# Patient Record
Sex: FEMALE | Race: WHITE | NOT HISPANIC OR LATINO | Employment: FULL TIME
[De-identification: names, ages, dates, MRNs, and addresses within clinical notes are randomized per-mention and may not be internally consistent; named-entity substitution may affect disease eponyms.]

---

## 2017-11-19 ENCOUNTER — HEALTH MAINTENANCE LETTER (OUTPATIENT)
Age: 32
End: 2017-11-19

## 2020-03-02 ENCOUNTER — HEALTH MAINTENANCE LETTER (OUTPATIENT)
Age: 35
End: 2020-03-02

## 2020-10-27 ENCOUNTER — COMMUNICATION - HEALTHEAST (OUTPATIENT)
Dept: SURGERY | Facility: CLINIC | Age: 35
End: 2020-10-27

## 2020-10-27 ENCOUNTER — OFFICE VISIT - HEALTHEAST (OUTPATIENT)
Dept: SURGERY | Facility: CLINIC | Age: 35
End: 2020-10-27

## 2020-10-27 ENCOUNTER — AMBULATORY - HEALTHEAST (OUTPATIENT)
Dept: SURGERY | Facility: CLINIC | Age: 35
End: 2020-10-27

## 2020-10-27 DIAGNOSIS — E66.01 MORBID OBESITY (H): ICD-10-CM

## 2020-10-27 DIAGNOSIS — K91.2 POSTOPERATIVE INTESTINAL MALABSORPTION: ICD-10-CM

## 2020-10-27 DIAGNOSIS — G43.709 CHRONIC MIGRAINE WITHOUT AURA WITHOUT STATUS MIGRAINOSUS, NOT INTRACTABLE: ICD-10-CM

## 2020-10-27 DIAGNOSIS — N39.3 STRESS INCONTINENCE OF URINE: ICD-10-CM

## 2020-10-27 DIAGNOSIS — K21.9 GASTROESOPHAGEAL REFLUX DISEASE WITHOUT ESOPHAGITIS: ICD-10-CM

## 2020-10-27 DIAGNOSIS — M54.50 LOW BACK PAIN, UNSPECIFIED BACK PAIN LATERALITY, UNSPECIFIED CHRONICITY, UNSPECIFIED WHETHER SCIATICA PRESENT: ICD-10-CM

## 2020-10-27 DIAGNOSIS — M72.2 PLANTAR FASCIITIS: ICD-10-CM

## 2020-10-27 ASSESSMENT — MIFFLIN-ST. JEOR: SCORE: 1930.13

## 2020-11-02 ENCOUNTER — COMMUNICATION - HEALTHEAST (OUTPATIENT)
Dept: SURGERY | Facility: CLINIC | Age: 35
End: 2020-11-02

## 2020-11-06 ENCOUNTER — COMMUNICATION - HEALTHEAST (OUTPATIENT)
Dept: SURGERY | Facility: CLINIC | Age: 35
End: 2020-11-06

## 2020-12-01 ENCOUNTER — OFFICE VISIT - HEALTHEAST (OUTPATIENT)
Dept: SURGERY | Facility: CLINIC | Age: 35
End: 2020-12-01

## 2020-12-01 DIAGNOSIS — K91.2 POSTOPERATIVE MALABSORPTION: ICD-10-CM

## 2020-12-01 DIAGNOSIS — R79.89 INCREASED PTH LEVEL: ICD-10-CM

## 2020-12-01 ASSESSMENT — MIFFLIN-ST. JEOR: SCORE: 1876.6

## 2020-12-04 ENCOUNTER — COMMUNICATION - HEALTHEAST (OUTPATIENT)
Dept: SURGERY | Facility: CLINIC | Age: 35
End: 2020-12-04

## 2020-12-14 ENCOUNTER — HEALTH MAINTENANCE LETTER (OUTPATIENT)
Age: 35
End: 2020-12-14

## 2020-12-23 ENCOUNTER — COMMUNICATION - HEALTHEAST (OUTPATIENT)
Dept: SURGERY | Facility: CLINIC | Age: 35
End: 2020-12-23

## 2020-12-23 ENCOUNTER — OFFICE VISIT - HEALTHEAST (OUTPATIENT)
Dept: SURGERY | Facility: CLINIC | Age: 35
End: 2020-12-23

## 2020-12-23 DIAGNOSIS — E66.01 OBESITY, CLASS III, BMI 40-49.9 (MORBID OBESITY) (H): ICD-10-CM

## 2020-12-23 DIAGNOSIS — Z98.84 BARIATRIC SURGERY STATUS: ICD-10-CM

## 2020-12-23 ASSESSMENT — MIFFLIN-ST. JEOR: SCORE: 1853.02

## 2021-01-21 ENCOUNTER — COMMUNICATION - HEALTHEAST (OUTPATIENT)
Dept: SURGERY | Facility: CLINIC | Age: 36
End: 2021-01-21

## 2021-01-21 DIAGNOSIS — E66.01 MORBID OBESITY (H): ICD-10-CM

## 2021-01-21 DIAGNOSIS — K91.2 POSTOPERATIVE INTESTINAL MALABSORPTION: ICD-10-CM

## 2021-02-02 ENCOUNTER — OFFICE VISIT - HEALTHEAST (OUTPATIENT)
Dept: SURGERY | Facility: CLINIC | Age: 36
End: 2021-02-02

## 2021-02-02 ENCOUNTER — COMMUNICATION - HEALTHEAST (OUTPATIENT)
Dept: SURGERY | Facility: CLINIC | Age: 36
End: 2021-02-02

## 2021-02-02 DIAGNOSIS — K91.2 POSTOPERATIVE INTESTINAL MALABSORPTION: ICD-10-CM

## 2021-02-02 DIAGNOSIS — E66.01 MORBID OBESITY (H): ICD-10-CM

## 2021-02-02 ASSESSMENT — MIFFLIN-ST. JEOR: SCORE: 1830.34

## 2021-03-04 ENCOUNTER — COMMUNICATION - HEALTHEAST (OUTPATIENT)
Dept: SURGERY | Facility: CLINIC | Age: 36
End: 2021-03-04

## 2021-03-04 DIAGNOSIS — Z98.84 BARIATRIC SURGERY STATUS: ICD-10-CM

## 2021-03-04 DIAGNOSIS — K91.2 POSTOPERATIVE INTESTINAL MALABSORPTION: ICD-10-CM

## 2021-03-04 DIAGNOSIS — R79.89 INCREASED PTH LEVEL: ICD-10-CM

## 2021-04-18 ENCOUNTER — HEALTH MAINTENANCE LETTER (OUTPATIENT)
Age: 36
End: 2021-04-18

## 2021-06-05 VITALS — BODY MASS INDEX: 41.48 KG/M2 | WEIGHT: 249 LBS | HEIGHT: 65 IN

## 2021-06-05 VITALS — BODY MASS INDEX: 43.18 KG/M2 | HEIGHT: 65 IN | WEIGHT: 259.2 LBS

## 2021-06-05 VITALS — HEIGHT: 65 IN | BODY MASS INDEX: 45.15 KG/M2 | WEIGHT: 271 LBS

## 2021-06-05 VITALS — BODY MASS INDEX: 42.32 KG/M2 | WEIGHT: 254 LBS | HEIGHT: 65 IN

## 2021-06-12 NOTE — PROGRESS NOTES
"Marizol Granados is a 35 y.o. female who is being evaluated via a billable video visit.      The patient has been notified of following:     \"This video visit will be conducted via a call between you and your physician/provider. We have found that certain health care needs can be provided without the need for an in-person physical exam.  This service lets us provide the care you need with a video conversation.  If a prescription is necessary we can send it directly to your pharmacy.  If lab work is needed we can place an order for that and you can then stop by our lab to have the test done at a later time.    Video visits are billed at different rates depending on your insurance coverage. Please reach out to your insurance provider with any questions.    If during the course of the call the physician/provider feels a video visit is not appropriate, you will not be charged for this service.\"    Patient has given verbal consent to a Video visit? Yes  How would you like to obtain your AVS? AVS Preference: MyChart.  If dropped by the video visit, the video invitation should be sent to: Send to e-mail at: [email or web address]  Will anyone else be joining your video visit? No        Video Start Time: 8:15 am    Additional provider notes:     Bariatric Follow Up Visit with a History of Previous Bariatric Surgery     Date of visit: 10/27/2020  Physician: Joellen Vidal MD  Primary Care Provider:  Luz Maria Niño MD  Marizol Granados   35 y.o.  female    Date of Surgery: 3/6/2008  Initial Weight: 268#  Initial BMI: 268#  Today's Weight:   Wt Readings from Last 1 Encounters:   10/27/20 (!) 271 lb (122.9 kg)     Body mass index is 45.1 kg/m .      Assessment and Plan     Assessment: Marizol is a 35 y.o. year old female who is 12 yrs s/p  Brea en Y Gastric Bypass with Dr. Munguia  aMrizol LOBATO Eitanaleksandr feels as if she has not achieved the goals she hoped to accomplish through bariatric surgery and weight loss.    Encounter Diagnoses "   Name Primary?     Postoperative intestinal malabsorption      Gastroesophageal reflux disease without esophagitis      Stress incontinence of urine      Chronic migraine without aura without status migrainosus, not intractable      Low back pain, unspecified back pain laterality, unspecified chronicity, unspecified whether sciatica present      Plantar fasciitis      Morbid obesity (H)          Current Outpatient Medications:      BELSOMRA 20 mg Tab, Take 1 tablet by mouth daily., Disp: , Rfl:      buPROPion (WELLBUTRIN SR) 200 MG 12 hr tablet, Take 1 tablet by mouth 2 (two) times a day., Disp: , Rfl:      calcium polycarbophiL (FIBERCON) 625 mg tablet, Take 1,250 mg by mouth daily., Disp: , Rfl:      cholecalciferol, vitamin D3, (VITAMIN D3) 50 mcg (2,000 unit) capsule, Take 1,000 Units by mouth daily., Disp: , Rfl:      colestipoL (COLESTID) 1 gram tablet, Take 1 tablet by mouth 2 (two) times a day., Disp: , Rfl:      cyanocobalamin, vitamin B-12, (CYANOCOBALAMIN) 1,000 mcg tablet, Take 1,000 mcg by mouth daily., Disp: , Rfl:      desvenlafaxine succinate (PRISTIQ) 100 MG 24 hr tablet, Take 1 tablet by mouth 2 (two) times a day., Disp: , Rfl:      diphenoxylate-atropine (LOMOTIL) 2.5-0.025 mg per tablet, Take 1-2 tablets by mouth 2 (two) times a day as needed., Disp: , Rfl:      fluconazole (DIFLUCAN) 150 MG tablet, Take 1 tablet by mouth daily., Disp: , Rfl:      fluticasone propionate (FLOVENT HFA) 220 mcg/actuation inhaler, Inhale 2 puffs., Disp: , Rfl:      galcanezumab-gnlm 120 mg/mL PnIj, Inject 120 mg under the skin every 30 (thirty) days., Disp: , Rfl:      levonorgestreL (MIRENA) 20 mcg/24 hours (5 yrs) 52 mg IUD, 1 Intra Uterine Device by Intrauterine route., Disp: , Rfl:      levothyroxine (SYNTHROID, LEVOTHROID) 125 MCG tablet, Take 125 mcg by mouth daily., Disp: , Rfl:      loperamide (IMODIUM A-D) 2 mg tablet, Take 2 mg by mouth daily as needed., Disp: , Rfl:      LORazepam (ATIVAN) 1 MG tablet,  "Take 1 tablet by mouth daily as needed., Disp: , Rfl:      ondansetron (ZOFRAN) 4 MG tablet, Take 4 mg by mouth daily as needed., Disp: , Rfl:      QUEtiapine (SEROQUEL) 200 MG tablet, Take 1 tablet by mouth at bedtime., Disp: , Rfl:      ramelteon (ROZEREM) 8 mg tablet, Take 1 tablet by mouth at bedtime., Disp: , Rfl:      traZODone (DESYREL) 150 MG tablet, Take 2 tablets by mouth at bedtime., Disp: , Rfl:      zolpidem (AMBIEN) 5 MG tablet, Take 1 tablet by mouth at bedtime., Disp: , Rfl:      famotidine (PEPCID) 20 MG tablet, Take 1 tablet by mouth daily as needed., Disp: , Rfl:      phentermine (ADIPEX-P) 37.5 mg tablet, Take 1/2 to 1 tablet in the morning., Disp: 90 tablet, Rfl: 0     topiramate (TOPAMAX) 25 MG tablet, Take 1-2 tablets (25-50 mg total) by mouth 2 (two) times a day., Disp: 180 tablet, Rfl: prn    Plan: Dietitian. Annual labs. Topamax for migraine prevention. Phentermine for appetite suppression. Has mirena for birth control. Change B-12 to Sublingual or \"rapid dissolve\" Will wait for labs to see if need to increase MVI to 2/d. Wait for labs to see if vitamin D increase is indicated. Continue going to the gym.     Return in about 1 day (around 10/28/2020) for dietitian then me 1 month after that. .    Bariatric Surgery Review     Interim History/LifeChanges: Initial weight 268# RYGB 2008 at [facility] [place]. Munguia. Lost to 200#. She did not f/u at [facility] [place]. She takes her B-12 by capsule. Her psychiatrist followed her vitamin levels to some extent. She takes one MVI daily. Her vitamin D is 2,000 and she takes 2 daily, She takes align pro-biotic and fiber. She has had multiple colonoscopies, endoscopies and has been diagnosed with IBS. She has had stress ulcers. She has had problems with her teeth. She has mirena for birth control. She used bupropion. She has used topamax in the past.     Patient Concerns: weight regain  Appetite (1-10): up and down  GERD: treated with PPI    Medication changes: off of " lithium recently, off of effexor, down on seroquel    Vitamin Intake:   B-12   capsule   MVI  one a day   Vitamin D  4,000U   Calcium   none     Other  none              LABS: ordered    Nausea no  Vomiting no  Constipation no  Diarrhea yes  Rashes yes  Hair Loss yes  Calf tenderness no  Breathing difficulty no  Reactive Hypoglycemia yes  Light Headedness sometimes   Moods stable    12 point ROS as above and otherwise negative      Habits:  Alcohol: 0-1/month  Tobacco: no  Caffeine diet soda  NSAIDS occasional ibuprofen  Exercise Routine: ADLs and goes to the gym 3X/wk  3 meals/day yes  Protein first yes  60grams/day  Water Separate from meals yes  Calorie Containing Beverages no  Restaurant eating/wk 0-1  Sleeping 6 hours/night-needs more  Stress low/moderate  CPAP: NA  Contraception: mirena  DEXA:not indicated for age <45    Social History     Social History     Socioeconomic History     Marital status: Single     Spouse name: Not on file     Number of children: Not on file     Years of education: Not on file     Highest education level: Not on file   Occupational History     Not on file   Social Needs     Financial resource strain: Not on file     Food insecurity     Worry: Not on file     Inability: Not on file     Transportation needs     Medical: Not on file     Non-medical: Not on file   Tobacco Use     Smoking status: Never Smoker     Smokeless tobacco: Never Used   Substance and Sexual Activity     Alcohol use: Yes     Alcohol/week: 0.0 - 1.0 standard drinks     Comment: maybe one a month     Drug use: Never     Sexual activity: Yes     Partners: Male   Lifestyle     Physical activity     Days per week: Not on file     Minutes per session: Not on file     Stress: Not on file   Relationships     Social connections     Talks on phone: Not on file     Gets together: Not on file     Attends Orthodoxy service: Not on file     Active member of club or organization: Not on file     Attends meetings of clubs or  organizations: Not on file     Relationship status: Not on file     Intimate partner violence     Fear of current or ex partner: Not on file     Emotionally abused: Not on file     Physically abused: Not on file     Forced sexual activity: Not on file   Other Topics Concern     Not on file   Social History Narrative    Single. 2 children 9 months apart.    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 Working from home       Past Medical History     Past Medical History:   Diagnosis Date     Deep vein thrombosis (H)      Depression      Disease of thyroid gland      GERD (gastroesophageal reflux disease)      Hypothyroidism      Low back pain      Migraine      Plantar fasciitis      Postoperative intestinal malabsorption      Pulmonary embolism (H)      Urinary incontinence      Problem List     Patient Active Problem List   Diagnosis     Therapeutic drug monitoring     Social phobia     Severe obesity (H)     S/P gastric bypass     Restless legs     Recurrent major depressive disorder (H)     Psychological factor affecting physical condition     Persistent depressive disorder     Other B-complex deficiencies     Morbid obesity with BMI of 40.0-44.9, adult (H)     Moderate persistent asthma     Irritable bowel syndrome with both constipation and diarrhea     Intractable migraine without aura and with status migrainosus     Insomnia     Hypothyroidism     Hypercholesterolemia     History of thromboembolism     History of MRSA infection     Generalized anxiety disorder     Frequent UTI     Chronic fatigue     Allergic rhinitis     Acne     Abdominal pain, chronic, epigastric     Abdominal pain     Genital herpes simplex     Postoperative intestinal malabsorption     Depression     GERD (gastroesophageal reflux disease)     Urinary incontinence     Migraine     Low back pain     Plantar fasciitis     Morbid obesity (H)     Medications     Current Outpatient Medications    Medication Sig     BELSOMRA 20 mg Tab Take 1 tablet by mouth daily.     buPROPion (WELLBUTRIN SR) 200 MG 12 hr tablet Take 1 tablet by mouth 2 (two) times a day.     calcium polycarbophiL (FIBERCON) 625 mg tablet Take 1,250 mg by mouth daily.     cholecalciferol, vitamin D3, (VITAMIN D3) 50 mcg (2,000 unit) capsule Take 1,000 Units by mouth daily.     colestipoL (COLESTID) 1 gram tablet Take 1 tablet by mouth 2 (two) times a day.     cyanocobalamin, vitamin B-12, (CYANOCOBALAMIN) 1,000 mcg tablet Take 1,000 mcg by mouth daily.     desvenlafaxine succinate (PRISTIQ) 100 MG 24 hr tablet Take 1 tablet by mouth 2 (two) times a day.     diphenoxylate-atropine (LOMOTIL) 2.5-0.025 mg per tablet Take 1-2 tablets by mouth 2 (two) times a day as needed.     fluconazole (DIFLUCAN) 150 MG tablet Take 1 tablet by mouth daily.     fluticasone propionate (FLOVENT HFA) 220 mcg/actuation inhaler Inhale 2 puffs.     galcanezumab-gnlm 120 mg/mL PnIj Inject 120 mg under the skin every 30 (thirty) days.     levonorgestreL (MIRENA) 20 mcg/24 hours (5 yrs) 52 mg IUD 1 Intra Uterine Device by Intrauterine route.     levothyroxine (SYNTHROID, LEVOTHROID) 125 MCG tablet Take 125 mcg by mouth daily.     loperamide (IMODIUM A-D) 2 mg tablet Take 2 mg by mouth daily as needed.     LORazepam (ATIVAN) 1 MG tablet Take 1 tablet by mouth daily as needed.     ondansetron (ZOFRAN) 4 MG tablet Take 4 mg by mouth daily as needed.     QUEtiapine (SEROQUEL) 200 MG tablet Take 1 tablet by mouth at bedtime.     ramelteon (ROZEREM) 8 mg tablet Take 1 tablet by mouth at bedtime.     traZODone (DESYREL) 150 MG tablet Take 2 tablets by mouth at bedtime.     zolpidem (AMBIEN) 5 MG tablet Take 1 tablet by mouth at bedtime.     famotidine (PEPCID) 20 MG tablet Take 1 tablet by mouth daily as needed.     phentermine (ADIPEX-P) 37.5 mg tablet Take 1/2 to 1 tablet in the morning.     topiramate (TOPAMAX) 25 MG tablet Take 1-2 tablets (25-50 mg total) by mouth 2  "(two) times a day.     Surgical History     Past Surgical History  She has a past surgical history that includes Brea-en-y procedure and Cholecystectomy.    Objective-Exam     Constitutional:  Ht 5' 5\" (1.651 m)   Wt (!) 271 lb (122.9 kg)   BMI 45.10 kg/m    Height: 5' 5\" (1.651 m) (10/27/2020  8:00 AM)  Initial Weight: 268 lbs (10/27/2020  8:00 AM)  Weight: (!) 271 lb (122.9 kg) (10/27/2020  8:00 AM)  Weight loss from initial: -3 (10/27/2020  8:00 AM)  % Weight loss: -1.12 % (10/27/2020  8:00 AM)  BMI (Calculated): 45.1 (10/27/2020  8:00 AM)    General:  Pleasant and in no acute distress   Psychiatric: alert and oriented X3, mood and affect normal    Counseling     We reviewed the important post op bariatric recommendations:  -eating 3 meals daily  -eating protein first, getting >60gm protein daily  -eating slowly, chewing food well  -avoiding/limiting calorie containing beverages  -drinking water 15-30 minutes before or after meals  -choosing wheat, not white with breads, crackers, pastas, ashley, bagels, tortillas, rice  -limiting restaurant or cafeteria eating to twice a week or less    We discussed the importance of restorative sleep and stress management in maintaining a healthy weight.  We discussed the National Weight Control Registry healthy weight maintenance strategies and ways to optimize metabolism.  We discussed the importance of physical activity including cardiovascular and strength training in maintaining a healthier weight.    We discussed the importance of life-long vitamin supplementation and life-long  follow-up.    Marizol was reminded that, to avoid marginal ulcers she should avoid tobacco at all, alcohol in excess, caffeine in excess, and NSAIDS (unless indicated for cardioprotection or othewise and opposed by a PPI).    Joellen Vidal MD, FAAFP  [facility].  10/27/2020  8:35 AM            Video-Visit Details    Type of service:  Video Visit    Video End Time (time " video stopped): 9:15am  Originating Location (pt. Location): Home    Distant Location (provider location):  [facility]     Platform used for Video Visit: Brii Vidal MD

## 2021-06-12 NOTE — PATIENT INSTRUCTIONS - HE
Thank you for your interest in Support Group!  We currently have two options for support group but they are in the virtual format only at this time.  Both groups are using [employer] Teams for their platform and you can access it through the web or an jarad that can be downloaded to a smart phone if you have one.      The Pre- and Post-op Connections Group is on the second Tuesday of the month from 6:30-8pm and is hosted by Bruce Pfeiffer, PhD.  If you are interested in this group, you will need to email him at [email or web address] each month and then he will in turn send you the invitation to join.      We also have a Post-op focused Connections Group the 4th Wednesday of the month from 11am-12pm that is mostly geared toward post-operative patients who are past three months post-op.  This group is hosted by JAIME Cortez, CBN, CIC and you can email her to join the group at [email or web address] each month and she will send you an invite similar to Bruce's group.  If you decide you would like to be a regular attender at this group, we can add you to an automatic invitation list of people.    Please let us know if you have any questions.     Thank you,   [facility] [facility]

## 2021-06-12 NOTE — TELEPHONE ENCOUNTER
Called patient when she was not connected for 9 am nutrition appointment. Left message to connect to video call and sent link via SMS text. Also, provided clinic number to call to reschedule.     Otilia Soto RD

## 2021-06-13 NOTE — PROGRESS NOTES
"Marizol Granados is a 35 y.o. female who is being evaluated via a billable video visit.      The patient has been notified of following:     \"This video visit will be conducted via a call between you and your physician/provider. We have found that certain health care needs can be provided without the need for an in-person physical exam.  This service lets us provide the care you need with a video conversation.  If a prescription is necessary we can send it directly to your pharmacy.  If lab work is needed we can place an order for that and you can then stop by our lab to have the test done at a later time.    Video visits are billed at different rates depending on your insurance coverage. Please reach out to your insurance provider with any questions.    If during the course of the call the physician/provider feels a video visit is not appropriate, you will not be charged for this service.\"    Patient has given verbal consent to a Video visit? Yes  How would you like to obtain your AVS? AVS Preference: MyChart.  If dropped by the video visit, the video invitation should be sent to: Send to e-mail at: [email or web address]  Will anyone else be joining your video visit? No        Video Start Time: 8:15 am    Additional provider notes:    Bariatric Follow Up Visit with a History of Previous Bariatric Surgery     Date of visit: 12/1/2020  Physician: Joellen Vidal MD  Primary Care Provider:  Luz Maria Niño MD  Marizol Granados   35 y.o.  female    Date of Surgery: 3/6/2008  Initial Weight: 268#  Initial BMI:   Today's Weight:   Wt Readings from Last 1 Encounters:   12/01/20 (!) 259 lb 3.2 oz (117.6 kg)     Body mass index is 43.13 kg/m .      Assessment and Plan     Assessment: Marizol is a 35 y.o. year old female who is 12 1/2 yrs s/p  Brea en Y Gastric Bypass with Dr. Ezra Fontana LUIS ALFREDO Roberta feels as if she was close achieving the goals she hoped to accomplish through bariatric surgery and weight loss.  She had " reached 200# but has had weight regain    Encounter Diagnoses   Name Primary?     Postoperative malabsorption Yes     Increased PTH level          Current Outpatient Medications:      BELSOMRA 20 mg Tab, Take 1 tablet by mouth daily., Disp: , Rfl:      buPROPion (WELLBUTRIN SR) 200 MG 12 hr tablet, Take 1 tablet by mouth 2 (two) times a day., Disp: , Rfl:      calcium polycarbophiL (FIBERCON) 625 mg tablet, Take 1,250 mg by mouth daily., Disp: , Rfl:      cholecalciferol, vitamin D3, (VITAMIN D3) 50 mcg (2,000 unit) capsule, Take 1,000 Units by mouth daily., Disp: , Rfl:      colestipoL (COLESTID) 1 gram tablet, Take 1 tablet by mouth 2 (two) times a day., Disp: , Rfl:      cyanocobalamin, vitamin B-12, (CYANOCOBALAMIN) 1,000 mcg tablet, Take 1,000 mcg by mouth daily., Disp: , Rfl:      desvenlafaxine succinate (PRISTIQ) 100 MG 24 hr tablet, Take 1 tablet by mouth 2 (two) times a day., Disp: , Rfl:      diphenoxylate-atropine (LOMOTIL) 2.5-0.025 mg per tablet, Take 1-2 tablets by mouth 2 (two) times a day as needed., Disp: , Rfl:      famotidine (PEPCID) 20 MG tablet, Take 1 tablet by mouth daily as needed., Disp: , Rfl:      fluconazole (DIFLUCAN) 150 MG tablet, Take 1 tablet by mouth daily., Disp: , Rfl:      fluticasone propionate (FLOVENT HFA) 220 mcg/actuation inhaler, Inhale 2 puffs., Disp: , Rfl:      galcanezumab-gnlm 120 mg/mL PnIj, Inject 120 mg under the skin every 30 (thirty) days., Disp: , Rfl:      levonorgestreL (MIRENA) 20 mcg/24 hours (5 yrs) 52 mg IUD, 1 Intra Uterine Device by Intrauterine route., Disp: , Rfl:      levothyroxine (SYNTHROID, LEVOTHROID) 125 MCG tablet, Take 125 mcg by mouth daily., Disp: , Rfl:      loperamide (IMODIUM A-D) 2 mg tablet, Take 2 mg by mouth daily as needed., Disp: , Rfl:      LORazepam (ATIVAN) 1 MG tablet, Take 1 tablet by mouth daily as needed., Disp: , Rfl:      ondansetron (ZOFRAN) 4 MG tablet, Take 4 mg by mouth daily as needed., Disp: , Rfl:      phentermine  "(ADIPEX-P) 37.5 mg tablet, Take 1/2 to 1 tablet in the morning., Disp: 90 tablet, Rfl: 0     QUEtiapine (SEROQUEL) 200 MG tablet, Take 1 tablet by mouth at bedtime., Disp: , Rfl:      ramelteon (ROZEREM) 8 mg tablet, Take 1 tablet by mouth at bedtime., Disp: , Rfl:      topiramate (TOPAMAX) 25 MG tablet, Take 1-2 tablets (25-50 mg total) by mouth 2 (two) times a day., Disp: 180 tablet, Rfl: prn     traZODone (DESYREL) 150 MG tablet, Take 2 tablets by mouth at bedtime., Disp: , Rfl:      zolpidem (AMBIEN) 5 MG tablet, Take 1 tablet by mouth at bedtime., Disp: , Rfl:     Plan: Discussed management of constipation. Encouraged to stay the course. Dietitian visit. PTH and ferritin in 6 months. See GYN provider re: menstrual irregularities.  Discussed DEXA  And decided to defer. She increased her D through winter and will see if PTH comes down. She is optimizing her calcium intake    Return for Next scheduled follow up.    Bariatric Surgery Review     Interim History/LifeChanges: Stress is high. Menstrual changes despite Mirena in place for 1 year. Feels exhausted daily-doesn't know why. 2 kids with ADHD distance learning. Acne break out.  PTH up, ferritin marginal. Other labs look good. She has lost 12#. Sleep study \"inconclusive\" Decreased seroquel. Constipated    Patient Concerns: follow up and weight  Appetite (1-10): OK  GERD: no    Medication changes: decreased seroquel and sleeping OK    Vitamin Intake:   B-12   SL   MVI  2/d   Vitamin D  doubled for the winter now taking 10KU/d   Calcium   6418-9461 between food and supplement     Other                LABS: \"Reviewed  PTH increased iron marginal  Nausea yes-has zofran and uses rarely  Vomiting no  Constipation yes  Diarrhea no  Rashes no  Hair Loss no  Calf tenderness no  Breathing difficulty no  Reactive Hypoglycemia no  Light Headedness no   Moods OK    12 point ROS as above and otherwise negative      Habits:  Alcohol: no  Tobacco: no  Caffeine no  NSAIDS " no  Exercise Routine: walks to the mailbox daily across the apartment   3 meals/day yes  Protein first yes  60 grams/day  Water Separate from meals yes  Calorie Containing Beverages no. [employer] and emmett  Restaurant eating/wk no  Sleeping OK  Stress high  CPAP: Not needed  Contraception: mirena  DEXA:not indicated d/t age <45    Social History     Social History     Socioeconomic History     Marital status: Single     Spouse name: Not on file     Number of children: Not on file     Years of education: Not on file     Highest education level: Not on file   Occupational History     Not on file   Social Needs     Financial resource strain: Not on file     Food insecurity     Worry: Not on file     Inability: Not on file     Transportation needs     Medical: Not on file     Non-medical: Not on file   Tobacco Use     Smoking status: Never Smoker     Smokeless tobacco: Never Used   Substance and Sexual Activity     Alcohol use: Yes     Alcohol/week: 0.0 - 1.0 standard drinks     Comment: maybe one a month     Drug use: Never     Sexual activity: Yes     Partners: Male   Lifestyle     Physical activity     Days per week: Not on file     Minutes per session: Not on file     Stress: Not on file   Relationships     Social connections     Talks on phone: Not on file     Gets together: Not on file     Attends Buddhist service: Not on file     Active member of club or organization: Not on file     Attends meetings of clubs or organizations: Not on file     Relationship status: Not on file     Intimate partner violence     Fear of current or ex partner: Not on file     Emotionally abused: Not on file     Physically abused: Not on file     Forced sexual activity: Not on file   Other Topics Concern     Not on file   Social History Narrative    Single. 2 children 9 months apart.    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 Working from home       Past Medical History     Past Medical History:    Diagnosis Date     Deep vein thrombosis (H)      Depression      Disease of thyroid gland      GERD (gastroesophageal reflux disease)      Hypothyroidism      Low back pain      Migraine      Plantar fasciitis      Postoperative intestinal malabsorption      Pulmonary embolism (H)      Urinary incontinence      Problem List     Patient Active Problem List   Diagnosis     Therapeutic drug monitoring     Social phobia     Severe obesity (H)     S/P gastric bypass     Restless legs     Recurrent major depressive disorder (H)     Psychological factor affecting physical condition     Persistent depressive disorder     Other B-complex deficiencies     Morbid obesity with BMI of 40.0-44.9, adult (H)     Moderate persistent asthma     Irritable bowel syndrome with both constipation and diarrhea     Intractable migraine without aura and with status migrainosus     Insomnia     Hypothyroidism     Hypercholesterolemia     History of thromboembolism     History of MRSA infection     Generalized anxiety disorder     Frequent UTI     Chronic fatigue     Allergic rhinitis     Acne     Abdominal pain, chronic, epigastric     Abdominal pain     Genital herpes simplex     Postoperative intestinal malabsorption     Depression     GERD (gastroesophageal reflux disease)     Urinary incontinence     Migraine     Low back pain     Plantar fasciitis     Morbid obesity (H)     Medications     Current Outpatient Medications   Medication Sig     BELSOMRA 20 mg Tab Take 1 tablet by mouth daily.     buPROPion (WELLBUTRIN SR) 200 MG 12 hr tablet Take 1 tablet by mouth 2 (two) times a day.     calcium polycarbophiL (FIBERCON) 625 mg tablet Take 1,250 mg by mouth daily.     cholecalciferol, vitamin D3, (VITAMIN D3) 50 mcg (2,000 unit) capsule Take 1,000 Units by mouth daily.     colestipoL (COLESTID) 1 gram tablet Take 1 tablet by mouth 2 (two) times a day.     cyanocobalamin, vitamin B-12, (CYANOCOBALAMIN) 1,000 mcg tablet Take 1,000 mcg by  "mouth daily.     desvenlafaxine succinate (PRISTIQ) 100 MG 24 hr tablet Take 1 tablet by mouth 2 (two) times a day.     diphenoxylate-atropine (LOMOTIL) 2.5-0.025 mg per tablet Take 1-2 tablets by mouth 2 (two) times a day as needed.     famotidine (PEPCID) 20 MG tablet Take 1 tablet by mouth daily as needed.     fluconazole (DIFLUCAN) 150 MG tablet Take 1 tablet by mouth daily.     fluticasone propionate (FLOVENT HFA) 220 mcg/actuation inhaler Inhale 2 puffs.     galcanezumab-gnlm 120 mg/mL PnIj Inject 120 mg under the skin every 30 (thirty) days.     levonorgestreL (MIRENA) 20 mcg/24 hours (5 yrs) 52 mg IUD 1 Intra Uterine Device by Intrauterine route.     levothyroxine (SYNTHROID, LEVOTHROID) 125 MCG tablet Take 125 mcg by mouth daily.     loperamide (IMODIUM A-D) 2 mg tablet Take 2 mg by mouth daily as needed.     LORazepam (ATIVAN) 1 MG tablet Take 1 tablet by mouth daily as needed.     ondansetron (ZOFRAN) 4 MG tablet Take 4 mg by mouth daily as needed.     phentermine (ADIPEX-P) 37.5 mg tablet Take 1/2 to 1 tablet in the morning.     QUEtiapine (SEROQUEL) 200 MG tablet Take 1 tablet by mouth at bedtime.     ramelteon (ROZEREM) 8 mg tablet Take 1 tablet by mouth at bedtime.     topiramate (TOPAMAX) 25 MG tablet Take 1-2 tablets (25-50 mg total) by mouth 2 (two) times a day.     traZODone (DESYREL) 150 MG tablet Take 2 tablets by mouth at bedtime.     zolpidem (AMBIEN) 5 MG tablet Take 1 tablet by mouth at bedtime.     Surgical History     Past Surgical History  She has a past surgical history that includes Brea-en-y procedure and Cholecystectomy.    Objective-Exam     Constitutional:  Ht 5' 5\" (1.651 m)   Wt (!) 259 lb 3.2 oz (117.6 kg)   BMI 43.13 kg/m    Height: 5' 5\" (1.651 m) (12/1/2020  7:00 AM)  Initial Weight: 268 lbs (12/1/2020  7:00 AM)  Weight: (!) 259 lb 3.2 oz (117.6 kg) (12/1/2020  7:00 AM)  Weight loss from initial: 8.8 (12/1/2020  7:00 AM)  % Weight loss: 3.28 % (12/1/2020  7:00 AM)  BMI " (Calculated): 43.1 (12/1/2020  7:00 AM)    General:  Pleasant and in no acute distress   Psychiatric: alert and oriented X3, mood and affect normal    Counseling     We reviewed the important post op bariatric recommendations:  -eating 3 meals daily  -eating protein first, getting >60gm protein daily  -eating slowly, chewing food well  -avoiding/limiting calorie containing beverages  -drinking water 15-30 minutes before or after meals  -choosing wheat, not white with breads, crackers, pastas, ashley, bagels, tortillas, rice  -limiting restaurant or cafeteria eating to twice a week or less    We discussed the importance of restorative sleep and stress management in maintaining a healthy weight.  We discussed the National Weight Control Registry healthy weight maintenance strategies and ways to optimize metabolism.  We discussed the importance of physical activity including cardiovascular and strength training in maintaining a healthier weight.    We discussed the importance of life-long vitamin supplementation and life-long  follow-up.    Marizol was reminded that, to avoid marginal ulcers she should avoid tobacco at all, alcohol in excess, caffeine in excess, and NSAIDS (unless indicated for cardioprotection or othewise and opposed by a PPI).    Joellen Vidal MD, FAAFP  [facility].  12/1/2020  8:21 AM            Video-Visit Details    Type of service:  Video Visit    Video End Time (time video stopped): 8:42  Originating Location (pt. Location): Home    Distant Location (provider location):  [facility] [place]     Platform used for Video Visit: Brii Vidal MD

## 2021-06-13 NOTE — TELEPHONE ENCOUNTER
Called patient when she was not connected for video nutrition visit. Left message for patient to connect to video or to call the clinic (phone number provided) to reschedule.     Otilia Soto RD

## 2021-06-14 NOTE — PROGRESS NOTES
"Marizol Granados is 35 y.o.  female who presents for a billable video visit today.    How would you like to obtain your AVS? MyChart.  If dropped from the video visit, the video invitation should be resent by: Send to e-mail at: [email or web address]  Will anyone else be joining your video visit? No      Video Start Time: 11:30 am    Bariatric Follow-up    35 y.o.  female BMI:Body mass index is 41.44 kg/m .    Weight:   Wt Readings from Last 1 Encounters:   02/02/21 (!) 249 lb (112.9 kg)    pounds  Height: 5' 5\" (1.651 m) (2/2/2021 11:00 AM)  Initial Weight: 268 lbs (2/2/2021 11:00 AM)  Weight: (!) 249 lb (112.9 kg) (2/2/2021 11:00 AM)  Weight loss from initial: 19 (2/2/2021 11:00 AM)  % Weight loss: 7.09 % (2/2/2021 11:00 AM)  BMI (Calculated): 41.4 (2/2/2021 11:00 AM)      Comorbidities:  Patient Active Problem List   Diagnosis     Therapeutic drug monitoring     Social phobia     Severe obesity (H)     S/P gastric bypass     Restless legs     Recurrent major depressive disorder (H)     Psychological factor affecting physical condition     Persistent depressive disorder     Other B-complex deficiencies     Morbid obesity with BMI of 40.0-44.9, adult (H)     Moderate persistent asthma     Irritable bowel syndrome with both constipation and diarrhea     Intractable migraine without aura and with status migrainosus     Insomnia     Hypothyroidism     Hypercholesterolemia     History of thromboembolism     History of MRSA infection     Generalized anxiety disorder     Frequent UTI     Chronic fatigue     Allergic rhinitis     Acne     Abdominal pain, chronic, epigastric     Abdominal pain     Genital herpes simplex     Postoperative intestinal malabsorption     Depression     GERD (gastroesophageal reflux disease)     Urinary incontinence     Migraine     Low back pain     Plantar fasciitis     Morbid obesity (H)         Interim: RYGB 3/6/2008 with Dr. Munguia [facility]. Initial weight 268#. Lost to 200#. Regained to 271#. " "Has lost 22# and has met with the dietitian once. At a plateau, frustrated. Clothes fitting better and getting compliments. Still on seroquel 200mg. Working with her therapist. Going on vacation for a couple of weeks.     Plan:  DIET  Continue 3 meals, protein first   EXERCISE continue walking the dog and start a YouTube exercise   PHARMACOTHERAPY Phentermine-continue-refilled at [facility]    dietitain. Long term goal is to come off of the seroquel. Stay the course. Continue to \"move,\" counting steps walking the dog and adding strength training with the kids would be great. Indoor plan for [place]-mall walking, home equipment...     -We reviewed her medications and whether associated with weight gain.  Current Outpatient Medications on File Prior to Visit   Medication Sig Dispense Refill     BELSOMRA 20 mg Tab Take 1 tablet by mouth daily.       buPROPion (WELLBUTRIN SR) 200 MG 12 hr tablet Take 1 tablet by mouth 2 (two) times a day.       calcium polycarbophiL (FIBERCON) 625 mg tablet Take 1,250 mg by mouth daily.       cholecalciferol, vitamin D3, (VITAMIN D3) 50 mcg (2,000 unit) capsule Take 1,000 Units by mouth daily.       colestipoL (COLESTID) 1 gram tablet Take 1 tablet by mouth 2 (two) times a day.       cyanocobalamin, vitamin B-12, (CYANOCOBALAMIN) 1,000 mcg tablet Take 1,000 mcg by mouth daily.       desvenlafaxine succinate (PRISTIQ) 100 MG 24 hr tablet Take 1 tablet by mouth 2 (two) times a day.       diphenoxylate-atropine (LOMOTIL) 2.5-0.025 mg per tablet Take 1-2 tablets by mouth 2 (two) times a day as needed.       famotidine (PEPCID) 20 MG tablet Take 1 tablet by mouth daily as needed.       fluconazole (DIFLUCAN) 150 MG tablet Take 1 tablet by mouth daily.       fluticasone propionate (FLOVENT HFA) 220 mcg/actuation inhaler Inhale 2 puffs.       galcanezumab-gnlm 120 mg/mL PnIj Inject 120 mg under the skin every 30 (thirty) days.       levonorgestreL (MIRENA) 20 mcg/24 hours (5 yrs) 52 mg IUD 1 " Intra Uterine Device by Intrauterine route.       levothyroxine (SYNTHROID, LEVOTHROID) 125 MCG tablet Take 125 mcg by mouth daily.       loperamide (IMODIUM A-D) 2 mg tablet Take 2 mg by mouth daily as needed.       LORazepam (ATIVAN) 1 MG tablet Take 1 tablet by mouth daily as needed.       omeprazole (PRILOSEC) 20 MG capsule Take 20 mg by mouth once.       ondansetron (ZOFRAN) 4 MG tablet Take 4 mg by mouth daily as needed.       QUEtiapine (SEROQUEL) 200 MG tablet Take 1 tablet by mouth at bedtime.       ramelteon (ROZEREM) 8 mg tablet Take 1 tablet by mouth at bedtime.       traZODone (DESYREL) 150 MG tablet Take 2 tablets by mouth at bedtime.       zolpidem (AMBIEN) 5 MG tablet Take 1 tablet by mouth at bedtime.       [DISCONTINUED] phentermine (ADIPEX-P) 37.5 mg tablet TAKE 1/2 TO 1 TABLET BY MOUTH IN THE MORNING 90 tablet 1     No current facility-administered medications on file prior to visit.         We discussed [facility] Bariatric Basics including:  -eating 3 meals daily  -eating protein first  -eating slowly, chewing food well  -avoiding/limiting calorie containing beverages  -choosing wheat, not white with breads, crackers, pastas, ashley, bagels, tortillas, rice  -limiting restaurant or cafeteria eating to twice a week or less  -We discussed the importance of restorative sleep and stress management in maintaining a healthy weight.  -We discussed insulin resistance and glycemic index as it relates to appetite and weight control  -We discussed the National Weight Control Registry healthy weight maintenance strategies and ways to optimize metabolism.  -We discussed the importance of physical activity including cardiovascular and strength training in maintaining a healthier weight and explored viable options.    Most recent labs:      DIETARY HISTORY  Meals Per Day: 3  Eating Protein First?: yes  Food Diary: B:protein shake with banana or 1 waffle with PB L:turkey, chicken, protein bar, salad,  D:Ribs  "last night   Snacks Per Day: not typically now  Typical Snack: shake or a bar on occasion  Fluid Intake: intentional  Portion Control: improved  Calorie Containing Beverages: none  Alcohol per week: no  Typical Protein Food Choices: see above  Choosing Whole Grains: with bread and crackers  Grocery Shopping is done by: herself  Food Preparation is done by: herself  Meals at Restaurant/Cafeteria/Take Out Per Week: 0-1  Eating at the Table: yes  TV is Off During Meals: yes    Positive Changes Since Last Visit: 22# weight loss, meal planning,   Struggling With: plateau    Knowledgeable in Reading Food Labels: yes  Getting Adequate Protein: yes  Sleeping 7-8 hours/day yes  Stress management sleeps    PHYSICAL ACTIVITY PATTERNS:  Cardiovascular: tries to move around the house. Walking the dog  Strength Training: thinking about doing exercises on TV    REVIEW OF SYSTEMS  GENERAL/CONSTITUTIONAL:  Fatigue: improved  CARDIOVASCULAR:  Chest Pain with Exertion: no  PULMONARY:  Dyspnea on exertion: no  CPAP Use: NA  Tobacco Use: no  GASTROINTESTINAL:  GERD/Heartburn: yes on PPI  UROLOGIC:  Urinary Symptoms: no  NEUROLOGIC:  Headaches: occ  Paresthesias: no  PSYCHIATRIC:  Moods: OK  MUSCULOSKELETAL/RHEUMATOLOGIC  Arthralgias: no  Myalgias: no  ENDOCRINE:  Monitoring Blood Sugars: no  Sugars Well Controlled: yes  DERMATOLOGIC:  Rashes: no    PHYSICAL EXAM: (most recent vitals and today's stated weight)  Vitals: Ht 5' 5\" (1.651 m)   Wt (!) 249 lb (112.9 kg)   BMI 41.44 kg/m    Height: 5' 5\" (1.651 m) (2/2/2021 11:00 AM)  Initial Weight: 268 lbs (2/2/2021 11:00 AM)  Weight: (!) 249 lb (112.9 kg) (2/2/2021 11:00 AM)  Weight loss from initial: 19 (2/2/2021 11:00 AM)  % Weight loss: 7.09 % (2/2/2021 11:00 AM)  BMI (Calculated): 41.4 (2/2/2021 11:00 AM)      GEN: Pleasant and in no acute distress  PSYCH: A&OX3,     I have reviewed the note as documented above.  This accurately captures the substance of my conversation with the " patient.  Thank you for the opportunity to participate in the care of your patient.    Joellen Vidal MD, FAAFP  [facility]  Diplomate, American Board of Obesity Medicine    Total time spent on the date of this encounter doing: chart review, review of test results, patient visit, physical exam, education, counseling, developing plan of care, and documenting = 30 minutes.          Video-Visit Details    Type of service:  Video Visit    Originating Location (pt. Location): Home    Distant Location (provider location):  [facility]     Platform used for Video Visit: [employer]

## 2021-06-14 NOTE — PROGRESS NOTES
"Marizol Granados is a 35 y.o. female who is being evaluated via a billable video visit.       The patient has been notified of following:     \"This video visit will be conducted via a call between you and your physician/provider. We have found that certain health care needs can be provided without the need for an in-person physical exam.  This service lets us provide the care you need with a video conversation.  If a prescription is necessary we can send it directly to your pharmacy.  If lab work is needed we can place an order for that and you can then stop by our lab to have the test done at a later time.    Video visits are billed at different rates depending on your insurance coverage. Please reach out to your insurance provider with any questions.    If during the course of the call the physician/provider feels a video visit is not appropriate, you will not be charged for this service.\"    Patient has given verbal consent to a Video visit? Yes  How would you like to obtain your AVS? AVS Preference: MyChart.  If dropped by the video visit, the video invitation should be sent to: Send to e-mail at: [email or web address]  Will anyone else be joining your video visit? No    Video Start Time: 2:00p      Post-op Surgical Weight Loss Diet Evaluation     Assessment:  Pt presents for 12.5 years post-op RD visit, s/p RYGB on 3-6-08 with Dr. Munguia. Today we reviewed current eating habits and level of physical activity, and instructed on the changes that are required for successful bariatric outcomes.    Patient Progress: goal is 160lb- lowest weight after surgery was 200lb, started on topamax and phentermine  Feeling really good to have lost about 19lb in the past couple months    Pt's Initial Weight: 268 lbs  Weight: (!) 254 lb (115.2 kg)  Weight loss from initial: 14  % Weight loss: 5.22 %      Body mass index is 42.27 kg/m .    Patient Active Problem List   Diagnosis     Therapeutic drug monitoring     Social phobia     " "Severe obesity (H)     S/P gastric bypass     Restless legs     Recurrent major depressive disorder (H)     Psychological factor affecting physical condition     Persistent depressive disorder     Other B-complex deficiencies     Morbid obesity with BMI of 40.0-44.9, adult (H)     Moderate persistent asthma     Irritable bowel syndrome with both constipation and diarrhea     Intractable migraine without aura and with status migrainosus     Insomnia     Hypothyroidism     Hypercholesterolemia     History of thromboembolism     History of MRSA infection     Generalized anxiety disorder     Frequent UTI     Chronic fatigue     Allergic rhinitis     Acne     Abdominal pain, chronic, epigastric     Abdominal pain     Genital herpes simplex     Postoperative intestinal malabsorption     Depression     GERD (gastroesophageal reflux disease)     Urinary incontinence     Migraine     Low back pain     Plantar fasciitis     Morbid obesity (H)     Vitamins   Multi Vit with Iron: yes  Calcium Citrate: yes  B12: yes  D3: yes    Do you have \"dumping\" syndrome?yes- a lot   Nausea: yes  Vomiting: no  Diarrhea: yes  Constipation: yes  +hospitalized for GI issues- unknown diagnosis    Diet Recall/Time:   Breakfast: 1/2 bagel or piece of toast with peanut butter, sometimes a banana  Lunch: turkey or chicken and carrots- kind of eats the same things each day- chicken salad or chopped chicken/turkey and a veggies (peas, carrots or corn)  Dinner: chicken caesar salad     Typical Snacks: none    Proteins/Veg/Fruits/CHO (NOT well tolerated): none    Estimated protein intake: 40-50 grams    Estimated portion size per meals:1 cup/meal    Meals per week away from home: none    Meal Duration:20 minutes    Fluid-meal separation:  Fluids are  30min before and 30 minutes after meals.    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       PES statement:      (NC-1.4) Altered GI Function related to Alteration in gastrointestinal tract structure and/or function/ Decreased functional length of the GI tract as evidenced by Weight loss of 5% initial body weight; Gastric bypass surgery  (NB-1.7) Undesirable food choices related to Failure to adjust for lifestyle changes as evidenced by low protein intake at meals; large portions; skipping meals; frequent grazing;  mindless eating ; drinking with meals, not chewing each bite to applesauce consistency; consuming caffeine/carbonation daily, frequent restaurant intake; and no structured activity regimen  (NI-5.7.1) Inadequate protein intake related to Gastric bypass causing increased nutrient needs due to malabsorption/ Decreased ability to consume sufficient protein as evidenced by Edema, poor musculature, dull skin, thin and fragile hair; and Estimated intake of protein insufficient to meet requirements    Intervention    Discussion  1. Recommended to consume 15-20gm protein at 3 meals daily, along with protein supplement/\"planned protein containing snack\" of 15-30gm protein, to reach goal of 60-80 gm protein daily.  2. Reviewed lean protein sources  3. Bariatric Plate Method-  including lean/low fat protein at each meal, including a vegetable/fruit, and limiting carbohydrate intake to less than 25% of plate volume.    Instructions  1. Include 15-20gm protein at each meal, along with protein supplement/\"planned protein containing snack\" of 15-30gm protein, to reach goal of 60-80 gm protein daily.  2. Read food labels more consistently: keeping total fat grams <10, total sugar grams <10, fiber >3gm per serving.  3. Increase vegetable/fruit intake, by having a vegetable or fruit with each meal daily.  4. Practice plate method: 1/2 plate lean/low fat protein source, vegetable/fruit, <25% of plate complex carbohydrates.  5. Separate fluids 30 " minutes before/after meal times.  6. Practice eating off of smaller plates/bowls, chewing to applesauce consistency, taking 20-30 minutes to eat in a calm/relaxed environment without distractions of tv/email/cell phone.    Handouts provided:  Protein supplements    Assessment/Plan:    Pt to follow up in 2 months with bariatrician and 3-4 months with [facility]    Video-Visit Details    Type of service:  Video Visit    Video End Time (time video stopped): 2:20 PM  Originating Location (pt. Location): Home    Distant Location (provider location):  [facility] GENER[facility] SURGERY [facility]ARE     Platform used for Video Visit: Brii Terrell RD

## 2021-06-15 NOTE — TELEPHONE ENCOUNTER
Orders placed and faxed to pt's PCP clinic, -fax number [phone]. Pt is due to have these labs checked at the end of April. Sent pt a message letting her know that.    Evi Valero RN, CBN  [facility]  P [phone]  F [phone]

## 2021-06-16 PROBLEM — F34.1 PERSISTENT DEPRESSIVE DISORDER: Status: ACTIVE | Noted: 2019-09-17

## 2021-06-16 PROBLEM — G25.81 RESTLESS LEGS: Status: ACTIVE | Noted: 2019-05-28

## 2021-06-16 PROBLEM — Z86.718 HISTORY OF THROMBOEMBOLISM: Status: ACTIVE | Noted: 2019-08-06

## 2021-06-16 PROBLEM — E03.9 HYPOTHYROIDISM: Status: ACTIVE | Noted: 2020-10-21

## 2021-06-16 PROBLEM — K58.2 IRRITABLE BOWEL SYNDROME WITH BOTH CONSTIPATION AND DIARRHEA: Status: ACTIVE | Noted: 2020-04-14

## 2021-06-16 PROBLEM — E53.8 OTHER B-COMPLEX DEFICIENCIES: Status: ACTIVE | Noted: 2019-03-04

## 2021-06-16 PROBLEM — G47.00 INSOMNIA: Status: ACTIVE | Noted: 2017-02-02

## 2021-06-16 PROBLEM — A60.00 GENITAL HERPES SIMPLEX: Status: ACTIVE | Noted: 2017-11-24

## 2021-06-16 PROBLEM — J30.9 ALLERGIC RHINITIS: Status: ACTIVE | Noted: 2019-03-04

## 2021-07-07 ENCOUNTER — COMMUNICATION - HEALTHEAST (OUTPATIENT)
Dept: SURGERY | Facility: CLINIC | Age: 36
End: 2021-07-07

## 2021-07-07 ENCOUNTER — AMBULATORY - HEALTHEAST (OUTPATIENT)
Dept: SURGERY | Facility: CLINIC | Age: 36
End: 2021-07-07

## 2021-07-07 DIAGNOSIS — K91.2 POSTOPERATIVE INTESTINAL MALABSORPTION: ICD-10-CM

## 2021-07-07 DIAGNOSIS — E66.01 MORBID OBESITY (H): ICD-10-CM

## 2021-10-02 ENCOUNTER — HEALTH MAINTENANCE LETTER (OUTPATIENT)
Age: 36
End: 2021-10-02

## 2021-11-19 ENCOUNTER — VIRTUAL VISIT (OUTPATIENT)
Dept: SURGERY | Facility: CLINIC | Age: 36
End: 2021-11-19
Payer: COMMERCIAL

## 2021-11-19 VITALS — WEIGHT: 206 LBS | BODY MASS INDEX: 34.32 KG/M2 | HEIGHT: 65 IN

## 2021-11-19 DIAGNOSIS — R63.2 HYPERPHAGIA: ICD-10-CM

## 2021-11-19 DIAGNOSIS — K91.2 POSTOPERATIVE MALABSORPTION: Primary | ICD-10-CM

## 2021-11-19 PROCEDURE — 99213 OFFICE O/P EST LOW 20 MIN: CPT | Mod: 95 | Performed by: FAMILY MEDICINE

## 2021-11-19 RX ORDER — DESVENLAFAXINE 50 MG/1
TABLET, FILM COATED, EXTENDED RELEASE ORAL
COMMUNITY
Start: 2021-02-09

## 2021-11-19 RX ORDER — PHENTERMINE HYDROCHLORIDE 37.5 MG/1
1 TABLET ORAL EVERY MORNING
COMMUNITY
Start: 2020-10-27 | End: 2021-11-19

## 2021-11-19 RX ORDER — TRAZODONE HYDROCHLORIDE 150 MG/1
300 TABLET ORAL AT BEDTIME
Status: ON HOLD | COMMUNITY
Start: 2020-10-20 | End: 2024-09-18

## 2021-11-19 RX ORDER — BUPROPION HYDROCHLORIDE 150 MG/1
150 TABLET, EXTENDED RELEASE ORAL
COMMUNITY
Start: 2021-05-05 | End: 2022-09-02

## 2021-11-19 RX ORDER — FLUTICASONE PROPIONATE 220 UG/1
2 AEROSOL, METERED RESPIRATORY (INHALATION) DAILY PRN
Status: ON HOLD | COMMUNITY
Start: 2020-08-06 | End: 2024-06-21

## 2021-11-19 RX ORDER — LEVOTHYROXINE SODIUM 125 UG/1
125 TABLET ORAL
COMMUNITY
Start: 2020-08-06 | End: 2022-09-02

## 2021-11-19 RX ORDER — TOPIRAMATE 25 MG/1
50 TABLET, FILM COATED ORAL
COMMUNITY
Start: 2020-10-27 | End: 2022-03-01

## 2021-11-19 RX ORDER — ALBUTEROL SULFATE 90 UG/1
1-2 AEROSOL, METERED RESPIRATORY (INHALATION)
Status: ON HOLD | COMMUNITY
Start: 2021-05-21 | End: 2023-01-23

## 2021-11-19 RX ORDER — DICYCLOMINE HYDROCHLORIDE 10 MG/1
CAPSULE ORAL
Status: ON HOLD | COMMUNITY
Start: 2020-07-14 | End: 2023-01-23

## 2021-11-19 RX ORDER — CALCIUM POLYCARBOPHIL 625 MG
1 TABLET ORAL DAILY
COMMUNITY
End: 2024-06-26

## 2021-11-19 RX ORDER — ZOLPIDEM TARTRATE 5 MG/1
5 TABLET ORAL AT BEDTIME
COMMUNITY
Start: 2020-10-20

## 2021-11-19 RX ORDER — RAMELTEON 8 MG/1
8 TABLET ORAL AT BEDTIME
COMMUNITY
Start: 2020-10-10

## 2021-11-19 RX ORDER — DESVENLAFAXINE 100 MG/1
100 TABLET, EXTENDED RELEASE ORAL DAILY
COMMUNITY
Start: 2020-09-30

## 2021-11-19 RX ORDER — UBROGEPANT 50 MG/1
50 TABLET ORAL
COMMUNITY
Start: 2021-10-12 | End: 2022-04-10

## 2021-11-19 RX ORDER — PHENTERMINE HYDROCHLORIDE 37.5 MG/1
18.75-37.5 TABLET ORAL EVERY MORNING
Qty: 90 TABLET | Refills: 1 | Status: SHIPPED | OUTPATIENT
Start: 2021-11-19 | End: 2022-05-12

## 2021-11-19 RX ORDER — QUETIAPINE FUMARATE 50 MG/1
150 TABLET, FILM COATED ORAL
COMMUNITY
Start: 2021-03-10 | End: 2022-03-10

## 2021-11-19 RX ORDER — GALCANEZUMAB 120 MG/ML
240 INJECTION, SOLUTION SUBCUTANEOUS
COMMUNITY
Start: 2020-08-10 | End: 2024-05-02

## 2021-11-19 ASSESSMENT — MIFFLIN-ST. JEOR: SCORE: 1635.29

## 2021-11-19 NOTE — LETTER
11/19/2021         RE: Marizol Granados  [id number] [address] [address]        Dear Colleague,    Thank you for referring your patient, Marizol Granados, to the [facility] [place]. Please see a copy of my visit note below.    Marizol Granados is 36 year old  female who presents for a billable video visit today.    How would you like to obtain your AVS? MyChart  If dropped from the video visit, the video invitation should be resent by: Send to e-mail at: [email or web address]  Will anyone else be joining your video visit? No  {If patient encounters technical issues they should call [phone]}    Video Start Time: 9:05      Bariatric Follow Up Visit with a History of Previous Bariatric Surgery     Date of visit: 11/19/2021  Physician: Joellen Vidal MD, MD  Primary Care Provider:  Arik Munoz  Marizol Granados   36 year old  female    Date of Surgery: 3/6/2008  Initial Weight: 268#  Initial BMI:   Today's Weight:   Wt Readings from Last 1 Encounters:   11/19/21 93.4 kg (206 lb)     Body mass index is 34.28 kg/m .      Assessment and Plan     Assessment: Marizol is a 36 year old year old female who is 13 yrs s/p  Brea en Y Gastric Bypass with Dr. Munguia  Marizol Granados feels as if she had achieved the goals she hoped to accomplish through bariatric surgery and weight loss.    Encounter Diagnoses   Name Primary?     Postoperative malabsorption Yes     Hyperphagia          Current Outpatient Medications:      albuterol (PROAIR HFA/PROVENTIL HFA/VENTOLIN HFA) 108 (90 Base) MCG/ACT inhaler, Inhale 1-2 puffs into the lungs, Disp: , Rfl:      buPROPion (WELLBUTRIN SR) 150 MG 12 hr tablet, Take 150 mg by mouth, Disp: , Rfl:      Calcium Carb-Cholecalciferol (CALCIUM 500 +D PO), Take  by mouth. 2xqd at hs, Disp: , Rfl:      Calcium Polycarbophil (FIBER) 625 MG tablet, Take 1,250 mg by mouth, Disp: , Rfl:      cholecalciferol 50 MCG (2000 UT) CAPS, Take  1,000 Units by mouth, Disp: , Rfl:      Cyanocobalamin (B-12 PO), Take  by mouth. 1,000 mg hs, Disp: , Rfl:      desvenlafaxine (PRISTIQ) 100 MG 24 hr tablet, TAKE 1 TABLET BY MOUTH DAILY AT BEDTIME, Disp: , Rfl:      desvenlafaxine (PRISTIQ) 50 MG 24 hr tablet, TAKE 1 TABLET BY MOUTH ONCE DAILY. TAKE WITH 100MG TABLET FOR TOTAL OF 150MG DAILY, Disp: , Rfl:      dicyclomine (BENTYL) 10 MG capsule, TAKE 1 TO 2 CAPSULES(10 TO 20 MG) BY MOUTH FOUR TIMES DAILY AS NEEDED FOR DIARRHEA OR ABDOMINAL CRAMPS, Disp: , Rfl:      fluticasone (FLOVENT HFA) 220 MCG/ACT inhaler, Inhale 2 puffs into the lungs, Disp: , Rfl:      fluticasone-salmeterol (ADVAIR) 500-50 MCG/DOSE inhaler, , Disp: , Rfl:      galcanezumab-gnlm (EMGALITY) 120 MG/ML injection, Inject 240 mg Subcutaneous, Disp: , Rfl:      levonorgestrel (MIRENA) 20 MCG/24HR IUD, 1 Intra Uterine Device by Intrauterine route, Disp: , Rfl:      levothyroxine (SYNTHROID/LEVOTHROID) 125 MCG tablet, Take 125 mcg by mouth, Disp: , Rfl:      LORazepam (ATIVAN PO), Take  by mouth. 0.5 mg 1 tab 2xqd and 1 tab prn, Disp: , Rfl:      Multiple Vitamins-Minerals (MULTIVITAL PO), Take  by mouth. 1xqd hs , Disp: , Rfl:      omeprazole (PRILOSEC) 20 MG DR capsule, Take 20 mg by mouth, Disp: , Rfl:      Ondansetron HCl (ZOFRAN PO), Take  by mouth. 4 mg 1-2 tab prn, Disp: , Rfl:      QUEtiapine (SEROQUEL) 50 MG tablet, Take 150 mg by mouth, Disp: , Rfl:      ramelteon (ROZEREM) 8 MG tablet, TAKE 1 TABLET(8 MG) BY MOUTH AT BEDTIME AS NEEDED FOR SLEEP, Disp: , Rfl:      Suvorexant (BELSOMRA) 20 MG tablet, Take 20 mg by mouth, Disp: , Rfl:      topiramate (TOPAMAX) 25 MG tablet, Take 50 mg by mouth, Disp: , Rfl:      traZODone (DESYREL) 150 MG tablet, TAKE 2 TABLETS(300 MG) BY MOUTH DAILY AT BEDTIME, Disp: , Rfl:      ubrogepant (UBRELVY) 50 MG tablet, Take 50 mg by mouth, Disp: , Rfl:      zolpidem (AMBIEN) 5 MG tablet, TAKE 1 TABLET(5 MG) BY MOUTH AT BEDTIME AS NEEDED FOR SLEEP, Disp: , Rfl:       phentermine (ADIPEX-P) 37.5 MG tablet, Take 0.5-1 tablets (18.75-37.5 mg) by mouth every morning, Disp: 90 tablet, Rfl: 1    Plan: Annual labs [facility]. Continue vitamins with consistency. Step down on Seroquel in the future if able and settled. Refill phentermine. Get a fitbit/smart watch or monitor steps. Adding 2,000 steps a day will get you to your goal of under 200:-)    Return in about 1 week (around 11/26/2021).    Bariatric Surgery Review     Interim History/LifeChanges: Had COVID in October. Having Thanksgiving at her house. Maintaining a 62#. Started [employer]. Moved to [place]. Just decided to start over there. Bought a house. She just hates it. The kids are healthy and like it there.    Patient Concerns: would like to get below 200#  Appetite (1-10): OK  GERD: no    Medication changes: seroquiel lowered from 200mg to 150mg    Vitamin Intake:   B-12   SL   MVI  1   Vitamin D  5,000   Calcium   supplement     Other                LABS: ordered    Nausea stomach hurts when eats  Vomiting no  Constipation with COVID  Diarrhea no  Rashes no  Hair Loss no  Calf tenderness no  Breathing difficulty no  Reactive Hypoglycemia avoids  Light Headedness no   Moods stable    12 point ROS as above and otherwise negative      Habits:  Alcohol: no  Tobacco: no  Caffeine no  NSAIDS no  Exercise Routine: ADLs.   3 meals/day yes  Protein first yes  60grams/day  Water Separate from meals yes  Calorie Containing Beverages no  Restaurant eating/wk no  Sleeping Ok  Stress moderate  CPAP: no  Contraception: mirena  DEXA:not indicated    Social History     Social History     Socioeconomic History     Marital status: Single     Spouse name: Not on file     Number of children: Not on file     Years of education: Not on file     Highest education level: Not on file   Occupational History     Not on file   Tobacco Use     Smoking status: Never Smoker     Smokeless tobacco: Never Used   Substance and Sexual  Activity     Alcohol use: Yes     Alcohol/week: 0.0 - 1.0 standard drinks     Comment: Alcoholic Drinks/day: maybe one a month     Drug use: Never     Sexual activity: Yes     Partners: Male     Birth control/protection: Condom   Other Topics Concern     Parent/sibling w/ CABG, MI or angioplasty before 65F 55M? Not Asked      Service Not Asked     Blood Transfusions Not Asked     Caffeine Concern Not Asked     Occupational Exposure Not Asked     Hobby Hazards Not Asked     Sleep Concern Not Asked     Stress Concern Not Asked     Weight Concern Not Asked     Special Diet Not Asked     Back Care Not Asked     Exercise Yes     Comment: cardio and weights     Bike Helmet Not Asked     Seat Belt Not Asked     Self-Exams Not Asked   Social History Narrative    Single. 2 children 9 months apart.  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 Working from home.     Social Determinants of Health     Financial Resource Strain: Not on file   Food Insecurity: Not on file   Transportation Needs: Not on file   Physical Activity: Not on file   Stress: Not on file   Social Connections: Not on file   Intimate Partner Violence: Not on file   Housing Stability: Not on file       Past Medical History     Past Medical History:   Diagnosis Date     Abdominal pain      Anxiety      Deep vein thrombosis (H)      Depression      Depression      Diarrhea      Disease of thyroid gland      GERD (gastroesophageal reflux disease)      Hypothyroidism      Low back pain      Migraine      Plantar fasciitis      Postoperative intestinal malabsorption      PTSD (post-traumatic stress disorder)      Pulmonary embolism (H)      Urinary incontinence      Problem List     Patient Active Problem List   Diagnosis     Therapeutic drug monitoring     Social phobia     Severe obesity (H)     S/P gastric bypass     Restless legs     Recurrent major depressive disorder (H)     Psychological factor affecting physical  condition     Persistent depressive disorder     Other B-complex deficiencies     Morbid obesity with BMI of 40.0-44.9, adult (H)     Moderate persistent asthma     Irritable bowel syndrome with both constipation and diarrhea     Intractable migraine without aura and with status migrainosus     Insomnia     Hypothyroidism     Hypercholesterolemia     History of thromboembolism     History of MRSA infection     Generalized anxiety disorder     Frequent UTI     Chronic fatigue     Allergic rhinitis     Acne     Abdominal pain, chronic, epigastric     Abdominal pain     Genital herpes simplex     Postoperative intestinal malabsorption     Depression     GERD (gastroesophageal reflux disease)     Urinary incontinence     Migraine     Low back pain     Plantar fasciitis     Morbid obesity (H)     Medications       Current Outpatient Medications:      albuterol (PROAIR HFA/PROVENTIL HFA/VENTOLIN HFA) 108 (90 Base) MCG/ACT inhaler, Inhale 1-2 puffs into the lungs, Disp: , Rfl:      buPROPion (WELLBUTRIN SR) 150 MG 12 hr tablet, Take 150 mg by mouth, Disp: , Rfl:      Calcium Carb-Cholecalciferol (CALCIUM 500 +D PO), Take  by mouth. 2xqd at hs, Disp: , Rfl:      Calcium Polycarbophil (FIBER) 625 MG tablet, Take 1,250 mg by mouth, Disp: , Rfl:      cholecalciferol 50 MCG (2000 UT) CAPS, Take 1,000 Units by mouth, Disp: , Rfl:      Cyanocobalamin (B-12 PO), Take  by mouth. 1,000 mg hs, Disp: , Rfl:      desvenlafaxine (PRISTIQ) 100 MG 24 hr tablet, TAKE 1 TABLET BY MOUTH DAILY AT BEDTIME, Disp: , Rfl:      desvenlafaxine (PRISTIQ) 50 MG 24 hr tablet, TAKE 1 TABLET BY MOUTH ONCE DAILY. TAKE WITH 100MG TABLET FOR TOTAL OF 150MG DAILY, Disp: , Rfl:      dicyclomine (BENTYL) 10 MG capsule, TAKE 1 TO 2 CAPSULES(10 TO 20 MG) BY MOUTH FOUR TIMES DAILY AS NEEDED FOR DIARRHEA OR ABDOMINAL CRAMPS, Disp: , Rfl:      fluticasone (FLOVENT HFA) 220 MCG/ACT inhaler, Inhale 2 puffs into the lungs, Disp: , Rfl:      fluticasone-salmeterol  "(ADVAIR) 500-50 MCG/DOSE inhaler, , Disp: , Rfl:      galcanezumab-gnlm (EMGALITY) 120 MG/ML injection, Inject 240 mg Subcutaneous, Disp: , Rfl:      levonorgestrel (MIRENA) 20 MCG/24HR IUD, 1 Intra Uterine Device by Intrauterine route, Disp: , Rfl:      levothyroxine (SYNTHROID/LEVOTHROID) 125 MCG tablet, Take 125 mcg by mouth, Disp: , Rfl:      LORazepam (ATIVAN PO), Take  by mouth. 0.5 mg 1 tab 2xqd and 1 tab prn, Disp: , Rfl:      Multiple Vitamins-Minerals (MULTIVITAL PO), Take  by mouth. 1xqd hs , Disp: , Rfl:      omeprazole (PRILOSEC) 20 MG DR capsule, Take 20 mg by mouth, Disp: , Rfl:      Ondansetron HCl (ZOFRAN PO), Take  by mouth. 4 mg 1-2 tab prn, Disp: , Rfl:      QUEtiapine (SEROQUEL) 50 MG tablet, Take 150 mg by mouth, Disp: , Rfl:      ramelteon (ROZEREM) 8 MG tablet, TAKE 1 TABLET(8 MG) BY MOUTH AT BEDTIME AS NEEDED FOR SLEEP, Disp: , Rfl:      Suvorexant (BELSOMRA) 20 MG tablet, Take 20 mg by mouth, Disp: , Rfl:      topiramate (TOPAMAX) 25 MG tablet, Take 50 mg by mouth, Disp: , Rfl:      traZODone (DESYREL) 150 MG tablet, TAKE 2 TABLETS(300 MG) BY MOUTH DAILY AT BEDTIME, Disp: , Rfl:      ubrogepant (UBRELVY) 50 MG tablet, Take 50 mg by mouth, Disp: , Rfl:      zolpidem (AMBIEN) 5 MG tablet, TAKE 1 TABLET(5 MG) BY MOUTH AT BEDTIME AS NEEDED FOR SLEEP, Disp: , Rfl:      phentermine (ADIPEX-P) 37.5 MG tablet, Take 0.5-1 tablets (18.75-37.5 mg) by mouth every morning, Disp: 90 tablet, Rfl: 1   Surgical History     Past Surgical History  She has a past surgical history that includes gastric bypass (2008); Cholecystectomy; liver biopsy; CAPSULE ENDOSCOPY (11/20/2012); CAPSULE ENDOSCOPY (12/10/2012); Revision Brea-En-Y; and Cholecystectomy.    Objective-Exam     Constitutional:  Ht 1.651 m (5' 5\")   Wt 93.4 kg (206 lb)   BMI 34.28 kg/m    General:  Pleasant and in no acute distress     Psychiatric: alert and oriented X3, mood and affect normal    Counseling     We reviewed the important post op " bariatric recommendations:  -eating 3 meals daily  -eating protein first, getting >60gm protein daily  -eating slowly, chewing food well  -avoiding/limiting calorie containing beverages  -drinking water 15-30 minutes before or after meals  -choosing wheat, not white with breads, crackers, pastas, ashley, bagels, tortillas, rice  -limiting restaurant or cafeteria eating to twice a week or less    We discussed the importance of restorative sleep and stress management in maintaining a healthy weight.  We discussed the National Weight Control Registry healthy weight maintenance strategies and ways to optimize metabolism.  We discussed the importance of physical activity including cardiovascular and strength training in maintaining a healthier weight.    We discussed the importance of life-long vitamin supplementation and life-long  follow-up.    Marizol was reminded that, to avoid marginal ulcers she should avoid tobacco at all, alcohol in excess, caffeine in excess, and NSAIDS (unless indicated for cardioprotection or othewise and opposed by a PPI).    Joellen Vidal MD, MD, [facility].  11/19/2021  9:06 AM  Total time spent on the date of this encounter doing: chart review, review of test results, patient visit, physical exam, education, counseling, developing plan of care, and documenting = 25 minutes.      Video-Visit Details    Type of service:  Video Visit    Video End Time (time video stopped): 9:30  Originating Location (pt. Location): Home    Distant Location (provider location):  [facility] [place]     Platform used for Video Visit: Brii      Again, thank you for allowing me to participate in the care of your patient.        Sincerely,        Joellen Vidal MD

## 2021-11-19 NOTE — PROGRESS NOTES
Marizol Granados is 36 year old  female who presents for a billable video visit today.    How would you like to obtain your AVS? MyChart  If dropped from the video visit, the video invitation should be resent by: Send to e-mail at: [email or web address]  Will anyone else be joining your video visit? No      Video Start Time: 9:05      Bariatric Follow Up Visit with a History of Previous Bariatric Surgery     Date of visit: 11/19/2021  Physician: Joellen Vidal MD, MD  Primary Care Provider:  Arik Munoz  Marizol Granados   36 year old  female    Date of Surgery: 3/6/2008  Initial Weight: 268#  Initial BMI:   Today's Weight:   Wt Readings from Last 1 Encounters:   11/19/21 93.4 kg (206 lb)     Body mass index is 34.28 kg/m .      Assessment and Plan     Assessment: Marizol is a 36 year old year old female who is 13 yrs s/p  Brea en Y Gastric Bypass with Dr. Munguia  Marizol Granados feels as if she had achieved the goals she hoped to accomplish through bariatric surgery and weight loss.    Encounter Diagnoses   Name Primary?     Postoperative malabsorption Yes     Hyperphagia          Current Outpatient Medications:      albuterol (PROAIR HFA/PROVENTIL HFA/VENTOLIN HFA) 108 (90 Base) MCG/ACT inhaler, Inhale 1-2 puffs into the lungs, Disp: , Rfl:      buPROPion (WELLBUTRIN SR) 150 MG 12 hr tablet, Take 150 mg by mouth, Disp: , Rfl:      Calcium Carb-Cholecalciferol (CALCIUM 500 +D PO), Take  by mouth. 2xqd at hs, Disp: , Rfl:      Calcium Polycarbophil (FIBER) 625 MG tablet, Take 1,250 mg by mouth, Disp: , Rfl:      cholecalciferol 50 MCG (2000 UT) CAPS, Take 1,000 Units by mouth, Disp: , Rfl:      Cyanocobalamin (B-12 PO), Take  by mouth. 1,000 mg hs, Disp: , Rfl:      desvenlafaxine (PRISTIQ) 100 MG 24 hr tablet, TAKE 1 TABLET BY MOUTH DAILY AT BEDTIME, Disp: , Rfl:      desvenlafaxine (PRISTIQ) 50 MG 24 hr tablet, TAKE 1 TABLET BY MOUTH ONCE DAILY. TAKE WITH 100MG TABLET FOR TOTAL OF 150MG DAILY, Disp: , Rfl:       dicyclomine (BENTYL) 10 MG capsule, TAKE 1 TO 2 CAPSULES(10 TO 20 MG) BY MOUTH FOUR TIMES DAILY AS NEEDED FOR DIARRHEA OR ABDOMINAL CRAMPS, Disp: , Rfl:      fluticasone (FLOVENT HFA) 220 MCG/ACT inhaler, Inhale 2 puffs into the lungs, Disp: , Rfl:      fluticasone-salmeterol (ADVAIR) 500-50 MCG/DOSE inhaler, , Disp: , Rfl:      galcanezumab-gnlm (EMGALITY) 120 MG/ML injection, Inject 240 mg Subcutaneous, Disp: , Rfl:      levonorgestrel (MIRENA) 20 MCG/24HR IUD, 1 Intra Uterine Device by Intrauterine route, Disp: , Rfl:      levothyroxine (SYNTHROID/LEVOTHROID) 125 MCG tablet, Take 125 mcg by mouth, Disp: , Rfl:      LORazepam (ATIVAN PO), Take  by mouth. 0.5 mg 1 tab 2xqd and 1 tab prn, Disp: , Rfl:      Multiple Vitamins-Minerals (MULTIVITAL PO), Take  by mouth. 1xqd hs , Disp: , Rfl:      omeprazole (PRILOSEC) 20 MG DR capsule, Take 20 mg by mouth, Disp: , Rfl:      Ondansetron HCl (ZOFRAN PO), Take  by mouth. 4 mg 1-2 tab prn, Disp: , Rfl:      QUEtiapine (SEROQUEL) 50 MG tablet, Take 150 mg by mouth, Disp: , Rfl:      ramelteon (ROZEREM) 8 MG tablet, TAKE 1 TABLET(8 MG) BY MOUTH AT BEDTIME AS NEEDED FOR SLEEP, Disp: , Rfl:      Suvorexant (BELSOMRA) 20 MG tablet, Take 20 mg by mouth, Disp: , Rfl:      topiramate (TOPAMAX) 25 MG tablet, Take 50 mg by mouth, Disp: , Rfl:      traZODone (DESYREL) 150 MG tablet, TAKE 2 TABLETS(300 MG) BY MOUTH DAILY AT BEDTIME, Disp: , Rfl:      ubrogepant (UBRELVY) 50 MG tablet, Take 50 mg by mouth, Disp: , Rfl:      zolpidem (AMBIEN) 5 MG tablet, TAKE 1 TABLET(5 MG) BY MOUTH AT BEDTIME AS NEEDED FOR SLEEP, Disp: , Rfl:      phentermine (ADIPEX-P) 37.5 MG tablet, Take 0.5-1 tablets (18.75-37.5 mg) by mouth every morning, Disp: 90 tablet, Rfl: 1    Plan: Annual labs [facility]. Continue vitamins with consistency. Step down on Seroquel in the future if able and settled. Refill phentermine. Get a fitbit/smart watch or monitor steps. Adding 2,000 steps a day  will get you to your goal of under 200:-)    Return in about 1 week (around 11/26/2021).    Bariatric Surgery Review     Interim History/LifeChanges: Had COVID in October. Having Thanksgiving at her house. Maintaining a 62#. Started [employer]. Moved to [place]. Just decided to start over there. Bought a house. She just hates it. The kids are healthy and like it there.    Patient Concerns: would like to get below 200#  Appetite (1-10): OK  GERD: no    Medication changes: seroquiel lowered from 200mg to 150mg    Vitamin Intake:   B-12   SL   MVI  1   Vitamin D  5,000   Calcium   supplement     Other                LABS: ordered    Nausea stomach hurts when eats  Vomiting no  Constipation with COVID  Diarrhea no  Rashes no  Hair Loss no  Calf tenderness no  Breathing difficulty no  Reactive Hypoglycemia avoids  Light Headedness no   Moods stable    12 point ROS as above and otherwise negative      Habits:  Alcohol: no  Tobacco: no  Caffeine no  NSAIDS no  Exercise Routine: ADLs.   3 meals/day yes  Protein first yes  60grams/day  Water Separate from meals yes  Calorie Containing Beverages no  Restaurant eating/wk no  Sleeping Ok  Stress moderate  CPAP: no  Contraception: mirena  DEXA:not indicated    Social History     Social History     Socioeconomic History     Marital status: Single     Spouse name: Not on file     Number of children: Not on file     Years of education: Not on file     Highest education level: Not on file   Occupational History     Not on file   Tobacco Use     Smoking status: Never Smoker     Smokeless tobacco: Never Used   Substance and Sexual Activity     Alcohol use: Yes     Alcohol/week: 0.0 - 1.0 standard drinks     Comment: Alcoholic Drinks/day: maybe one a month     Drug use: Never     Sexual activity: Yes     Partners: Male     Birth control/protection: Condom   Other Topics Concern     Parent/sibling w/ CABG, MI or angioplasty before 65F 55M? Not Asked      Service Not Asked      Blood Transfusions Not Asked     Caffeine Concern Not Asked     Occupational Exposure Not Asked     Hobby Hazards Not Asked     Sleep Concern Not Asked     Stress Concern Not Asked     Weight Concern Not Asked     Special Diet Not Asked     Back Care Not Asked     Exercise Yes     Comment: cardio and weights     Bike Helmet Not Asked     Seat Belt Not Asked     Self-Exams Not Asked   Social History Narrative    Single. 2 children 9 months apart.  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 Working from home.     Social Determinants of Health     Financial Resource Strain: Not on file   Food Insecurity: Not on file   Transportation Needs: Not on file   Physical Activity: Not on file   Stress: Not on file   Social Connections: Not on file   Intimate Partner Violence: Not on file   Housing Stability: Not on file       Past Medical History     Past Medical History:   Diagnosis Date     Abdominal pain      Anxiety      Deep vein thrombosis (H)      Depression      Depression      Diarrhea      Disease of thyroid gland      GERD (gastroesophageal reflux disease)      Hypothyroidism      Low back pain      Migraine      Plantar fasciitis      Postoperative intestinal malabsorption      PTSD (post-traumatic stress disorder)      Pulmonary embolism (H)      Urinary incontinence      Problem List     Patient Active Problem List   Diagnosis     Therapeutic drug monitoring     Social phobia     Severe obesity (H)     S/P gastric bypass     Restless legs     Recurrent major depressive disorder (H)     Psychological factor affecting physical condition     Persistent depressive disorder     Other B-complex deficiencies     Morbid obesity with BMI of 40.0-44.9, adult (H)     Moderate persistent asthma     Irritable bowel syndrome with both constipation and diarrhea     Intractable migraine without aura and with status migrainosus     Insomnia     Hypothyroidism     Hypercholesterolemia      History of thromboembolism     History of MRSA infection     Generalized anxiety disorder     Frequent UTI     Chronic fatigue     Allergic rhinitis     Acne     Abdominal pain, chronic, epigastric     Abdominal pain     Genital herpes simplex     Postoperative intestinal malabsorption     Depression     GERD (gastroesophageal reflux disease)     Urinary incontinence     Migraine     Low back pain     Plantar fasciitis     Morbid obesity (H)     Medications       Current Outpatient Medications:      albuterol (PROAIR HFA/PROVENTIL HFA/VENTOLIN HFA) 108 (90 Base) MCG/ACT inhaler, Inhale 1-2 puffs into the lungs, Disp: , Rfl:      buPROPion (WELLBUTRIN SR) 150 MG 12 hr tablet, Take 150 mg by mouth, Disp: , Rfl:      Calcium Carb-Cholecalciferol (CALCIUM 500 +D PO), Take  by mouth. 2xqd at hs, Disp: , Rfl:      Calcium Polycarbophil (FIBER) 625 MG tablet, Take 1,250 mg by mouth, Disp: , Rfl:      cholecalciferol 50 MCG (2000 UT) CAPS, Take 1,000 Units by mouth, Disp: , Rfl:      Cyanocobalamin (B-12 PO), Take  by mouth. 1,000 mg hs, Disp: , Rfl:      desvenlafaxine (PRISTIQ) 100 MG 24 hr tablet, TAKE 1 TABLET BY MOUTH DAILY AT BEDTIME, Disp: , Rfl:      desvenlafaxine (PRISTIQ) 50 MG 24 hr tablet, TAKE 1 TABLET BY MOUTH ONCE DAILY. TAKE WITH 100MG TABLET FOR TOTAL OF 150MG DAILY, Disp: , Rfl:      dicyclomine (BENTYL) 10 MG capsule, TAKE 1 TO 2 CAPSULES(10 TO 20 MG) BY MOUTH FOUR TIMES DAILY AS NEEDED FOR DIARRHEA OR ABDOMINAL CRAMPS, Disp: , Rfl:      fluticasone (FLOVENT HFA) 220 MCG/ACT inhaler, Inhale 2 puffs into the lungs, Disp: , Rfl:      fluticasone-salmeterol (ADVAIR) 500-50 MCG/DOSE inhaler, , Disp: , Rfl:      galcanezumab-gnlm (EMGALITY) 120 MG/ML injection, Inject 240 mg Subcutaneous, Disp: , Rfl:      levonorgestrel (MIRENA) 20 MCG/24HR IUD, 1 Intra Uterine Device by Intrauterine route, Disp: , Rfl:      levothyroxine (SYNTHROID/LEVOTHROID) 125 MCG tablet, Take 125 mcg by mouth, Disp: , Rfl:       "LORazepam (ATIVAN PO), Take  by mouth. 0.5 mg 1 tab 2xqd and 1 tab prn, Disp: , Rfl:      Multiple Vitamins-Minerals (MULTIVITAL PO), Take  by mouth. 1xqd hs , Disp: , Rfl:      omeprazole (PRILOSEC) 20 MG DR capsule, Take 20 mg by mouth, Disp: , Rfl:      Ondansetron HCl (ZOFRAN PO), Take  by mouth. 4 mg 1-2 tab prn, Disp: , Rfl:      QUEtiapine (SEROQUEL) 50 MG tablet, Take 150 mg by mouth, Disp: , Rfl:      ramelteon (ROZEREM) 8 MG tablet, TAKE 1 TABLET(8 MG) BY MOUTH AT BEDTIME AS NEEDED FOR SLEEP, Disp: , Rfl:      Suvorexant (BELSOMRA) 20 MG tablet, Take 20 mg by mouth, Disp: , Rfl:      topiramate (TOPAMAX) 25 MG tablet, Take 50 mg by mouth, Disp: , Rfl:      traZODone (DESYREL) 150 MG tablet, TAKE 2 TABLETS(300 MG) BY MOUTH DAILY AT BEDTIME, Disp: , Rfl:      ubrogepant (UBRELVY) 50 MG tablet, Take 50 mg by mouth, Disp: , Rfl:      zolpidem (AMBIEN) 5 MG tablet, TAKE 1 TABLET(5 MG) BY MOUTH AT BEDTIME AS NEEDED FOR SLEEP, Disp: , Rfl:      phentermine (ADIPEX-P) 37.5 MG tablet, Take 0.5-1 tablets (18.75-37.5 mg) by mouth every morning, Disp: 90 tablet, Rfl: 1   Surgical History     Past Surgical History  She has a past surgical history that includes gastric bypass (2008); Cholecystectomy; liver biopsy; CAPSULE ENDOSCOPY (11/20/2012); CAPSULE ENDOSCOPY (12/10/2012); Revision Brea-En-Y; and Cholecystectomy.    Objective-Exam     Constitutional:  Ht 1.651 m (5' 5\")   Wt 93.4 kg (206 lb)   BMI 34.28 kg/m    General:  Pleasant and in no acute distress     Psychiatric: alert and oriented X3, mood and affect normal    Counseling     We reviewed the important post op bariatric recommendations:  -eating 3 meals daily  -eating protein first, getting >60gm protein daily  -eating slowly, chewing food well  -avoiding/limiting calorie containing beverages  -drinking water 15-30 minutes before or after meals  -choosing wheat, not white with breads, crackers, pastas, ashley, bagels, tortillas, rice  -limiting restaurant or " cafeteria eating to twice a week or less    We discussed the importance of restorative sleep and stress management in maintaining a healthy weight.  We discussed the National Weight Control Registry healthy weight maintenance strategies and ways to optimize metabolism.  We discussed the importance of physical activity including cardiovascular and strength training in maintaining a healthier weight.    We discussed the importance of life-long vitamin supplementation and life-long  follow-up.    Marizol was reminded that, to avoid marginal ulcers she should avoid tobacco at all, alcohol in excess, caffeine in excess, and NSAIDS (unless indicated for cardioprotection or othewise and opposed by a PPI).    Joellen Vidal MD, MD, [facility].  11/19/2021  9:06 AM  Total time spent on the date of this encounter doing: chart review, review of test results, patient visit, physical exam, education, counseling, developing plan of care, and documenting = 25 minutes.      Video-Visit Details    Type of service:  Video Visit    Video End Time (time video stopped): 9:30  Originating Location (pt. Location): Home    Distant Location (provider location):  [facility] [place]     Platform used for Video Visit: Rambus

## 2021-12-10 ENCOUNTER — TELEPHONE (OUTPATIENT)
Dept: SURGERY | Facility: CLINIC | Age: 36
End: 2021-12-10

## 2021-12-10 NOTE — TELEPHONE ENCOUNTER
Called patient when she was not connected for the video nutrition visit. Sent patient a text message video link toconnect. Left voicemail instructing patient to connect or to call the clinic (phone number provided) to reschedule appointment.    Otilia Soto RD, LD

## 2022-05-14 ENCOUNTER — HEALTH MAINTENANCE LETTER (OUTPATIENT)
Age: 37
End: 2022-05-14

## 2022-07-27 ENCOUNTER — VIRTUAL VISIT (OUTPATIENT)
Dept: SURGERY | Facility: CLINIC | Age: 37
End: 2022-07-27
Payer: COMMERCIAL

## 2022-07-27 VITALS — HEIGHT: 65 IN | WEIGHT: 174 LBS | BODY MASS INDEX: 28.99 KG/M2

## 2022-07-27 DIAGNOSIS — Z98.84 S/P GASTRIC BYPASS: ICD-10-CM

## 2022-07-27 DIAGNOSIS — R63.2 HYPERPHAGIA: ICD-10-CM

## 2022-07-27 DIAGNOSIS — K91.2 POSTOPERATIVE MALABSORPTION: Primary | ICD-10-CM

## 2022-07-27 DIAGNOSIS — R21 RASH: ICD-10-CM

## 2022-07-27 DIAGNOSIS — R10.13 EPIGASTRIC DISCOMFORT: ICD-10-CM

## 2022-07-27 PROCEDURE — 99214 OFFICE O/P EST MOD 30 MIN: CPT | Mod: 95 | Performed by: FAMILY MEDICINE

## 2022-07-27 RX ORDER — BUPROPION HYDROCHLORIDE 200 MG/1
TABLET, EXTENDED RELEASE ORAL
COMMUNITY
Start: 2022-05-31 | End: 2023-02-27

## 2022-07-27 RX ORDER — OMEPRAZOLE 40 MG/1
40 CAPSULE, DELAYED RELEASE ORAL DAILY
Qty: 90 CAPSULE | Refills: 3 | Status: SHIPPED | OUTPATIENT
Start: 2022-07-27 | End: 2023-08-09

## 2022-07-27 RX ORDER — ONDANSETRON 4 MG/1
4 TABLET, ORALLY DISINTEGRATING ORAL EVERY 8 HOURS PRN
COMMUNITY
Start: 2021-10-05 | End: 2023-02-27

## 2022-07-27 RX ORDER — RIZATRIPTAN BENZOATE 10 MG/1
TABLET ORAL
Status: ON HOLD | COMMUNITY
Start: 2021-09-28 | End: 2023-01-23

## 2022-07-27 RX ORDER — BUSPIRONE HYDROCHLORIDE 30 MG/1
30 TABLET ORAL 2 TIMES DAILY
COMMUNITY
Start: 2022-07-15

## 2022-07-27 RX ORDER — TOPIRAMATE 25 MG/1
50 TABLET, FILM COATED ORAL
COMMUNITY
Start: 2020-10-27 | End: 2022-07-27

## 2022-07-27 RX ORDER — QUETIAPINE FUMARATE 200 MG/1
200 TABLET, FILM COATED ORAL AT BEDTIME
COMMUNITY
Start: 2022-05-04

## 2022-07-27 RX ORDER — UBROGEPANT 50 MG/1
50 TABLET ORAL
COMMUNITY
Start: 2022-05-31 | End: 2024-05-02

## 2022-07-27 RX ORDER — TRAZODONE HYDROCHLORIDE 50 MG/1
TABLET, FILM COATED ORAL
COMMUNITY
Start: 2022-05-13 | End: 2022-09-02

## 2022-07-27 RX ORDER — LORAZEPAM 1 MG/1
1 TABLET ORAL 2 TIMES DAILY
Status: ON HOLD | COMMUNITY
Start: 2022-07-15 | End: 2024-09-18

## 2022-07-27 RX ORDER — PHENTERMINE HYDROCHLORIDE 37.5 MG/1
TABLET ORAL
Qty: 90 TABLET | Refills: 1 | Status: SHIPPED | OUTPATIENT
Start: 2022-07-27 | End: 2023-01-11

## 2022-07-27 NOTE — PROGRESS NOTES
Marizol Granados is 37 year old  female who presents for a billable video visit today.    How would you like to obtain your AVS? MyChart  If dropped from the video visit, the video invitation should be resent by: Text to cell phone: [phone]  Will anyone else be joining your video visit? No      Video Start Time: 3:10pm    Are there any specific questions or needs that you would like addressed at your visit today? Return MWM - no ?s or concerns      Bariatric Follow Up Visit with a History of Previous Bariatric Surgery     Date of visit: 7/27/2022  Physician: Joellen Vidal MD, MD  Primary Care Provider:  Arik Munoz  Marizol Granados   37 year old  female    Date of Surgery: 3/6/2008  Initial Weight: 268#  Initial BMI:   Today's Weight:   Wt Readings from Last 1 Encounters:   07/27/22 78.9 kg (174 lb)     Body mass index is 28.96 kg/m .      Assessment and Plan     Assessment: Marizol is a 37 year old year old female who is 14 yrs s/p  Brea en Y Gastric Bypass with Dr. Munguia  Marizol Granados feels as if she has achieved the goals she hoped to accomplish through bariatric surgery and weight loss.    Encounter Diagnoses   Name Primary?     Epigastric discomfort Yes     S/P gastric bypass      Rash      Hyperphagia          Current Outpatient Medications:      buPROPion (WELLBUTRIN SR) 200 MG 12 hr tablet, TAKE 1 TABLET(200 MG) BY MOUTH TWICE DAILY, Disp: , Rfl:      butt paste ointment, Apply topically 2 times daily as needed for skin protection Aquaphor 60gm and Stomadhesive Powder 30gm and Nystatin Ointment 15gm, Disp: 105 g, Rfl: 4     omeprazole (PRILOSEC) 40 MG DR capsule, Take 1 capsule (40 mg) by mouth daily, Disp: 90 capsule, Rfl: 3     phentermine (ADIPEX-P) 37.5 MG tablet, TAKE 1/2 TO 1 TABLET(18.75 TO 37.5 MG) BY MOUTH EVERY MORNING, Disp: 90 tablet, Rfl: 1     albuterol (PROAIR HFA/PROVENTIL HFA/VENTOLIN HFA) 108 (90 Base) MCG/ACT inhaler, Inhale 1-2 puffs into the lungs, Disp: , Rfl:       buPROPion (WELLBUTRIN SR) 150 MG 12 hr tablet, Take 150 mg by mouth, Disp: , Rfl:      busPIRone (BUSPAR) 15 MG tablet, , Disp: , Rfl:      calcium carbonate-vitamin D (OYSTER SHELL CALCIUM/D) 500-200 MG-UNIT tablet, Take 2 tablets by mouth, Disp: , Rfl:      Calcium Polycarbophil (FIBER) 625 MG tablet, Take 1,250 mg by mouth, Disp: , Rfl:      cholecalciferol 50 MCG (2000 UT) CAPS, Take 1,000 Units by mouth, Disp: , Rfl:      Cyanocobalamin (B-12 PO), Take  by mouth. 1,000 mg hs, Disp: , Rfl:      desvenlafaxine (PRISTIQ) 100 MG 24 hr tablet, TAKE 1 TABLET BY MOUTH DAILY AT BEDTIME, Disp: , Rfl:      desvenlafaxine (PRISTIQ) 50 MG 24 hr tablet, TAKE 1 TABLET BY MOUTH ONCE DAILY. TAKE WITH 100MG TABLET FOR TOTAL OF 150MG DAILY, Disp: , Rfl:      dicyclomine (BENTYL) 10 MG capsule, TAKE 1 TO 2 CAPSULES(10 TO 20 MG) BY MOUTH FOUR TIMES DAILY AS NEEDED FOR DIARRHEA OR ABDOMINAL CRAMPS, Disp: , Rfl:      fluticasone (FLOVENT HFA) 220 MCG/ACT inhaler, Inhale 2 puffs into the lungs, Disp: , Rfl:      fluticasone-salmeterol (ADVAIR) 500-50 MCG/DOSE inhaler, , Disp: , Rfl:      galcanezumab-gnlm (EMGALITY) 120 MG/ML injection, Inject 240 mg Subcutaneous, Disp: , Rfl:      levonorgestrel (MIRENA) 20 MCG/24HR IUD, 1 Intra Uterine Device by Intrauterine route, Disp: , Rfl:      levothyroxine (SYNTHROID/LEVOTHROID) 125 MCG tablet, Take 125 mcg by mouth, Disp: , Rfl:      LORazepam (ATIVAN) 1 MG tablet, , Disp: , Rfl:      Multiple Vitamins-Minerals (MULTIVITAL PO), Take  by mouth. 1xqd hs , Disp: , Rfl:      ondansetron (ZOFRAN ODT) 4 MG ODT tab, , Disp: , Rfl:      PROBIOTIC PRODUCT PO, Take 1 tablet by mouth At Bedtime, Disp: , Rfl:      QUEtiapine (SEROQUEL) 200 MG tablet, , Disp: , Rfl:      ramelteon (ROZEREM) 8 MG tablet, TAKE 1 TABLET(8 MG) BY MOUTH AT BEDTIME AS NEEDED FOR SLEEP, Disp: , Rfl:      rizatriptan (MAXALT) 10 MG tablet, TAKE 1 TABLET BY MOUTH AT ONSET OF MIGRAINE. REPEAT IN 2 HOURS IF MIGRAINE PERSISTS. NO  MORE THAN 2 IN 24 HOURS OR 5 IN 7 DAYS, Disp: , Rfl:      Suvorexant (BELSOMRA) 20 MG tablet, Take 20 mg by mouth, Disp: , Rfl:      topiramate (TOPAMAX) 50 MG tablet, Take 1 tablet (50 mg) by mouth 2 times daily, Disp: 180 tablet, Rfl: 3     traZODone (DESYREL) 150 MG tablet, TAKE 2 TABLETS(300 MG) BY MOUTH DAILY AT BEDTIME, Disp: , Rfl:      traZODone (DESYREL) 50 MG tablet, , Disp: , Rfl:      UBRELVY 50 MG tablet, , Disp: , Rfl:      vitamin B-12 (CYANOCOBALAMIN) 1000 MCG tablet, Take 1,000 mcg by mouth, Disp: , Rfl:      zolpidem (AMBIEN) 5 MG tablet, TAKE 1 TABLET(5 MG) BY MOUTH AT BEDTIME AS NEEDED FOR SLEEP, Disp: , Rfl:     Plan: Referral to Dr. Bell for definitive treatment for intertriginous dermatitis. PPI during high stress period. RX for Omeprazole 40mg daily. Butt paste for intertriginous dermatitis (has tried hygiene, OTC anti-fungals, and everything she can think of).     Return in about 3 months (around 10/27/2022).    Bariatric Surgery Review     Interim History/LifeChanges: Rough 6 months Moved back to [facility] in to her parent's house then moving now to her own house August 1st.  Her ex-boyfriend was a threatening narcissist. Has been able to take care of herself, sleeping OK. Has parents and psychiatrist support.  Has started omeprazole. Skin and rashes are terribly bothersome. Battling them for a long time, feels like a losing [place]. Has tried powders, antifungals, different fabrics, hygiene but they persist.     Patient Concerns: Rashes are terrible  Appetite (1-10): down  GERD: uneasiness    Medication changes: none    Vitamin Intake:   B-12   SL   MVI  2/d   Vitamin D  5,000   Calcium        Other                LABS: ordered  Had a physical with normal TSH, lipids and glucose. They did not run annual labs which had been ordered.  Nausea no  Vomiting no  Constipation no  Diarrhea no  Rashes yes!  Hair Loss no  Calf tenderness no  Breathing difficulty no  Reactive Hypoglycemia now  and again  Light Headedness    Moods improving    12 point ROS as above and otherwise negative      Habits:  Alcohol: no  Tobacco: no  Caffeine no  NSAIDS no  Exercise Routine: daily-with her dad to walk the dog  3 meals/day yes  Protein first yes  60grams/day  Water Separate from meals yes  Calorie Containing Beverages herbalife shakes  Restaurant eating/wk <2  Sleeping better  Stress high  CPAP: NA  Contraception: mirena  DEXA:not indicated    Social History     Social History     Socioeconomic History     Marital status: Single     Spouse name: Not on file     Number of children: Not on file     Years of education: Not on file     Highest education level: Not on file   Occupational History     Not on file   Tobacco Use     Smoking status: Never Smoker     Smokeless tobacco: Never Used   Substance and Sexual Activity     Alcohol use: Yes     Alcohol/week: 0.0 - 1.0 standard drinks     Comment: Alcoholic Drinks/day: maybe one a month     Drug use: Never     Sexual activity: Yes     Partners: Male     Birth control/protection: Condom   Other Topics Concern     Parent/sibling w/ CABG, MI or angioplasty before 65F 55M? Not Asked      Service Not Asked     Blood Transfusions Not Asked     Caffeine Concern Not Asked     Occupational Exposure Not Asked     Hobby Hazards Not Asked     Sleep Concern Not Asked     Stress Concern Not Asked     Weight Concern Not Asked     Special Diet Not Asked     Back Care Not Asked     Exercise Yes     Comment: cardio and weights     Bike Helmet Not Asked     Seat Belt Not Asked     Self-Exams Not Asked   Social History Narrative    Single. 2 children 9 months apart.  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 Working from home.    Left a narcissistic relationship and now with her 2 kids     Social Determinants of Health     Financial Resource Strain: Not on file   Food Insecurity: Not on file   Transportation Needs: Not on file   Physical  Activity: Not on file   Stress: Not on file   Social Connections: Not on file   Intimate Partner Violence: Not on file   Housing Stability: Not on file       Past Medical History     Past Medical History:   Diagnosis Date     Abdominal pain      Anxiety      Deep vein thrombosis (H)      Depression      Depression      Diarrhea      Disease of thyroid gland      GERD (gastroesophageal reflux disease)      Hypothyroidism      Low back pain      Migraine      Plantar fasciitis      Postoperative intestinal malabsorption      PTSD (post-traumatic stress disorder)      Pulmonary embolism (H)      Urinary incontinence      Problem List     Patient Active Problem List   Diagnosis     Therapeutic drug monitoring     Social phobia     Severe obesity (H)     S/P gastric bypass     Restless legs     Recurrent major depressive disorder (H)     Psychological factor affecting physical condition     Persistent depressive disorder     Other B-complex deficiencies     Morbid obesity with BMI of 40.0-44.9, adult (H)     Moderate persistent asthma     Irritable bowel syndrome with both constipation and diarrhea     Intractable migraine without aura and with status migrainosus     Insomnia     Hypothyroidism     Hypercholesterolemia     History of thromboembolism     History of MRSA infection     Generalized anxiety disorder     Frequent UTI     Chronic fatigue     Allergic rhinitis     Acne     Abdominal pain, chronic, epigastric     Abdominal pain     Genital herpes simplex     Postoperative intestinal malabsorption     Depression     GERD (gastroesophageal reflux disease)     Urinary incontinence     Migraine     Low back pain     Plantar fasciitis     Morbid obesity (H)     Medications       Current Outpatient Medications:      buPROPion (WELLBUTRIN SR) 200 MG 12 hr tablet, TAKE 1 TABLET(200 MG) BY MOUTH TWICE DAILY, Disp: , Rfl:      butt paste ointment, Apply topically 2 times daily as needed for skin protection Aquaphor 60gm  and Stomadhesive Powder 30gm and Nystatin Ointment 15gm, Disp: 105 g, Rfl: 4     omeprazole (PRILOSEC) 40 MG DR capsule, Take 1 capsule (40 mg) by mouth daily, Disp: 90 capsule, Rfl: 3     phentermine (ADIPEX-P) 37.5 MG tablet, TAKE 1/2 TO 1 TABLET(18.75 TO 37.5 MG) BY MOUTH EVERY MORNING, Disp: 90 tablet, Rfl: 1     albuterol (PROAIR HFA/PROVENTIL HFA/VENTOLIN HFA) 108 (90 Base) MCG/ACT inhaler, Inhale 1-2 puffs into the lungs, Disp: , Rfl:      buPROPion (WELLBUTRIN SR) 150 MG 12 hr tablet, Take 150 mg by mouth, Disp: , Rfl:      busPIRone (BUSPAR) 15 MG tablet, , Disp: , Rfl:      calcium carbonate-vitamin D (OYSTER SHELL CALCIUM/D) 500-200 MG-UNIT tablet, Take 2 tablets by mouth, Disp: , Rfl:      Calcium Polycarbophil (FIBER) 625 MG tablet, Take 1,250 mg by mouth, Disp: , Rfl:      cholecalciferol 50 MCG (2000 UT) CAPS, Take 1,000 Units by mouth, Disp: , Rfl:      Cyanocobalamin (B-12 PO), Take  by mouth. 1,000 mg hs, Disp: , Rfl:      desvenlafaxine (PRISTIQ) 100 MG 24 hr tablet, TAKE 1 TABLET BY MOUTH DAILY AT BEDTIME, Disp: , Rfl:      desvenlafaxine (PRISTIQ) 50 MG 24 hr tablet, TAKE 1 TABLET BY MOUTH ONCE DAILY. TAKE WITH 100MG TABLET FOR TOTAL OF 150MG DAILY, Disp: , Rfl:      dicyclomine (BENTYL) 10 MG capsule, TAKE 1 TO 2 CAPSULES(10 TO 20 MG) BY MOUTH FOUR TIMES DAILY AS NEEDED FOR DIARRHEA OR ABDOMINAL CRAMPS, Disp: , Rfl:      fluticasone (FLOVENT HFA) 220 MCG/ACT inhaler, Inhale 2 puffs into the lungs, Disp: , Rfl:      fluticasone-salmeterol (ADVAIR) 500-50 MCG/DOSE inhaler, , Disp: , Rfl:      galcanezumab-gnlm (EMGALITY) 120 MG/ML injection, Inject 240 mg Subcutaneous, Disp: , Rfl:      levonorgestrel (MIRENA) 20 MCG/24HR IUD, 1 Intra Uterine Device by Intrauterine route, Disp: , Rfl:      levothyroxine (SYNTHROID/LEVOTHROID) 125 MCG tablet, Take 125 mcg by mouth, Disp: , Rfl:      LORazepam (ATIVAN) 1 MG tablet, , Disp: , Rfl:      Multiple Vitamins-Minerals (MULTIVITAL PO), Take  by mouth. 1xqd  "hs , Disp: , Rfl:      ondansetron (ZOFRAN ODT) 4 MG ODT tab, , Disp: , Rfl:      PROBIOTIC PRODUCT PO, Take 1 tablet by mouth At Bedtime, Disp: , Rfl:      QUEtiapine (SEROQUEL) 200 MG tablet, , Disp: , Rfl:      ramelteon (ROZEREM) 8 MG tablet, TAKE 1 TABLET(8 MG) BY MOUTH AT BEDTIME AS NEEDED FOR SLEEP, Disp: , Rfl:      rizatriptan (MAXALT) 10 MG tablet, TAKE 1 TABLET BY MOUTH AT ONSET OF MIGRAINE. REPEAT IN 2 HOURS IF MIGRAINE PERSISTS. NO MORE THAN 2 IN 24 HOURS OR 5 IN 7 DAYS, Disp: , Rfl:      Suvorexant (BELSOMRA) 20 MG tablet, Take 20 mg by mouth, Disp: , Rfl:      topiramate (TOPAMAX) 50 MG tablet, Take 1 tablet (50 mg) by mouth 2 times daily, Disp: 180 tablet, Rfl: 3     traZODone (DESYREL) 150 MG tablet, TAKE 2 TABLETS(300 MG) BY MOUTH DAILY AT BEDTIME, Disp: , Rfl:      traZODone (DESYREL) 50 MG tablet, , Disp: , Rfl:      UBRELVY 50 MG tablet, , Disp: , Rfl:      vitamin B-12 (CYANOCOBALAMIN) 1000 MCG tablet, Take 1,000 mcg by mouth, Disp: , Rfl:      zolpidem (AMBIEN) 5 MG tablet, TAKE 1 TABLET(5 MG) BY MOUTH AT BEDTIME AS NEEDED FOR SLEEP, Disp: , Rfl:    Surgical History     Past Surgical History  She has a past surgical history that includes gastric bypass (2008); Cholecystectomy; liver biopsy; CAPSULE ENDOSCOPY (11/20/2012); CAPSULE ENDOSCOPY (12/10/2012); Revision Brea-En-Y; and Cholecystectomy.    Objective-Exam     Constitutional:  Ht 1.651 m (5' 5\")   Wt 78.9 kg (174 lb)   BMI 28.96 kg/m    General:  Pleasant and in no acute distress     Psychiatric: alert and oriented X3, mood and affect normal    Counseling     We reviewed the important post op bariatric recommendations:  -eating 3 meals daily  -eating protein first, getting >60gm protein daily  -eating slowly, chewing food well  -avoiding/limiting calorie containing beverages  -drinking water 15-30 minutes before or after meals  -choosing wheat, not white with breads, crackers, pastas, ashley, bagels, tortillas, rice  -limiting restaurant " or cafeteria eating to twice a week or less    We discussed the importance of restorative sleep and stress management in maintaining a healthy weight.  We discussed the National Weight Control Registry healthy weight maintenance strategies and ways to optimize metabolism.  We discussed the importance of physical activity including cardiovascular and strength training in maintaining a healthier weight.    We discussed the importance of life-long vitamin supplementation and life-long  follow-up.    Marizol was reminded that, to avoid marginal ulcers she should avoid tobacco at all, alcohol in excess, caffeine in excess, and NSAIDS (unless indicated for cardioprotection or othewise and opposed by a PPI).    Joellen Vidal MD, MD, FAAUniversity of [facility] Health [facility].  7/27/2022  3:17 PM  Total time spent on the date of this encounter doing: chart review, review of test results, patient visit, physical exam, education, counseling, developing plan of care, and documenting = 30 minutes.      Video-Visit Details    Type of service:  Video Visit    Video End Time (time video stopped): 3:40  Originating Location (pt. Location): Home    Distant Location (provider location):  [facility] [place]     Platform used for Video Visit: [employer]ala

## 2022-07-27 NOTE — LETTER
7/27/2022         RE: Marizol Granados  [id number] [address] [address]        Dear Colleague,    Thank you for referring your patient, Marizol Granados, to the [facility] [place]. Please see a copy of my visit note below.    Marizol Granados is 37 year old  female who presents for a billable video visit today.    How would you like to obtain your AVS? MyChart  If dropped from the video visit, the video invitation should be resent by: Text to cell phone: [phone]  Will anyone else be joining your video visit? No  {If patient encounters technical issues they should call [phone]}    Video Start Time: 3:10pm    Are there any specific questions or needs that you would like addressed at your visit today? Return MWM - no ?s or concerns      Bariatric Follow Up Visit with a History of Previous Bariatric Surgery     Date of visit: 7/27/2022  Physician: Joellen Vidal MD, MD  Primary Care Provider:  Arik Munoz  Marizol Granados   37 year old  female    Date of Surgery: 3/6/2008  Initial Weight: 268#  Initial BMI:   Today's Weight:   Wt Readings from Last 1 Encounters:   07/27/22 78.9 kg (174 lb)     Body mass index is 28.96 kg/m .      Assessment and Plan     Assessment: Marizol is a 37 year old year old female who is 14 yrs s/p  Brea en Y Gastric Bypass with Dr. Munguia  Marizol Granados feels as if she has achieved the goals she hoped to accomplish through bariatric surgery and weight loss.    Encounter Diagnoses   Name Primary?     Epigastric discomfort Yes     S/P gastric bypass      Rash      Hyperphagia          Current Outpatient Medications:      buPROPion (WELLBUTRIN SR) 200 MG 12 hr tablet, TAKE 1 TABLET(200 MG) BY MOUTH TWICE DAILY, Disp: , Rfl:      butt paste ointment, Apply topically 2 times daily as needed for skin protection Aquaphor 60gm and Stomadhesive Powder 30gm and Nystatin Ointment 15gm, Disp: 105 g, Rfl: 4     omeprazole  (PRILOSEC) 40 MG DR capsule, Take 1 capsule (40 mg) by mouth daily, Disp: 90 capsule, Rfl: 3     phentermine (ADIPEX-P) 37.5 MG tablet, TAKE 1/2 TO 1 TABLET(18.75 TO 37.5 MG) BY MOUTH EVERY MORNING, Disp: 90 tablet, Rfl: 1     albuterol (PROAIR HFA/PROVENTIL HFA/VENTOLIN HFA) 108 (90 Base) MCG/ACT inhaler, Inhale 1-2 puffs into the lungs, Disp: , Rfl:      buPROPion (WELLBUTRIN SR) 150 MG 12 hr tablet, Take 150 mg by mouth, Disp: , Rfl:      busPIRone (BUSPAR) 15 MG tablet, , Disp: , Rfl:      calcium carbonate-vitamin D (OYSTER SHELL CALCIUM/D) 500-200 MG-UNIT tablet, Take 2 tablets by mouth, Disp: , Rfl:      Calcium Polycarbophil (FIBER) 625 MG tablet, Take 1,250 mg by mouth, Disp: , Rfl:      cholecalciferol 50 MCG (2000 UT) CAPS, Take 1,000 Units by mouth, Disp: , Rfl:      Cyanocobalamin (B-12 PO), Take  by mouth. 1,000 mg hs, Disp: , Rfl:      desvenlafaxine (PRISTIQ) 100 MG 24 hr tablet, TAKE 1 TABLET BY MOUTH DAILY AT BEDTIME, Disp: , Rfl:      desvenlafaxine (PRISTIQ) 50 MG 24 hr tablet, TAKE 1 TABLET BY MOUTH ONCE DAILY. TAKE WITH 100MG TABLET FOR TOTAL OF 150MG DAILY, Disp: , Rfl:      dicyclomine (BENTYL) 10 MG capsule, TAKE 1 TO 2 CAPSULES(10 TO 20 MG) BY MOUTH FOUR TIMES DAILY AS NEEDED FOR DIARRHEA OR ABDOMINAL CRAMPS, Disp: , Rfl:      fluticasone (FLOVENT HFA) 220 MCG/ACT inhaler, Inhale 2 puffs into the lungs, Disp: , Rfl:      fluticasone-salmeterol (ADVAIR) 500-50 MCG/DOSE inhaler, , Disp: , Rfl:      galcanezumab-gnlm (EMGALITY) 120 MG/ML injection, Inject 240 mg Subcutaneous, Disp: , Rfl:      levonorgestrel (MIRENA) 20 MCG/24HR IUD, 1 Intra Uterine Device by Intrauterine route, Disp: , Rfl:      levothyroxine (SYNTHROID/LEVOTHROID) 125 MCG tablet, Take 125 mcg by mouth, Disp: , Rfl:      LORazepam (ATIVAN) 1 MG tablet, , Disp: , Rfl:      Multiple Vitamins-Minerals (MULTIVITAL PO), Take  by mouth. 1xqd hs , Disp: , Rfl:      ondansetron (ZOFRAN ODT) 4 MG ODT tab, , Disp: , Rfl:      PROBIOTIC  PRODUCT PO, Take 1 tablet by mouth At Bedtime, Disp: , Rfl:      QUEtiapine (SEROQUEL) 200 MG tablet, , Disp: , Rfl:      ramelteon (ROZEREM) 8 MG tablet, TAKE 1 TABLET(8 MG) BY MOUTH AT BEDTIME AS NEEDED FOR SLEEP, Disp: , Rfl:      rizatriptan (MAXALT) 10 MG tablet, TAKE 1 TABLET BY MOUTH AT ONSET OF MIGRAINE. REPEAT IN 2 HOURS IF MIGRAINE PERSISTS. NO MORE THAN 2 IN 24 HOURS OR 5 IN 7 DAYS, Disp: , Rfl:      Suvorexant (BELSOMRA) 20 MG tablet, Take 20 mg by mouth, Disp: , Rfl:      topiramate (TOPAMAX) 50 MG tablet, Take 1 tablet (50 mg) by mouth 2 times daily, Disp: 180 tablet, Rfl: 3     traZODone (DESYREL) 150 MG tablet, TAKE 2 TABLETS(300 MG) BY MOUTH DAILY AT BEDTIME, Disp: , Rfl:      traZODone (DESYREL) 50 MG tablet, , Disp: , Rfl:      UBRELVY 50 MG tablet, , Disp: , Rfl:      vitamin B-12 (CYANOCOBALAMIN) 1000 MCG tablet, Take 1,000 mcg by mouth, Disp: , Rfl:      zolpidem (AMBIEN) 5 MG tablet, TAKE 1 TABLET(5 MG) BY MOUTH AT BEDTIME AS NEEDED FOR SLEEP, Disp: , Rfl:     Plan: Referral to Dr. Bell for definitive treatment for intertriginous dermatitis. PPI during high stress period. RX for Omeprazole 40mg daily. Butt paste for intertriginous dermatitis (has tried hygiene, OTC anti-fungals, and everything she can think of).     Return in about 3 months (around 10/27/2022).    Bariatric Surgery Review     Interim History/LifeChanges: Rough 6 months Moved back to [facility] in to her parent's house then moving now to her own house August 1st.  Her ex-boyfriend was a threatening narcissist. Has been able to take care of herself, sleeping OK. Has parents and psychiatrist support.  Has started omeprazole. Skin and rashes are terribly bothersome. Battling them for a long time, feels like a losing [place]. Has tried powders, antifungals, different fabrics, hygiene but they persist.     Patient Concerns: Rashes are terrible  Appetite (1-10): down  GERD: uneasiness    Medication changes: none    Vitamin  Intake:   B-12   SL   MVI  2/d   Vitamin D  5,000   Calcium        Other                LABS: ordered  Had a physical with normal TSH, lipids and glucose. They did not run annual labs which had been ordered.  Nausea no  Vomiting no  Constipation no  Diarrhea no  Rashes yes!  Hair Loss no  Calf tenderness no  Breathing difficulty no  Reactive Hypoglycemia now and again  Light Headedness    Moods improving    12 point ROS as above and otherwise negative      Habits:  Alcohol: no  Tobacco: no  Caffeine no  NSAIDS no  Exercise Routine: daily-with her dad to walk the dog  3 meals/day yes  Protein first yes  60grams/day  Water Separate from meals yes  Calorie Containing Beverages herbalife shakes  Restaurant eating/wk <2  Sleeping better  Stress high  CPAP: NA  Contraception: mirena  DEXA:not indicated    Social History     Social History     Socioeconomic History     Marital status: Single     Spouse name: Not on file     Number of children: Not on file     Years of education: Not on file     Highest education level: Not on file   Occupational History     Not on file   Tobacco Use     Smoking status: Never Smoker     Smokeless tobacco: Never Used   Substance and Sexual Activity     Alcohol use: Yes     Alcohol/week: 0.0 - 1.0 standard drinks     Comment: Alcoholic Drinks/day: maybe one a month     Drug use: Never     Sexual activity: Yes     Partners: Male     Birth control/protection: Condom   Other Topics Concern     Parent/sibling w/ CABG, MI or angioplasty before 65F 55M? Not Asked      Service Not Asked     Blood Transfusions Not Asked     Caffeine Concern Not Asked     Occupational Exposure Not Asked     Hobby Hazards Not Asked     Sleep Concern Not Asked     Stress Concern Not Asked     Weight Concern Not Asked     Special Diet Not Asked     Back Care Not Asked     Exercise Yes     Comment: cardio and weights     Bike Helmet Not Asked     Seat Belt Not Asked     Self-Exams Not Asked   Social History  Narrative    Single. 2 children 9 months apart.  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 Working from home.    Left a narcissistic relationship and now with her 2 kids     Social Determinants of Health     Financial Resource Strain: Not on file   Food Insecurity: Not on file   Transportation Needs: Not on file   Physical Activity: Not on file   Stress: Not on file   Social Connections: Not on file   Intimate Partner Violence: Not on file   Housing Stability: Not on file       Past Medical History     Past Medical History:   Diagnosis Date     Abdominal pain      Anxiety      Deep vein thrombosis (H)      Depression      Depression      Diarrhea      Disease of thyroid gland      GERD (gastroesophageal reflux disease)      Hypothyroidism      Low back pain      Migraine      Plantar fasciitis      Postoperative intestinal malabsorption      PTSD (post-traumatic stress disorder)      Pulmonary embolism (H)      Urinary incontinence      Problem List     Patient Active Problem List   Diagnosis     Therapeutic drug monitoring     Social phobia     Severe obesity (H)     S/P gastric bypass     Restless legs     Recurrent major depressive disorder (H)     Psychological factor affecting physical condition     Persistent depressive disorder     Other B-complex deficiencies     Morbid obesity with BMI of 40.0-44.9, adult (H)     Moderate persistent asthma     Irritable bowel syndrome with both constipation and diarrhea     Intractable migraine without aura and with status migrainosus     Insomnia     Hypothyroidism     Hypercholesterolemia     History of thromboembolism     History of MRSA infection     Generalized anxiety disorder     Frequent UTI     Chronic fatigue     Allergic rhinitis     Acne     Abdominal pain, chronic, epigastric     Abdominal pain     Genital herpes simplex     Postoperative intestinal malabsorption     Depression     GERD (gastroesophageal reflux disease)      Urinary incontinence     Migraine     Low back pain     Plantar fasciitis     Morbid obesity (H)     Medications       Current Outpatient Medications:      buPROPion (WELLBUTRIN SR) 200 MG 12 hr tablet, TAKE 1 TABLET(200 MG) BY MOUTH TWICE DAILY, Disp: , Rfl:      butt paste ointment, Apply topically 2 times daily as needed for skin protection Aquaphor 60gm and Stomadhesive Powder 30gm and Nystatin Ointment 15gm, Disp: 105 g, Rfl: 4     omeprazole (PRILOSEC) 40 MG DR capsule, Take 1 capsule (40 mg) by mouth daily, Disp: 90 capsule, Rfl: 3     phentermine (ADIPEX-P) 37.5 MG tablet, TAKE 1/2 TO 1 TABLET(18.75 TO 37.5 MG) BY MOUTH EVERY MORNING, Disp: 90 tablet, Rfl: 1     albuterol (PROAIR HFA/PROVENTIL HFA/VENTOLIN HFA) 108 (90 Base) MCG/ACT inhaler, Inhale 1-2 puffs into the lungs, Disp: , Rfl:      buPROPion (WELLBUTRIN SR) 150 MG 12 hr tablet, Take 150 mg by mouth, Disp: , Rfl:      busPIRone (BUSPAR) 15 MG tablet, , Disp: , Rfl:      calcium carbonate-vitamin D (OYSTER SHELL CALCIUM/D) 500-200 MG-UNIT tablet, Take 2 tablets by mouth, Disp: , Rfl:      Calcium Polycarbophil (FIBER) 625 MG tablet, Take 1,250 mg by mouth, Disp: , Rfl:      cholecalciferol 50 MCG (2000 UT) CAPS, Take 1,000 Units by mouth, Disp: , Rfl:      Cyanocobalamin (B-12 PO), Take  by mouth. 1,000 mg hs, Disp: , Rfl:      desvenlafaxine (PRISTIQ) 100 MG 24 hr tablet, TAKE 1 TABLET BY MOUTH DAILY AT BEDTIME, Disp: , Rfl:      desvenlafaxine (PRISTIQ) 50 MG 24 hr tablet, TAKE 1 TABLET BY MOUTH ONCE DAILY. TAKE WITH 100MG TABLET FOR TOTAL OF 150MG DAILY, Disp: , Rfl:      dicyclomine (BENTYL) 10 MG capsule, TAKE 1 TO 2 CAPSULES(10 TO 20 MG) BY MOUTH FOUR TIMES DAILY AS NEEDED FOR DIARRHEA OR ABDOMINAL CRAMPS, Disp: , Rfl:      fluticasone (FLOVENT HFA) 220 MCG/ACT inhaler, Inhale 2 puffs into the lungs, Disp: , Rfl:      fluticasone-salmeterol (ADVAIR) 500-50 MCG/DOSE inhaler, , Disp: , Rfl:      galcanezumab-gnlm (EMGALITY) 120 MG/ML injection,  "Inject 240 mg Subcutaneous, Disp: , Rfl:      levonorgestrel (MIRENA) 20 MCG/24HR IUD, 1 Intra Uterine Device by Intrauterine route, Disp: , Rfl:      levothyroxine (SYNTHROID/LEVOTHROID) 125 MCG tablet, Take 125 mcg by mouth, Disp: , Rfl:      LORazepam (ATIVAN) 1 MG tablet, , Disp: , Rfl:      Multiple Vitamins-Minerals (MULTIVITAL PO), Take  by mouth. 1xqd hs , Disp: , Rfl:      ondansetron (ZOFRAN ODT) 4 MG ODT tab, , Disp: , Rfl:      PROBIOTIC PRODUCT PO, Take 1 tablet by mouth At Bedtime, Disp: , Rfl:      QUEtiapine (SEROQUEL) 200 MG tablet, , Disp: , Rfl:      ramelteon (ROZEREM) 8 MG tablet, TAKE 1 TABLET(8 MG) BY MOUTH AT BEDTIME AS NEEDED FOR SLEEP, Disp: , Rfl:      rizatriptan (MAXALT) 10 MG tablet, TAKE 1 TABLET BY MOUTH AT ONSET OF MIGRAINE. REPEAT IN 2 HOURS IF MIGRAINE PERSISTS. NO MORE THAN 2 IN 24 HOURS OR 5 IN 7 DAYS, Disp: , Rfl:      Suvorexant (BELSOMRA) 20 MG tablet, Take 20 mg by mouth, Disp: , Rfl:      topiramate (TOPAMAX) 50 MG tablet, Take 1 tablet (50 mg) by mouth 2 times daily, Disp: 180 tablet, Rfl: 3     traZODone (DESYREL) 150 MG tablet, TAKE 2 TABLETS(300 MG) BY MOUTH DAILY AT BEDTIME, Disp: , Rfl:      traZODone (DESYREL) 50 MG tablet, , Disp: , Rfl:      UBRELVY 50 MG tablet, , Disp: , Rfl:      vitamin B-12 (CYANOCOBALAMIN) 1000 MCG tablet, Take 1,000 mcg by mouth, Disp: , Rfl:      zolpidem (AMBIEN) 5 MG tablet, TAKE 1 TABLET(5 MG) BY MOUTH AT BEDTIME AS NEEDED FOR SLEEP, Disp: , Rfl:    Surgical History     Past Surgical History  She has a past surgical history that includes gastric bypass (2008); Cholecystectomy; liver biopsy; CAPSULE ENDOSCOPY (11/20/2012); CAPSULE ENDOSCOPY (12/10/2012); Revision Brea-En-Y; and Cholecystectomy.    Objective-Exam     Constitutional:  Ht 1.651 m (5' 5\")   Wt 78.9 kg (174 lb)   BMI 28.96 kg/m    General:  Pleasant and in no acute distress     Psychiatric: alert and oriented X3, mood and affect normal    Counseling     We reviewed the " important post op bariatric recommendations:  -eating 3 meals daily  -eating protein first, getting >60gm protein daily  -eating slowly, chewing food well  -avoiding/limiting calorie containing beverages  -drinking water 15-30 minutes before or after meals  -choosing wheat, not white with breads, crackers, pastas, ashley, bagels, tortillas, rice  -limiting restaurant or cafeteria eating to twice a week or less    We discussed the importance of restorative sleep and stress management in maintaining a healthy weight.  We discussed the National Weight Control Registry healthy weight maintenance strategies and ways to optimize metabolism.  We discussed the importance of physical activity including cardiovascular and strength training in maintaining a healthier weight.    We discussed the importance of life-long vitamin supplementation and life-long  follow-up.    Marizol was reminded that, to avoid marginal ulcers she should avoid tobacco at all, alcohol in excess, caffeine in excess, and NSAIDS (unless indicated for cardioprotection or othewise and opposed by a PPI).    Joellen Vidal MD, MD, [facility].  7/27/2022  3:17 PM  Total time spent on the date of this encounter doing: chart review, review of test results, patient visit, physical exam, education, counseling, developing plan of care, and documenting = 30 minutes.      Video-Visit Details    Type of service:  Video Visit    Video End Time (time video stopped): 3:40  Originating Location (pt. Location): Home    Distant Location (provider location):  [facility] [place]     Platform used for Video Visit: Brii      Again, thank you for allowing me to participate in the care of your patient.        Sincerely,        Joellen Vidal MD

## 2022-09-02 ENCOUNTER — OFFICE VISIT (OUTPATIENT)
Dept: PLASTIC SURGERY | Facility: AMBULATORY SURGERY CENTER | Age: 37
End: 2022-09-02
Payer: COMMERCIAL

## 2022-09-02 DIAGNOSIS — E65 ABDOMINAL PANNUS: Primary | ICD-10-CM

## 2022-09-02 DIAGNOSIS — N64.81 PTOSIS OF BOTH BREASTS: ICD-10-CM

## 2022-09-02 PROCEDURE — 99205 OFFICE O/P NEW HI 60 MIN: CPT | Performed by: PLASTIC SURGERY

## 2022-09-02 NOTE — Clinical Note
9/2/2022         RE: Marizol Granados  [address] [place] [address]        Dear Colleague,    Thank you for referring your patient, Marizol Granados, to the [facility]. Please see a copy of my visit note below.    Chief complaint:  Abdominal wall skin laxity, hanging abdominal wall pannus.     History of present illness:  This is 37 year old lady who presents with severe abdominal wall skin laxity and hanging abdominal wall pannus over the suprapubic region.  This patient does have history of laparoscopic Brea-en-Y gastric bypass on 2008 and has lost significant amount of weight, more than 117 pounds. Her weight has been stable now for the last several years.  Now she is presenting with abdominal wall skin laxity and hanging abdominal wall pannus.    She does have history of multiple and recurrent episode of intertrigo that she treats with multiple creams, talc powder and deodorant.  This irritation produces pain and its gets worse during the summertime.    She has been referred to me for possible panniculectomy.     Past medical history:  Past Medical History:   Diagnosis Date     Abdominal pain      Anxiety      Deep vein thrombosis (H)      Depression      Depression      Diarrhea      Disease of thyroid gland      GERD (gastroesophageal reflux disease)      Hypothyroidism      Low back pain      Migraine      Plantar fasciitis      Postoperative intestinal malabsorption      PTSD (post-traumatic stress disorder)      Pulmonary embolism (H)      Urinary incontinence      Left lower extremity DVT     Past surgical history:  Laparoscopic Brea-en-Y with gastric bypass, laparoscopic cholecystectomy.     Allergies:  Sulfa and penicillin     Medications:       Current Outpatient Medications:      buPROPion (WELLBUTRIN SR) 200 MG 12 hr tablet, TAKE 1 TABLET(200 MG) BY MOUTH TWICE DAILY, Disp: , Rfl:      busPIRone (BUSPAR) 15 MG tablet, , Disp: , Rfl:      butt paste  ointment, Apply topically 2 times daily as needed for skin protection Aquaphor 60gm and Stomadhesive Powder 30gm and Nystatin Ointment 15gm, Disp: 105 g, Rfl: 4     calcium carbonate-vitamin D (OS-LORY WITH D) 500-200 MG-UNIT tablet, Take 2 tablets by mouth, Disp: , Rfl:      Calcium Polycarbophil (FIBER) 625 MG tablet, Take 1,250 mg by mouth, Disp: , Rfl:      cholecalciferol 50 MCG (2000 UT) CAPS, Take 1,000 Units by mouth, Disp: , Rfl:      Cyanocobalamin (B-12 PO), Take  by mouth. 1,000 mg hs, Disp: , Rfl:      desvenlafaxine (PRISTIQ) 100 MG 24 hr tablet, TAKE 1 TABLET BY MOUTH DAILY AT BEDTIME, Disp: , Rfl:      desvenlafaxine (PRISTIQ) 50 MG 24 hr tablet, TAKE 1 TABLET BY MOUTH ONCE DAILY. TAKE WITH 100MG TABLET FOR TOTAL OF 150MG DAILY, Disp: , Rfl:      dicyclomine (BENTYL) 10 MG capsule, TAKE 1 TO 2 CAPSULES(10 TO 20 MG) BY MOUTH FOUR TIMES DAILY AS NEEDED FOR DIARRHEA OR ABDOMINAL CRAMPS, Disp: , Rfl:      fluticasone (FLOVENT HFA) 220 MCG/ACT inhaler, Inhale 2 puffs into the lungs, Disp: , Rfl:      galcanezumab-gnlm (EMGALITY) 120 MG/ML injection, Inject 240 mg Subcutaneous, Disp: , Rfl:      levonorgestrel (MIRENA) 20 MCG/DAY IUD, 1 Intra Uterine Device by Intrauterine route, Disp: , Rfl:      LORazepam (ATIVAN) 1 MG tablet, , Disp: , Rfl:      Multiple Vitamins-Minerals (MULTIVITAL PO), Take  by mouth. 1xqd hs , Disp: , Rfl:      omeprazole (PRILOSEC) 40 MG DR capsule, Take 1 capsule (40 mg) by mouth daily, Disp: 90 capsule, Rfl: 3     ondansetron (ZOFRAN ODT) 4 MG ODT tab, , Disp: , Rfl:      phentermine (ADIPEX-P) 37.5 MG tablet, TAKE 1/2 TO 1 TABLET(18.75 TO 37.5 MG) BY MOUTH EVERY MORNING, Disp: 90 tablet, Rfl: 1     PROBIOTIC PRODUCT PO, Take 1 tablet by mouth At Bedtime, Disp: , Rfl:      QUEtiapine (SEROQUEL) 200 MG tablet, , Disp: , Rfl:      ramelteon (ROZEREM) 8 MG tablet, TAKE 1 TABLET(8 MG) BY MOUTH AT BEDTIME AS NEEDED FOR SLEEP, Disp: , Rfl:      rizatriptan (MAXALT) 10 MG tablet, TAKE 1  TABLET BY MOUTH AT ONSET OF MIGRAINE. REPEAT IN 2 HOURS IF MIGRAINE PERSISTS. NO MORE THAN 2 IN 24 HOURS OR 5 IN 7 DAYS, Disp: , Rfl:      Suvorexant (BELSOMRA) 20 MG tablet, Take 20 mg by mouth, Disp: , Rfl:      topiramate (TOPAMAX) 50 MG tablet, Take 1 tablet (50 mg) by mouth 2 times daily, Disp: 180 tablet, Rfl: 3     traZODone (DESYREL) 150 MG tablet, TAKE 2 TABLETS(300 MG) BY MOUTH DAILY AT BEDTIME, Disp: , Rfl:      UBRELVY 50 MG tablet, , Disp: , Rfl:      vitamin B-12 (CYANOCOBALAMIN) 1000 MCG tablet, Take 1,000 mcg by mouth, Disp: , Rfl:      zolpidem (AMBIEN) 5 MG tablet, TAKE 1 TABLET(5 MG) BY MOUTH AT BEDTIME AS NEEDED FOR SLEEP, Disp: , Rfl:      albuterol (PROAIR HFA/PROVENTIL HFA/VENTOLIN HFA) 108 (90 Base) MCG/ACT inhaler, Inhale 1-2 puffs into the lungs, Disp: , Rfl:      fluticasone-salmeterol (ADVAIR) 500-50 MCG/DOSE inhaler, , Disp: , Rfl:     BIRTH CONTROL: Mirena     Family history:  Denies venous Thromboembolism (VTE)       GYN history:  G  2, P 2     Social History:  Denies tobacco, denies alcohol     Review of systems:  General ROS: No complaints or constitutional symptoms  Skin: Intertrigo and dermatitis in the suprapubic region.  Severe skin laxity throughout the anterior abdominal wall.  Hematologic/Lymphatic: No symptoms or complaints  Psychiatric: No symptoms or complaints  Endocrine: No excessive fatigue, no hypermetabolic symptoms reported  Respiratory ROS: No cough, shortness of breath, or wheezing  Cardiovascular ROS: No chest pain or dyspnea on exertion  Breast ROS: Denies nipple discharge, denies nipple inversion, denies palpable breast masses, denies changes in the color of the skin of both breasts  Gastrointestinal ROS: No abdominal pain, nausea, diarrhea, or constipation  Musculoskeletal ROS: Occasional back pain.  Neurological ROS: No focal neurologic defects reported.       Physical exam:     There were no vitals taken for this visit.  General: Alert, cooperative, appears  stated age   Skin: Skin color, texture, turgor normal, no rashes or lesions   Lymphatic: No obvious adenopathy, no swelling   Eyes: No scleral icterus, pupils equal  HENT: No traumatic injury to the head or face, no gross abnormalities  Lungs: Normal respiratory effort, breath sounds equal bilaterally  Heart: Regular rate and rhythm  Breasts: Not examined  Abdomen: Presents with diffuse skin laxity, striae and cellulite throughout the whole abdominal wall. There is severe skin laxity throughout the whole anterior abdominal wall.  This is more pronounced in the supraumbilical region and infraumbilical region as well as bilateral flanks. This skin laxity is more evident during flexion of her torso over the pelvis.  There is hanging abdominal wall pannus over the suprapubic region.  The umbilicus is deformed secondary to the weight of the supraumbilical lipodystrophy.  The umbilicus is also inferiorly displaced secondary to the weight of the infra umbilical hanging pannus that is pulling the umbilicus down.  Patient does present with diastases recti.  I did not palpate any evidence of umbilical hernias or incisional hernias.  Abdomen is otherwise soft, non-distended and non-tender to palpation.  Extremities: Patient does not present with varicose veins.  Neurologic: Grossly intact                                              Assessment:     37 year old female with excessive abdominal skin laxity and hanging abdominal wall pannus with inferiorly displaced umbilicus.  She also presents with diastases recti and multiple recurrent episode of intertrigo.     Her Caprini score is as follows: one- point for BMI greater than 25, three- points one-point for her history of DVT, two-points because any body contouring procedure will take more than 45 minutes, one-point because of her birth control Mirena. Total risk factor score is 7 points.  Patient will  require postoperative chemoprophylaxis, utilizing 40 mg of Lovenox  subcutaneously daily for 7 days.        PLAN:      Patient will benefit of [id number]'s panniculectomy secondary to the severity of her skin laxity in both supra and infraumbilical region as well as on her bilateral flanks.  She will also need rectus abdominals muscle fascia plication.     I discussed with her that this is an outpatient procedure that usually last between 4 to 5 hours.       We also discussed the potential risks for surgery.     In her particular case, she is at risk of venous thromboembolism (VTE) according to her Caprini score of 7 points.  She has personal history of left lower extremity DVT.  Therefore, I would prescribe her Lovenox postoperatively for 7 days to prevent any VTE.  That being said, she will be at risk of hematoma and postoperative bleeding.  However, I explained to her that chemoprophylaxis to prevent VTE outweighs the risks of potential bleeding.  She told me that she understood.     The other risks of surgery include but are not limited to scarring, wound dehiscence, wound healing problems, bleeding and hematoma, infection, seroma, contour irregularities, vertical scar in the abdomen, intraperitoneal injury requiring laparotomy, risk for further surgeries.      Patient has acknowledged all these risks and is still is interested to proceed with surgery.    Time spent with patient 60 minutes.    Adan Bell MD , FACS   Diplomate American Board of Plastic Surgery  Diplomate American Board of Surgery  Adj. Assistant Professor of Surgery  Division of Plastic & Reconstructive Surgery   [facility]  Office: [phone]   9/2/2022 at 3:41 PM       Chief complaint:  Bilateral breast ptosis     History of present illness:  This is a 37 year old year old  lady who presents with bilateral breast grade 3 ptosis.  Interestingly, she had more than 10 years ago, laparoscopic Brea-en-Y gastric bypass and has lost more than 117 pounds.  Her weight has now been  stable for the last several years.  She is concerned about the degree of her bilateral breast ptosis and the skin laxity.  She is interested on a breast lift.     Past medical history:  Left lower extremity DVT     Past surgical history:  Laparoscopic Brea-en-Y gastric bypass, laparoscopically cholecystectomy     Allergies:  Penicillin and sulfa     Medications:    Current Outpatient Medications:      buPROPion (WELLBUTRIN SR) 200 MG 12 hr tablet, TAKE 1 TABLET(200 MG) BY MOUTH TWICE DAILY, Disp: , Rfl:      busPIRone (BUSPAR) 15 MG tablet, , Disp: , Rfl:      butt paste ointment, Apply topically 2 times daily as needed for skin protection Aquaphor 60gm and Stomadhesive Powder 30gm and Nystatin Ointment 15gm, Disp: 105 g, Rfl: 4     calcium carbonate-vitamin D (OS-LORY WITH D) 500-200 MG-UNIT tablet, Take 2 tablets by mouth, Disp: , Rfl:      Calcium Polycarbophil (FIBER) 625 MG tablet, Take 1,250 mg by mouth, Disp: , Rfl:      cholecalciferol 50 MCG (2000 UT) CAPS, Take 1,000 Units by mouth, Disp: , Rfl:      Cyanocobalamin (B-12 PO), Take  by mouth. 1,000 mg hs, Disp: , Rfl:      desvenlafaxine (PRISTIQ) 100 MG 24 hr tablet, TAKE 1 TABLET BY MOUTH DAILY AT BEDTIME, Disp: , Rfl:      desvenlafaxine (PRISTIQ) 50 MG 24 hr tablet, TAKE 1 TABLET BY MOUTH ONCE DAILY. TAKE WITH 100MG TABLET FOR TOTAL OF 150MG DAILY, Disp: , Rfl:      dicyclomine (BENTYL) 10 MG capsule, TAKE 1 TO 2 CAPSULES(10 TO 20 MG) BY MOUTH FOUR TIMES DAILY AS NEEDED FOR DIARRHEA OR ABDOMINAL CRAMPS, Disp: , Rfl:      fluticasone (FLOVENT HFA) 220 MCG/ACT inhaler, Inhale 2 puffs into the lungs, Disp: , Rfl:      galcanezumab-gnlm (EMGALITY) 120 MG/ML injection, Inject 240 mg Subcutaneous, Disp: , Rfl:      levonorgestrel (MIRENA) 20 MCG/DAY IUD, 1 Intra Uterine Device by Intrauterine route, Disp: , Rfl:      LORazepam (ATIVAN) 1 MG tablet, , Disp: , Rfl:      Multiple Vitamins-Minerals (MULTIVITAL PO), Take  by mouth. 1xqd hs , Disp: , Rfl:       omeprazole (PRILOSEC) 40 MG DR capsule, Take 1 capsule (40 mg) by mouth daily, Disp: 90 capsule, Rfl: 3     ondansetron (ZOFRAN ODT) 4 MG ODT tab, , Disp: , Rfl:      phentermine (ADIPEX-P) 37.5 MG tablet, TAKE 1/2 TO 1 TABLET(18.75 TO 37.5 MG) BY MOUTH EVERY MORNING, Disp: 90 tablet, Rfl: 1     PROBIOTIC PRODUCT PO, Take 1 tablet by mouth At Bedtime, Disp: , Rfl:      QUEtiapine (SEROQUEL) 200 MG tablet, , Disp: , Rfl:      ramelteon (ROZEREM) 8 MG tablet, TAKE 1 TABLET(8 MG) BY MOUTH AT BEDTIME AS NEEDED FOR SLEEP, Disp: , Rfl:      rizatriptan (MAXALT) 10 MG tablet, TAKE 1 TABLET BY MOUTH AT ONSET OF MIGRAINE. REPEAT IN 2 HOURS IF MIGRAINE PERSISTS. NO MORE THAN 2 IN 24 HOURS OR 5 IN 7 DAYS, Disp: , Rfl:      Suvorexant (BELSOMRA) 20 MG tablet, Take 20 mg by mouth, Disp: , Rfl:      topiramate (TOPAMAX) 50 MG tablet, Take 1 tablet (50 mg) by mouth 2 times daily, Disp: 180 tablet, Rfl: 3     traZODone (DESYREL) 150 MG tablet, TAKE 2 TABLETS(300 MG) BY MOUTH DAILY AT BEDTIME, Disp: , Rfl:      UBRELVY 50 MG tablet, , Disp: , Rfl:      vitamin B-12 (CYANOCOBALAMIN) 1000 MCG tablet, Take 1,000 mcg by mouth, Disp: , Rfl:      zolpidem (AMBIEN) 5 MG tablet, TAKE 1 TABLET(5 MG) BY MOUTH AT BEDTIME AS NEEDED FOR SLEEP, Disp: , Rfl:      albuterol (PROAIR HFA/PROVENTIL HFA/VENTOLIN HFA) 108 (90 Base) MCG/ACT inhaler, Inhale 1-2 puffs into the lungs, Disp: , Rfl:      fluticasone-salmeterol (ADVAIR) 500-50 MCG/DOSE inhaler, , Disp: , Rfl:      Family history:  Denies family history of breast cancer     GYN history:  G2, P2     Social History:  Denies tobacco, denies alcohol     Review of systems:  General ROS: No complaints or constitutional symptoms  Skin: No complaints or symptoms   Hematologic/Lymphatic: No symptoms or complaints  Psychiatric: No symptoms or complaints  Endocrine: No excessive fatigue, no hypermetabolic symptoms reported  Respiratory ROS: No cough, shortness of breath, or wheezing  Cardiovascular ROS:  No chest pain or dyspnea on exertion  Breast ROS: Denies nipple discharge, denies nipple inversion, denies palpable breast masses, denies changes in the color of the skin of both breasts  Gastrointestinal ROS: No abdominal pain, nausea, diarrhea, or constipation  Musculoskeletal ROS: Complains of neck and upper back pain.  Neurological ROS: No focal neurologic defects reported.       Physical exam:    There were no vitals taken for this visit.  General: Alert, cooperative, appears stated age   Skin: Skin color, texture, turgor normal, no rashes or lesions   Lymphatic: No obvious adenopathy, no swelling   Eyes: No scleral icterus, pupils equal  HENT: No traumatic injury to the head or face, no gross abnormalities  Lungs: Normal respiratory effort, breath sounds equal bilaterally  Heart: Regular rate and rhythm  Breasts: Bilateral grade 3 ptosis. There is no nipple inversion or nipple discharge.  There is no peau d'orange.  There are no palpable breast masses.  There are no palpable axillary lymphadenopathies. The right breast presents with sternal notch to nipple distance of 32 cm, nipple to inframammary fold 7 cm, and breast diameter 15 cm.  On the left breast sternal notch to nipple distance is 30 cm, nipple to inframammary fold is 7 cm and breast diameter is 15 cm.  There is significant loss of bilateral upper pole fullness with significant skin laxity.  Abdomen: Soft, non-distended and non-tender to palpation  Neurologic: Grossly intact                                Assessment:     37 year old years old female with bilateral breast grade 3 ptosis.     PLAN:   This patient is a good surgical candidate for bilateral augmentation mastopexy, utilizing a Navarrete pattern, with subpectoral placement of breast implants.    For her breast implants I will utilize a moderate profile, soft touch, with the following potential volumes: 295 cc, 275 cc, 255 cc and 210 cc.  I will utilize corresponding sizers as well.     Risks  were explained to the patient and they include but are not limited to scarring, keloid, infection, potential explantation of the implants, capsular contracture, bleeding, temporary versus permanent altered sensation of the nipple areolar complexes as well of the skin of both breasts, necrosis of the nipple areolar complex requiring potential tattooing, inability to breast-feed, need for further surgeries. Patient has acknowledged all these risks and has expressed interest in proceeding with surgery.    Patient understands that this is a cosmetic procedure.  This procedure is outpatient I will take approximately 4 to 5 hours.  I will have our  reach out to her to discuss pricing.       Adan Bell MD , FACS   Diplomate American Board of Plastic Surgery  Diplomate American Board of Surgery  Adj. Assistant Professor of Surgery  Division of Plastic & Reconstructive Surgery   [facility]  Office: [phone]   9/2/2022 at 7:00 PM         Again, thank you for allowing me to participate in the care of your patient.        Sincerely,        Adan Bell MD

## 2022-09-02 NOTE — PROGRESS NOTES
Chief complaint:  Abdominal wall skin laxity, hanging abdominal wall pannus.     History of present illness:  This is 37 year old lady who presents with severe abdominal wall skin laxity and hanging abdominal wall pannus over the suprapubic region.  This patient does have history of laparoscopic Brea-en-Y gastric bypass on 2008 and has lost significant amount of weight, more than 117 pounds. Her weight has been stable now for the last several years.  Now she is presenting with abdominal wall skin laxity and hanging abdominal wall pannus.    She does have history of multiple and recurrent episode of intertrigo that she treats with multiple creams, talc powder and deodorant.  This irritation produces pain and its gets worse during the summertime.    She has been referred to me for possible panniculectomy.     Past medical history:  Past Medical History:   Diagnosis Date     Abdominal pain      Anxiety      Deep vein thrombosis (H)      Depression      Depression      Diarrhea      Disease of thyroid gland      GERD (gastroesophageal reflux disease)      Hypothyroidism      Low back pain      Migraine      Plantar fasciitis      Postoperative intestinal malabsorption      PTSD (post-traumatic stress disorder)      Pulmonary embolism (H)      Urinary incontinence      Left lower extremity DVT     Past surgical history:  Laparoscopic Brea-en-Y with gastric bypass, laparoscopic cholecystectomy.     Allergies:  Sulfa and penicillin     Medications:       Current Outpatient Medications:      buPROPion (WELLBUTRIN SR) 200 MG 12 hr tablet, TAKE 1 TABLET(200 MG) BY MOUTH TWICE DAILY, Disp: , Rfl:      busPIRone (BUSPAR) 15 MG tablet, , Disp: , Rfl:      butt paste ointment, Apply topically 2 times daily as needed for skin protection Aquaphor 60gm and Stomadhesive Powder 30gm and Nystatin Ointment 15gm, Disp: 105 g, Rfl: 4     calcium carbonate-vitamin D (OS-LORY WITH D) 500-200 MG-UNIT tablet, Take 2 tablets by mouth, Disp: ,  Rfl:      Calcium Polycarbophil (FIBER) 625 MG tablet, Take 1,250 mg by mouth, Disp: , Rfl:      cholecalciferol 50 MCG (2000 UT) CAPS, Take 1,000 Units by mouth, Disp: , Rfl:      Cyanocobalamin (B-12 PO), Take  by mouth. 1,000 mg hs, Disp: , Rfl:      desvenlafaxine (PRISTIQ) 100 MG 24 hr tablet, TAKE 1 TABLET BY MOUTH DAILY AT BEDTIME, Disp: , Rfl:      desvenlafaxine (PRISTIQ) 50 MG 24 hr tablet, TAKE 1 TABLET BY MOUTH ONCE DAILY. TAKE WITH 100MG TABLET FOR TOTAL OF 150MG DAILY, Disp: , Rfl:      dicyclomine (BENTYL) 10 MG capsule, TAKE 1 TO 2 CAPSULES(10 TO 20 MG) BY MOUTH FOUR TIMES DAILY AS NEEDED FOR DIARRHEA OR ABDOMINAL CRAMPS, Disp: , Rfl:      fluticasone (FLOVENT HFA) 220 MCG/ACT inhaler, Inhale 2 puffs into the lungs, Disp: , Rfl:      galcanezumab-gnlm (EMGALITY) 120 MG/ML injection, Inject 240 mg Subcutaneous, Disp: , Rfl:      levonorgestrel (MIRENA) 20 MCG/DAY IUD, 1 Intra Uterine Device by Intrauterine route, Disp: , Rfl:      LORazepam (ATIVAN) 1 MG tablet, , Disp: , Rfl:      Multiple Vitamins-Minerals (MULTIVITAL PO), Take  by mouth. 1xqd hs , Disp: , Rfl:      omeprazole (PRILOSEC) 40 MG DR capsule, Take 1 capsule (40 mg) by mouth daily, Disp: 90 capsule, Rfl: 3     ondansetron (ZOFRAN ODT) 4 MG ODT tab, , Disp: , Rfl:      phentermine (ADIPEX-P) 37.5 MG tablet, TAKE 1/2 TO 1 TABLET(18.75 TO 37.5 MG) BY MOUTH EVERY MORNING, Disp: 90 tablet, Rfl: 1     PROBIOTIC PRODUCT PO, Take 1 tablet by mouth At Bedtime, Disp: , Rfl:      QUEtiapine (SEROQUEL) 200 MG tablet, , Disp: , Rfl:      ramelteon (ROZEREM) 8 MG tablet, TAKE 1 TABLET(8 MG) BY MOUTH AT BEDTIME AS NEEDED FOR SLEEP, Disp: , Rfl:      rizatriptan (MAXALT) 10 MG tablet, TAKE 1 TABLET BY MOUTH AT ONSET OF MIGRAINE. REPEAT IN 2 HOURS IF MIGRAINE PERSISTS. NO MORE THAN 2 IN 24 HOURS OR 5 IN 7 DAYS, Disp: , Rfl:      Suvorexant (BELSOMRA) 20 MG tablet, Take 20 mg by mouth, Disp: , Rfl:      topiramate (TOPAMAX) 50 MG tablet, Take 1 tablet (50  mg) by mouth 2 times daily, Disp: 180 tablet, Rfl: 3     traZODone (DESYREL) 150 MG tablet, TAKE 2 TABLETS(300 MG) BY MOUTH DAILY AT BEDTIME, Disp: , Rfl:      UBRELVY 50 MG tablet, , Disp: , Rfl:      vitamin B-12 (CYANOCOBALAMIN) 1000 MCG tablet, Take 1,000 mcg by mouth, Disp: , Rfl:      zolpidem (AMBIEN) 5 MG tablet, TAKE 1 TABLET(5 MG) BY MOUTH AT BEDTIME AS NEEDED FOR SLEEP, Disp: , Rfl:      albuterol (PROAIR HFA/PROVENTIL HFA/VENTOLIN HFA) 108 (90 Base) MCG/ACT inhaler, Inhale 1-2 puffs into the lungs, Disp: , Rfl:      fluticasone-salmeterol (ADVAIR) 500-50 MCG/DOSE inhaler, , Disp: , Rfl:     BIRTH CONTROL: Mirena     Family history:  Denies venous Thromboembolism (VTE)       GYN history:  G  2, P 2     Social History:  Denies tobacco, denies alcohol     Review of systems:  General ROS: No complaints or constitutional symptoms  Skin: Intertrigo and dermatitis in the suprapubic region.  Severe skin laxity throughout the anterior abdominal wall.  Hematologic/Lymphatic: No symptoms or complaints  Psychiatric: No symptoms or complaints  Endocrine: No excessive fatigue, no hypermetabolic symptoms reported  Respiratory ROS: No cough, shortness of breath, or wheezing  Cardiovascular ROS: No chest pain or dyspnea on exertion  Breast ROS: Denies nipple discharge, denies nipple inversion, denies palpable breast masses, denies changes in the color of the skin of both breasts  Gastrointestinal ROS: No abdominal pain, nausea, diarrhea, or constipation  Musculoskeletal ROS: Occasional back pain.  Neurological ROS: No focal neurologic defects reported.       Physical exam:     There were no vitals taken for this visit.  General: Alert, cooperative, appears stated age   Skin: Skin color, texture, turgor normal, no rashes or lesions   Lymphatic: No obvious adenopathy, no swelling   Eyes: No scleral icterus, pupils equal  HENT: No traumatic injury to the head or face, no gross abnormalities  Lungs: Normal respiratory  effort, breath sounds equal bilaterally  Heart: Regular rate and rhythm  Breasts: Not examined  Abdomen: Presents with diffuse skin laxity, striae and cellulite throughout the whole abdominal wall. There is severe skin laxity throughout the whole anterior abdominal wall.  This is more pronounced in the supraumbilical region and infraumbilical region as well as bilateral flanks. This skin laxity is more evident during flexion of her torso over the pelvis.  There is hanging abdominal wall pannus over the suprapubic region.  The umbilicus is deformed secondary to the weight of the supraumbilical lipodystrophy.  The umbilicus is also inferiorly displaced secondary to the weight of the infra umbilical hanging pannus that is pulling the umbilicus down.  Patient does present with diastases recti.  I did not palpate any evidence of umbilical hernias or incisional hernias.  Abdomen is otherwise soft, non-distended and non-tender to palpation.  Extremities: Patient does not present with varicose veins.  Neurologic: Grossly intact                                              Assessment:     37 year old female with excessive abdominal skin laxity and hanging abdominal wall pannus with inferiorly displaced umbilicus.  She also presents with diastases recti and multiple recurrent episode of intertrigo.     Her Caprini score is as follows: one- point for BMI greater than 25, three- points one-point for her history of DVT, two-points because any body contouring procedure will take more than 45 minutes, one-point because of her birth control Mirena. Total risk factor score is 7 points.  Patient will  require postoperative chemoprophylaxis, utilizing 40 mg of Lovenox subcutaneously daily for 7 days.        PLAN:      Patient will benefit of keena's panniculectomy secondary to the severity of her skin laxity in both supra and infraumbilical region as well as on her bilateral flanks.  She will also need rectus abdominals muscle  fascia plication.     I discussed with her that this is an outpatient procedure that usually last between 4 to 5 hours.       We also discussed the potential risks for surgery.     In her particular case, she is at risk of venous thromboembolism (VTE) according to her Caprini score of 7 points.  She has personal history of left lower extremity DVT.  Therefore, I would prescribe her Lovenox postoperatively for 7 days to prevent any VTE.  That being said, she will be at risk of hematoma and postoperative bleeding.  However, I explained to her that chemoprophylaxis to prevent VTE outweighs the risks of potential bleeding.  She told me that she understood.     The other risks of surgery include but are not limited to scarring, wound dehiscence, wound healing problems, bleeding and hematoma, infection, seroma, contour irregularities, vertical scar in the abdomen, intraperitoneal injury requiring laparotomy, risk for further surgeries.      Patient has acknowledged all these risks and is still is interested to proceed with surgery.    Time spent with patient 60 minutes.    Adan Bell MD , FACS   Diplomate American Board of Plastic Surgery  Diplomate American Board of Surgery  Adj. Assistant Professor of Surgery  Division of Plastic & Reconstructive Surgery   [facility]  Office: [phone]   9/2/2022 at 3:41 PM

## 2022-09-03 ENCOUNTER — HEALTH MAINTENANCE LETTER (OUTPATIENT)
Age: 37
End: 2022-09-03

## 2022-09-03 NOTE — PROGRESS NOTES
Chief complaint:  Bilateral breast ptosis     History of present illness:  This is a 37 year old year old  lady who presents with bilateral breast grade 3 ptosis.  Interestingly, she had more than 10 years ago, laparoscopic Brea-en-Y gastric bypass and has lost more than 117 pounds.  Her weight has now been stable for the last several years.  She is concerned about the degree of her bilateral breast ptosis and the skin laxity.  She is interested on a breast lift.     Past medical history:  Left lower extremity DVT     Past surgical history:  Laparoscopic Brea-en-Y gastric bypass, laparoscopically cholecystectomy     Allergies:  Penicillin and sulfa     Medications:    Current Outpatient Medications:      buPROPion (WELLBUTRIN SR) 200 MG 12 hr tablet, TAKE 1 TABLET(200 MG) BY MOUTH TWICE DAILY, Disp: , Rfl:      busPIRone (BUSPAR) 15 MG tablet, , Disp: , Rfl:      butt paste ointment, Apply topically 2 times daily as needed for skin protection Aquaphor 60gm and Stomadhesive Powder 30gm and Nystatin Ointment 15gm, Disp: 105 g, Rfl: 4     calcium carbonate-vitamin D (OS-LORY WITH D) 500-200 MG-UNIT tablet, Take 2 tablets by mouth, Disp: , Rfl:      Calcium Polycarbophil (FIBER) 625 MG tablet, Take 1,250 mg by mouth, Disp: , Rfl:      cholecalciferol 50 MCG (2000 UT) CAPS, Take 1,000 Units by mouth, Disp: , Rfl:      Cyanocobalamin (B-12 PO), Take  by mouth. 1,000 mg hs, Disp: , Rfl:      desvenlafaxine (PRISTIQ) 100 MG 24 hr tablet, TAKE 1 TABLET BY MOUTH DAILY AT BEDTIME, Disp: , Rfl:      desvenlafaxine (PRISTIQ) 50 MG 24 hr tablet, TAKE 1 TABLET BY MOUTH ONCE DAILY. TAKE WITH 100MG TABLET FOR TOTAL OF 150MG DAILY, Disp: , Rfl:      dicyclomine (BENTYL) 10 MG capsule, TAKE 1 TO 2 CAPSULES(10 TO 20 MG) BY MOUTH FOUR TIMES DAILY AS NEEDED FOR DIARRHEA OR ABDOMINAL CRAMPS, Disp: , Rfl:      fluticasone (FLOVENT HFA) 220 MCG/ACT inhaler, Inhale 2 puffs into the lungs, Disp: , Rfl:      galcanezumab-gnlm (EMGALITY) 120  MG/ML injection, Inject 240 mg Subcutaneous, Disp: , Rfl:      levonorgestrel (MIRENA) 20 MCG/DAY IUD, 1 Intra Uterine Device by Intrauterine route, Disp: , Rfl:      LORazepam (ATIVAN) 1 MG tablet, , Disp: , Rfl:      Multiple Vitamins-Minerals (MULTIVITAL PO), Take  by mouth. 1xqd hs , Disp: , Rfl:      omeprazole (PRILOSEC) 40 MG DR capsule, Take 1 capsule (40 mg) by mouth daily, Disp: 90 capsule, Rfl: 3     ondansetron (ZOFRAN ODT) 4 MG ODT tab, , Disp: , Rfl:      phentermine (ADIPEX-P) 37.5 MG tablet, TAKE 1/2 TO 1 TABLET(18.75 TO 37.5 MG) BY MOUTH EVERY MORNING, Disp: 90 tablet, Rfl: 1     PROBIOTIC PRODUCT PO, Take 1 tablet by mouth At Bedtime, Disp: , Rfl:      QUEtiapine (SEROQUEL) 200 MG tablet, , Disp: , Rfl:      ramelteon (ROZEREM) 8 MG tablet, TAKE 1 TABLET(8 MG) BY MOUTH AT BEDTIME AS NEEDED FOR SLEEP, Disp: , Rfl:      rizatriptan (MAXALT) 10 MG tablet, TAKE 1 TABLET BY MOUTH AT ONSET OF MIGRAINE. REPEAT IN 2 HOURS IF MIGRAINE PERSISTS. NO MORE THAN 2 IN 24 HOURS OR 5 IN 7 DAYS, Disp: , Rfl:      Suvorexant (BELSOMRA) 20 MG tablet, Take 20 mg by mouth, Disp: , Rfl:      topiramate (TOPAMAX) 50 MG tablet, Take 1 tablet (50 mg) by mouth 2 times daily, Disp: 180 tablet, Rfl: 3     traZODone (DESYREL) 150 MG tablet, TAKE 2 TABLETS(300 MG) BY MOUTH DAILY AT BEDTIME, Disp: , Rfl:      UBRELVY 50 MG tablet, , Disp: , Rfl:      vitamin B-12 (CYANOCOBALAMIN) 1000 MCG tablet, Take 1,000 mcg by mouth, Disp: , Rfl:      zolpidem (AMBIEN) 5 MG tablet, TAKE 1 TABLET(5 MG) BY MOUTH AT BEDTIME AS NEEDED FOR SLEEP, Disp: , Rfl:      albuterol (PROAIR HFA/PROVENTIL HFA/VENTOLIN HFA) 108 (90 Base) MCG/ACT inhaler, Inhale 1-2 puffs into the lungs, Disp: , Rfl:      fluticasone-salmeterol (ADVAIR) 500-50 MCG/DOSE inhaler, , Disp: , Rfl:      Family history:  Denies family history of breast cancer     GYN history:  G2, P2     Social History:  Denies tobacco, denies alcohol     Review of systems:  General ROS: No  complaints or constitutional symptoms  Skin: No complaints or symptoms   Hematologic/Lymphatic: No symptoms or complaints  Psychiatric: No symptoms or complaints  Endocrine: No excessive fatigue, no hypermetabolic symptoms reported  Respiratory ROS: No cough, shortness of breath, or wheezing  Cardiovascular ROS: No chest pain or dyspnea on exertion  Breast ROS: Denies nipple discharge, denies nipple inversion, denies palpable breast masses, denies changes in the color of the skin of both breasts  Gastrointestinal ROS: No abdominal pain, nausea, diarrhea, or constipation  Musculoskeletal ROS: Complains of neck and upper back pain.  Neurological ROS: No focal neurologic defects reported.       Physical exam:    There were no vitals taken for this visit.  General: Alert, cooperative, appears stated age   Skin: Skin color, texture, turgor normal, no rashes or lesions   Lymphatic: No obvious adenopathy, no swelling   Eyes: No scleral icterus, pupils equal  HENT: No traumatic injury to the head or face, no gross abnormalities  Lungs: Normal respiratory effort, breath sounds equal bilaterally  Heart: Regular rate and rhythm  Breasts: Bilateral grade 3 ptosis. There is no nipple inversion or nipple discharge.  There is no peau d'orange.  There are no palpable breast masses.  There are no palpable axillary lymphadenopathies. The right breast presents with sternal notch to nipple distance of 32 cm, nipple to inframammary fold 7 cm, and breast diameter 15 cm.  On the left breast sternal notch to nipple distance is 30 cm, nipple to inframammary fold is 7 cm and breast diameter is 15 cm.  There is significant loss of bilateral upper pole fullness with significant skin laxity.  Abdomen: Soft, non-distended and non-tender to palpation  Neurologic: Grossly intact                                Assessment:     37 year old years old female with bilateral breast grade 3 ptosis.     PLAN:   This patient is a good surgical candidate for  bilateral augmentation mastopexy, utilizing a Navarrete pattern, with subpectoral placement of breast implants.    For her breast implants I will utilize a moderate profile, soft touch, with the following potential volumes: 295 cc, 275 cc, 255 cc and 210 cc.  I will utilize corresponding sizers as well.     Risks were explained to the patient and they include but are not limited to scarring, keloid, infection, potential explantation of the implants, capsular contracture, bleeding, temporary versus permanent altered sensation of the nipple areolar complexes as well of the skin of both breasts, necrosis of the nipple areolar complex requiring potential tattooing, inability to breast-feed, need for further surgeries. Patient has acknowledged all these risks and has expressed interest in proceeding with surgery.    Patient understands that this is a cosmetic procedure.  This procedure is outpatient I will take approximately 4 to 5 hours.  I will have our  reach out to her to discuss pricing.       Adan Bell MD , FACS   Diplomate American Board of Plastic Surgery  Diplomate American Board of Surgery  Adj. Assistant Professor of Surgery  Division of Plastic & Reconstructive Surgery   [facility]  Office: [phone]   9/2/2022 at 7:00 PM

## 2022-10-26 ENCOUNTER — VIRTUAL VISIT (OUTPATIENT)
Dept: SURGERY | Facility: CLINIC | Age: 37
End: 2022-10-26
Payer: COMMERCIAL

## 2022-10-26 VITALS — WEIGHT: 172 LBS | HEIGHT: 65 IN | BODY MASS INDEX: 28.66 KG/M2

## 2022-10-26 DIAGNOSIS — K91.2 POSTOPERATIVE MALABSORPTION: Primary | ICD-10-CM

## 2022-10-26 PROCEDURE — 99213 OFFICE O/P EST LOW 20 MIN: CPT | Mod: 95 | Performed by: FAMILY MEDICINE

## 2022-10-26 RX ORDER — TRAZODONE HYDROCHLORIDE 50 MG/1
TABLET, FILM COATED ORAL
Status: ON HOLD | COMMUNITY
Start: 2022-10-24 | End: 2023-01-23

## 2022-10-26 NOTE — LETTER
10/26/2022         RE: Marizol Granados  [address] [place] [address]        Dear Colleague,    Thank you for referring your patient, Marizol Granados, to the [facility] [place]. Please see a copy of my visit note below.    Marizol Granados is 37 year old  female who presents for a billable video visit today.    How would you like to obtain your AVS? MyChart  If dropped from the video visit, the video invitation should be resent by: Text to cell phone: [phone]  Will anyone else be joining your video visit? No  {If patient encounters technical issues they should call [phone]}    Video Start Time: 10:46    Are there any specific questions or needs that you would like addressed at your visit today? Return simeon - 14yr PO - no concerns or ?s      Bariatric Follow Up Visit with a History of Previous Bariatric Surgery     Date of visit: 10/26/2022  Physician: Joellen Vidal MD, MD  Primary Care Provider:  Arik Munoz  Marizol Granados   37 year old  female    Date of Surgery: 3/6/2008  Initial Weight: 268#-initial  here 271#  Initial BMI:   Today's Weight:   Wt Readings from Last 1 Encounters:   10/26/22 78 kg (172 lb)     Body mass index is 28.62 kg/m .      Assessment and Plan     Assessment: Marizol is a 37 year old year old female who is 14 yrs s/p  Brea en Y Gastric Bypass with Dr. Munguia  Marizol Granados feels as if she has achieved the goals she hoped to accomplish through bariatric surgery and weight loss.    Encounter Diagnosis   Name Primary?     Postoperative malabsorption Yes         Current Outpatient Medications:      albuterol (PROAIR HFA/PROVENTIL HFA/VENTOLIN HFA) 108 (90 Base) MCG/ACT inhaler, Inhale 1-2 puffs into the lungs, Disp: , Rfl:      buPROPion (WELLBUTRIN SR) 200 MG 12 hr tablet, TAKE 1 TABLET(200 MG) BY MOUTH TWICE DAILY, Disp: , Rfl:      busPIRone (BUSPAR) 15 MG tablet, , Disp: , Rfl:      butt paste ointment,  Apply topically 2 times daily as needed for skin protection Aquaphor 60gm and Stomadhesive Powder 30gm and Nystatin Ointment 15gm, Disp: 105 g, Rfl: 4     calcium carbonate-vitamin D (OS-LORY WITH D) 500-200 MG-UNIT tablet, Take 2 tablets by mouth, Disp: , Rfl:      Calcium Polycarbophil (FIBER) 625 MG tablet, Take 1,250 mg by mouth, Disp: , Rfl:      cholecalciferol 50 MCG (2000 UT) CAPS, Take 1,000 Units by mouth, Disp: , Rfl:      Cyanocobalamin (B-12 PO), Take  by mouth. 1,000 mg hs, Disp: , Rfl:      desvenlafaxine (PRISTIQ) 100 MG 24 hr tablet, TAKE 1 TABLET BY MOUTH DAILY AT BEDTIME, Disp: , Rfl:      desvenlafaxine (PRISTIQ) 50 MG 24 hr tablet, TAKE 1 TABLET BY MOUTH ONCE DAILY. TAKE WITH 100MG TABLET FOR TOTAL OF 150MG DAILY, Disp: , Rfl:      dicyclomine (BENTYL) 10 MG capsule, TAKE 1 TO 2 CAPSULES(10 TO 20 MG) BY MOUTH FOUR TIMES DAILY AS NEEDED FOR DIARRHEA OR ABDOMINAL CRAMPS, Disp: , Rfl:      fluticasone (FLOVENT HFA) 220 MCG/ACT inhaler, Inhale 2 puffs into the lungs, Disp: , Rfl:      fluticasone-salmeterol (ADVAIR) 500-50 MCG/DOSE inhaler, , Disp: , Rfl:      galcanezumab-gnlm (EMGALITY) 120 MG/ML injection, Inject 240 mg Subcutaneous, Disp: , Rfl:      levonorgestrel (MIRENA) 20 MCG/DAY IUD, 1 Intra Uterine Device by Intrauterine route, Disp: , Rfl:      LORazepam (ATIVAN) 1 MG tablet, , Disp: , Rfl:      Multiple Vitamins-Minerals (MULTIVITAL PO), Take  by mouth. 1xqd hs , Disp: , Rfl:      omeprazole (PRILOSEC) 40 MG DR capsule, Take 1 capsule (40 mg) by mouth daily, Disp: 90 capsule, Rfl: 3     ondansetron (ZOFRAN ODT) 4 MG ODT tab, , Disp: , Rfl:      phentermine (ADIPEX-P) 37.5 MG tablet, TAKE 1/2 TO 1 TABLET(18.75 TO 37.5 MG) BY MOUTH EVERY MORNING, Disp: 90 tablet, Rfl: 1     PROBIOTIC PRODUCT PO, Take 1 tablet by mouth At Bedtime, Disp: , Rfl:      QUEtiapine (SEROQUEL) 200 MG tablet, , Disp: , Rfl:      ramelteon (ROZEREM) 8 MG tablet, TAKE 1 TABLET(8 MG) BY MOUTH AT BEDTIME AS NEEDED FOR  SLEEP, Disp: , Rfl:      rizatriptan (MAXALT) 10 MG tablet, TAKE 1 TABLET BY MOUTH AT ONSET OF MIGRAINE. REPEAT IN 2 HOURS IF MIGRAINE PERSISTS. NO MORE THAN 2 IN 24 HOURS OR 5 IN 7 DAYS, Disp: , Rfl:      Suvorexant (BELSOMRA) 20 MG tablet, Take 20 mg by mouth, Disp: , Rfl:      topiramate (TOPAMAX) 50 MG tablet, Take 1 tablet (50 mg) by mouth 2 times daily, Disp: 180 tablet, Rfl: 3     traZODone (DESYREL) 150 MG tablet, TAKE 2 TABLETS(300 MG) BY MOUTH DAILY AT BEDTIME, Disp: , Rfl:      traZODone (DESYREL) 50 MG tablet, , Disp: , Rfl:      UBRELVY 50 MG tablet, , Disp: , Rfl:      vitamin B-12 (CYANOCOBALAMIN) 1000 MCG tablet, Take 1,000 mcg by mouth, Disp: , Rfl:      zolpidem (AMBIEN) 5 MG tablet, TAKE 1 TABLET(5 MG) BY MOUTH AT BEDTIME AS NEEDED FOR SLEEP, Disp: , Rfl:     Plan: When things are good and stable, consider trying to back down on seroquel dose if able. Continue vitamins with consistency. Consider zinc supplement given recent COVID and upcoming plastic surgery with Dr. Bell. Annual labs prior to plastic surgery to ensure excellent nutritional status for healing. Optimize and continue protein intake for healing as well.     Return in about 1 year (around 10/26/2023).    Bariatric Surgery Review     Interim History/LifeChanges: Recent COVID (again) down for weeks, just regaining her strength. Taking her vitamins with consistency. Waling the dog daily. 100# down from when she presented here. Labs ordered not drawn.  Work is busy season. Winter is hard time so staying on Seroquel. Has tried going down but back up to 200mg.     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   Appetite (1-10): Ok  GERD: on PPI    Medication changes: on PPI    Vitamin Intake:   B-12   Sl   MVI  1/d   Vitamin D  5,000   Calcium        Other                LABS: ordered    Nausea no  Vomiting no  Constipation no  Diarrhea no  Rashes no  Hair Loss no  Calf tenderness no  Breathing difficulty no  Reactive  Hypoglycemia avoids  Light Headedness no   Moods stable    12 point ROS as above and otherwise negative      Habits:  Alcohol: no  Tobacco: no  Caffeine no  NSAIDS no  Exercise Routine: walking the dog, counting on phone  3 meals/day yes  Protein first yes  grams/day  Water Separate from meals yes  Calorie Containing Beverages no  Restaurant eating/wk no  Sleeping well  Stress busy season at work  CPAP:   Contraception: mirena  DEXA:not indicated    Social History     Social History     Socioeconomic History     Marital status: Single     Spouse name: Not on file     Number of children: Not on file     Years of education: Not on file     Highest education level: Not on file   Occupational History     Not on file   Tobacco Use     Smoking status: Never     Smokeless tobacco: Never   Substance and Sexual Activity     Alcohol use: Yes     Alcohol/week: 0.0 - 1.0 standard drinks     Comment: Alcoholic Drinks/day: maybe one a month     Drug use: Never     Sexual activity: Yes     Partners: Male     Birth control/protection: Condom   Other Topics Concern     Parent/sibling w/ CABG, MI or angioplasty before 65F 55M? Not Asked      Service Not Asked     Blood Transfusions Not Asked     Caffeine Concern Not Asked     Occupational Exposure Not Asked     Hobby Hazards Not Asked     Sleep Concern Not Asked     Stress Concern Not Asked     Weight Concern Not Asked     Special Diet Not Asked     Back Care Not Asked     Exercise Yes     Comment: cardio and weights     Bike Helmet Not Asked     Seat Belt Not Asked     Self-Exams Not Asked   Social History Narrative    Single. 2 children 9 months apart.  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 Working from home.    Left a narcissistic relationship and now with her 2 kids     Social Determinants of Health     Financial Resource Strain: Not on file   Food Insecurity: Not on file   Transportation Needs: Not on file   Physical Activity:  Not on file   Stress: Not on file   Social Connections: Not on file   Intimate Partner Violence: Not on file   Housing Stability: Not on file       Past Medical History     Past Medical History:   Diagnosis Date     Abdominal pain      Anxiety      Deep vein thrombosis (H)      Depression      Depression      Diarrhea      Disease of thyroid gland      GERD (gastroesophageal reflux disease)      Hypothyroidism      Low back pain      Migraine      Plantar fasciitis      Postoperative intestinal malabsorption      PTSD (post-traumatic stress disorder)      Pulmonary embolism (H)      Urinary incontinence      Problem List     Patient Active Problem List   Diagnosis     Therapeutic drug monitoring     Social phobia     Severe obesity (H)     S/P gastric bypass     Restless legs     Recurrent major depressive disorder (H)     Psychological factor affecting physical condition     Persistent depressive disorder     Other B-complex deficiencies     Morbid obesity with BMI of 40.0-44.9, adult (H)     Moderate persistent asthma     Irritable bowel syndrome with both constipation and diarrhea     Intractable migraine without aura and with status migrainosus     Insomnia     Hypothyroidism     Hypercholesterolemia     History of thromboembolism     History of MRSA infection     Generalized anxiety disorder     Frequent UTI     Chronic fatigue     Allergic rhinitis     Acne     Abdominal pain, chronic, epigastric     Abdominal pain     Genital herpes simplex     Postoperative intestinal malabsorption     Depression     GERD (gastroesophageal reflux disease)     Urinary incontinence     Migraine     Low back pain     Plantar fasciitis     Morbid obesity (H)     Medications       Current Outpatient Medications:      albuterol (PROAIR HFA/PROVENTIL HFA/VENTOLIN HFA) 108 (90 Base) MCG/ACT inhaler, Inhale 1-2 puffs into the lungs, Disp: , Rfl:      buPROPion (WELLBUTRIN SR) 200 MG 12 hr tablet, TAKE 1 TABLET(200 MG) BY MOUTH TWICE  DAILY, Disp: , Rfl:      busPIRone (BUSPAR) 15 MG tablet, , Disp: , Rfl:      butt paste ointment, Apply topically 2 times daily as needed for skin protection Aquaphor 60gm and Stomadhesive Powder 30gm and Nystatin Ointment 15gm, Disp: 105 g, Rfl: 4     calcium carbonate-vitamin D (OS-LORY WITH D) 500-200 MG-UNIT tablet, Take 2 tablets by mouth, Disp: , Rfl:      Calcium Polycarbophil (FIBER) 625 MG tablet, Take 1,250 mg by mouth, Disp: , Rfl:      cholecalciferol 50 MCG (2000 UT) CAPS, Take 1,000 Units by mouth, Disp: , Rfl:      Cyanocobalamin (B-12 PO), Take  by mouth. 1,000 mg hs, Disp: , Rfl:      desvenlafaxine (PRISTIQ) 100 MG 24 hr tablet, TAKE 1 TABLET BY MOUTH DAILY AT BEDTIME, Disp: , Rfl:      desvenlafaxine (PRISTIQ) 50 MG 24 hr tablet, TAKE 1 TABLET BY MOUTH ONCE DAILY. TAKE WITH 100MG TABLET FOR TOTAL OF 150MG DAILY, Disp: , Rfl:      dicyclomine (BENTYL) 10 MG capsule, TAKE 1 TO 2 CAPSULES(10 TO 20 MG) BY MOUTH FOUR TIMES DAILY AS NEEDED FOR DIARRHEA OR ABDOMINAL CRAMPS, Disp: , Rfl:      fluticasone (FLOVENT HFA) 220 MCG/ACT inhaler, Inhale 2 puffs into the lungs, Disp: , Rfl:      fluticasone-salmeterol (ADVAIR) 500-50 MCG/DOSE inhaler, , Disp: , Rfl:      galcanezumab-gnlm (EMGALITY) 120 MG/ML injection, Inject 240 mg Subcutaneous, Disp: , Rfl:      levonorgestrel (MIRENA) 20 MCG/DAY IUD, 1 Intra Uterine Device by Intrauterine route, Disp: , Rfl:      LORazepam (ATIVAN) 1 MG tablet, , Disp: , Rfl:      Multiple Vitamins-Minerals (MULTIVITAL PO), Take  by mouth. 1xqd hs , Disp: , Rfl:      omeprazole (PRILOSEC) 40 MG DR capsule, Take 1 capsule (40 mg) by mouth daily, Disp: 90 capsule, Rfl: 3     ondansetron (ZOFRAN ODT) 4 MG ODT tab, , Disp: , Rfl:      phentermine (ADIPEX-P) 37.5 MG tablet, TAKE 1/2 TO 1 TABLET(18.75 TO 37.5 MG) BY MOUTH EVERY MORNING, Disp: 90 tablet, Rfl: 1     PROBIOTIC PRODUCT PO, Take 1 tablet by mouth At Bedtime, Disp: , Rfl:      QUEtiapine (SEROQUEL) 200 MG tablet, , Disp:  ", Rfl:      ramelteon (ROZEREM) 8 MG tablet, TAKE 1 TABLET(8 MG) BY MOUTH AT BEDTIME AS NEEDED FOR SLEEP, Disp: , Rfl:      rizatriptan (MAXALT) 10 MG tablet, TAKE 1 TABLET BY MOUTH AT ONSET OF MIGRAINE. REPEAT IN 2 HOURS IF MIGRAINE PERSISTS. NO MORE THAN 2 IN 24 HOURS OR 5 IN 7 DAYS, Disp: , Rfl:      Suvorexant (BELSOMRA) 20 MG tablet, Take 20 mg by mouth, Disp: , Rfl:      topiramate (TOPAMAX) 50 MG tablet, Take 1 tablet (50 mg) by mouth 2 times daily, Disp: 180 tablet, Rfl: 3     traZODone (DESYREL) 150 MG tablet, TAKE 2 TABLETS(300 MG) BY MOUTH DAILY AT BEDTIME, Disp: , Rfl:      traZODone (DESYREL) 50 MG tablet, , Disp: , Rfl:      UBRELVY 50 MG tablet, , Disp: , Rfl:      vitamin B-12 (CYANOCOBALAMIN) 1000 MCG tablet, Take 1,000 mcg by mouth, Disp: , Rfl:      zolpidem (AMBIEN) 5 MG tablet, TAKE 1 TABLET(5 MG) BY MOUTH AT BEDTIME AS NEEDED FOR SLEEP, Disp: , Rfl:    Surgical History     Past Surgical History  She has a past surgical history that includes gastric bypass (2008); Cholecystectomy; liver biopsy; CAPSULE ENDOSCOPY (11/20/2012); CAPSULE ENDOSCOPY (12/10/2012); Revision Brea-En-Y; and Cholecystectomy.    Objective-Exam     Constitutional:  Ht 1.651 m (5' 5\")   Wt 78 kg (172 lb)   BMI 28.62 kg/m    General:  Pleasant and in no acute distress     Psychiatric: alert and oriented X3, mood and affect normal    Counseling     We reviewed the important post op bariatric recommendations:  -eating 3 meals daily  -eating protein first, getting >60gm protein daily  -eating slowly, chewing food well  -avoiding/limiting calorie containing beverages  -drinking water 15-30 minutes before or after meals  -choosing wheat, not white with breads, crackers, pastas, ashley, bagels, tortillas, rice  -limiting restaurant or cafeteria eating to twice a week or less    We discussed the importance of restorative sleep and stress management in maintaining a healthy weight.  We discussed the National Weight Control Registry " healthy weight maintenance strategies and ways to optimize metabolism.  We discussed the importance of physical activity including cardiovascular and strength training in maintaining a healthier weight.    We discussed the importance of life-long vitamin supplementation and life-long  follow-up.    Marizol was reminded that, to avoid marginal ulcers she should avoid tobacco at all, alcohol in excess, caffeine in excess, and NSAIDS (unless indicated for cardioprotection or othewise and opposed by a PPI).    Joellen Vidal MD, MD, [facility].  10/26/2022  10:42 AM  Total time spent on the date of this encounter doing: chart review, review of test results, patient visit, physical exam, education, counseling, developing plan of care, and documenting = 20 minutes.      Video-Visit Details    Type of service:  Video Visit    Platform used for Video Visit: "[employer]    Video End Time (time video stopped): 11:06 AM    Originating Location (pt. Location): Home    {PROVIDER LOCATION On-site should be selected for visits conducted from your clinic location or adjoining [facility] hospital, academic office, or other nearby [facility] building. Off-site should be selected for all other provider locations, including home:[id number]}    Distant Location (provider location):  On-site    Distant Location (provider location):  [facility] [place]       Again, thank you for allowing me to participate in the care of your patient.        Sincerely,        Joellen Vidal MD

## 2022-10-26 NOTE — PROGRESS NOTES
Marizol Granados is 37 year old  female who presents for a billable video visit today.    How would you like to obtain your AVS? MyChart  If dropped from the video visit, the video invitation should be resent by: Text to cell phone: [phone]  Will anyone else be joining your video visit? No      Video Start Time: 10:46    Are there any specific questions or needs that you would like addressed at your visit today? Return simeon - 14yr PO - no concerns or ?s      Bariatric Follow Up Visit with a History of Previous Bariatric Surgery     Date of visit: 10/26/2022  Physician: Joellen Vidal MD, MD  Primary Care Provider:  Arik Munoz  Marizol Granados   37 year old  female    Date of Surgery: 3/6/2008  Initial Weight: 268#-initial  here 271#  Initial BMI:   Today's Weight:   Wt Readings from Last 1 Encounters:   10/26/22 78 kg (172 lb)     Body mass index is 28.62 kg/m .      Assessment and Plan     Assessment: Marizol is a 37 year old year old female who is 14 yrs s/p  Brea en Y Gastric Bypass with Dr. Munguia  Marizol Granados feels as if she has achieved the goals she hoped to accomplish through bariatric surgery and weight loss.    Encounter Diagnosis   Name Primary?     Postoperative malabsorption Yes         Current Outpatient Medications:      albuterol (PROAIR HFA/PROVENTIL HFA/VENTOLIN HFA) 108 (90 Base) MCG/ACT inhaler, Inhale 1-2 puffs into the lungs, Disp: , Rfl:      buPROPion (WELLBUTRIN SR) 200 MG 12 hr tablet, TAKE 1 TABLET(200 MG) BY MOUTH TWICE DAILY, Disp: , Rfl:      busPIRone (BUSPAR) 15 MG tablet, , Disp: , Rfl:      butt paste ointment, Apply topically 2 times daily as needed for skin protection Aquaphor 60gm and Stomadhesive Powder 30gm and Nystatin Ointment 15gm, Disp: 105 g, Rfl: 4     calcium carbonate-vitamin D (OS-LORY WITH D) 500-200 MG-UNIT tablet, Take 2 tablets by mouth, Disp: , Rfl:      Calcium Polycarbophil (FIBER) 625 MG tablet, Take 1,250 mg by mouth, Disp: , Rfl:       cholecalciferol 50 MCG (2000 UT) CAPS, Take 1,000 Units by mouth, Disp: , Rfl:      Cyanocobalamin (B-12 PO), Take  by mouth. 1,000 mg hs, Disp: , Rfl:      desvenlafaxine (PRISTIQ) 100 MG 24 hr tablet, TAKE 1 TABLET BY MOUTH DAILY AT BEDTIME, Disp: , Rfl:      desvenlafaxine (PRISTIQ) 50 MG 24 hr tablet, TAKE 1 TABLET BY MOUTH ONCE DAILY. TAKE WITH 100MG TABLET FOR TOTAL OF 150MG DAILY, Disp: , Rfl:      dicyclomine (BENTYL) 10 MG capsule, TAKE 1 TO 2 CAPSULES(10 TO 20 MG) BY MOUTH FOUR TIMES DAILY AS NEEDED FOR DIARRHEA OR ABDOMINAL CRAMPS, Disp: , Rfl:      fluticasone (FLOVENT HFA) 220 MCG/ACT inhaler, Inhale 2 puffs into the lungs, Disp: , Rfl:      fluticasone-salmeterol (ADVAIR) 500-50 MCG/DOSE inhaler, , Disp: , Rfl:      galcanezumab-gnlm (EMGALITY) 120 MG/ML injection, Inject 240 mg Subcutaneous, Disp: , Rfl:      levonorgestrel (MIRENA) 20 MCG/DAY IUD, 1 Intra Uterine Device by Intrauterine route, Disp: , Rfl:      LORazepam (ATIVAN) 1 MG tablet, , Disp: , Rfl:      Multiple Vitamins-Minerals (MULTIVITAL PO), Take  by mouth. 1xqd hs , Disp: , Rfl:      omeprazole (PRILOSEC) 40 MG DR capsule, Take 1 capsule (40 mg) by mouth daily, Disp: 90 capsule, Rfl: 3     ondansetron (ZOFRAN ODT) 4 MG ODT tab, , Disp: , Rfl:      phentermine (ADIPEX-P) 37.5 MG tablet, TAKE 1/2 TO 1 TABLET(18.75 TO 37.5 MG) BY MOUTH EVERY MORNING, Disp: 90 tablet, Rfl: 1     PROBIOTIC PRODUCT PO, Take 1 tablet by mouth At Bedtime, Disp: , Rfl:      QUEtiapine (SEROQUEL) 200 MG tablet, , Disp: , Rfl:      ramelteon (ROZEREM) 8 MG tablet, TAKE 1 TABLET(8 MG) BY MOUTH AT BEDTIME AS NEEDED FOR SLEEP, Disp: , Rfl:      rizatriptan (MAXALT) 10 MG tablet, TAKE 1 TABLET BY MOUTH AT ONSET OF MIGRAINE. REPEAT IN 2 HOURS IF MIGRAINE PERSISTS. NO MORE THAN 2 IN 24 HOURS OR 5 IN 7 DAYS, Disp: , Rfl:      Suvorexant (BELSOMRA) 20 MG tablet, Take 20 mg by mouth, Disp: , Rfl:      topiramate (TOPAMAX) 50 MG tablet, Take 1 tablet (50 mg) by mouth 2  times daily, Disp: 180 tablet, Rfl: 3     traZODone (DESYREL) 150 MG tablet, TAKE 2 TABLETS(300 MG) BY MOUTH DAILY AT BEDTIME, Disp: , Rfl:      traZODone (DESYREL) 50 MG tablet, , Disp: , Rfl:      UBRELVY 50 MG tablet, , Disp: , Rfl:      vitamin B-12 (CYANOCOBALAMIN) 1000 MCG tablet, Take 1,000 mcg by mouth, Disp: , Rfl:      zolpidem (AMBIEN) 5 MG tablet, TAKE 1 TABLET(5 MG) BY MOUTH AT BEDTIME AS NEEDED FOR SLEEP, Disp: , Rfl:     Plan: When things are good and stable, consider trying to back down on seroquel dose if able. Continue vitamins with consistency. Consider zinc supplement given recent COVID and upcoming plastic surgery with Dr. Bell. Annual labs prior to plastic surgery to ensure excellent nutritional status for healing. Optimize and continue protein intake for healing as well.     Return in about 1 year (around 10/26/2023).    Bariatric Surgery Review     Interim History/LifeChanges: Recent COVID (again) down for weeks, just regaining her strength. Taking her vitamins with consistency. Waling the dog daily. 100# down from when she presented here. Labs ordered not drawn.  Work is busy season. Winter is hard time so staying on Seroquel. Has tried going down but back up to 200mg.     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   Appetite (1-10): Ok  GERD: on PPI    Medication changes: on PPI    Vitamin Intake:   B-12   Sl   MVI  1/d   Vitamin D  5,000   Calcium        Other                LABS: ordered    Nausea no  Vomiting no  Constipation no  Diarrhea no  Rashes no  Hair Loss no  Calf tenderness no  Breathing difficulty no  Reactive Hypoglycemia avoids  Light Headedness no   Moods stable    12 point ROS as above and otherwise negative      Habits:  Alcohol: no  Tobacco: no  Caffeine no  NSAIDS no  Exercise Routine: walking the dog, counting on phone  3 meals/day yes  Protein first yes  grams/day  Water Separate from meals yes  Calorie Containing Beverages no  Restaurant eating/wk  no  Sleeping well  Stress busy season at work  CPAP:   Contraception: mirena  DEXA:not indicated    Social History     Social History     Socioeconomic History     Marital status: Single     Spouse name: Not on file     Number of children: Not on file     Years of education: Not on file     Highest education level: Not on file   Occupational History     Not on file   Tobacco Use     Smoking status: Never     Smokeless tobacco: Never   Substance and Sexual Activity     Alcohol use: Yes     Alcohol/week: 0.0 - 1.0 standard drinks     Comment: Alcoholic Drinks/day: maybe one a month     Drug use: Never     Sexual activity: Yes     Partners: Male     Birth control/protection: Condom   Other Topics Concern     Parent/sibling w/ CABG, MI or angioplasty before 65F 55M? Not Asked      Service Not Asked     Blood Transfusions Not Asked     Caffeine Concern Not Asked     Occupational Exposure Not Asked     Hobby Hazards Not Asked     Sleep Concern Not Asked     Stress Concern Not Asked     Weight Concern Not Asked     Special Diet Not Asked     Back Care Not Asked     Exercise Yes     Comment: cardio and weights     Bike Helmet Not Asked     Seat Belt Not Asked     Self-Exams Not Asked   Social History Narrative    Single. 2 children 9 months apart.  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 Working from home.    Left a narcissistic relationship and now with her 2 kids     Social Determinants of Health     Financial Resource Strain: Not on file   Food Insecurity: Not on file   Transportation Needs: Not on file   Physical Activity: Not on file   Stress: Not on file   Social Connections: Not on file   Intimate Partner Violence: Not on file   Housing Stability: Not on file       Past Medical History     Past Medical History:   Diagnosis Date     Abdominal pain      Anxiety      Deep vein thrombosis (H)      Depression      Depression      Diarrhea      Disease of thyroid gland       GERD (gastroesophageal reflux disease)      Hypothyroidism      Low back pain      Migraine      Plantar fasciitis      Postoperative intestinal malabsorption      PTSD (post-traumatic stress disorder)      Pulmonary embolism (H)      Urinary incontinence      Problem List     Patient Active Problem List   Diagnosis     Therapeutic drug monitoring     Social phobia     Severe obesity (H)     S/P gastric bypass     Restless legs     Recurrent major depressive disorder (H)     Psychological factor affecting physical condition     Persistent depressive disorder     Other B-complex deficiencies     Morbid obesity with BMI of 40.0-44.9, adult (H)     Moderate persistent asthma     Irritable bowel syndrome with both constipation and diarrhea     Intractable migraine without aura and with status migrainosus     Insomnia     Hypothyroidism     Hypercholesterolemia     History of thromboembolism     History of MRSA infection     Generalized anxiety disorder     Frequent UTI     Chronic fatigue     Allergic rhinitis     Acne     Abdominal pain, chronic, epigastric     Abdominal pain     Genital herpes simplex     Postoperative intestinal malabsorption     Depression     GERD (gastroesophageal reflux disease)     Urinary incontinence     Migraine     Low back pain     Plantar fasciitis     Morbid obesity (H)     Medications       Current Outpatient Medications:      albuterol (PROAIR HFA/PROVENTIL HFA/VENTOLIN HFA) 108 (90 Base) MCG/ACT inhaler, Inhale 1-2 puffs into the lungs, Disp: , Rfl:      buPROPion (WELLBUTRIN SR) 200 MG 12 hr tablet, TAKE 1 TABLET(200 MG) BY MOUTH TWICE DAILY, Disp: , Rfl:      busPIRone (BUSPAR) 15 MG tablet, , Disp: , Rfl:      butt paste ointment, Apply topically 2 times daily as needed for skin protection Aquaphor 60gm and Stomadhesive Powder 30gm and Nystatin Ointment 15gm, Disp: 105 g, Rfl: 4     calcium carbonate-vitamin D (OS-LORY WITH D) 500-200 MG-UNIT tablet, Take 2 tablets by mouth, Disp: ,  Rfl:      Calcium Polycarbophil (FIBER) 625 MG tablet, Take 1,250 mg by mouth, Disp: , Rfl:      cholecalciferol 50 MCG (2000 UT) CAPS, Take 1,000 Units by mouth, Disp: , Rfl:      Cyanocobalamin (B-12 PO), Take  by mouth. 1,000 mg hs, Disp: , Rfl:      desvenlafaxine (PRISTIQ) 100 MG 24 hr tablet, TAKE 1 TABLET BY MOUTH DAILY AT BEDTIME, Disp: , Rfl:      desvenlafaxine (PRISTIQ) 50 MG 24 hr tablet, TAKE 1 TABLET BY MOUTH ONCE DAILY. TAKE WITH 100MG TABLET FOR TOTAL OF 150MG DAILY, Disp: , Rfl:      dicyclomine (BENTYL) 10 MG capsule, TAKE 1 TO 2 CAPSULES(10 TO 20 MG) BY MOUTH FOUR TIMES DAILY AS NEEDED FOR DIARRHEA OR ABDOMINAL CRAMPS, Disp: , Rfl:      fluticasone (FLOVENT HFA) 220 MCG/ACT inhaler, Inhale 2 puffs into the lungs, Disp: , Rfl:      fluticasone-salmeterol (ADVAIR) 500-50 MCG/DOSE inhaler, , Disp: , Rfl:      galcanezumab-gnlm (EMGALITY) 120 MG/ML injection, Inject 240 mg Subcutaneous, Disp: , Rfl:      levonorgestrel (MIRENA) 20 MCG/DAY IUD, 1 Intra Uterine Device by Intrauterine route, Disp: , Rfl:      LORazepam (ATIVAN) 1 MG tablet, , Disp: , Rfl:      Multiple Vitamins-Minerals (MULTIVITAL PO), Take  by mouth. 1xqd hs , Disp: , Rfl:      omeprazole (PRILOSEC) 40 MG DR capsule, Take 1 capsule (40 mg) by mouth daily, Disp: 90 capsule, Rfl: 3     ondansetron (ZOFRAN ODT) 4 MG ODT tab, , Disp: , Rfl:      phentermine (ADIPEX-P) 37.5 MG tablet, TAKE 1/2 TO 1 TABLET(18.75 TO 37.5 MG) BY MOUTH EVERY MORNING, Disp: 90 tablet, Rfl: 1     PROBIOTIC PRODUCT PO, Take 1 tablet by mouth At Bedtime, Disp: , Rfl:      QUEtiapine (SEROQUEL) 200 MG tablet, , Disp: , Rfl:      ramelteon (ROZEREM) 8 MG tablet, TAKE 1 TABLET(8 MG) BY MOUTH AT BEDTIME AS NEEDED FOR SLEEP, Disp: , Rfl:      rizatriptan (MAXALT) 10 MG tablet, TAKE 1 TABLET BY MOUTH AT ONSET OF MIGRAINE. REPEAT IN 2 HOURS IF MIGRAINE PERSISTS. NO MORE THAN 2 IN 24 HOURS OR 5 IN 7 DAYS, Disp: , Rfl:      Suvorexant (BELSOMRA) 20 MG tablet, Take 20 mg by  "mouth, Disp: , Rfl:      topiramate (TOPAMAX) 50 MG tablet, Take 1 tablet (50 mg) by mouth 2 times daily, Disp: 180 tablet, Rfl: 3     traZODone (DESYREL) 150 MG tablet, TAKE 2 TABLETS(300 MG) BY MOUTH DAILY AT BEDTIME, Disp: , Rfl:      traZODone (DESYREL) 50 MG tablet, , Disp: , Rfl:      UBRELVY 50 MG tablet, , Disp: , Rfl:      vitamin B-12 (CYANOCOBALAMIN) 1000 MCG tablet, Take 1,000 mcg by mouth, Disp: , Rfl:      zolpidem (AMBIEN) 5 MG tablet, TAKE 1 TABLET(5 MG) BY MOUTH AT BEDTIME AS NEEDED FOR SLEEP, Disp: , Rfl:    Surgical History     Past Surgical History  She has a past surgical history that includes gastric bypass (2008); Cholecystectomy; liver biopsy; CAPSULE ENDOSCOPY (11/20/2012); CAPSULE ENDOSCOPY (12/10/2012); Revision Brea-En-Y; and Cholecystectomy.    Objective-Exam     Constitutional:  Ht 1.651 m (5' 5\")   Wt 78 kg (172 lb)   BMI 28.62 kg/m    General:  Pleasant and in no acute distress     Psychiatric: alert and oriented X3, mood and affect normal    Counseling     We reviewed the important post op bariatric recommendations:  -eating 3 meals daily  -eating protein first, getting >60gm protein daily  -eating slowly, chewing food well  -avoiding/limiting calorie containing beverages  -drinking water 15-30 minutes before or after meals  -choosing wheat, not white with breads, crackers, pastas, ashley, bagels, tortillas, rice  -limiting restaurant or cafeteria eating to twice a week or less    We discussed the importance of restorative sleep and stress management in maintaining a healthy weight.  We discussed the National Weight Control Registry healthy weight maintenance strategies and ways to optimize metabolism.  We discussed the importance of physical activity including cardiovascular and strength training in maintaining a healthier weight.    We discussed the importance of life-long vitamin supplementation and life-long  follow-up.    Marizol was reminded that, to avoid marginal ulcers she " should avoid tobacco at all, alcohol in excess, caffeine in excess, and NSAIDS (unless indicated for cardioprotection or othewise and opposed by a PPI).    Joellen Vidal MD, MD, [facility].  10/26/2022  10:42 AM  Total time spent on the date of this encounter doing: chart review, review of test results, patient visit, physical exam, education, counseling, developing plan of care, and documenting = 20 minutes.      Video-Visit Details    Type of service:  Video Visit    Platform used for Video Visit: [employer]    Video End Time (time video stopped): 11:06 AM    Originating Location (pt. Location): Home        Distant Location (provider location):  On-site    Distant Location (provider location):  [facility]

## 2022-11-29 ENCOUNTER — TELEPHONE (OUTPATIENT)
Dept: SURGERY | Facility: CLINIC | Age: 37
End: 2022-11-29

## 2022-11-29 NOTE — TELEPHONE ENCOUNTER
Prior Authorization Retail Medication Request    Medication/Dose: Omeprazole 40mg DR capsules    Key: [id number]    Pharmacy Information (if different than what is on RX)  Name:  [facility]  Phone:  [phone]

## 2022-11-30 NOTE — TELEPHONE ENCOUNTER
Central Prior Authorization Team   Phone: [phone]    PA Initiation    Medication: Omeprazole 40mg DR capsules  Insurance Company: [employer] [place] - Phone [phone] Fax [phone]  Pharmacy Filling the Rx: [facility] #[id number] [place], [place] - [address] [place] [address] AT [facility] & [address] [place] [address]  Filling Pharmacy Phone: [phone]  Filling Pharmacy Fax:    Start Date: 11/30/2022

## 2022-12-02 NOTE — TELEPHONE ENCOUNTER
Prior Authorization Approval    Authorization Effective Date: 9/2/2022  Authorization Expiration Date: 12/1/2023  Medication: Omeprazole 40mg DR capsules  Approved Dose/Quantity:    Reference #:     Insurance Company: [employer] [place] - Phone [phone] Fax [phone]  Expected CoPay:       CoPay Card Available:      Foundation Assistance Needed:    Which Pharmacy is filling the prescription (Not needed for infusion/clinic administered): [id number] [place], [place] - [facility] [address] AT [address]  Pharmacy Notified: Yes  Patient Notified: Yes

## 2023-01-20 RX ORDER — ACETAMINOPHEN 325 MG/1
650 TABLET ORAL EVERY 6 HOURS PRN
Status: ON HOLD | COMMUNITY
End: 2023-01-24

## 2023-01-20 RX ORDER — MINERAL OIL/HYDROPHIL PETROLAT
OINTMENT (GRAM) TOPICAL PRN
COMMUNITY
End: 2023-02-27

## 2023-01-20 RX ORDER — DIPHENOXYLATE HCL/ATROPINE 2.5-.025MG
1 TABLET ORAL 4 TIMES DAILY PRN
Status: ON HOLD | COMMUNITY
End: 2023-01-23

## 2023-01-20 RX ORDER — FLUCONAZOLE 150 MG/1
150 TABLET ORAL ONCE
Status: ON HOLD | COMMUNITY
End: 2023-01-23

## 2023-01-20 RX ORDER — LOPERAMIDE HCL 2 MG
2 CAPSULE ORAL 4 TIMES DAILY PRN
COMMUNITY
End: 2023-02-27

## 2023-01-23 ENCOUNTER — ANESTHESIA (OUTPATIENT)
Dept: SURGERY | Facility: HOSPITAL | Age: 38
End: 2023-01-23
Payer: COMMERCIAL

## 2023-01-23 ENCOUNTER — ANESTHESIA EVENT (OUTPATIENT)
Dept: SURGERY | Facility: HOSPITAL | Age: 38
End: 2023-01-23
Payer: COMMERCIAL

## 2023-01-23 ENCOUNTER — HOSPITAL ENCOUNTER (OUTPATIENT)
Facility: HOSPITAL | Age: 38
Discharge: HOME | End: 2023-01-24
Attending: PLASTIC SURGERY | Admitting: PLASTIC SURGERY
Payer: COMMERCIAL

## 2023-01-23 DIAGNOSIS — Z98.890 S/P PANNICULECTOMY: Primary | ICD-10-CM

## 2023-01-23 PROCEDURE — 250N000009 HC RX 250: Performed by: PLASTIC SURGERY

## 2023-01-23 PROCEDURE — 250N000011 HC RX IP 250 OP 636: Performed by: PLASTIC SURGERY

## 2023-01-23 PROCEDURE — 250N000011 HC RX IP 250 OP 636: Performed by: ANESTHESIOLOGY

## 2023-01-23 PROCEDURE — 250N000009 HC RX 250: Performed by: NURSE ANESTHETIST, CERTIFIED REGISTERED

## 2023-01-23 PROCEDURE — C1769 GUIDE WIRE: HCPCS | Performed by: PLASTIC SURGERY

## 2023-01-23 PROCEDURE — 258N000003 HC RX IP 258 OP 636: Performed by: ANESTHESIOLOGY

## 2023-01-23 PROCEDURE — 360N000076 HC SURGERY LEVEL 3, PER MIN: Performed by: PLASTIC SURGERY

## 2023-01-23 PROCEDURE — 999N000141 HC STATISTIC PRE-PROCEDURE NURSING ASSESSMENT: Performed by: PLASTIC SURGERY

## 2023-01-23 PROCEDURE — 258N000003 HC RX IP 258 OP 636: Performed by: PLASTIC SURGERY

## 2023-01-23 PROCEDURE — 250N000013 HC RX MED GY IP 250 OP 250 PS 637: Performed by: PLASTIC SURGERY

## 2023-01-23 PROCEDURE — 370N000017 HC ANESTHESIA TECHNICAL FEE, PER MIN: Performed by: PLASTIC SURGERY

## 2023-01-23 PROCEDURE — 258N000001 HC RX 258: Performed by: PLASTIC SURGERY

## 2023-01-23 PROCEDURE — 999N000127 HC STATISTIC PERIPHERAL IV START W US GUIDANCE

## 2023-01-23 PROCEDURE — 88304 TISSUE EXAM BY PATHOLOGIST: CPT | Mod: TC | Performed by: PLASTIC SURGERY

## 2023-01-23 PROCEDURE — 250N000025 HC SEVOFLURANE, PER MIN: Performed by: PLASTIC SURGERY

## 2023-01-23 PROCEDURE — 88304 TISSUE EXAM BY PATHOLOGIST: CPT | Mod: 26 | Performed by: PATHOLOGY

## 2023-01-23 PROCEDURE — 272N000001 HC OR GENERAL SUPPLY STERILE: Performed by: PLASTIC SURGERY

## 2023-01-23 PROCEDURE — 710N000009 HC RECOVERY PHASE 1, LEVEL 1, PER MIN: Performed by: PLASTIC SURGERY

## 2023-01-23 PROCEDURE — 250N000011 HC RX IP 250 OP 636: Performed by: NURSE ANESTHETIST, CERTIFIED REGISTERED

## 2023-01-23 PROCEDURE — 15830 EXC EXCESSIVE SKIN ABDOMEN: CPT | Performed by: PLASTIC SURGERY

## 2023-01-23 RX ORDER — SODIUM CHLORIDE, SODIUM LACTATE, POTASSIUM CHLORIDE, CALCIUM CHLORIDE 600; 310; 30; 20 MG/100ML; MG/100ML; MG/100ML; MG/100ML
INJECTION, SOLUTION INTRAVENOUS CONTINUOUS
Status: DISCONTINUED | OUTPATIENT
Start: 2023-01-23 | End: 2023-01-23

## 2023-01-23 RX ORDER — MORPHINE SULFATE 2 MG/ML
2 INJECTION, SOLUTION INTRAMUSCULAR; INTRAVENOUS
Status: DISCONTINUED | OUTPATIENT
Start: 2023-01-23 | End: 2023-01-24 | Stop reason: HOSPADM

## 2023-01-23 RX ORDER — OXYCODONE AND ACETAMINOPHEN 5; 325 MG/1; MG/1
1 TABLET ORAL EVERY 4 HOURS PRN
Status: DISCONTINUED | OUTPATIENT
Start: 2023-01-23 | End: 2023-01-24 | Stop reason: HOSPADM

## 2023-01-23 RX ORDER — DOCUSATE SODIUM 100 MG/1
100 CAPSULE, LIQUID FILLED ORAL 2 TIMES DAILY
Status: DISCONTINUED | OUTPATIENT
Start: 2023-01-23 | End: 2023-01-24 | Stop reason: HOSPADM

## 2023-01-23 RX ORDER — CLINDAMYCIN PHOSPHATE 900 MG/50ML
900 INJECTION, SOLUTION INTRAVENOUS ONCE
Status: COMPLETED | OUTPATIENT
Start: 2023-01-23 | End: 2023-01-23

## 2023-01-23 RX ORDER — FENTANYL CITRATE 50 UG/ML
50 INJECTION, SOLUTION INTRAMUSCULAR; INTRAVENOUS EVERY 5 MIN PRN
Status: DISCONTINUED | OUTPATIENT
Start: 2023-01-23 | End: 2023-01-23

## 2023-01-23 RX ORDER — SODIUM CHLORIDE, SODIUM LACTATE, POTASSIUM CHLORIDE, CALCIUM CHLORIDE 600; 310; 30; 20 MG/100ML; MG/100ML; MG/100ML; MG/100ML
INJECTION, SOLUTION INTRAVENOUS CONTINUOUS
Status: DISCONTINUED | OUTPATIENT
Start: 2023-01-23 | End: 2023-01-23 | Stop reason: HOSPADM

## 2023-01-23 RX ORDER — ONDANSETRON 2 MG/ML
INJECTION INTRAMUSCULAR; INTRAVENOUS PRN
Status: DISCONTINUED | OUTPATIENT
Start: 2023-01-23 | End: 2023-01-23

## 2023-01-23 RX ORDER — LIDOCAINE 40 MG/G
CREAM TOPICAL
Status: DISCONTINUED | OUTPATIENT
Start: 2023-01-23 | End: 2023-01-23 | Stop reason: HOSPADM

## 2023-01-23 RX ORDER — CLINDAMYCIN PHOSPHATE 900 MG/50ML
900 INJECTION, SOLUTION INTRAVENOUS EVERY 8 HOURS
Status: COMPLETED | OUTPATIENT
Start: 2023-01-23 | End: 2023-01-24

## 2023-01-23 RX ORDER — ONDANSETRON 2 MG/ML
4 INJECTION INTRAMUSCULAR; INTRAVENOUS EVERY 6 HOURS PRN
Status: DISCONTINUED | OUTPATIENT
Start: 2023-01-23 | End: 2023-01-24 | Stop reason: HOSPADM

## 2023-01-23 RX ORDER — ONDANSETRON 2 MG/ML
4 INJECTION INTRAMUSCULAR; INTRAVENOUS EVERY 30 MIN PRN
Status: DISCONTINUED | OUTPATIENT
Start: 2023-01-23 | End: 2023-01-23

## 2023-01-23 RX ORDER — NALOXONE HYDROCHLORIDE 0.4 MG/ML
0.2 INJECTION, SOLUTION INTRAMUSCULAR; INTRAVENOUS; SUBCUTANEOUS
Status: DISCONTINUED | OUTPATIENT
Start: 2023-01-23 | End: 2023-01-24 | Stop reason: HOSPADM

## 2023-01-23 RX ORDER — HYDROMORPHONE HYDROCHLORIDE 1 MG/ML
0.4 INJECTION, SOLUTION INTRAMUSCULAR; INTRAVENOUS; SUBCUTANEOUS EVERY 5 MIN PRN
Status: DISCONTINUED | OUTPATIENT
Start: 2023-01-23 | End: 2023-01-23

## 2023-01-23 RX ORDER — DEXTROSE MONOHYDRATE, SODIUM CHLORIDE, AND POTASSIUM CHLORIDE 50; 1.49; 4.5 G/1000ML; G/1000ML; G/1000ML
INJECTION, SOLUTION INTRAVENOUS CONTINUOUS
Status: DISCONTINUED | OUTPATIENT
Start: 2023-01-23 | End: 2023-01-24 | Stop reason: HOSPADM

## 2023-01-23 RX ORDER — ENOXAPARIN SODIUM 100 MG/ML
40 INJECTION SUBCUTANEOUS EVERY 24 HOURS
Status: DISCONTINUED | OUTPATIENT
Start: 2023-01-24 | End: 2023-01-24 | Stop reason: HOSPADM

## 2023-01-23 RX ORDER — NALOXONE HYDROCHLORIDE 0.4 MG/ML
0.4 INJECTION, SOLUTION INTRAMUSCULAR; INTRAVENOUS; SUBCUTANEOUS
Status: DISCONTINUED | OUTPATIENT
Start: 2023-01-23 | End: 2023-01-24 | Stop reason: HOSPADM

## 2023-01-23 RX ORDER — HYDROMORPHONE HYDROCHLORIDE 1 MG/ML
0.2 INJECTION, SOLUTION INTRAMUSCULAR; INTRAVENOUS; SUBCUTANEOUS EVERY 5 MIN PRN
Status: DISCONTINUED | OUTPATIENT
Start: 2023-01-23 | End: 2023-01-23

## 2023-01-23 RX ORDER — FENTANYL CITRATE 50 UG/ML
25 INJECTION, SOLUTION INTRAMUSCULAR; INTRAVENOUS EVERY 5 MIN PRN
Status: DISCONTINUED | OUTPATIENT
Start: 2023-01-23 | End: 2023-01-23

## 2023-01-23 RX ORDER — DEXAMETHASONE SODIUM PHOSPHATE 10 MG/ML
INJECTION, SOLUTION INTRAMUSCULAR; INTRAVENOUS PRN
Status: DISCONTINUED | OUTPATIENT
Start: 2023-01-23 | End: 2023-01-23

## 2023-01-23 RX ORDER — IBUPROFEN 200 MG
800 TABLET ORAL EVERY 6 HOURS PRN
COMMUNITY
End: 2024-05-13

## 2023-01-23 RX ORDER — PROPOFOL 10 MG/ML
INJECTION, EMULSION INTRAVENOUS PRN
Status: DISCONTINUED | OUTPATIENT
Start: 2023-01-23 | End: 2023-01-23

## 2023-01-23 RX ORDER — RIZATRIPTAN BENZOATE 10 MG/1
10 TABLET ORAL ONCE
Status: COMPLETED | OUTPATIENT
Start: 2023-01-23 | End: 2023-01-23

## 2023-01-23 RX ORDER — GLYCOPYRROLATE 0.2 MG/ML
INJECTION, SOLUTION INTRAMUSCULAR; INTRAVENOUS PRN
Status: DISCONTINUED | OUTPATIENT
Start: 2023-01-23 | End: 2023-01-23

## 2023-01-23 RX ORDER — LIDOCAINE HYDROCHLORIDE 10 MG/ML
INJECTION, SOLUTION INFILTRATION; PERINEURAL PRN
Status: DISCONTINUED | OUTPATIENT
Start: 2023-01-23 | End: 2023-01-23

## 2023-01-23 RX ORDER — ONDANSETRON 4 MG/1
4 TABLET, ORALLY DISINTEGRATING ORAL EVERY 30 MIN PRN
Status: DISCONTINUED | OUTPATIENT
Start: 2023-01-23 | End: 2023-01-23

## 2023-01-23 RX ORDER — PROPOFOL 10 MG/ML
INJECTION, EMULSION INTRAVENOUS CONTINUOUS PRN
Status: DISCONTINUED | OUTPATIENT
Start: 2023-01-23 | End: 2023-01-23

## 2023-01-23 RX ORDER — GINSENG 100 MG
CAPSULE ORAL PRN
Status: DISCONTINUED | OUTPATIENT
Start: 2023-01-23 | End: 2023-01-23 | Stop reason: HOSPADM

## 2023-01-23 RX ORDER — MAGNESIUM SULFATE 4 G/50ML
4 INJECTION INTRAVENOUS ONCE
Status: COMPLETED | OUTPATIENT
Start: 2023-01-23 | End: 2023-01-23

## 2023-01-23 RX ORDER — RIZATRIPTAN BENZOATE 10 MG/1
10 TABLET ORAL
Status: COMPLETED | OUTPATIENT
Start: 2023-01-23 | End: 2023-01-24

## 2023-01-23 RX ORDER — MAGNESIUM HYDROXIDE 1200 MG/15ML
LIQUID ORAL PRN
Status: DISCONTINUED | OUTPATIENT
Start: 2023-01-23 | End: 2023-01-23 | Stop reason: HOSPADM

## 2023-01-23 RX ORDER — FENTANYL CITRATE 50 UG/ML
INJECTION, SOLUTION INTRAMUSCULAR; INTRAVENOUS PRN
Status: DISCONTINUED | OUTPATIENT
Start: 2023-01-23 | End: 2023-01-23

## 2023-01-23 RX ORDER — KETAMINE HYDROCHLORIDE 10 MG/ML
INJECTION INTRAMUSCULAR; INTRAVENOUS PRN
Status: DISCONTINUED | OUTPATIENT
Start: 2023-01-23 | End: 2023-01-23

## 2023-01-23 RX ORDER — LIDOCAINE 40 MG/G
CREAM TOPICAL
Status: DISCONTINUED | OUTPATIENT
Start: 2023-01-23 | End: 2023-01-24 | Stop reason: HOSPADM

## 2023-01-23 RX ADMIN — MIDAZOLAM 2 MG: 1 INJECTION INTRAMUSCULAR; INTRAVENOUS at 14:17

## 2023-01-23 RX ADMIN — GLYCOPYRROLATE 0.2 MG: 0.2 INJECTION, SOLUTION INTRAMUSCULAR; INTRAVENOUS at 14:29

## 2023-01-23 RX ADMIN — LIDOCAINE HYDROCHLORIDE 30 MG: 10 INJECTION, SOLUTION INFILTRATION; PERINEURAL at 14:29

## 2023-01-23 RX ADMIN — PROPOFOL 180 MG: 10 INJECTION, EMULSION INTRAVENOUS at 14:29

## 2023-01-23 RX ADMIN — DOCUSATE SODIUM 100 MG: 100 CAPSULE, LIQUID FILLED ORAL at 23:04

## 2023-01-23 RX ADMIN — HYDROMORPHONE HYDROCHLORIDE 0.5 MG: 1 INJECTION, SOLUTION INTRAMUSCULAR; INTRAVENOUS; SUBCUTANEOUS at 17:46

## 2023-01-23 RX ADMIN — CLINDAMYCIN PHOSPHATE 900 MG: 900 INJECTION, SOLUTION INTRAVENOUS at 22:23

## 2023-01-23 RX ADMIN — CLINDAMYCIN PHOSPHATE 900 MG: 900 INJECTION, SOLUTION INTRAVENOUS at 14:16

## 2023-01-23 RX ADMIN — MAGNESIUM SULFATE HEPTAHYDRATE 4 G: 80 INJECTION, SOLUTION INTRAVENOUS at 12:09

## 2023-01-23 RX ADMIN — ONDANSETRON 4 MG: 2 INJECTION INTRAMUSCULAR; INTRAVENOUS at 21:40

## 2023-01-23 RX ADMIN — ONDANSETRON 4 MG: 2 INJECTION INTRAMUSCULAR; INTRAVENOUS at 14:23

## 2023-01-23 RX ADMIN — KETAMINE HYDROCHLORIDE 50 MG: 10 INJECTION, SOLUTION INTRAMUSCULAR; INTRAVENOUS at 14:34

## 2023-01-23 RX ADMIN — HYDROMORPHONE HYDROCHLORIDE 0.5 MG: 1 INJECTION, SOLUTION INTRAMUSCULAR; INTRAVENOUS; SUBCUTANEOUS at 16:22

## 2023-01-23 RX ADMIN — DEXAMETHASONE SODIUM PHOSPHATE 10 MG: 10 INJECTION, SOLUTION INTRAMUSCULAR; INTRAVENOUS at 14:29

## 2023-01-23 RX ADMIN — FENTANYL CITRATE 50 MCG: 50 INJECTION, SOLUTION INTRAMUSCULAR; INTRAVENOUS at 15:25

## 2023-01-23 RX ADMIN — POTASSIUM CHLORIDE, DEXTROSE MONOHYDRATE AND SODIUM CHLORIDE: 150; 5; 450 INJECTION, SOLUTION INTRAVENOUS at 21:49

## 2023-01-23 RX ADMIN — MORPHINE SULFATE 2 MG: 2 INJECTION, SOLUTION INTRAMUSCULAR; INTRAVENOUS at 21:40

## 2023-01-23 RX ADMIN — FENTANYL CITRATE 50 MCG: 50 INJECTION, SOLUTION INTRAMUSCULAR; INTRAVENOUS at 20:06

## 2023-01-23 RX ADMIN — ROCURONIUM BROMIDE 30 MG: 50 INJECTION, SOLUTION INTRAVENOUS at 16:54

## 2023-01-23 RX ADMIN — FENTANYL CITRATE 50 MCG: 50 INJECTION, SOLUTION INTRAMUSCULAR; INTRAVENOUS at 15:32

## 2023-01-23 RX ADMIN — FENTANYL CITRATE 50 MCG: 50 INJECTION, SOLUTION INTRAMUSCULAR; INTRAVENOUS at 20:39

## 2023-01-23 RX ADMIN — FENTANYL CITRATE 50 MCG: 50 INJECTION, SOLUTION INTRAMUSCULAR; INTRAVENOUS at 20:25

## 2023-01-23 RX ADMIN — FENTANYL CITRATE 50 MCG: 50 INJECTION, SOLUTION INTRAMUSCULAR; INTRAVENOUS at 19:50

## 2023-01-23 RX ADMIN — ROCURONIUM BROMIDE 20 MG: 50 INJECTION, SOLUTION INTRAVENOUS at 17:42

## 2023-01-23 RX ADMIN — RIZATRIPTAN BENZOATE 10 MG: 10 TABLET ORAL at 23:01

## 2023-01-23 RX ADMIN — ROCURONIUM BROMIDE 50 MG: 50 INJECTION, SOLUTION INTRAVENOUS at 14:29

## 2023-01-23 RX ADMIN — FENTANYL CITRATE 100 MCG: 50 INJECTION, SOLUTION INTRAMUSCULAR; INTRAVENOUS at 14:29

## 2023-01-23 RX ADMIN — SODIUM CHLORIDE, POTASSIUM CHLORIDE, SODIUM LACTATE AND CALCIUM CHLORIDE: 600; 310; 30; 20 INJECTION, SOLUTION INTRAVENOUS at 12:09

## 2023-01-23 RX ADMIN — PROPOFOL 40 MCG/KG/MIN: 10 INJECTION, EMULSION INTRAVENOUS at 15:40

## 2023-01-23 RX ADMIN — SODIUM CHLORIDE, POTASSIUM CHLORIDE, SODIUM LACTATE AND CALCIUM CHLORIDE: 600; 310; 30; 20 INJECTION, SOLUTION INTRAVENOUS at 16:06

## 2023-01-23 ASSESSMENT — ACTIVITIES OF DAILY LIVING (ADL)
ADLS_ACUITY_SCORE: 20
ADLS_ACUITY_SCORE: 20
ADLS_ACUITY_SCORE: 22
ADLS_ACUITY_SCORE: 20
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
## 2023-01-23 NOTE — ANESTHESIA PROCEDURE NOTES
Airway       Patient location during procedure: OR       Procedure Start/Stop Times: 1/23/2023 2:31 PM  Staff -        CRNA: Evi Pereira APRN CRNA       Performed By: CRNA  Consent for Airway        Urgency: elective  Indications and Patient Condition       Indications for airway management: freedom-procedural       Induction type:intravenous       Mask difficulty assessment: 1 - vent by mask    Final Airway Details       Final airway type: endotracheal airway       Successful airway: ETT - single  Endotracheal Airway Details        ETT size (mm): 7.0       Cuffed: yes       Successful intubation technique: direct laryngoscopy       DL Blade Type: MAC 3       Grade View of Cords: 1       Adjucts: stylet       Position: Right       Measured from: gums/teeth       Secured at (cm): 22       Bite block used: None    Post intubation assessment        Placement verified by: capnometry, equal breath sounds and chest rise        Number of attempts at approach: 1       Number of other approaches attempted: 0       Secured with: silk tape       Ease of procedure: easy       Dentition: Intact and Unchanged       Dental guard used and removed. Dental Guard Type: Proguard Red.    Medication(s) Administered   Medication Administration Time: 1/23/2023 2:31 PM

## 2023-01-23 NOTE — INTERVAL H&P NOTE
I have reviewed the surgical (or preoperative) H&P that is linked to this encounter, and examined the patient. There are no significant changes    Clinical Conditions Present on Arrival:  Clinically Significant Risk Factors Present on Admission                       Adan Bell MD , FACS   Diplomate American Board of Plastic Surgery  Diplomate American Board of Surgery  Adj. Assistant Professor of Surgery  Division of Plastic & Reconstructive Surgery   [facility]  Office: [phone]   1/23/2023 at 2:19 PM

## 2023-01-24 VITALS
OXYGEN SATURATION: 96 % | HEART RATE: 91 BPM | BODY MASS INDEX: 28.76 KG/M2 | SYSTOLIC BLOOD PRESSURE: 111 MMHG | TEMPERATURE: 97.6 F | DIASTOLIC BLOOD PRESSURE: 66 MMHG | RESPIRATION RATE: 16 BRPM | WEIGHT: 172.84 LBS

## 2023-01-24 LAB — GLUCOSE BLDC GLUCOMTR-MCNC: 147 MG/DL (ref 70–99)

## 2023-01-24 PROCEDURE — 96372 THER/PROPH/DIAG INJ SC/IM: CPT | Performed by: PLASTIC SURGERY

## 2023-01-24 PROCEDURE — 82962 GLUCOSE BLOOD TEST: CPT

## 2023-01-24 PROCEDURE — 250N000011 HC RX IP 250 OP 636: Performed by: PLASTIC SURGERY

## 2023-01-24 PROCEDURE — 250N000013 HC RX MED GY IP 250 OP 250 PS 637: Performed by: PLASTIC SURGERY

## 2023-01-24 RX ORDER — ENOXAPARIN SODIUM 100 MG/ML
40 INJECTION SUBCUTANEOUS DAILY
Qty: 2.4 ML | Refills: 0 | Status: SHIPPED | OUTPATIENT
Start: 2023-01-24 | End: 2023-01-30

## 2023-01-24 RX ORDER — OXYCODONE HYDROCHLORIDE 5 MG/1
5-10 TABLET ORAL EVERY 4 HOURS PRN
Qty: 25 TABLET | Refills: 0 | Status: SHIPPED | OUTPATIENT
Start: 2023-01-24 | End: 2023-02-27

## 2023-01-24 RX ORDER — ONDANSETRON 4 MG/1
4 TABLET, ORALLY DISINTEGRATING ORAL EVERY 8 HOURS PRN
Qty: 12 TABLET | Refills: 0 | Status: SHIPPED | OUTPATIENT
Start: 2023-01-24 | End: 2023-02-27

## 2023-01-24 RX ORDER — AMOXICILLIN 250 MG
1-2 CAPSULE ORAL 2 TIMES DAILY
Qty: 30 TABLET | Refills: 0 | Status: SHIPPED | OUTPATIENT
Start: 2023-01-24 | End: 2023-02-27

## 2023-01-24 RX ORDER — CLINDAMYCIN HCL 300 MG
300 CAPSULE ORAL 3 TIMES DAILY
Qty: 21 CAPSULE | Refills: 0 | Status: SHIPPED | OUTPATIENT
Start: 2023-01-24 | End: 2023-02-27

## 2023-01-24 RX ORDER — HYDROMORPHONE HYDROCHLORIDE 4 MG/1
4 TABLET ORAL EVERY 4 HOURS PRN
Status: DISCONTINUED | OUTPATIENT
Start: 2023-01-24 | End: 2023-01-24 | Stop reason: HOSPADM

## 2023-01-24 RX ORDER — AMOXICILLIN 250 MG
1-2 CAPSULE ORAL 2 TIMES DAILY
Qty: 30 TABLET | Refills: 0 | Status: ON HOLD | OUTPATIENT
Start: 2023-01-24 | End: 2023-03-01

## 2023-01-24 RX ORDER — HYDROMORPHONE HYDROCHLORIDE 2 MG/1
4 TABLET ORAL EVERY 4 HOURS PRN
Qty: 21 TABLET | Refills: 0 | Status: ON HOLD | OUTPATIENT
Start: 2023-01-24 | End: 2023-03-01

## 2023-01-24 RX ORDER — ONDANSETRON 4 MG/1
4 TABLET, ORALLY DISINTEGRATING ORAL EVERY 8 HOURS PRN
Qty: 12 TABLET | Refills: 0 | Status: SHIPPED | OUTPATIENT
Start: 2023-01-24 | End: 2024-05-02

## 2023-01-24 RX ADMIN — MORPHINE SULFATE 2 MG: 2 INJECTION, SOLUTION INTRAMUSCULAR; INTRAVENOUS at 02:13

## 2023-01-24 RX ADMIN — HYDROMORPHONE HYDROCHLORIDE 4 MG: 4 TABLET ORAL at 09:41

## 2023-01-24 RX ADMIN — MORPHINE SULFATE 2 MG: 2 INJECTION, SOLUTION INTRAMUSCULAR; INTRAVENOUS at 00:04

## 2023-01-24 RX ADMIN — DOCUSATE SODIUM 100 MG: 100 CAPSULE, LIQUID FILLED ORAL at 08:35

## 2023-01-24 RX ADMIN — RIZATRIPTAN BENZOATE 10 MG: 10 TABLET ORAL at 01:40

## 2023-01-24 RX ADMIN — ENOXAPARIN SODIUM 40 MG: 40 INJECTION SUBCUTANEOUS at 08:35

## 2023-01-24 RX ADMIN — MORPHINE SULFATE 2 MG: 2 INJECTION, SOLUTION INTRAMUSCULAR; INTRAVENOUS at 06:22

## 2023-01-24 RX ADMIN — ONDANSETRON 4 MG: 2 INJECTION INTRAMUSCULAR; INTRAVENOUS at 06:45

## 2023-01-24 RX ADMIN — CLINDAMYCIN PHOSPHATE 900 MG: 900 INJECTION, SOLUTION INTRAVENOUS at 06:28

## 2023-01-24 RX ADMIN — MORPHINE SULFATE 2 MG: 2 INJECTION, SOLUTION INTRAMUSCULAR; INTRAVENOUS at 04:15

## 2023-01-24 RX ADMIN — OXYCODONE HYDROCHLORIDE AND ACETAMINOPHEN 1 TABLET: 5; 325 TABLET ORAL at 08:35

## 2023-01-24 ASSESSMENT — ACTIVITIES OF DAILY LIVING (ADL)
ADLS_ACUITY_SCORE: 22

## 2023-01-24 NOTE — DISCHARGE INSTRUCTIONS
Enoxaparin Sodium Solution for injection  What is this medicine?  ENOXAPARIN (ee nox a PA rin) is used after knee, hip, or abdominal surgeries to prevent blood clotting. It is also used to treat existing blood clots in the lungs or in the veins.  This medicine may be used for other purposes; ask your health care provider or pharmacist if you have questions.  What should I tell my health care provider before I take this medicine?  They need to know if you have any of these conditions:  bleeding disorders, hemorrhage, or hemophilia  infection of the heart or heart valves  kidney or liver disease  previous stroke  prosthetic heart valve  recent surgery or delivery of a baby  ulcer in the stomach or intestine, diverticulitis, or other bowel disease  an unusual or allergic reaction to enoxaparin, heparin, pork or pork products, other medicines, foods, dyes, or preservatives  pregnant or trying to get pregnant  breast-feeding  How should I use this medicine?  This medicine is for injection under the skin. It is usually given by a health-care professional. You or a family member may be trained on how to give the injections. If you are to give yourself injections, make sure you understand how to use the syringe, measure the dose if necessary, and give the injection. To avoid bruising, do not rub the site where this medicine has been injected. Do not take your medicine more often than directed. Do not stop taking except on the advice of your doctor or health care professional.  Make sure you receive a puncture-resistant container to dispose of the needles and syringes once you have finished with them. Do not reuse these items. Return the container to your doctor or health care professional for proper disposal.  Talk to your pediatrician regarding the use of this medicine in children. Special care may be needed.  Overdosage: If you think you have taken too much of this medicine contact a poison control center or emergency  room at once.  NOTE: This medicine is only for you. Do not share this medicine with others.  What if I miss a dose?  If you miss a dose, take it as soon as you can. If it is almost time for your next dose, take only that dose. Do not take double or extra doses.  What may interact with this medicine?  aspirin and aspirin-like medicines  certain medicines that treat or prevent blood clots  dipyridamole  NSAIDs, medicines for pain and inflammation, like ibuprofen or naproxen  This list may not describe all possible interactions. Give your health care provider a list of all the medicines, herbs, non-prescription drugs, or dietary supplements you use. Also tell them if you smoke, drink alcohol, or use illegal drugs. Some items may interact with your medicine.  What should I watch for while using this medicine?  Visit your doctor or health care professional for regular checks on your progress. Your condition will be monitored carefully while you are receiving this medicine.  Notify your doctor or health care professional and seek emergency treatment if you develop breathing problems; changes in vision; chest pain; severe, sudden headache; pain, swelling, warmth in the leg; trouble speaking; sudden numbness or weakness of the face, arm, or leg. These can be signs that your condition has gotten worse.  If you are going to have surgery, tell your doctor or health care professional that you are taking this medicine.  Do not stop taking this medicine without first talking to your doctor. Be sure to refill your prescription before you run out of medicine.  Avoid sports and activities that might cause injury while you are using this medicine. Severe falls or injuries can cause unseen bleeding. Be careful when using sharp tools or knives. Consider using an electric razor. Take special care brushing or flossing your teeth. Report any injuries, bruising, or red spots on the skin to your doctor or health care professional.  What  side effects may I notice from receiving this medicine?  Side effects that you should report to your doctor or health care professional as soon as possible:  allergic reactions like skin rash, itching or hives, swelling of the face, lips, or tongue  feeling faint or lightheaded, falls  signs and symptoms of bleeding such as bloody or black, tarry stools; red or dark-brown urine; spitting up blood or brown material that looks like coffee grounds; red spots on the skin; unusual bruising or bleeding from the eye, gums, or nose  Side effects that usually do not require medical attention (report to your doctor or health care professional if they continue or are bothersome):  pain, redness, or irritation at site where injected  This list may not describe all possible side effects. Call your doctor for medical advice about side effects. You may report side effects to FDA at 8-903-FDA-7866.  Where should I keep my medicine?  Keep out of the reach of children.  Store at room temperature between 15 and 30 degrees C (59 and 86 degrees F). Do not freeze. If your injections have been specially prepared, you may need to store them in the refrigerator. Ask your pharmacist. Throw away any unused medicine after the expiration date.  NOTE:This sheet is a summary. It may not cover all possible information. If you have questions about this medicine, talk to your doctor, pharmacist, or health care provider. Copyright  2016 Gold Standard        Discharge Instructions: Caring for Your Charly-Fleming Drainage Tube  Your healthcare provider discharged you with a Charly-Fleming drainage tube. They commonly leave this drain within the abdomen and other cavities after surgery. It helps drain and collect blood and body fluid after surgery. This can prevent swelling and reduces the risk for infection. The tube is held in place by a few stitches. It's covered with a bandage. Your healthcare provider will remove the drain when he or she determines  you no longer need it.  Home care  Don t sleep on the same side as the tube.  Secure the tube and bag inside your clothing with a safety pin. This helps keep the tube from being pulled out.  Empty your drain at least twice a day. Empty it more often if the drain is full. Wash  and dry your hands before emptying the drain.  Lift the opening on the drain.  Drain the fluid into a measuring cup.  Record the amount of fluid each time you empty the drain. Include the date and time it was emptied. Share this information with your healthcare provider on your next visit.  Squeeze the bulb with your hands until you hear air coming out of the bulb if your healthcare provider has instructed you to do so (sometimes the bulb is used as a reservoir without suction). Check with your healthcare provider about specific drain instructions.  Close the opening.  If you are to change the dressing around the tube, follow the directions your provider has given you. Some dressings may not need to be changed, but your provider will let you know. Here are some general steps to follow:  Wash your hands.  Remove the old bandage.  Wash your hands again.  Clean the skin around the incision and tube site as instructed.  Put a new bandage on the incision and tube site. Make the bandage large enough to cover the whole incision area.  Tape the bandage in place.  Talk with your healthcare provider about showering with the drain. You may need to keep the bandage and tube site dry when you shower. Ask your healthcare team about the best way to do this.   Stripping  the tube helps keep blood clots from blocking the tube. Ask your healthcare team how often you should strip the tube. Stripping may not be needed, depending on where and why your healthcare provider placed the tube. It may even be dangerous in some cases.   Hold the tubing where it leaves the skin, with one hand. This keeps it from pulling on the skin.  Pinch the tubing with the thumb and  first finger of your other hand.  Slowly and firmly pull your thumb and first finger down the tubing. You may find it helpful to hold an alcohol swab between your fingers and the tube to lubricate the tubing.  If the pulling hurts or feels like the tube is coming out of the skin, stop. Begin again more gently.    Follow-up care  Make a follow-up appointment as directed by our staff.  When to call your healthcare provider  Call your healthcare provider right away if you have any of the following:  New or increased pain around the tube  Redness, swelling, or warmth around the incision or tube  Drainage that is foul-smelling  Vomiting  Fever of 100.4 F ( 38 C) or higher, or as directed by your provider  Fluid leaking around the tube  Incision seems not to be healing  Stitches become loose  Tube falls out or breaks  Drainage that changes from light pink to dark red  Blood clots in the drainage bulb  A sudden increase or decrease in the amount of drainage (over 30 mL)  [employer] last reviewed this educational content on 10/1/2019    0177-0843 [employer]. All rights reserved. This information is not intended as a substitute for professional medical care. Always follow your healthcare professional's instructions.

## 2023-01-24 NOTE — PLAN OF CARE
Goal Outcome Evaluation:  Call placed to Dr Albert this morning for inadequate pain control with percocet  New orders received for PO Dilaudid.  Patient stated good pain relief with dilaudid.  Dr Albert updated and will send prescription to [facility].         Patient vital signs are stable.  Incisions are clean dry and intact.  AQUILINO 's are patent and draining.  Education provided on AQUILINO care and Lovenox injections.  Patient performed teach back on these skills and all questions were answered.  Discharge instructions reviewed and questions answered.

## 2023-01-24 NOTE — ANESTHESIA POSTPROCEDURE EVALUATION
Patient: Marizol Granados    Procedure: Procedure(s):  Upfyz-jp-[id number]'s panniculectomy with vertical component.       Anesthesia Type:  General    Note:  Disposition: Outpatient   Postop Pain Control: Uneventful            Sign Out: Well controlled pain   PONV: No   Neuro/Psych: Uneventful            Sign Out: Acceptable/Baseline neuro status   Airway/Respiratory: Uneventful            Sign Out: Acceptable/Baseline resp. status   CV/Hemodynamics: Uneventful            Sign Out: Acceptable CV status; No obvious hypovolemia; No obvious fluid overload   Other NRE: NONE   DID A NON-ROUTINE EVENT OCCUR?            Last vitals:  Vitals Value Taken Time   /81 01/23/23 2045   Temp 37.1  C (98.7  F) 01/23/23 2045   Pulse 92 01/23/23 2100   Resp 9 01/23/23 2051   SpO2 98 % 01/23/23 2100   Vitals shown include unvalidated device data.    Electronically Signed By: Lee Guillen MD  January 23, 2023  9:13 PM

## 2023-01-24 NOTE — ANESTHESIA CARE TRANSFER NOTE
Patient: Marizol Granados    Procedure: Procedure(s):  Zugsj-tq-zbg's panniculectomy with vertical component.       Diagnosis: Abdominal pannus [E65]  Diagnosis Additional Information: No value filed.    Anesthesia Type:   General     Note:    Oropharynx: spontaneously breathing  Level of Consciousness: drowsy  Oxygen Supplementation: face mask  Level of Supplemental Oxygen (L/min / FiO2): 6  Independent Airway: airway patency satisfactory and stable  Dentition: dentition unchanged  Vital Signs Stable: post-procedure vital signs reviewed and stable  Report to RN Given: handoff report given  Patient transferred to: PACU    Handoff Report: Identifed the Patient, Identified the Reponsible Provider, Reviewed the pertinent medical history, Discussed the surgical course, Reviewed Intra-OP anesthesia mangement and issues during anesthesia, Set expectations for post-procedure period and Allowed opportunity for questions and acknowledgement of understanding      Vitals:  Vitals Value Taken Time   /81 01/23/23 1937   Temp     Pulse 99 01/23/23 1941   Resp 14 01/23/23 1941   SpO2 100 % 01/23/23 1941   Vitals shown include unvalidated device data.    Electronically Signed By: GERALDO Austin CRNA  January 23, 2023  7:42 PM

## 2023-01-24 NOTE — PHARMACY-ADMISSION MEDICATION HISTORY
Pharmacy Note - Admission Medication History    Pertinent Provider Information:      ______________________________________________________________________    Prior To Admission (PTA) med list completed and updated in EMR.       PTA Med List   Medication Sig Note Last Dose     acetaminophen (TYLENOL) 325 MG tablet Take 650 mg by mouth every 6 hours as needed for mild pain  Unknown at prn     buPROPion (WELLBUTRIN SR) 200 MG 12 hr tablet TAKE 1 TABLET(200 MG) BY MOUTH TWICE DAILY  1/23/2023     busPIRone (BUSPAR) 15 MG tablet Take 15 mg by mouth 2 times daily  1/23/2023     butt paste ointment Apply topically 2 times daily as needed for skin protection Aquaphor 60gm and Stomadhesive Powder 30gm and Nystatin Ointment 15gm  Unknown at prn     calcium carbonate-vitamin D (OS-LORY WITH D) 500-200 MG-UNIT tablet Take 2 tablets by mouth daily  1/21/2023     Calcium Polycarbophil (FIBER) 625 MG tablet Take 1,250 mg by mouth daily  1/21/2023     cholecalciferol 25 MCG (1000 UT) TABS Take 1,000 Units by mouth daily  1/21/2023     Cyanocobalamin (B-12 PO) Take 1,000 mg by mouth At Bedtime  1/21/2023     desvenlafaxine (PRISTIQ) 100 MG 24 hr tablet Take 100 mg by mouth daily 1/23/2023: Total of 150mg daily 1/22/2023     desvenlafaxine (PRISTIQ) 50 MG 24 hr tablet TAKE 1 TABLET BY MOUTH ONCE DAILY. TAKE WITH 100MG TABLET FOR TOTAL OF 150MG DAILY 1/23/2023: Total of 150mg daily 1/22/2023     fluticasone (FLOVENT HFA) 220 MCG/ACT inhaler Inhale 2 puffs into the lungs 1/23/2023: No Fills since April 2022 More than a month     galcanezumab-gnlm (EMGALITY) 120 MG/ML injection Inject 240 mg Subcutaneous every 30 days  Past Month     ibuprofen (ADVIL/MOTRIN) 200 MG tablet Take 800 mg by mouth every 6 hours as needed for pain  Unknown     levonorgestrel (MIRENA) 20 MCG/DAY IUD 1 Intra Uterine Device by Intrauterine route  Unknown     loperamide (IMODIUM) 2 MG capsule Take 2 mg by mouth 4 times daily as needed for diarrhea  More than a  month     LORazepam (ATIVAN) 1 MG tablet Take 1 mg by mouth 2 times daily  1/23/2023     mineral oil-hydrophilic petrolatum (AQUAPHOR) external ointment Apply topically as needed  Unknown at prn     Multiple Vitamins-Minerals (MULTIVITAL PO) Take 1 tablet by mouth At Bedtime  1/21/2023     omeprazole (PRILOSEC) 40 MG DR capsule Take 1 capsule (40 mg) by mouth daily  1/22/2023     ondansetron (ZOFRAN ODT) 4 MG ODT tab Take 4 mg by mouth every 8 hours as needed  More than a month     phentermine (ADIPEX-P) 37.5 MG tablet TAKE 1/2 TO 1 TABLET(18.75 TO 37.5 MG) BY MOUTH EVERY MORNING (Patient taking differently: Take 37.5 mg by mouth every morning (before breakfast))  1/23/2023     PROBIOTIC PRODUCT PO Take 1 tablet by mouth At Bedtime  Past Week     QUEtiapine (SEROQUEL) 200 MG tablet Take 200 mg by mouth At Bedtime  1/22/2023     ramelteon (ROZEREM) 8 MG tablet Take 8 mg by mouth At Bedtime  1/22/2023     Suvorexant (BELSOMRA) 20 MG tablet Take 20 mg by mouth At Bedtime  1/22/2023     topiramate (TOPAMAX) 50 MG tablet Take 1 tablet (50 mg) by mouth 2 times daily  1/23/2023     traZODone (DESYREL) 150 MG tablet Take 225 mg by mouth At Bedtime 1/23/2023: Takes 1.5 tabs at bedtime 1/22/2023     UBRELVY 50 MG tablet Take 50 mg by mouth at onset of headache  More than a month at prn     vitamin B-12 (CYANOCOBALAMIN) 1000 MCG tablet Take 1,000 mcg by mouth daily  1/21/2023     zolpidem (AMBIEN) 5 MG tablet Take 5 mg by mouth nightly as needed for sleep  1/22/2023       Information source(s): Patient and CareEverywhere/SureScripts  Method of interview communication: in-person    Summary of Changes to PTA Med List  New: Advil  Discontinued: Lomotil per pt request, dicyclomine, trazodone 50mg (duplicate), rizatriptan as pt not using  Changed: Trazodone to 1.5 tabs hs, Buspar to BID    Patient was asked about OTC/herbal products specifically.  PTA med list reflects this.    In the past week, patient estimated taking medication  this percent of the time:  greater than 90%.      Allergies were reviewed, assessed, and updated with the patient.      The information provided in this note is only as accurate as the sources available at the time of the update(s).    Thank you for the opportunity to participate in the care of this patient.    Marjan Dumont, PharmD  1/23/2023 10:24 PM

## 2023-01-24 NOTE — BRIEF OP NOTE
[facility]    Brief Operative Note    Pre-operative diagnosis: Abdominal pannus [E65]  Post-operative diagnosis Same as pre-operative diagnosis    Procedure: Procedure(s):  Opxej-tu-qye's panniculectomy with vertical component.  Surgeon: Surgeon(s) and Role:     * Adan Bell MD - Primary  Anesthesia: General   Estimated Blood Loss: Less than 50 ml    Drains: Charly-Fleming  Specimens:   ID Type Source Tests Collected by Time Destination   1 : ABDOMINAL WALL PANNUS    4752 grams Tissue Abdomen SURGICAL PATHOLOGY EXAM Adan Bell MD 1/23/2023  5:05 PM      Findings:   Large abdominal wall pannus.  Complications: None.  Implants: * No implants in log *       Adan Bell MD , FACS   Diplomate American Board of Plastic Surgery  Diplomate American Board of Surgery  Adj. Assistant Professor of Surgery  Division of Plastic & Reconstructive Surgery   [facility]  Office: [phone]   1/23/2023 at 7:53 PM

## 2023-01-24 NOTE — PLAN OF CARE
Problem: Plan of Care    Goal: Optimal Comfort and Wellbeing  Outcome: Progressing  Intervention: Monitor Pain and Promote Comfort  Taken 1/24/2023 0140 by Huan Sanchez RN  Pain Management Interventions: medication (see MAR)    Problem: Surgery Nonspecified  Goal: Anesthesia/Sedation Recovery  Intervention: Optimize Anesthesia Recovery  Recent Flowsheet Documentation  Taken 1/24/2023 0140 by Huan Sanchez, RN  Safety Promotion/Fall Prevention:    activity supervised    assistive device/personal items within reach    fall prevention program maintained    clutter free environment maintained    nonskid shoes/slippers when out of bed    patient and family education    room organization consistent    safety round/check completed    supervised activity     Problem: Surgery Nonspecified  Goal: Effective Urinary Elimination  Outcome: Progressing      Goal Outcome Evaluation:         Pt C/O 6-7/10 abdominal pain and migraine. Administered PRN Morphine and Rizatriptan with relief.     Ambulated to bathroom with Ax1. Voided 1050 mL. Tolerated room air overnight. SpO2 >95%. Bilateral AQUILINO drains intact and patent. Abdominal binder in place.

## 2023-01-24 NOTE — PLAN OF CARE
Goal Outcome Evaluation:       Patient came from surgery a little after 2100. Patient having 7/10 pain to abdomen and a migraine headache. Paged surgeon Bell to get pain medication orders and nausea medication since there was none. Gave IV morphine for pain and IV zofran for nausea. Abdomen pain improved to 3/10 but patient still having a horrible migraine with continued nausea so paged MD Bell again to get an order for rizatriptan for migraine. Gave first dose and waiting to see effectiveness. SCD's in place. IV fluids and abx given as ordered. Incision intact, KANDY, no drainage noted, ab binders x2 in place. Pt did walk from the cart to the bed after surgery. Waiting for patient to void once migraine improves. Will continue to monitor.     Lila Jones RN 1/23/2023 11:36 PM

## 2023-01-25 ENCOUNTER — OFFICE VISIT (OUTPATIENT)
Dept: PLASTIC SURGERY | Facility: AMBULATORY SURGERY CENTER | Age: 38
End: 2023-01-25
Payer: COMMERCIAL

## 2023-01-25 DIAGNOSIS — E65 ABDOMINAL PANNUS: Primary | ICD-10-CM

## 2023-01-25 LAB
PATH REPORT.COMMENTS IMP SPEC: NORMAL
PATH REPORT.COMMENTS IMP SPEC: NORMAL
PATH REPORT.FINAL DX SPEC: NORMAL
PATH REPORT.GROSS SPEC: NORMAL
PATH REPORT.MICROSCOPIC SPEC OTHER STN: NORMAL
PATH REPORT.RELEVANT HX SPEC: NORMAL
PHOTO IMAGE: NORMAL

## 2023-01-25 PROCEDURE — 99207 PR NO CHARGE NURSE ONLY: CPT | Performed by: PLASTIC SURGERY

## 2023-01-25 NOTE — LETTER
1/25/2023         RE: Marizol Granados  [address] [address]        Dear Colleague,    Thank you for referring your patient, Marizol Granados, to the [facility] [place]. Please see a copy of my visit note below.    Marizol is a 37 year old here today status post [id number]-pg-[id number]'s panniculectomy.  She is 2 days out from surgery.  The patient is feeling well. The patient does have hx of DVT and was prescribed Lovenox for 6 days. She has been administering these herself daily.      At the physical exam, the vertical and horizontal incision from the zohvv-lj-rlb's panniculectomy are healing well, no sign of dehiscence or infection or wound dehiscence.  Bilateral Andrés drain with serosanguineous fluid.  Patient with excellent shape on abdomen     Plan: Patient may have regular showers, continue with drain care, follow-up with Dr. Bell scheduled for 02/02/2023 for possible drain removal right lower quadrant.  Also I have encouraged the patient to ambulate to prevent blood clots in addition to lovenox. Patient is aware she must wear abdominal binder 24/7 unless showering. Patient is happy with results and so am I.       Rupinder Brantley RN on 1/25/2023 at 10:11 AM        Again, thank you for allowing me to participate in the care of your patient.        Sincerely,        Adan Bell MD

## 2023-01-25 NOTE — PROGRESS NOTES
Marizol is a 37 year old here today status post uuqpk-mg-jao's panniculectomy.  She is 2 days out from surgery.  The patient is feeling well. The patient does have hx of DVT and was prescribed Lovenox for 6 days. She has been administering these herself daily.      At the physical exam, the vertical and horizontal incision from the lcnco-bq-xzl's panniculectomy are healing well, no sign of dehiscence or infection or wound dehiscence.  Bilateral Andrés drain with serosanguineous fluid.  Patient with excellent shape on abdomen     Plan: Patient may have regular showers, continue with drain care, follow-up with Dr. Bell scheduled for 02/02/2023 for possible drain removal right lower quadrant.  Also I have encouraged the patient to ambulate to prevent blood clots in addition to lovenox. Patient is aware she must wear abdominal binder 24/7 unless showering. Patient is happy with results and so am I.       Rupinder Brantley RN on 1/25/2023 at 10:11 AM

## 2023-01-26 NOTE — OP NOTE
January 25, 2023    Marizol Granados      Preoperative diagnosis: Large abdominal wall pannus, recurrent episodes of intertrigo, excessive skin laxity supraumbilical region     Postoperative diagnosis: same     Procedure: Uokrn-cx-bzt's panniculectomy      Surgeon: Adan Bell MD.     Assistant: Virginie Klein CSA (there was no plastic surgery resident available to assist).     Anesthesia: General     IV fluids:  1500 mL    Tumescent solution: 2000 mL of lactated Ringer's mixed with 1 mg of epinephrine (1 mg of epinephrine per 1000 mL of lactated Ringer's) in the abdominal wall pannus, midline and epigastric region    Estimated blood loss (EBL): 20 mL    Urine output: 450 mL     Findings: Large abdominal wall pannus that was hanging over the pubic region.  Significant epigastric skin laxity with lipodystrophy.  Diastases recti.     Specimen: Abdominal wall pannus and vertical epigastric loose skin     Drains: two # 15 [place] Andrés drains. One on the right lower quadrant and the second one on the left lower quadrant.      Disposition: Patient tolerated procedure well, she was extubated and transferred to recovery room awake and in stable condition.     Indications:    Marizol Granados is a 37 year old patient with history of bariatric surgery with subsequent significant weight loss.  The weight loss has now been stable for more than 18 months.  After this significant weight loss, the patient has now developed a great amount of abdominal wall skin laxity, especially a hanging abdominal wall pannus over the pubic region as well as significant skin laxity on the supra umbilical and epigastric region.  The friction of the hanging abdominal wall pannus over the suprapubic abdominal crease, is causing multiple, chronic and recurrent episode of intertrigo that have failed medical therapy.    Patient has then been offered a Jrjuj-eb-fbo's panniculectomy in order to treat the infraumbilical hanging abdominal wall pannus, that  "is causing the recurrent episode of intertrigo in the pubic region, as well as the significant supraumbilical and epigastric skin laxity.    Risks of the surgery has been explained to the patient and they include but are not limited to scarring, both in the suprapubic region as well as along the midline (forming an inverted \"T\"), wound dehiscence, wound healing problems (especially at the level of the intersection of the vertical component of the incision with the horizontal component of the incision), infection, wound ischemia and necrosis, bleeding, hematoma, seroma, loss of the umbilicus, contour irregularities, venous thromboembolism (VTE) and pulmonary embolism (PE), need for further surgeries.  Patient has acknowledged all these risks and has signed informed consent agreeing to proceed.     Procedure:     Patient was identified in the preoperative holding area and preoperative IV antibiotics were given.    I also proceeded to perform preoperative markings on the anterior abdominal wall.  First, I draw the midline from the xiphoid process down to the vulvar cleft.  I stopped 8 cm superior from the vulvar cleft.  Then, I proceeded to perform, via Pinch test technique, the vertical markings of the area to be excised in the epigastric region. 12 cm were found to be adequate to safely excise on the epigastric region, 6 cm from each size of the midline.  These marking were drawn down up to the level of the umbilicus.    Then, I proceeded to perform the inferior markings along the inguinal crease bilaterally.  These markings intersected at the level of the midline and in the pubic region with the aleah that was 7 cm superior from the vulvar cleft.  The markings were extended laterally and superiorly in the direction of the anterior iliac superior spine (AISP).  From the AISP I draw a second and transverse curve line towards the umbilicus that intersected with the vertical markings that were coming down from the " epigastric region and that were parallel from the midline.  At this time, I also performed a Pinch test in order to make sure that this infra abdominal flap would be able to be closed without undue tension in the lower abdomen.    This is how the markings looked like:                      Patient was then brought to the operating room and was placed supine on the operating room table.  Patient already had sequential compression devices on both lower extremities and they were immediately activated.  After general anesthesia was obtained, a Cat catheter was introduced and the chest, abdomen, flanks, pubic region and upper thighs were then prepped and draped in sterile surgical fashion.     Utilizing a 2 mm Klein's infiltrating cannula, we proceeded to infiltrate the subcutaneous tissues of the marked pannus areas to be resected with tumescent solution. This solution was beneficial for hemostasis and to facilitate dissection.     Then, we proceeded to make incision along the inferior border of the marked pannus area along the inguinal crease and dissection was taken down with electrocautery to the Rectus Abdominis fascia. Dissection of the abdominal wall flap continued superiorly and towards the umbilicus and midline.      Once we reach the umbilicus we divided the inferior abdominal wall flap along its midline and detach the umbilicus with a scalpel #15 from this surrounding abdominal wall flap.  The umbilicus along its stalk remained attached to its opening on the rectus abdominis fascia.  At this time, we stop the dissection towards the epigastric area and proceed to reassess the markings of the upper border of this inferior abdominal wall flap.      We sat down the patient at approximately 45 degree angles and proceeded to pull down inferiorly the dissected inferior abdominal wall flap and marked the inferior edges of this flap over the inferior incision of the panniculectomy along the inguinal creases.  These  new markings coincided with the preoperative upper transverse markings.  After confirming that the flap could be closed without excessive tension, the abdominal wall flap was then divided over these markings.      This flap represented the inferior hanging abdominal wall pannus.  Said flap was sent to pathology as a specimen.     Then, we continue with the supraumbilical and epigastric dissection until we reach the xiphoid process.  At this time we confirm again the preoperative markings with a new Pinch test on this supraumbilical flap.  We were satisfied that the area will be closed without undue tension and this flap was then divided along its lateral markings and it was completely excised and sent to pathology as a specimen.    The total weight of the excised epigastric and infraumbilical pannus was: 4752 grams.    We inspected the integrity of the fascia of the rectus abdominis muscles along the midline and there was evidence of diastases recti. With methylene blue we marked the diastases recti approximately 3 cm laterally from each side of the midline.  Essentially, the width of the diastases recti was approximately 6 cm.  Utilizing 1-0 Prolene we proceeded to close this diastases with running intraaponeurotic stitches from the xiphoid down to umbilicus and from the umbilicus down to the symphysis pubis.    Irrigation was provided and we found that hemostasis was excellent.  A #19 [place] Andrés drain was placed on the right lower quadrant and a second one on the left lower quadrant, for a total of 2 drains. Each drain was secured to the skin with 2-0 silk stitch.    At this time, the supraumbilical/epigastric opening was temporarily approximated towards the midline with staples. This closure showed that there was no significant tension along the midline and no undermining was performed.  We then proceeded to approximate the inferior opening of the panniculectomy along the inguinal region to this upper abdominal  flap.  This temporary closure was also performed with staples, beginning laterally from each anterior iliac superior spine (AISP) towards the midline in order to decrease any tension on the midline.  Finally, when we reach the midline, we were satisfied that we were able to approximate the vertical incision of the upper flap along its midline to the corresponding midline of the inferior opening of the panniculectomy without any tension.  Lack of tension in this intersection of the vertical incision with horizontal incision is desirable in order to prevent ischemia, necrosis and wound healing problems.    The new umbilical opening along the midline/vertical incision on the upper abdominal wall flap was determined by utilizing the intersection of this midline and the superior edge of bilateral iliac crests. The umbilicus was secured to the surrounding subcutaneous tissues with 3-0 Monocryl buried interrupted stitches followed by 5-0 Monocryl running subcuticular stitches.    Both the midline and inferior panniculectomy incisions were closed in layers with 2-0 Vicryl buried interrupted stitches at the level of the deep fascia, followed by In sorb absorbable staples at the level of the subcutaneous tissue and then the skin with 4-0 Stratafix running subcuticular stitches.    Instrument count was reported by nursing personnel as correct, patient tolerated procedure well, she was then extubated and transferred to recovery room awake and in stable condition.      Adan Bell MD , FACS   Diplomate American Board of Plastic Surgery  Diplomate American Board of Surgery  Adj. Assistant Professor of Surgery  Division of Plastic & Reconstructive Surgery   [facility]  Office: [phone]   1/25/2023 at 6:04 PM

## 2023-01-31 ENCOUNTER — TELEPHONE (OUTPATIENT)
Dept: PLASTIC SURGERY | Facility: CLINIC | Age: 38
End: 2023-01-31
Payer: COMMERCIAL

## 2023-01-31 ENCOUNTER — MYC MEDICAL ADVICE (OUTPATIENT)
Dept: PLASTIC SURGERY | Facility: AMBULATORY SURGERY CENTER | Age: 38
End: 2023-01-31
Payer: COMMERCIAL

## 2023-01-31 NOTE — TELEPHONE ENCOUNTER
Patient called with questions regarding redness around the belly button. The patient denies fever, chills or any signs of infection. I have advised the patient to send in a photograph for Dr. Bell and team to address.     Rupinder Brantley RN on 1/31/2023 at 10:32 AM

## 2023-02-02 ENCOUNTER — OFFICE VISIT (OUTPATIENT)
Dept: PLASTIC SURGERY | Facility: AMBULATORY SURGERY CENTER | Age: 38
End: 2023-02-02
Payer: COMMERCIAL

## 2023-02-02 DIAGNOSIS — Z98.890 STATUS POST PANNICULECTOMY: Primary | ICD-10-CM

## 2023-02-02 PROCEDURE — 99024 POSTOP FOLLOW-UP VISIT: CPT | Performed by: PLASTIC SURGERY

## 2023-02-02 NOTE — PROGRESS NOTES
Kristel is a 37 years old lady status post frhuf-du-fhu panniculectomy.  She is 10 days out from surgery.  Patient is doing well.  She did receive Lovenox 40 mg daily for the last 7 days.  She reports no hematoma or shortness of breath.    At the physical exam, all incisions are healing well.  There is no evidence of dehiscence or infection or necrosis.  Bilateral Andrés drains still with high output more than 30 cc a day on both right lower quadrant and left lower quadrant drain.  Therefore, they will have to stay for another week.    Plan: Continue compression garments, continue drain care, follow-up with me next week for possible drain removal right lower quadrant.  Patient is happy with results.    Adan Bell MD , FACS   Diplomate American Board of Plastic Surgery  Diplomate American Board of Surgery  Adj. Assistant Professor of Surgery  Division of Plastic & Reconstructive Surgery   [facility]  Office: [phone]   2/2/2023 at 10:59 AM

## 2023-02-02 NOTE — NURSING NOTE
Patient here today for second post-op after gabriel mcnamara paniculectomy on 01/23/2023. The patient is doing well, reports decreased redness around umbilicus. The patient reports pain 4/10. Patient has both drains in place currently. The right drain is leaking and has far less drainage. The patient reports the left drain is being emptied 4-5x per day with 50ml at a time.     Rupinder Brantley RN on 2/2/2023 at 9:32 AM

## 2023-02-02 NOTE — Clinical Note
2/2/2023         RE: Marizol Granados  [address] [address]        Dear Colleague,    Thank you for referring your patient, Marizol Granados, to the [facility] [place]. Please see a copy of my visit note below.    Kristel is a 37 years old lady status post mjbyn-ze-fsf panniculectomy.  She is 10 days out from surgery.  Patient is doing well.  She did receive Lovenox 40 mg daily for the last 7 days.  She reports no hematoma or shortness of breath.    At the physical exam, all incisions are healing well.  There is no evidence of dehiscence or infection or necrosis.  Bilateral Andrés drains still with high output more than 30 cc a day on both right lower quadrant and left lower quadrant drain.  Therefore, they will have to stay for another week.    Plan: Continue compression garments, continue drain care, follow-up with me next week for possible drain removal right lower quadrant.  Patient is happy with results.    Adan Bell MD , FACS   Diplomate American Board of Plastic Surgery  Diplomate American Board of Surgery  Adj. Assistant Professor of Surgery  Division of Plastic & Reconstructive Surgery   [facility]  Office: [phone]   2/2/2023 at 10:59 AM         Again, thank you for allowing me to participate in the care of your patient.        Sincerely,        Adan Bell MD

## 2023-02-06 ENCOUNTER — TELEPHONE (OUTPATIENT)
Dept: PLASTIC SURGERY | Facility: CLINIC | Age: 38
End: 2023-02-06
Payer: COMMERCIAL

## 2023-02-06 NOTE — TELEPHONE ENCOUNTER
[facility] Call Center    Phone Message    May a detailed message be left on voicemail: yes     Reason for Call: Other:     Pt is requesting a call back to discuss if they need to be seen sooner than their appt 02/09.    Action Taken: Message routed to:  [facility]    Travel Screening: Not Applicable

## 2023-02-06 NOTE — TELEPHONE ENCOUNTER
Call received from Dr. Woodruff at [facility]. Patient went into the ER today due to new onset right extremity pain. Patient has hx of DVT. A ultrasound was preformed and patient does have a new DVT on RLE extending proximally into great saphenous vein. Plan to treat with Warfarin but would like to make sure that is okay given patient still has 2 drains in place.     Dr. Woodruff can be reached by cell at [phone].     Patient is currently set up to be discharged but the ER doc would like to consult with hospitalist to ensure that is a good idea. The patient is set up to see Dr. Bell this Thursday. I will forward high priority to Dr. Bell and notify him via cell as he in the OR today.     Rupinder Brantley RN on 2/6/2023 at 11:12 AM

## 2023-02-06 NOTE — TELEPHONE ENCOUNTER
Return call placed to patient after consulting with Dr. Bell. Patient is okay to be seen as scheduled this Thursday. Message left for patient with this information.     Rupinder Brantley RN on 2/6/2023 at 1:59 PM

## 2023-02-09 ENCOUNTER — OFFICE VISIT (OUTPATIENT)
Dept: PLASTIC SURGERY | Facility: AMBULATORY SURGERY CENTER | Age: 38
End: 2023-02-09
Payer: COMMERCIAL

## 2023-02-09 DIAGNOSIS — Z98.890 STATUS POST PANNICULECTOMY: Primary | ICD-10-CM

## 2023-02-09 PROCEDURE — 99207 PR NO CHARGE NURSE ONLY: CPT | Performed by: PLASTIC SURGERY

## 2023-02-09 NOTE — PROGRESS NOTES
Patient here today for post-op gabrielsuzy mcnamara panniculectomy. The patient is doing okay but is still struggling with DVT diagnosis. The patient reports increased pain in right calf after DVT diagnosis. Dr. Bell and myself advised the patient to see PCP or follow-up again in ER as PCP has been unable to see her. I have let the patient know I will contact her PCP to discuss alternate options and further treatment care regarding DVT. Provider to re-assess patient on 02/14/2023. Left drain was pulled per providers consent. Patient has been advised to wash the area thoroughly and apply antibiotic ointment and a dressing to the drain site for the next 3 days.      The patient plans to go to the ER tonight as the medication she was prescribed (Eloquis) does not seem to be working. Will call to check in tomorrow and talk with PCP.     Rupinder Brantley RN on 2/9/2023 at 4:18 PM

## 2023-02-09 NOTE — Clinical Note
2/9/2023         RE: Marizol Granados  [address] [address]        Dear Colleague,    Thank you for referring your patient, Marizol Granados, to the [facility]LAST[facility] SUR[facility]ERY [facility] [place]. Please see a copy of my visit note below.    Patient here today for post-op gabrielsuzy mcnamara panniculectomy. The patient is doing okay but is still struggling with DVT diagnosis. The patient reports increased pain in right calf after DVT diagnosis. Dr. Bell and myself advised the patient to see PCP or follow-up again in ER as PCP has been unable to see her. I have let the patient know I will contact her PCP to discuss alternate options and further treatment care regarding DVT. Provider to re-assess patient on 02/14/2023. Left drain was pulled per providers consent. Patient has been advised to wash the area thoroughly and apply antibiotic ointment and a dressing to the drain site for the next 3 days.      The patient plans to go to the ER tonight as the medication she was prescribed (Eloquis) does not seem to be working. Will call to check in tomorrow and talk with PCP.     Rupinder Brantley RN on 2/9/2023 at 4:18 PM          Again, thank you for allowing me to participate in the care of your patient.        Sincerely,        Adan Bell MD

## 2023-02-10 ENCOUNTER — TELEPHONE (OUTPATIENT)
Dept: PLASTIC SURGERY | Facility: CLINIC | Age: 38
End: 2023-02-10
Payer: COMMERCIAL

## 2023-02-10 NOTE — TELEPHONE ENCOUNTER
Patient returned call and reports no change after visit to the emergency room.     The patient is going to be following up with PCP.     Patients PCP team called back and will set up an appointment for the patient to come in for additional care.     The patient was advised should any worsening symptoms occur to seek emergent medical attention.     Rupinder Brantley RN on 2/10/2023 at 11:00 AM

## 2023-02-10 NOTE — TELEPHONE ENCOUNTER
Call placed to patient to check in after post-op visit yesterday. Patient was advised to see  PCP or seek emergency medical care with symptoms of DVT worsening despite treatment.     Patient did not answer. Detailed message left for patient to call back to follow-up.     A call was also placed to the patients PCP as additional follow-up will be needed. Care team was unavailable so a message was left with instructions to call back.     Rupinder Brantley RN on 2/10/2023 at 10:31 AM

## 2023-02-14 ENCOUNTER — OFFICE VISIT (OUTPATIENT)
Dept: PLASTIC SURGERY | Facility: AMBULATORY SURGERY CENTER | Age: 38
End: 2023-02-14
Payer: COMMERCIAL

## 2023-02-14 DIAGNOSIS — Z98.890 STATUS POST PANNICULECTOMY: Primary | ICD-10-CM

## 2023-02-14 PROCEDURE — 99024 POSTOP FOLLOW-UP VISIT: CPT | Performed by: PLASTIC SURGERY

## 2023-02-14 NOTE — Clinical Note
2/14/2023         RE: Marizol Granados  [address] [address]        Dear Colleague,    Thank you for referring your patient, Marizol Granados, to the [facility] PLASTIC SURGERY [facility] [place]. Please see a copy of my visit note below.    Marizol is a 37 years old lady status post dsnyj-cz-mbi panniculectomy.  She is 3 weeks out from surgery.  Patient did develop a right lower extremity DVT despite the fact that she received 7 days postoperatively of prophylactic Lovenox due to a Caprini score greater than 7.  Patient does have history of previous lower extremity DVT.    At the physical exam, remaining right lower quadrant Andrés drain has been removed.  Patient was still having greater than 30 cc a day output.  However, after 3 weeks, I was concerned that this drain will become a gate for bacterial access and potential surgical site infection.  Therefore I remove the drain and I instructed her to continue with abdominal binders and also I instructed her to purchase compression garment and needs to be wear 24/7 except while showering.    All incisions of the ofefa-ry-vvj's panniculectomy's are well-healed.  No sign of infection or wound dehiscence.    Plan: Apply cocoa butter lotion along the incisions, continue with compression garments and follow-up with me in 1 week.  Patient is currently receiving Eliquis for her DVT.    I told her that in the future if she wants to have breast reduction and lift she will need to have hematology evaluation to make sure that she does not have a hypercoagulable syndrome.  Furthermore, I told her that if she is taking chronic anticoagulation, in my hands, that is a formal contraindication to pursue any breast surgery.    Patient told me that she understands this.    I will see her again in 1 week.    Adan Bell MD , FACS   Diplomate American Board of Plastic Surgery  Diplomate American Board of Surgery  Adj. Assistant Professor of Surgery  Division of Plastic  & Reconstructive Surgery   [facility]  Office: [phone]   2/14/2023 at 7:03 PM         Again, thank you for allowing me to participate in the care of your patient.        Sincerely,        Adan Bell MD

## 2023-02-14 NOTE — NURSING NOTE
Patient here today after Ndizy-sw-avv's panniculectomy with vertical component on 01/23/2023. The patient is currently being treated for a DVT of the RLE. The patient is following up with PCP regarding this as pain has become worse. Currently her PCP has prescribed Dilaudid for pain but patient has been unable to fill the prescription due to needing a prior auth.     The patient reports pain of 5/10 in abdomen. The patients right drain is still having large output of 200 mL daily. The patient has concerns as she feels the drain is pulling.     Provider to evaluate today during visit.     Rupinder Brantley RN on 2/14/2023 at 9:44 AM

## 2023-02-15 NOTE — PROGRESS NOTES
Marizol is a 37 years old lady status post mcewo-ab-ceu panniculectomy.  She is 3 weeks out from surgery.  Patient did develop a right lower extremity DVT despite the fact that she received 7 days postoperatively of prophylactic Lovenox due to a Caprini score greater than 7.  Patient does have history of previous lower extremity DVT.    At the physical exam, remaining right lower quadrant Andrés drain has been removed.  Patient was still having greater than 30 cc a day output.  However, after 3 weeks, I was concerned that this drain will become a gate for bacterial access and potential surgical site infection.  Therefore I remove the drain and I instructed her to continue with abdominal binders and also I instructed her to purchase compression garment and needs to be wear 24/7 except while showering.    All incisions of the tfimo-iy-wuv's panniculectomy's are well-healed.  No sign of infection or wound dehiscence.    Plan: Apply cocoa butter lotion along the incisions, continue with compression garments and follow-up with me in 1 week.  Patient is currently receiving Eliquis for her DVT.    I told her that in the future if she wants to have breast reduction and lift she will need to have hematology evaluation to make sure that she does not have a hypercoagulable syndrome.  Furthermore, I told her that if she is taking chronic anticoagulation, in my hands, that is a formal contraindication to pursue any breast surgery.    Patient told me that she understands this.    I will see her again in 1 week.    Adan Blel MD , FACS   Diplomate American Board of Plastic Surgery  Diplomate American Board of Surgery  Adj. Assistant Professor of Surgery  Division of Plastic & Reconstructive Surgery   [facility]  Office: [phone]   2/14/2023 at 7:03 PM

## 2023-02-18 DIAGNOSIS — G43.011 INTRACTABLE MIGRAINE WITHOUT AURA AND WITH STATUS MIGRAINOSUS: ICD-10-CM

## 2023-02-20 ENCOUNTER — TELEPHONE (OUTPATIENT)
Dept: PLASTIC SURGERY | Facility: CLINIC | Age: 38
End: 2023-02-20
Payer: COMMERCIAL

## 2023-02-20 RX ORDER — TOPIRAMATE 50 MG/1
TABLET, FILM COATED ORAL
Qty: 180 TABLET | Refills: 3 | Status: SHIPPED | OUTPATIENT
Start: 2023-02-20 | End: 2024-01-29

## 2023-02-20 NOTE — TELEPHONE ENCOUNTER
Patient called and reports that sutures line opened up on Saturday. The patient reports that she is having some pus-like drainage. The patient has been advised to send a picture for Dr. Bell to evaluate.     Patient denies fever or other infection symptoms other than yellow pus.     Rupinder Brantley RN on 2/20/2023 at 1:56 PM

## 2023-02-21 ENCOUNTER — TELEPHONE (OUTPATIENT)
Dept: PLASTIC SURGERY | Facility: CLINIC | Age: 38
End: 2023-02-21
Payer: COMMERCIAL

## 2023-02-21 ENCOUNTER — VIRTUAL VISIT (OUTPATIENT)
Dept: PLASTIC SURGERY | Facility: AMBULATORY SURGERY CENTER | Age: 38
End: 2023-02-21
Payer: COMMERCIAL

## 2023-02-21 DIAGNOSIS — Z98.890 STATUS POST PANNICULECTOMY: Primary | ICD-10-CM

## 2023-02-21 PROCEDURE — 99024 POSTOP FOLLOW-UP VISIT: CPT | Mod: VID | Performed by: PLASTIC SURGERY

## 2023-02-21 NOTE — LETTER
2/21/2023         RE: Marizol Granados  [address] [facility] [address]        Dear Colleague,    Thank you for referring your patient, Marizol Granados, to the [facility] PLASTIC SURGERY [facility] [place]. Please see a copy of my visit note below.    Ms. Fontana is a 36 years old patient status post lfctz-hm-epp's panniculectomy's.  She is 4 weeks out from surgery.  Patient did develop right lower extremity DVT, despite the fact that she was treated prophylactically with Lovenox 7 days after surgery.    This is a virtual consultation due to inclement weather.    Patient is concerned that she could be developing a seroma in her hypogastric region.    At the physical exam, there is some swelling in the hypogastric region along the middle third of the panniculectomy scar.  This could be representative of a small seroma or perhaps a small hematoma secondary to the fact that the patient is taking Eliquis for her DVT.    I will see the patient in person in the next 48 hours to see if she in fact is developing a seroma or hematoma.  If this is the case, I will refer her to interventional radiology for placement of a drain.  In the meantime, continue with compression garments.    Time spent with the patient 10 minutes.    Adan Bell MD , FACS   Diplomate American Board of Plastic Surgery  Diplomate American Board of Surgery  Adj. Assistant Professor of Surgery  Division of Plastic & Reconstructive Surgery   [facility]  Office: [phone]   2/23/2023 at 10:04 PM         Again, thank you for allowing me to participate in the care of your patient.        Sincerely,        Adan eBll MD

## 2023-02-21 NOTE — NURSING NOTE
Patient being evaluated after [id number] panniculectomy on 01/23/2023. The patient reports some hardness, bruising and pain along suture line.     Rupinder Brantley RN on 2/21/2023 at 3:50 PM

## 2023-02-21 NOTE — TELEPHONE ENCOUNTER
Health Call Center    Phone Message    May a detailed message be left on voicemail: yes     Reason for Call: Other: Marizol is calling in asking for a call back from DONNELL Bucio. She states that she would like to discuss her incision prior to her appt with Dr. Bell on 2/23. Please call back as soon as possible to discuss.     Action Taken: Message routed to:  [facility] ([facility] Plastic Surg    Travel Screening: Not Applicable                                                                         How Severe Are Your Spot(S)?: mild What Is The Reason For Today's Visit?: Skin Lesions

## 2023-02-21 NOTE — TELEPHONE ENCOUNTER
Return call place to patient. The patient reports that site where stitches began to open has turned black and blue and has started swelling despite wearing compression. The patient is concerned due to the hardness, swelling and pain she is currently undergoing.     I have requested the patient sent photos in for Dr. Bell to evaluate.     Rupinder Brantley RN on 2/21/2023 at 3:00 PM

## 2023-02-24 ENCOUNTER — OFFICE VISIT (OUTPATIENT)
Dept: PLASTIC SURGERY | Facility: AMBULATORY SURGERY CENTER | Age: 38
End: 2023-02-24
Payer: COMMERCIAL

## 2023-02-24 DIAGNOSIS — S30.1XXA ABDOMINAL WALL SEROMA, INITIAL ENCOUNTER: ICD-10-CM

## 2023-02-24 DIAGNOSIS — Z98.890 STATUS POST PANNICULECTOMY: Primary | ICD-10-CM

## 2023-02-24 PROCEDURE — 99024 POSTOP FOLLOW-UP VISIT: CPT | Performed by: PLASTIC SURGERY

## 2023-02-24 NOTE — PROGRESS NOTES
Ms. Fontana is a 36 years old patient status post hrmmg-rq-vmg's panniculectomy's.  She is 4 weeks out from surgery.  Patient did develop right lower extremity DVT, despite the fact that she was treated prophylactically with Lovenox 7 days after surgery.    This is a virtual consultation due to inclement weather.    Patient is concerned that she could be developing a seroma in her hypogastric region.    At the physical exam, there is some swelling in the hypogastric region along the middle third of the panniculectomy scar.  This could be representative of a small seroma or perhaps a small hematoma secondary to the fact that the patient is taking Eliquis for her DVT.    I will see the patient in person in the next 48 hours to see if she in fact is developing a seroma or hematoma.  If this is the case, I will refer her to interventional radiology for placement of a drain.  In the meantime, continue with compression garments.    Time spent with the patient 10 minutes.    Adan Bell MD , FACS   Diplomate American Board of Plastic Surgery  Diplomate American Board of Surgery  Adj. Assistant Professor of Surgery  Division of Plastic & Reconstructive Surgery   [facility]  Office: [phone]   2/23/2023 at 10:04 PM

## 2023-02-24 NOTE — PROGRESS NOTES
Order for IR sent to [facility] fax per patients request as she lives in [place] and wants to do this procedure closer to home if possible.     Order was faxed to Radiology at [facility] via [phone]. Scheduling states this should be able to be done quickly with an emergent / urgent referral.     Patient has been notified.     Rupinder Brantley RN on 2/24/2023 at 11:08 AM

## 2023-02-24 NOTE — Clinical Note
2/24/2023         RE: Marizol Granados  [address] [facility] [address]        Dear Colleague,    Thank you for referring your patient, Marizol Granados, to the [facility] PLASTIC SURGERY CLINIC [place]. Please see a copy of my visit note below.    Order for IR sent to [facility] fax per patients request as she lives in [place] and wants to do this procedure closer to home if possible.     Order was faxed to Radiology at [facility] via [phone]. Scheduling states this should be able to be done quickly with an emergent / urgent referral.     Patient has been notified.     Rupinder Brantley RN on 2/24/2023 at 11:08 AM      Lalo is a 35 years old lady status post zlmea-hr-gez's panniculectomy.  She is 1 month out from surgery.  She returns today for reevaluation.    At the physical exam, panniculectomy incisions are healing well.  Patient presents however with evidence of seroma on her lower abdomen.  There is no surgical site infection.    Plan: I will refer this patient for interventional radiologist for drain placement.  I will follow-up with her in 10 days.  Continue with compression garment.    Adan Bell MD , FACS   Diplomate American Board of Plastic Surgery  Diplomate American Board of Surgery  Adj. Assistant Professor of Surgery  Division of Plastic & Reconstructive Surgery   [facility]  Office: [phone]   2/24/2023 at 1:21 PM         Again, thank you for allowing me to participate in the care of your patient.        Sincerely,        Adan Bell MD

## 2023-02-24 NOTE — PROGRESS NOTES
Lalo is a 35 years old lady status post keena's panniculectomy.  She is 1 month out from surgery.  She returns today for reevaluation.    At the physical exam, panniculectomy incisions are healing well.  Patient presents however with evidence of seroma on her lower abdomen.  There is no surgical site infection.    Plan: I will refer this patient for interventional radiologist for drain placement.  I will follow-up with her in 10 days.  Continue with compression garment.    Adan Bell MD , FACS   Diplomate American Board of Plastic Surgery  Diplomate American Board of Surgery  Adj. Assistant Professor of Surgery  Division of Plastic & Reconstructive Surgery   [facility]  Office: [phone]   2/24/2023 at 1:21 PM

## 2023-02-24 NOTE — NURSING NOTE
Patient here today for post-op pannic follow-up appointment. The patient rates pain as 8/10 with minimal relief from OTC medication.     Patient reports extreme pressure and bloating that makes her feel like she is going to explode. The patient reports her wound is healing up and closing.     Reports minimal pain from DVT at this time.     Rupinder Brantley RN on 2/24/2023 at 9:57 AM

## 2023-02-27 ENCOUNTER — OFFICE VISIT (OUTPATIENT)
Dept: PLASTIC SURGERY | Facility: AMBULATORY SURGERY CENTER | Age: 38
End: 2023-02-27
Payer: COMMERCIAL

## 2023-02-27 ENCOUNTER — APPOINTMENT (OUTPATIENT)
Dept: CT IMAGING | Facility: HOSPITAL | Age: 38
End: 2023-02-27
Attending: EMERGENCY MEDICINE
Payer: COMMERCIAL

## 2023-02-27 ENCOUNTER — HOSPITAL ENCOUNTER (INPATIENT)
Facility: HOSPITAL | Age: 38
LOS: 2 days | Discharge: HOME | End: 2023-03-01
Attending: EMERGENCY MEDICINE | Admitting: PLASTIC SURGERY
Payer: COMMERCIAL

## 2023-02-27 DIAGNOSIS — L76.34 POSTOPERATIVE SEROMA OF SUBCUTANEOUS TISSUE AFTER NON-DERMATOLOGIC PROCEDURE: ICD-10-CM

## 2023-02-27 DIAGNOSIS — Z86.718 HISTORY OF THROMBOEMBOLISM: Primary | ICD-10-CM

## 2023-02-27 DIAGNOSIS — Z98.890 S/P PANNICULECTOMY: ICD-10-CM

## 2023-02-27 DIAGNOSIS — T81.40XA POSTOPERATIVE INFECTION, UNSPECIFIED TYPE, INITIAL ENCOUNTER: ICD-10-CM

## 2023-02-27 DIAGNOSIS — Z98.890 STATUS POST ABDOMINOPLASTY: Primary | ICD-10-CM

## 2023-02-27 LAB
ALBUMIN SERPL BCG-MCNC: 3.8 G/DL (ref 3.5–5.2)
ALBUMIN UR-MCNC: 10 MG/DL
ALBUMIN UR-MCNC: NEGATIVE MG/DL
ALP SERPL-CCNC: 63 U/L (ref 35–104)
ALT SERPL W P-5'-P-CCNC: 17 U/L (ref 10–35)
ANION GAP SERPL CALCULATED.3IONS-SCNC: 14 MMOL/L (ref 7–15)
APPEARANCE FLD: ABNORMAL
APPEARANCE UR: ABNORMAL
APPEARANCE UR: CLEAR
APTT PPP: 27 SECONDS (ref 22–38)
AST SERPL W P-5'-P-CCNC: 20 U/L (ref 10–35)
BACTERIA #/AREA URNS HPF: ABNORMAL /HPF
BASOPHILS # BLD AUTO: 0 10E3/UL (ref 0–0.2)
BASOPHILS NFR BLD AUTO: 1 %
BILIRUB SERPL-MCNC: <0.2 MG/DL
BILIRUB UR QL STRIP: NEGATIVE
BILIRUB UR QL STRIP: NEGATIVE
BUN SERPL-MCNC: 15.7 MG/DL (ref 6–20)
CALCIUM SERPL-MCNC: 9.1 MG/DL (ref 8.6–10)
CELL COUNT BODY FLUID SOURCE: ABNORMAL
CHLORIDE SERPL-SCNC: 104 MMOL/L (ref 98–107)
COLOR FLD: ABNORMAL
COLOR UR AUTO: ABNORMAL
COLOR UR AUTO: YELLOW
CREAT SERPL-MCNC: 0.78 MG/DL (ref 0.51–0.95)
DEPRECATED HCO3 PLAS-SCNC: 20 MMOL/L (ref 22–29)
EOSINOPHIL # BLD AUTO: 0.3 10E3/UL (ref 0–0.7)
EOSINOPHIL NFR BLD AUTO: 5 %
ERYTHROCYTE [DISTWIDTH] IN BLOOD BY AUTOMATED COUNT: 13.6 % (ref 10–15)
GFR SERPL CREATININE-BSD FRML MDRD: >90 ML/MIN/1.73M2
GLUCOSE SERPL-MCNC: 98 MG/DL (ref 70–99)
GLUCOSE UR STRIP-MCNC: NEGATIVE MG/DL
GLUCOSE UR STRIP-MCNC: NEGATIVE MG/DL
HCT VFR BLD AUTO: 37.3 % (ref 35–47)
HGB BLD-MCNC: 11.8 G/DL (ref 11.7–15.7)
HGB UR QL STRIP: NEGATIVE
HGB UR QL STRIP: NEGATIVE
HOLD SPECIMEN: NORMAL
HYALINE CASTS: 4 /LPF
IMM GRANULOCYTES # BLD: 0 10E3/UL
IMM GRANULOCYTES NFR BLD: 1 %
INR PPP: 1.22 (ref 0.85–1.15)
KETONES UR STRIP-MCNC: NEGATIVE MG/DL
KETONES UR STRIP-MCNC: NEGATIVE MG/DL
LACTATE SERPL-SCNC: 0.9 MMOL/L (ref 0.7–2)
LEUKOCYTE ESTERASE UR QL STRIP: ABNORMAL
LEUKOCYTE ESTERASE UR QL STRIP: NEGATIVE
LIPASE SERPL-CCNC: 50 U/L (ref 13–60)
LYMPHOCYTES # BLD AUTO: 1.9 10E3/UL (ref 0.8–5.3)
LYMPHOCYTES NFR BLD AUTO: 29 %
LYMPHOCYTES NFR FLD MANUAL: 10 %
MCH RBC QN AUTO: 29.3 PG (ref 26.5–33)
MCHC RBC AUTO-ENTMCNC: 31.6 G/DL (ref 31.5–36.5)
MCV RBC AUTO: 93 FL (ref 78–100)
MONOCYTES # BLD AUTO: 0.4 10E3/UL (ref 0–1.3)
MONOCYTES NFR BLD AUTO: 7 %
MONOS+MACROS NFR FLD MANUAL: 3 %
MUCOUS THREADS #/AREA URNS LPF: PRESENT /LPF
NEUTROPHILS # BLD AUTO: 3.9 10E3/UL (ref 1.6–8.3)
NEUTROPHILS NFR BLD AUTO: 57 %
NEUTS BAND NFR FLD MANUAL: 87 %
NITRATE UR QL: NEGATIVE
NITRATE UR QL: NEGATIVE
NRBC # BLD AUTO: 0 10E3/UL
NRBC BLD AUTO-RTO: 0 /100
PH UR STRIP: 5.5 [PH] (ref 5–7)
PH UR STRIP: 6 [PH] (ref 5–7)
PLATELET # BLD AUTO: 257 10E3/UL (ref 150–450)
POTASSIUM SERPL-SCNC: 4.1 MMOL/L (ref 3.4–5.3)
PROT SERPL-MCNC: 6.9 G/DL (ref 6.4–8.3)
RBC # BLD AUTO: 4.03 10E6/UL (ref 3.8–5.2)
RBC # FLD: ABNORMAL /UL
RBC URINE: 1 /HPF
RBC URINE: <1 /HPF
SODIUM SERPL-SCNC: 138 MMOL/L (ref 136–145)
SP GR UR STRIP: 1.02 (ref 1–1.03)
SP GR UR STRIP: >1.05 (ref 1–1.03)
SQUAMOUS EPITHELIAL: 1 /HPF
SQUAMOUS EPITHELIAL: 13 /HPF
UROBILINOGEN UR STRIP-MCNC: <2 MG/DL
UROBILINOGEN UR STRIP-MCNC: <2 MG/DL
WBC # BLD AUTO: 6.6 10E3/UL (ref 4–11)
WBC # FLD AUTO: ABNORMAL /UL
WBC URINE: 18 /HPF
WBC URINE: <1 /HPF

## 2023-02-27 PROCEDURE — G0378 HOSPITAL OBSERVATION PER HR: HCPCS

## 2023-02-27 PROCEDURE — 89050 BODY FLUID CELL COUNT: CPT | Performed by: EMERGENCY MEDICINE

## 2023-02-27 PROCEDURE — 85610 PROTHROMBIN TIME: CPT | Performed by: EMERGENCY MEDICINE

## 2023-02-27 PROCEDURE — 250N000011 HC RX IP 250 OP 636: Performed by: PLASTIC SURGERY

## 2023-02-27 PROCEDURE — 258N000003 HC RX IP 258 OP 636: Performed by: EMERGENCY MEDICINE

## 2023-02-27 PROCEDURE — 250N000013 HC RX MED GY IP 250 OP 250 PS 637: Performed by: PLASTIC SURGERY

## 2023-02-27 PROCEDURE — 80053 COMPREHEN METABOLIC PANEL: CPT | Performed by: EMERGENCY MEDICINE

## 2023-02-27 PROCEDURE — 96365 THER/PROPH/DIAG IV INF INIT: CPT

## 2023-02-27 PROCEDURE — 81003 URINALYSIS AUTO W/O SCOPE: CPT | Performed by: EMERGENCY MEDICINE

## 2023-02-27 PROCEDURE — 87086 URINE CULTURE/COLONY COUNT: CPT | Performed by: EMERGENCY MEDICINE

## 2023-02-27 PROCEDURE — 250N000011 HC RX IP 250 OP 636: Performed by: EMERGENCY MEDICINE

## 2023-02-27 PROCEDURE — 87077 CULTURE AEROBIC IDENTIFY: CPT | Performed by: EMERGENCY MEDICINE

## 2023-02-27 PROCEDURE — 85025 COMPLETE CBC W/AUTO DIFF WBC: CPT | Performed by: EMERGENCY MEDICINE

## 2023-02-27 PROCEDURE — 83690 ASSAY OF LIPASE: CPT | Performed by: EMERGENCY MEDICINE

## 2023-02-27 PROCEDURE — 99285 EMERGENCY DEPT VISIT HI MDM: CPT | Mod: 25

## 2023-02-27 PROCEDURE — 120N000001 HC R&B MED SURG/OB

## 2023-02-27 PROCEDURE — 74177 CT ABD & PELVIS W/CONTRAST: CPT

## 2023-02-27 PROCEDURE — 99024 POSTOP FOLLOW-UP VISIT: CPT | Performed by: PLASTIC SURGERY

## 2023-02-27 PROCEDURE — 36415 COLL VENOUS BLD VENIPUNCTURE: CPT | Performed by: EMERGENCY MEDICINE

## 2023-02-27 PROCEDURE — 96375 TX/PRO/DX INJ NEW DRUG ADDON: CPT

## 2023-02-27 PROCEDURE — 83605 ASSAY OF LACTIC ACID: CPT | Performed by: EMERGENCY MEDICINE

## 2023-02-27 PROCEDURE — 85730 THROMBOPLASTIN TIME PARTIAL: CPT | Performed by: EMERGENCY MEDICINE

## 2023-02-27 PROCEDURE — 87040 BLOOD CULTURE FOR BACTERIA: CPT | Performed by: EMERGENCY MEDICINE

## 2023-02-27 PROCEDURE — 36415 COLL VENOUS BLD VENIPUNCTURE: CPT | Performed by: STUDENT IN AN ORGANIZED HEALTH CARE EDUCATION/TRAINING PROGRAM

## 2023-02-27 PROCEDURE — 87205 SMEAR GRAM STAIN: CPT | Performed by: EMERGENCY MEDICINE

## 2023-02-27 RX ORDER — PHENTERMINE HYDROCHLORIDE 37.5 MG/1
37.5 TABLET ORAL
Status: DISCONTINUED | OUTPATIENT
Start: 2023-02-28 | End: 2023-03-01 | Stop reason: HOSPADM

## 2023-02-27 RX ORDER — CEFAZOLIN SODIUM 1 G/50ML
1750 SOLUTION INTRAVENOUS ONCE
Status: COMPLETED | OUTPATIENT
Start: 2023-02-27 | End: 2023-02-27

## 2023-02-27 RX ORDER — CIPROFLOXACIN 2 MG/ML
400 INJECTION, SOLUTION INTRAVENOUS EVERY 12 HOURS
Status: DISCONTINUED | OUTPATIENT
Start: 2023-02-28 | End: 2023-02-28 | Stop reason: ALTCHOICE

## 2023-02-27 RX ORDER — VITAMIN B COMPLEX
50 TABLET ORAL AT BEDTIME
Status: DISCONTINUED | OUTPATIENT
Start: 2023-02-28 | End: 2023-03-01 | Stop reason: HOSPADM

## 2023-02-27 RX ORDER — IBUPROFEN 800 MG/1
800 TABLET, FILM COATED ORAL EVERY 6 HOURS PRN
Status: DISCONTINUED | OUTPATIENT
Start: 2023-02-27 | End: 2023-03-01 | Stop reason: HOSPADM

## 2023-02-27 RX ORDER — HYDROMORPHONE HYDROCHLORIDE 2 MG/1
2 TABLET ORAL EVERY 4 HOURS PRN
Status: DISCONTINUED | OUTPATIENT
Start: 2023-02-27 | End: 2023-03-01 | Stop reason: HOSPADM

## 2023-02-27 RX ORDER — PROCHLORPERAZINE MALEATE 10 MG
10 TABLET ORAL EVERY 6 HOURS PRN
Status: DISCONTINUED | OUTPATIENT
Start: 2023-02-27 | End: 2023-03-01 | Stop reason: HOSPADM

## 2023-02-27 RX ORDER — DESVENLAFAXINE 50 MG/1
100 TABLET, FILM COATED, EXTENDED RELEASE ORAL AT BEDTIME
Status: DISCONTINUED | OUTPATIENT
Start: 2023-02-28 | End: 2023-03-01 | Stop reason: HOSPADM

## 2023-02-27 RX ORDER — CEFTRIAXONE 1 G/1
1 INJECTION, POWDER, FOR SOLUTION INTRAMUSCULAR; INTRAVENOUS ONCE
Status: COMPLETED | OUTPATIENT
Start: 2023-02-27 | End: 2023-02-27

## 2023-02-27 RX ORDER — TOPIRAMATE 25 MG/1
50 TABLET, FILM COATED ORAL 2 TIMES DAILY
Status: DISCONTINUED | OUTPATIENT
Start: 2023-02-28 | End: 2023-03-01 | Stop reason: HOSPADM

## 2023-02-27 RX ORDER — HYDROMORPHONE HYDROCHLORIDE 1 MG/ML
0.4 INJECTION, SOLUTION INTRAMUSCULAR; INTRAVENOUS; SUBCUTANEOUS
Status: DISCONTINUED | OUTPATIENT
Start: 2023-02-27 | End: 2023-03-01 | Stop reason: HOSPADM

## 2023-02-27 RX ORDER — RAMELTEON 8 MG/1
8 TABLET ORAL AT BEDTIME
Status: DISCONTINUED | OUTPATIENT
Start: 2023-02-28 | End: 2023-03-01 | Stop reason: HOSPADM

## 2023-02-27 RX ORDER — ONDANSETRON 2 MG/ML
4 INJECTION INTRAMUSCULAR; INTRAVENOUS EVERY 30 MIN PRN
Status: DISCONTINUED | OUTPATIENT
Start: 2023-02-27 | End: 2023-03-01 | Stop reason: HOSPADM

## 2023-02-27 RX ORDER — DESVENLAFAXINE 50 MG/1
50 TABLET, FILM COATED, EXTENDED RELEASE ORAL AT BEDTIME
Status: DISCONTINUED | OUTPATIENT
Start: 2023-02-28 | End: 2023-03-01 | Stop reason: HOSPADM

## 2023-02-27 RX ORDER — ONDANSETRON 2 MG/ML
4 INJECTION INTRAMUSCULAR; INTRAVENOUS EVERY 6 HOURS PRN
Status: DISCONTINUED | OUTPATIENT
Start: 2023-02-27 | End: 2023-03-01 | Stop reason: HOSPADM

## 2023-02-27 RX ORDER — ACETAMINOPHEN 325 MG/1
650 TABLET ORAL EVERY 4 HOURS PRN
Status: DISCONTINUED | OUTPATIENT
Start: 2023-03-02 | End: 2023-03-01 | Stop reason: HOSPADM

## 2023-02-27 RX ORDER — ACETAMINOPHEN 325 MG/1
975 TABLET ORAL EVERY 8 HOURS
Status: DISCONTINUED | OUTPATIENT
Start: 2023-02-27 | End: 2023-03-01 | Stop reason: HOSPADM

## 2023-02-27 RX ORDER — BISACODYL 10 MG
10 SUPPOSITORY, RECTAL RECTAL DAILY PRN
Status: DISCONTINUED | OUTPATIENT
Start: 2023-02-27 | End: 2023-03-01 | Stop reason: HOSPADM

## 2023-02-27 RX ORDER — ZOLPIDEM TARTRATE 5 MG/1
5 TABLET ORAL
Status: DISCONTINUED | OUTPATIENT
Start: 2023-02-27 | End: 2023-03-01 | Stop reason: HOSPADM

## 2023-02-27 RX ORDER — QUETIAPINE FUMARATE 100 MG/1
200 TABLET, FILM COATED ORAL AT BEDTIME
Status: DISCONTINUED | OUTPATIENT
Start: 2023-02-28 | End: 2023-03-01 | Stop reason: HOSPADM

## 2023-02-27 RX ORDER — CLINDAMYCIN HCL 150 MG
150 CAPSULE ORAL 3 TIMES DAILY
Status: ON HOLD | COMMUNITY
End: 2023-03-01

## 2023-02-27 RX ORDER — PHENTERMINE HYDROCHLORIDE 37.5 MG/1
37.5 TABLET ORAL
COMMUNITY
End: 2023-08-02

## 2023-02-27 RX ORDER — LORAZEPAM 0.5 MG/1
1 TABLET ORAL 2 TIMES DAILY
Status: DISCONTINUED | OUTPATIENT
Start: 2023-02-28 | End: 2023-03-01 | Stop reason: HOSPADM

## 2023-02-27 RX ORDER — HYDROMORPHONE HYDROCHLORIDE 2 MG/1
2 TABLET ORAL ONCE
Status: DISCONTINUED | OUTPATIENT
Start: 2023-02-27 | End: 2023-02-27

## 2023-02-27 RX ORDER — CALCIUM POLYCARBOPHIL 625 MG 625 MG/1
625 TABLET ORAL AT BEDTIME
Status: DISCONTINUED | OUTPATIENT
Start: 2023-02-28 | End: 2023-03-01 | Stop reason: HOSPADM

## 2023-02-27 RX ORDER — LIDOCAINE 40 MG/G
CREAM TOPICAL
Status: DISCONTINUED | OUTPATIENT
Start: 2023-02-27 | End: 2023-03-01 | Stop reason: HOSPADM

## 2023-02-27 RX ORDER — HYDROMORPHONE HYDROCHLORIDE 1 MG/ML
0.2 INJECTION, SOLUTION INTRAMUSCULAR; INTRAVENOUS; SUBCUTANEOUS
Status: DISCONTINUED | OUTPATIENT
Start: 2023-02-27 | End: 2023-03-01 | Stop reason: HOSPADM

## 2023-02-27 RX ORDER — FLUCONAZOLE 150 MG/1
150 TABLET ORAL DAILY
COMMUNITY
End: 2024-05-02

## 2023-02-27 RX ORDER — POLYETHYLENE GLYCOL 3350 17 G/17G
17 POWDER, FOR SOLUTION ORAL DAILY
Status: DISCONTINUED | OUTPATIENT
Start: 2023-02-28 | End: 2023-03-01 | Stop reason: HOSPADM

## 2023-02-27 RX ORDER — IOPAMIDOL 755 MG/ML
100 INJECTION, SOLUTION INTRAVASCULAR ONCE
Status: COMPLETED | OUTPATIENT
Start: 2023-02-27 | End: 2023-02-27

## 2023-02-27 RX ORDER — AMOXICILLIN 250 MG
1 CAPSULE ORAL 2 TIMES DAILY
Status: DISCONTINUED | OUTPATIENT
Start: 2023-02-27 | End: 2023-03-01 | Stop reason: HOSPADM

## 2023-02-27 RX ORDER — ONDANSETRON 4 MG/1
4 TABLET, ORALLY DISINTEGRATING ORAL EVERY 6 HOURS PRN
Status: DISCONTINUED | OUTPATIENT
Start: 2023-02-27 | End: 2023-03-01 | Stop reason: HOSPADM

## 2023-02-27 RX ORDER — HYDROMORPHONE HYDROCHLORIDE 2 MG/1
4 TABLET ORAL EVERY 4 HOURS PRN
Status: DISCONTINUED | OUTPATIENT
Start: 2023-02-27 | End: 2023-03-01 | Stop reason: HOSPADM

## 2023-02-27 RX ORDER — SODIUM CHLORIDE 9 MG/ML
INJECTION, SOLUTION INTRAVENOUS ONCE
Status: COMPLETED | OUTPATIENT
Start: 2023-02-27 | End: 2023-02-27

## 2023-02-27 RX ADMIN — IOPAMIDOL 100 ML: 755 INJECTION, SOLUTION INTRAVENOUS at 14:43

## 2023-02-27 RX ADMIN — HYDROMORPHONE HYDROCHLORIDE 0.4 MG: 1 INJECTION, SOLUTION INTRAMUSCULAR; INTRAVENOUS; SUBCUTANEOUS at 22:47

## 2023-02-27 RX ADMIN — ACETAMINOPHEN 975 MG: 325 TABLET ORAL at 20:20

## 2023-02-27 RX ADMIN — HYDROMORPHONE HYDROCHLORIDE 1 MG: 1 INJECTION, SOLUTION INTRAMUSCULAR; INTRAVENOUS; SUBCUTANEOUS at 14:54

## 2023-02-27 RX ADMIN — HYDROMORPHONE HYDROCHLORIDE 4 MG: 2 TABLET ORAL at 20:51

## 2023-02-27 RX ADMIN — CEFTRIAXONE SODIUM 1 G: 1 INJECTION, POWDER, FOR SOLUTION INTRAMUSCULAR; INTRAVENOUS at 13:28

## 2023-02-27 RX ADMIN — VANCOMYCIN HYDROCHLORIDE 1750 MG: 5 INJECTION, POWDER, LYOPHILIZED, FOR SOLUTION INTRAVENOUS at 14:58

## 2023-02-27 RX ADMIN — ONDANSETRON 4 MG: 2 INJECTION INTRAMUSCULAR; INTRAVENOUS at 14:50

## 2023-02-27 RX ADMIN — SODIUM CHLORIDE: 9 INJECTION, SOLUTION INTRAVENOUS at 14:50

## 2023-02-27 RX ADMIN — SENNOSIDES AND DOCUSATE SODIUM 1 TABLET: 50; 8.6 TABLET ORAL at 20:20

## 2023-02-27 ASSESSMENT — ENCOUNTER SYMPTOMS
FEVER: 0
ABDOMINAL PAIN: 1

## 2023-02-27 ASSESSMENT — ACTIVITIES OF DAILY LIVING (ADL)
ADLS_ACUITY_SCORE: 35

## 2023-02-27 NOTE — ED NOTES
"Patient is saying she is itchy-reports itchy \"all over\". -no rash or hives noted-slioght redness noted on her abdomin from itching. -stopped vanco infusion--has received over half the bag.  Vanco placed on hold. Paged the plastic surgeon and updated on the above.  (Dr. Bell)  "

## 2023-02-27 NOTE — ED TRIAGE NOTES
"Patient presents here for evaluation of pain related to a recent surgery that was done. She was seen by her surgeon today and sent here for further work up. She is unclear about her symptoms, stating \"It is hard for me to explain\".       "

## 2023-02-27 NOTE — PHARMACY-ADMISSION MEDICATION HISTORY
Pharmacy Note - Admission Medication History    Pertinent Provider Information: Recent start of clindamycin 150mg tid x5 days started 2/25, pt thinks she's been taking it twice daily.  Eliquis has been stopped since 2/25 for IR appointment     ______________________________________________________________________    Prior To Admission (PTA) med list completed and updated in EMR.       PTA Med List   Medication Sig Note Last Dose     apixaban ANTICOAGULANT (ELIQUIS) 5 MG tablet Take 5 mg by mouth 2 times daily 2/27/2023: Stopped last Friday due to AQUILINO drain placement.  Has not re-started Past Week     busPIRone (BUSPAR) 15 MG tablet Take 22.5 mg by mouth 2 times daily  2/27/2023     calcium carbonate-vitamin D (OS-LORY WITH D) 500-200 MG-UNIT tablet Take 2 tablets by mouth daily  2/26/2023 at hs     Calcium Polycarbophil (FIBER) 625 MG tablet Take 1 tablet by mouth daily  2/26/2023 at hs     cholecalciferol 25 MCG (1000 UT) TABS Take 2,000 Units by mouth daily  2/26/2023 at hs     clindamycin (CLEOCIN) 150 MG capsule Take 150 mg by mouth 3 times daily X 5 days  2/27/2023 at am     desvenlafaxine (PRISTIQ) 100 MG 24 hr tablet Take 100 mg by mouth daily  2/26/2023 at hs     desvenlafaxine (PRISTIQ) 50 MG 24 hr tablet TAKE 1 TABLET BY MOUTH ONCE DAILY. TAKE WITH 100MG TABLET FOR TOTAL OF 150MG DAILY  2/26/2023 at hs     fluconazole (DIFLUCAN) 150 MG tablet Take 150 mg by mouth daily X3 days for Vaginal yeast infection prevention while on antibiotics  Past Week     fluticasone (FLOVENT HFA) 220 MCG/ACT inhaler Inhale 2 puffs into the lungs daily as needed  Past Month     galcanezumab-gnlm (EMGALITY) 120 MG/ML injection Inject 240 mg Subcutaneous every 30 days  2/23/2023     HYDROmorphone (DILAUDID) 2 MG tablet Take 2 tablets (4 mg) by mouth every 4 hours as needed for moderate to severe pain 2/27/2023: Pt ran out of medication Past Week     ibuprofen (ADVIL/MOTRIN) 200 MG tablet Take 800 mg by mouth every 6 hours as needed  for pain  2/26/2023 at hs     levonorgestrel (MIRENA) 20 MCG/DAY IUD 1 Intra Uterine Device by Intrauterine route       LORazepam (ATIVAN) 1 MG tablet Take 1 mg by mouth 2 times daily 2/27/2023: Pt takes at 8am and 2pm 2/27/2023 at am     Multiple Vitamins-Minerals (MULTIVITAL PO) Take 1 tablet by mouth daily  2/27/2023     omeprazole (PRILOSEC) 40 MG DR capsule Take 1 capsule (40 mg) by mouth daily  2/27/2023 at am     ondansetron (ZOFRAN ODT) 4 MG ODT tab Take 1 tablet (4 mg) by mouth every 8 hours as needed for nausea  2/26/2023     phentermine (ADIPEX-P) 37.5 MG tablet Take 37.5 mg by mouth every morning (before breakfast)  2/27/2023     PROBIOTIC PRODUCT PO Take 1 tablet by mouth At Bedtime  2/27/2023     QUEtiapine (SEROQUEL) 200 MG tablet Take 200 mg by mouth At Bedtime  2/26/2023 at hs     ramelteon (ROZEREM) 8 MG tablet Take 8 mg by mouth At Bedtime  2/26/2023 at hs     senna-docusate (SENOKOT-S/PERICOLACE) 8.6-50 MG tablet Take 1-2 tablets by mouth 2 times daily (Patient taking differently: Take 1-2 tablets by mouth 2 times daily as needed)  Past Week     Suvorexant (BELSOMRA) 20 MG tablet Take 20 mg by mouth At Bedtime 2/27/2023: Pt does not have her supply.  She's aware that this medication is non-formulary and is ok if she does not get it her 2/26/2023 at hs     topiramate (TOPAMAX) 50 MG tablet TAKE 1 TABLET(50 MG) BY MOUTH TWICE DAILY  2/27/2023 at am     traZODone (DESYREL) 150 MG tablet Take 225 mg by mouth At Bedtime  2/26/2023 at hs     UBRELVY 50 MG tablet Take 50 mg by mouth at onset of headache  More than a month     zolpidem (AMBIEN) 5 MG tablet Take 5 mg by mouth nightly as needed for sleep  2/26/2023 at hs       Information source(s): Patient  Method of interview communication: in-person    Summary of Changes to PTA Med List  New: clindamycin, fluconazole, EEliquis  Discontinued: n/a  Changed: n/a    Patient was asked about OTC/herbal products specifically.  PTA med list reflects this.    In  the past week, patient estimated taking medication this percent of the time:  greater than 90%.    Allergies were reviewed, assessed, and updated with the patient.      Patient did not bring any medications to the hospital and can't retrieve from home. No multi-dose medications are available for use during hospital stay.     The information provided in this note is only as accurate as the sources available at the time of the update(s).    Thank you for the opportunity to participate in the care of this patient.    Cathy Lovell RPH  2/27/2023 3:26 PM

## 2023-02-27 NOTE — ED NOTES
Expected Patient Referral to ED  10:38 AM    Referring Clinic/Provider:  Dr. Adan Bell plastic surgery     Reason for referral/Clinical facts:  He did a paniculectomy on her about 1 month ago, and she is now in clinic with significant abd pain.    S/p paniculectomy.  Has hx/o dvt so started on lovenox.  However, now has new dvt in her rlq and has been on eliquis for the last month.  Then this weekend developed seroma and was seen at [facility] ER and radiologist placed a drain in her seroma.  Per patient called today for positive cultures.    He would like to have a new ct scan of her abd to assure no other signs of fluid collection/abscess.  Please try and get cultures off the adela drain for repeat testing, and then also give broad spectrum antibiotics of our choice for likely postoperative infection.    When she is ready for admission please call HIM and he will admit her directly to his service.          Recommendations provided:  Send to ED for further evaluation    Caller was informed that this institution does possess the capabilities and/or resources to provide for patient and should be transferred to our facility.    Discussed that if direct admit is sought and any hurdles are encountered, this ED would be happy to see the patient and evaluate.    Informed caller that recommendations provided are recommendations based only on the facts provided and that they responsible to accept or reject the advice, or to seek a formal in person consultation as needed and that this ED will see/treat patient should they arrive.      Darlin Donovan MD  [facility] EMERGENCY DEPARTMENT  [address] [place] [id number]  [phone]       Dralin Donovan MD  02/27/23 [id number]
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
## 2023-02-27 NOTE — PHARMACY-VANCOMYCIN DOSING SERVICE
Pharmacy Vancomycin Initial Note  Date of Service 2023  Patient's  1985  38 year old, female    Indication: Sepsis and Skin and Soft Tissue Infection    Current estimated CrCl = CrCl cannot be calculated (Patient's most recent lab result is older than the maximum 30 days allowed.).    Creatinine for last 3 days  No results found for requested labs within last 72 hours.    Recent Vancomycin Level(s) for last 3 days  No results found for requested labs within last 72 hours.      Vancomycin IV Administrations (past 72 hours)      No vancomycin orders with administrations in past 72 hours.                Nephrotoxins and other renal medications (From now, onward)    Start     Dose/Rate Route Frequency Ordered Stop    23 1300  vancomycin (VANCOCIN) 1,750 mg in sodium chloride 0.9 % 500 mL intermittent infusion         1,750 mg  over 2 Hours Intravenous ONCE 23 1222            Contrast Orders - past 72 hours (72h ago, onward)    None                    Plan:  1. Give Vancomycin 1750 mg IV once.   2. Please re consult pharmacy if Vancomycin is to continue as inpatient.     RADHA MOYA, RPH

## 2023-02-27 NOTE — NURSING NOTE
Patient here today for panniculectomy follow-up. The patient did have to be seen in the ER in [place] on Saturday due to pain. The patient did have a drain placed but things have not improved. The patient reports not being able to eat or drink much due to level of pain / discomfort. The patient also reports she has to milk drainage tube in order for it to drain. She reports feeling very uncomfortable and has been unable to wear her compression due to the level of bloating and how the drains port sits.     The patient is currently out of pain meds and would like to try alternative to Zofran for nausea as Zofran does not seem to be working.       Rupinder Brantley RN on 2/27/2023 at 9:58 AM

## 2023-02-27 NOTE — Clinical Note
2/27/2023         RE: Marizol Granados  [address] [address]        Dear Colleague,    Thank you for referring your patient, Marizol Granados, to the [facility] [place]. Please see a copy of my visit note below.    Marizol is a 38 years old lady status post [id number]'s panniculectomy.  She had surgery on January 23, 2023.  She is essentially 27 days out from surgery.  Patient had a high Caprini score secondary to previous history of left lower extremity DVT.  Therefore, patient was initiated postoperatively on prophylactic Lovenox for 7 days.  Patient tolerated procedure well.    Approximately 2 weeks after surgery and despite the fact that she was treated prophylactically with Lovenox, patient was found to have a new onset of right lower extremity DVT.  Patient was initiated on oral anticoagulation.    She used to have Andrés drains.  1 of which on the left lower quadrant was removed secondary to early cellulitis around the insertion site.  The second drain on the right lower quadrant remain in place for 3 weeks.    Last week, patient complain of fullness in the lower abdomen and upon evaluation I discovered that the patient did have postoperative seroma.  Patient was then a schedule for insertion of a drain by interventional radiologist, which took place this weekend in the ED at [facility].    Patient returns today for reevaluation.  She complains of diffuse abdominal pain.  She is tolerating her diet and having bowel movement.  Denies any fevers.  CBC performed at [facility] did not disclose any elevated white blood cell count.  However, and according to the patient, there was some evidence of infection on the fluid collection drained.    She does not have any evidence of peritonitis.  However, her abdomen is diffusely tender.    I am concerned that the patient may be developing an infection, at least at the level of the fascia.    Plan: I have  spoken with the ER physician at [facility] and I will send her over there for a repeat CT scan, new CBC, CMP, culture from the drain and admission for pain control and broad-spectrum IV antibiotics.    Patient is in agreement with this plan.    Time spent with the patient 30 minutes.    Adan Bell MD , FACS   Diplomate American Board of Plastic Surgery  Diplomate American Board of Surgery  Adj. Assistant Professor of Surgery  Division of Plastic & Reconstructive Surgery   [facility]  Office: [phone]   2/27/2023 at 11:33 AM          Again, thank you for allowing me to participate in the care of your patient.        Sincerely,        Adan Bell MD

## 2023-02-27 NOTE — PROGRESS NOTES
Marizol is a 38 years old lady status post [id number]'s panniculectomy.  She had surgery on January 23, 2023.  She is essentially 27 days out from surgery.  Patient had a high Caprini score secondary to previous history of left lower extremity DVT.  Therefore, patient was initiated postoperatively on prophylactic Lovenox for 7 days.  Patient tolerated procedure well.    Approximately 2 weeks after surgery and despite the fact that she was treated prophylactically with Lovenox, patient was found to have a new onset of right lower extremity DVT.  Patient was initiated on oral anticoagulation.    She used to have Andrés drains.  1 of which on the left lower quadrant was removed secondary to early cellulitis around the insertion site.  The second drain on the right lower quadrant remain in place for 3 weeks.    Last week, patient complain of fullness in the lower abdomen and upon evaluation I discovered that the patient did have postoperative seroma.  Patient was then a schedule for insertion of a drain by interventional radiologist, which took place this weekend in the ED at [facility].    Patient returns today for reevaluation.  She complains of diffuse abdominal pain.  She is tolerating her diet and having bowel movement.  Denies any fevers.  CBC performed at [facility] did not disclose any elevated white blood cell count.  However, and according to the patient, there was some evidence of infection on the fluid collection drained.    She does not have any evidence of peritonitis.  However, her abdomen is diffusely tender.    I am concerned that the patient may be developing an infection, at least at the level of the fascia.    Plan: I have spoken with the ER physician at [facility] and I will send her over there for a repeat CT scan, new CBC, CMP, culture from the drain and admission for pain control and broad-spectrum IV antibiotics.    Patient is in agreement with this plan.    Time  spent with the patient 30 minutes.    Adan Bell MD , FACS   Diplomate American Board of Plastic Surgery  Diplomate American Board of Surgery  Adj. Assistant Professor of Surgery  Division of Plastic & Reconstructive Surgery   [facility]  Office: [phone]   2/27/2023 at 11:33 AM
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
## 2023-02-28 ENCOUNTER — APPOINTMENT (OUTPATIENT)
Dept: ULTRASOUND IMAGING | Facility: HOSPITAL | Age: 38
End: 2023-02-28
Attending: RADIOLOGY
Payer: COMMERCIAL

## 2023-02-28 PROCEDURE — 250N000013 HC RX MED GY IP 250 OP 250 PS 637: Performed by: PLASTIC SURGERY

## 2023-02-28 PROCEDURE — 250N000013 HC RX MED GY IP 250 OP 250 PS 637

## 2023-02-28 PROCEDURE — 250N000011 HC RX IP 250 OP 636: Performed by: PLASTIC SURGERY

## 2023-02-28 PROCEDURE — 76857 US EXAM PELVIC LIMITED: CPT

## 2023-02-28 PROCEDURE — 120N000001 HC R&B MED SURG/OB

## 2023-02-28 PROCEDURE — 99232 SBSQ HOSP IP/OBS MODERATE 35: CPT | Mod: GC | Performed by: STUDENT IN AN ORGANIZED HEALTH CARE EDUCATION/TRAINING PROGRAM

## 2023-02-28 RX ORDER — PIPERACILLIN SODIUM, TAZOBACTAM SODIUM 3; .375 G/15ML; G/15ML
3.38 INJECTION, POWDER, LYOPHILIZED, FOR SOLUTION INTRAVENOUS ONCE
Status: COMPLETED | OUTPATIENT
Start: 2023-02-28 | End: 2023-02-28

## 2023-02-28 RX ORDER — PIPERACILLIN SODIUM, TAZOBACTAM SODIUM 3; .375 G/15ML; G/15ML
3.38 INJECTION, POWDER, LYOPHILIZED, FOR SOLUTION INTRAVENOUS EVERY 8 HOURS
Status: DISCONTINUED | OUTPATIENT
Start: 2023-03-01 | End: 2023-03-01 | Stop reason: HOSPADM

## 2023-02-28 RX ADMIN — HYDROMORPHONE HYDROCHLORIDE 4 MG: 2 TABLET ORAL at 07:28

## 2023-02-28 RX ADMIN — SENNOSIDES AND DOCUSATE SODIUM 1 TABLET: 50; 8.6 TABLET ORAL at 20:14

## 2023-02-28 RX ADMIN — BUSPIRONE HYDROCHLORIDE 22.5 MG: 7.5 TABLET ORAL at 08:56

## 2023-02-28 RX ADMIN — APIXABAN 5 MG: 5 TABLET, FILM COATED ORAL at 08:58

## 2023-02-28 RX ADMIN — Medication 50 MCG: at 00:40

## 2023-02-28 RX ADMIN — PIPERACILLIN AND TAZOBACTAM 3.38 G: 3; .375 INJECTION, POWDER, LYOPHILIZED, FOR SOLUTION INTRAVENOUS at 18:39

## 2023-02-28 RX ADMIN — DESVENLAFAXINE SUCCINATE 50 MG: 50 TABLET, EXTENDED RELEASE ORAL at 21:16

## 2023-02-28 RX ADMIN — Medication 50 MCG: at 21:21

## 2023-02-28 RX ADMIN — RAMELTEON 8 MG: 8 TABLET, FILM COATED ORAL at 21:21

## 2023-02-28 RX ADMIN — HYDROMORPHONE HYDROCHLORIDE 4 MG: 2 TABLET ORAL at 12:15

## 2023-02-28 RX ADMIN — CALCIUM POLYCARBOPHIL 625 MG: 625 TABLET, FILM COATED ORAL at 21:22

## 2023-02-28 RX ADMIN — CALCIUM POLYCARBOPHIL 625 MG: 625 TABLET, FILM COATED ORAL at 00:41

## 2023-02-28 RX ADMIN — TOPIRAMATE 50 MG: 25 TABLET, FILM COATED ORAL at 00:31

## 2023-02-28 RX ADMIN — DESVENLAFAXINE SUCCINATE 100 MG: 50 TABLET, EXTENDED RELEASE ORAL at 00:42

## 2023-02-28 RX ADMIN — ACETAMINOPHEN 975 MG: 325 TABLET ORAL at 03:51

## 2023-02-28 RX ADMIN — CIPROFLOXACIN 400 MG: 2 INJECTION, SOLUTION INTRAVENOUS at 01:04

## 2023-02-28 RX ADMIN — QUETIAPINE FUMARATE 200 MG: 100 TABLET ORAL at 00:38

## 2023-02-28 RX ADMIN — RAMELTEON 8 MG: 8 TABLET, FILM COATED ORAL at 00:41

## 2023-02-28 RX ADMIN — APIXABAN 5 MG: 5 TABLET, FILM COATED ORAL at 21:09

## 2023-02-28 RX ADMIN — BUSPIRONE HYDROCHLORIDE 22.5 MG: 7.5 TABLET ORAL at 21:10

## 2023-02-28 RX ADMIN — OMEPRAZOLE 40 MG: 20 CAPSULE, DELAYED RELEASE ORAL at 08:58

## 2023-02-28 RX ADMIN — TOPIRAMATE 50 MG: 25 TABLET, FILM COATED ORAL at 08:59

## 2023-02-28 RX ADMIN — QUETIAPINE FUMARATE 200 MG: 100 TABLET ORAL at 21:13

## 2023-02-28 RX ADMIN — HYDROMORPHONE HYDROCHLORIDE 4 MG: 2 TABLET ORAL at 21:48

## 2023-02-28 RX ADMIN — TRAZODONE HYDROCHLORIDE 225 MG: 50 TABLET ORAL at 00:34

## 2023-02-28 RX ADMIN — TOPIRAMATE 50 MG: 25 TABLET, FILM COATED ORAL at 21:18

## 2023-02-28 RX ADMIN — CIPROFLOXACIN 400 MG: 2 INJECTION, SOLUTION INTRAVENOUS at 12:20

## 2023-02-28 RX ADMIN — DESVENLAFAXINE SUCCINATE 50 MG: 50 TABLET, EXTENDED RELEASE ORAL at 00:42

## 2023-02-28 RX ADMIN — ACETAMINOPHEN 975 MG: 325 TABLET ORAL at 20:14

## 2023-02-28 RX ADMIN — BUSPIRONE HYDROCHLORIDE 22.5 MG: 7.5 TABLET ORAL at 00:36

## 2023-02-28 RX ADMIN — HYDROMORPHONE HYDROCHLORIDE 4 MG: 2 TABLET ORAL at 16:31

## 2023-02-28 RX ADMIN — LORAZEPAM 1 MG: 0.5 TABLET ORAL at 14:14

## 2023-02-28 RX ADMIN — DESVENLAFAXINE SUCCINATE 100 MG: 50 TABLET, EXTENDED RELEASE ORAL at 21:16

## 2023-02-28 RX ADMIN — ACETAMINOPHEN 975 MG: 325 TABLET ORAL at 12:14

## 2023-02-28 RX ADMIN — SENNOSIDES AND DOCUSATE SODIUM 1 TABLET: 50; 8.6 TABLET ORAL at 07:29

## 2023-02-28 RX ADMIN — ONDANSETRON 4 MG: 2 INJECTION INTRAMUSCULAR; INTRAVENOUS at 12:04

## 2023-02-28 RX ADMIN — LORAZEPAM 1 MG: 0.5 TABLET ORAL at 07:29

## 2023-02-28 RX ADMIN — TRAZODONE HYDROCHLORIDE 225 MG: 50 TABLET ORAL at 21:19

## 2023-02-28 ASSESSMENT — ACTIVITIES OF DAILY LIVING (ADL)
ADLS_ACUITY_SCORE: 22
TOILETING_ISSUES: NO
WALKING_OR_CLIMBING_STAIRS_DIFFICULTY: NO
ADLS_ACUITY_SCORE: 22
CONCENTRATING,_REMEMBERING_OR_MAKING_DECISIONS_DIFFICULTY: NO
DOING_ERRANDS_INDEPENDENTLY_DIFFICULTY: NO
ADLS_ACUITY_SCORE: 22
DIFFICULTY_EATING/SWALLOWING: NO
ADLS_ACUITY_SCORE: 22
FALL_HISTORY_WITHIN_LAST_SIX_MONTHS: NO
ADLS_ACUITY_SCORE: 37
ADLS_ACUITY_SCORE: 22
DRESSING/BATHING_DIFFICULTY: NO
VISION_MANAGEMENT: GLASSES
ADLS_ACUITY_SCORE: 22
NUMBER_OF_TIMES_PATIENT_HAS_FALLEN_WITHIN_LAST_SIX_MONTHS: 0
CHANGE_IN_FUNCTIONAL_STATUS_SINCE_ONSET_OF_CURRENT_ILLNESS/INJURY: NO
ADLS_ACUITY_SCORE: 22
WEAR_GLASSES_OR_BLIND: YES

## 2023-02-28 NOTE — PROGRESS NOTES
[facility] Hospitalist service was called for consult regarding medical management of Ms. Granados. I contacted the [facility] and patient's RN regarding [place] hospitalist team's request to be consulted for general medicine assistance (please see progress note by Dr. Cathy Hagan on 2/27/23). RN is to call them for consult. We will sign off. Thank you.
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
## 2023-02-28 NOTE — ED NOTES
Spoke with the surgeon on the phone--aware of the vanco--placed on her allergy list. Admission orders released.

## 2023-02-28 NOTE — PLAN OF CARE
No fever. Pain managed with scheduled tylenol and PRN oral dilaudid. Had light breakfast- denied nausea. Drain to bulb suction.  Going to ultrasound at 1pm. Napped this am. Up in room with stand by assist, calls appropriately. Falls prevention plan in place. Era Sullivan RN    Problem: Infection  Goal: Absence of Infection Signs and Symptoms  Outcome: Progressing     Problem: Pain Acute  Goal: Optimal Pain Control and Function  Outcome: Progressing     Problem: Plan of Care - These are the overarching goals to be used throughout the patient stay.    Goal: Absence of Hospital-Acquired Illness or Injury  Intervention: Identify and Manage Fall Risk  Recent Flowsheet Documentation  Taken 2/28/2023 0800 by Era Sullivan, RN  Safety Promotion/Fall Prevention:   fall prevention program maintained   nonskid shoes/slippers when out of bed

## 2023-02-28 NOTE — PROGRESS NOTES
Reordered extensive list of home meds. If needing assistance from general medicine team, please place consult to [place] hospitalist team.    Cathy Hagan MD  [facility], PGY-2  Contact via [employer] or pager #: [phone].

## 2023-02-28 NOTE — PLAN OF CARE
Problem: Plan of Care - These are the overarching goals to be used throughout the patient stay.    Goal: Optimal Comfort and Wellbeing  Intervention: Monitor Pain and Promote Comfort  Recent Flowsheet Documentation  Taken 2/28/2023 0436 by Nanette Veras RN  Pain Management Interventions: medication (see MAR)  Taken 2/27/2023 2101 by Nanette Veras RN  Pain Management Interventions: medication (see MAR)     Problem: Pain Acute  Goal: Optimal Pain Control and Function  Intervention: Prevent or Manage Pain  Recent Flowsheet Documentation  Taken 2/28/2023 0318 by Nanette Veras RN  Medication Review/Management: medications reviewed  Taken 2/27/2023 2030 by Nanette Veras RN  Medication Review/Management: medications reviewed   Goal Outcome Evaluation:         Pt complained of 8/10 pain, managed with PO and IV dilaudid and scheduled tylenol. Vitally stable. IV ABX continued, pt tolerated well.

## 2023-03-01 VITALS
RESPIRATION RATE: 18 BRPM | OXYGEN SATURATION: 98 % | BODY MASS INDEX: 29.12 KG/M2 | WEIGHT: 175 LBS | TEMPERATURE: 97.7 F | DIASTOLIC BLOOD PRESSURE: 55 MMHG | HEART RATE: 70 BPM | SYSTOLIC BLOOD PRESSURE: 94 MMHG

## 2023-03-01 LAB — BACTERIA UR CULT: NORMAL

## 2023-03-01 PROCEDURE — 250N000013 HC RX MED GY IP 250 OP 250 PS 637: Performed by: PLASTIC SURGERY

## 2023-03-01 PROCEDURE — 250N000011 HC RX IP 250 OP 636: Performed by: PLASTIC SURGERY

## 2023-03-01 PROCEDURE — 250N000013 HC RX MED GY IP 250 OP 250 PS 637

## 2023-03-01 PROCEDURE — 99238 HOSP IP/OBS DSCHRG MGMT 30/<: CPT | Mod: GC | Performed by: STUDENT IN AN ORGANIZED HEALTH CARE EDUCATION/TRAINING PROGRAM

## 2023-03-01 RX ORDER — CLINDAMYCIN HCL 300 MG
300 CAPSULE ORAL 3 TIMES DAILY
Qty: 21 CAPSULE | Refills: 0 | Status: SHIPPED | OUTPATIENT
Start: 2023-03-01 | End: 2024-05-02

## 2023-03-01 RX ORDER — CLINDAMYCIN HCL 300 MG
300 CAPSULE ORAL 3 TIMES DAILY
Qty: 21 CAPSULE | Refills: 0 | Status: SHIPPED | OUTPATIENT
Start: 2023-03-01 | End: 2023-03-01

## 2023-03-01 RX ADMIN — PIPERACILLIN AND TAZOBACTAM 3.38 G: 3; .375 INJECTION, POWDER, LYOPHILIZED, FOR SOLUTION INTRAVENOUS at 00:55

## 2023-03-01 RX ADMIN — BUSPIRONE HYDROCHLORIDE 22.5 MG: 7.5 TABLET ORAL at 09:07

## 2023-03-01 RX ADMIN — ACETAMINOPHEN 975 MG: 325 TABLET ORAL at 04:17

## 2023-03-01 RX ADMIN — TOPIRAMATE 50 MG: 25 TABLET, FILM COATED ORAL at 09:06

## 2023-03-01 RX ADMIN — APIXABAN 5 MG: 5 TABLET, FILM COATED ORAL at 09:06

## 2023-03-01 RX ADMIN — PIPERACILLIN AND TAZOBACTAM 3.38 G: 3; .375 INJECTION, POWDER, LYOPHILIZED, FOR SOLUTION INTRAVENOUS at 08:13

## 2023-03-01 RX ADMIN — HYDROMORPHONE HYDROCHLORIDE 4 MG: 2 TABLET ORAL at 04:40

## 2023-03-01 RX ADMIN — LORAZEPAM 1 MG: 0.5 TABLET ORAL at 08:13

## 2023-03-01 RX ADMIN — IBUPROFEN 800 MG: 800 TABLET ORAL at 12:29

## 2023-03-01 RX ADMIN — HYDROMORPHONE HYDROCHLORIDE 4 MG: 2 TABLET ORAL at 09:05

## 2023-03-01 RX ADMIN — SENNOSIDES AND DOCUSATE SODIUM 1 TABLET: 50; 8.6 TABLET ORAL at 08:13

## 2023-03-01 RX ADMIN — POLYETHYLENE GLYCOL 3350 17 G: 17 POWDER, FOR SOLUTION ORAL at 08:13

## 2023-03-01 RX ADMIN — OMEPRAZOLE 40 MG: 20 CAPSULE, DELAYED RELEASE ORAL at 09:06

## 2023-03-01 ASSESSMENT — ACTIVITIES OF DAILY LIVING (ADL)
ADLS_ACUITY_SCORE: 22

## 2023-03-01 NOTE — PLAN OF CARE
Problem: Pain Acute  Goal: Optimal Pain Control and Function  Outcome: Progressing  Intervention: Develop Pain Management Plan  Recent Flowsheet Documentation  Taken 3/1/2023 0017 by Kaushik Bautista RN  Pain Management Interventions: emotional support  Intervention: Prevent or Manage Pain  Recent Flowsheet Documentation  Taken 3/1/2023 0420 by Kaushik Bautista RN  Medication Review/Management: medications reviewed  Taken 3/1/2023 0017 by Kaushik Bautista RN  Medication Review/Management: medications reviewed   Vital signs are stable. Received scheduled IV antibiotics, Zosyn. Independent in the room. AQUILINO in place, no output during the night. PRN dilaudid given around 0500 a.m.

## 2023-03-01 NOTE — PLAN OF CARE
Problem: Pain Acute  Goal: Optimal Pain Control and Function  Outcome: Progressing     Problem: Nausea and Vomiting  Goal: Nausea and Vomiting Relief  Outcome: Progressing     Problem: Infection  Goal: Absence of Infection Signs and Symptoms  Outcome: Progressing     Problem: Plan of Care - These are the overarching goals to be used throughout the patient stay.    Goal: Absence of Hospital-Acquired Illness or Injury  Intervention: Prevent Skin Injury  Recent Flowsheet Documentation  Taken 2/28/2023 1600 by Mary Batres RN  Body Position: position changed independently   Goal Outcome Evaluation:       Pt is alert and oriented, uses call light appropriately.  Pt is up independently in room. Pt's AQUILINO drainage this shift was 40 ml.  Dr. Bell saw pt this evening, and will discharge pt tomorrow.  Pt did not order food in time, so she ate turkey sandwich and lemon water ice for dinner.  Dilaudid is somewhat helpful for pt's pain but pain is still present.  Pt takes pills without incident.  Pt's lives near [place], and family will need to drive into town to pick her up.  Pt will speak with father to see when he could drive into town to pick her up for discharge.

## 2023-03-01 NOTE — PROGRESS NOTES
Care Management Chart Review    Length of Stay (days): 2    Expected Discharge Date: 03/01/2023    Concerns to be Addressed:   RN CM watching plan for antibiotics (Concern for infected seroma s/p panniculectomy); currently receiving IV Zosyn. Plastic surgery following.   Patient plan of care discussed at interdisciplinary rounds: Yes    Anticipated Discharge Disposition:  Home.     Anticipated Discharge Services:  To be determined.   Anticipated Discharge DME:  None anticipated.     Patient/family educated on Medicare website which has current facility and service quality ratings:   NA  Education Provided on the Discharge Plan:   Per team  Patient/Family in Agreement with the Plan:   Yes    Referrals Placed by CM/SW:   None  Private pay costs discussed: Not applicable     Additional Information:  Per chart review, patient lives in [facility] and is independent at baseline. Per MALDONADO CM comment, parents are involved/supportive.  CM will follow plan for IV antibiotics.     Hansa Pabon RN

## 2023-03-01 NOTE — PROGRESS NOTES
Care Management Discharge Note    Discharge Date: 03/01/2023     Discharge Disposition:  Home    Discharge Services:  None    Discharge DME:  [facility]    Discharge Transportation:  Family    Private pay costs discussed: Not applicable    PAS Confirmation Code:  NA  Patient/family educated on Medicare website which has current facility and service quality ratings:  NA    Education Provided on the Discharge Plan:  NA  Persons Notified of Discharge Plans: Nursing;   Patient/Family in Agreement with the Plan:  Yes    Handoff Referral Completed: No    Additional Information:  Discharge orders noted. No care management needs identified.      Hansa Pabon RN

## 2023-03-01 NOTE — PROGRESS NOTES
No events in the last 24 hours.  Patient actually is feeling better.  Abdominal pain has greatly improved.    At the physical exam, abdomen is soft, nontender with no peritonitis.  Drain is draining serous fluid.  No evidence of purulence.    Assessment/plan:    38 years old lady status post csmrj-wv-jea's panniculectomy, postoperative day #29.    So far, no clear evidence of surgical site infection.  White blood cell count is normal and preliminary cultures from drain has been negative for bacterial growth.    Patient is empirically treated with IV levofloxacin and Zosyn.    I appreciate consultation from hospitalist service as well as interventional radiologist.  No further interventions as far as new drain placement were necessary.    Patient to be discharged home in a.m.      Adan Bell MD , FACS   Diplomate American Board of Plastic Surgery  Diplomate American Board of Surgery  Adj. Assistant Professor of Surgery  Division of Plastic & Reconstructive Surgery   [facility]  Office: [phone]   2/28/2023 at 10:39 PM

## 2023-03-01 NOTE — PLAN OF CARE
Goal Outcome Evaluation:           Problem: Pain Acute  Goal: Optimal Pain Control and Function  Outcome: Met  Intervention: Prevent or Manage Pain  Recent Flowsheet Documentation  Taken 3/1/2023 8044 by Emily Jenkins RN  Medication Review/Management: medications reviewed       Patient reported having 7/10 abdominal pain. PRN dilaudid given and it was effective. Taught patient on care of AQUILINO drain and dressing. Patient was able to teach back information. She was discharged at 1500 and escorted home by her father.

## 2023-03-01 NOTE — PROGRESS NOTES
[facility]    Progress Note - Hospitalist Service       Date of Admission:  2/27/2023    Assessment & Plan   Marizol Granados is a 38 year old female admitted on 2/27/2023. She has a history of major depressive disorder, generalized anxiety disorder, hypothyroidism, status post gastric bypass and panniculectomy, recent right lower extremity DVT on Eliquis.  She was admitted to the hospital for concern of infected seroma status post panniculectomy 4 weeks ago.  Medicine team has been consulted for management of multiple medical issues.     OK to discharge from medicine perspective. Resume prior home medications on discharge - placed into discharge navigator this morning. Antibiotic plan per Surgery.    Concern for infected seroma s/p panniculectomy (1/23/23)  Presented to [facility] with diffuse abdominal pain found to have postoperative seroma.  Had a subcutaneous drain placed by IR in [place] on 2/25/2023.  Admitted for repeat CT scan, labs, drain cultures and antibiotics per plastic surgery  -Postoperative care as per plastic surgery  -Antibiotics per plastic surgery              Currently on Levo/Zosyn      Does have history of MRSA in chart (2013) but noted IPC reviewed 2 negative MRSA swabs on 10/3/12 and 3/4/13. No isolation required upon readmission..  -Scheduled for US/aspiration/replacement of drain this afternoon              Discussed with IR physician yesterday, drain appears well-positioned and adequately draining fluid.  No fluid was aspirated.  Drain is left in place.  Would benefit from a 3 to 5 cc saline flushes every 8 hours to keep tubing patent.  -Regular diet per surgery     RLE DVT s/p panniculectomy  Was treated prophylactivally with Lovenox for 7 days post op due to high caprini score 2/2 to previous history of LLE DVT. Now anticoagulated with Eliquis  - Continue Eliquis  - OOB/AAT     Chronic Conditions  MDD/SHIVAM: Continue Buspar, Desvenlafaxine,  Lorazepam  GERD: Continue PPI  Insomnia: Continue Ramelteon, Ambien, Seroquel, Trazodone  S/P gastric bypass: Continue Phentermine, topiramate. Hold suvorexant     Diet: Regular Diet Adult    DVT Prophylaxis: DOAC  Cat Catheter: Not present  Fluids: PO  Lines: None     Cardiac Monitoring: None  Code Status: Full Code      Clinically Significant Risk Factors                                 Disposition Plan     Expected Discharge Date: 03/01/2023                  Charity Johnson MD  Hospitalist Service  [facility]  Securely message with [employer] (more info)  Text page via [employer] Paging/Directory   ______________________________________________________________________    Interval History   - NAEON  - Pain improved  - AQUILINO serous  - No other concerns  - Hoping to go home today    Physical Exam   Vital Signs: Temp: 97.7  F (36.5  C) Temp src: Oral BP: 94/55 Pulse: 70   Resp: 18 SpO2: 98 % O2 Device: None (Room air)    Weight: 175 lbs 0 oz    Constitutional: awake, alert, cooperative, no apparent distress, and appears stated age  Eyes: lids and lashes normal and extra-ocular muscles intact  ENT: normocepalic, without obvious abnormality  Respiratory: No increased work of breathing, good air exchange, clear to auscultation bilaterally, no crackles or wheezing  Cardiovascular: regular rate and rhythm and normal S1 and S2  GI: soft, nondistended, nontender post op dressings in place. AQUILINO serous fluid  Musculoskeletal: no lower extremity pitting edema present  full range of motion noted  Neuropsychiatric: General: normal, calm and normal eye contact       Data   ------------------------- PAST 24 HR DATA REVIEWED -----------------------------------------------         Imaging results reviewed over the past 24 hrs:   Recent Results (from the past 24 hour(s))   US Pelvic Limited    Narrative    EXAM: US PELVIC LIMITED  LOCATION: [facility]  DATE/TIME: 2/28/2023 2:02 PM    INDICATION:  persistent fluid colletion  COMPARISON: None.  TECHNIQUE: Transabdominal scans were performed. Endovaginal ultrasound was performed to better visualize the adnexa.      Impression    FINDINGS/IMPRESSION:    A drain is identified within the subcutaneous tissues in the pelvis. Very minimal fluid is identified. Mild subcutaneous edema is identified.

## 2023-03-01 NOTE — DISCHARGE SUMMARY
[facility]  Discharge Summary - Medicine & Pediatrics       Date of Admission:  2/27/2023  Date of Discharge:  3/1/2023  Discharging Provider: Charity Johnson MD   Discharge Service: Hospitalist Service    Discharge Diagnoses   Principal Problem:    Postoperative infection, unspecified type, initial encounter (2/27/2023)      Follow-ups Needed After Discharge   Follow-up Appointments     Follow-up and recommended labs and tests       Follow up with primary care provider, Micaela Pierson, within 7 days for   hospital follow- up.  No follow up labs or test are needed.    Follow up with Dr. Bell on March 9 at 4pm               Unresulted Labs Ordered in the Past 30 Days of this Admission     Date and Time Order Name Status Description    2/27/2023 12:07 PM Blood Culture Peripheral Blood Preliminary     2/27/2023 12:07 PM Blood Culture Peripheral Blood Preliminary     2/27/2023 11:59 AM Body fluid, unsp Aerobic Bacterial Culture Routine with Gram Stain Preliminary       These results will be followed up by surgeon and/or PCP    Discharge Disposition   Discharged to home  Condition at discharge: Stable    Hospital Course   Marizol Granados was admitted on 2/27/2023 for concern of infected seroma status post panniculectomy 4 weeks ago. .  The following problems were addressed during her hospitalization:    Concern for infected seroma s/p panniculectomy (1/23/23)  Presented to [facility] with diffuse abdominal pain found to have postoperative seroma.  Had a subcutaneous drain placed by IR in [place] on 2/25/2023. Underwent US on 2/28 to eval drainable fluid collection. AQUILINO drain appeared well positioned and adequately drained collection.    Radiology recommended 3-5 ml saline flushes q8 hours to keep drain patent.              Received Cipro inpatient then transitioned to Levo/Zosyn --> transition to clindamycin 300 TID x 7 days per plastic surgeon.      Plan to follow up with surgeon on  March 9, at 4pm   Continue abdominal binder     Consultations This Hospital Stay   PHARMACY TO DOSE VANCO  PHARMACY TO DOSE VANCO  HOSPITALIST IP CONSULT  INTERVENTIONAL RADIOLOGY ADULT/PEDS IP CONSULT    Code Status   Full Code       The patient was discussed with Dr. Reinaldo Johnson MD  [address] [address]  Phone: [phone]  Fax: [phone]  ______________________________________________________________________    Physical Exam   Vital Signs: Temp: 97.7  F (36.5  C) Temp src: Oral BP: 94/55 Pulse: 70   Resp: 18 SpO2: 98 % O2 Device: None (Room air)    Weight: 175 lbs 0 oz  Constitutional: awake, alert, cooperative, no apparent distress, and appears stated age  Eyes: lids and lashes normal and extra-ocular muscles intact  ENT: normocepalic, without obvious abnormality  Respiratory: No increased work of breathing, good air exchange, clear to auscultation bilaterally, no crackles or wheezing  Cardiovascular: regular rate and rhythm and normal S1 and S2  GI: soft, nondistended, nontender post op dressings in place. AQUILINO serous fluid  Musculoskeletal: no lower extremity pitting edema present  full range of motion noted  Neuropsychiatric: General: normal, calm and normal eye contact         Primary Care Physician   Micaela Pierson    Discharge Orders      Reason for your hospital stay    You were hospitalized for concern of a post operative infection of fluid collection beneath the surgical incision. The drain is working well to drain the fluid. Continue antibiotics for the next 7 days.     Follow-up and recommended labs and tests     Follow up with primary care provider, Micaela Pierson, within 7 days for hospital follow- up.  No follow up labs or test are needed.    Follow up with Dr. Bell on March 9 at 4pm     Activity    Your activity upon discharge: activity as tolerated     Tubes and drains    You are going  home with the following tubes or drains: ADELA.  Tube cares per hospital or home care instructions     Diet    Follow this diet upon discharge: Orders Placed This Encounter      Regular Diet Adult       Significant Results and Procedures   Most Recent 3 CBC's:Recent Labs   Lab Test 02/27/23  1104   WBC 6.6   HGB 11.8   MCV 93        Most Recent 3 BMP's:Recent Labs   Lab Test 02/27/23  1104 01/24/23  0600     --    POTASSIUM 4.1  --    CHLORIDE 104  --    CO2 20*  --    BUN 15.7  --    CR 0.78  --    ANIONGAP 14  --    LORY 9.1  --    GLC 98 147*   ,   Results for orders placed or performed during the hospital encounter of 02/27/23   CT Abdomen Pelvis w Contrast    Narrative    EXAM: CT ABDOMEN PELVIS W CONTRAST  LOCATION: [facility]  DATE/TIME: 2/27/2023 2:42 PM    INDICATION: s p paniculectomy on 2 25 23 and then seen at [facility] for seroma that they placed adela drain.  ? poor drainage from adela drain and worsened pain.  incidentally has dvt r leg since surgery  COMPARISON: None.  TECHNIQUE: CT scan of the abdomen and pelvis was performed following injection of IV contrast. Multiplanar reformats were obtained. Dose reduction techniques were used.  CONTRAST: IsoVue 370 100mL    FINDINGS:   LOWER CHEST: Normal.    HEPATOBILIARY: Cholecystectomy. Liver and bile ducts are unremarkable.    PANCREAS: Normal.    SPLEEN: Normal.    ADRENAL GLANDS: Normal.    KIDNEYS/BLADDER: Normal.    BOWEL: Post surgical changes from gastric bypass. No acute inflammation or obstruction.    LYMPH NODES: Normal.    VASCULATURE: Unremarkable.    PELVIC ORGANS: IUD in situ. Ovaries unremarkable.    MUSCULOSKELETAL: Post surgical changes in the anterior abdominal wall from a colectomy. There is a thin superficial subcutaneous fluid collection measuring 11.5 x 1.5 x 7.8 cm (3/178). Percutaneous pigtail drainage catheter terminates along the left   superolateral aspect of the collection. Mild surrounding  ill-defined fat stranding. No other fluid collections.      Impression    IMPRESSION:   Postsurgical changes from panniculectomy with a simple subcutaneous fluid collection in the anterior abdominal wall. The drainage catheter terminates along the left superolateral aspect of the collection, which could account for the reported impaired   drainage.   US Pelvic Limited    Narrative    EXAM: US PELVIC LIMITED  LOCATION: [facility]  DATE/TIME: 2/28/2023 2:02 PM    INDICATION: persistent fluid colletion  COMPARISON: None.  TECHNIQUE: Transabdominal scans were performed. Endovaginal ultrasound was performed to better visualize the adnexa.      Impression    FINDINGS/IMPRESSION:    A drain is identified within the subcutaneous tissues in the pelvis. Very minimal fluid is identified. Mild subcutaneous edema is identified.                  Discharge Medications   Current Discharge Medication List      START taking these medications    Details   !! clindamycin (CLEOCIN) 300 MG capsule Take 1 capsule (300 mg) by mouth 3 times daily for 7 days  Qty: 21 capsule, Refills: 0    Associated Diagnoses: Postoperative seroma of subcutaneous tissue after non-dermatologic procedure       !! - Potential duplicate medications found. Please discuss with provider.      CONTINUE these medications which have CHANGED    Details   apixaban ANTICOAGULANT (ELIQUIS) 5 MG tablet Take 1 tablet (5 mg) by mouth 2 times daily  Qty: 30 tablet, Refills: 0    Associated Diagnoses: History of thromboembolism         CONTINUE these medications which have NOT CHANGED    Details   busPIRone (BUSPAR) 15 MG tablet Take 22.5 mg by mouth 2 times daily      calcium carbonate-vitamin D (OS-LORY WITH D) 500-200 MG-UNIT tablet Take 2 tablets by mouth daily      Calcium Polycarbophil (FIBER) 625 MG tablet Take 1 tablet by mouth daily      cholecalciferol 25 MCG (1000 UT) TABS Take 2,000 Units by mouth daily      !! clindamycin (CLEOCIN) 150 MG  capsule Take 150 mg by mouth 3 times daily X 5 days      !! desvenlafaxine (PRISTIQ) 100 MG 24 hr tablet Take 100 mg by mouth daily      !! desvenlafaxine (PRISTIQ) 50 MG 24 hr tablet TAKE 1 TABLET BY MOUTH ONCE DAILY. TAKE WITH 100MG TABLET FOR TOTAL OF 150MG DAILY      fluconazole (DIFLUCAN) 150 MG tablet Take 150 mg by mouth daily X3 days for Vaginal yeast infection prevention while on antibiotics      fluticasone (FLOVENT HFA) 220 MCG/ACT inhaler Inhale 2 puffs into the lungs daily as needed      galcanezumab-gnlm (EMGALITY) 120 MG/ML injection Inject 240 mg Subcutaneous every 30 days      ibuprofen (ADVIL/MOTRIN) 200 MG tablet Take 800 mg by mouth every 6 hours as needed for pain      levonorgestrel (MIRENA) 20 MCG/DAY IUD 1 Intra Uterine Device by Intrauterine route      LORazepam (ATIVAN) 1 MG tablet Take 1 mg by mouth 2 times daily      Multiple Vitamins-Minerals (MULTIVITAL PO) Take 1 tablet by mouth daily      omeprazole (PRILOSEC) 40 MG DR capsule Take 1 capsule (40 mg) by mouth daily  Qty: 90 capsule, Refills: 3    Associated Diagnoses: Epigastric discomfort; S/P gastric bypass      ondansetron (ZOFRAN ODT) 4 MG ODT tab Take 1 tablet (4 mg) by mouth every 8 hours as needed for nausea  Qty: 12 tablet, Refills: 0    Associated Diagnoses: S/P panniculectomy      phentermine (ADIPEX-P) 37.5 MG tablet Take 37.5 mg by mouth every morning (before breakfast)      PROBIOTIC PRODUCT PO Take 1 tablet by mouth At Bedtime      QUEtiapine (SEROQUEL) 200 MG tablet Take 200 mg by mouth At Bedtime      ramelteon (ROZEREM) 8 MG tablet Take 8 mg by mouth At Bedtime      Suvorexant (BELSOMRA) 20 MG tablet Take 20 mg by mouth At Bedtime      topiramate (TOPAMAX) 50 MG tablet TAKE 1 TABLET(50 MG) BY MOUTH TWICE DAILY  Qty: 180 tablet, Refills: 3    Associated Diagnoses: Intractable migraine without aura and with status migrainosus      traZODone (DESYREL) 150 MG tablet Take 225 mg by mouth At Bedtime      UBRELVY 50 MG tablet  Take 50 mg by mouth at onset of headache      zolpidem (AMBIEN) 5 MG tablet Take 5 mg by mouth nightly as needed for sleep       !! - Potential duplicate medications found. Please discuss with provider.      STOP taking these medications       HYDROmorphone (DILAUDID) 2 MG tablet Comments:   Reason for Stopping:         senna-docusate (SENOKOT-S/PERICOLACE) 8.6-50 MG tablet Comments:   Reason for Stopping:             Allergies   Allergies   Allergen Reactions     Bactrim [Sulfamethoxazole W-Trimethoprim] Anaphylaxis     Mesalamine Anaphylaxis and Hives     Other Environmental Allergy Hives, Other (See Comments) and Shortness Of Breath     [place]/Pollen  oak     Sulfa Drugs Anaphylaxis     Asacol [Mesalamine] Hives     Vancomycin Itching     Amoxicillin Other (See Comments)     Other reaction(s): Unknown/Not Verified  Gets UTI  Urinary sx's.       Molds & Smuts Other (See Comments)     Other reaction(s): Runny Nose, Unknown/Not Verified    Pollen

## 2023-03-03 ENCOUNTER — PATIENT OUTREACH (OUTPATIENT)
Dept: CARE COORDINATION | Facility: CLINIC | Age: 38
End: 2023-03-03
Payer: COMMERCIAL

## 2023-03-03 NOTE — PROGRESS NOTES
[facility] Resource Center Contact  [facility]/Voicemail     Clinical Data: Transitional Care Management Outreach     Outreach attempted x 2.  Left message on patient's voicemail, providing [facility]'s 24/7 scheduling and nurse triage phone number [phone] ([phone]) for questions/concerns and/or to schedule an appt with an [facility] provider, if they do not have a PCP.      Plan:  [facility] will do no further outreaches at this time.       Lay Ramirez  Community Health Worker  [facility], [facility]  [phone]      *Connected Care Resource Team does NOT follow patient ongoing. Referrals are identified based on internal discharge reports and the outreach is to ensure patient has an understanding of their discharge instructions.

## 2023-03-04 LAB
BACTERIA BLD CULT: NO GROWTH
BACTERIA BLD CULT: NO GROWTH

## 2023-03-05 LAB
BACTERIA FLD CULT: ABNORMAL
GRAM STAIN RESULT: ABNORMAL
GRAM STAIN RESULT: ABNORMAL

## 2023-03-10 ENCOUNTER — OFFICE VISIT (OUTPATIENT)
Dept: PLASTIC SURGERY | Facility: AMBULATORY SURGERY CENTER | Age: 38
End: 2023-03-10
Payer: COMMERCIAL

## 2023-03-10 DIAGNOSIS — Z98.890 STATUS POST PANNICULECTOMY: Primary | ICD-10-CM

## 2023-03-10 PROCEDURE — 99024 POSTOP FOLLOW-UP VISIT: CPT | Performed by: PLASTIC SURGERY

## 2023-03-10 NOTE — PROGRESS NOTES
Marizol is a 38 years old lady with history of [id number]-[id number]'s panniculectomy.  She did develop a postoperative seroma that was successfully drained by interventional radiology.  Currently she has a pigtail catheter placed by interventional radiology.  This catheter has been in place for the last 2 weeks.  She returns today for reevaluation.    At the physical exam, all incisions are well-healed.  Pigtail catheter in place.  Her output has been more than 100 cc a day.  Therefore the catheter will have to stay.    Plan: Continue with drain until the output is at least 40 to 30 cc a day for 2 consecutive days, continue with abdominal binders and follow-up with me in 1 week.    Patient is stable from plastic surgery standpoint.    Adan eBll MD , FACS   Diplomate American Board of Plastic Surgery  Diplomate American Board of Surgery  Adj. Assistant Professor of Surgery  Division of Plastic & Reconstructive Surgery   [facility]  Office: [phone]   3/10/2023 at 4:38 PM

## 2023-03-10 NOTE — LETTER
3/10/2023         RE: Marizol Granados  [address] [address]        Dear Colleague,    Thank you for referring your patient, Marizol Granados, to the [facility]LAST[facility]IN[facility] [place]. Please see a copy of my visit note below.    Marizol is a 38 years old lady with history of nzqok-en-qwe's panniculectomy.  She did develop a postoperative seroma that was successfully drained by interventional radiology.  Currently she has a pigtail catheter placed by interventional radiology.  This catheter has been in place for the last 2 weeks.  She returns today for reevaluation.    At the physical exam, all incisions are well-healed.  Pigtail catheter in place.  Her output has been more than 100 cc a day.  Therefore the catheter will have to stay.    Plan: Continue with drain until the output is at least 40 to 30 cc a day for 2 consecutive days, continue with abdominal binders and follow-up with me in 1 week.    Patient is stable from plastic surgery standpoint.    Adan Bell MD , FACS   Diplomate American Board of Plastic Surgery  Diplomate American Board of Surgery  Adj. Assistant Professor of Surgery  Division of Plastic & Reconstructive Surgery   [facility]  Office: [phone]   3/10/2023 at 4:38 PM         Again, thank you for allowing me to participate in the care of your patient.        Sincerely,        Adan Bell MD

## 2023-03-17 ENCOUNTER — OFFICE VISIT (OUTPATIENT)
Dept: PLASTIC SURGERY | Facility: AMBULATORY SURGERY CENTER | Age: 38
End: 2023-03-17
Payer: COMMERCIAL

## 2023-03-17 DIAGNOSIS — Z98.890 STATUS POST PANNICULECTOMY: Primary | ICD-10-CM

## 2023-03-17 PROCEDURE — 99024 POSTOP FOLLOW-UP VISIT: CPT | Performed by: PLASTIC SURGERY

## 2023-03-17 NOTE — LETTER
3/17/2023         RE: Marizol Granados  [address] [address]        Dear Colleague,    Thank you for referring your patient, Marizol Granados, to the [facility] PLASTIC SUR[facility]ERY [facility]IN[facility] [place]. Please see a copy of my visit note below.    Marizol is a 38 years old lady status post xuqmp-as-edx panniculectomy.  She is almost 8 weeks out from surgery.  She did develop a postoperative seroma that was drained by IR 3 weeks ago.  Today she returns for reevaluation.  She still have the drain that was placed 3 weeks ago.  She reports that the output has been negligible in the last few days.    At the physical exam, the pigtail catheter has been removed.  There is no evidence of recurrent seroma.  There is no evidence of infection.    Plan: Apply compression garment for the next 3 days without removing it, after the third day patient can take a shower and remove the garment, then reapply it for another 4 days.  With this strategy we will stimulate closing of the seroma cavity and prevent recurrence.  I will see the patient again in 2 weeks.    Adan Bell MD , FACS   Diplomate American Board of Plastic Surgery  Diplomate American Board of Surgery  Adj. Assistant Professor of Surgery  Division of Plastic & Reconstructive Surgery   [facility]  Office: [phone]   3/17/2023 at 1:00 PM         Again, thank you for allowing me to participate in the care of your patient.        Sincerely,        Adan Bell MD

## 2023-03-17 NOTE — NURSING NOTE
Patient here today for post-op follow-up after gabriel mcnamara panniculectomy. The patient is doing very well and is hopeful drain can be removed today. Drainage is minimal as far as daily output. Overall the patient is doing well.     Rupinder Brantley RN on 3/17/2023 at 12:45 PM

## 2023-03-17 NOTE — PROGRESS NOTES
Marizol is a 38 years old lady status post iewpc-ah-akl panniculectomy.  She is almost 8 weeks out from surgery.  She did develop a postoperative seroma that was drained by IR 3 weeks ago.  Today she returns for reevaluation.  She still have the drain that was placed 3 weeks ago.  She reports that the output has been negligible in the last few days.    At the physical exam, the pigtail catheter has been removed.  There is no evidence of recurrent seroma.  There is no evidence of infection.    Plan: Apply compression garment for the next 3 days without removing it, after the third day patient can take a shower and remove the garment, then reapply it for another 4 days.  With this strategy we will stimulate closing of the seroma cavity and prevent recurrence.  I will see the patient again in 2 weeks.    Adan Bell MD , FACS   Diplomate American Board of Plastic Surgery  Diplomate American Board of Surgery  Adj. Assistant Professor of Surgery  Division of Plastic & Reconstructive Surgery   [facility]  Office: [phone]   3/17/2023 at 1:00 PM

## 2023-03-31 ENCOUNTER — OFFICE VISIT (OUTPATIENT)
Dept: PLASTIC SURGERY | Facility: AMBULATORY SURGERY CENTER | Age: 38
End: 2023-03-31
Payer: COMMERCIAL

## 2023-03-31 DIAGNOSIS — Z98.890 STATUS POST PANNICULECTOMY: Primary | ICD-10-CM

## 2023-03-31 PROCEDURE — 99024 POSTOP FOLLOW-UP VISIT: CPT | Performed by: PLASTIC SURGERY

## 2023-03-31 NOTE — LETTER
3/31/2023         RE: Marizol Granados  [address] [address]        Dear Colleague,    Thank you for referring your patient, Marizol Granados, to the [facility] PLAST[facility] SUR[facility]ERY [facility]IN[facility] [place]. Please see a copy of my visit note below.    Marizol is a 34 years old lady status post rdkoh-xt-ktr's panniculectomy.  She is 2-month out from surgery.  She did have history of postoperative seroma.  Patient is otherwise doing excellent.  She returns today for reevaluation.    At the physical examination, all incisions are well-healed.  There is no evidence of recurrent seroma or hematoma.  There is still some minimal swelling at the level of the right lower quadrant skin.                                Plan: continue with compression garment, may resume normal activities and follow-up with me in 3 months.    Patient is interested in bilateral augmentation mastopexies.  She is currently receiving anticoagulation secondary to lower extremity DVT.  She told me that her anticoagulation will be stopped in about a month.  If that is the case then we should be able to proceed in the future with the augmentation mastopexy.    Once again we will see her in 3 months.  She is very happy with her results and so am I.    Adan Bell MD , FACS   Diplomate American Board of Plastic Surgery  Diplomate American Board of Surgery  Adj. Assistant Professor of Surgery  Division of Plastic & Reconstructive Surgery   [facility]  Office: [phone]   3/31/2023 at 12:55 PM         Again, thank you for allowing me to participate in the care of your patient.        Sincerely,        Adan Bell MD

## 2023-03-31 NOTE — PROGRESS NOTES
Marizol is a 34 years old lady status post rjzsx-so-hpl's panniculectomy.  She is 2-month out from surgery.  She did have history of postoperative seroma.  Patient is otherwise doing excellent.  She returns today for reevaluation.    At the physical examination, all incisions are well-healed.  There is no evidence of recurrent seroma or hematoma.  There is still some minimal swelling at the level of the right lower quadrant skin.                                Plan: continue with compression garment, may resume normal activities and follow-up with me in 3 months.    Patient is interested in bilateral augmentation mastopexies.  She is currently receiving anticoagulation secondary to lower extremity DVT.  She told me that her anticoagulation will be stopped in about a month.  If that is the case then we should be able to proceed in the future with the augmentation mastopexy.    Once again we will see her in 3 months.  She is very happy with her results and so am I.    Adan Bell MD , FACS   Diplomate American Board of Plastic Surgery  Diplomate American Board of Surgery  Adj. Assistant Professor of Surgery  Division of Plastic & Reconstructive Surgery   [facility]  Office: [phone]   3/31/2023 at 12:55 PM

## 2023-06-30 ENCOUNTER — OFFICE VISIT (OUTPATIENT)
Dept: PLASTIC SURGERY | Facility: AMBULATORY SURGERY CENTER | Age: 38
End: 2023-06-30
Payer: COMMERCIAL

## 2023-06-30 DIAGNOSIS — Z98.890 STATUS POST PANNICULECTOMY: Primary | ICD-10-CM

## 2023-06-30 PROCEDURE — 99212 OFFICE O/P EST SF 10 MIN: CPT | Performed by: PLASTIC SURGERY

## 2023-06-30 NOTE — PROGRESS NOTES
Marizol is a 35 years old lady status post keena's panniculectomy.  She is 6 months out from surgery.  She is feeling great.    At the physical exam, patient present with excellent definition and shape on her abdomen.  All incisions are well-healed.  No evidence of hypertrophic scarring or keloid.    Plan: Patient did excellent with her panniculectomy operation.  Patient presents with bilateral grade 2 ptosis.  She will come back for reevaluation for future bilateral augmentation mastopexy.  She did have history of DVT and now she is off of any anticoagulation.    Adan Bell MD , FACS   Diplomate American Board of Plastic Surgery  Diplomate American Board of Surgery  Adj. Assistant Professor of Surgery  Division of Plastic & Reconstructive Surgery   [facility]  Office: [phone]   6/30/2023 at 1:58 PM

## 2023-06-30 NOTE — LETTER
6/30/2023         RE: Marizol Granados  [address] [address]        Dear Colleague,    Thank you for referring your patient, Marizol Granados, to the [facility] PLASTIC SURGERY [facility]INIC [place]. Please see a copy of my visit note below.    Marizol is a 35 years old lady status post jddcb-at-bdx's panniculectomy.  She is 6 months out from surgery.  She is feeling great.    At the physical exam, patient present with excellent definition and shape on her abdomen.  All incisions are well-healed.  No evidence of hypertrophic scarring or keloid.    Plan: Patient did excellent with her panniculectomy operation.  Patient presents with bilateral grade 2 ptosis.  She will come back for reevaluation for future bilateral augmentation mastopexy.  She did have history of DVT and now she is off of any anticoagulation.    Adan Bell MD , FACS   Diplomate American Board of Plastic Surgery  Diplomate American Board of Surgery  Adj. Assistant Professor of Surgery  Division of Plastic & Reconstructive Surgery   [facility]  Office: [phone]   6/30/2023 at 1:58 PM         Again, thank you for allowing me to participate in the care of your patient.        Sincerely,        Adan Bell MD

## 2023-08-02 DIAGNOSIS — R63.2 HYPERPHAGIA: Primary | ICD-10-CM

## 2023-08-02 RX ORDER — PHENTERMINE HYDROCHLORIDE 37.5 MG/1
18.75-37.5 TABLET ORAL
Qty: 90 TABLET | Refills: 1 | Status: SHIPPED | OUTPATIENT
Start: 2023-08-02 | End: 2023-08-14

## 2023-08-03 ENCOUNTER — APPOINTMENT (OUTPATIENT)
Dept: GENERAL RADIOLOGY | Facility: CLINIC | Age: 38
End: 2023-08-03
Attending: EMERGENCY MEDICINE
Payer: COMMERCIAL

## 2023-08-03 ENCOUNTER — HOSPITAL ENCOUNTER (EMERGENCY)
Facility: CLINIC | Age: 38
Discharge: HOME | End: 2023-08-03
Attending: EMERGENCY MEDICINE | Admitting: EMERGENCY MEDICINE
Payer: COMMERCIAL

## 2023-08-03 VITALS
OXYGEN SATURATION: 100 % | DIASTOLIC BLOOD PRESSURE: 79 MMHG | SYSTOLIC BLOOD PRESSURE: 121 MMHG | TEMPERATURE: 98 F | RESPIRATION RATE: 16 BRPM | HEART RATE: 71 BPM

## 2023-08-03 DIAGNOSIS — N34.1 NGU DUE TO UREAPLASMA UREALYTICUM: ICD-10-CM

## 2023-08-03 DIAGNOSIS — A49.3 NGU DUE TO UREAPLASMA UREALYTICUM: ICD-10-CM

## 2023-08-03 DIAGNOSIS — R10.13 EPIGASTRIC PAIN: ICD-10-CM

## 2023-08-03 DIAGNOSIS — R07.9 CHEST PAIN, UNSPECIFIED TYPE: ICD-10-CM

## 2023-08-03 LAB
ALBUMIN SERPL BCG-MCNC: 4.1 G/DL (ref 3.5–5.2)
ALP SERPL-CCNC: 50 U/L (ref 35–104)
ALT SERPL W P-5'-P-CCNC: 23 U/L (ref 0–50)
ANION GAP SERPL CALCULATED.3IONS-SCNC: 9 MMOL/L (ref 7–15)
AST SERPL W P-5'-P-CCNC: 21 U/L (ref 0–45)
BASOPHILS # BLD AUTO: 0 10E3/UL (ref 0–0.2)
BASOPHILS NFR BLD AUTO: 1 %
BILIRUB DIRECT SERPL-MCNC: <0.2 MG/DL (ref 0–0.3)
BILIRUB SERPL-MCNC: 0.2 MG/DL
BUN SERPL-MCNC: 20.2 MG/DL (ref 6–20)
CALCIUM SERPL-MCNC: 9.3 MG/DL (ref 8.6–10)
CHLORIDE SERPL-SCNC: 106 MMOL/L (ref 98–107)
CREAT SERPL-MCNC: 0.86 MG/DL (ref 0.51–0.95)
D DIMER PPP FEU-MCNC: 0.35 UG/ML FEU (ref 0–0.5)
DEPRECATED HCO3 PLAS-SCNC: 23 MMOL/L (ref 22–29)
EOSINOPHIL # BLD AUTO: 0.2 10E3/UL (ref 0–0.7)
EOSINOPHIL NFR BLD AUTO: 3 %
ERYTHROCYTE [DISTWIDTH] IN BLOOD BY AUTOMATED COUNT: 15.2 % (ref 10–15)
GFR SERPL CREATININE-BSD FRML MDRD: 89 ML/MIN/1.73M2
GLUCOSE SERPL-MCNC: 88 MG/DL (ref 70–99)
HCT VFR BLD AUTO: 37.8 % (ref 35–47)
HGB BLD-MCNC: 11.7 G/DL (ref 11.7–15.7)
HOLD SPECIMEN: NORMAL
HOLD SPECIMEN: NORMAL
IMM GRANULOCYTES # BLD: 0 10E3/UL
IMM GRANULOCYTES NFR BLD: 0 %
LIPASE SERPL-CCNC: 66 U/L (ref 13–60)
LYMPHOCYTES # BLD AUTO: 2.3 10E3/UL (ref 0.8–5.3)
LYMPHOCYTES NFR BLD AUTO: 35 %
MCH RBC QN AUTO: 28.7 PG (ref 26.5–33)
MCHC RBC AUTO-ENTMCNC: 31 G/DL (ref 31.5–36.5)
MCV RBC AUTO: 93 FL (ref 78–100)
MONOCYTES # BLD AUTO: 0.7 10E3/UL (ref 0–1.3)
MONOCYTES NFR BLD AUTO: 10 %
NEUTROPHILS # BLD AUTO: 3.3 10E3/UL (ref 1.6–8.3)
NEUTROPHILS NFR BLD AUTO: 51 %
NRBC # BLD AUTO: 0 10E3/UL
NRBC BLD AUTO-RTO: 0 /100
PLATELET # BLD AUTO: 213 10E3/UL (ref 150–450)
POTASSIUM SERPL-SCNC: 4.2 MMOL/L (ref 3.4–5.3)
PROT SERPL-MCNC: 6.9 G/DL (ref 6.4–8.3)
RBC # BLD AUTO: 4.08 10E6/UL (ref 3.8–5.2)
SODIUM SERPL-SCNC: 138 MMOL/L (ref 136–145)
TROPONIN T SERPL HS-MCNC: <6 NG/L
WBC # BLD AUTO: 6.4 10E3/UL (ref 4–11)

## 2023-08-03 PROCEDURE — 99285 EMERGENCY DEPT VISIT HI MDM: CPT | Mod: 25

## 2023-08-03 PROCEDURE — 80053 COMPREHEN METABOLIC PANEL: CPT | Performed by: EMERGENCY MEDICINE

## 2023-08-03 PROCEDURE — 82248 BILIRUBIN DIRECT: CPT | Performed by: EMERGENCY MEDICINE

## 2023-08-03 PROCEDURE — 250N000013 HC RX MED GY IP 250 OP 250 PS 637: Performed by: EMERGENCY MEDICINE

## 2023-08-03 PROCEDURE — 85025 COMPLETE CBC W/AUTO DIFF WBC: CPT | Performed by: EMERGENCY MEDICINE

## 2023-08-03 PROCEDURE — 84484 ASSAY OF TROPONIN QUANT: CPT | Performed by: EMERGENCY MEDICINE

## 2023-08-03 PROCEDURE — 93005 ELECTROCARDIOGRAM TRACING: CPT

## 2023-08-03 PROCEDURE — 83690 ASSAY OF LIPASE: CPT | Performed by: EMERGENCY MEDICINE

## 2023-08-03 PROCEDURE — 36415 COLL VENOUS BLD VENIPUNCTURE: CPT | Performed by: EMERGENCY MEDICINE

## 2023-08-03 PROCEDURE — 71046 X-RAY EXAM CHEST 2 VIEWS: CPT

## 2023-08-03 PROCEDURE — 85379 FIBRIN DEGRADATION QUANT: CPT | Performed by: EMERGENCY MEDICINE

## 2023-08-03 PROCEDURE — 250N000011 HC RX IP 250 OP 636: Performed by: EMERGENCY MEDICINE

## 2023-08-03 RX ORDER — ONDANSETRON 4 MG/1
4 TABLET, ORALLY DISINTEGRATING ORAL ONCE
Status: COMPLETED | OUTPATIENT
Start: 2023-08-03 | End: 2023-08-03

## 2023-08-03 RX ORDER — AZITHROMYCIN 250 MG/1
TABLET, FILM COATED ORAL
Qty: 6 TABLET | Refills: 0 | Status: SHIPPED | OUTPATIENT
Start: 2023-08-03 | End: 2023-08-08

## 2023-08-03 RX ORDER — MAGNESIUM HYDROXIDE/ALUMINUM HYDROXICE/SIMETHICONE 120; 1200; 1200 MG/30ML; MG/30ML; MG/30ML
15 SUSPENSION ORAL ONCE
Status: COMPLETED | OUTPATIENT
Start: 2023-08-03 | End: 2023-08-03

## 2023-08-03 RX ORDER — PANTOPRAZOLE SODIUM 40 MG/1
40 TABLET, DELAYED RELEASE ORAL DAILY
Qty: 30 TABLET | Refills: 0 | Status: SHIPPED | OUTPATIENT
Start: 2023-08-03 | End: 2023-09-02

## 2023-08-03 RX ORDER — SUCRALFATE 1 G/1
1 TABLET ORAL 4 TIMES DAILY
Qty: 40 TABLET | Refills: 0 | Status: SHIPPED | OUTPATIENT
Start: 2023-08-03 | End: 2023-08-13

## 2023-08-03 RX ORDER — PROMETHAZINE HYDROCHLORIDE 25 MG/1
25 TABLET ORAL EVERY 6 HOURS PRN
Qty: 10 TABLET | Refills: 0 | Status: SHIPPED | OUTPATIENT
Start: 2023-08-03 | End: 2024-05-02

## 2023-08-03 RX ORDER — FAMOTIDINE 20 MG/1
20 TABLET, FILM COATED ORAL ONCE
Status: COMPLETED | OUTPATIENT
Start: 2023-08-03 | End: 2023-08-03

## 2023-08-03 RX ADMIN — ALUMINUM HYDROXIDE, MAGNESIUM HYDROXIDE, AND DIMETHICONE 15 ML: 200; 20; 200 SUSPENSION ORAL at 21:28

## 2023-08-03 RX ADMIN — FAMOTIDINE 20 MG: 20 TABLET ORAL at 21:28

## 2023-08-03 RX ADMIN — ONDANSETRON 4 MG: 4 TABLET, ORALLY DISINTEGRATING ORAL at 22:04

## 2023-08-03 ASSESSMENT — ACTIVITIES OF DAILY LIVING (ADL)
ADLS_ACUITY_SCORE: 35
ADLS_ACUITY_SCORE: 33

## 2023-08-04 LAB
ATRIAL RATE - MUSE: 66 BPM
DIASTOLIC BLOOD PRESSURE - MUSE: NORMAL MMHG
INTERPRETATION ECG - MUSE: NORMAL
P AXIS - MUSE: 66 DEGREES
PR INTERVAL - MUSE: 132 MS
QRS DURATION - MUSE: 80 MS
QT - MUSE: 368 MS
QTC - MUSE: 385 MS
R AXIS - MUSE: 33 DEGREES
SYSTOLIC BLOOD PRESSURE - MUSE: NORMAL MMHG
T AXIS - MUSE: 49 DEGREES
VENTRICULAR RATE- MUSE: 66 BPM

## 2023-08-04 NOTE — ED PROVIDER NOTES
"  History     Chief Complaint:  Pharyngitis and Chest Pain       The history is provided by the patient.      Marizol Granados is a 38 year old female who presents with pharyngitis and chest pain.  Patient reports she was seen a couple weeks ago for a UTI, which she gets frequently.  She has always been put on antibiotics for these infections, which provide acute relief, but never resolve her chronic issue.  During this recent visit within the last few weeks, she was prescribed doxycycline, which she states \"made her sick\".  She took 6 doses of the doxycycline and stopped July 29th prior to a family trip to [place].  He states that the same night she stopped the steroids, she began experiencing substernal chest pain, which inhibited her ability to swallow with ease.  She states that the pain has been constant since and that eating and drinking make it worse.  She endorses hematuria and nausea, but denies any shortness of breath or hemoptysis.  She does have a history of DVT and PE, but stopped taking the Eliquis 2 months ago due to an upcoming surgery.  She has had a cholecystectomy.    Independent Historian:   None - Patient Only    Review of External Notes:   Reviewed primary care visit from 7/20/2023 in which patient was placed on doxycycline for Ureaplasma related dysuria/urethritis    Medications:    Buspirone  Cholecalciferol  Clindamycin  Desvenlafaxine  Flovent  Levonorgestrel  Lorazepam  Phentermine  Seroquel  Ramelteon  Topamax  Trazodone  Ambien    Past Medical History:     Anxiety  Vitamin B deficiency  Colitis  DVT  Depression  Genital herpes  GERD  IBS  Thromboembolism  Hypercholesterolemia  Hypothyroidism  Migraine  Panic attack  Elevated BMI  PTSD  Restless legs  Suicidal ideation  Ulcerative colitis  Plantar fasciitis    Past Surgical History:    Cholecystectomy  Gastric bypass  Capsule endoscopy (x2)  Liver biopsy  Hepatic mass removal  Panniculectomy     Physical Exam   Patient Vitals for the past 24 " hrs:   BP Temp Temp src Pulse Resp SpO2   08/03/23 2130 121/79 -- -- 71 16 100 %   08/03/23 2020 118/87 98  F (36.7  C) Temporal 77 18 100 %        Physical Exam      HEENT:    Oropharynx is moist, without lesions or trismus.  Eyes:    Conjunctiva normal  Neck:     Supple, no meningismus.     CV:     Regular rate and rhythm.      No murmurs, rubs or gallops.       No unilateral leg swelling.       2+ radial pulses bilateral.       No lower extremity edema.  PULM:    Clear to auscultation bilateral.       No respiratory distress.      Good air exchange.     No rales or wheezing.     No stridor.  ABD:    Soft, non-distended.       Mild focal epigastric tenderness     No pulsatile masses.       No rebound, guarding or rigidity.  MSK:     No gross deformity to all four extremities.   LYMPH:   No cervical lymphadenopathy.  NEURO:   Alert. Good muscle tone, no atrophy.  Skin:    Warm, dry and intact.    Psych:    Mood is good and affect is appropriate.      Emergency Department Course   ECG  ECG results from 08/03/23   EKG 12-lead, tracing only     Value    Systolic Blood Pressure     Diastolic Blood Pressure     Ventricular Rate 66    Atrial Rate 66    CA Interval 132    QRS Duration 80        QTc 385    P Axis 66    R AXIS 33    T Axis 49    Interpretation ECG      Sinus rhythm  Possible Left atrial enlargement  Borderline ECG  No previous ECGs available  Read by: Dr. Hilario Hollins MD at [id number].       Imaging:  Chest XR,  PA & LAT   Final Result   IMPRESSION: Negative chest.         Report per radiology    Laboratory:  Labs Ordered and Resulted from Time of ED Arrival to Time of ED Departure   BASIC METABOLIC PANEL - Abnormal       Result Value    Sodium 138      Potassium 4.2      Chloride 106      Carbon Dioxide (CO2) 23      Anion Gap 9      Urea Nitrogen 20.2 (*)     Creatinine 0.86      Calcium 9.3      Glucose 88      GFR Estimate 89     CBC WITH PLATELETS AND DIFFERENTIAL - Abnormal    WBC Count 6.4       RBC Count 4.08      Hemoglobin 11.7      Hematocrit 37.8      MCV 93      MCH 28.7      MCHC 31.0 (*)     RDW 15.2 (*)     Platelet Count 213      % Neutrophils 51      % Lymphocytes 35      % Monocytes 10      % Eosinophils 3      % Basophils 1      % Immature Granulocytes 0      NRBCs per 100 WBC 0      Absolute Neutrophils 3.3      Absolute Lymphocytes 2.3      Absolute Monocytes 0.7      Absolute Eosinophils 0.2      Absolute Basophils 0.0      Absolute Immature Granulocytes 0.0      Absolute NRBCs 0.0     LIPASE - Abnormal    Lipase 66 (*)    TROPONIN T, HIGH SENSITIVITY - Normal    Troponin T, High Sensitivity <6     HEPATIC FUNCTION PANEL - Normal    Protein Total 6.9      Albumin 4.1      Bilirubin Total 0.2      Alkaline Phosphatase 50      AST 21      ALT 23      Bilirubin Direct <0.20     D DIMER QUANTITATIVE - Normal    D-Dimer Quantitative 0.35        Emergency Department Course & Assessments:    Interventions:  Medications   alum & mag hydroxide-simethicone (MAALOX) suspension 15 mL (15 mLs Oral $Given 8/3/23 2128)   famotidine (PEPCID) tablet 20 mg (20 mg Oral $Given 8/3/23 2128)   ondansetron (ZOFRAN ODT) ODT tab 4 mg (4 mg Oral $Given 8/3/23 2204)      Independent Interpretation (X-rays, CTs, rhythm strip):  I independently reviewed the patient's chest x-ray from today's visit.  I do not appreciate any pneumothorax, pneumonia, or other pulmonary infiltrate.    Assessments/Consultations/Discussion of Management or Tests:  ED Course as of 23   Thu Aug 03, 2023   2207 Dr. Hollins' evaluation     Social Determinants of Health affecting care:   None    Disposition:  The patient was discharged to home.     Impression & Plan      Medical Decision Makin-year-old female presents with chest pain and epigastric pain after recent initiation of doxycycline for Ureaplasma associated urethritis.  EKG without ischemic changes.  Troponin within normal limits.  No indication for serial enzymes  given duration of symptoms.  She has a history of PE and is no longer anticoagulated.  D-dimer sent and negative thus no indication for CT scan of the chest.  The remainder of her evaluation was unremarkable.  Evaluation is most consistent with doxycycline associated esophagitis.  She will be discharged home on Protonix, sucralfate and antiemetics.  Patient prescribed azithromycin as an alternative treatment option for her Ureaplasma urethritis.  Patient safe to discharge home and close follow-up with PCP.    Diagnosis:    ICD-10-CM    1. Chest pain, unspecified type  R07.9       2. Epigastric pain  R10.13       3. TOÑITO due to ureaplasma urealyticum  N34.1     A49.3            Discharge Medications:  New Prescriptions    AZITHROMYCIN (ZITHROMAX Z-SONIA) 250 MG TABLET    Two tablets on the first day, then one tablet daily for the next 4 days    PANTOPRAZOLE (PROTONIX) 40 MG EC TABLET    Take 1 tablet (40 mg) by mouth daily for 30 doses    PROMETHAZINE (PHENERGAN) 25 MG TABLET    Take 1 tablet (25 mg) by mouth every 6 hours as needed for nausea    SUCRALFATE (CARAFATE) 1 GM TABLET    Take 1 tablet (1 g) by mouth 4 times daily for 10 days      Scribe Disclosure:  I, YULIA MASON, am serving as a scribe at 9:59 PM on 8/3/2023 to document services personally performed by Hilario Hollins MD based on my observations and the provider's statements to me.     8/3/2023   Hilario Hollins MD Matthews, Jeremiah R, MD  08/03/23 3725

## 2023-08-04 NOTE — ED NOTES
PIT/Triage Evaluation    Patient presented with pharyngitis and chest pain. The patient reports that a couple weeks ago she visited back home and saw her primary care physician for BV and was prescribed doxycycline, but felt extremely sick after taking it. She took her last dose on Saturday night and still felt sick, had abdominal pain, and had pain with swallowing. Patient reports eating more than normal. States she takes Omeprazole, anti-depressants, and anti-anxiety medications.    Exam is notable for:    Patient Vitals for the past 24 hrs:   BP Temp Temp src Pulse Resp SpO2   08/03/23 2020 118/87 98  F (36.7  C) Temporal 77 18 100 %     Constitutional: Alert, attentive, GCS 15   Eyes: EOM are normal, anicteric, conjugate gaze  CV: distal extremities warm, well perfused  Chest: Non-labored breathing on RA  GI: Mild epigastric pain.  No distension. No guarding or rebound.    Neurological: Alert, attentive, moving all extremities equally.   Skin: Skin is warm and dry.     Appropriate interventions for symptom management were initiated if applicable.  Appropriate diagnostic tests were initiated if indicated.    Important information for subsequent clinician:  38-year-old woman presenting for evaluation of burning epigastric pain since Saturday after she finished course of antibiotics for BV and Ureaplasma. denies exertional component, reports she is unable to swallow but handling secretions well.  We will plan for screening labs, GI cocktail and IV Pepcid.  Abdominal exam is benign, imaging deferred.      I briefly evaluated the patient and developed an initial plan of care. I discussed this plan and explained that this brief interaction does not constitute a full evaluation. Patient/family understands that they should wait to be fully evaluated and discuss any test results with another clinician prior to leaving the hospital.    Elian Kenny MD  [employer]  9:04 PM 08/03/23        Scribe Disclosure:  I, SALLIE DAVEY, am serving as a scribe at 8:53 PM on 8/3/2023 to document services personally performed by Elian Kenny MD based on my observations and the provider's statements to me.      Elian Kenny MD  08/03/23 2104

## 2023-08-04 NOTE — ED TRIAGE NOTES
"Patient reports recently taking doxycycline for BV and was \"severely sick\".  She reports nausea, vomiting, chills, and sore throat.  She reports last dose taken was Saturday and still has a sore throat.  She also reports some mild chest pain that has been ongoing since Saturday.  History of asthma, depression, anxiety.  ABCs intact, A&Ox4.     Triage Assessment       Row Name 08/03/23 2021       Triage Assessment (Adult)    Airway WDL WDL       Respiratory WDL    Respiratory WDL WDL       Skin Circulation/Temperature WDL    Skin Circulation/Temperature WDL WDL       Cardiac WDL    Cardiac WDL WDL       Peripheral/Neurovascular WDL    Peripheral Neurovascular WDL WDL       Cognitive/Neuro/Behavioral WDL    Cognitive/Neuro/Behavioral WDL WDL       Leia Coma Scale    Best Eye Response 4-->(E4) spontaneous    Best Motor Response 6-->(M6) obeys commands    Best Verbal Response 5-->(V5) oriented    Leia Coma Scale Score 15                    "

## 2023-08-09 DIAGNOSIS — R10.13 EPIGASTRIC DISCOMFORT: ICD-10-CM

## 2023-08-09 DIAGNOSIS — Z98.84 S/P GASTRIC BYPASS: ICD-10-CM

## 2023-08-09 RX ORDER — OMEPRAZOLE 40 MG/1
40 CAPSULE, DELAYED RELEASE ORAL DAILY
Qty: 90 CAPSULE | Refills: 3 | Status: SHIPPED | OUTPATIENT
Start: 2023-08-09 | End: 2024-05-03

## 2023-08-14 DIAGNOSIS — R63.2 HYPERPHAGIA: ICD-10-CM

## 2023-08-14 RX ORDER — PHENTERMINE HYDROCHLORIDE 37.5 MG/1
18.75-37.5 TABLET ORAL
Qty: 90 TABLET | Refills: 1 | Status: SHIPPED | OUTPATIENT
Start: 2023-08-14 | End: 2024-08-30

## 2023-08-25 ENCOUNTER — VIRTUAL VISIT (OUTPATIENT)
Dept: SURGERY | Facility: CLINIC | Age: 38
End: 2023-08-25
Payer: COMMERCIAL

## 2023-08-25 VITALS — HEIGHT: 65 IN | WEIGHT: 164 LBS | BODY MASS INDEX: 27.32 KG/M2

## 2023-08-25 DIAGNOSIS — R10.13 EPIGASTRIC DISCOMFORT: ICD-10-CM

## 2023-08-25 DIAGNOSIS — K91.2 POSTOPERATIVE MALABSORPTION: Primary | ICD-10-CM

## 2023-08-25 DIAGNOSIS — K91.2 POSTOPERATIVE MALABSORPTION: ICD-10-CM

## 2023-08-25 PROCEDURE — 99214 OFFICE O/P EST MOD 30 MIN: CPT | Mod: VID | Performed by: FAMILY MEDICINE

## 2023-08-25 RX ORDER — SUCRALFATE 1 G/1
1 TABLET ORAL 4 TIMES DAILY
Qty: 120 TABLET | Refills: 1 | Status: SHIPPED | OUTPATIENT
Start: 2023-08-25 | End: 2023-08-28
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
## 2023-08-28 RX ORDER — SUCRALFATE 1 G/1
1 TABLET ORAL 4 TIMES DAILY
Qty: 360 TABLET | Refills: 1 | Status: SHIPPED | OUTPATIENT
Start: 2023-08-28 | End: 2024-03-20

## 2023-09-07 ENCOUNTER — OFFICE VISIT (OUTPATIENT)
Dept: PLASTIC SURGERY | Facility: AMBULATORY SURGERY CENTER | Age: 38
End: 2023-09-07
Payer: COMMERCIAL

## 2023-09-07 VITALS
DIASTOLIC BLOOD PRESSURE: 64 MMHG | WEIGHT: 170 LBS | HEIGHT: 65 IN | SYSTOLIC BLOOD PRESSURE: 116 MMHG | BODY MASS INDEX: 28.32 KG/M2

## 2023-09-07 DIAGNOSIS — N64.81 PTOSIS OF BOTH BREASTS: Primary | ICD-10-CM

## 2023-09-07 PROCEDURE — 99213 OFFICE O/P EST LOW 20 MIN: CPT | Performed by: PLASTIC SURGERY

## 2023-09-07 NOTE — LETTER
9/7/2023         RE: Marizol Granados  [id number] [facility] [address]        Dear Colleague,    Thank you for referring your patient, Marizol Granados, to the [facility]LAST[facility] [place]. Please see a copy of my visit note below.    Marizol is a 38 years old patient with history of bariatric surgery status post [id number]'s panniculectomy.  She is 9 months out.    Patient does present with bilateral grade 2 ptosis and significant bilateral upper pole loss of volume.  There are no nipple inversion, or nipple discharge.  No peau d'orange.  On the right breast the sternal notch to nipple distance is 32 cm, nipple to inframammary fold distance is 7 cm and breast diameter is 21 cm.  On the left breast, the sternal notch to nipple distance is 30 cm, nipple to inframammary fold distance is 8 cm and breast diameter is 20 cm.  There are no palpable axillary lymphadenopathies bilaterally.        Plan: Patient will benefit of bilateral augmentation mastopexy.  I will utilize inferior pedicle with a Navarrete pattern.  The implants will be subpectoral.  I will utilize Allergan, full profile, cohesive implants.  Potential volumes are 385 cc, 415 cc and 450 cc.  I will bring corresponding sizers as well.    Risks were explained to the patient and they include but are not limited to infection, potential explantation, bleeding, hematoma, seroma, capsular contracture, loss of nipple areolar complex, implant malposition, need for further surgeries.    Patient has acknowledged all these risks and would like to proceed.    She will be contacted by our  to discuss pricing.  Then we will schedule for surgery.     She understand that she will have to self finance the surgery.      Adan Bell MD , FACS   Diplomate American Board of Plastic Surgery  Diplomate American Board of Surgery  Adj. Assistant Professor of Surgery  Division of Plastic & Reconstructive Surgery   [facility]  Physicians  Office: [phone]   9/7/2023 at 3:56 PM       Again, thank you for allowing me to participate in the care of your patient.        Sincerely,        Adan Bell MD

## 2023-09-07 NOTE — PROGRESS NOTES
Marizol is a 38 years old patient with history of bariatric surgery status post [id number]'s panniculectomy.  She is 9 months out.    Patient does present with bilateral grade 2 ptosis and significant bilateral upper pole loss of volume.  There are no nipple inversion, or nipple discharge.  No peau d'orange.  On the right breast the sternal notch to nipple distance is 32 cm, nipple to inframammary fold distance is 7 cm and breast diameter is 21 cm.  On the left breast, the sternal notch to nipple distance is 30 cm, nipple to inframammary fold distance is 8 cm and breast diameter is 20 cm.  There are no palpable axillary lymphadenopathies bilaterally.        Plan: Patient will benefit of bilateral augmentation mastopexy.  I will utilize inferior pedicle with a Navarrete pattern.  The implants will be subpectoral.  I will utilize Allergan, full profile, cohesive implants.  Potential volumes are 385 cc, 415 cc and 450 cc.  I will bring corresponding sizers as well.    Risks were explained to the patient and they include but are not limited to infection, potential explantation, bleeding, hematoma, seroma, capsular contracture, loss of nipple areolar complex, implant malposition, need for further surgeries.    Patient has acknowledged all these risks and would like to proceed.    She will be contacted by our  to discuss pricing.  Then we will schedule for surgery.     She understand that she will have to self finance the surgery.      Adan Bell MD , FACS   Diplomate American Board of Plastic Surgery  Diplomate American Board of Surgery  Adj. Assistant Professor of Surgery  Division of Plastic & Reconstructive Surgery   [facility] Physicians  Office: [phone]   9/7/2023 at 3:56 PM

## 2023-09-12 ENCOUNTER — HOSPITAL ENCOUNTER (OUTPATIENT)
Facility: AMBULATORY SURGERY CENTER | Age: 38
Discharge: HOME | End: 2023-09-12
Attending: PLASTIC SURGERY | Admitting: PLASTIC SURGERY

## 2023-09-12 PROBLEM — N64.81 PTOSIS OF BOTH BREASTS: Status: ACTIVE | Noted: 2023-09-07

## 2023-09-30 ENCOUNTER — HEALTH MAINTENANCE LETTER (OUTPATIENT)
Age: 38
End: 2023-09-30

## 2023-10-16 ENCOUNTER — TELEPHONE (OUTPATIENT)
Dept: PLASTIC SURGERY | Facility: AMBULATORY SURGERY CENTER | Age: 38
End: 2023-10-16
Payer: COMMERCIAL

## 2023-10-16 NOTE — TELEPHONE ENCOUNTER
Marizol called in and requested to cancel surgery on 11/7 with Dr. Bell.    Reason: family illness    Reschedule?: pt declined at this time will call back when ready.    CSC and provider notified via email

## 2023-10-23 ENCOUNTER — TELEPHONE (OUTPATIENT)
Dept: SURGERY | Facility: CLINIC | Age: 38
End: 2023-10-23
Payer: COMMERCIAL

## 2023-10-23 NOTE — TELEPHONE ENCOUNTER
Prior Authorization Retail Medication Request    Medication/Dose: Renewal--Omeprazole 40mg Dr Capsules  Rationale:  Previous approval, expires soon.    Key: [id number]

## 2023-10-25 NOTE — TELEPHONE ENCOUNTER
Central Prior Authorization Team  Phone: [phone]    PA Initiation    Medication: OMEPRAZOLE 40 MG PO CPDR  Insurance Company: [facility] - Phone [phone] Fax [phone]  Pharmacy Filling the Rx: [id number] - [place], [place] - [address] AT [place] [address] & [facility]  Filling Pharmacy Phone: [phone]  Filling Pharmacy Fax:    Start Date: 10/25/2023

## 2023-10-26 NOTE — TELEPHONE ENCOUNTER
Central Prior Authorization Team  Phone: [phone]    Prior Authorization Approval    Medication: OMEPRAZOLE 40 MG PO CPDR  Authorization Effective Date: 12/2/2023  Authorization Expiration Date: 12/2/2024  Approved Dose/Quantity:   Reference #:     Insurance Company: [employer] [place] - Phone [phone] Fax [phone]  Expected CoPay: $    CoPay Card Available:      Financial Assistance Needed:   Which Pharmacy is filling the prescription: [id number] [place] [address] AT [place] [address] & [facility]  Pharmacy Notified: no- this is approved from 12/2/23 on  Patient Notified: n/a

## 2024-01-29 DIAGNOSIS — G43.011 INTRACTABLE MIGRAINE WITHOUT AURA AND WITH STATUS MIGRAINOSUS: ICD-10-CM

## 2024-01-29 RX ORDER — TOPIRAMATE 50 MG/1
TABLET, FILM COATED ORAL
Qty: 180 TABLET | Refills: 3 | Status: SHIPPED | OUTPATIENT
Start: 2024-01-29

## 2024-02-26 ASSESSMENT — ASTHMA QUESTIONNAIRES
ACT_TOTALSCORE: 23
QUESTION_4 LAST FOUR WEEKS HOW OFTEN HAVE YOU USED YOUR RESCUE INHALER OR NEBULIZER MEDICATION (SUCH AS ALBUTEROL): NOT AT ALL
ACT_TOTALSCORE: 23
QUESTION_1 LAST FOUR WEEKS HOW MUCH OF THE TIME DID YOUR ASTHMA KEEP YOU FROM GETTING AS MUCH DONE AT WORK, SCHOOL OR AT HOME: A LITTLE OF THE TIME
QUESTION_5 LAST FOUR WEEKS HOW WOULD YOU RATE YOUR ASTHMA CONTROL: COMPLETELY CONTROLLED
QUESTION_2 LAST FOUR WEEKS HOW OFTEN HAVE YOU HAD SHORTNESS OF BREATH: ONCE OR TWICE A WEEK
QUESTION_3 LAST FOUR WEEKS HOW OFTEN DID YOUR ASTHMA SYMPTOMS (WHEEZING, COUGHING, SHORTNESS OF BREATH, CHEST TIGHTNESS OR PAIN) WAKE YOU UP AT NIGHT OR EARLIER THAN USUAL IN THE MORNING: NOT AT ALL

## 2024-02-26 ASSESSMENT — PATIENT HEALTH QUESTIONNAIRE - PHQ9
SUM OF ALL RESPONSES TO PHQ QUESTIONS 1-9: 18
10. IF YOU CHECKED OFF ANY PROBLEMS, HOW DIFFICULT HAVE THESE PROBLEMS MADE IT FOR YOU TO DO YOUR WORK, TAKE CARE OF THINGS AT HOME, OR GET ALONG WITH OTHER PEOPLE: EXTREMELY DIFFICULT
SUM OF ALL RESPONSES TO PHQ QUESTIONS 1-9: 18

## 2024-02-27 ENCOUNTER — OFFICE VISIT (OUTPATIENT)
Dept: FAMILY MEDICINE | Facility: CLINIC | Age: 39
End: 2024-02-27
Payer: COMMERCIAL

## 2024-02-27 VITALS
HEIGHT: 65 IN | TEMPERATURE: 97.8 F | SYSTOLIC BLOOD PRESSURE: 102 MMHG | DIASTOLIC BLOOD PRESSURE: 70 MMHG | HEART RATE: 78 BPM | WEIGHT: 181.2 LBS | BODY MASS INDEX: 30.19 KG/M2 | RESPIRATION RATE: 20 BRPM | OXYGEN SATURATION: 98 %

## 2024-02-27 DIAGNOSIS — N89.8 VAGINAL DISCHARGE: ICD-10-CM

## 2024-02-27 DIAGNOSIS — R10.2 VAGINAL PAIN: Primary | ICD-10-CM

## 2024-02-27 LAB
ALBUMIN UR-MCNC: NEGATIVE MG/DL
APPEARANCE UR: CLEAR
BILIRUB UR QL STRIP: NEGATIVE
CLUE CELLS: ABNORMAL
COLOR UR AUTO: YELLOW
GLUCOSE UR STRIP-MCNC: NEGATIVE MG/DL
HGB UR QL STRIP: NEGATIVE
KETONES UR STRIP-MCNC: ABNORMAL MG/DL
LEUKOCYTE ESTERASE UR QL STRIP: NEGATIVE
NITRATE UR QL: NEGATIVE
PH UR STRIP: 6.5 [PH] (ref 5–7)
SP GR UR STRIP: 1.02 (ref 1–1.03)
TRICHOMONAS, WET PREP: ABNORMAL
UROBILINOGEN UR STRIP-ACNC: 0.2 E.U./DL
WBC'S/HIGH POWER FIELD, WET PREP: ABNORMAL
YEAST, WET PREP: ABNORMAL

## 2024-02-27 PROCEDURE — 81003 URINALYSIS AUTO W/O SCOPE: CPT | Performed by: PHYSICIAN ASSISTANT

## 2024-02-27 PROCEDURE — 87210 SMEAR WET MOUNT SALINE/INK: CPT | Performed by: PHYSICIAN ASSISTANT

## 2024-02-27 PROCEDURE — 99214 OFFICE O/P EST MOD 30 MIN: CPT | Performed by: PHYSICIAN ASSISTANT

## 2024-02-27 RX ORDER — TRIAMCINOLONE ACETONIDE 1 MG/G
CREAM TOPICAL 2 TIMES DAILY
Qty: 30 G | Refills: 0 | Status: SHIPPED | OUTPATIENT
Start: 2024-02-27 | End: 2024-05-02

## 2024-02-27 RX ORDER — TERCONAZOLE 0.4 %
1 CREAM WITH APPLICATOR VAGINAL AT BEDTIME
Qty: 45 G | Refills: 0 | Status: SHIPPED | OUTPATIENT
Start: 2024-02-27 | End: 2024-05-02

## 2024-02-27 ASSESSMENT — PAIN SCALES - GENERAL: PAINLEVEL: SEVERE PAIN (6)

## 2024-02-27 NOTE — PROGRESS NOTES
Assessment & Plan     Vaginal pain  Most symptoms seems to be coming from skin externally.  No yeast or clue cells seen today.  UA normal.  Will treat topically for a week.  Follow up if not improving.  - triamcinolone (KENALOG) 0.1 % external cream; Apply topically 2 times daily  - terconazole (TERAZOL 7) 0.4 % vaginal cream; Place 1 applicator vaginally at bedtime    Vaginal discharge    - Wet prep - lab collect; Future  - UA Macroscopic with reflex to Microscopic and Culture - Lab Collect; Future  - Wet prep - lab collect  - UA Macroscopic with reflex to Microscopic and Culture - Lab Collect      Depression Screening Follow Up        2/26/2024     6:58 PM   PHQ   PHQ-9 Total Score 18   Q9: Thoughts of better off dead/self-harm past 2 weeks Several days   F/U: Thoughts of suicide or self-harm No   F/U: Safety concerns No       Follow Up    Follow Up Actions Taken  Referred patient back to mental health provider    Discussed the following ways the patient can remain in a safe environment:   denied suicidal intent        Nichole Fontana is a 39 year old, presenting for the following health issues:  Vaginal Problem        2/27/2024     9:45 AM   Additional Questions   Roomed by Lisa Magill, CMA   Accompanied by self         2/27/2024     9:45 AM   Patient Reported Additional Medications   Patient reports taking the following new medications NONE     Vaginal Problem     History of Present Illness       Reason for visit:  Yeast infection or bacteria vaganosis    She eats 2-3 servings of fruits and vegetables daily.She consumes 0 sweetened beverage(s) daily.She exercises with enough effort to increase her heart rate 30 to 60 minutes per day.  She exercises with enough effort to increase her heart rate 5 days per week.   She is taking medications regularly.         2/26/2024     6:58 PM   PHQ   PHQ-9 Total Score 18   Q9: Thoughts of better off dead/self-harm past 2 weeks Several days   F/U: Thoughts of suicide or  "self-harm No   F/U: Safety concerns No      Moved from [facility] to [place] last year.  Psych in [facility]- denies active SI    Vaginal Symptoms  Onset/Duration: 2.5 weeks ago  Description:  Vaginal Discharge: creamy   Itching (Pruritis): YES  Burning sensation:  YES  Odor: YES  Accompanying Signs & Symptoms:  Urinary symptoms: YES- urinary frequency   Abdominal pain: No  Fever: No  History:   Sexually active: YES  New Partner: No  Possibility of Pregnancy:  No  Recent antibiotic use: YES  Previous vaginitis issues: YES  Precipitating or alleviating factors: NOTHING  Therapies tried and outcome: Monistat, Vagisil, and cranberry pills.  Patient was also on a antibiotic metronidazole and diflucan-Did NOT help symptoms.  Symptoms are a lot worse-more painful.     Was treated at an  2/13 for BV  Didn't go away completely but got better and now back with a lot of pain  Long time since any herpes outbreak but currently feels that painful, hurts just to sit  Has used OTC and Rx's without relief  Declines STD screening    Review of Systems  Constitutional, HEENT, cardiovascular, pulmonary, gi and gu systems are negative, except as otherwise noted.      Objective    /70 (BP Location: Right arm, Patient Position: Sitting, Cuff Size: Adult Regular)   Pulse 78   Temp 97.8  F (36.6  C) (Oral)   Resp 20   Ht 1.657 m (5' 5.25\")   Wt 82.2 kg (181 lb 3.2 oz)   SpO2 98%   BMI 29.92 kg/m    Body mass index is 29.92 kg/m .  Physical Exam   GENERAL: alert and no distress   (female): normal female external genitalia, normal urethral meatus , and vaginal skin findings: erythema present at introitus- no lesions or discharge seen today.  MS: no gross musculoskeletal defects noted, no edema  PSYCH: mentation appears normal, affect normal/bright    Results for orders placed or performed in visit on 02/27/24 (from the past 24 hour(s))   Wet prep - lab collect    Specimen: Vagina; Swab   Result Value Ref Range    Trichomonas " Absent Absent    Yeast Absent Absent    Clue Cells Absent Absent    WBCs/high power field 1+ (A) None   UA Macroscopic with reflex to Microscopic and Culture - Lab Collect    Specimen: Urine, Midstream   Result Value Ref Range    Color Urine Yellow Colorless, Straw, Light Yellow, Yellow    Appearance Urine Clear Clear    Glucose Urine Negative Negative mg/dL    Bilirubin Urine Negative Negative    Ketones Urine Trace (A) Negative mg/dL    Specific Gravity Urine 1.025 1.003 - 1.035    Blood Urine Negative Negative    pH Urine 6.5 5.0 - 7.0    Protein Albumin Urine Negative Negative mg/dL    Urobilinogen Urine 0.2 0.2, 1.0 E.U./dL    Nitrite Urine Negative Negative    Leukocyte Esterase Urine Negative Negative    Narrative    Microscopic not indicated           Signed Electronically by: Jonas Hart PA-C

## 2024-03-19 DIAGNOSIS — K91.2 POSTOPERATIVE MALABSORPTION: ICD-10-CM

## 2024-03-19 DIAGNOSIS — R10.13 EPIGASTRIC DISCOMFORT: ICD-10-CM

## 2024-03-20 RX ORDER — SUCRALFATE 1 G/1
1 TABLET ORAL 4 TIMES DAILY
Qty: 360 TABLET | Refills: 1 | Status: SHIPPED | OUTPATIENT
Start: 2024-03-20 | End: 2024-06-26

## 2024-05-01 ASSESSMENT — PATIENT HEALTH QUESTIONNAIRE - PHQ9
SUM OF ALL RESPONSES TO PHQ QUESTIONS 1-9: 24
SUM OF ALL RESPONSES TO PHQ QUESTIONS 1-9: 24
10. IF YOU CHECKED OFF ANY PROBLEMS, HOW DIFFICULT HAVE THESE PROBLEMS MADE IT FOR YOU TO DO YOUR WORK, TAKE CARE OF THINGS AT HOME, OR GET ALONG WITH OTHER PEOPLE: EXTREMELY DIFFICULT

## 2024-05-02 ENCOUNTER — OFFICE VISIT (OUTPATIENT)
Dept: FAMILY MEDICINE | Facility: CLINIC | Age: 39
End: 2024-05-02
Payer: COMMERCIAL

## 2024-05-02 VITALS
TEMPERATURE: 98.1 F | HEIGHT: 65 IN | OXYGEN SATURATION: 98 % | WEIGHT: 182.6 LBS | DIASTOLIC BLOOD PRESSURE: 62 MMHG | SYSTOLIC BLOOD PRESSURE: 100 MMHG | BODY MASS INDEX: 30.42 KG/M2 | RESPIRATION RATE: 15 BRPM | HEART RATE: 83 BPM

## 2024-05-02 DIAGNOSIS — M54.42 ACUTE LEFT-SIDED LOW BACK PAIN WITH LEFT-SIDED SCIATICA: Primary | ICD-10-CM

## 2024-05-02 DIAGNOSIS — F33.9 RECURRENT MAJOR DEPRESSIVE DISORDER, REMISSION STATUS UNSPECIFIED (H): ICD-10-CM

## 2024-05-02 PROBLEM — T81.40XA POSTOPERATIVE INFECTION, UNSPECIFIED TYPE, INITIAL ENCOUNTER: Status: RESOLVED | Noted: 2023-02-27 | Resolved: 2024-05-02

## 2024-05-02 PROBLEM — F34.1 PERSISTENT DEPRESSIVE DISORDER: Status: RESOLVED | Noted: 2019-09-17 | Resolved: 2024-05-02

## 2024-05-02 PROCEDURE — 99214 OFFICE O/P EST MOD 30 MIN: CPT | Performed by: NURSE PRACTITIONER

## 2024-05-02 RX ORDER — PREDNISONE 20 MG/1
40 TABLET ORAL DAILY
Qty: 10 TABLET | Refills: 0 | Status: SHIPPED | OUTPATIENT
Start: 2024-05-02 | End: 2024-06-17

## 2024-05-02 RX ORDER — CYCLOBENZAPRINE HCL 10 MG
10 TABLET ORAL 3 TIMES DAILY PRN
Qty: 15 TABLET | Refills: 0 | Status: ON HOLD | OUTPATIENT
Start: 2024-05-02 | End: 2024-06-21

## 2024-05-02 ASSESSMENT — ENCOUNTER SYMPTOMS
DIZZINESS: 0
UNEXPECTED WEIGHT CHANGE: 0
NECK PAIN: 0
CHILLS: 0
CONSTIPATION: 0
NUMBNESS: 0
PALPITATIONS: 0
SHORTNESS OF BREATH: 0
ABDOMINAL PAIN: 0
DYSURIA: 0
CHEST TIGHTNESS: 0
WEAKNESS: 0
FEVER: 0
DIARRHEA: 0
BACK PAIN: 1

## 2024-05-02 ASSESSMENT — PAIN SCALES - GENERAL: PAINLEVEL: SEVERE PAIN (7)

## 2024-05-02 NOTE — ASSESSMENT & PLAN NOTE
Has significant longstanding history of depression with hospitalizations in the past.  Currently feels her depression is reasonably controlled.  Sees a therapist routinely and has psychiatry managing medications.  Her mental health concerns certainly do cause further concern with her low back pain that she is managing right now.  We discussed this and patient is comfortable with the current management of depression and anxiety right now does not feel her low back pain is making her mental health concerns worse.  Understands emergency plan should she worsen.

## 2024-05-03 DIAGNOSIS — R10.13 EPIGASTRIC DISCOMFORT: ICD-10-CM

## 2024-05-03 DIAGNOSIS — Z98.84 S/P GASTRIC BYPASS: ICD-10-CM

## 2024-05-03 RX ORDER — OMEPRAZOLE 40 MG/1
40 CAPSULE, DELAYED RELEASE ORAL DAILY
Qty: 90 CAPSULE | Refills: 3 | Status: SHIPPED | OUTPATIENT
Start: 2024-05-03

## 2024-05-03 NOTE — PROGRESS NOTES
PHYSICAL THERAPY EVALUATION  Type of Visit: Evaluation    See electronic medical record for Abuse and Falls Screening details.    Subjective  Pt. complains of left LBP that has been present for 3 months.  Mechanism/History of injury/symptoms: she suspects it began after after doing a lot of driving [place] and from [place].   Patient s chief complaints: L LBP with left LE radiculopathy.  Symptoms are exacerbated by sitting or laying.  Symptoms are relieved by  nothing.   Current function restrictions:  sitting or laying down and driving.  Previous functional status as reported by patient: unlimited.  8 chiro visits without any affect    Notes from referring provider:    1 month of low back pain on left side.  Pain does now radiate down the left leg to the foot.  She denies any weakness in the leg.  She denies any bowel or bladder control.  She has no fever chills night sweats.  She has no history of injury associated with this low back pain.  Has been using chiropractic for a month and symptoms are worsening.  Does work at home and sits at a desk all day long.  Her pain is otherwise not limiting activities of daily living.         Presenting condition or subjective complaint: Very bad sciatica pain down my left side/lower back  Date of onset:      Relevant medical history:     Dates & types of surgery: Gastric bypass, gallbladder, major skin removal    Prior diagnostic imaging/testing results:       Prior therapy history for the same diagnosis, illness or injury: No      Prior Level of Function  Transfers: Independent  Ambulation: Independent  ADL: Independent  IADL:     Living Environment  Social support: With a significant other or spouse   Type of home: House   Stairs to enter the home: Yes 4 Is there a railing: No   Ramp: No   Stairs inside the home: Yes 12 Is there a railing: Yes   Help at home: None  Equipment owned:       Employment: Yes   Hobbies/Interests: Scrapbooking, camping, swimming, cooking    Patient  goals for therapy: Sit and sleep    Pain assessment: 6/10 today, 8/10 at worst     Objective   Lumbar alignment: Decreased lumbar lordosis in standing  Positive slump test  Negative straight leg raise test  Repeated extension in lying centralized symptoms  Repeated extension in standing decreased radicular symptoms and increased low back symptoms  Lumbar range of motion: Flexion 100% with no effect, extension 75%, right and left sidebending 100%  Core strength 3/5  Negative hip screen  Normal sensation and strength bilaterally    Assessment & Plan   CLINICAL IMPRESSIONS  Medical Diagnosis: L LBP    Treatment Diagnosis: L LBP   Impression/Assessment: Patient is a 39 year old female with LBP with L radiculopathy complaints.  The following significant findings have been identified: Decreased ROM/flexibility, Decreased joint mobility, Decreased strength, Impaired muscle performance, and Decreased activity tolerance. These impairments interfere with their ability to perform self care tasks, recreational activities, driving , household mobility, and community mobility as compared to previous level of function.     Clinical Decision Making (Complexity):  Clinical Presentation: Stable/Uncomplicated  Clinical Presentation Rationale: based on medical and personal factors listed in PT evaluation  Clinical Decision Making (Complexity): Low complexity    PLAN OF CARE  Treatment Interventions:  Interventions: Manual Therapy, Neuromuscular Re-education, Therapeutic Activity, Therapeutic Exercise    Long Term Goals     PT Goal 1  Goal Description: Pt will be able to tolerate sitting for 45 minutes  Rationale: to maximize safety and independence with performance of ADLs and functional tasks;to maximize safety and independence within the home;to maximize safety and independence within the community  Target Date: 07/01/24      Frequency of Treatment: 1 x week  Duration of Treatment: 8 week    Recommended Referrals to Other  Professionals:   Education Assessment:   Learner/Method: Patient;Listening;Demonstration;Pictures/Video  Education Comments: Pt educated on plan of care    Risks and benefits of evaluation/treatment have been explained.   Patient/Family/caregiver agrees with Plan of Care.     Evaluation Time:     PT Eval, Low Complexity Minutes (25885): 15       Signing Clinician: Elan Arndt, PT      [facility]                                                                                   OUTPATIENT PHYSICAL THERAPY      PLAN OF TREATMENT FOR OUTPATIENT REHABILITATION   Patient's Last Name, First Name, Marizol Figueroa YOB: 1985   Provider's Name   [facility]   Medical Record No.  [id number]     Onset Date:    Start of Care Date: 05/06/24     Medical Diagnosis:  L LBP      PT Treatment Diagnosis:  L LBP Plan of Treatment  Frequency/Duration: 1 x week/ 8 week    Certification date from 05/06/24 to 07/01/24         See note for plan of treatment details and functional goals     Elan Arndt, PT                         I CERTIFY THE NEED FOR THESE SERVICES FURNISHED UNDER        THIS PLAN OF TREATMENT AND WHILE UNDER MY CARE     (Physician attestation of this document indicates review and certification of the therapy plan).              Referring Provider:  Georgie Hernández    Initial Assessment  See Epic Evaluation- Start of Care Date: 05/06/24

## 2024-05-06 ENCOUNTER — THERAPY VISIT (OUTPATIENT)
Dept: PHYSICAL THERAPY | Facility: CLINIC | Age: 39
End: 2024-05-06
Attending: NURSE PRACTITIONER
Payer: COMMERCIAL

## 2024-05-06 DIAGNOSIS — M54.42 ACUTE LEFT-SIDED LOW BACK PAIN WITH LEFT-SIDED SCIATICA: ICD-10-CM

## 2024-05-06 PROCEDURE — 97161 PT EVAL LOW COMPLEX 20 MIN: CPT | Mod: GP | Performed by: PHYSICAL THERAPIST

## 2024-05-06 PROCEDURE — 97112 NEUROMUSCULAR REEDUCATION: CPT | Mod: GP | Performed by: PHYSICAL THERAPIST

## 2024-05-06 PROCEDURE — 97110 THERAPEUTIC EXERCISES: CPT | Mod: GP | Performed by: PHYSICAL THERAPIST

## 2024-05-13 ENCOUNTER — OFFICE VISIT (OUTPATIENT)
Dept: FAMILY MEDICINE | Facility: CLINIC | Age: 39
End: 2024-05-13
Payer: COMMERCIAL

## 2024-05-13 ENCOUNTER — OFFICE VISIT (OUTPATIENT)
Dept: PEDIATRICS | Facility: CLINIC | Age: 39
End: 2024-05-13
Payer: COMMERCIAL

## 2024-05-13 ENCOUNTER — HOSPITAL ENCOUNTER (OUTPATIENT)
Dept: ULTRASOUND IMAGING | Facility: CLINIC | Age: 39
Discharge: HOME | End: 2024-05-13
Attending: PHYSICIAN ASSISTANT | Admitting: PHYSICIAN ASSISTANT
Payer: COMMERCIAL

## 2024-05-13 ENCOUNTER — THERAPY VISIT (OUTPATIENT)
Dept: PHYSICAL THERAPY | Facility: CLINIC | Age: 39
End: 2024-05-13
Attending: NURSE PRACTITIONER
Payer: COMMERCIAL

## 2024-05-13 VITALS
WEIGHT: 181 LBS | OXYGEN SATURATION: 100 % | HEART RATE: 85 BPM | TEMPERATURE: 97.7 F | BODY MASS INDEX: 30.12 KG/M2 | RESPIRATION RATE: 18 BRPM | DIASTOLIC BLOOD PRESSURE: 70 MMHG | SYSTOLIC BLOOD PRESSURE: 103 MMHG

## 2024-05-13 VITALS
SYSTOLIC BLOOD PRESSURE: 116 MMHG | HEART RATE: 93 BPM | OXYGEN SATURATION: 98 % | RESPIRATION RATE: 16 BRPM | TEMPERATURE: 97.9 F | DIASTOLIC BLOOD PRESSURE: 78 MMHG

## 2024-05-13 DIAGNOSIS — M79.662 PAIN OF LEFT CALF: Primary | ICD-10-CM

## 2024-05-13 DIAGNOSIS — M79.662 PAIN OF LEFT CALF: ICD-10-CM

## 2024-05-13 DIAGNOSIS — Z86.718 PERSONAL HISTORY OF DVT (DEEP VEIN THROMBOSIS): ICD-10-CM

## 2024-05-13 DIAGNOSIS — I80.02 THROMBOPHLEBITIS OF SUPERFICIAL VEINS OF LEFT LOWER EXTREMITY: ICD-10-CM

## 2024-05-13 DIAGNOSIS — Z86.711 PERSONAL HISTORY OF PULMONARY EMBOLISM: ICD-10-CM

## 2024-05-13 DIAGNOSIS — M54.50 LOW BACK PAIN: Primary | ICD-10-CM

## 2024-05-13 DIAGNOSIS — I80.02 THROMBOPHLEBITIS OF SUPERFICIAL VEINS OF LEFT LOWER EXTREMITY: Primary | ICD-10-CM

## 2024-05-13 LAB
ERYTHROCYTE [DISTWIDTH] IN BLOOD BY AUTOMATED COUNT: 16.2 % (ref 10–15)
FACTOR 2 INTERPRETATION: NORMAL
FACTOR V INTERPRETATION: NORMAL
HCT VFR BLD AUTO: 36.3 % (ref 35–47)
HGB BLD-MCNC: 11.3 G/DL (ref 11.7–15.7)
LAB DIRECTOR COMMENTS: NORMAL
LAB DIRECTOR DISCLAIMER: NORMAL
LAB DIRECTOR INTERPRETATION: NORMAL
LAB DIRECTOR METHODOLOGY: NORMAL
LAB DIRECTOR RESULTS: NORMAL
LOCATION OF TASK: NORMAL
MCH RBC QN AUTO: 28.5 PG (ref 26.5–33)
MCHC RBC AUTO-ENTMCNC: 31.1 G/DL (ref 31.5–36.5)
MCV RBC AUTO: 92 FL (ref 78–100)
PLATELET # BLD AUTO: 227 10E3/UL (ref 150–450)
RBC # BLD AUTO: 3.96 10E6/UL (ref 3.8–5.2)
SPECIMEN DESCRIPTION: NORMAL
WBC # BLD AUTO: 6.1 10E3/UL (ref 4–11)

## 2024-05-13 PROCEDURE — 81241 F5 GENE: CPT | Performed by: PHYSICIAN ASSISTANT

## 2024-05-13 PROCEDURE — 81240 F2 GENE: CPT | Performed by: PHYSICIAN ASSISTANT

## 2024-05-13 PROCEDURE — 85306 CLOT INHIBIT PROT S FREE: CPT | Performed by: PHYSICIAN ASSISTANT

## 2024-05-13 PROCEDURE — G0452 MOLECULAR PATHOLOGY INTERPR: HCPCS | Performed by: PATHOLOGY

## 2024-05-13 PROCEDURE — 97110 THERAPEUTIC EXERCISES: CPT | Mod: GP | Performed by: PHYSICAL THERAPIST

## 2024-05-13 PROCEDURE — 99207 REFERRAL TO ACUTE AND DIAGNOSTIC SERVICES: CPT | Performed by: NURSE PRACTITIONER

## 2024-05-13 PROCEDURE — 85300 ANTITHROMBIN III ACTIVITY: CPT | Performed by: PHYSICIAN ASSISTANT

## 2024-05-13 PROCEDURE — 36415 COLL VENOUS BLD VENIPUNCTURE: CPT | Performed by: PHYSICIAN ASSISTANT

## 2024-05-13 PROCEDURE — 85027 COMPLETE CBC AUTOMATED: CPT | Performed by: PHYSICIAN ASSISTANT

## 2024-05-13 PROCEDURE — 93971 EXTREMITY STUDY: CPT | Mod: LT

## 2024-05-13 PROCEDURE — 85303 CLOT INHIBIT PROT C ACTIVITY: CPT | Performed by: PHYSICIAN ASSISTANT

## 2024-05-13 PROCEDURE — 99215 OFFICE O/P EST HI 40 MIN: CPT | Performed by: PHYSICIAN ASSISTANT

## 2024-05-13 RX ORDER — APIXABAN 5 MG (74)
KIT ORAL
Qty: 74 EACH | Refills: 0 | Status: SHIPPED | OUTPATIENT
Start: 2024-05-13 | End: 2024-06-14

## 2024-05-13 SDOH — HEALTH STABILITY: PHYSICAL HEALTH: ON AVERAGE, HOW MANY DAYS PER WEEK DO YOU ENGAGE IN MODERATE TO STRENUOUS EXERCISE (LIKE A BRISK WALK)?: 4 DAYS

## 2024-05-13 SDOH — HEALTH STABILITY: PHYSICAL HEALTH: ON AVERAGE, HOW MANY MINUTES DO YOU ENGAGE IN EXERCISE AT THIS LEVEL?: 60 MIN

## 2024-05-13 ASSESSMENT — LIFESTYLE VARIABLES
HOW MANY STANDARD DRINKS CONTAINING ALCOHOL DO YOU HAVE ON A TYPICAL DAY: 1 OR 2
AUDIT-C TOTAL SCORE: 1
SKIP TO QUESTIONS 9-10: 1
HOW OFTEN DO YOU HAVE SIX OR MORE DRINKS ON ONE OCCASION: NEVER
HOW OFTEN DO YOU HAVE A DRINK CONTAINING ALCOHOL: MONTHLY OR LESS

## 2024-05-13 ASSESSMENT — SOCIAL DETERMINANTS OF HEALTH (SDOH)
IN A TYPICAL WEEK, HOW MANY TIMES DO YOU TALK ON THE PHONE WITH FAMILY, FRIENDS, OR NEIGHBORS?: THREE TIMES A WEEK
HOW OFTEN DO YOU ATTEND CHURCH OR RELIGIOUS SERVICES?: NEVER
ARE YOU MARRIED, WIDOWED, DIVORCED, SEPARATED, NEVER MARRIED, OR LIVING WITH A PARTNER?: LIVING WITH PARTNER
HOW OFTEN DO YOU ATTENT MEETINGS OF THE CLUB OR ORGANIZATION YOU BELONG TO?: NEVER
DO YOU BELONG TO ANY CLUBS OR ORGANIZATIONS SUCH AS CHURCH GROUPS UNIONS, FRATERNAL OR ATHLETIC GROUPS, OR SCHOOL GROUPS?: NO
HOW OFTEN DO YOU GET TOGETHER WITH FRIENDS OR RELATIVES?: ONCE A WEEK

## 2024-05-13 NOTE — PROGRESS NOTES
Called from PT.  Patient with left calf pain.  NO redness or swelling, but constant pain noted on left calf.  No recent or remote history of injury to explain symptoms. Does have history of thromboembolism while on OCP.  Not on anticoagulation at this time.  No shortness of breath.  NO chest pain.  Was referred for sciatica and those symptoms are resolving nicely per PT.    Objective:  Pulse 93, and O2 sat 98%  Normal temp    Left calf with pain, not acutely tender.  Worsening pain with dorsiflexion.  No redness or increased warmth.    Appt made for pt at [facility] to rule out DVT.  Report called.  Appt for 2:30 today.      Patient stable for driving.

## 2024-05-13 NOTE — RESULT ENCOUNTER NOTE
Results discussed directly with patient while patient was present. Any further details documented in the note.   Blossom Garcia PA-C

## 2024-05-13 NOTE — PROGRESS NOTES
"Acute [facility] Diagnostic Services Clinic Visit    Assessment & Plan     Thrombophlebitis of superficial veins of left lower extremity  Pain of left calf  Personal history of DVT (deep vein thrombosis)  Personal history of pulmonary embolism  Extensive review of multiple charts revealing multiple thromboembolic episodes without any history of hypercoagulation workup.  Patient was found to have occlusive superficial thrombus that is causing significant symptomatology.  Hypercoagulation workup done today and patient will be started on Eliquis.  Close follow-up with [facility].  - US Lower Extremity Venous Duplex Left  - Apixaban Starter Pack (ELIQUIS DVT/PE STARTER PACK) 5 MG TBPK  Dispense: 74 each; Refill: 0  - Adult Oncology/Hematology  Referral  - CBC with platelets  - Protein S Antigen Free  - Antithrombin III  - Factor 2 and 5 mutation analysis  - Protein C chromogenic      43  minutes were spent doing chart review, history and exam, documentation and further activities per the note.      BMI  Estimated body mass index is 30.12 kg/m  as calculated from the following:    Height as of 5/2/24: 1.651 m (5' 5\").    Weight as of this encounter: 82.1 kg (181 lb).   Weight management plan: Patient was referred to their PCP to discuss a diet and exercise plan.      Return in about 4 weeks (around 6/10/2024) for Hematology.      Blossom Garcia MBA, MS, PA-C  [facility]        Nichole Fontana is a 39 year old, presenting for the following health issues:  Leg Pain (Left leg pain X 2 weeks)        5/2/2024    10:18 AM   Additional Questions   Roomed by Stephany LAL LPN     HPI     Evaluation for possible DVT  Onset/Duration: X 2 weeks  Description:       Location: left lower extremity       Redness: no        Pain: 8/10       Warmth: YES- calf slightly       Joint swelling no   Progression of symptoms worse  Accompanying signs and " "symptoms:       Fevers: no        Numbness/tingling/weakness: no        Chest pain/pleurisy: no        Shortness of breath: YES- \"a little bit\"  History        Trauma: no         Recent travel/when: no         Previous history of DVT: YES- bilaterally lower extremity         Family history of DVT: no         Recent surgery: no   Aggravating factors include: sitting, standing, walking, and climbing stairs  Therapies tried and outcome: physical therapy for sciatic pain in left leg, stretching  Prior surgery on arteries of veins in this area: No    Multiple thrombosis episodes:  2014 - Had bilateral DVT and PE on oral contraceptives. Anti-coagulation through June 2015 (first episode).  On anticoagulation x 1 year.  No hypercoagulation testing    4/2018 -found to have a superficial right upper extremity thrombosis after a hospital stay near her IV site through [facility].  Follow-up care based on my review of care everywhere is warm packs were planned if superficial DVT and anticoagulation if DVT.    2/2023: 3 weeks s/p [id number] panniculectomy.  Patient did develop a right lower extremity DVT despite the fact that she received 7 days postoperatively of prophylactic Lovenox.  Was treated with Eliquis but stopped this in preparation for a surgery June 2023 (which ultimately was canceled) but the patient never restarted.    To date still has not had any hypercoagulability workup.  No known family history of blood clots or clotting disorders.          Review of Systems  Constitutional, HEENT, cardiovascular, pulmonary, GI, , musculoskeletal, neuro, skin, endocrine and psych systems are negative, except as otherwise noted.      Objective    /70 (BP Location: Left arm, Patient Position: Chair, Cuff Size: Adult Large)   Pulse 85   Temp 97.7  F (36.5  C) (Oral)   Resp 18   Wt 82.1 kg (181 lb)   LMP  (LMP Unknown)   SpO2 100%   BMI 30.12 kg/m    Body mass index is 30.12 kg/m .  Physical Exam   GENERAL: alert " and no distress  EYES: Eyes grossly normal to inspection, PERRL and conjunctivae and sclerae normal  RESP: lungs clear to auscultation - no rales, rhonchi or wheezes  CV: regular rate and rhythm, normal S1 S2, no S3 or S4, no murmur, click or rub, no peripheral edema  MS: no gross musculoskeletal defects noted, tender left calf with mild compression.  SKIN: no suspicious lesions or rashes  NEURO: Normal strength and tone, mentation intact and speech normal  PSYCH: mentation appears normal, affect normal/bright    Results for orders placed or performed during the hospital encounter of 05/13/24   US Lower Extremity Venous Duplex Left     Status: None    Narrative    VENOUS ULTRASOUND LEFT LEG  5/13/2024 3:29 PM     HISTORY: hx dvt x 2, left calf pain - severe; Pain of left calf;  Personal history of DVT (deep vein thrombosis)    COMPARISON: None.    FINDINGS:  Examination of the deep veins with graded compression and  color flow Doppler with spectral wave form analysis was performed.   There is no evidence for DVT in the left lower extremity. There is are  partially occlusive superficial venous thrombus in the left greater  saphenous vein in the proximal thigh. This is less than 5 cm in  length.      Impression    IMPRESSION: No evidence of deep venous thrombosis.  Superficial venous  thrombus in the left greater saphenous vein.    KIMBERLY DYE MD         SYSTEM ID:  [id number]   Results for orders placed or performed in visit on 05/13/24   CBC with platelets     Status: Abnormal   Result Value Ref Range    WBC Count 6.1 4.0 - 11.0 10e3/uL    RBC Count 3.96 3.80 - 5.20 10e6/uL    Hemoglobin 11.3 (L) 11.7 - 15.7 g/dL    Hematocrit 36.3 35.0 - 47.0 %    MCV 92 78 - 100 fL    MCH 28.5 26.5 - 33.0 pg    MCHC 31.1 (L) 31.5 - 36.5 g/dL    RDW 16.2 (H) 10.0 - 15.0 %    Platelet Count 227 150 - 450 10e3/uL           Signed Electronically by: Blossom Garcia PA-C

## 2024-05-14 RX ORDER — APIXABAN 5 MG (74)
KIT ORAL
OUTPATIENT
Start: 2024-05-14

## 2024-05-14 NOTE — TELEPHONE ENCOUNTER
Please call patient and see if she is wanting this medication  sent to  a different pharmacy.  Thanks!

## 2024-05-14 NOTE — TELEPHONE ENCOUNTER
Called the pt.  She said she would like to keep eliquis at [facility] in [place], but they were out yesterday and so is [facility] in [place].  She is going to call to see if [facility] in [place] got some.  Advised if they didn't she should see if another pharmacy around here has it because it is important she have this medication asap.  She agrees.  Advised to let us know what is needed.      Called [facility] pharmacy.  They do have one in stock.      Called the pt to advise of this.  [facility]  If they don't have it advised, to call [facility] pharmacy to have it transferred asap.  Advised it is very important to start this asap.

## 2024-05-15 LAB
AT III ACT/NOR PPP CHRO: 113 % (ref 85–135)
PROT C ACT/NOR PPP CHRO: 89 % (ref 70–170)
PROT S FREE AG ACT/NOR PPP IA: 79 % (ref 55–125)

## 2024-05-20 ENCOUNTER — APPOINTMENT (OUTPATIENT)
Dept: CT IMAGING | Facility: CLINIC | Age: 39
End: 2024-05-20
Attending: EMERGENCY MEDICINE
Payer: COMMERCIAL

## 2024-05-20 ENCOUNTER — APPOINTMENT (OUTPATIENT)
Dept: ULTRASOUND IMAGING | Facility: CLINIC | Age: 39
End: 2024-05-20
Attending: EMERGENCY MEDICINE
Payer: COMMERCIAL

## 2024-05-20 ENCOUNTER — HOSPITAL ENCOUNTER (EMERGENCY)
Facility: CLINIC | Age: 39
Discharge: HOME | End: 2024-05-20
Attending: EMERGENCY MEDICINE | Admitting: EMERGENCY MEDICINE
Payer: COMMERCIAL

## 2024-05-20 VITALS
HEIGHT: 65 IN | OXYGEN SATURATION: 100 % | TEMPERATURE: 98.1 F | RESPIRATION RATE: 18 BRPM | SYSTOLIC BLOOD PRESSURE: 118 MMHG | BODY MASS INDEX: 30.93 KG/M2 | HEART RATE: 64 BPM | DIASTOLIC BLOOD PRESSURE: 80 MMHG | WEIGHT: 185.63 LBS

## 2024-05-20 DIAGNOSIS — R07.9 CHEST PAIN, UNSPECIFIED TYPE: ICD-10-CM

## 2024-05-20 DIAGNOSIS — M79.662 PAIN OF LEFT CALF: ICD-10-CM

## 2024-05-20 LAB
ANION GAP SERPL CALCULATED.3IONS-SCNC: 11 MMOL/L (ref 7–15)
ATRIAL RATE - MUSE: 68 BPM
BASOPHILS # BLD AUTO: 0 10E3/UL (ref 0–0.2)
BASOPHILS NFR BLD AUTO: 1 %
BUN SERPL-MCNC: 18.3 MG/DL (ref 6–20)
CALCIUM SERPL-MCNC: 8.9 MG/DL (ref 8.6–10)
CHLORIDE SERPL-SCNC: 109 MMOL/L (ref 98–107)
CREAT SERPL-MCNC: 0.76 MG/DL (ref 0.51–0.95)
DEPRECATED HCO3 PLAS-SCNC: 21 MMOL/L (ref 22–29)
DIASTOLIC BLOOD PRESSURE - MUSE: NORMAL MMHG
EGFRCR SERPLBLD CKD-EPI 2021: >90 ML/MIN/1.73M2
EOSINOPHIL # BLD AUTO: 0.3 10E3/UL (ref 0–0.7)
EOSINOPHIL NFR BLD AUTO: 5 %
ERYTHROCYTE [DISTWIDTH] IN BLOOD BY AUTOMATED COUNT: 16.4 % (ref 10–15)
GLUCOSE SERPL-MCNC: 108 MG/DL (ref 70–99)
HCT VFR BLD AUTO: 34.9 % (ref 35–47)
HGB BLD-MCNC: 10.9 G/DL (ref 11.7–15.7)
IMM GRANULOCYTES # BLD: 0 10E3/UL
IMM GRANULOCYTES NFR BLD: 0 %
INR PPP: 1.08 (ref 0.85–1.15)
INTERPRETATION ECG - MUSE: NORMAL
LYMPHOCYTES # BLD AUTO: 2.5 10E3/UL (ref 0.8–5.3)
LYMPHOCYTES NFR BLD AUTO: 36 %
MCH RBC QN AUTO: 28.6 PG (ref 26.5–33)
MCHC RBC AUTO-ENTMCNC: 31.2 G/DL (ref 31.5–36.5)
MCV RBC AUTO: 92 FL (ref 78–100)
MONOCYTES # BLD AUTO: 0.5 10E3/UL (ref 0–1.3)
MONOCYTES NFR BLD AUTO: 7 %
NEUTROPHILS # BLD AUTO: 3.6 10E3/UL (ref 1.6–8.3)
NEUTROPHILS NFR BLD AUTO: 51 %
NRBC # BLD AUTO: 0 10E3/UL
NRBC BLD AUTO-RTO: 0 /100
P AXIS - MUSE: 106 DEGREES
PLATELET # BLD AUTO: 208 10E3/UL (ref 150–450)
POTASSIUM SERPL-SCNC: 4.2 MMOL/L (ref 3.4–5.3)
PR INTERVAL - MUSE: 144 MS
QRS DURATION - MUSE: 68 MS
QT - MUSE: 370 MS
QTC - MUSE: 393 MS
R AXIS - MUSE: 32 DEGREES
RBC # BLD AUTO: 3.81 10E6/UL (ref 3.8–5.2)
SODIUM SERPL-SCNC: 141 MMOL/L (ref 135–145)
SYSTOLIC BLOOD PRESSURE - MUSE: NORMAL MMHG
T AXIS - MUSE: 49 DEGREES
VENTRICULAR RATE- MUSE: 68 BPM
WBC # BLD AUTO: 6.9 10E3/UL (ref 4–11)

## 2024-05-20 PROCEDURE — 93005 ELECTROCARDIOGRAM TRACING: CPT

## 2024-05-20 PROCEDURE — 250N000011 HC RX IP 250 OP 636: Performed by: EMERGENCY MEDICINE

## 2024-05-20 PROCEDURE — 80048 BASIC METABOLIC PNL TOTAL CA: CPT | Performed by: EMERGENCY MEDICINE

## 2024-05-20 PROCEDURE — 99285 EMERGENCY DEPT VISIT HI MDM: CPT | Mod: 25

## 2024-05-20 PROCEDURE — 93971 EXTREMITY STUDY: CPT | Mod: LT

## 2024-05-20 PROCEDURE — 71275 CT ANGIOGRAPHY CHEST: CPT

## 2024-05-20 PROCEDURE — 85049 AUTOMATED PLATELET COUNT: CPT | Performed by: EMERGENCY MEDICINE

## 2024-05-20 PROCEDURE — 36415 COLL VENOUS BLD VENIPUNCTURE: CPT | Performed by: EMERGENCY MEDICINE

## 2024-05-20 PROCEDURE — 250N000009 HC RX 250: Performed by: EMERGENCY MEDICINE

## 2024-05-20 PROCEDURE — 250N000013 HC RX MED GY IP 250 OP 250 PS 637: Performed by: EMERGENCY MEDICINE

## 2024-05-20 PROCEDURE — 85610 PROTHROMBIN TIME: CPT | Performed by: EMERGENCY MEDICINE

## 2024-05-20 RX ORDER — IOPAMIDOL 755 MG/ML
500 INJECTION, SOLUTION INTRAVASCULAR ONCE
Status: COMPLETED | OUTPATIENT
Start: 2024-05-20 | End: 2024-05-20

## 2024-05-20 RX ORDER — OXYCODONE HYDROCHLORIDE 5 MG/1
5 TABLET ORAL ONCE
Status: COMPLETED | OUTPATIENT
Start: 2024-05-20 | End: 2024-05-20

## 2024-05-20 RX ORDER — ACETAMINOPHEN 500 MG
1000 TABLET ORAL ONCE
Status: COMPLETED | OUTPATIENT
Start: 2024-05-20 | End: 2024-05-20

## 2024-05-20 RX ORDER — OXYCODONE HYDROCHLORIDE 5 MG/1
5 TABLET ORAL EVERY 6 HOURS PRN
Qty: 6 TABLET | Refills: 0 | Status: SHIPPED | OUTPATIENT
Start: 2024-05-20 | End: 2024-05-23

## 2024-05-20 RX ORDER — METHOCARBAMOL 500 MG/1
500 TABLET, FILM COATED ORAL 4 TIMES DAILY PRN
Qty: 40 TABLET | Refills: 0 | Status: SHIPPED | OUTPATIENT
Start: 2024-05-20 | End: 2024-06-17

## 2024-05-20 RX ADMIN — IOPAMIDOL 75 ML: 755 INJECTION, SOLUTION INTRAVENOUS at 18:15

## 2024-05-20 RX ADMIN — SODIUM CHLORIDE 93 ML: 9 INJECTION, SOLUTION INTRAVENOUS at 18:15

## 2024-05-20 RX ADMIN — ACETAMINOPHEN 1000 MG: 500 TABLET, FILM COATED ORAL at 18:02

## 2024-05-20 ASSESSMENT — COLUMBIA-SUICIDE SEVERITY RATING SCALE - C-SSRS
2. HAVE YOU ACTUALLY HAD ANY THOUGHTS OF KILLING YOURSELF IN THE PAST MONTH?: NO
6. HAVE YOU EVER DONE ANYTHING, STARTED TO DO ANYTHING, OR PREPARED TO DO ANYTHING TO END YOUR LIFE?: NO
1. IN THE PAST MONTH, HAVE YOU WISHED YOU WERE DEAD OR WISHED YOU COULD GO TO SLEEP AND NOT WAKE UP?: NO

## 2024-05-20 ASSESSMENT — ACTIVITIES OF DAILY LIVING (ADL)
ADLS_ACUITY_SCORE: 33
ADLS_ACUITY_SCORE: 35
ADLS_ACUITY_SCORE: 35

## 2024-05-20 NOTE — ED TRIAGE NOTES
Patient reports she was diagnosed with a DVT in her left leg last week, today is having increased pain and chest pain.  Patient also reports minor SOB.      Triage Assessment (Adult)       Row Name 05/20/24 9834          Triage Assessment    Airway WDL WDL        Respiratory WDL    Respiratory WDL WDL        Skin Circulation/Temperature WDL    Skin Circulation/Temperature WDL WDL        Cardiac WDL    Cardiac WDL WDL        Peripheral/Neurovascular WDL    Peripheral Neurovascular WDL WDL        Cognitive/Neuro/Behavioral WDL    Cognitive/Neuro/Behavioral WDL WDL

## 2024-05-20 NOTE — Clinical Note
Marizol Granados was seen and treated in our emergency department on 5/20/2024.  She may return to work on 05/23/2024.       If you have any questions or concerns, please don't hesitate to call.      Lani Ribera MD
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
## 2024-05-21 NOTE — DISCHARGE INSTRUCTIONS
Continue with tylenol  May use muscle relaxant and oxycodone for severe pain  No driving or working while taking these medications  Recheck with your doctor in 2 days
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
## 2024-06-14 ENCOUNTER — OFFICE VISIT (OUTPATIENT)
Dept: HEMATOLOGY | Facility: CLINIC | Age: 39
End: 2024-06-14
Attending: PHYSICIAN ASSISTANT
Payer: COMMERCIAL

## 2024-06-14 VITALS
DIASTOLIC BLOOD PRESSURE: 65 MMHG | BODY MASS INDEX: 29.71 KG/M2 | HEIGHT: 65 IN | SYSTOLIC BLOOD PRESSURE: 98 MMHG | OXYGEN SATURATION: 100 % | HEART RATE: 102 BPM | TEMPERATURE: 97.8 F | WEIGHT: 178.3 LBS

## 2024-06-14 DIAGNOSIS — D64.9 ANEMIA, UNSPECIFIED TYPE: Primary | ICD-10-CM

## 2024-06-14 DIAGNOSIS — Z86.718 PERSONAL HISTORY OF DVT (DEEP VEIN THROMBOSIS): ICD-10-CM

## 2024-06-14 DIAGNOSIS — I80.02 THROMBOPHLEBITIS OF SUPERFICIAL VEINS OF LEFT LOWER EXTREMITY: ICD-10-CM

## 2024-06-14 DIAGNOSIS — Z86.711 PERSONAL HISTORY OF PULMONARY EMBOLISM: ICD-10-CM

## 2024-06-14 DIAGNOSIS — K59.00 CONSTIPATION, UNSPECIFIED CONSTIPATION TYPE: ICD-10-CM

## 2024-06-14 DIAGNOSIS — M79.662 PAIN OF LEFT CALF: ICD-10-CM

## 2024-06-14 LAB
ALBUMIN SERPL BCG-MCNC: 4.4 G/DL (ref 3.5–5.2)
ALP SERPL-CCNC: 60 U/L (ref 40–150)
ALT SERPL W P-5'-P-CCNC: 20 U/L (ref 0–50)
AST SERPL W P-5'-P-CCNC: 17 U/L (ref 0–45)
BILIRUB DIRECT SERPL-MCNC: <0.2 MG/DL (ref 0–0.3)
BILIRUB SERPL-MCNC: 0.3 MG/DL
FERRITIN SERPL-MCNC: 9 NG/ML (ref 6–175)
HGB BLD-MCNC: 11.7 G/DL (ref 11.7–15.7)
IRON BINDING CAPACITY (ROCHE): 369 UG/DL (ref 240–430)
IRON SATN MFR SERPL: 12 % (ref 15–46)
IRON SERPL-MCNC: 45 UG/DL (ref 37–145)
PROT SERPL-MCNC: 7 G/DL (ref 6.4–8.3)

## 2024-06-14 PROCEDURE — 99205 OFFICE O/P NEW HI 60 MIN: CPT | Performed by: PHYSICIAN ASSISTANT

## 2024-06-14 PROCEDURE — 82040 ASSAY OF SERUM ALBUMIN: CPT | Performed by: PHYSICIAN ASSISTANT

## 2024-06-14 PROCEDURE — 82247 BILIRUBIN TOTAL: CPT | Performed by: PHYSICIAN ASSISTANT

## 2024-06-14 PROCEDURE — 86147 CARDIOLIPIN ANTIBODY EA IG: CPT | Performed by: PHYSICIAN ASSISTANT

## 2024-06-14 PROCEDURE — 86364 TISS TRNSGLTMNASE EA IG CLAS: CPT | Performed by: PHYSICIAN ASSISTANT

## 2024-06-14 PROCEDURE — 82728 ASSAY OF FERRITIN: CPT | Performed by: PHYSICIAN ASSISTANT

## 2024-06-14 PROCEDURE — 85018 HEMOGLOBIN: CPT | Performed by: PHYSICIAN ASSISTANT

## 2024-06-14 PROCEDURE — 36415 COLL VENOUS BLD VENIPUNCTURE: CPT | Performed by: PHYSICIAN ASSISTANT

## 2024-06-14 PROCEDURE — 84443 ASSAY THYROID STIM HORMONE: CPT | Performed by: PHYSICIAN ASSISTANT

## 2024-06-14 PROCEDURE — 83550 IRON BINDING TEST: CPT | Performed by: PHYSICIAN ASSISTANT

## 2024-06-14 PROCEDURE — G0463 HOSPITAL OUTPT CLINIC VISIT: HCPCS | Performed by: PHYSICIAN ASSISTANT

## 2024-06-14 PROCEDURE — 86146 BETA-2 GLYCOPROTEIN ANTIBODY: CPT | Performed by: PHYSICIAN ASSISTANT

## 2024-06-14 PROCEDURE — 82784 ASSAY IGA/IGD/IGG/IGM EACH: CPT | Performed by: PHYSICIAN ASSISTANT

## 2024-06-14 PROCEDURE — G2211 COMPLEX E/M VISIT ADD ON: HCPCS | Performed by: PHYSICIAN ASSISTANT

## 2024-06-14 PROCEDURE — 83540 ASSAY OF IRON: CPT | Performed by: PHYSICIAN ASSISTANT
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
## 2024-06-17 ENCOUNTER — VIRTUAL VISIT (OUTPATIENT)
Dept: FAMILY MEDICINE | Facility: CLINIC | Age: 39
End: 2024-06-17
Payer: COMMERCIAL

## 2024-06-17 DIAGNOSIS — K59.00 CONSTIPATION, UNSPECIFIED CONSTIPATION TYPE: Primary | ICD-10-CM

## 2024-06-17 LAB
B2 GLYCOPROT1 IGG SERPL IA-ACNC: <0.8 U/ML
B2 GLYCOPROT1 IGM SERPL IA-ACNC: <2.4 U/ML
CARDIOLIPIN IGG SER IA-ACNC: <2 GPL-U/ML
CARDIOLIPIN IGG SER IA-ACNC: NEGATIVE
CARDIOLIPIN IGM SER IA-ACNC: <2 MPL-U/ML
CARDIOLIPIN IGM SER IA-ACNC: NEGATIVE

## 2024-06-17 PROCEDURE — 99214 OFFICE O/P EST MOD 30 MIN: CPT | Mod: 95 | Performed by: NURSE PRACTITIONER

## 2024-06-17 NOTE — PROGRESS NOTES
Marizol is a 39 year old who is being evaluated via a billable video visit.    How would you like to obtain your AVS? MyChart  If the video visit is dropped, the invitation should be resent by: Text to cell phone: [phone]  Will anyone else be joining your video visit? No      Assessment & Plan     Constipation, unspecified constipation type  Recent bowel changes.  Labs ordered.  Colonoscopy ordered.  - IgA; Future  - Tissue transglutaminase darlin IgA and IgG; Future  - CBC with platelets and differential; Future  - Comprehensive metabolic panel (BMP + Alb, Alk Phos, ALT, AST, Total. Bili, TP); Future  - TSH with free T4 reflex; Future  - Colonoscopy Screening  Referral; Future      Subjective   Marizol is a 39 year old, presenting for the following health issues:  Gastrointestinal Problem        6/17/2024     1:50 PM   Additional Questions   Roomed by rox       Video Start Time: 2:16 PM    History of Present Illness       Reason for visit:  Stomach/digestive/stool/butt    She eats 2-3 servings of fruits and vegetables daily.She consumes 0 sweetened beverage(s) daily.She exercises with enough effort to increase her heart rate 30 to 60 minutes per day.  She exercises with enough effort to increase her heart rate 4 days per week.   She is taking medications regularly.     3 weeks of symptoms.  Started with constipation symptoms.  After having symptoms for 5-6 days she took a laxative.  This was not helpful.  She changed her diet without change either.  She has had constant pain and has the sensation she does need to have a bm.  She took laxatives again and again this was not helpful.  She also completed an enema and this did not help.  The last time she had a bm was about 3 weeks ago.  This occurred once many years ago.  She is eating and drinking.  This seems to make her stomach symptoms worse.  She feels the urge to defacate but nothing comes of it.  No fever.  No recent travel or abx.  No close contacts with  symptoms.  No ibuprofen or aleve.  Right at the start of symptoms she saw a few drops of blood in the stool.  During the past 3 weeks she has lost 6-7#.  No family hx of celiac.    No smoking.  Normal bp.  No regular otc meds.  Prior to these symptoms she had daily bowel movements.    Dad dx with colon cancer at age 67.    She has had bowel issues in the past but those had resolved prior to this.        Review of Systems  Constitutional, HEENT, cardiovascular, pulmonary, gi and gu systems are negative, except as otherwise noted.      Objective           Vitals:  No vitals were obtained today due to virtual visit.    Physical Exam   GENERAL: alert and no distress  EYES: Eyes grossly normal to inspection.  No discharge or erythema, or obvious scleral/conjunctival abnormalities.  RESP: No audible wheeze, cough, or visible cyanosis.    SKIN: Visible skin clear. No significant rash, abnormal pigmentation or lesions.  NEURO: Cranial nerves grossly intact.  Mentation and speech appropriate for age.  PSYCH: Appropriate affect, tone, and pace of words          Video-Visit Details    Type of service:  Video Visit   Video End Time:230pm  Originating Location (pt. Location): Home    Distant Location (provider location):  On-site  Platform used for Video Visit: Brii  Signed Electronically by: GERALDO Carter Ra, CNP

## 2024-06-18 ENCOUNTER — TELEPHONE (OUTPATIENT)
Dept: GASTROENTEROLOGY | Facility: CLINIC | Age: 39
End: 2024-06-18
Payer: COMMERCIAL

## 2024-06-18 ENCOUNTER — TELEPHONE (OUTPATIENT)
Dept: GASTROENTEROLOGY | Facility: CLINIC | Age: 39
End: 2024-06-18

## 2024-06-18 DIAGNOSIS — K59.00 CONSTIPATION, UNSPECIFIED CONSTIPATION TYPE: Primary | ICD-10-CM

## 2024-06-18 LAB
ALBUMIN SERPL BCG-MCNC: 4.5 G/DL (ref 3.5–5.2)
ALP SERPL-CCNC: 57 U/L (ref 40–150)
ALT SERPL W P-5'-P-CCNC: 21 U/L (ref 0–50)
ANION GAP SERPL CALCULATED.3IONS-SCNC: 12 MMOL/L (ref 7–15)
AST SERPL W P-5'-P-CCNC: 18 U/L (ref 0–45)
BILIRUB SERPL-MCNC: 0.2 MG/DL
BUN SERPL-MCNC: 11.3 MG/DL (ref 6–20)
CALCIUM SERPL-MCNC: 9.2 MG/DL (ref 8.6–10)
CHLORIDE SERPL-SCNC: 108 MMOL/L (ref 98–107)
CREAT SERPL-MCNC: 0.94 MG/DL (ref 0.51–0.95)
DEPRECATED HCO3 PLAS-SCNC: 20 MMOL/L (ref 22–29)
EGFRCR SERPLBLD CKD-EPI 2021: 80 ML/MIN/1.73M2
GLUCOSE SERPL-MCNC: 97 MG/DL (ref 70–99)
IGA SERPL-MCNC: 138 MG/DL (ref 84–499)
POTASSIUM SERPL-SCNC: 4.6 MMOL/L (ref 3.4–5.3)
PROT SERPL-MCNC: 6.8 G/DL (ref 6.4–8.3)
SODIUM SERPL-SCNC: 140 MMOL/L (ref 135–145)
TSH SERPL DL<=0.005 MIU/L-ACNC: 3.01 UIU/ML (ref 0.3–4.2)
TTG IGA SER-ACNC: <0.2 U/ML
TTG IGG SER-ACNC: <0.6 U/ML

## 2024-06-18 RX ORDER — BISACODYL 5 MG/1
TABLET, DELAYED RELEASE ORAL
Qty: 4 TABLET | Refills: 0 | Status: ON HOLD | OUTPATIENT
Start: 2024-06-18 | End: 2024-06-21

## 2024-06-18 NOTE — TELEPHONE ENCOUNTER
Pre Assessment RN Review     sent question through secure chat regarding patient's severity of anxiety. I was unable to locate any SHIVAM form or SHIVAM 7 score. Anxiety listed on problem list but not much more info regarding her anxiety.      Scheduling Status & Recommendations    Sedation: Moderate Sedation - Per RN assessment.    Johana Roberts RN Colorectal Cancer   Division of Gastroenterology at [facility]

## 2024-06-18 NOTE — TELEPHONE ENCOUNTER
"Endoscopy Scheduling Screen    Have you had a positive Covid test in the last 14 days?  No    What is your communication preference for Instructions and/or Bowel Prep?   MyChart    What insurance is in the chart?  Other:  [employer]    Ordering/Referring Provider: JUANCARLOS BRANHAM RA   (If ordering provider performs procedure, schedule with ordering provider unless otherwise instructed. )    BMI: Estimated body mass index is 29.67 kg/m  as calculated from the following:    Height as of 6/14/24: 1.651 m (5' 5\").    Weight as of 6/14/24: 80.9 kg (178 lb 4.8 oz).     Sedation Ordered  moderate sedation.   If patient BMI > 50 do not schedule in [facility].    If patient BMI > 45 do not schedule at [facility].    Are you taking methadone or Suboxone?  No    Have you had difficulties, pain, or discomfort during past endoscopy procedures?  No    Are you taking any prescription medications for pain 3 or more times per week?   NO, No RN review required.    Do you have a history of malignant hyperthermia?  No    (Females) Are you currently pregnant?   No     Have you been diagnosed or told you have pulmonary hypertension?   No    Do you have an LVAD?  No    Have you been told you have moderate to severe sleep apnea?  No    Have you been told you have COPD, asthma, or any other lung disease?  Yes     What breathing problems do you have?  Asthma     Do you use home oxygen?  No    Have your breathing problems required an ED visit or hospitalization in the last year?  No    Do you have any heart conditions?  No     Have you ever had or are you waiting for an organ transplant?  No. Continue scheduling, no site restrictions.    Have you had a stroke or transient ischemic attack (TIA aka \"mini stroke\" in the last 6 months?   No    Have you been diagnosed with or been told you have cirrhosis of the liver?   No    Are you currently on dialysis?   No    Do you need assistance transferring?   No    BMI: Estimated body mass index is 29.67 kg/m  as " "calculated from the following:    Height as of 6/14/24: 1.651 m (5' 5\").    Weight as of 6/14/24: 80.9 kg (178 lb 4.8 oz).     Is patients BMI > 40 and scheduling location [facility]?  No    Do you take an injectable medication for weight loss or diabetes (excluding insulin)?  No    Do you take the medication Naltrexone?  No    Do you take blood thinners?  Yes     Are you taking Effient/Prasugrel?  No, you must contact your prescribing provider for direction on holding or bridging with a different medication.       Prep   Are you currently on dialysis or do you have chronic kidney disease?  No    Do you have a diagnosis of diabetes?  No    Do you have a diagnosis of cystic fibrosis (CF)?  No    On a regular basis do you go 3 -5 days between bowel movements?  Yes (Extended Prep)    BMI > 40?  No    Preferred Pharmacy:    [facility] #[id number] [facility] [id number] AT [place] [address] [address] [place] [id number] [employer]  [facility] [address]  Phone: [phone] Fax: [phone]      Final Scheduling Details     Procedure scheduled  Colonoscopy    Surgeon:  DAIANA     Date of procedure:  6/21/2024     Pre-OP / PAC:   No - Not required for this site.    Location  [facility] - Per order.    Sedation   Moderate Sedation - Per RN assessment.      Patient Reminders:   You will receive a call from a Nurse to review instructions and health history.  This assessment must be completed prior to your procedure.  Failure to complete the Nurse assessment may result in the procedure being cancelled.      On the day of your procedure, please designate an adult(s) who can drive you home stay with you for the next 24 hours. The medicines used in the exam will make you sleepy. You will not be able to drive.      You cannot take public transportation, ride share services, or non-medical taxi service without a responsible caregiver.  Medical transport services are allowed with the requirement that a responsible caregiver will " receive you at your destination.  We require that drivers and caregivers are confirmed prior to your procedure.

## 2024-06-18 NOTE — TELEPHONE ENCOUNTER
Pre visit planning completed.      Procedure details:    Patient scheduled for Colonoscopy  on 06.21.2024.     Arrival time: 1400. Procedure time 1445    Facility location: [facility]; [address] [facility], [place] [address]. Check in location: Main entrance, door #1 on the North side of the building under roundabout awning. DO NOT GO TO SURGERY/ED ENTRANCE.     Sedation type: Conscious sedation  Discussed with patient her anxiety and sedation.  Stated that she is fine with sedation she is scheduled with.     Pre op exam needed? N/A    Indication for procedure: Constipation, unspecified constipation type       Chart review:     Electronic implanted devices? No    Recent diagnosis of diverticulitis within the last 6 weeks? No    Diabetic? No      Medication review:    Anticoagulants? Yes Apixaban (Eliquis): Recommended HOLD 2 days before procedure.  Consult with your managing provider.    NSAIDS? No NSAID medications per patient's medication list.  RN will verify with pre-assessment call.    Other medication HOLDING recommendations:  Phentermine: HOLD 7 days before procedure.  Fiber x 7 days       Prep for procedure:     Bowel prep recommendation: Extended Golytely. Bowel prep prescription sent to    [facility] #[id number] - [place] - [address] [facility] AT [place] [address] & [facility]   Due to: constipation noted or reported.     Prep instructions sent via [employer]         Randi Lott RN  Endoscopy Procedure Pre Assessment RN  [phone] option 4

## 2024-06-18 NOTE — TELEPHONE ENCOUNTER
Pre assessment completed for upcoming procedure.   (Please see previous telephone encounter notes for complete details)      Procedure details:    Arrival time and facility location reviewed.    Pre op exam needed? N/A    Designated  policy reviewed. Instructed to have someone stay 6  hours post procedure.       Medication review:    Medications reviewed. Please see supporting documentation below. Holding recommendations discussed (if applicable).   Blood thinner/Anti-platelet medication(s):  Apixaban (Eliquis): Recommended HOLD 2 days before procedure.  Consult with your managing provider.  Patient reported not taking phentermine d/t being out of medication  Patient will hold fiber supplements from 06.19 until procedure. Patient was an add on 06.18.2024  Prep for procedure:     Procedure prep instructions reviewed.        Any additional information needed:  N/A      Patient  verbalized understanding and had no questions or concerns at this time.      Randi Lott RN  Endoscopy Procedure Pre Assessment   [phone] option 4

## 2024-06-21 ENCOUNTER — HOSPITAL ENCOUNTER (OUTPATIENT)
Facility: CLINIC | Age: 39
Discharge: HOME | End: 2024-06-21
Attending: INTERNAL MEDICINE | Admitting: INTERNAL MEDICINE
Payer: COMMERCIAL

## 2024-06-21 VITALS
DIASTOLIC BLOOD PRESSURE: 78 MMHG | OXYGEN SATURATION: 100 % | SYSTOLIC BLOOD PRESSURE: 120 MMHG | HEART RATE: 75 BPM | RESPIRATION RATE: 10 BRPM

## 2024-06-21 LAB — COLONOSCOPY: NORMAL

## 2024-06-21 PROCEDURE — 45378 DIAGNOSTIC COLONOSCOPY: CPT | Performed by: INTERNAL MEDICINE

## 2024-06-21 PROCEDURE — G0105 COLORECTAL SCRN; HI RISK IND: HCPCS | Performed by: INTERNAL MEDICINE

## 2024-06-21 PROCEDURE — 250N000011 HC RX IP 250 OP 636: Performed by: INTERNAL MEDICINE

## 2024-06-21 PROCEDURE — G0500 MOD SEDAT ENDO SERVICE >5YRS: HCPCS | Performed by: INTERNAL MEDICINE

## 2024-06-21 PROCEDURE — 99153 MOD SED SAME PHYS/QHP EA: CPT | Performed by: INTERNAL MEDICINE

## 2024-06-21 RX ORDER — NALOXONE HYDROCHLORIDE 0.4 MG/ML
0.4 INJECTION, SOLUTION INTRAMUSCULAR; INTRAVENOUS; SUBCUTANEOUS
Status: DISCONTINUED | OUTPATIENT
Start: 2024-06-21 | End: 2024-06-21 | Stop reason: HOSPADM

## 2024-06-21 RX ORDER — FLUMAZENIL 0.1 MG/ML
0.2 INJECTION, SOLUTION INTRAVENOUS
Status: DISCONTINUED | OUTPATIENT
Start: 2024-06-21 | End: 2024-06-21 | Stop reason: HOSPADM

## 2024-06-21 RX ORDER — PROCHLORPERAZINE MALEATE 10 MG
10 TABLET ORAL EVERY 6 HOURS PRN
Status: DISCONTINUED | OUTPATIENT
Start: 2024-06-21 | End: 2024-06-21 | Stop reason: HOSPADM

## 2024-06-21 RX ORDER — EPINEPHRINE 1 MG/ML
0.1 INJECTION, SOLUTION INTRAMUSCULAR; SUBCUTANEOUS
Status: DISCONTINUED | OUTPATIENT
Start: 2024-06-21 | End: 2024-06-21 | Stop reason: HOSPADM

## 2024-06-21 RX ORDER — ONDANSETRON 2 MG/ML
4 INJECTION INTRAMUSCULAR; INTRAVENOUS EVERY 6 HOURS PRN
Status: DISCONTINUED | OUTPATIENT
Start: 2024-06-21 | End: 2024-06-21 | Stop reason: HOSPADM

## 2024-06-21 RX ORDER — ONDANSETRON 2 MG/ML
4 INJECTION INTRAMUSCULAR; INTRAVENOUS
Status: DISCONTINUED | OUTPATIENT
Start: 2024-06-21 | End: 2024-06-21 | Stop reason: HOSPADM

## 2024-06-21 RX ORDER — NALOXONE HYDROCHLORIDE 0.4 MG/ML
0.2 INJECTION, SOLUTION INTRAMUSCULAR; INTRAVENOUS; SUBCUTANEOUS
Status: DISCONTINUED | OUTPATIENT
Start: 2024-06-21 | End: 2024-06-21 | Stop reason: HOSPADM

## 2024-06-21 RX ORDER — ONDANSETRON 4 MG/1
4 TABLET, ORALLY DISINTEGRATING ORAL EVERY 6 HOURS PRN
Status: DISCONTINUED | OUTPATIENT
Start: 2024-06-21 | End: 2024-06-21 | Stop reason: HOSPADM

## 2024-06-21 RX ORDER — LIDOCAINE 40 MG/G
CREAM TOPICAL
Status: DISCONTINUED | OUTPATIENT
Start: 2024-06-21 | End: 2024-06-21 | Stop reason: HOSPADM

## 2024-06-21 RX ORDER — DIPHENHYDRAMINE HYDROCHLORIDE 50 MG/ML
25-50 INJECTION INTRAMUSCULAR; INTRAVENOUS
Status: DISCONTINUED | OUTPATIENT
Start: 2024-06-21 | End: 2024-06-21 | Stop reason: HOSPADM

## 2024-06-21 RX ORDER — ATROPINE SULFATE 0.1 MG/ML
1 INJECTION INTRAVENOUS
Status: DISCONTINUED | OUTPATIENT
Start: 2024-06-21 | End: 2024-06-21 | Stop reason: HOSPADM

## 2024-06-21 RX ORDER — FENTANYL CITRATE 50 UG/ML
50-100 INJECTION, SOLUTION INTRAMUSCULAR; INTRAVENOUS EVERY 5 MIN PRN
Status: DISCONTINUED | OUTPATIENT
Start: 2024-06-21 | End: 2024-06-21 | Stop reason: HOSPADM

## 2024-06-21 RX ORDER — SIMETHICONE 40MG/0.6ML
133 SUSPENSION, DROPS(FINAL DOSAGE FORM)(ML) ORAL
Status: DISCONTINUED | OUTPATIENT
Start: 2024-06-21 | End: 2024-06-21 | Stop reason: HOSPADM

## 2024-06-21 RX ADMIN — MIDAZOLAM 1 MG: 1 INJECTION INTRAMUSCULAR; INTRAVENOUS at 15:46

## 2024-06-21 RX ADMIN — MIDAZOLAM 1 MG: 1 INJECTION INTRAMUSCULAR; INTRAVENOUS at 15:42

## 2024-06-21 RX ADMIN — MIDAZOLAM 2 MG: 1 INJECTION INTRAMUSCULAR; INTRAVENOUS at 15:15

## 2024-06-21 RX ADMIN — FENTANYL CITRATE 100 MCG: 50 INJECTION, SOLUTION INTRAMUSCULAR; INTRAVENOUS at 15:16

## 2024-06-21 RX ADMIN — MIDAZOLAM 2 MG: 1 INJECTION INTRAMUSCULAR; INTRAVENOUS at 15:30

## 2024-06-21 RX ADMIN — FENTANYL CITRATE 50 MCG: 50 INJECTION, SOLUTION INTRAMUSCULAR; INTRAVENOUS at 15:43

## 2024-06-21 RX ADMIN — FENTANYL CITRATE 50 MCG: 50 INJECTION, SOLUTION INTRAMUSCULAR; INTRAVENOUS at 15:46

## 2024-06-21 RX ADMIN — FENTANYL CITRATE 100 MCG: 50 INJECTION, SOLUTION INTRAMUSCULAR; INTRAVENOUS at 15:30

## 2024-06-21 ASSESSMENT — ACTIVITIES OF DAILY LIVING (ADL)
ADLS_ACUITY_SCORE: 33
ADLS_ACUITY_SCORE: 35
ADLS_ACUITY_SCORE: 35
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
## 2024-06-21 NOTE — PROGRESS NOTES
Patient requested to leave 10 minutes early from 30 minute recovery monitoring post-procedure. Discussed with Dr. Regalado who approved of shortened recovery time. Patient was alert, oriented and stable post-procedure.  at bedside   Home

## 2024-06-26 ENCOUNTER — OFFICE VISIT (OUTPATIENT)
Dept: FAMILY MEDICINE | Facility: CLINIC | Age: 39
End: 2024-06-26
Attending: NURSE PRACTITIONER
Payer: COMMERCIAL

## 2024-06-26 VITALS
BODY MASS INDEX: 30.67 KG/M2 | HEIGHT: 65 IN | TEMPERATURE: 97.8 F | OXYGEN SATURATION: 98 % | DIASTOLIC BLOOD PRESSURE: 77 MMHG | SYSTOLIC BLOOD PRESSURE: 115 MMHG | RESPIRATION RATE: 12 BRPM | WEIGHT: 184.1 LBS | HEART RATE: 69 BPM

## 2024-06-26 DIAGNOSIS — M54.42 LEFT-SIDED LOW BACK PAIN WITH LEFT-SIDED SCIATICA, UNSPECIFIED CHRONICITY: ICD-10-CM

## 2024-06-26 DIAGNOSIS — Z86.718 HISTORY OF THROMBOEMBOLISM: Primary | ICD-10-CM

## 2024-06-26 DIAGNOSIS — K59.00 CONSTIPATION, UNSPECIFIED CONSTIPATION TYPE: ICD-10-CM

## 2024-06-26 PROCEDURE — G2211 COMPLEX E/M VISIT ADD ON: HCPCS | Performed by: NURSE PRACTITIONER

## 2024-06-26 PROCEDURE — 99213 OFFICE O/P EST LOW 20 MIN: CPT | Performed by: NURSE PRACTITIONER

## 2024-06-26 RX ORDER — PREDNISONE 20 MG/1
40 TABLET ORAL DAILY
Qty: 10 TABLET | Refills: 0 | Status: ON HOLD | OUTPATIENT
Start: 2024-06-26 | End: 2024-09-04

## 2024-06-26 RX ORDER — CALCIUM POLYCARBOPHIL 625 MG
4 TABLET ORAL DAILY
COMMUNITY
Start: 2024-06-26 | End: 2024-09-12

## 2024-06-26 ASSESSMENT — ENCOUNTER SYMPTOMS
DYSURIA: 0
UNEXPECTED WEIGHT CHANGE: 0
ABDOMINAL DISTENTION: 0
CHEST TIGHTNESS: 0
CONSTIPATION: 1
FEVER: 0
SHORTNESS OF BREATH: 0
NERVOUS/ANXIOUS: 1
DIARRHEA: 0
PALPITATIONS: 0
ABDOMINAL PAIN: 0
CONFUSION: 0
CHILLS: 0

## 2024-06-26 ASSESSMENT — PAIN SCALES - GENERAL: PAINLEVEL: NO PAIN (0)

## 2024-06-26 NOTE — PROGRESS NOTES
Assessment & Plan   History of thromboembolism  Compliant with apixaban twice daily.  No increased redness swelling or increased warmth of lower extremities.    Constipation, unspecified constipation type  Constipation continues.  Was able to complete colonoscopy successfully no abnormal findings at this time.  Continues to have difficulty having bowel movement despite the use of MiraLAX and increase fiber.  Will get abdominal ultrasound and proceed to advanced imaging if indicated.  - US Abdomen Complete    Left-sided low back pain with left-sided sciatica, unspecified chronicity  Recent history of low back pain with left-sided sciatica, was treated with prednisone steroid burst and improved.  Responded well to physical therapy.  Now with increased numbness and pain in left ankle and foot.  With changing gait and weakness.  Steroid burst today.  Refer to spine as well.  Staff working to arrange for early appointment with spine provider if acutely worse patient understands she needs urgent evaluation.  - predniSONE (DELTASONE) 20 MG tablet  Dispense: 10 tablet; Refill: 0  - Spine  Referral    The longitudinal plan of care for the diagnosis(es)/condition(s) as documented were addressed during this visit. Due to the added complexity in care, I will continue to support Marizol in the subsequent management and with ongoing continuity of care.  There are no Patient Instructions on file for this visit.   GERALDO Tillman CNP  [facility] [place]  ============================================  Subjective  No problem-specific Assessment & Plan notes found for this encounter.    History of Present Illness       Reason for visit:  Stomach/digestive/stool/butt    She eats 2-3 servings of fruits and vegetables daily.She consumes 0 sweetened beverage(s) daily.She exercises with enough effort to increase her heart rate 30 to 60 minutes per day.  She exercises with enough effort to increase her heart rate  "4 days per week.   She is taking medications regularly.     Tobacco  Allergies  Meds  Problems  Med Hx  Surg Hx  Fam Hx         Review of Systems   Constitutional:  Negative for chills, fever and unexpected weight change.   HENT: Negative.     Respiratory:  Negative for chest tightness and shortness of breath.    Cardiovascular:  Negative for chest pain and palpitations.   Gastrointestinal:  Positive for constipation. Negative for abdominal distention, abdominal pain and diarrhea.        Feels need to pass bowel movement, but is unable to do so at this time.  Has no acute pain.   Genitourinary:  Negative for dysuria.        No urinary incontinence.   Skin:  Negative for rash.   Psychiatric/Behavioral:  Negative for confusion. The patient is nervous/anxious.      ============================================  Objective    /77 (BP Location: Right arm, Patient Position: Sitting, Cuff Size: Adult Regular)   Pulse 69   Temp 97.8  F (36.6  C) (Oral)   Resp 12   Ht 1.651 m (5' 5\")   Wt 83.5 kg (184 lb 1.6 oz)   LMP  (LMP Unknown)   SpO2 98%   BMI 30.64 kg/m    Physical Exam  Constitutional:       Appearance: Normal appearance.   Eyes:      Conjunctiva/sclera: Conjunctivae normal.   Cardiovascular:      Rate and Rhythm: Normal rate and regular rhythm.      Heart sounds: Normal heart sounds.   Pulmonary:      Effort: Pulmonary effort is normal.      Breath sounds: Normal breath sounds.   Abdominal:      General: There is no distension.      Palpations: There is no mass.      Tenderness: There is no abdominal tenderness. There is no guarding.   Musculoskeletal:         General: No swelling or tenderness.      Right lower leg: No edema.      Left lower leg: No edema.      Comments: Straight leg raise is negative at 90 degrees on both sides. DTR's,  normal, Dorsi and plantar flexion of left foot reduced and great toe strength reduced on left as compared to right. Antalgic gait noted.  Hips and knees have " full range of motion without pain.     Skin:     General: Skin is warm and dry.      Findings: No rash.   Neurological:      Mental Status: She is alert.   Psychiatric:         Mood and Affect: Mood normal.        ============================================  GERALDO Tillman CNP  Signed Electronically by: GERALDO Tillman CNP

## 2024-07-03 ENCOUNTER — HOSPITAL ENCOUNTER (OUTPATIENT)
Dept: ULTRASOUND IMAGING | Facility: CLINIC | Age: 39
Discharge: HOME | End: 2024-07-03
Attending: NURSE PRACTITIONER | Admitting: NURSE PRACTITIONER
Payer: COMMERCIAL

## 2024-07-03 DIAGNOSIS — G89.29 CHRONIC LEFT-SIDED LOW BACK PAIN WITH LEFT-SIDED SCIATICA: Primary | ICD-10-CM

## 2024-07-03 DIAGNOSIS — M54.42 CHRONIC LEFT-SIDED LOW BACK PAIN WITH LEFT-SIDED SCIATICA: Primary | ICD-10-CM

## 2024-07-03 DIAGNOSIS — K59.00 CONSTIPATION, UNSPECIFIED CONSTIPATION TYPE: ICD-10-CM

## 2024-07-03 PROCEDURE — 76700 US EXAM ABDOM COMPLETE: CPT

## 2024-07-08 ENCOUNTER — TELEPHONE (OUTPATIENT)
Dept: NEUROSURGERY | Facility: CLINIC | Age: 39
End: 2024-07-08
Payer: COMMERCIAL

## 2024-07-08 NOTE — TELEPHONE ENCOUNTER
Possible Reg Flag Symptoms- Spine- Bowel Issues    [facility] Call Center    Phone Message    May a detailed message be left on voicemail: yes     Reason for Call: Other: Chronic left-sided low back pain with left-sided sciatica- new weakness left leg & foot, bowel issues for 1 month. Scheduled for 7/11/24.      Action Taken: Other: [id number]    Travel Screening: Not Applicable     Date of Service:

## 2024-07-08 NOTE — TELEPHONE ENCOUNTER
Called Marizol to discuss her current symptoms/concerns. She c/o left sciatica, denies bowel or bladder incontinence or saddle anesthesia. Reports constipation, on Miralax. Will keep appt for 07/11/2024 as scheduled.  Nguyen Da Silva RN, CNRN

## 2024-07-11 ENCOUNTER — HOSPITAL ENCOUNTER (OUTPATIENT)
Dept: MRI IMAGING | Facility: CLINIC | Age: 39
Discharge: HOME | End: 2024-07-11
Attending: PHYSICIAN ASSISTANT
Payer: COMMERCIAL

## 2024-07-11 ENCOUNTER — OFFICE VISIT (OUTPATIENT)
Dept: NEUROSURGERY | Facility: CLINIC | Age: 39
End: 2024-07-11
Attending: NURSE PRACTITIONER
Payer: COMMERCIAL

## 2024-07-11 VITALS
HEART RATE: 64 BPM | DIASTOLIC BLOOD PRESSURE: 72 MMHG | WEIGHT: 184 LBS | SYSTOLIC BLOOD PRESSURE: 103 MMHG | OXYGEN SATURATION: 98 % | BODY MASS INDEX: 30.62 KG/M2

## 2024-07-11 DIAGNOSIS — M54.42 CHRONIC LEFT-SIDED LOW BACK PAIN WITH LEFT-SIDED SCIATICA: ICD-10-CM

## 2024-07-11 DIAGNOSIS — G89.29 CHRONIC LEFT-SIDED LOW BACK PAIN WITH LEFT-SIDED SCIATICA: ICD-10-CM

## 2024-07-11 DIAGNOSIS — R29.898 LEFT LEG WEAKNESS: Primary | ICD-10-CM

## 2024-07-11 DIAGNOSIS — R20.0 LEFT LEG NUMBNESS: ICD-10-CM

## 2024-07-11 DIAGNOSIS — Z86.711 PERSONAL HISTORY OF PULMONARY EMBOLISM: Primary | ICD-10-CM

## 2024-07-11 DIAGNOSIS — R29.898 LEFT LEG WEAKNESS: ICD-10-CM

## 2024-07-11 PROCEDURE — A9585 GADOBUTROL INJECTION: HCPCS | Performed by: PHYSICIAN ASSISTANT

## 2024-07-11 PROCEDURE — 72157 MRI CHEST SPINE W/O & W/DYE: CPT

## 2024-07-11 PROCEDURE — 255N000002 HC RX 255 OP 636: Performed by: PHYSICIAN ASSISTANT

## 2024-07-11 PROCEDURE — 72158 MRI LUMBAR SPINE W/O & W/DYE: CPT

## 2024-07-11 PROCEDURE — G0463 HOSPITAL OUTPT CLINIC VISIT: HCPCS | Performed by: PHYSICIAN ASSISTANT

## 2024-07-11 PROCEDURE — 99244 OFF/OP CNSLTJ NEW/EST MOD 40: CPT | Performed by: PHYSICIAN ASSISTANT

## 2024-07-11 RX ORDER — GADOBUTROL 604.72 MG/ML
8 INJECTION INTRAVENOUS ONCE
Status: COMPLETED | OUTPATIENT
Start: 2024-07-11 | End: 2024-07-11

## 2024-07-11 RX ADMIN — GADOBUTROL 8 ML: 604.72 INJECTION INTRAVENOUS at 12:59

## 2024-07-11 ASSESSMENT — PAIN SCALES - GENERAL: PAINLEVEL: MILD PAIN (3)

## 2024-07-11 NOTE — PATIENT INSTRUCTIONS
-lumbar MRI ordered. Our office will assist with scheduling ASAP due to weakness   -please call PCP or specialist to rule out blood clot in the meantime   -after MRI obtained, I will call you directly with results and next steps     Leyda Singh PA-C  [facility] Neurosurgery  [address] [place] [id number]  Tel [phone]  Fax [phone]
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
## 2024-07-11 NOTE — NURSING NOTE
"Marizol Granados is a 39 year old female who presents for:  Chief Complaint   Patient presents with    Back Pain        Vitals:    Vitals:    07/11/24 0928   BP: 103/72   Pulse: 64   SpO2: 98%   Weight: 184 lb (83.5 kg)       BMI:  Estimated body mass index is 30.62 kg/m  as calculated from the following:    Height as of 6/26/24: 5' 5\" (1.651 m).    Weight as of this encounter: 184 lb (83.5 kg).    Pain Score:  Mild Pain (3)        Amendo Phorn      "

## 2024-07-12 DIAGNOSIS — G89.29 CHRONIC LEFT-SIDED LOW BACK PAIN WITH LEFT-SIDED SCIATICA: Primary | ICD-10-CM

## 2024-07-12 DIAGNOSIS — R29.898 LEFT LEG WEAKNESS: ICD-10-CM

## 2024-07-12 DIAGNOSIS — M54.42 CHRONIC LEFT-SIDED LOW BACK PAIN WITH LEFT-SIDED SCIATICA: Primary | ICD-10-CM

## 2024-07-12 DIAGNOSIS — R20.0 LEFT LEG NUMBNESS: ICD-10-CM

## 2024-07-24 ENCOUNTER — TRANSFERRED RECORDS (OUTPATIENT)
Dept: HEALTH INFORMATION MANAGEMENT | Facility: CLINIC | Age: 39
End: 2024-07-24
Payer: COMMERCIAL

## 2024-08-14 ENCOUNTER — OFFICE VISIT (OUTPATIENT)
Dept: PHYSICAL MEDICINE AND REHAB | Facility: CLINIC | Age: 39
End: 2024-08-14
Attending: PHYSICIAN ASSISTANT
Payer: COMMERCIAL

## 2024-08-14 DIAGNOSIS — R20.0 LEFT LEG NUMBNESS: ICD-10-CM

## 2024-08-14 DIAGNOSIS — G89.29 CHRONIC LEFT-SIDED LOW BACK PAIN WITH LEFT-SIDED SCIATICA: ICD-10-CM

## 2024-08-14 DIAGNOSIS — R29.898 LEFT LEG WEAKNESS: ICD-10-CM

## 2024-08-14 DIAGNOSIS — M54.42 CHRONIC LEFT-SIDED LOW BACK PAIN WITH LEFT-SIDED SCIATICA: ICD-10-CM

## 2024-08-14 PROCEDURE — 95910 NRV CNDJ TEST 7-8 STUDIES: CPT | Performed by: PHYSICAL MEDICINE & REHABILITATION

## 2024-08-14 PROCEDURE — 95886 MUSC TEST DONE W/N TEST COMP: CPT | Performed by: PHYSICAL MEDICINE & REHABILITATION

## 2024-08-14 NOTE — PROGRESS NOTES
[facility]  [address] [place] [address] [address]  [place] [address]  Office: [phone] Fax: [phone]    Electromyography and Nerve Conduction Study Report        Indication: Patient presents at the request of Leyda Vinson PA-C for left lower extremity EMG.  Patient has approximate 2-month history of left lower extremity paresthesias over the dorsal foot plantar foot progressing to the left anterior shin with significant weakness of the ankle plantar flexors and ankle inversion.  Has had some left-sided low back pain abdominal pain as well.  On exam, decree sensation to light touch left medial malleolus lateral malleolus and most significant dorsal left foot.  2+ bilateral patellar and right Achilles reflex and 1+ left Achilles reflex.  1/5 strength left great toe extensors ankle dorsiflexors 4/5 left ankle plantar flexors 2/5 left ankle inverters 5/5 left knee flexors and extensors as well as hip flexors 5/5 throughout the right lower extremity.      Pt Exam Discussion (Communication Barriers):  Electromyography and nerve conduction testing, including associated discomfort, risks, benefits, and alternatives was discussed with the patient prior to the procedure.  No learning/ communication barriers; patient verbalized understanding of procedure.  Informed consent was obtained.           Pt Assessment:  Testing was successfully completed; patient tolerated testing well.       Blood Thinners: Eliquis Skin Temperature: Warmed 32.9                   EMG/NCS  results:     Nerve Conduction Studies  Motor Sites      Segment Distal Latency Neg. Amp CV F-Latency F-Estimate Comment   Site  (ms) (mV) (m/s) (ms) (ms)    Left Fibular (EDB) Motor   Ankle Ankle-EDB 4.5 3.4       Fib Head Fib Head-Ankle 11.2 3.1 45      Knee Knee-Ankle 13.0 2.9 46      Left Tibial (AH) Motor   Ankle Ankle-AH 2.9 11.1       Knee Knee-Ankle 11.3 7.7 47        Sensory Sites      Onset Lat Peak Lat Amp CV Comment   Site  (ms) (ms) ( V) (m/s)    Left Superficial Fibular Sensory   Lower Leg-Ankle *NR *NR *NR *NR    Right Superficial Fibular Sensory   Lower Leg-Ankle 2.9 3.6 9 -    Left Sural Sensory   B-Ankle 2.7 3.3 8 -      H-Reflex Sites      M-Lat H Lat H Neg Amp   Site (ms) (ms) (mV)   Left Tibial (Soleus) H-Reflex   Pop Fossa 5.4 30.8 1.80   Right Tibial (Soleus) H-Reflex   Pop Fossa 5.6 29.6 4.5     H-Reflex Sites      Lt. H Lat Rt. H Lat L-R H Lat L-R H Neg Amp   Site (ms) (ms) (ms) Norm (mV)   Tibial (Soleus) H-Reflex   Pop Fossa 30.8 29.6 1.20  < 3.0 2.7       NCS Waveforms:    Motor         Sensory           H-Reflex           Electromyography     Side Muscle Nerve Root Ins Act Fibs Psw Fasc Recrt Dur Amp Poly Comment   Left AntTibialis Dp Br Fibular L4-5 *Incr *2+ *2+ Nml Nml Nml Nml *1+    Left Gastroc Tibial S1-2 Nml Nml Nml Nml Nml Nml Nml 0    Left Fibularis Long Sup Br Fibular L5-S1 Nml Nml Nml Nml Nml Nml Nml 0    Left VastusLat Femoral L2-4 Nml Nml Nml Nml Nml Nml Nml 0    Left PostTibialis Tibial L5, S1 *Incr *3+ *3+ Nml Nml Nml Nml 0 poor activation   Left BicepsFemS Sciatic L5-S1 Nml Nml Nml Nml Nml Nml Nml 0    Left TensorFascLat SupGluteal L4-5, S1 *Incr *1+ *1+ Nml Nml Nml Nml *1+    Left GluteusMax InfGluteal L5-S2 *Incr *1+ *1+ Nml Nml Nml Nml 0    Left Lumbo Parasp Low Rami L5-S1 Nml Nml Nml Nml  Nml Nml           Comment NCS: Abnormal study  1.  Absent left superficial peroneal SNAP and prolonged latency and reduced amplitude of the left tibial H reflex.  2.  Normal left peroneal and tibial CMAP's, left sural SNAP, right peroneal SNAP.    Comment EMG: Abnormal study  1.  Increased insertional activity with positive waves and fibrillation potentials left tibialis anterior, tibialis posterior, TFL and gluteus wilmer with a few polyphasic units of the tibialis anterior and TFL.  Remainder left lower extremity needle EMG normal.    Interpretation: Abnormal study: There is electrodiagnostic evidence of:    1.   Electrodiagnostic findings are most consistent with a left lumbosacral plexopathy affecting predominantly the L4-S1 nerve roots.  These findings could also represent L4-S1 polyradiculopathy with accompanying superficial peroneal neuropathy but this would be considered less likely.     Note: Given abdominal pain and progression of symptoms, patient may benefit from CT abdomen and pelvis along with MRI of the left lumbosacral plexus to further evaluate.    The testing was completed in its entirety by the physician.      It was our pleasure caring for your patient today, if there any questions or concerns please do not hesitate to contact us.

## 2024-08-14 NOTE — PATIENT INSTRUCTIONS
Thank you for choosing the [facility] for your EMG testing.    The ordering provider will receive your final EMG results within the next few days.  Please follow up with your provider for the results and further treatment recommendations.

## 2024-08-14 NOTE — LETTER
8/14/2024      Marizol Granados  [address] [facility] [facility] [address]      Dear Colleague,    Thank you for referring your patient, Marizol Granados, to the [facility]. Please see a copy of my visit note below.    [facility]  [address] [place] [address] [address]  [place] [address]  Office: [phone] Fax: [phone]    Electromyography and Nerve Conduction Study Report        Indication: Patient presents at the request of Leyda Vinson PA-C for left lower extremity EMG.  Patient has approximate 2-month history of left lower extremity paresthesias over the dorsal foot plantar foot progressing to the left anterior shin with significant weakness of the ankle plantar flexors and ankle inversion.  Has had some left-sided low back pain abdominal pain as well.  On exam, decree sensation to light touch left medial malleolus lateral malleolus and most significant dorsal left foot.  2+ bilateral patellar and right Achilles reflex and 1+ left Achilles reflex.  1/5 strength left great toe extensors ankle dorsiflexors 4/5 left ankle plantar flexors 2/5 left ankle inverters 5/5 left knee flexors and extensors as well as hip flexors 5/5 throughout the right lower extremity.      Pt Exam Discussion (Communication Barriers):  Electromyography and nerve conduction testing, including associated discomfort, risks, benefits, and alternatives was discussed with the patient prior to the procedure.  No learning/ communication barriers; patient verbalized understanding of procedure.  Informed consent was obtained.           Pt Assessment:  Testing was successfully completed; patient tolerated testing well.       Blood Thinners: Eliquis Skin Temperature: Warmed 32.9                   EMG/NCS  results:     Nerve Conduction Studies  Motor Sites      Segment Distal Latency Neg. Amp CV F-Latency F-Estimate Comment   Site  (ms) (mV) (m/s) (ms) (ms)    Left Fibular (EDB) Motor   Ankle  Ankle-EDB 4.5 3.4       Fib Head Fib Head-Ankle 11.2 3.1 45      Knee Knee-Ankle 13.0 2.9 46      Left Tibial (AH) Motor   Ankle Ankle-AH 2.9 11.1       Knee Knee-Ankle 11.3 7.7 47        Sensory Sites      Onset Lat Peak Lat Amp CV Comment   Site (ms) (ms) ( V) (m/s)    Left Superficial Fibular Sensory   Lower Leg-Ankle *NR *NR *NR *NR    Right Superficial Fibular Sensory   Lower Leg-Ankle 2.9 3.6 9 -    Left Sural Sensory   B-Ankle 2.7 3.3 8 -      H-Reflex Sites      M-Lat H Lat H Neg Amp   Site (ms) (ms) (mV)   Left Tibial (Soleus) H-Reflex   Pop Fossa 5.4 30.8 1.80   Right Tibial (Soleus) H-Reflex   Pop Fossa 5.6 29.6 4.5     H-Reflex Sites      Lt. H Lat Rt. H Lat L-R H Lat L-R H Neg Amp   Site (ms) (ms) (ms) Norm (mV)   Tibial (Soleus) H-Reflex   Pop Fossa 30.8 29.6 1.20  < 3.0 2.7       NCS Waveforms:    Motor         Sensory           H-Reflex           Electromyography     Side Muscle Nerve Root Ins Act Fibs Psw Fasc Recrt Dur Amp Poly Comment   Left AntTibialis Dp Br Fibular L4-5 *Incr *2+ *2+ Nml Nml Nml Nml *1+    Left Gastroc Tibial S1-2 Nml Nml Nml Nml Nml Nml Nml 0    Left Fibularis Long Sup Br Fibular L5-S1 Nml Nml Nml Nml Nml Nml Nml 0    Left VastusLat Femoral L2-4 Nml Nml Nml Nml Nml Nml Nml 0    Left PostTibialis Tibial L5, S1 *Incr *3+ *3+ Nml Nml Nml Nml 0 poor activation   Left BicepsFemS Sciatic L5-S1 Nml Nml Nml Nml Nml Nml Nml 0    Left TensorFascLat SupGluteal L4-5, S1 *Incr *1+ *1+ Nml Nml Nml Nml *1+    Left GluteusMax InfGluteal L5-S2 *Incr *1+ *1+ Nml Nml Nml Nml 0    Left Lumbo Parasp Low Rami L5-S1 Nml Nml Nml Nml  Nml Nml           Comment NCS: Abnormal study  1.  Absent left superficial peroneal SNAP and prolonged latency and reduced amplitude of the left tibial H reflex.  2.  Normal left peroneal and tibial CMAP's, left sural SNAP, right peroneal SNAP.    Comment EMG: Abnormal study  1.  Increased insertional activity with positive waves and fibrillation potentials left tibialis  anterior, tibialis posterior, TFL and gluteus wilmer with a few polyphasic units of the tibialis anterior and TFL.  Remainder left lower extremity needle EMG normal.    Interpretation: Abnormal study: There is electrodiagnostic evidence of:    1.  Electrodiagnostic findings are most consistent with a left lumbosacral plexopathy affecting predominantly the L4-S1 nerve roots.  These findings could also represent L4-S1 polyradiculopathy with accompanying superficial peroneal neuropathy but this would be considered less likely.     Note: Given abdominal pain and progression of symptoms, patient may benefit from CT abdomen and pelvis along with MRI of the left lumbosacral plexus to further evaluate.    The testing was completed in its entirety by the physician.      It was our pleasure caring for your patient today, if there any questions or concerns please do not hesitate to contact us.      Again, thank you for allowing me to participate in the care of your patient.        Sincerely,        Kelvin Wilkins, DO

## 2024-08-30 ENCOUNTER — TELEPHONE (OUTPATIENT)
Dept: SURGERY | Facility: CLINIC | Age: 39
End: 2024-08-30

## 2024-08-30 ENCOUNTER — VIRTUAL VISIT (OUTPATIENT)
Dept: SURGERY | Facility: CLINIC | Age: 39
End: 2024-08-30
Payer: COMMERCIAL

## 2024-08-30 ENCOUNTER — HOSPITAL ENCOUNTER (EMERGENCY)
Facility: CLINIC | Age: 39
Discharge: ANOTHER HOSPITAL | End: 2024-08-31
Attending: EMERGENCY MEDICINE | Admitting: EMERGENCY MEDICINE
Payer: COMMERCIAL

## 2024-08-30 ENCOUNTER — APPOINTMENT (OUTPATIENT)
Dept: CT IMAGING | Facility: CLINIC | Age: 39
End: 2024-08-30
Attending: EMERGENCY MEDICINE
Payer: COMMERCIAL

## 2024-08-30 ENCOUNTER — APPOINTMENT (OUTPATIENT)
Dept: MRI IMAGING | Facility: CLINIC | Age: 39
End: 2024-08-30
Attending: EMERGENCY MEDICINE
Payer: COMMERCIAL

## 2024-08-30 VITALS
RESPIRATION RATE: 18 BRPM | HEART RATE: 70 BPM | SYSTOLIC BLOOD PRESSURE: 110 MMHG | OXYGEN SATURATION: 94 % | TEMPERATURE: 98.6 F | BODY MASS INDEX: 28.95 KG/M2 | DIASTOLIC BLOOD PRESSURE: 80 MMHG | WEIGHT: 173.94 LBS

## 2024-08-30 VITALS — BODY MASS INDEX: 27.82 KG/M2 | WEIGHT: 167 LBS | HEIGHT: 65 IN

## 2024-08-30 DIAGNOSIS — N83.8 OVARIAN MASS: ICD-10-CM

## 2024-08-30 DIAGNOSIS — K91.2 POSTOPERATIVE MALABSORPTION: Primary | ICD-10-CM

## 2024-08-30 DIAGNOSIS — E66.3 OVERWEIGHT: ICD-10-CM

## 2024-08-30 DIAGNOSIS — R63.2 HYPERPHAGIA: ICD-10-CM

## 2024-08-30 LAB
ANION GAP SERPL CALCULATED.3IONS-SCNC: 10 MMOL/L (ref 7–15)
AST SERPL W P-5'-P-CCNC: 11 U/L (ref 0–45)
BASOPHILS # BLD AUTO: 0 10E3/UL (ref 0–0.2)
BASOPHILS NFR BLD AUTO: 1 %
BUN SERPL-MCNC: 17.8 MG/DL (ref 6–20)
CALCIUM SERPL-MCNC: 8.8 MG/DL (ref 8.8–10.4)
CHLORIDE SERPL-SCNC: 107 MMOL/L (ref 98–107)
CREAT SERPL-MCNC: 0.82 MG/DL (ref 0.51–0.95)
EGFRCR SERPLBLD CKD-EPI 2021: >90 ML/MIN/1.73M2
EOSINOPHIL # BLD AUTO: 0.2 10E3/UL (ref 0–0.7)
EOSINOPHIL NFR BLD AUTO: 4 %
ERYTHROCYTE [DISTWIDTH] IN BLOOD BY AUTOMATED COUNT: 16.2 % (ref 10–15)
GLUCOSE SERPL-MCNC: 163 MG/DL (ref 70–99)
HCG SERPL QL: NEGATIVE
HCO3 SERPL-SCNC: 19 MMOL/L (ref 22–29)
HCT VFR BLD AUTO: 36.4 % (ref 35–47)
HGB BLD-MCNC: 11.2 G/DL (ref 11.7–15.7)
IMM GRANULOCYTES # BLD: 0 10E3/UL
IMM GRANULOCYTES NFR BLD: 0 %
LDH SERPL L TO P-CCNC: 192 U/L (ref 0–250)
LYMPHOCYTES # BLD AUTO: 2.1 10E3/UL (ref 0.8–5.3)
LYMPHOCYTES NFR BLD AUTO: 41 %
MCH RBC QN AUTO: 28.4 PG (ref 26.5–33)
MCHC RBC AUTO-ENTMCNC: 30.8 G/DL (ref 31.5–36.5)
MCV RBC AUTO: 92 FL (ref 78–100)
MONOCYTES # BLD AUTO: 0.4 10E3/UL (ref 0–1.3)
MONOCYTES NFR BLD AUTO: 7 %
NEUTROPHILS # BLD AUTO: 2.4 10E3/UL (ref 1.6–8.3)
NEUTROPHILS NFR BLD AUTO: 47 %
NRBC # BLD AUTO: 0 10E3/UL
NRBC BLD AUTO-RTO: 0 /100
PLATELET # BLD AUTO: 164 10E3/UL (ref 150–450)
POTASSIUM SERPL-SCNC: 4 MMOL/L (ref 3.4–5.3)
RBC # BLD AUTO: 3.95 10E6/UL (ref 3.8–5.2)
SODIUM SERPL-SCNC: 136 MMOL/L (ref 135–145)
WBC # BLD AUTO: 5.1 10E3/UL (ref 4–11)

## 2024-08-30 PROCEDURE — 84450 TRANSFERASE (AST) (SGOT): CPT | Performed by: EMERGENCY MEDICINE

## 2024-08-30 PROCEDURE — 36415 COLL VENOUS BLD VENIPUNCTURE: CPT | Performed by: EMERGENCY MEDICINE

## 2024-08-30 PROCEDURE — 84703 CHORIONIC GONADOTROPIN ASSAY: CPT | Performed by: EMERGENCY MEDICINE

## 2024-08-30 PROCEDURE — A9585 GADOBUTROL INJECTION: HCPCS | Performed by: EMERGENCY MEDICINE

## 2024-08-30 PROCEDURE — 96376 TX/PRO/DX INJ SAME DRUG ADON: CPT

## 2024-08-30 PROCEDURE — 83615 LACTATE (LD) (LDH) ENZYME: CPT | Performed by: EMERGENCY MEDICINE

## 2024-08-30 PROCEDURE — 96375 TX/PRO/DX INJ NEW DRUG ADDON: CPT

## 2024-08-30 PROCEDURE — 82378 CARCINOEMBRYONIC ANTIGEN: CPT | Performed by: EMERGENCY MEDICINE

## 2024-08-30 PROCEDURE — 74177 CT ABD & PELVIS W/CONTRAST: CPT

## 2024-08-30 PROCEDURE — 250N000011 HC RX IP 250 OP 636: Performed by: EMERGENCY MEDICINE

## 2024-08-30 PROCEDURE — 96374 THER/PROPH/DIAG INJ IV PUSH: CPT | Mod: 59

## 2024-08-30 PROCEDURE — 86301 IMMUNOASSAY TUMOR CA 19-9: CPT | Performed by: EMERGENCY MEDICINE

## 2024-08-30 PROCEDURE — 99214 OFFICE O/P EST MOD 30 MIN: CPT | Mod: 95 | Performed by: FAMILY MEDICINE

## 2024-08-30 PROCEDURE — 80048 BASIC METABOLIC PNL TOTAL CA: CPT | Performed by: EMERGENCY MEDICINE

## 2024-08-30 PROCEDURE — 85004 AUTOMATED DIFF WBC COUNT: CPT | Performed by: EMERGENCY MEDICINE

## 2024-08-30 PROCEDURE — 72158 MRI LUMBAR SPINE W/O & W/DYE: CPT

## 2024-08-30 PROCEDURE — 99285 EMERGENCY DEPT VISIT HI MDM: CPT | Mod: 25

## 2024-08-30 PROCEDURE — 250N000009 HC RX 250: Performed by: EMERGENCY MEDICINE

## 2024-08-30 PROCEDURE — 255N000002 HC RX 255 OP 636: Performed by: EMERGENCY MEDICINE

## 2024-08-30 RX ORDER — PHENTERMINE HYDROCHLORIDE 37.5 MG/1
18.75-37.5 TABLET ORAL
Qty: 90 TABLET | Refills: 1 | Status: ON HOLD | OUTPATIENT
Start: 2024-08-30 | End: 2024-09-18

## 2024-08-30 RX ORDER — ONDANSETRON 2 MG/ML
4 INJECTION INTRAMUSCULAR; INTRAVENOUS EVERY 30 MIN PRN
Status: DISCONTINUED | OUTPATIENT
Start: 2024-08-30 | End: 2024-08-31 | Stop reason: HOSPADM

## 2024-08-30 RX ORDER — HYDROMORPHONE HYDROCHLORIDE 1 MG/ML
0.5 INJECTION, SOLUTION INTRAMUSCULAR; INTRAVENOUS; SUBCUTANEOUS EVERY 30 MIN PRN
Status: COMPLETED | OUTPATIENT
Start: 2024-08-30 | End: 2024-08-30

## 2024-08-30 RX ORDER — IOPAMIDOL 755 MG/ML
120 INJECTION, SOLUTION INTRAVASCULAR ONCE
Status: COMPLETED | OUTPATIENT
Start: 2024-08-30 | End: 2024-08-30

## 2024-08-30 RX ORDER — GADOBUTROL 604.72 MG/ML
6 INJECTION INTRAVENOUS ONCE
Status: COMPLETED | OUTPATIENT
Start: 2024-08-30 | End: 2024-08-30

## 2024-08-30 RX ADMIN — IOPAMIDOL 86 ML: 755 INJECTION, SOLUTION INTRAVENOUS at 17:30

## 2024-08-30 RX ADMIN — ONDANSETRON 4 MG: 2 INJECTION INTRAMUSCULAR; INTRAVENOUS at 16:13

## 2024-08-30 RX ADMIN — SODIUM CHLORIDE 62 ML: 9 INJECTION, SOLUTION INTRAVENOUS at 17:31

## 2024-08-30 RX ADMIN — HYDROMORPHONE HYDROCHLORIDE 1 MG: 1 INJECTION, SOLUTION INTRAMUSCULAR; INTRAVENOUS; SUBCUTANEOUS at 22:48

## 2024-08-30 RX ADMIN — HYDROMORPHONE HYDROCHLORIDE 0.5 MG: 1 INJECTION, SOLUTION INTRAMUSCULAR; INTRAVENOUS; SUBCUTANEOUS at 20:03

## 2024-08-30 RX ADMIN — HYDROMORPHONE HYDROCHLORIDE 0.5 MG: 1 INJECTION, SOLUTION INTRAMUSCULAR; INTRAVENOUS; SUBCUTANEOUS at 16:13

## 2024-08-30 RX ADMIN — HYDROMORPHONE HYDROCHLORIDE 0.5 MG: 1 INJECTION, SOLUTION INTRAMUSCULAR; INTRAVENOUS; SUBCUTANEOUS at 17:23

## 2024-08-30 RX ADMIN — GADOBUTROL 6 ML: 604.72 INJECTION INTRAVENOUS at 13:49

## 2024-08-30 ASSESSMENT — PAIN SCALES - GENERAL: PAINLEVEL: SEVERE PAIN (7)

## 2024-08-30 ASSESSMENT — ACTIVITIES OF DAILY LIVING (ADL)
ADLS_ACUITY_SCORE: 35
ADLS_ACUITY_SCORE: 33
ADLS_ACUITY_SCORE: 35
ADLS_ACUITY_SCORE: 35

## 2024-08-30 ASSESSMENT — COLUMBIA-SUICIDE SEVERITY RATING SCALE - C-SSRS
2. HAVE YOU ACTUALLY HAD ANY THOUGHTS OF KILLING YOURSELF IN THE PAST MONTH?: NO
1. IN THE PAST MONTH, HAVE YOU WISHED YOU WERE DEAD OR WISHED YOU COULD GO TO SLEEP AND NOT WAKE UP?: NO
6. HAVE YOU EVER DONE ANYTHING, STARTED TO DO ANYTHING, OR PREPARED TO DO ANYTHING TO END YOUR LIFE?: NO

## 2024-08-30 NOTE — ED TRIAGE NOTES
Arrives with left leg numbness and pain for the past several months, pt was seen by specialists and had an EMG and MRI completed. Pt states the MRI didn't take the full picture so the provider is concerned for possible compression but pt has not been able to get in for repeat MRI. Pt states pain has gotten worse recently.      Triage Assessment (Adult)       Row Name 08/30/24 1134          Triage Assessment    Airway WDL WDL        Respiratory WDL    Respiratory WDL WDL        Cardiac WDL    Cardiac WDL WDL
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
## 2024-08-30 NOTE — ED NOTES
Pt reports Left leg numbness and pain for the last 3-4mo and today worse. Pt also reports she has been having abdominal pain for about 3-4 mo as well.  Pt reports she had an MRI of her back and neuro testing about 2 weeks ago.

## 2024-08-30 NOTE — TELEPHONE ENCOUNTER
Attempted to reach pt to make follow up with JUDAH Garcia with appt details and to call [phone] if changes are needed.

## 2024-08-30 NOTE — NURSING NOTE
Current patient location: home    Is the patient currently in the state of [place]? YES    Visit mode:VIDEO    If the visit is dropped, the patient can be reconnected by: VIDEO VISIT: Text to cell phone:   Telephone Information:   Mobile [phone]       Will anyone else be joining the visit? NO  (If patient encounters technical issues they should call [phone])    How would you like to obtain your AVS? MyChart    Are changes needed to the allergy or medication list? Pt stated no changes to allergies and Pt stated no med changes    Are refills needed on medications prescribed by this physician? YES    Rooming Documentation:  Questionnaire(s) completed    Reason for visit: RECHECK    Wt other than 24 hrs:  1-2 months  Pain more than one location:  no  Lida CISNEROS
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
## 2024-08-31 ENCOUNTER — HOSPITAL ENCOUNTER (INPATIENT)
Facility: CLINIC | Age: 39
LOS: 6 days | Discharge: HOME HEALTH | End: 2024-09-06
Attending: OBSTETRICS & GYNECOLOGY | Admitting: OBSTETRICS & GYNECOLOGY
Payer: COMMERCIAL

## 2024-08-31 ENCOUNTER — ANESTHESIA EVENT (OUTPATIENT)
Dept: SURGERY | Facility: CLINIC | Age: 39
End: 2024-08-31
Payer: COMMERCIAL

## 2024-08-31 DIAGNOSIS — N94.89 ADNEXAL MASS: Primary | ICD-10-CM

## 2024-08-31 LAB
ABO/RH(D): NORMAL
AFP SERPL-MCNC: 2.9 NG/ML
ALBUMIN SERPL BCG-MCNC: 4 G/DL (ref 3.5–5.2)
ALP SERPL-CCNC: 55 U/L (ref 40–150)
ALT SERPL W P-5'-P-CCNC: 52 U/L (ref 0–50)
ANION GAP SERPL CALCULATED.3IONS-SCNC: 8 MMOL/L (ref 7–15)
ANTIBODY SCREEN: NEGATIVE
AST SERPL W P-5'-P-CCNC: 95 U/L (ref 0–45)
BILIRUB SERPL-MCNC: <0.2 MG/DL
BUN SERPL-MCNC: 14.9 MG/DL (ref 6–20)
C DIFF TOX B STL QL: NEGATIVE
CALCIUM SERPL-MCNC: 8.8 MG/DL (ref 8.8–10.4)
CANCER AG125 SERPL-ACNC: 27 U/ML
CEA SERPL-MCNC: 2.3 NG/ML
CHLORIDE SERPL-SCNC: 107 MMOL/L (ref 98–107)
CREAT SERPL-MCNC: 0.88 MG/DL (ref 0.51–0.95)
EGFRCR SERPLBLD CKD-EPI 2021: 86 ML/MIN/1.73M2
ERYTHROCYTE [DISTWIDTH] IN BLOOD BY AUTOMATED COUNT: 16 % (ref 10–15)
GLUCOSE SERPL-MCNC: 131 MG/DL (ref 70–99)
HCG INTACT+B SERPL-ACNC: <1 MIU/ML
HCO3 SERPL-SCNC: 23 MMOL/L (ref 22–29)
HCT VFR BLD AUTO: 36.6 % (ref 35–47)
HGB BLD-MCNC: 11.2 G/DL (ref 11.7–15.7)
MAGNESIUM SERPL-MCNC: 1.7 MG/DL (ref 1.7–2.3)
MCH RBC QN AUTO: 28.2 PG (ref 26.5–33)
MCHC RBC AUTO-ENTMCNC: 30.6 G/DL (ref 31.5–36.5)
MCV RBC AUTO: 92 FL (ref 78–100)
PHOSPHATE SERPL-MCNC: 3.3 MG/DL (ref 2.5–4.5)
PLATELET # BLD AUTO: 149 10E3/UL (ref 150–450)
POTASSIUM SERPL-SCNC: 4.2 MMOL/L (ref 3.4–5.3)
PROT SERPL-MCNC: 6.5 G/DL (ref 6.4–8.3)
RBC # BLD AUTO: 3.97 10E6/UL (ref 3.8–5.2)
SODIUM SERPL-SCNC: 138 MMOL/L (ref 135–145)
SPECIMEN EXPIRATION DATE: NORMAL
WBC # BLD AUTO: 5.9 10E3/UL (ref 4–11)

## 2024-08-31 PROCEDURE — 250N000013 HC RX MED GY IP 250 OP 250 PS 637: Performed by: STUDENT IN AN ORGANIZED HEALTH CARE EDUCATION/TRAINING PROGRAM

## 2024-08-31 PROCEDURE — 80053 COMPREHEN METABOLIC PANEL: CPT

## 2024-08-31 PROCEDURE — 250N000011 HC RX IP 250 OP 636

## 2024-08-31 PROCEDURE — 85027 COMPLETE CBC AUTOMATED: CPT

## 2024-08-31 PROCEDURE — 258N000003 HC RX IP 258 OP 636: Performed by: STUDENT IN AN ORGANIZED HEALTH CARE EDUCATION/TRAINING PROGRAM

## 2024-08-31 PROCEDURE — 84100 ASSAY OF PHOSPHORUS: CPT

## 2024-08-31 PROCEDURE — 86923 COMPATIBILITY TEST ELECTRIC: CPT

## 2024-08-31 PROCEDURE — 250N000013 HC RX MED GY IP 250 OP 250 PS 637

## 2024-08-31 PROCEDURE — 120N000005 HC R&B MS OVERFLOW UMMC

## 2024-08-31 PROCEDURE — 84702 CHORIONIC GONADOTROPIN TEST: CPT

## 2024-08-31 PROCEDURE — 96376 TX/PRO/DX INJ SAME DRUG ADON: CPT

## 2024-08-31 PROCEDURE — 83735 ASSAY OF MAGNESIUM: CPT

## 2024-08-31 PROCEDURE — 87493 C DIFF AMPLIFIED PROBE: CPT | Performed by: INTERNAL MEDICINE

## 2024-08-31 PROCEDURE — 250N000013 HC RX MED GY IP 250 OP 250 PS 637: Performed by: OBSTETRICS & GYNECOLOGY

## 2024-08-31 PROCEDURE — 99223 1ST HOSP IP/OBS HIGH 75: CPT | Mod: 57 | Performed by: OBSTETRICS & GYNECOLOGY

## 2024-08-31 PROCEDURE — 250N000011 HC RX IP 250 OP 636: Performed by: EMERGENCY MEDICINE

## 2024-08-31 PROCEDURE — 86923 COMPATIBILITY TEST ELECTRIC: CPT | Performed by: OBSTETRICS & GYNECOLOGY

## 2024-08-31 PROCEDURE — 36415 COLL VENOUS BLD VENIPUNCTURE: CPT

## 2024-08-31 PROCEDURE — 86900 BLOOD TYPING SEROLOGIC ABO: CPT | Performed by: OBSTETRICS & GYNECOLOGY

## 2024-08-31 PROCEDURE — 86304 IMMUNOASSAY TUMOR CA 125: CPT

## 2024-08-31 PROCEDURE — 83520 IMMUNOASSAY QUANT NOS NONAB: CPT

## 2024-08-31 PROCEDURE — 82105 ALPHA-FETOPROTEIN SERUM: CPT

## 2024-08-31 RX ORDER — AMOXICILLIN 250 MG
2 CAPSULE ORAL 2 TIMES DAILY PRN
Status: DISCONTINUED | OUTPATIENT
Start: 2024-08-31 | End: 2024-08-31

## 2024-08-31 RX ORDER — BUSPIRONE HYDROCHLORIDE 10 MG/1
30 TABLET ORAL 2 TIMES DAILY
Status: DISCONTINUED | OUTPATIENT
Start: 2024-08-31 | End: 2024-09-01

## 2024-08-31 RX ORDER — ACETAMINOPHEN 325 MG/1
650 TABLET ORAL EVERY 4 HOURS PRN
Status: DISCONTINUED | OUTPATIENT
Start: 2024-08-31 | End: 2024-09-01

## 2024-08-31 RX ORDER — ACETAMINOPHEN 650 MG/1
650 SUPPOSITORY RECTAL EVERY 4 HOURS PRN
Status: DISCONTINUED | OUTPATIENT
Start: 2024-08-31 | End: 2024-09-01

## 2024-08-31 RX ORDER — HYDROMORPHONE HYDROCHLORIDE 1 MG/ML
0.5 INJECTION, SOLUTION INTRAMUSCULAR; INTRAVENOUS; SUBCUTANEOUS
Status: DISCONTINUED | OUTPATIENT
Start: 2024-08-31 | End: 2024-09-01

## 2024-08-31 RX ORDER — LORAZEPAM 1 MG/1
1 TABLET ORAL 2 TIMES DAILY
Status: DISCONTINUED | OUTPATIENT
Start: 2024-08-31 | End: 2024-09-01

## 2024-08-31 RX ORDER — POLYETHYLENE GLYCOL 3350 17 G/17G
17 POWDER, FOR SOLUTION ORAL 2 TIMES DAILY
Status: DISCONTINUED | OUTPATIENT
Start: 2024-08-31 | End: 2024-09-01

## 2024-08-31 RX ORDER — HEPARIN SODIUM 5000 [USP'U]/.5ML
5000 INJECTION, SOLUTION INTRAVENOUS; SUBCUTANEOUS ONCE
Status: COMPLETED | OUTPATIENT
Start: 2024-08-31 | End: 2024-09-01

## 2024-08-31 RX ORDER — SENNOSIDES 8.6 MG
8.6 TABLET ORAL 2 TIMES DAILY PRN
Status: DISCONTINUED | OUTPATIENT
Start: 2024-08-31 | End: 2024-08-31

## 2024-08-31 RX ORDER — PHENAZOPYRIDINE HYDROCHLORIDE 100 MG/1
200 TABLET, FILM COATED ORAL ONCE
Status: COMPLETED | OUTPATIENT
Start: 2024-08-31 | End: 2024-09-01

## 2024-08-31 RX ORDER — OXYCODONE HYDROCHLORIDE 5 MG/1
5-10 TABLET ORAL EVERY 4 HOURS PRN
Status: DISCONTINUED | OUTPATIENT
Start: 2024-08-31 | End: 2024-09-01

## 2024-08-31 RX ORDER — SODIUM CHLORIDE 9 MG/ML
INJECTION, SOLUTION INTRAVENOUS CONTINUOUS
Status: DISCONTINUED | OUTPATIENT
Start: 2024-09-01 | End: 2024-09-01

## 2024-08-31 RX ORDER — HEPARIN SODIUM 5000 [USP'U]/.5ML
5000 INJECTION, SOLUTION INTRAVENOUS; SUBCUTANEOUS
Status: DISCONTINUED | OUTPATIENT
Start: 2024-08-31 | End: 2024-08-31

## 2024-08-31 RX ORDER — FLUTICASONE PROPIONATE 220 UG/1
1 AEROSOL, METERED RESPIRATORY (INHALATION) 2 TIMES DAILY PRN
Status: DISCONTINUED | OUTPATIENT
Start: 2024-08-31 | End: 2024-08-31

## 2024-08-31 RX ORDER — QUETIAPINE FUMARATE 200 MG/1
200 TABLET, FILM COATED ORAL AT BEDTIME
Status: DISCONTINUED | OUTPATIENT
Start: 2024-08-31 | End: 2024-09-01

## 2024-08-31 RX ORDER — PANTOPRAZOLE SODIUM 40 MG/1
40 TABLET, DELAYED RELEASE ORAL
Status: DISCONTINUED | OUTPATIENT
Start: 2024-08-31 | End: 2024-09-01

## 2024-08-31 RX ORDER — CEFAZOLIN SODIUM 2 G/100ML
2 INJECTION, SOLUTION INTRAVENOUS
Status: COMPLETED | OUTPATIENT
Start: 2024-08-31 | End: 2024-09-01

## 2024-08-31 RX ORDER — ZOLPIDEM TARTRATE 5 MG/1
5 TABLET ORAL
Status: DISCONTINUED | OUTPATIENT
Start: 2024-08-31 | End: 2024-08-31

## 2024-08-31 RX ORDER — NALOXONE HYDROCHLORIDE 0.4 MG/ML
0.4 INJECTION, SOLUTION INTRAMUSCULAR; INTRAVENOUS; SUBCUTANEOUS
Status: DISCONTINUED | OUTPATIENT
Start: 2024-08-31 | End: 2024-09-01

## 2024-08-31 RX ORDER — DOCUSATE SODIUM 100 MG/1
100 CAPSULE, LIQUID FILLED ORAL 2 TIMES DAILY
Status: DISCONTINUED | OUTPATIENT
Start: 2024-08-31 | End: 2024-08-31

## 2024-08-31 RX ORDER — ONDANSETRON 2 MG/ML
4 INJECTION INTRAMUSCULAR; INTRAVENOUS EVERY 6 HOURS PRN
Status: DISCONTINUED | OUTPATIENT
Start: 2024-08-31 | End: 2024-09-01

## 2024-08-31 RX ORDER — METRONIDAZOLE 500 MG/100ML
500 INJECTION, SOLUTION INTRAVENOUS
Status: COMPLETED | OUTPATIENT
Start: 2024-08-31 | End: 2024-09-01

## 2024-08-31 RX ORDER — NALOXONE HYDROCHLORIDE 0.4 MG/ML
0.2 INJECTION, SOLUTION INTRAMUSCULAR; INTRAVENOUS; SUBCUTANEOUS
Status: DISCONTINUED | OUTPATIENT
Start: 2024-08-31 | End: 2024-09-01

## 2024-08-31 RX ORDER — CEFAZOLIN SODIUM 2 G/100ML
2 INJECTION, SOLUTION INTRAVENOUS SEE ADMIN INSTRUCTIONS
Status: DISCONTINUED | OUTPATIENT
Start: 2024-08-31 | End: 2024-09-01 | Stop reason: HOSPADM

## 2024-08-31 RX ORDER — POLYETHYLENE GLYCOL 3350 17 G/17G
17 POWDER, FOR SOLUTION ORAL DAILY
Status: DISCONTINUED | OUTPATIENT
Start: 2024-08-31 | End: 2024-08-31

## 2024-08-31 RX ORDER — AMOXICILLIN 250 MG
1 CAPSULE ORAL 2 TIMES DAILY PRN
Status: DISCONTINUED | OUTPATIENT
Start: 2024-08-31 | End: 2024-08-31

## 2024-08-31 RX ORDER — ENOXAPARIN SODIUM 100 MG/ML
40 INJECTION SUBCUTANEOUS ONCE
Status: COMPLETED | OUTPATIENT
Start: 2024-08-31 | End: 2024-08-31

## 2024-08-31 RX ORDER — DESVENLAFAXINE 50 MG/1
50 TABLET, FILM COATED, EXTENDED RELEASE ORAL DAILY
Status: DISCONTINUED | OUTPATIENT
Start: 2024-08-31 | End: 2024-08-31

## 2024-08-31 RX ORDER — PROPRANOLOL HYDROCHLORIDE 10 MG/1
10 TABLET ORAL 2 TIMES DAILY
Status: DISCONTINUED | OUTPATIENT
Start: 2024-08-31 | End: 2024-09-01

## 2024-08-31 RX ORDER — DESVENLAFAXINE 100 MG/1
100 TABLET, EXTENDED RELEASE ORAL DAILY
Status: DISCONTINUED | OUTPATIENT
Start: 2024-08-31 | End: 2024-08-31

## 2024-08-31 RX ORDER — GUAIFENESIN/DEXTROMETHORPHAN 100-10MG/5
5 SYRUP ORAL EVERY 4 HOURS PRN
Status: DISCONTINUED | OUTPATIENT
Start: 2024-08-31 | End: 2024-09-01

## 2024-08-31 RX ORDER — TOPIRAMATE 50 MG/1
50 TABLET, FILM COATED ORAL 2 TIMES DAILY
Status: DISCONTINUED | OUTPATIENT
Start: 2024-08-31 | End: 2024-09-01

## 2024-08-31 RX ORDER — ZOLPIDEM TARTRATE 5 MG/1
5 TABLET ORAL AT BEDTIME
Status: DISCONTINUED | OUTPATIENT
Start: 2024-08-31 | End: 2024-09-01

## 2024-08-31 RX ORDER — RAMELTEON 8 MG/1
8 TABLET ORAL AT BEDTIME
Status: DISCONTINUED | OUTPATIENT
Start: 2024-08-31 | End: 2024-09-01

## 2024-08-31 RX ORDER — AMOXICILLIN 250 MG
1 CAPSULE ORAL 2 TIMES DAILY
Status: DISCONTINUED | OUTPATIENT
Start: 2024-08-31 | End: 2024-09-01

## 2024-08-31 RX ORDER — ENOXAPARIN SODIUM 100 MG/ML
40 INJECTION SUBCUTANEOUS EVERY 24 HOURS
Status: DISCONTINUED | OUTPATIENT
Start: 2024-08-31 | End: 2024-08-31

## 2024-08-31 RX ADMIN — DESVENLAFAXINE SUCCINATE 150 MG: 100 TABLET, FILM COATED, EXTENDED RELEASE ORAL at 08:43

## 2024-08-31 RX ADMIN — POLYETHYLENE GLYCOL 3350 17 G: 17 POWDER, FOR SOLUTION ORAL at 19:41

## 2024-08-31 RX ADMIN — HYDROMORPHONE HYDROCHLORIDE 0.5 MG: 1 INJECTION, SOLUTION INTRAMUSCULAR; INTRAVENOUS; SUBCUTANEOUS at 19:47

## 2024-08-31 RX ADMIN — POLYETHYLENE GLYCOL 3350 17 G: 17 POWDER, FOR SOLUTION ORAL at 08:43

## 2024-08-31 RX ADMIN — SENNOSIDES AND DOCUSATE SODIUM 1 TABLET: 8.6; 5 TABLET ORAL at 10:35

## 2024-08-31 RX ADMIN — TOPIRAMATE 50 MG: 50 TABLET, FILM COATED ORAL at 19:44

## 2024-08-31 RX ADMIN — HYDROMORPHONE HYDROCHLORIDE 0.5 MG: 1 INJECTION, SOLUTION INTRAMUSCULAR; INTRAVENOUS; SUBCUTANEOUS at 21:03

## 2024-08-31 RX ADMIN — OXYCODONE HYDROCHLORIDE 10 MG: 5 TABLET ORAL at 03:59

## 2024-08-31 RX ADMIN — HYDROMORPHONE HYDROCHLORIDE 0.5 MG: 1 INJECTION, SOLUTION INTRAMUSCULAR; INTRAVENOUS; SUBCUTANEOUS at 07:43

## 2024-08-31 RX ADMIN — ACETAMINOPHEN 650 MG: 325 TABLET ORAL at 03:59

## 2024-08-31 RX ADMIN — HYDROMORPHONE HYDROCHLORIDE 1 MG: 1 INJECTION, SOLUTION INTRAMUSCULAR; INTRAVENOUS; SUBCUTANEOUS at 01:12

## 2024-08-31 RX ADMIN — HYDROMORPHONE HYDROCHLORIDE 0.5 MG: 1 INJECTION, SOLUTION INTRAMUSCULAR; INTRAVENOUS; SUBCUTANEOUS at 10:35

## 2024-08-31 RX ADMIN — ENOXAPARIN SODIUM 40 MG: 40 INJECTION SUBCUTANEOUS at 14:40

## 2024-08-31 RX ADMIN — QUETIAPINE 200 MG: 200 TABLET ORAL at 05:10

## 2024-08-31 RX ADMIN — HYDROMORPHONE HYDROCHLORIDE 0.5 MG: 1 INJECTION, SOLUTION INTRAMUSCULAR; INTRAVENOUS; SUBCUTANEOUS at 05:03

## 2024-08-31 RX ADMIN — QUETIAPINE 200 MG: 200 TABLET ORAL at 22:17

## 2024-08-31 RX ADMIN — PROPRANOLOL HYDROCHLORIDE 10 MG: 10 TABLET ORAL at 19:44

## 2024-08-31 RX ADMIN — SENNOSIDES AND DOCUSATE SODIUM 1 TABLET: 8.6; 5 TABLET ORAL at 19:42

## 2024-08-31 RX ADMIN — PANTOPRAZOLE SODIUM 40 MG: 40 TABLET, DELAYED RELEASE ORAL at 07:49

## 2024-08-31 RX ADMIN — BUSPIRONE HYDROCHLORIDE 30 MG: 10 TABLET ORAL at 07:49

## 2024-08-31 RX ADMIN — ONDANSETRON 4 MG: 2 INJECTION INTRAMUSCULAR; INTRAVENOUS at 05:03

## 2024-08-31 RX ADMIN — SODIUM CHLORIDE 1000 ML: 9 INJECTION, SOLUTION INTRAVENOUS at 23:57

## 2024-08-31 RX ADMIN — PROPRANOLOL HYDROCHLORIDE 10 MG: 10 TABLET ORAL at 07:49

## 2024-08-31 RX ADMIN — RAMELTEON 8 MG: 8 TABLET ORAL at 05:10

## 2024-08-31 RX ADMIN — DOCUSATE SODIUM 100 MG: 100 CAPSULE, LIQUID FILLED ORAL at 08:43

## 2024-08-31 RX ADMIN — LORAZEPAM 1 MG: 1 TABLET ORAL at 07:49

## 2024-08-31 RX ADMIN — Medication 225 MG: at 05:11

## 2024-08-31 RX ADMIN — HYDROMORPHONE HYDROCHLORIDE 0.5 MG: 1 INJECTION, SOLUTION INTRAMUSCULAR; INTRAVENOUS; SUBCUTANEOUS at 17:20

## 2024-08-31 RX ADMIN — DOCUSATE SODIUM 1 ENEMA: 283 LIQUID RECTAL at 11:36

## 2024-08-31 RX ADMIN — LORAZEPAM 1 MG: 1 TABLET ORAL at 19:44

## 2024-08-31 RX ADMIN — RAMELTEON 8 MG: 8 TABLET ORAL at 22:17

## 2024-08-31 RX ADMIN — TOPIRAMATE 50 MG: 50 TABLET, FILM COATED ORAL at 08:43

## 2024-08-31 RX ADMIN — BUSPIRONE HYDROCHLORIDE 30 MG: 10 TABLET ORAL at 19:42

## 2024-08-31 RX ADMIN — Medication 225 MG: at 22:18

## 2024-08-31 RX ADMIN — ZOLPIDEM TARTRATE 5 MG: 5 TABLET, COATED ORAL at 22:17

## 2024-08-31 RX ADMIN — HYDROMORPHONE HYDROCHLORIDE 0.5 MG: 1 INJECTION, SOLUTION INTRAMUSCULAR; INTRAVENOUS; SUBCUTANEOUS at 13:13

## 2024-08-31 ASSESSMENT — ACTIVITIES OF DAILY LIVING (ADL)
WEAR_GLASSES_OR_BLIND: YES
CONCENTRATING,_REMEMBERING_OR_MAKING_DECISIONS_DIFFICULTY: YES
ADLS_ACUITY_SCORE: 32
ADLS_ACUITY_SCORE: 34
ADLS_ACUITY_SCORE: 25
ADLS_ACUITY_SCORE: 34
ADLS_ACUITY_SCORE: 35
ADLS_ACUITY_SCORE: 25
FALL_HISTORY_WITHIN_LAST_SIX_MONTHS: NO
ADLS_ACUITY_SCORE: 25
CHANGE_IN_FUNCTIONAL_STATUS_SINCE_ONSET_OF_CURRENT_ILLNESS/INJURY: NO
ADLS_ACUITY_SCORE: 25
VISION_MANAGEMENT: GLASSES
ADLS_ACUITY_SCORE: 35
ADLS_ACUITY_SCORE: 25
ADLS_ACUITY_SCORE: 25
TOILETING_ISSUES: NO
DIFFICULTY_EATING/SWALLOWING: NO
ADLS_ACUITY_SCORE: 34
ADLS_ACUITY_SCORE: 34
ADLS_ACUITY_SCORE: 25
ADLS_ACUITY_SCORE: 34
ADLS_ACUITY_SCORE: 34
ADLS_ACUITY_SCORE: 35
DOING_ERRANDS_INDEPENDENTLY_DIFFICULTY: NO
WALKING_OR_CLIMBING_STAIRS_DIFFICULTY: NO
DRESSING/BATHING_DIFFICULTY: NO
ADLS_ACUITY_SCORE: 25
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
## 2024-08-31 NOTE — PROGRESS NOTES
Admission          8/31/2024  2:38 AM  -----------------------------------------------------------  Reason for admission: Leg pain and abd mass   Primary team notified of pt arrival.  Admitted from: [facility]   Via: stretcher  Accompanied by: Self   Belongings: Placed in closet.  Admission Profile: complete  Teaching: orientation to unit and call light- call light within reach, call don't fall, use of console, meal times, when to call for the RN, and enforced importance of safety   Access: PIV  Telemetry: No order   Ht./Wt.: complete  Code Status verified on armband: yes  2 RN Skin Assessment Completed By: Kathleen JJ RN and Alba AGUILAR RN.   Med Rec completed: yes. Skin looks good except redness from scratching   Suction/Ambu bag/Flowmeter at bedside: yes  Is patient having diarrhea upon admission- if YES fill out testing algorithm : no    C. Diff Testing Algorithm (MUST be marked YES)   3 or more loose stools in 24 hrs. [] Yes [x] No       Additional symptoms:(At least ONE must be marked yes)   Abdominal pain/discomfort [x] Yes [] No   Fever at least 38C (100.4 F) [] Yes [x] No   Elevated WBC(>11,000) [] Yes [x] No       Exclusion Criteria:  (MUST be marked YES)   Off laxatives for at least 48 hrs. [x] Yes [] No       Pt status:  A/O x4. Afebrile, VSS on room air. Regular diet. 1 R PIV. C/O left leg pain and headache. PRN tylelon x1, oxy x1 and dilaudid x1 with no relief. Pt is SBA     Temp:  [98.4  F (36.9  C)] 98.4  F (36.9  C)  Resp:  [16] 16  BP: (119)/(86) 119/86  SpO2:  [97 %] 97 %

## 2024-08-31 NOTE — PROGRESS NOTES
GYN ONC Attending attestation      Please see resident H&P for full note.     I saw and examined Marizol. I reviewed imaging including CT A/P, labs. I counseled her regarding findings and surgical options. 75 minutes total time today.     A/P  39 year old with complex pelvic mass. Suspect this is her left ovary. Also with right ovarian cyst. Differential includes benign or malignant etiology (including rare ovarian histologic subtypes). I do think this is causing bladder frequency, bowel dysfunction and may be causing her leg pain. I recommend surgery. At minimum I recommend USO however she may require hysterectomy/BSO and cancer staging depending on frozen section. Discussed surgical risks not limited to bleeding, infection, damage to surrounding organs, need for blood transfusions and ICU stay. Discussed possibility of bowel resection and temporary or permanent ostomy. She is 100% sure she does not want future fertility. I therefore do recommend hysterectomy. She is likely not a candidate for HRT so I would like to preserve some ovary if possible/feasible.     - Plan XLap/USO/hysterectomy. Possible BSO, possible cancer staging  - Tumor markers today (CEA, , AFP, HCG, LDH, Inhibin B)       Jessenia Sosa MD  Gynecologic Oncology  Pager [phone]

## 2024-08-31 NOTE — PROVIDER NOTIFICATION
Gyn Onc paged via [facility]: 5C pt [id number] MD was told to not eat per cross cover, but has regular diet. Is she ok to eat? Also no stool in 2 weeks, hypoactive bowel sounds do we wanna try enema and/or mag citrate?

## 2024-08-31 NOTE — PLAN OF CARE
"/60 (BP Location: Left arm)   Pulse 82   Temp 98.3  F (36.8  C) (Oral)   Resp 16   Ht 1.67 m (5' 5.75\")   Wt 77.7 kg (171 lb 3.2 oz)   SpO2 94%   BMI 27.84 kg/m      AVSS on RA. Pt reports ongoing pain in pelvis and L leg. Pain managed well with dilaudid ~q3hrs, x4 for shift. Pt reports that oxycodone doesn't help with pain, declined throughout shift. Significant weakness and paresthesia in LLE. Pt steady on feet, but bed alarm is on for safety. Mild nausea this morning, relief with ice chips. Pt will be NPO at midnight for surgery, time unknown. Pt reports constipation x2 weeks. Given PO senna, colace, miralax and senna enema. Pt was able to pass 3 small, watery stools. Pt voiding spontaneously, no urinary hesitancy. Continue POC.    Problem: Adult Inpatient Plan of Care  Goal: Optimal Comfort and Wellbeing  Outcome: Progressing     Problem: Pain Acute  Goal: Optimal Pain Control and Function  Outcome: Progressing    "

## 2024-08-31 NOTE — PROVIDER NOTIFICATION
Time of notification: 4:05 AM  Provider notified:GYN Onc covering team  Patient status: SRI Granados [id number]  Pt is still requesting for nausea med, but there wasn't any ordered.   thank you   Temp:  [98.4  F (36.9  C)] 98.4  F (36.9  C)  Resp:  [16] 16  BP: (119)/(86) 119/86  SpO2:  [97 %] 97 %  Orders received:

## 2024-09-01 ENCOUNTER — ANESTHESIA (OUTPATIENT)
Dept: SURGERY | Facility: CLINIC | Age: 39
End: 2024-09-01
Payer: COMMERCIAL

## 2024-09-01 LAB
ALBUMIN SERPL BCG-MCNC: 3.8 G/DL (ref 3.5–5.2)
ALP SERPL-CCNC: 48 U/L (ref 40–150)
ALT SERPL W P-5'-P-CCNC: 35 U/L (ref 0–50)
ANION GAP SERPL CALCULATED.3IONS-SCNC: 12 MMOL/L (ref 7–15)
ANION GAP SERPL CALCULATED.3IONS-SCNC: 7 MMOL/L (ref 7–15)
APTT PPP: 26 SECONDS (ref 22–38)
APTT PPP: 27 SECONDS (ref 22–38)
AST SERPL W P-5'-P-CCNC: 30 U/L (ref 0–45)
BASE EXCESS BLDV CALC-SCNC: -3.3 MMOL/L (ref -3–3)
BASE EXCESS BLDV CALC-SCNC: -4 MMOL/L (ref -3–3)
BASE EXCESS BLDV CALC-SCNC: -4.7 MMOL/L (ref -3–3)
BASE EXCESS BLDV CALC-SCNC: -6.1 MMOL/L (ref -3–3)
BASOPHILS # BLD AUTO: 0 10E3/UL (ref 0–0.2)
BASOPHILS NFR BLD AUTO: 0 %
BILIRUB SERPL-MCNC: 0.2 MG/DL
BLD PROD TYP BPU: NORMAL
BLOOD COMPONENT TYPE: NORMAL
BUN SERPL-MCNC: 13.8 MG/DL (ref 6–20)
BUN SERPL-MCNC: 17.2 MG/DL (ref 6–20)
CA-I BLD-MCNC: 3.8 MG/DL (ref 4.4–5.2)
CA-I BLD-MCNC: 4.6 MG/DL (ref 4.4–5.2)
CA-I BLD-MCNC: 4.8 MG/DL (ref 4.4–5.2)
CA-I BLD-MCNC: 5 MG/DL (ref 4.4–5.2)
CALCIUM SERPL-MCNC: 8 MG/DL (ref 8.8–10.4)
CALCIUM SERPL-MCNC: 8.6 MG/DL (ref 8.8–10.4)
CANCER AG19-9 SERPL IA-ACNC: 6 U/ML
CHLORIDE SERPL-SCNC: 106 MMOL/L (ref 98–107)
CHLORIDE SERPL-SCNC: 110 MMOL/L (ref 98–107)
CLOT INIT KAOL IND TO POST HEP NEUT TRTO: 0.9 {RATIO}
CLOT INIT KAOL IND TO POST HEP NEUT TRTO: 1 {RATIO}
CLOT INIT KAOLIN IND BLD US: 90 SEC (ref 113–166)
CLOT INIT KAOLIN IND BLD US: 97 SEC (ref 113–166)
CLOT INIT KAOLIN IND P HEP NEUT BLD US: 98 SEC (ref 103–153)
CLOT INIT KAOLIN IND P HEP NEUT BLD US: 99 SEC (ref 103–153)
CLOT STIFF PLT CONT BLD CALC: 19.1 HPA (ref 11.9–29.8)
CLOT STIFF PLT CONT BLD CALC: 20.4 HPA (ref 11.9–29.8)
CLOT STIFF TF IND P HEP NEUT BLD US: 21 HPA (ref 13–33.2)
CLOT STIFF TF IND P HEP NEUT BLD US: 22.6 HPA (ref 13–33.2)
CLOT STIFF TF IND+IIB-IIIA INH P HEP NEU: 1.9 HPA (ref 1–3.7)
CLOT STIFF TF IND+IIB-IIIA INH P HEP NEU: 2.2 HPA (ref 1–3.7)
CODING SYSTEM: NORMAL
CREAT SERPL-MCNC: 0.88 MG/DL (ref 0.51–0.95)
CREAT SERPL-MCNC: 0.99 MG/DL (ref 0.51–0.95)
CROSSMATCH: NORMAL
EGFRCR SERPLBLD CKD-EPI 2021: 75 ML/MIN/1.73M2
EGFRCR SERPLBLD CKD-EPI 2021: 86 ML/MIN/1.73M2
EOSINOPHIL # BLD AUTO: 0.3 10E3/UL (ref 0–0.7)
EOSINOPHIL NFR BLD AUTO: 5 %
ERYTHROCYTE [DISTWIDTH] IN BLOOD BY AUTOMATED COUNT: 15.9 % (ref 10–15)
ERYTHROCYTE [DISTWIDTH] IN BLOOD BY AUTOMATED COUNT: 16.3 % (ref 10–15)
GLUCOSE BLD-MCNC: 129 MG/DL (ref 70–99)
GLUCOSE BLD-MCNC: 130 MG/DL (ref 70–99)
GLUCOSE BLD-MCNC: 131 MG/DL (ref 70–99)
GLUCOSE BLD-MCNC: 166 MG/DL (ref 70–99)
GLUCOSE SERPL-MCNC: 243 MG/DL (ref 70–99)
GLUCOSE SERPL-MCNC: 95 MG/DL (ref 70–99)
HCO3 BLDV-SCNC: 19 MMOL/L (ref 21–28)
HCO3 BLDV-SCNC: 21 MMOL/L (ref 21–28)
HCO3 BLDV-SCNC: 22 MMOL/L (ref 21–28)
HCO3 BLDV-SCNC: 23 MMOL/L (ref 21–28)
HCO3 SERPL-SCNC: 18 MMOL/L (ref 22–29)
HCO3 SERPL-SCNC: 24 MMOL/L (ref 22–29)
HCT VFR BLD AUTO: 31 % (ref 35–47)
HCT VFR BLD AUTO: 33.9 % (ref 35–47)
HGB BLD-MCNC: 10.4 G/DL (ref 11.7–15.7)
HGB BLD-MCNC: 10.5 G/DL (ref 11.7–15.7)
HGB BLD-MCNC: 7.3 G/DL (ref 11.7–15.7)
HGB BLD-MCNC: 9.3 G/DL (ref 11.7–15.7)
HGB BLD-MCNC: 9.7 G/DL (ref 11.7–15.7)
HGB BLD-MCNC: 9.8 G/DL (ref 11.7–15.7)
HOLD SPECIMEN: NORMAL
HOLD SPECIMEN: NORMAL
IMM GRANULOCYTES # BLD: 0 10E3/UL
IMM GRANULOCYTES NFR BLD: 0 %
INR PPP: 1.23 (ref 0.85–1.15)
INR PPP: 1.35 (ref 0.85–1.15)
ISSUE DATE AND TIME: NORMAL
LACTATE BLD-SCNC: 0.6 MMOL/L (ref 0.7–2)
LACTATE BLD-SCNC: 0.8 MMOL/L (ref 0.7–2)
LACTATE BLD-SCNC: 1 MMOL/L (ref 0.7–2)
LACTATE BLD-SCNC: 1.7 MMOL/L (ref 0.7–2)
LYMPHOCYTES # BLD AUTO: 2.1 10E3/UL (ref 0.8–5.3)
LYMPHOCYTES NFR BLD AUTO: 38 %
MCH RBC QN AUTO: 28.2 PG (ref 26.5–33)
MCH RBC QN AUTO: 28.5 PG (ref 26.5–33)
MCHC RBC AUTO-ENTMCNC: 31 G/DL (ref 31.5–36.5)
MCHC RBC AUTO-ENTMCNC: 31.3 G/DL (ref 31.5–36.5)
MCV RBC AUTO: 91 FL (ref 78–100)
MCV RBC AUTO: 91 FL (ref 78–100)
MONOCYTES # BLD AUTO: 0.6 10E3/UL (ref 0–1.3)
MONOCYTES NFR BLD AUTO: 10 %
NEUTROPHILS # BLD AUTO: 2.6 10E3/UL (ref 1.6–8.3)
NEUTROPHILS NFR BLD AUTO: 47 %
NRBC # BLD AUTO: 0 10E3/UL
NRBC BLD AUTO-RTO: 0 /100
O2/TOTAL GAS SETTING VFR VENT: 30 %
O2/TOTAL GAS SETTING VFR VENT: 33 %
O2/TOTAL GAS SETTING VFR VENT: 35 %
O2/TOTAL GAS SETTING VFR VENT: 60 %
OXYHGB MFR BLDV: 64 % (ref 70–75)
OXYHGB MFR BLDV: 69 % (ref 70–75)
OXYHGB MFR BLDV: 75 % (ref 70–75)
OXYHGB MFR BLDV: 80 % (ref 70–75)
PCO2 BLDV: 36 MM HG (ref 40–50)
PCO2 BLDV: 42 MM HG (ref 40–50)
PCO2 BLDV: 43 MM HG (ref 40–50)
PCO2 BLDV: 45 MM HG (ref 40–50)
PH BLDV: 7.31 [PH] (ref 7.32–7.43)
PH BLDV: 7.32 [PH] (ref 7.32–7.43)
PH BLDV: 7.32 [PH] (ref 7.32–7.43)
PH BLDV: 7.34 [PH] (ref 7.32–7.43)
PLATELET # BLD AUTO: 151 10E3/UL (ref 150–450)
PLATELET # BLD AUTO: 152 10E3/UL (ref 150–450)
PO2 BLDV: 37 MM HG (ref 25–47)
PO2 BLDV: 40 MM HG (ref 25–47)
PO2 BLDV: 42 MM HG (ref 25–47)
PO2 BLDV: 48 MM HG (ref 25–47)
POTASSIUM BLD-SCNC: 4 MMOL/L (ref 3.4–5.3)
POTASSIUM BLD-SCNC: 4 MMOL/L (ref 3.4–5.3)
POTASSIUM BLD-SCNC: 4.2 MMOL/L (ref 3.4–5.3)
POTASSIUM BLD-SCNC: 4.5 MMOL/L (ref 3.4–5.3)
POTASSIUM SERPL-SCNC: 4 MMOL/L (ref 3.4–5.3)
POTASSIUM SERPL-SCNC: 5.1 MMOL/L (ref 3.4–5.3)
PROT SERPL-MCNC: 6.1 G/DL (ref 6.4–8.3)
RBC # BLD AUTO: 3.4 10E6/UL (ref 3.8–5.2)
RBC # BLD AUTO: 3.73 10E6/UL (ref 3.8–5.2)
SAO2 % BLDV: 65 % (ref 70–75)
SAO2 % BLDV: 70 % (ref 70–75)
SAO2 % BLDV: 77 % (ref 70–75)
SAO2 % BLDV: 82 % (ref 70–75)
SODIUM BLD-SCNC: 136 MMOL/L (ref 135–145)
SODIUM BLD-SCNC: 137 MMOL/L (ref 135–145)
SODIUM BLD-SCNC: 140 MMOL/L (ref 135–145)
SODIUM BLD-SCNC: 141 MMOL/L (ref 135–145)
SODIUM SERPL-SCNC: 136 MMOL/L (ref 135–145)
SODIUM SERPL-SCNC: 141 MMOL/L (ref 135–145)
UNIT ABO/RH: NORMAL
UNIT NUMBER: NORMAL
UNIT STATUS: NORMAL
UNIT TYPE ISBT: 6200
WBC # BLD AUTO: 10.4 10E3/UL (ref 4–11)
WBC # BLD AUTO: 5.6 10E3/UL (ref 4–11)

## 2024-09-01 PROCEDURE — 250N000011 HC RX IP 250 OP 636

## 2024-09-01 PROCEDURE — 360N000077 HC SURGERY LEVEL 4, PER MIN: Performed by: OBSTETRICS & GYNECOLOGY

## 2024-09-01 PROCEDURE — 250N000011 HC RX IP 250 OP 636: Performed by: STUDENT IN AN ORGANIZED HEALTH CARE EDUCATION/TRAINING PROGRAM

## 2024-09-01 PROCEDURE — 250N000013 HC RX MED GY IP 250 OP 250 PS 637

## 2024-09-01 PROCEDURE — 258N000003 HC RX IP 258 OP 636

## 2024-09-01 PROCEDURE — 85730 THROMBOPLASTIN TIME PARTIAL: CPT

## 2024-09-01 PROCEDURE — 85027 COMPLETE CBC AUTOMATED: CPT

## 2024-09-01 PROCEDURE — 3E043XZ INTRODUCTION OF VASOPRESSOR INTO CENTRAL VEIN, PERCUTANEOUS APPROACH: ICD-10-PCS | Performed by: OBSTETRICS & GYNECOLOGY

## 2024-09-01 PROCEDURE — 36415 COLL VENOUS BLD VENIPUNCTURE: CPT

## 2024-09-01 PROCEDURE — 0DTU0ZZ RESECTION OF OMENTUM, OPEN APPROACH: ICD-10-PCS | Performed by: OBSTETRICS & GYNECOLOGY

## 2024-09-01 PROCEDURE — 84295 ASSAY OF SERUM SODIUM: CPT

## 2024-09-01 PROCEDURE — 88342 IMHCHEM/IMCYTCHM 1ST ANTB: CPT | Mod: TC | Performed by: OBSTETRICS & GYNECOLOGY

## 2024-09-01 PROCEDURE — 0DTN0ZZ RESECTION OF SIGMOID COLON, OPEN APPROACH: ICD-10-PCS | Performed by: OBSTETRICS & GYNECOLOGY

## 2024-09-01 PROCEDURE — 999N000141 HC STATISTIC PRE-PROCEDURE NURSING ASSESSMENT: Performed by: OBSTETRICS & GYNECOLOGY

## 2024-09-01 PROCEDURE — 250N000009 HC RX 250

## 2024-09-01 PROCEDURE — 250N000011 HC RX IP 250 OP 636: Performed by: SURGERY

## 2024-09-01 PROCEDURE — 258N000003 HC RX IP 258 OP 636: Performed by: SURGERY

## 2024-09-01 PROCEDURE — 88331 PATH CONSLTJ SURG 1 BLK 1SPC: CPT | Mod: 26 | Performed by: PATHOLOGY

## 2024-09-01 PROCEDURE — 82330 ASSAY OF CALCIUM: CPT

## 2024-09-01 PROCEDURE — 82040 ASSAY OF SERUM ALBUMIN: CPT

## 2024-09-01 PROCEDURE — 88305 TISSUE EXAM BY PATHOLOGIST: CPT | Mod: 26 | Performed by: PATHOLOGY

## 2024-09-01 PROCEDURE — 85730 THROMBOPLASTIN TIME PARTIAL: CPT | Performed by: OBSTETRICS & GYNECOLOGY

## 2024-09-01 PROCEDURE — 58240 REMOVAL OF PELVIS CONTENTS: CPT | Performed by: SURGERY

## 2024-09-01 PROCEDURE — 272N000001 HC OR GENERAL SUPPLY STERILE: Performed by: OBSTETRICS & GYNECOLOGY

## 2024-09-01 PROCEDURE — 0UT70ZZ RESECTION OF BILATERAL FALLOPIAN TUBES, OPEN APPROACH: ICD-10-PCS | Performed by: OBSTETRICS & GYNECOLOGY

## 2024-09-01 PROCEDURE — 250N000013 HC RX MED GY IP 250 OP 250 PS 637: Performed by: OBSTETRICS & GYNECOLOGY

## 2024-09-01 PROCEDURE — P9016 RBC LEUKOCYTES REDUCED: HCPCS

## 2024-09-01 PROCEDURE — 88305 TISSUE EXAM BY PATHOLOGIST: CPT | Mod: TC | Performed by: OBSTETRICS & GYNECOLOGY

## 2024-09-01 PROCEDURE — 250N000013 HC RX MED GY IP 250 OP 250 PS 637: Performed by: CHIROPRACTOR

## 2024-09-01 PROCEDURE — 85396 CLOTTING ASSAY WHOLE BLOOD: CPT

## 2024-09-01 PROCEDURE — 999N000127 HC STATISTIC PERIPHERAL IV START W US GUIDANCE

## 2024-09-01 PROCEDURE — 85610 PROTHROMBIN TIME: CPT

## 2024-09-01 PROCEDURE — 120N000002 HC R&B MED SURG/OB UMMC

## 2024-09-01 PROCEDURE — 88112 CYTOPATH CELL ENHANCE TECH: CPT | Mod: 26 | Performed by: PATHOLOGY

## 2024-09-01 PROCEDURE — 88307 TISSUE EXAM BY PATHOLOGIST: CPT | Mod: 26 | Performed by: PATHOLOGY

## 2024-09-01 PROCEDURE — 250N000009 HC RX 250: Performed by: OBSTETRICS & GYNECOLOGY

## 2024-09-01 PROCEDURE — 85610 PROTHROMBIN TIME: CPT | Performed by: OBSTETRICS & GYNECOLOGY

## 2024-09-01 PROCEDURE — 88331 PATH CONSLTJ SURG 1 BLK 1SPC: CPT | Mod: TC | Performed by: OBSTETRICS & GYNECOLOGY

## 2024-09-01 PROCEDURE — 88341 IMHCHEM/IMCYTCHM EA ADD ANTB: CPT | Mod: 26 | Performed by: PATHOLOGY

## 2024-09-01 PROCEDURE — 85025 COMPLETE CBC W/AUTO DIFF WBC: CPT | Performed by: OBSTETRICS & GYNECOLOGY

## 2024-09-01 PROCEDURE — 88360 TUMOR IMMUNOHISTOCHEM/MANUAL: CPT | Mod: 26 | Performed by: PATHOLOGY

## 2024-09-01 PROCEDURE — 0UT90ZZ RESECTION OF UTERUS, OPEN APPROACH: ICD-10-PCS | Performed by: OBSTETRICS & GYNECOLOGY

## 2024-09-01 PROCEDURE — 88309 TISSUE EXAM BY PATHOLOGIST: CPT | Mod: 26 | Performed by: PATHOLOGY

## 2024-09-01 PROCEDURE — 0UBG0ZZ EXCISION OF VAGINA, OPEN APPROACH: ICD-10-PCS | Performed by: OBSTETRICS & GYNECOLOGY

## 2024-09-01 PROCEDURE — 250N000013 HC RX MED GY IP 250 OP 250 PS 637: Performed by: STUDENT IN AN ORGANIZED HEALTH CARE EDUCATION/TRAINING PROGRAM

## 2024-09-01 PROCEDURE — 710N000010 HC RECOVERY PHASE 1, LEVEL 2, PER MIN: Performed by: OBSTETRICS & GYNECOLOGY

## 2024-09-01 PROCEDURE — 0UT20ZZ RESECTION OF BILATERAL OVARIES, OPEN APPROACH: ICD-10-PCS | Performed by: OBSTETRICS & GYNECOLOGY

## 2024-09-01 PROCEDURE — 88342 IMHCHEM/IMCYTCHM 1ST ANTB: CPT | Mod: 26 | Performed by: PATHOLOGY

## 2024-09-01 PROCEDURE — 0D1B0Z4 BYPASS ILEUM TO CUTANEOUS, OPEN APPROACH: ICD-10-PCS | Performed by: OBSTETRICS & GYNECOLOGY

## 2024-09-01 PROCEDURE — 370N000017 HC ANESTHESIA TECHNICAL FEE, PER MIN: Performed by: OBSTETRICS & GYNECOLOGY

## 2024-09-01 PROCEDURE — 250N000025 HC SEVOFLURANE, PER MIN: Performed by: OBSTETRICS & GYNECOLOGY

## 2024-09-01 RX ORDER — SODIUM CHLORIDE, SODIUM LACTATE, POTASSIUM CHLORIDE, CALCIUM CHLORIDE 600; 310; 30; 20 MG/100ML; MG/100ML; MG/100ML; MG/100ML
INJECTION, SOLUTION INTRAVENOUS CONTINUOUS PRN
Status: DISCONTINUED | OUTPATIENT
Start: 2024-09-01 | End: 2024-09-01

## 2024-09-01 RX ORDER — MAGNESIUM HYDROXIDE 1200 MG/15ML
LIQUID ORAL PRN
Status: DISCONTINUED | OUTPATIENT
Start: 2024-09-01 | End: 2024-09-01 | Stop reason: HOSPADM

## 2024-09-01 RX ORDER — METRONIDAZOLE 500 MG/100ML
500 INJECTION, SOLUTION INTRAVENOUS ONCE
Status: DISCONTINUED | OUTPATIENT
Start: 2024-09-01 | End: 2024-09-01 | Stop reason: HOSPADM

## 2024-09-01 RX ORDER — HYDROXYZINE HYDROCHLORIDE 25 MG/1
25 TABLET, FILM COATED ORAL EVERY 6 HOURS PRN
Status: DISCONTINUED | OUTPATIENT
Start: 2024-09-01 | End: 2024-09-06 | Stop reason: HOSPADM

## 2024-09-01 RX ORDER — ONDANSETRON 4 MG/1
4 TABLET, ORALLY DISINTEGRATING ORAL EVERY 6 HOURS PRN
Status: DISCONTINUED | OUTPATIENT
Start: 2024-09-01 | End: 2024-09-03

## 2024-09-01 RX ORDER — FENTANYL CITRATE 50 UG/ML
INJECTION, SOLUTION INTRAMUSCULAR; INTRAVENOUS PRN
Status: DISCONTINUED | OUTPATIENT
Start: 2024-09-01 | End: 2024-09-01

## 2024-09-01 RX ORDER — NALOXONE HYDROCHLORIDE 0.4 MG/ML
0.4 INJECTION, SOLUTION INTRAMUSCULAR; INTRAVENOUS; SUBCUTANEOUS
Status: DISCONTINUED | OUTPATIENT
Start: 2024-09-01 | End: 2024-09-02

## 2024-09-01 RX ORDER — DEXAMETHASONE SODIUM PHOSPHATE 4 MG/ML
INJECTION, SOLUTION INTRA-ARTICULAR; INTRALESIONAL; INTRAMUSCULAR; INTRAVENOUS; SOFT TISSUE PRN
Status: DISCONTINUED | OUTPATIENT
Start: 2024-09-01 | End: 2024-09-01

## 2024-09-01 RX ORDER — ONDANSETRON 4 MG/1
4 TABLET, ORALLY DISINTEGRATING ORAL EVERY 30 MIN PRN
Status: DISCONTINUED | OUTPATIENT
Start: 2024-09-01 | End: 2024-09-01

## 2024-09-01 RX ORDER — HEPARIN SODIUM 5000 [USP'U]/.5ML
5000 INJECTION, SOLUTION INTRAVENOUS; SUBCUTANEOUS ONCE
Status: COMPLETED | OUTPATIENT
Start: 2024-09-01 | End: 2024-09-01

## 2024-09-01 RX ORDER — PROPOFOL 10 MG/ML
INJECTION, EMULSION INTRAVENOUS PRN
Status: DISCONTINUED | OUTPATIENT
Start: 2024-09-01 | End: 2024-09-01

## 2024-09-01 RX ORDER — ACETAMINOPHEN 325 MG/1
975 TABLET ORAL ONCE
Status: DISCONTINUED | OUTPATIENT
Start: 2024-09-01 | End: 2024-09-01

## 2024-09-01 RX ORDER — CEFAZOLIN SODIUM 2 G/100ML
2 INJECTION, SOLUTION INTRAVENOUS EVERY 8 HOURS
Status: COMPLETED | OUTPATIENT
Start: 2024-09-01 | End: 2024-09-02

## 2024-09-01 RX ORDER — CALCIUM CHLORIDE 100 MG/ML
INJECTION INTRAVENOUS; INTRAVENTRICULAR PRN
Status: DISCONTINUED | OUTPATIENT
Start: 2024-09-01 | End: 2024-09-01

## 2024-09-01 RX ORDER — ZOLPIDEM TARTRATE 5 MG/1
5 TABLET ORAL AT BEDTIME
Status: DISCONTINUED | OUTPATIENT
Start: 2024-09-01 | End: 2024-09-06 | Stop reason: HOSPADM

## 2024-09-01 RX ORDER — BUPIVACAINE HYDROCHLORIDE AND EPINEPHRINE 2.5; 5 MG/ML; UG/ML
INJECTION, SOLUTION INFILTRATION; PERINEURAL
Status: DISCONTINUED | OUTPATIENT
Start: 2024-09-01 | End: 2024-09-01

## 2024-09-01 RX ORDER — METHOCARBAMOL 500 MG/1
500 TABLET, FILM COATED ORAL EVERY 6 HOURS PRN
Status: DISCONTINUED | OUTPATIENT
Start: 2024-09-01 | End: 2024-09-03

## 2024-09-01 RX ORDER — PANTOPRAZOLE SODIUM 40 MG/1
40 TABLET, DELAYED RELEASE ORAL
Status: DISCONTINUED | OUTPATIENT
Start: 2024-09-02 | End: 2024-09-06 | Stop reason: HOSPADM

## 2024-09-01 RX ORDER — CALCIUM CARBONATE 500 MG/1
500 TABLET, CHEWABLE ORAL 4 TIMES DAILY PRN
Status: DISCONTINUED | OUTPATIENT
Start: 2024-09-01 | End: 2024-09-06 | Stop reason: HOSPADM

## 2024-09-01 RX ORDER — ACETAMINOPHEN 325 MG/1
975 TABLET ORAL ONCE
Status: COMPLETED | OUTPATIENT
Start: 2024-09-01 | End: 2024-09-01

## 2024-09-01 RX ORDER — HYDROMORPHONE HCL IN WATER/PF 6 MG/30 ML
0.2 PATIENT CONTROLLED ANALGESIA SYRINGE INTRAVENOUS
Status: DISCONTINUED | OUTPATIENT
Start: 2024-09-01 | End: 2024-09-01

## 2024-09-01 RX ORDER — PROPRANOLOL HYDROCHLORIDE 10 MG/1
10 TABLET ORAL 2 TIMES DAILY
Status: DISCONTINUED | OUTPATIENT
Start: 2024-09-01 | End: 2024-09-04

## 2024-09-01 RX ORDER — NALOXONE HYDROCHLORIDE 0.4 MG/ML
0.1 INJECTION, SOLUTION INTRAMUSCULAR; INTRAVENOUS; SUBCUTANEOUS
Status: DISCONTINUED | OUTPATIENT
Start: 2024-09-01 | End: 2024-09-01

## 2024-09-01 RX ORDER — FENTANYL CITRATE 50 UG/ML
50 INJECTION, SOLUTION INTRAMUSCULAR; INTRAVENOUS EVERY 5 MIN PRN
Status: DISCONTINUED | OUTPATIENT
Start: 2024-09-01 | End: 2024-09-01

## 2024-09-01 RX ORDER — NALOXONE HYDROCHLORIDE 0.4 MG/ML
0.2 INJECTION, SOLUTION INTRAMUSCULAR; INTRAVENOUS; SUBCUTANEOUS
Status: DISCONTINUED | OUTPATIENT
Start: 2024-09-01 | End: 2024-09-02

## 2024-09-01 RX ORDER — FENTANYL CITRATE 50 UG/ML
25 INJECTION, SOLUTION INTRAMUSCULAR; INTRAVENOUS EVERY 5 MIN PRN
Status: DISCONTINUED | OUTPATIENT
Start: 2024-09-01 | End: 2024-09-01

## 2024-09-01 RX ORDER — LIDOCAINE 40 MG/G
CREAM TOPICAL
Status: DISCONTINUED | OUTPATIENT
Start: 2024-09-01 | End: 2024-09-01 | Stop reason: HOSPADM

## 2024-09-01 RX ORDER — LIDOCAINE HYDROCHLORIDE 20 MG/ML
INJECTION, SOLUTION INFILTRATION; PERINEURAL PRN
Status: DISCONTINUED | OUTPATIENT
Start: 2024-09-01 | End: 2024-09-01

## 2024-09-01 RX ORDER — ONDANSETRON 2 MG/ML
4 INJECTION INTRAMUSCULAR; INTRAVENOUS EVERY 30 MIN PRN
Status: DISCONTINUED | OUTPATIENT
Start: 2024-09-01 | End: 2024-09-01

## 2024-09-01 RX ORDER — LIDOCAINE 40 MG/G
CREAM TOPICAL
Status: DISCONTINUED | OUTPATIENT
Start: 2024-09-01 | End: 2024-09-06 | Stop reason: HOSPADM

## 2024-09-01 RX ORDER — OXYCODONE HYDROCHLORIDE 10 MG/1
10 TABLET ORAL EVERY 4 HOURS PRN
Status: DISCONTINUED | OUTPATIENT
Start: 2024-09-01 | End: 2024-09-02

## 2024-09-01 RX ORDER — ENOXAPARIN SODIUM 100 MG/ML
40 INJECTION SUBCUTANEOUS EVERY 24 HOURS
Status: DISCONTINUED | OUTPATIENT
Start: 2024-09-02 | End: 2024-09-03

## 2024-09-01 RX ORDER — DIMENHYDRINATE 50 MG/ML
25 INJECTION, SOLUTION INTRAMUSCULAR; INTRAVENOUS
Status: DISCONTINUED | OUTPATIENT
Start: 2024-09-01 | End: 2024-09-01

## 2024-09-01 RX ORDER — SODIUM CHLORIDE, SODIUM LACTATE, POTASSIUM CHLORIDE, CALCIUM CHLORIDE 600; 310; 30; 20 MG/100ML; MG/100ML; MG/100ML; MG/100ML
INJECTION, SOLUTION INTRAVENOUS CONTINUOUS
Status: DISCONTINUED | OUTPATIENT
Start: 2024-09-01 | End: 2024-09-01 | Stop reason: HOSPADM

## 2024-09-01 RX ORDER — OXYCODONE HYDROCHLORIDE 5 MG/1
5 TABLET ORAL EVERY 4 HOURS PRN
Status: DISCONTINUED | OUTPATIENT
Start: 2024-09-01 | End: 2024-09-02

## 2024-09-01 RX ORDER — OXYCODONE HYDROCHLORIDE 5 MG/1
5 TABLET ORAL
Status: DISCONTINUED | OUTPATIENT
Start: 2024-09-01 | End: 2024-09-01

## 2024-09-01 RX ORDER — HYDROMORPHONE HCL IN WATER/PF 6 MG/30 ML
0.4 PATIENT CONTROLLED ANALGESIA SYRINGE INTRAVENOUS EVERY 5 MIN PRN
Status: DISCONTINUED | OUTPATIENT
Start: 2024-09-01 | End: 2024-09-01

## 2024-09-01 RX ORDER — HYDROMORPHONE HCL IN WATER/PF 6 MG/30 ML
0.2 PATIENT CONTROLLED ANALGESIA SYRINGE INTRAVENOUS EVERY 5 MIN PRN
Status: DISCONTINUED | OUTPATIENT
Start: 2024-09-01 | End: 2024-09-01

## 2024-09-01 RX ORDER — ACETAMINOPHEN 325 MG/1
650 TABLET ORAL EVERY 6 HOURS
Status: DISCONTINUED | OUTPATIENT
Start: 2024-09-01 | End: 2024-09-02

## 2024-09-01 RX ORDER — ESMOLOL HYDROCHLORIDE 10 MG/ML
INJECTION INTRAVENOUS PRN
Status: DISCONTINUED | OUTPATIENT
Start: 2024-09-01 | End: 2024-09-01

## 2024-09-01 RX ORDER — LORAZEPAM 0.5 MG/1
1 TABLET ORAL 2 TIMES DAILY
Status: DISCONTINUED | OUTPATIENT
Start: 2024-09-01 | End: 2024-09-06 | Stop reason: HOSPADM

## 2024-09-01 RX ORDER — SODIUM CHLORIDE, SODIUM LACTATE, POTASSIUM CHLORIDE, CALCIUM CHLORIDE 600; 310; 30; 20 MG/100ML; MG/100ML; MG/100ML; MG/100ML
INJECTION, SOLUTION INTRAVENOUS CONTINUOUS
Status: DISCONTINUED | OUTPATIENT
Start: 2024-09-01 | End: 2024-09-03

## 2024-09-01 RX ORDER — HYDROMORPHONE HCL IN WATER/PF 6 MG/30 ML
0.2 PATIENT CONTROLLED ANALGESIA SYRINGE INTRAVENOUS EVERY 4 HOURS PRN
Status: DISCONTINUED | OUTPATIENT
Start: 2024-09-01 | End: 2024-09-01

## 2024-09-01 RX ORDER — SODIUM CHLORIDE, SODIUM LACTATE, POTASSIUM CHLORIDE, CALCIUM CHLORIDE 600; 310; 30; 20 MG/100ML; MG/100ML; MG/100ML; MG/100ML
INJECTION, SOLUTION INTRAVENOUS CONTINUOUS
Status: DISCONTINUED | OUTPATIENT
Start: 2024-09-01 | End: 2024-09-01

## 2024-09-01 RX ORDER — QUETIAPINE FUMARATE 100 MG/1
200 TABLET, FILM COATED ORAL AT BEDTIME
Status: DISCONTINUED | OUTPATIENT
Start: 2024-09-01 | End: 2024-09-03

## 2024-09-01 RX ORDER — BUPIVACAINE HYDROCHLORIDE AND EPINEPHRINE 2.5; 5 MG/ML; UG/ML
INJECTION, SOLUTION INFILTRATION; PERINEURAL PRN
Status: DISCONTINUED | OUTPATIENT
Start: 2024-09-01 | End: 2024-09-01

## 2024-09-01 RX ORDER — BUSPIRONE HYDROCHLORIDE 15 MG/1
30 TABLET ORAL 2 TIMES DAILY
Status: DISCONTINUED | OUTPATIENT
Start: 2024-09-01 | End: 2024-09-06 | Stop reason: HOSPADM

## 2024-09-01 RX ORDER — TOPIRAMATE 25 MG/1
50 TABLET, FILM COATED ORAL 2 TIMES DAILY
Status: DISCONTINUED | OUTPATIENT
Start: 2024-09-01 | End: 2024-09-06 | Stop reason: HOSPADM

## 2024-09-01 RX ORDER — HYDROMORPHONE HCL IN WATER/PF 6 MG/30 ML
0.2 PATIENT CONTROLLED ANALGESIA SYRINGE INTRAVENOUS
Status: DISCONTINUED | OUTPATIENT
Start: 2024-09-01 | End: 2024-09-02

## 2024-09-01 RX ORDER — METRONIDAZOLE 500 MG/100ML
500 INJECTION, SOLUTION INTRAVENOUS EVERY 8 HOURS
Status: COMPLETED | OUTPATIENT
Start: 2024-09-02 | End: 2024-09-02

## 2024-09-01 RX ORDER — SODIUM CHLORIDE, SODIUM GLUCONATE, SODIUM ACETATE, POTASSIUM CHLORIDE AND MAGNESIUM CHLORIDE 526; 502; 368; 37; 30 MG/100ML; MG/100ML; MG/100ML; MG/100ML; MG/100ML
INJECTION, SOLUTION INTRAVENOUS CONTINUOUS PRN
Status: DISCONTINUED | OUTPATIENT
Start: 2024-09-01 | End: 2024-09-01

## 2024-09-01 RX ORDER — RAMELTEON 8 MG/1
8 TABLET ORAL AT BEDTIME
Status: DISCONTINUED | OUTPATIENT
Start: 2024-09-01 | End: 2024-09-06 | Stop reason: HOSPADM

## 2024-09-01 RX ADMIN — PHENYLEPHRINE HYDROCHLORIDE 100 MCG: 10 INJECTION INTRAVENOUS at 14:08

## 2024-09-01 RX ADMIN — Medication 20 MG: at 12:39

## 2024-09-01 RX ADMIN — METRONIDAZOLE 500 MG: 500 INJECTION, SOLUTION INTRAVENOUS at 17:35

## 2024-09-01 RX ADMIN — Medication 20 MG: at 14:18

## 2024-09-01 RX ADMIN — PROPOFOL 80 MCG/KG/MIN: 10 INJECTION, EMULSION INTRAVENOUS at 11:40

## 2024-09-01 RX ADMIN — Medication 30 MG: at 12:10

## 2024-09-01 RX ADMIN — CALCIUM CHLORIDE INJECTION 1 G: 100 INJECTION, SOLUTION INTRAVENOUS at 14:45

## 2024-09-01 RX ADMIN — PHENYLEPHRINE HYDROCHLORIDE 100 MCG: 10 INJECTION INTRAVENOUS at 16:39

## 2024-09-01 RX ADMIN — HEPARIN SODIUM 5000 UNITS: 5000 INJECTION, SOLUTION INTRAVENOUS; SUBCUTANEOUS at 09:50

## 2024-09-01 RX ADMIN — HYDROMORPHONE HYDROCHLORIDE 0.5 MG: 1 INJECTION, SOLUTION INTRAMUSCULAR; INTRAVENOUS; SUBCUTANEOUS at 16:20

## 2024-09-01 RX ADMIN — Medication 50 MG: at 11:06

## 2024-09-01 RX ADMIN — HYDROMORPHONE HYDROCHLORIDE 0.5 MG: 1 INJECTION, SOLUTION INTRAMUSCULAR; INTRAVENOUS; SUBCUTANEOUS at 00:04

## 2024-09-01 RX ADMIN — FENTANYL CITRATE 25 MCG: 50 INJECTION, SOLUTION INTRAMUSCULAR; INTRAVENOUS at 19:33

## 2024-09-01 RX ADMIN — PROPRANOLOL HYDROCHLORIDE 10 MG: 10 TABLET ORAL at 07:50

## 2024-09-01 RX ADMIN — ACETAMINOPHEN 975 MG: 325 TABLET ORAL at 10:26

## 2024-09-01 RX ADMIN — Medication 5 MG: at 18:36

## 2024-09-01 RX ADMIN — FENTANYL CITRATE 100 MCG: 50 INJECTION INTRAMUSCULAR; INTRAVENOUS at 11:05

## 2024-09-01 RX ADMIN — PHENYLEPHRINE HYDROCHLORIDE 100 MCG: 10 INJECTION INTRAVENOUS at 14:20

## 2024-09-01 RX ADMIN — PHENYLEPHRINE HYDROCHLORIDE 100 MCG: 10 INJECTION INTRAVENOUS at 15:46

## 2024-09-01 RX ADMIN — METHOCARBAMOL 500 MG: 500 TABLET ORAL at 21:56

## 2024-09-01 RX ADMIN — TOPIRAMATE 50 MG: 50 TABLET, FILM COATED ORAL at 07:50

## 2024-09-01 RX ADMIN — HYDROMORPHONE HYDROCHLORIDE 0.2 MG: 0.2 INJECTION, SOLUTION INTRAMUSCULAR; INTRAVENOUS; SUBCUTANEOUS at 22:59

## 2024-09-01 RX ADMIN — SODIUM CHLORIDE, POTASSIUM CHLORIDE, SODIUM LACTATE AND CALCIUM CHLORIDE: 600; 310; 30; 20 INJECTION, SOLUTION INTRAVENOUS at 16:21

## 2024-09-01 RX ADMIN — LIDOCAINE HYDROCHLORIDE 80 MG: 20 INJECTION, SOLUTION INFILTRATION; PERINEURAL at 11:06

## 2024-09-01 RX ADMIN — PHENYLEPHRINE HYDROCHLORIDE 0.3 MCG/KG/MIN: 10 INJECTION INTRAVENOUS at 14:30

## 2024-09-01 RX ADMIN — LORAZEPAM 1 MG: 0.5 TABLET ORAL at 23:38

## 2024-09-01 RX ADMIN — ZOLPIDEM TARTRATE 5 MG: 5 TABLET, COATED ORAL at 23:37

## 2024-09-01 RX ADMIN — BUPIVACAINE HYDROCHLORIDE AND EPINEPHRINE 40 ML: 2.5; 5 INJECTION, SOLUTION INFILTRATION; PERINEURAL at 18:50

## 2024-09-01 RX ADMIN — PROPRANOLOL HYDROCHLORIDE 10 MG: 10 TABLET ORAL at 23:38

## 2024-09-01 RX ADMIN — HYDROMORPHONE HYDROCHLORIDE 0.5 MG: 1 INJECTION, SOLUTION INTRAMUSCULAR; INTRAVENOUS; SUBCUTANEOUS at 18:26

## 2024-09-01 RX ADMIN — ESMOLOL HYDROCHLORIDE 10 MG: 10 INJECTION, SOLUTION INTRAVENOUS at 15:40

## 2024-09-01 RX ADMIN — Medication 2 G: at 11:13

## 2024-09-01 RX ADMIN — HYDROMORPHONE HYDROCHLORIDE 0.2 MG: 0.2 INJECTION, SOLUTION INTRAMUSCULAR; INTRAVENOUS; SUBCUTANEOUS at 20:20

## 2024-09-01 RX ADMIN — Medication 225 MG: at 23:38

## 2024-09-01 RX ADMIN — Medication 20 MG: at 15:01

## 2024-09-01 RX ADMIN — Medication 20 MG: at 17:25

## 2024-09-01 RX ADMIN — SENNOSIDES AND DOCUSATE SODIUM 1 TABLET: 8.6; 5 TABLET ORAL at 07:50

## 2024-09-01 RX ADMIN — Medication 20 MG: at 16:56

## 2024-09-01 RX ADMIN — Medication 20 MG: at 15:26

## 2024-09-01 RX ADMIN — PHENYLEPHRINE HYDROCHLORIDE 100 MCG: 10 INJECTION INTRAVENOUS at 16:31

## 2024-09-01 RX ADMIN — HYDROMORPHONE HYDROCHLORIDE 0.2 MG: 0.2 INJECTION, SOLUTION INTRAMUSCULAR; INTRAVENOUS; SUBCUTANEOUS at 20:09

## 2024-09-01 RX ADMIN — Medication 30 MG: at 12:02

## 2024-09-01 RX ADMIN — METRONIDAZOLE 500 MG: 500 INJECTION, SOLUTION INTRAVENOUS at 11:21

## 2024-09-01 RX ADMIN — HYDROMORPHONE HYDROCHLORIDE 0.5 MG: 1 INJECTION, SOLUTION INTRAMUSCULAR; INTRAVENOUS; SUBCUTANEOUS at 15:24

## 2024-09-01 RX ADMIN — DEXAMETHASONE SODIUM PHOSPHATE 8 MG: 4 INJECTION, SOLUTION INTRA-ARTICULAR; INTRALESIONAL; INTRAMUSCULAR; INTRAVENOUS; SOFT TISSUE at 11:11

## 2024-09-01 RX ADMIN — SODIUM CHLORIDE, POTASSIUM CHLORIDE, SODIUM LACTATE AND CALCIUM CHLORIDE: 600; 310; 30; 20 INJECTION, SOLUTION INTRAVENOUS at 11:01

## 2024-09-01 RX ADMIN — PHENYLEPHRINE HYDROCHLORIDE 100 MCG: 10 INJECTION INTRAVENOUS at 18:31

## 2024-09-01 RX ADMIN — OXYCODONE HYDROCHLORIDE 10 MG: 10 TABLET ORAL at 21:56

## 2024-09-01 RX ADMIN — PHENYLEPHRINE HYDROCHLORIDE 100 MCG: 10 INJECTION INTRAVENOUS at 18:21

## 2024-09-01 RX ADMIN — SODIUM CHLORIDE, SODIUM GLUCONATE, SODIUM ACETATE, POTASSIUM CHLORIDE AND MAGNESIUM CHLORIDE: 526; 502; 368; 37; 30 INJECTION, SOLUTION INTRAVENOUS at 11:14

## 2024-09-01 RX ADMIN — BUSPIRONE HYDROCHLORIDE 30 MG: 10 TABLET ORAL at 07:50

## 2024-09-01 RX ADMIN — MIDAZOLAM 2 MG: 1 INJECTION INTRAMUSCULAR; INTRAVENOUS at 10:58

## 2024-09-01 RX ADMIN — FENTANYL CITRATE 25 MCG: 50 INJECTION, SOLUTION INTRAMUSCULAR; INTRAVENOUS at 19:39

## 2024-09-01 RX ADMIN — PROPOFOL 140 MG: 10 INJECTION, EMULSION INTRAVENOUS at 11:06

## 2024-09-01 RX ADMIN — HYDROMORPHONE HYDROCHLORIDE 0.5 MG: 1 INJECTION, SOLUTION INTRAMUSCULAR; INTRAVENOUS; SUBCUTANEOUS at 15:05

## 2024-09-01 RX ADMIN — SODIUM CHLORIDE, POTASSIUM CHLORIDE, SODIUM LACTATE AND CALCIUM CHLORIDE: 600; 310; 30; 20 INJECTION, SOLUTION INTRAVENOUS at 23:43

## 2024-09-01 RX ADMIN — LORAZEPAM 1 MG: 1 TABLET ORAL at 07:50

## 2024-09-01 RX ADMIN — HYDROMORPHONE HYDROCHLORIDE 0.5 MG: 1 INJECTION, SOLUTION INTRAMUSCULAR; INTRAVENOUS; SUBCUTANEOUS at 06:25

## 2024-09-01 RX ADMIN — PHENAZOPYRIDINE 200 MG: 200 TABLET ORAL at 10:25

## 2024-09-01 RX ADMIN — Medication 30 MG: at 11:40

## 2024-09-01 RX ADMIN — Medication 20 MG: at 16:04

## 2024-09-01 RX ADMIN — Medication 200 MG: at 18:49

## 2024-09-01 RX ADMIN — SODIUM CHLORIDE, POTASSIUM CHLORIDE, SODIUM LACTATE AND CALCIUM CHLORIDE: 600; 310; 30; 20 INJECTION, SOLUTION INTRAVENOUS at 19:49

## 2024-09-01 RX ADMIN — PANTOPRAZOLE SODIUM 40 MG: 40 TABLET, DELAYED RELEASE ORAL at 07:50

## 2024-09-01 RX ADMIN — Medication 2 G: at 15:04

## 2024-09-01 RX ADMIN — QUETIAPINE 200 MG: 200 TABLET ORAL at 23:40

## 2024-09-01 RX ADMIN — Medication 20 MG: at 13:44

## 2024-09-01 RX ADMIN — ACETAMINOPHEN 650 MG: 325 TABLET ORAL at 20:09

## 2024-09-01 RX ADMIN — ONDANSETRON 4 MG: 2 INJECTION INTRAMUSCULAR; INTRAVENOUS at 18:22

## 2024-09-01 RX ADMIN — RAMELTEON 8 MG: 8 TABLET ORAL at 23:41

## 2024-09-01 RX ADMIN — TOPIRAMATE 50 MG: 50 TABLET, FILM COATED ORAL at 23:37

## 2024-09-01 ASSESSMENT — ACTIVITIES OF DAILY LIVING (ADL)
ADLS_ACUITY_SCORE: 25
ADLS_ACUITY_SCORE: 25
ADLS_ACUITY_SCORE: 27
ADLS_ACUITY_SCORE: 25
ADLS_ACUITY_SCORE: 27
ADLS_ACUITY_SCORE: 25
ADLS_ACUITY_SCORE: 27
ADLS_ACUITY_SCORE: 25
ADLS_ACUITY_SCORE: 27
ADLS_ACUITY_SCORE: 25
ADLS_ACUITY_SCORE: 25
ADLS_ACUITY_SCORE: 27
ADLS_ACUITY_SCORE: 25
ADLS_ACUITY_SCORE: 25

## 2024-09-01 ASSESSMENT — LIFESTYLE VARIABLES: TOBACCO_USE: 0

## 2024-09-01 NOTE — PROGRESS NOTES
Gynecology Oncology Progress Note  09/01/2024    24 hour events:   - Arrived via transfer of care from [facility]   - NPO at midnight    - Med rec completed with bedside RN   - IV heparin x1 preoperatively   - Consented for XL with poss ABHISHEK, BSO, cancer staging - consent copies in pt chart on 5C and onc workroom    - No surgery 8/31 - regular diet, lovenox restarted     Subjective: Patient is doing ok this morning, a little nervous in anticipation of surgery.  Notes ongoing baseline pain in L lower extremity with L foot numbness, feels that multimodal pain regimen has been less effective in addressing leg pain but able to sleep overnight. Has been NPO since midnight in anticipation of surgery today.. Denies chest pain, SOB, nausea/vomiting, fevers/chills, dizziness.     Objective:   Patient Vitals for the past 24 hrs:   BP Temp Temp src Pulse Resp SpO2 Weight   09/01/24 0728 102/70 98.1  F (36.7  C) Oral 82 16 96 % 78.2 kg (172 lb 6.4 oz)   09/01/24 0400 99/60 98.8  F (37.1  C) Oral -- 16 97 % --   08/31/24 2359 90/55 98.1  F (36.7  C) Oral -- 16 93 % --   08/31/24 1940 96/63 -- -- -- 16 97 % --   08/31/24 1937 97/64 97.5  F (36.4  C) Oral -- 16 96 % --   08/31/24 1632 106/60 98.3  F (36.8  C) Oral 82 16 94 % --   08/31/24 1145 109/72 98.1  F (36.7  C) Oral 67 16 97 % --   08/31/24 0903 -- -- -- -- -- -- 77.7 kg (171 lb 3.2 oz)     General: NAD, appears comfortable in bed  CV: regular rate  Resp: normal rate and work of breathing on room air  Abdomen: soft, appropriately tender, non-distended  Extremities: warm, well-perfused, nontender, trace edema, SCDs in place        Assessment/Plan:  39 year old female with history of DVT/PE one eliquis, gastric bypass, MDD/SHIVAM, and restless leg who presents as a transfer of care from [facility] for workup and management of complex adnexal mass. Plan for OR today, management of chronic medical conditions as below.     # Abdominal pain   # Back pain  # Leg pain, paresthesias  #  Complex adnexal mass  Patient with a 12cm complex ovarian mass and subacute back/leg/ abdominal pain concerning for a mass effect and possible nerve compression resulting in leg numbness. Given the concerning symptoms, further surgical evaluation will be needed to resect the mass and send for pathology. Plan for OR today for mass removal with possibility of bilateral salpingo-oophorectomy, hysterectomy, exploratory laparotomy, cancer surgery with staging, bowel resection, and permament/temporary ostomy. See 8/31/24 H&P for full operative counseling and leg pain workup with neurology that lead to identificaiton of masses.   - NPO, maintenance IVF   - To operating room for exploratory laparotomy, possible BSO, ABHISHEK, cancer staging, omnetectomy, bowel resection with temporary or permanent ostomy, cystoscopy   -Pain: tylenol, IV diluadid prn. Will consider gabapentin for nerve related pain    -Neurology and Neurosurgery consult PRN if no improvement or worsening of LE symptoms while in house   -UA, ucx, lipase, CBC, BMP, T&S,   -Tumor markers: CA-125, , CEA, bHCG, inhibin B, AFP, LDH     # Hx DVT  # Hx PE   First diagnosis of a DVT and PE in 2014 after presenting with left calf pain. Known risk included OCPs which was discontinue thereafter. She completed a 6 month course of anticoagulation. She had her second DVT in 2023 after presenting with right groin in the setting of recent panniculectomy. Patient also reports x3 other smaller clot episodes, although do not see all of these episodes per chart review. Most recent clot was 3 months ago (05/2024), patient currently on thereapeutic Eliquis BID for this and last took a dose 8/29/24 PM. Plan for heparin preoperatively, then likely Lovenox while inpatient and restart of home apixaban on discharge.   - Apixaban 5mg BID [held on admission]   - Heparin 5000mg x1 preoperatively      # Hx of gastric bypass, panniculectomy  # Hx of C.diff  History of uncomplicated gastric  bypass completed in 2022 followed by panniculectomy in 2023 for excess skin removal. Postoperative recovery c/b C. Diff during inpatient admission. No diarrhea    - Enteric precautions per BMT unit guideline pending negative C. Diff on admission   - Pantoprazole 20mg BID      # Constipation   Per patient has not had a bowel movement x2 weeks. Moderate stool burden on CT AP. Plan for scheduled bowel regimen including   - Senna-colcace qdaily   - Miralax BID   - Senna enema x1 this AM      # Restless leg  # MDD/SHIVAM   # Insomnia   Patient with significant psychiatric medication history in setting of refractory anxiety/depression as well as intermittent RLS. Medication reconcilation perfromed morning of 8/31 with bedside RN, confirmed medications and dosing schedule as below, plan to continue prior to admission regimen while in house.   - Buspirone 30mg PO BID   - Desvenlafaxine 150mg PO qAM   - Lorazapam 1mg PO BID    - Propanolol 10mg PO BID   - Quetiapine 200mg PO at bedtime  - Topirimate 50mg BID   - Zolpidem 5mg PO at bedtime   - Trazodone 225mg PO at bedtime        Code: Full  PPx: IS, SCDs, Pepcid, Heparin x1 preoperatively > Lovenox POD#1     Dispo: pending clinical course     Please page the Gynecology Oncology team [phone] with questions or concerns.      Micah Pisano MD   [employer]   Gynecologic Oncology, PGY-2  Gyn Onc Pager: [phone]      I saw and evaluated the patient. I agree with the findings and the plan of care as documented in Dr. Pisano 's note.   Plan surgery today: XLap/ABHISHEK/LSO possible BSO/possible cancer staging. Discussed possible need for bowel resection with permanent or temporary ostomy. Reviewed surgical risks. TUmor markers normal.   Got lov 40 yesterday (ppx dose) given that surgery was delayed until today. Received Heparin this morning. Plan postop anti-coag to resume likely tonight or tomorrow depending on operative findings. High risk for  perioperative DVT.     Jessenia Sosa MD  Gynecologic Oncology  Pager [phone]

## 2024-09-01 NOTE — ANESTHESIA PROCEDURE NOTES
Airway       Patient location during procedure: OR       Procedure Start/Stop Times: 9/1/2024 11:13 AM  Staff -        Anesthesiologist:  Pawel Gunn MD       Resident/Fellow: Maggie Douglas MD       Performed By: resident and with residents       Procedure performed by resident/fellow/CRNA in presence of a teaching physician.    Consent for Airway        Urgency: elective  Indications and Patient Condition       Indications for airway management: freedom-procedural       Induction type:intravenous       Mask difficulty assessment: 1 - vent by mask    Final Airway Details       Final airway type: endotracheal airway       Successful airway: ETT - single  Endotracheal Airway Details        ETT size (mm): 7.0       Cuffed: yes       Successful intubation technique: direct laryngoscopy       DL Blade Type: MAC 3       Grade View of Cords: 1       Adjucts: stylet       Position: Right       Measured from: lips       Secured at (cm): 23       Bite block used: Soft    Post intubation assessment        Placement verified by: capnometry, equal breath sounds and chest rise        Number of attempts at approach: 1       Number of other approaches attempted: 0       Secured with: tape       Ease of procedure: easy       Dentition: Intact    Medication(s) Administered   Medication Administration Time: 9/1/2024 11:13 AM

## 2024-09-01 NOTE — PLAN OF CARE
"Assumed cares 8279-1205:    /70   Pulse 82   Temp 98.1  F (36.7  C) (Oral)   Resp 16   Ht 1.67 m (5' 5.75\")   Wt 78.2 kg (172 lb 6.4 oz)   SpO2 96%   BMI 28.04 kg/m      AVSS on RA. Pt reporting pain 3/10 this morning, declined interventions. Pt scheduled for XL with possible ABHISHEK BSO this morning. Pt NPO since midnight for surgery. MIVF running at 75ml/hr. Heparin given SQ prior to procedure for DVT prophylaxis. Pt left in stable condition. Belongings remain in room. Continue POC.    Problem: Adult Inpatient Plan of Care  Goal: Optimal Comfort and Wellbeing  Outcome: Progressing  "

## 2024-09-01 NOTE — ANESTHESIA PROCEDURE NOTES
"TAP Procedure Note    Pre-Procedure   Staff -        Anesthesiologist:  Pawel Gunn MD       Resident/Fellow: Maggie Douglas MD       Performed By: resident       Location: OR       Procedure Start/Stop Times: 9/1/2024 6:40 PM and 9/1/2024 6:53 PM       Pre-Anesthestic Checklist: patient identified, IV checked, site marked, risks and benefits discussed, informed consent, monitors and equipment checked, pre-op evaluation, at physician/surgeon's request and post-op pain management  Timeout:       Correct Patient: Yes        Correct Procedure: Yes        Correct Site: Yes        Correct Position: Yes        Correct Laterality: Yes        Site Marked: Yes  Procedure Documentation  Procedure: TAP       Laterality: bilateral       Patient Position: supine       Skin prep: Chloraprep       Needle Type: short bevel       Needle Gauge: 21.        Needle Length (millimeters): 100        Ultrasound guided       1. Ultrasound was used to identify targeted nerve, plexus, vascular marker, or fascial plane and place a needle adjacent to it in real-time.       2. Ultrasound was used to visualize the spread of anesthetic in close proximity to the above referenced structure.       3. A permanent image is entered into the patient's record.       4. The visualized anatomic structures appeared normal.       5. There were no apparent abnormal pathologic findings.    Assessment/Narrative         Injection painful: completed under geneal anesthesia.       Bolus given via. no blood aspirated via catheter.        Secured via.        Insertion/Infusion Method: Single Shot       Complications: none    Medication(s) Administered   Bupivacaine 0.25% w/ 1:200K Epi (Injection) - Injection   40 mL - 9/1/2024 6:50:00 PM  Medication Administration Time: 9/1/2024 6:40 PM      FOR [facility] ([facility]) ONLY:   Pain Team Contact information: please page the Pain Team Via orangutrans. Search \"Pain\". During daytime hours, please page the attending " first. At night please page the resident first.
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
## 2024-09-01 NOTE — OP NOTE
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 Tumor markers were normal. She had no ascites. She was brought to the operating room for further diagnosis and management.     FINDINGS: On bimanual exam she had a large, fixed, firm non mobile pelvic mass to the level of the pelvic floor. On laparotomy, she had normal appearing ovaries and fallopian tubes. She had small volume of ascites. The uterus was normal appearing. She had miliary disease along the serosa of the sigmoid colon. I biopsied this and it returned as adenocarcinoma. She had a large mass arising from the rectosigmoid space. After radical dissection it was evident she had two large endometriomas which were fixed in the rectovaginal septum extending the entire posterior length of the vagina.     The bowel was run in its entirety. She had evidence of previous Brea-en-y. She had no upper abdominal disease. The omentum was grossly normal appearing.     At the conclusion of the procedure she was debulked to no visible disease.    DESCRIPTION OF PROCEDURE:     After informed consent the patient was brought to the operating room where general anesthesia was administered. She was prepped and  draped in the dorsal lithotomy position. A surgical timeout was performed. I placed a Cat catheter into her bladder. She was given Ancef and Flagyl for preoperative antibiotics. SCDs were placed on her lower extremities. Pregnancy test was negative.      made a vertical midline incision from her pubic symphasis to above her umbilicus. This was carried down to the underlying fascia which was incised in the midline. The facial incision was extended superiorly and inferiorly. The peritoneum was identified and entered sharply. The abdomen was inspected with the above findings noted. Pelvic washings were collected    I placed a Bookwalter retractor and packed the bowels into the upper abdomen with moist laps. I ran the bowel with the above findings noted. I biopsied the nodules on the surface of the sigmoid colon, which returned positive for adenocarcinoma. We started the radical hysterectomy. I opened the right retroperitoneum through the round ligament. I identified the right ureter and tagged this with a vessel loop.  I performed ureterolysis to the level of the right uterine artery. I isolated the right utero-ovarian pedical to initially preserve the ovary, however after pathology returned as malignant, I clamped cut and suture ligated the right IP. I sent the right tube and ovary for permanent pathology.     In a similar fashion  I opened the left retroperitoneum through the peritoneum. I identified the left ureter and tagged this with a vessel loop.  I performed ureterolysis to the level of the left uterine artery. I isolated the left IP, cut and suture ligated this pedical. I then bluntly and sharply dissected the ovary from the pelvic peritoneum all with good visualization of the ureter. I clamped the left utero-ovarian vascular pedicle and sent the left adenexa for permanent pathology.     We created a bladder flap with cautery. At this point it was evident the masses were separate from the uterus and in fact  were large endometriomas in the rectovaginal space I drained the masses. There was malignant appearing tissue in the right sided mass.     Given that these masses were densely adherent to the rectosigmoid colon and completely distorting the deep pelvic anatomy, I performed a rectosigmoid colon resection. With ureters on lateral tension, I incised the bilateral peritoneum to the level of the colon at the pelvic brim. I made a window in the mesentery and used a MELISSA stapler to transect the colon. I then elevated the colon and used the ligasure to detach the mesorectum and enter the presacral space.     I then completed the radical hysterectomy on the left by completely unroofing the left ureter and transecting the uterine arteries at the level of the ureters. Additionally I extensively dissected the bladder off the vagina in order to facilitate colpotomy. I placed a ringed forceps in the vagina and used cautery against the ring to create a colpotomy. I then used the ligasure to further open the vagina.     I similarly dissected the right ureter all the way to the bladder and completely lateralized it in order to facilitate dissection from the malignant mass. I transected the right uterine artery at the level of the ureter on the right, too.     I continued to free the rectosigmoid colon and in order to get beneath the mass I ultimately peeling the posterior vagina off of the mass from lateral to medial and cut out a wedge shaped 6 cm region of posterior vagina which was too adherent to the malignancy to preserve. This ultimately allowed me to identify a distal region of rectum which was free from the mass. I  the vagina from this region. I used the ligature for ongoing lateral dissection, all with good visualization of the ureters. Due to the complexity of the dissection, I did enter the rectum when attempting to dissect the mass from the colon. Finally I could placed the Contour stapler with a green staple  load around the distal rectum, distal to the colon defect. The specimen was then removed en bloc as uterus, cervix, posterior vagina, pelvic masses, rectosigmoid colon, and sent for pathology.     I copiously irrigated the pelvis. I unpacked the bowels. I removed the omentum by  it from the transverse colon and sing the Ligasure to transect the omentum from right to left. She had a very redundant sigmoid colon. I mobilized the colon to the level of the splenic flexure. I cut out the staple line from the distal sigmoid colon. I sutured in a 29 mm anvil.     I inspected the pelvis and placed additional sutures and clips for hemostasis. I used some Sno and Surgiflo as well. I then placed the EEA stapling device in the anus. I deployed the device and attached the anvil. I closed the device and fired the staples in the usual fashion. I performed an air leak test with the proctoscope and no air leak was noted. There was no tension on the anastomosis.     Given the very low anastomosis (approximately 6cm of distal rectum) as well as the high blood loss during this surgery, I performed a diverting loop ileostomy. I identified a mobile loop of ileum about 20cm from the cecum. I then made a right mid abdominal incision and incised the fascia. I stretched the fascia to facilitate 2 fingers.     We closed the fascia with 0 looped PDS suture, meeting in the middle. We closed the subcutaneous tissue with 2-0 Vicryl and the skin with 4-0 Monocryl. Derma munoz was placed over the incision. We matured the ostomy by making a 2 cm serosal incision. We placed rosebud sutures to nicol the stoma. We placed an ostomy bag over the stoma.     The patient was extubated and brought to recovery in a stable condition.

## 2024-09-01 NOTE — PLAN OF CARE
"Goal Outcome Evaluation:         AVSS, A/O x 4. Pt is on room air with 02 saturations WNL. Pt continue to complain of left LE pain and described pain as \"shooting\". Pt is requesting IV Dilaudid 0.5 mg and had  4 doses on the night shift. Provider came to see pt and pt tried to talk him into increasing pain medication level to 1 mg of the Dilaudid q 1 hour but he declined. Pt continue to constantly rate this pain level at 8/10 before treatment then mostly 7/10 after treatment but this morning she told the incoming RN that the pain level was 3/10. Pt is NPO for possible Bowel resection and hysterectomy. And Colostomy. MIVF, NS 0.9/L started at midnight at 75 ML/hour. HCG shower done x 1 and complete bed linen and new gown on.Pt slept well in between cares. Bed alarm on due to pt on multiple medications that could course her to fall. She had PCD on. PIV intact and dressing changed after her shower. No suicidal thoughts or behavior noted/demonstrated. Continue to monitor pt and continue to prep pt for surgery today and follow plan of  care      Problem: Adult Inpatient Plan of Care  Goal: Plan of Care Review  Description: The Plan of Care Review/Shift note should be completed every shift.  The Outcome Evaluation is a brief statement about your assessment that the patient is improving, declining, or no change.  This information will be displayed automatically on your shift  note.  Outcome: Progressing  Goal: Patient-Specific Goal (Individualized)  Description: You can add care plan individualizations to a care plan. Examples of Individualization might be:  \"Parent requests to be called daily at 9am for status\", \"I have a hard time hearing out of my right ear\", or \"Do not touch me to wake me up as it startles  me\".  Outcome: Progressing  Goal: Absence of Hospital-Acquired Illness or Injury  Outcome: Progressing  Intervention: Identify and Manage Fall Risk  Recent Flowsheet Documentation  Taken 9/1/2024 0400 by Piper, " Katty CARDENAS RN  Safety Promotion/Fall Prevention:   clutter free environment maintained   lighting adjusted   nonskid shoes/slippers when out of bed   room near nurse's station   room organization consistent   safety round/check completed  Taken 8/31/2024 2000 by Katty Saldaña RN  Safety Promotion/Fall Prevention:   clutter free environment maintained   lighting adjusted   nonskid shoes/slippers when out of bed   room near nurse's station   room organization consistent   safety round/check completed  Intervention: Prevent Skin Injury  Recent Flowsheet Documentation  Taken 9/1/2024 0400 by Katty Saldaña RN  Body Position: position changed independently  Skin Protection: adhesive use limited  Device Skin Pressure Protection: adhesive use limited  Taken 8/31/2024 2000 by Katty Saldaña RN  Body Position: position changed independently  Skin Protection: adhesive use limited  Device Skin Pressure Protection: adhesive use limited  Intervention: Prevent and Manage VTE (Venous Thromboembolism) Risk  Recent Flowsheet Documentation  Taken 9/1/2024 0400 by Katty Saldaña RN  VTE Prevention/Management:   SCDs off (sequential compression devices)   compression stockings off  Taken 8/31/2024 2000 by Katty Saldaña RN  VTE Prevention/Management:   SCDs off (sequential compression devices)   compression stockings off  Intervention: Prevent Infection  Recent Flowsheet Documentation  Taken 9/1/2024 0400 by Katty Saldaña RN  Infection Prevention:   hand hygiene promoted   single patient room provided   visitors restricted/screened   personal protective equipment utilized   rest/sleep promoted   environmental surveillance performed   equipment surfaces disinfected  Taken 8/31/2024 2000 by Katty Saldaña RN  Infection Prevention:   hand hygiene promoted   single patient room provided   visitors restricted/screened   personal protective equipment utilized   rest/sleep promoted   environmental surveillance  performed   equipment surfaces disinfected  Goal: Optimal Comfort and Wellbeing  Outcome: Progressing  Intervention: Monitor Pain and Promote Comfort  Recent Flowsheet Documentation  Taken 9/1/2024 0625 by Katty Saldaña, RN  Pain Management Interventions: medication (see MAR)  Taken 8/31/2024 1959 by Katty Saldaña, RN  Pain Management Interventions: medication (see MAR)

## 2024-09-01 NOTE — PROGRESS NOTES
Brief Interval Progress Note     MD to bedside for PM check in. Patient is feeling ok. Notes that L leg pain has been more bothersome this evening, feels that IV dilaudid has stopped providing relief for the pain and is requesting 1mg, rather than 0.5mg, doses. Discussed concern that this would be overly sedating in setting of multimodal psych regimen that includes multiple sedating medications. Also discussed that dilaudid may be less helpful for neuropathic pain, which appears to be most bothersome symptom. Did offer dose of gabapentin, which patient declines at this time. Plan for NPO at midnight with IVF for OR tomorrow, timing TBD pending patient's place on add-on case list.       Micah Pisano MD   [employer]   Gynecologic Oncology, PGY-2  Gyn Onc Pager: [phone]

## 2024-09-02 LAB
ANION GAP SERPL CALCULATED.3IONS-SCNC: 7 MMOL/L (ref 7–15)
APTT PPP: 30 SECONDS (ref 22–38)
APTT PPP: 32 SECONDS (ref 22–38)
BLD PROD TYP BPU: NORMAL
BLOOD COMPONENT TYPE: NORMAL
BUN SERPL-MCNC: 17.8 MG/DL (ref 6–20)
CALCIUM SERPL-MCNC: 7.6 MG/DL (ref 8.8–10.4)
CHLORIDE SERPL-SCNC: 106 MMOL/L (ref 98–107)
CODING SYSTEM: NORMAL
CREAT SERPL-MCNC: 1 MG/DL (ref 0.51–0.95)
CROSSMATCH: NORMAL
CROSSMATCH: NORMAL
EGFRCR SERPLBLD CKD-EPI 2021: 74 ML/MIN/1.73M2
ERYTHROCYTE [DISTWIDTH] IN BLOOD BY AUTOMATED COUNT: 15.5 % (ref 10–15)
ERYTHROCYTE [DISTWIDTH] IN BLOOD BY AUTOMATED COUNT: 15.9 % (ref 10–15)
ERYTHROCYTE [DISTWIDTH] IN BLOOD BY AUTOMATED COUNT: 15.9 % (ref 10–15)
FIBRINOGEN PPP-MCNC: 422 MG/DL (ref 170–510)
GLUCOSE BLDC GLUCOMTR-MCNC: 139 MG/DL (ref 70–99)
GLUCOSE SERPL-MCNC: 210 MG/DL (ref 70–99)
HCO3 SERPL-SCNC: 20 MMOL/L (ref 22–29)
HCT VFR BLD AUTO: 21.8 % (ref 35–47)
HCT VFR BLD AUTO: 22 % (ref 35–47)
HCT VFR BLD AUTO: 23.7 % (ref 35–47)
HGB BLD-MCNC: 7.1 G/DL (ref 11.7–15.7)
HGB BLD-MCNC: 7.1 G/DL (ref 11.7–15.7)
HGB BLD-MCNC: 7.5 G/DL (ref 11.7–15.7)
INR PPP: 1.53 (ref 0.85–1.15)
INR PPP: 1.53 (ref 0.85–1.15)
INR PPP: 1.65 (ref 0.85–1.15)
ISSUE DATE AND TIME: NORMAL
MCH RBC QN AUTO: 28.4 PG (ref 26.5–33)
MCH RBC QN AUTO: 29 PG (ref 26.5–33)
MCH RBC QN AUTO: 29.1 PG (ref 26.5–33)
MCHC RBC AUTO-ENTMCNC: 31.6 G/DL (ref 31.5–36.5)
MCHC RBC AUTO-ENTMCNC: 32.3 G/DL (ref 31.5–36.5)
MCHC RBC AUTO-ENTMCNC: 32.6 G/DL (ref 31.5–36.5)
MCV RBC AUTO: 89 FL (ref 78–100)
MCV RBC AUTO: 90 FL (ref 78–100)
MCV RBC AUTO: 90 FL (ref 78–100)
PLATELET # BLD AUTO: 122 10E3/UL (ref 150–450)
PLATELET # BLD AUTO: 125 10E3/UL (ref 150–450)
PLATELET # BLD AUTO: 128 10E3/UL (ref 150–450)
POTASSIUM SERPL-SCNC: 4.3 MMOL/L (ref 3.4–5.3)
RBC # BLD AUTO: 2.44 10E6/UL (ref 3.8–5.2)
RBC # BLD AUTO: 2.45 10E6/UL (ref 3.8–5.2)
RBC # BLD AUTO: 2.64 10E6/UL (ref 3.8–5.2)
SODIUM SERPL-SCNC: 133 MMOL/L (ref 135–145)
UNIT ABO/RH: NORMAL
UNIT NUMBER: NORMAL
UNIT STATUS: NORMAL
UNIT TYPE ISBT: 6200
WBC # BLD AUTO: 8.3 10E3/UL (ref 4–11)
WBC # BLD AUTO: 8.6 10E3/UL (ref 4–11)
WBC # BLD AUTO: 8.9 10E3/UL (ref 4–11)

## 2024-09-02 PROCEDURE — 250N000011 HC RX IP 250 OP 636: Performed by: OBSTETRICS & GYNECOLOGY

## 2024-09-02 PROCEDURE — 85730 THROMBOPLASTIN TIME PARTIAL: CPT

## 2024-09-02 PROCEDURE — 83605 ASSAY OF LACTIC ACID: CPT

## 2024-09-02 PROCEDURE — 85027 COMPLETE CBC AUTOMATED: CPT | Performed by: OBSTETRICS & GYNECOLOGY

## 2024-09-02 PROCEDURE — 85610 PROTHROMBIN TIME: CPT

## 2024-09-02 PROCEDURE — 85396 CLOTTING ASSAY WHOLE BLOOD: CPT | Performed by: STUDENT IN AN ORGANIZED HEALTH CARE EDUCATION/TRAINING PROGRAM

## 2024-09-02 PROCEDURE — 250N000013 HC RX MED GY IP 250 OP 250 PS 637

## 2024-09-02 PROCEDURE — 85384 FIBRINOGEN ACTIVITY: CPT | Performed by: OBSTETRICS & GYNECOLOGY

## 2024-09-02 PROCEDURE — P9016 RBC LEUKOCYTES REDUCED: HCPCS

## 2024-09-02 PROCEDURE — 36415 COLL VENOUS BLD VENIPUNCTURE: CPT

## 2024-09-02 PROCEDURE — P9059 PLASMA, FRZ BETWEEN 8-24HOUR: HCPCS | Performed by: OBSTETRICS & GYNECOLOGY

## 2024-09-02 PROCEDURE — 999N000128 HC STATISTIC PERIPHERAL IV START W/O US GUIDANCE

## 2024-09-02 PROCEDURE — 250N000011 HC RX IP 250 OP 636: Performed by: STUDENT IN AN ORGANIZED HEALTH CARE EDUCATION/TRAINING PROGRAM

## 2024-09-02 PROCEDURE — 85027 COMPLETE CBC AUTOMATED: CPT

## 2024-09-02 PROCEDURE — 85610 PROTHROMBIN TIME: CPT | Performed by: OBSTETRICS & GYNECOLOGY

## 2024-09-02 PROCEDURE — 999N000127 HC STATISTIC PERIPHERAL IV START W US GUIDANCE

## 2024-09-02 PROCEDURE — 85014 HEMATOCRIT: CPT

## 2024-09-02 PROCEDURE — 36415 COLL VENOUS BLD VENIPUNCTURE: CPT | Performed by: STUDENT IN AN ORGANIZED HEALTH CARE EDUCATION/TRAINING PROGRAM

## 2024-09-02 PROCEDURE — 80053 COMPREHEN METABOLIC PANEL: CPT

## 2024-09-02 PROCEDURE — 120N000002 HC R&B MED SURG/OB UMMC

## 2024-09-02 PROCEDURE — 258N000003 HC RX IP 258 OP 636

## 2024-09-02 PROCEDURE — 36415 COLL VENOUS BLD VENIPUNCTURE: CPT | Performed by: OBSTETRICS & GYNECOLOGY

## 2024-09-02 PROCEDURE — 82805 BLOOD GASES W/O2 SATURATION: CPT

## 2024-09-02 PROCEDURE — 250N000011 HC RX IP 250 OP 636

## 2024-09-02 PROCEDURE — 250N000013 HC RX MED GY IP 250 OP 250 PS 637: Performed by: OBSTETRICS & GYNECOLOGY

## 2024-09-02 PROCEDURE — 85730 THROMBOPLASTIN TIME PARTIAL: CPT | Performed by: OBSTETRICS & GYNECOLOGY

## 2024-09-02 PROCEDURE — 258N000003 HC RX IP 258 OP 636: Performed by: STUDENT IN AN ORGANIZED HEALTH CARE EDUCATION/TRAINING PROGRAM

## 2024-09-02 PROCEDURE — P9016 RBC LEUKOCYTES REDUCED: HCPCS | Performed by: OBSTETRICS & GYNECOLOGY

## 2024-09-02 PROCEDURE — 80048 BASIC METABOLIC PNL TOTAL CA: CPT

## 2024-09-02 PROCEDURE — 82330 ASSAY OF CALCIUM: CPT

## 2024-09-02 RX ORDER — HYDROMORPHONE HYDROCHLORIDE 2 MG/1
4 TABLET ORAL
Status: DISCONTINUED | OUTPATIENT
Start: 2024-09-02 | End: 2024-09-02

## 2024-09-02 RX ORDER — NALOXONE HYDROCHLORIDE 0.4 MG/ML
0.2 INJECTION, SOLUTION INTRAMUSCULAR; INTRAVENOUS; SUBCUTANEOUS
Status: DISCONTINUED | OUTPATIENT
Start: 2024-09-02 | End: 2024-09-06 | Stop reason: HOSPADM

## 2024-09-02 RX ORDER — GABAPENTIN 300 MG/1
300 CAPSULE ORAL 3 TIMES DAILY
Status: DISCONTINUED | OUTPATIENT
Start: 2024-09-02 | End: 2024-09-06 | Stop reason: HOSPADM

## 2024-09-02 RX ORDER — NALOXONE HYDROCHLORIDE 0.4 MG/ML
0.4 INJECTION, SOLUTION INTRAMUSCULAR; INTRAVENOUS; SUBCUTANEOUS
Status: DISCONTINUED | OUTPATIENT
Start: 2024-09-02 | End: 2024-09-06 | Stop reason: HOSPADM

## 2024-09-02 RX ORDER — ACETAMINOPHEN 325 MG/1
975 TABLET ORAL EVERY 6 HOURS
Status: DISCONTINUED | OUTPATIENT
Start: 2024-09-02 | End: 2024-09-03

## 2024-09-02 RX ORDER — AMOXICILLIN 250 MG
2 CAPSULE ORAL 2 TIMES DAILY
Status: DISCONTINUED | OUTPATIENT
Start: 2024-09-02 | End: 2024-09-02

## 2024-09-02 RX ORDER — POLYETHYLENE GLYCOL 3350 17 G/17G
17 POWDER, FOR SOLUTION ORAL DAILY
Status: DISCONTINUED | OUTPATIENT
Start: 2024-09-02 | End: 2024-09-02

## 2024-09-02 RX ORDER — SIMETHICONE 80 MG
80 TABLET,CHEWABLE ORAL EVERY 6 HOURS PRN
Status: DISCONTINUED | OUTPATIENT
Start: 2024-09-02 | End: 2024-09-05

## 2024-09-02 RX ORDER — AMOXICILLIN 250 MG
1 CAPSULE ORAL 2 TIMES DAILY
Status: DISCONTINUED | OUTPATIENT
Start: 2024-09-02 | End: 2024-09-02

## 2024-09-02 RX ADMIN — NALOXONE HYDROCHLORIDE 0.4 MG: 0.4 INJECTION, SOLUTION INTRAMUSCULAR; INTRAVENOUS; SUBCUTANEOUS at 23:40

## 2024-09-02 RX ADMIN — CEFAZOLIN SODIUM 2 G: 2 INJECTION, SOLUTION INTRAVENOUS at 16:54

## 2024-09-02 RX ADMIN — PANTOPRAZOLE SODIUM 40 MG: 40 TABLET, DELAYED RELEASE ORAL at 08:22

## 2024-09-02 RX ADMIN — HYDROXYZINE HYDROCHLORIDE 25 MG: 25 TABLET, FILM COATED ORAL at 16:09

## 2024-09-02 RX ADMIN — CEFAZOLIN SODIUM 2 G: 2 INJECTION, SOLUTION INTRAVENOUS at 01:14

## 2024-09-02 RX ADMIN — HYDROMORPHONE HYDROCHLORIDE 0.2 MG: 0.2 INJECTION, SOLUTION INTRAMUSCULAR; INTRAVENOUS; SUBCUTANEOUS at 14:26

## 2024-09-02 RX ADMIN — HYDROMORPHONE HYDROCHLORIDE 0.2 MG: 0.2 INJECTION, SOLUTION INTRAMUSCULAR; INTRAVENOUS; SUBCUTANEOUS at 07:10

## 2024-09-02 RX ADMIN — ZOLPIDEM TARTRATE 5 MG: 5 TABLET, COATED ORAL at 21:53

## 2024-09-02 RX ADMIN — METRONIDAZOLE 500 MG: 500 INJECTION, SOLUTION INTRAVENOUS at 02:24

## 2024-09-02 RX ADMIN — SODIUM CHLORIDE, POTASSIUM CHLORIDE, SODIUM LACTATE AND CALCIUM CHLORIDE 1000 ML: 600; 310; 30; 20 INJECTION, SOLUTION INTRAVENOUS at 23:40

## 2024-09-02 RX ADMIN — METHOCARBAMOL 500 MG: 500 TABLET ORAL at 10:09

## 2024-09-02 RX ADMIN — HYDROMORPHONE HYDROCHLORIDE 0.2 MG: 0.2 INJECTION, SOLUTION INTRAMUSCULAR; INTRAVENOUS; SUBCUTANEOUS at 09:24

## 2024-09-02 RX ADMIN — PHYTONADIONE 10 MG: 10 INJECTION, EMULSION INTRAMUSCULAR; INTRAVENOUS; SUBCUTANEOUS at 12:38

## 2024-09-02 RX ADMIN — ACETAMINOPHEN 650 MG: 325 TABLET ORAL at 08:22

## 2024-09-02 RX ADMIN — OXYCODONE HYDROCHLORIDE 10 MG: 10 TABLET ORAL at 04:48

## 2024-09-02 RX ADMIN — LORAZEPAM 1 MG: 0.5 TABLET ORAL at 20:21

## 2024-09-02 RX ADMIN — METHOCARBAMOL 500 MG: 500 TABLET ORAL at 16:09

## 2024-09-02 RX ADMIN — Medication: at 16:44

## 2024-09-02 RX ADMIN — SODIUM CHLORIDE, POTASSIUM CHLORIDE, SODIUM LACTATE AND CALCIUM CHLORIDE 500 ML: 600; 310; 30; 20 INJECTION, SOLUTION INTRAVENOUS at 05:12

## 2024-09-02 RX ADMIN — METRONIDAZOLE 500 MG: 500 INJECTION, SOLUTION INTRAVENOUS at 10:09

## 2024-09-02 RX ADMIN — ACETAMINOPHEN 975 MG: 325 TABLET ORAL at 20:21

## 2024-09-02 RX ADMIN — GABAPENTIN 300 MG: 300 CAPSULE ORAL at 16:09

## 2024-09-02 RX ADMIN — GABAPENTIN 300 MG: 300 CAPSULE ORAL at 20:22

## 2024-09-02 RX ADMIN — ACETAMINOPHEN 650 MG: 325 TABLET ORAL at 02:24

## 2024-09-02 RX ADMIN — CEFAZOLIN SODIUM 2 G: 2 INJECTION, SOLUTION INTRAVENOUS at 08:22

## 2024-09-02 RX ADMIN — HYDROMORPHONE HYDROCHLORIDE 4 MG: 2 TABLET ORAL at 10:08

## 2024-09-02 RX ADMIN — ACETAMINOPHEN 650 MG: 325 TABLET ORAL at 14:26

## 2024-09-02 RX ADMIN — TOPIRAMATE 50 MG: 50 TABLET, FILM COATED ORAL at 08:22

## 2024-09-02 RX ADMIN — HYDROXYZINE HYDROCHLORIDE 25 MG: 25 TABLET, FILM COATED ORAL at 10:09

## 2024-09-02 RX ADMIN — BUSPIRONE HYDROCHLORIDE 30 MG: 30 TABLET ORAL at 08:22

## 2024-09-02 RX ADMIN — RAMELTEON 8 MG: 8 TABLET ORAL at 21:53

## 2024-09-02 RX ADMIN — TOPIRAMATE 50 MG: 50 TABLET, FILM COATED ORAL at 20:21

## 2024-09-02 RX ADMIN — DESVENLAFAXINE SUCCINATE 150 MG: 100 TABLET, FILM COATED, EXTENDED RELEASE ORAL at 08:22

## 2024-09-02 RX ADMIN — LORAZEPAM 1 MG: 0.5 TABLET ORAL at 08:22

## 2024-09-02 RX ADMIN — METRONIDAZOLE 500 MG: 500 INJECTION, SOLUTION INTRAVENOUS at 17:53

## 2024-09-02 RX ADMIN — QUETIAPINE 200 MG: 200 TABLET ORAL at 21:53

## 2024-09-02 RX ADMIN — HYDROMORPHONE HYDROCHLORIDE 0.2 MG: 0.2 INJECTION, SOLUTION INTRAMUSCULAR; INTRAVENOUS; SUBCUTANEOUS at 12:16

## 2024-09-02 RX ADMIN — Medication 225 MG: at 21:53

## 2024-09-02 RX ADMIN — BUSPIRONE HYDROCHLORIDE 30 MG: 30 TABLET ORAL at 20:21

## 2024-09-02 ASSESSMENT — ACTIVITIES OF DAILY LIVING (ADL)
ADLS_ACUITY_SCORE: 24
ADLS_ACUITY_SCORE: 25
ADLS_ACUITY_SCORE: 24
ADLS_ACUITY_SCORE: 25
ADLS_ACUITY_SCORE: 24
ADLS_ACUITY_SCORE: 25
ADLS_ACUITY_SCORE: 24
ADLS_ACUITY_SCORE: 25
ADLS_ACUITY_SCORE: 24
ADLS_ACUITY_SCORE: 24
DEPENDENT_IADLS:: INDEPENDENT
ADLS_ACUITY_SCORE: 24

## 2024-09-02 NOTE — ANESTHESIA POSTPROCEDURE EVALUATION
Patient: Marizol Granados    Procedure: Procedure(s):  Laparotomy exploratory, Rectosigmoid colon resection, diverting ileostomy, total omentectomy; Hysterectomy total abdominal radical  Hysterectomy total abdominal radical  Rectosigmoid colon resection, diverting ileostomy, total omentectomy       Anesthesia Type:  General    Note:  Disposition: Inpatient   Postop Pain Control: Uneventful            Sign Out: Well controlled pain   PONV: No   Neuro/Psych: Uneventful            Sign Out: Acceptable/Baseline neuro status   Airway/Respiratory: Uneventful            Sign Out: Acceptable/Baseline resp. status   CV/Hemodynamics: Uneventful            Sign Out: Acceptable CV status; No obvious hypovolemia; No obvious fluid overload   Other NRE: NONE   DID A NON-ROUTINE EVENT OCCUR? No           Last vitals:  Vitals Value Taken Time   BP 96/70 09/01/24 2031   Temp 36.8  C (98.2  F) 09/01/24 1945   Pulse 107 09/01/24 2033   Resp 9 09/01/24 2033   SpO2 97 % 09/01/24 2033   Vitals shown include unfiled device data.    Electronically Signed By: Aftab Gonsalez MD  September 1, 2024  8:34 PM

## 2024-09-02 NOTE — PLAN OF CARE
Goal Outcome Evaluation:      Plan of Care Reviewed With: patient    Temp: 98.3  F (36.8  C) Temp src: Oral BP: 107/48 Pulse: 117   Resp: 18 SpO2: 94 % O2 Device: None (Room air)     Pt A&O X4, able to communicate needs. Pt reported new numbness on right arm, providing team made aware. BP soft and pt tachycardic. 02 >95% on RA, denies SOB at rest. Incisional pain shortly relieved with current pain regimen. Plan to start pt on PCA Dilaudid. Ileostomy intact with 100 mL liquid output. Cat with good UOP. Plasma infused and red blood cells currently infusing. Pt tolerated both infusion with no reaction. Pt tolerating clear liquid diet without nausea or vomiting. Encouraged pt to get out of bed, pt yet not up for it due to pain.

## 2024-09-02 NOTE — PROGRESS NOTES
Gynecology Oncology Progress Note  09/02/2024      HD# 3 low back pain, L leg pain and numbness, pelvic mass; POD#1 rad hyst, BSO, rectosigmoid colectomy w post vaginectomy, supracolic omentectomy, diverting loop ileostomy    Disease: Frozen: adenocarcinoma, CTAP 12.1 x 11.9 x 1.1 cm cystic mass in the central pelvis w/ intermediate thickness irregular septation, enhancing nodularity and calcification.  27, AFP/HcG wnl     24 hour events:   - rad hyst, BSO, rectosigmoid colectomy w post vaginectomy, supracolic omentectomy, diverting loop ileostomy   - 2U pRBC postoperatively, Hgb continued to downtrend > 1U pRBC given 9/2. INR 1.35 > 1.53 > 1.65, FFP and vitamin K given.    Subjective: Patient is doing well this morning.  Pain is not yet controlled.  Tolerating PO, waite in place and not yet ambulating. Denies chest pain, SOB, nausea/vomiting, fevers/chills, dizziness.    Objective:   Patient Vitals for the past 24 hrs:   BP Temp Temp src Pulse Resp SpO2   09/02/24 1101 99/63 98.8  F (37.1  C) Oral 111 16 96 %   09/02/24 0912 101/50 99.2  F (37.3  C) Oral -- 16 93 %   09/02/24 0820 99/53 98.3  F (36.8  C) Oral -- 16 95 %   09/02/24 0422 99/58 98.8  F (37.1  C) Oral 107 16 97 %   09/02/24 0056 -- -- -- -- -- 97 %   09/02/24 0039 110/60 -- -- -- -- 98 %   09/02/24 0017 95/59 -- -- 106 12 92 %   09/02/24 0014 101/65 -- -- 113 15 --   09/02/24 0013 97/76 -- -- 115 13 --   09/02/24 0009 -- -- -- 118 12 --   09/02/24 0000 (!) 78/53 -- -- -- 21 --   09/01/24 2357 101/68 -- -- 114 14 93 %   09/01/24 2300 107/73 -- -- 93 21 98 %   09/01/24 2240 97/70 -- -- 119 16 98 %   09/01/24 2200 105/68 -- -- 104 13 99 %   09/01/24 2156 -- -- -- (!) 128 14 --   09/01/24 2141 110/87 97.8  F (36.6  C) Axillary 111 14 98 %   09/01/24 2030 96/70 97.7  F (36.5  C) Axillary 95 11 98 %   09/01/24 2015 102/76 -- -- 98 10 97 %   09/01/24 2000 104/69 -- -- 102 11 94 %   09/01/24 1945 97/68 98.2  F (36.8  C) -- 102 11 95 %   09/01/24 1930 (!)  85/68 -- -- 105 11 100 %   09/01/24 1915 96/66 98.4  F (36.9  C) Oral 105 10 100 %       General: NAD, appears comfortable in bed  CV: RRR, no m/r/g  Resp: CTAB, no wheezes  Abdomen: soft, tender, non-distended  Incision: c/d/i, dressing in place, ileostomy with small amount of green output  Extremities: warm, well-perfused, nontender, trace edema, SCDs in place    I/Os  (Yesterday // Since Midnight)  PO 560cc // none documented  IVF 6250cc // none documented  Blood loss 600cc // none documented  Urine 800cc // 275cc  Stool 100 cc // 150 ml  Blood prod 3500 cc // 0 cc    New Labs/Imaging-   Results for orders placed or performed during the hospital encounter of 08/31/24 (from the past 24 hour(s))   Venous Panel POCT   Result Value Ref Range    pH Venous POCT 7.32 7.32 - 7.43    pCO2 Venous POCT 45 40 - 50 mm Hg    pO2 Venous POCT 40 25 - 47 mm Hg    Bicarbonate Venous POCT 23 21 - 28 mmol/L    Sodium POCT 141 135 - 145 mmol/L    Potassium POCT 4.0 3.4 - 5.3 mmol/L    Hemoglobin POCT 10.4 (L) 11.7 - 15.7 g/dL    Oxyhemoglobin Venous POCT 69 (L) 70 - 75 %    Glucose Whole Blood POCT 129 (H) 70 - 99 mg/dL    Base Excess/Deficit (+/-) POCT -3.3 (L) -3.0 - 3.0 mmol/L    Calcium, Ionized Whole Blood POCT 4.8 4.4 - 5.2 mg/dL    O2 Sat, Venous POCT 70 70 - 75 %    Lactic Acid POCT 0.8 0.7 - 2.0 mmol/L    FIO2 POCT 60.0 %    Narrative    In healthy individuals, oxyhemoglobin (O2Hb) and oxygen saturation (SO2) are approximately equal. In the presence of dyshemoglobins, oxyhemoglobin can be considerably lower than oxygen saturation.   Venous Panel POCT   Result Value Ref Range    pH Venous POCT 7.32 7.32 - 7.43    pCO2 Venous POCT 43 40 - 50 mm Hg    pO2 Venous POCT 37 25 - 47 mm Hg    Bicarbonate Venous POCT 22 21 - 28 mmol/L    Sodium POCT 140 135 - 145 mmol/L    Potassium POCT 4.0 3.4 - 5.3 mmol/L    Hemoglobin POCT 9.3 (L) 11.7 - 15.7 g/dL    Oxyhemoglobin Venous POCT 64 (L) 70 - 75 %    Glucose Whole Blood POCT 131 (H) 70  - 99 mg/dL    Base Excess/Deficit (+/-) POCT -4.0 (L) -3.0 - 3.0 mmol/L    Calcium, Ionized Whole Blood POCT 4.6 4.4 - 5.2 mg/dL    O2 Sat, Venous POCT 65 (L) 70 - 75 %    Lactic Acid POCT 1.7 0.7 - 2.0 mmol/L    FIO2 POCT 33.0 %    Narrative    In healthy individuals, oxyhemoglobin (O2Hb) and oxygen saturation (SO2) are approximately equal. In the presence of dyshemoglobins, oxyhemoglobin can be considerably lower than oxygen saturation.   Viscoelastic Clot Assessment POCT   Result Value Ref Range    Clot Time 90 (L) 113 - 166 sec    Clot Time w Heparinase 99 (L) 103 - 153 sec    Clot Stiffness 22.6 13.0 - 33.2 hPa    Fibr Contrib to Clot Stiffness 2.2 1.0 - 3.7 hPa    Plt Contrib to Clot Stiffness 20.4 11.9 - 29.8 hPa    Clot Time Ratio 0.9     Narrative    The [employer] QPlus System is indicated for the evaluation of blood coagulation in perioperative patients age 18 years and older to assess possible hypocoagulable and hypercoagulable conditions in cardiovascular or major orthopedic surgeries before, during, and following the procedure.   Venous Panel POCT   Result Value Ref Range    pH Venous POCT 7.34 7.32 - 7.43    pCO2 Venous POCT 36 (L) 40 - 50 mm Hg    pO2 Venous POCT 48 (H) 25 - 47 mm Hg    Bicarbonate Venous POCT 19 (L) 21 - 28 mmol/L    Sodium POCT 136 135 - 145 mmol/L    Potassium POCT 4.2 3.4 - 5.3 mmol/L    Hemoglobin POCT 7.3 (L) 11.7 - 15.7 g/dL    Oxyhemoglobin Venous POCT 80 (H) 70 - 75 %    Glucose Whole Blood POCT 130 (H) 70 - 99 mg/dL    Base Excess/Deficit (+/-) POCT -6.1 (L) -3.0 - 3.0 mmol/L    Calcium, Ionized Whole Blood POCT 3.8 (L) 4.4 - 5.2 mg/dL    O2 Sat, Venous POCT 82 (H) 70 - 75 %    Lactic Acid POCT 0.6 (L) 0.7 - 2.0 mmol/L    FIO2 POCT 30.0 %    Narrative    In healthy individuals, oxyhemoglobin (O2Hb) and oxygen saturation (SO2) are approximately equal. In the presence of dyshemoglobins, oxyhemoglobin can be considerably lower than oxygen saturation.   Viscoelastic Clot  Assessment POCT   Result Value Ref Range    Clot Time 97 (L) 113 - 166 sec    Clot Time w Heparinase 98 (L) 103 - 153 sec    Clot Stiffness 21.0 13.0 - 33.2 hPa    Fibr Contrib to Clot Stiffness 1.9 1.0 - 3.7 hPa    Plt Contrib to Clot Stiffness 19.1 11.9 - 29.8 hPa    Clot Time Ratio 1.0     Narrative    The [employer] QPlus System is indicated for the evaluation of blood coagulation in perioperative patients age 18 years and older to assess possible hypocoagulable and hypercoagulable conditions in cardiovascular or major orthopedic surgeries before, during, and following the procedure.   Prepare red blood cells (unit)   Result Value Ref Range    Blood Component Type Red Blood Cells     Product Code K6708Y70     Unit Status Returned     Unit Number [id number]     CROSSMATCH Compatible     CODING SYSTEM [id number]     ISSUE DATE AND TIME 52787343371493     UNIT ABO/RH A+     UNIT TYPE ISBT 6200    Prepare red blood cells (unit)   Result Value Ref Range    Blood Component Type Red Blood Cells     Product Code [id number]     Unit Status Returned     Unit Number [id number]     CROSSMATCH Compatible     CODING SYSTEM [id number]     ISSUE DATE AND TIME [id number]     UNIT ABO/RH A+     UNIT TYPE ISBT 6200    Venous Panel POCT   Result Value Ref Range    pH Venous POCT 7.31 (L) 7.32 - 7.43    pCO2 Venous POCT 42 40 - 50 mm Hg    pO2 Venous POCT 42 25 - 47 mm Hg    Bicarbonate Venous POCT 21 21 - 28 mmol/L    Sodium POCT 137 135 - 145 mmol/L    Potassium POCT 4.5 3.4 - 5.3 mmol/L    Hemoglobin POCT 9.8 (L) 11.7 - 15.7 g/dL    Oxyhemoglobin Venous POCT 75 70 - 75 %    Glucose Whole Blood POCT 166 (H) 70 - 99 mg/dL    Base Excess/Deficit (+/-) POCT -4.7 (L) -3.0 - 3.0 mmol/L    Calcium, Ionized Whole Blood POCT 5.0 4.4 - 5.2 mg/dL    O2 Sat, Venous POCT 77 (H) 70 - 75 %    Lactic Acid POCT 1.0 0.7 - 2.0 mmol/L    FIO2 POCT 35.0 %    Narrative    In healthy individuals, oxyhemoglobin (O2Hb) and oxygen saturation (SO2) are  approximately equal. In the presence of dyshemoglobins, oxyhemoglobin can be considerably lower than oxygen saturation.   Basic metabolic panel   Result Value Ref Range    Sodium 136 135 - 145 mmol/L    Potassium 5.1 3.4 - 5.3 mmol/L    Chloride 106 98 - 107 mmol/L    Carbon Dioxide (CO2) 18 (L) 22 - 29 mmol/L    Anion Gap 12 7 - 15 mmol/L    Urea Nitrogen 17.2 6.0 - 20.0 mg/dL    Creatinine 0.99 (H) 0.51 - 0.95 mg/dL    GFR Estimate 75 >60 mL/min/1.73m2    Calcium 8.0 (L) 8.8 - 10.4 mg/dL    Glucose 243 (H) 70 - 99 mg/dL   CBC with platelets   Result Value Ref Range    WBC Count 10.4 4.0 - 11.0 10e3/uL    RBC Count 3.40 (L) 3.80 - 5.20 10e6/uL    Hemoglobin 9.7 (L) 11.7 - 15.7 g/dL    Hematocrit 31.0 (L) 35.0 - 47.0 %    MCV 91 78 - 100 fL    MCH 28.5 26.5 - 33.0 pg    MCHC 31.3 (L) 31.5 - 36.5 g/dL    RDW 15.9 (H) 10.0 - 15.0 %    Platelet Count 151 150 - 450 10e3/uL   INR   Result Value Ref Range    INR 1.35 (H) 0.85 - 1.15   Partial thromboplastin time   Result Value Ref Range    aPTT 26 22 - 38 Seconds   Basic metabolic panel   Result Value Ref Range    Sodium 133 (L) 135 - 145 mmol/L    Potassium 4.3 3.4 - 5.3 mmol/L    Chloride 106 98 - 107 mmol/L    Carbon Dioxide (CO2) 20 (L) 22 - 29 mmol/L    Anion Gap 7 7 - 15 mmol/L    Urea Nitrogen 17.8 6.0 - 20.0 mg/dL    Creatinine 1.00 (H) 0.51 - 0.95 mg/dL    GFR Estimate 74 >60 mL/min/1.73m2    Calcium 7.6 (L) 8.8 - 10.4 mg/dL    Glucose 210 (H) 70 - 99 mg/dL   CBC with platelets   Result Value Ref Range    WBC Count 8.6 4.0 - 11.0 10e3/uL    RBC Count 2.64 (L) 3.80 - 5.20 10e6/uL    Hemoglobin 7.5 (L) 11.7 - 15.7 g/dL    Hematocrit 23.7 (L) 35.0 - 47.0 %    MCV 90 78 - 100 fL    MCH 28.4 26.5 - 33.0 pg    MCHC 31.6 31.5 - 36.5 g/dL    RDW 15.9 (H) 10.0 - 15.0 %    Platelet Count 122 (L) 150 - 450 10e3/uL   INR   Result Value Ref Range    INR 1.53 (H) 0.85 - 1.15   Partial thromboplastin time   Result Value Ref Range    aPTT 30 22 - 38 Seconds   Prepare red blood  cells (unit)   Result Value Ref Range    Blood Component Type Red Blood Cells     Product Code [id number]     Unit Status Ready for issue     Unit Number [id number]     CROSSMATCH Compatible     CODING SYSTEM [id number]    CBC with platelets   Result Value Ref Range    WBC Count 8.3 4.0 - 11.0 10e3/uL    RBC Count 2.44 (L) 3.80 - 5.20 10e6/uL    Hemoglobin 7.1 (L) 11.7 - 15.7 g/dL    Hematocrit 21.8 (L) 35.0 - 47.0 %    MCV 89 78 - 100 fL    MCH 29.1 26.5 - 33.0 pg    MCHC 32.6 31.5 - 36.5 g/dL    RDW 15.9 (H) 10.0 - 15.0 %    Platelet Count 128 (L) 150 - 450 10e3/uL   INR   Result Value Ref Range    INR 1.65 (H) 0.85 - 1.15   Partial thromboplastin time   Result Value Ref Range    aPTT 32 22 - 38 Seconds   Fibrinogen activity   Result Value Ref Range    Fibrinogen Activity 422 170 - 510 mg/dL   Prepare plasma (unit)   Result Value Ref Range    Blood Component Type Plasma     Product Code [id number]     Unit Status Issued     Unit Number [id number]     CODING SYSTEM [id number]     ISSUE DATE AND TIME [id number]     UNIT ABO/RH A+     UNIT TYPE ISBT 6200        Assessment: 39 year old female with history of DVT/PE one eliquis, gastric bypass, MDD/SHIVAM, and restless leg who is POD#1 s/p rad hyst, BSO, rectosigmoid colectomy w post vaginectomy, supracolic omentectomy, diverting loop ileostomy with frozen notable for adenocarcinoma.    # Abdominal pain  # Back pain  # Leg pain, paresthesias  # Complex mass from rectosigmoid space  # Adenocarcinoma   # Postoperative state  - Patient with a 12cm complex ovarian mass and subacute back/leg/ abdominal pain concerning for a mass effect and possible nerve compression resulting in leg numbness, s/p rad hyst, BSO, rectosignoid colectomy w/ post vaginectomy, supracolic omentectomy, diverting loop ileostomy, found to be adenocarcinoma on frozen.  - Poor pain control with schd Tylenol, PRN oxy, IV diluadid; s/p TAP block. PO dilaudid added with IV for breakthrough pain, patient  reports improvement.  - Ostomy in place, pending return of bowel function  - no bowel regimen, nothing per rectum  - CLD, mIVF 125 mL/hr, will advanced diet slowly  - Cat in place until POD3 for radical hysterectomy  - continue ancef/flagyl for 24hr    # acute loss blood anemia  # elevated INR  - Hgb 11.2 > EBL 2500mL > 2U pRBC postoperatively. AM Hgb 7.5 > 7.1, 1u pRBC given. Repeat Hgb ordered.  - INR 1.35 > 1.53 > 1.65, FFP and vitamin K given in the setting of elevated INR and concern for bleeding due to extensive surgery   - Will monitor closely for vitals, UOP, abdominal exam, repeat CBC and coags.     # Hx DVT  # Hx PE  - Most superficial thrombophlebitis found recently 05/2024, on therapeutic Eliquis BID PTA (held).   - Once CBC showing appropriate rise - will resume anticoagulation with lovenox prophylactic dosing first then uptitrate to therapeutic dose.     # Hx of gastric bypass, panniculectomy  # Hx of C.diff  History of uncomplicated gastric bypass completed in 2022 followed by panniculectomy in 2023 for excess skin removal. Postoperative recovery c/b C. Diff during inpatient admission. No diarrhea    - Enteric precautions per BMT unit guideline pending negative C. Diff on admission   - Pantoprazole 20mg BID      # Restless leg  # MDD/SHIVAM   # Insomnia   Patient with significant psychiatric medication history in setting of refractory anxiety/depression as well as intermittent RLS. Medication reconcilation perfromed morning of 8/31 with bedside RN, confirmed medications and dosing schedule as below, plan to continue prior to admission regimen while in house.   - Buspirone 30mg PO BID   - Desvenlafaxine 150mg PO qAM   - Lorazapam 1mg PO BID    - Propanolol 10mg PO BID - held today due to blood pressure  - Quetiapine 200mg PO at bedtime  - Topirimate 50mg BID   - Zolpidem 5mg PO at bedtime   - Trazodone 225mg PO at bedtime      # Moderate persistent asthma   Noted per chart review. Patient reports she is  largely asymptomatic and has not had exacerbations that have required hospitalizaiton or intubation.   - Flovent 220mcg 1 puff Q12H       PPX: SCDs, IS, lovenox held  Dispo: Pending ostomy output  Drains/Lines: mariann BRYAN, margareth Freedman MD, MSc  Gynecologic Oncology, PGY-1  09/02/2024 12:19 PM    Gyn Onc Pager: [phone]     ---------------------    Physician Attestation   I saw this patient with the resident and agree with the findings and plan of care as documented in the note.  I personally reviewed vital signs, medications, and labs. The above note has been edited by me.    Comments: Marizol is now POD#1 s/p radical hysterectomy, BSO, rectosigmoid colectomy w/ posterior vaginectomy, supracolic omentectomy, diverting loop ileostomy. 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 Patient had a large EBL of 2500cc, required 2u pRBC transfusion and pressor support intraoperatively.  Postoperatively is making adequate UOP. However, this morning noted to have decreased hgb to 7.1 and an elevated INR to 1.6. Abdominal exam is reassuring. She is getting one unit of pRBC, FFP and vitamin K. Will repeat CBC and coags after transfusion. She has an extensive history of PE/DVT on eliquis. While inpatient, plan to resume anticoagulation with prophylactic lovenox dosing first pending her blood transfusion and appropriate repeat labs.  In addition, patient has waite in place - plan for removal on POD3 due to radical hysterectomy.  In terms of diet she is currently on clears, will advance as tolerated. Her ileostomy had small output this morning.  She is on ancef and flagyl for additional 24 hours due to the contaminated nature of the case.  Will monitor her closely due to complexity of her history and surgery, bleeding and clotting risk.      Patient discussed with Dr. Sosa (gyn-oncology attending)     Maria De Jesus Méndez,  MD  Gynecology Oncology Fellow  September 2, 2024, 2:18 PM

## 2024-09-02 NOTE — DISCHARGE SUMMARY
Gynecologic Oncology Discharge Summary    Marizol Granados  [id number]    Admit Date: 8/31/2024  Discharge Date: 9/6/2024  Admitting Provider: Jessenia Sosa MD  Discharge Provider: Xavi Al MD    Admission Dx:   - Abdominal pain   - Back pain  - Leg pain, paresthesias  - Complex adnexal mass  - Hx DVT  - Hx PE   - Hx of gastric bypass, panniculectomy  - Hx of C.diff  - Constipation   - Restless leg  - MDD/SHIVAM   - Insomnia   - Moderate persistent asthma    Discharge Dx:  - Adenocarcinoma  - Complex adnexal mass s/p removal  - Abdominal pain, improved  - Back pain, improved  - Leg pain, paresthesias, improved  - Hx DVT  - Hx PE   - Hx of gastric bypass, panniculectomy  - Hx of C.diff  - Constipation   - Restless leg  - MDD/SHIVAM   - Insomnia   - Moderate persistent asthma  - Adenocarcinoma  - Acute blood loss anemia  - Urinary retention    Patient Active Problem List   Diagnosis    Therapeutic drug monitoring    Social phobia    S/P gastric bypass    Restless legs    Recurrent major depressive disorder (H24)    Other B-complex deficiencies    Moderate persistent asthma    Irritable bowel syndrome with both constipation and diarrhea    Intractable migraine without aura and with status migrainosus    Insomnia    Hypothyroidism    Hypercholesterolemia    History of thromboembolism    Generalized anxiety disorder    Frequent UTI    Chronic fatigue    Allergic rhinitis    Genital herpes simplex    Postoperative intestinal malabsorption    GERD (gastroesophageal reflux disease)    Low back pain    Morbid obesity (H)    Ptosis of both breasts    Adnexal mass       Procedures:   Exploratory laparotomy with radical hysterectomy, bilateral salpingo-oophorectomy, radical en bloc rectosigmoid colon resection with posterior vaginectomy  Total supracolic omentectomy  Diverting loop ileostomy    Prior to Admission Medications:  Medications Prior to Admission   Medication Sig Dispense Refill Last Dose    propranolol (INDERAL)  10 MG tablet Take 10 mg by mouth 2 times daily.   8/30/2024    Suvorexant (BELSOMRA) 20 MG tablet Take 20 mg by mouth At Bedtime   Past Month    apixaban ANTICOAGULANT (ELIQUIS) 5 MG tablet Take 1 tablet (5 mg) by mouth 2 times daily 60 tablet 11 8/30/2024    busPIRone (BUSPAR) 15 MG tablet Take 22.5 mg by mouth 2 times daily   8/30/2024    calcium carbonate-vitamin D (OS-LORY WITH D) 500-200 MG-UNIT tablet Take 2 tablets by mouth daily   8/30/2024    Calcium Polycarbophil (FIBER) 625 MG tablet Take 4 tablets (2,500 mg) by mouth daily   8/30/2024    cholecalciferol 25 MCG (1000 UT) TABS Take 2,000 Units by mouth daily   8/30/2024    desvenlafaxine (PRISTIQ) 100 MG 24 hr tablet Take 100 mg by mouth daily   8/30/2024    desvenlafaxine (PRISTIQ) 50 MG 24 hr tablet TAKE 1 TABLET BY MOUTH ONCE DAILY. TAKE WITH 100MG TABLET FOR TOTAL OF 150MG DAILY   8/30/2024    LORazepam (ATIVAN) 1 MG tablet Take 1 mg by mouth 2 times daily   8/30/2024    Multiple Vitamins-Minerals (MULTIVITAL PO) Take 1 tablet by mouth daily   8/30/2024    omeprazole (PRILOSEC) 40 MG DR capsule TAKE 1 CAPSULE(40 MG) BY MOUTH DAILY 90 capsule 3 8/30/2024    phentermine (ADIPEX-P) 37.5 MG tablet Take 0.5-1 tablets (18.75-37.5 mg) by mouth every morning (before breakfast). 90 tablet 1 8/30/2024    PROBIOTIC PRODUCT PO Take 1 tablet by mouth At Bedtime   8/30/2024    QUEtiapine (SEROQUEL) 200 MG tablet Take 150 mg by mouth at bedtime.   8/30/2024    ramelteon (ROZEREM) 8 MG tablet Take 8 mg by mouth At Bedtime   8/30/2024    topiramate (TOPAMAX) 50 MG tablet TAKE 1 TABLET(50 MG) BY MOUTH TWICE DAILY 180 tablet 3 8/30/2024    traZODone (DESYREL) 150 MG tablet Take 225 mg by mouth At Bedtime   8/30/2024    zolpidem (AMBIEN) 5 MG tablet Take 5 mg by mouth nightly as needed for sleep   8/30/2024       Discharge Medications:     Review of your medicines        START taking        Dose / Directions   acetaminophen 325 MG tablet  Commonly known as: TYLENOL       Dose: 650 mg  Take 2 tablets (650 mg) by mouth every 6 hours.  Refills: 0     gabapentin 300 MG capsule  Commonly known as: NEURONTIN      Dose: 300 mg  Take 1 capsule (300 mg) by mouth 3 times daily for 14 days.  Quantity: 42 capsule  Refills: 0     HYDROmorphone 4 MG tablet  Commonly known as: DILAUDID      Dose: 4 mg  Take 1 tablet (4 mg) by mouth every 4 hours as needed for severe pain.  Quantity: 42 tablet  Refills: 0     methocarbamol 500 MG tablet  Commonly known as: ROBAXIN      Dose: 500 mg  Take 1 tablet (500 mg) by mouth every 6 hours as needed for muscle spasms.  Quantity: 28 tablet  Refills: 0     miconazole 2 % external powder  Commonly known as: MICATIN      Apply topically 2 times daily.  Refills: 0     naloxone 4 MG/0.1ML nasal spray  Commonly known as: NARCAN      Dose: 4 mg  Spray 1 spray (4 mg) into one nostril alternating nostrils as needed for opioid reversal. every 2-3 minutes until assistance arrives  Quantity: 2 each  Refills: 0     simethicone 80 MG chewable tablet  Commonly known as: MYLICON      Dose: 80 mg  Take 1 tablet (80 mg) by mouth every 6 hours as needed for flatulence or cramping.  Quantity: 120 tablet  Refills: 2            CONTINUE these medicines which have NOT CHANGED        Dose / Directions   apixaban ANTICOAGULANT 5 MG tablet  Commonly known as: ELIQUIS  Used for: Personal history of pulmonary embolism      Dose: 5 mg  Take 1 tablet (5 mg) by mouth 2 times daily  Quantity: 60 tablet  Refills: 11     busPIRone 15 MG tablet  Commonly known as: BUSPAR      Dose: 22.5 mg  Take 22.5 mg by mouth 2 times daily  Refills: 0     calcium carbonate-vitamin D 500-200 MG-UNIT tablet  Commonly known as: OS-LORY with D      Dose: 2 tablet  Take 2 tablets by mouth daily  Refills: 0     * desvenlafaxine 100 MG 24 hr tablet  Commonly known as: PRISTIQ      Dose: 100 mg  Take 100 mg by mouth daily  Refills: 0     * desvenlafaxine 50 MG 24 hr tablet  Commonly known as: PRISTIQ      TAKE 1  TABLET BY MOUTH ONCE DAILY. TAKE WITH 100MG TABLET FOR TOTAL OF 150MG DAILY  Refills: 0     Fiber 625 MG tablet      Dose: 4 tablet  Take 4 tablets (2,500 mg) by mouth daily  Refills: 0     LORazepam 1 MG tablet  Commonly known as: ATIVAN      Dose: 1 mg  Take 1 mg by mouth 2 times daily  Refills: 0     MULTIVITAL PO      Dose: 1 tablet  Take 1 tablet by mouth daily  Refills: 0     omeprazole 40 MG DR capsule  Commonly known as: PriLOSEC  Used for: Epigastric discomfort, S/P gastric bypass      Dose: 40 mg  TAKE 1 CAPSULE(40 MG) BY MOUTH DAILY  Quantity: 90 capsule  Refills: 3     phentermine 37.5 MG tablet  Commonly known as: ADIPEX-P  Used for: Postoperative malabsorption, Hyperphagia      Dose: 18.75-37.5 mg  Take 0.5-1 tablets (18.75-37.5 mg) by mouth every morning (before breakfast).  Quantity: 90 tablet  Refills: 1     PROBIOTIC PRODUCT PO      Dose: 1 tablet  Take 1 tablet by mouth At Bedtime  Refills: 0     propranolol 10 MG tablet  Commonly known as: INDERAL      Dose: 10 mg  Take 10 mg by mouth 2 times daily.  Refills: 0     QUEtiapine 200 MG tablet  Commonly known as: SEROquel      Dose: 150 mg  Take 150 mg by mouth at bedtime.  Refills: 0     ramelteon 8 MG tablet  Commonly known as: ROZEREM      Dose: 8 mg  Take 8 mg by mouth At Bedtime  Refills: 0     Suvorexant 20 MG tablet  Commonly known as: BELSOMRA      Dose: 20 mg  Take 20 mg by mouth At Bedtime  Refills: 0     topiramate 50 MG tablet  Commonly known as: TOPAMAX  Used for: Intractable migraine without aura and with status migrainosus      TAKE 1 TABLET(50 MG) BY MOUTH TWICE DAILY  Quantity: 180 tablet  Refills: 3     traZODone 150 MG tablet  Commonly known as: DESYREL      Dose: 225 mg  Take 225 mg by mouth At Bedtime  Refills: 0     Vitamin D3 25 mcg (1000 units) tablet  Commonly known as: CHOLECALCIFEROL      Dose: 2,000 Units  Take 2,000 Units by mouth daily  Refills: 0     zolpidem 5 MG tablet  Commonly known as: AMBIEN      Dose: 5 mg  Take  5 mg by mouth nightly as needed for sleep  Refills: 0           * This list has 2 medication(s) that are the same as other medications prescribed for you. Read the directions carefully, and ask your doctor or other care provider to review them with you.                   Where to get your medicines        These medications were sent to [facility] - [place], [place] - [address]  [address], [facility] [address]      Phone: [phone]   gabapentin 300 MG capsule  HYDROmorphone 4 MG tablet  methocarbamol 500 MG tablet  naloxone 4 MG/0.1ML nasal spray  simethicone 80 MG chewable tablet       Some of these will need a paper prescription and others can be bought over the counter. Ask your nurse if you have questions.    You don't need a prescription for these medications  acetaminophen 325 MG tablet  miconazole 2 % external powder         Consultations:  SICU, CC, WOC, Nutrition, PT, OT    Brief History of Illness:  This patient is a 39 year old  with history of DVT/PE on eliquis, gastric bypass, MDD/SHIVAM managed on multiple medications, and restless leg, who presents as a transfer of care from [facility] after a long work up of leg pain, abdominal, pain and back by her neurologist, including a CT from , revealing a 12 cm complex cystic adnexa mass in her central pelvis.      She transferred from [facility] for surgery and on HD#2 she underwent an exploratory laparotomy with radical hysterectomy, bilateral salpingo-oophorectomy, radical en bloc rectosigmoid colon resection with posterior vaginectomy as well as a total supracolic omentectomy and diverting loop ileostomy. Fresh frozen revealed Adenocarcinoma.    Hospital Course:  Dz:   - Preoperative diagnosis was 12 cm complex cystic adnexa mass in her central pelvis.  Frozen section at the time of the surgery showed adenocarcinoma.  Final pathology is pending at the time of discharge.  She will follow-up postoperatively for a  care plan on 9/13/24.  FEN:   - She was maintained on IVF until tolerating PO intake.  By discharge, she was tolerating a regular low residue diet without nausea and vomiting and able to maintain her hydration without IVF supplementation.  Pain:   - Her pain was initially controlled on scheduled tylenol and PRN oxycodone and IV dilaudid. Her pain was consistent despite these interventions, requiring escalation to PO dilaudid then PCA. Her PCA was discontinued due to concern for oversedation, see Psych, and she was transitioned to scheduled tylenol and PO dilaudid and robaxin. Her pain was well controlled on this and she was discharged home with gabapentin, tylenol, robaxin, and PRN dilaudid.  CV:   - She has no history of CV issues. She developed hypotension that required ICU admission on POD#2 without pressor support, and she was transferred back to the floor the same day, see Heme. Her vital signs were stable while in house and she had no acute CV issues.  PULM:   - She has a history of a DVT-PE as discussed in Heme below. She was initially given O2 supplementation in order to maintain her O2 sats in the immediate postop period and was transitioned off of this without difficulty.  By discharge, her O2 sats were greater than 94% on RA.  She was encouraged to use her bedside IS while in house.  She had no acute pulmonary issues while in house.  HEME:   - Her preoperative Hgb was 11.2 with EBL 2500. She received 2U pRBCs intraoperatively.  Her hgb dropped to 7.1 with her INR elevated at 1.65. She received 10mg Vit K, 1u FFP, 2u pRBC in the days following surgery. Despite transfusion, she developed hypotension that was unresponsive to fluids and the second unit of pRBC, ultimately requiring ICU admission. Concern for polypharmacy given sedating psych medications; see psych. Her blood pressures stabilized without pressors. Her hgb dropped to 6.7 and she required an additional unit of PRBC. Her hgb was stable at 8.8 and  INR normalized to 1.14 at the time of discharge. She had no other acute heme issues while in house. She has a history of a DVT PE for which home eliquis was held. Post op prophylactic lovenox was held until POD#4 given elevated INR and coagulopathy. She will discharge with her PTA Eliquis.  GI:   - She was made NPO prior to the procedure.  On POD#0, her diet was advanced to clear liquids and then on POD#2 it was advanced slowly as tolerated.  She was not given any bowel regimen medications given her colon resection. At the time of discharge, she was tolerating a regular low residue diet without nausea and vomiting and had appropriate ostomy output. She had not yet had a bowel movement. She will continue to monitor ileostomy output daily.   :    - A waite catheter was placed at the time of the surgery.  This was left in place until POD#3 at which point it was removed. After 2 straight caths the patient still could not void so the waite was replaced and left at discharge. She will follow up in clinic for an active trial of void on 9/13/24.   ID:   - The patient was AF during her hospitalization.  She received standard preoperative antibiotics without incident.    ENDO:   - no issues  PSYCH/NEURO:   - Patient has a history of restless leg, MDD/SHIVAM, and insomnia with PTA buspirone, Desvenlafaxine, lorazepam, topirimate, propanolol, quetiapine, zolpidem, and trazodone. Her propanolol was held given lower blood pressures, but the others were continued given reported history of withdrawal when stopped. On HD#4/POD#2, she became hypotensive and somnolent with concern for under-resuscitation after surgery and polypharmacy given multiple sedating medications. Naloxone was administered with some improvement. Psych was consulted and recommended decreasing seroquel and trazodone doses. This was tolerated by the patient and there were no more complications.  PPX:    - She was given SCDs, IS, PPI and lovenox during her hospital  course. Lovenox was initially held given elevated INR, but started POD#4 without issue. She was transitioned to PTA Eliquis prior to discharge. She tolerated these prophylactic interventions without incident. They were discontinued at the time of her discharge.      Discharge Instructions and Follow up:  Ms. Marizol Granados was discharged from the hospital with follow up for 9/13/2024 with Dr. Sosa.    Discharge Diet: Low fiber diet  Discharge Activity: Activity as tolerated  Discharge Follow up: 9/13/24 Dr Sosa    Discharge Disposition:  Discharged to home with family and home health care    Discharge Staff: MD Liliana August MD, MSc  Gynecologic Oncology, PGY-1  09/06/2024 11:03 AM    Gyn Onc Pager: [phone]     Provider Disclosure:      I reviewed and agree with the pathology results:        09/01/24 pathology and cytology results showed:  Final Diagnosis   A. Serosa, colonic, biopsy:  - Metastatic low-grade serous carcinoma     B. Fallopian tube and ovary, LEFT, salpingo-oophorectomy:  - Low-grade serous carcinoma involving the surface of the LEFT ovary  - Ovarian stroma with cortical inclusion cysts, follicular cysts and surface fibrous adhesions  - Fallopian tube with benign paratubal cyst     C. Fallopian tube, RIGHT, salpingectomy:  - Low-grade serous carcinoma involving the surface of the RIGHT fallopian tube  - Paratubal cyst and hydrosalpinx      D. Uterus, cervix, portion of posterior vagina and rectum total abdominal radical hysterectomy and rectosigmoid resection with diverting ileostomy:  - Low-grade serous carcinoma arising in the rectovaginal septum in a background of endometriosis   - Low-grade serous carcinoma involves the uterine serosa, cervical stroma, and vaginal wall   - Closest margin: 0.3 cm (radial margin)  - Lymphovascular invasion is identified  - Nine lymph nodes, uninvolved by carcinoma (0/9)  - Inactive endometrium (IUD identified)     E. Ovary,  RIGHT, oophorectomy:  - Low-grade serous carcinoma involving the surface of the RIGHT ovary  - RIGHT ovary with hemorrhagic corpus luteum, endometriosis, and endometriotic cysts      F. Omentum, omentectomy:  - Metastatic low-grade serous carcinoma  - Four lymph nodes, uninvolved by carcinoma (0/4)     I agree with above History, Review of Systems, Physical exam and Plan. I have reviewed the content of the documentation and have edited it as needed. I have seen and personally performed the services documented here and the documentation accurately represents those services and the decisions I have made.     Electronically signed by:   Xavi Al MD   Gynecologic Oncology   [facility]

## 2024-09-02 NOTE — CONSULTS
Care Management Initial Consult    General Information  Assessment completed with: Patient, Duane  Type of CM/SW Visit: Initial Assessment    Primary Care Provider verified and updated as needed: Yes   Readmission within the last 30 days: no previous admission in last 30 days      Reason for Consult: discharge planning  Advance Care Planning: Advance Care Planning Reviewed: no concerns identified          Communication Assessment  Patient's communication style: spoken language (English or Bilingual)    Hearing Difficulty or Deaf: no   Wear Glasses or Blind: yes    Cognitive  Cognitive/Neuro/Behavioral: WDL  Level of Consciousness: alert  Arousal Level: opens eyes spontaneously  Orientation: oriented x 4  Mood/Behavior: cooperative, calm     Speech: clear    Living Environment:   People in home: child(enrico), dependent     Current living Arrangements: house      Able to return to prior arrangements: yes  Living Arrangement Comments: Family and ex- will support    Family/Social Support:  Care provided by: self  Provides care for: child(enrico)  Marital Status:   Support system: Children, Parent(s)          Description of Support System: Supportive, Involved    Support Assessment: Adequate family and caregiver support, Adequate social supports    Current Resources:   Patient receiving home care services: No        Community Resources: None  Equipment currently used at home: none  Supplies currently used at home:      Employment/Financial:  Employment Status: unemployed        Financial Concerns: none   Referral to Financial Worker: No       Does the patient's insurance plan have a 3 day qualifying hospital stay waiver?  No    Lifestyle & Psychosocial Needs:  Social Determinants of Health     Food Insecurity: Low Risk  (8/31/2024)    Food Insecurity     Within the past 12 months, did you worry that your food would run out before you got money to buy more?: No     Within the past 12 months, did the food  you bought just not last and you didn t have money to get more?: No   Depression: At risk (5/2/2024)    PHQ-2     PHQ-2 Score: 6   Housing Stability: High Risk (8/31/2024)    Housing Stability     Do you have housing? : No     Are you worried about losing your housing?: No   Tobacco Use: Low Risk  (8/30/2024)    Patient History     Smoking Tobacco Use: Never     Smokeless Tobacco Use: Never     Passive Exposure: Not on file   Financial Resource Strain: Low Risk  (8/31/2024)    Financial Resource Strain     Within the past 12 months, have you or your family members you live with been unable to get utilities (heat, electricity) when it was really needed?: No   Alcohol Use: Not At Risk (5/13/2024)    AUDIT-C     Frequency of Alcohol Consumption: Monthly or less     Average Number of Drinks: 1 or 2     Frequency of Binge Drinking: Never   Transportation Needs: Low Risk  (8/31/2024)    Transportation Needs     Within the past 12 months, has lack of transportation kept you from medical appointments, getting your medicines, non-medical meetings or appointments, work, or from getting things that you need?: No   Physical Activity: Sufficiently Active (5/13/2024)    Exercise Vital Sign     Days of Exercise per Week: 4 days     Minutes of Exercise per Session: 60 min   Interpersonal Safety: High Risk (8/31/2024)    Interpersonal Safety     Do you feel physically and emotionally safe where you currently live?: No     Within the past 12 months, have you been hit, slapped, kicked or otherwise physically hurt by someone?: No     Within the past 12 months, have you been humiliated or emotionally abused in other ways by your partner or ex-partner?: No   Stress: Stress Concern Present (5/13/2024)    [employer] - Occupational Stress Questionnaire     Feeling of Stress : Very much   Social Connections: Moderately Isolated (5/13/2024)    Social Connection and Isolation Panel [NHANES]     Frequency of  Communication with Friends and Family: Three times a week     Frequency of Social Gatherings with Friends and Family: Once a week     Attends Worship Services: Never     Active Member of Clubs or Organizations: No     Attends Club or Organization Meetings: Never     Marital Status: Living with partner   Health Literacy: Not on file       Functional Status:  Prior to admission patient needed assistance:   Dependent ADLs:: Independent  Dependent IADLs:: Independent  Assesssment of Functional Status: Not at baseline with ADL Functioning    Mental Health Status:  Mental Health Status: No Current Concerns       Chemical Dependency Status:  Chemical Dependency Status: No Current Concerns             Values/Beliefs:  Spiritual, Cultural Beliefs, Worship Practices, Values that affect care: yes               Discussed  Partnership in Safe Discharge Planning  document with patient/family: Yes:     Additional Information:  Pt is 39 year old female currently POD 1 rad hyst, BSO, rectosigmoid colectomy w post vaginectomy, supracolic omentectomy, diverting loop ileostomy.    MALDONADO met with the pt and her father Mike at bedside to introduce self and role. The pt shared that at baseline she lives with her 2 children ages 10 and 11. Her ex- lives in [place], [place] and will be coming for a bit to help care for the children.     The pt shared she is worried about how she will manage at home, she noted her children do have SW'ers through [employer] as well and is asking if they can provide any support. Mike noted that their should be no issues with support, her ex- and her aunt will be coming to help with the kids and Mike and his wife Atif will be helping with the pt's return home. Mike also shared that he has a colostomy and will be able to help the pt with the adjustment of this both emotionally and physically.    The pt is open to having some home care at discharge to help with her ileostomy.    Next Steps: Referral or HC and  ileostomy supplies      Rosanne Gentile, MSW, [facility]  [facility]  Weekend Coverage- Units 5A and 5C  [facility]- [facility] Adult Social Work- unit 5A/C  Vocera:    5A Onc 5201 thru 5219 SW    5A Onc 5220 thru 5240 SW   5C OFF SERVICE 5401 thru 5416 SW   5C OFF SERVICE 5417 thru 5432 SW

## 2024-09-02 NOTE — PLAN OF CARE
Goal Outcome Evaluation:    Admitted/transferred from: PACU  2 RN full   skin assessment completed by Mc Lundberg RN and Lorri WHITE RN.  Skin assessment finding: issues found PIVx2, abdominal incision, waite, generalized bruising in BL legs and arms, and   Ileostomy  Interventions/actions: other NA      Will continue to monitor.

## 2024-09-02 NOTE — PLAN OF CARE
Goal Outcome Evaluation:      Plan of Care Reviewed With: patient    POD# 1 s/p Exploratory laparotomy with radical hysterectomy, bilateral salpingo-oophorectomy, radical en bloc rectosigmoid colon resection with posterior vaginectomy; Total supracolic omentectomy; Diverting loop ileostomy.     A/Ox4. Soft Bps, on 1 L NC. Capno in place. Has not yet dangled. Ileostomy with gas and liquid stool. Cat patent. IVMF infusing at 100 ml/hr. Received 500 ml/hr bolus to improve blood pressure. EBL was 2500 ml. Pt received 2 units of Rbcs in OR. Hgb at 7.5. Pain managed with IV dilaudid, Oxycodone, Robaxin, and Tylenol. Midline incision with Island dressing CDI. PIVx2. On scheduled IV Zosyn and Cefazolin. Mom at bedside. Cont POC.

## 2024-09-02 NOTE — PROGRESS NOTES
Gynecologic Oncology Postoperative Check Note  9/1/2024    S: Marizol was tearful on my arrival to her room. Her mother was with her and stated she had just gotten done telling her what she knew from her discussion with Dr. Sosa. Pain is well controlled with dilaudid but other pain medications are not helpful. Requesting increased frequency of dilaudid PRN. Has not yet ambulated. Cat in place. On CLD but reports she has no hunger and only wants juice anyway. Denies chest pain, shortness of breath, dizziness, or other concerns at this time.     O:  Vitals:    09/01/24 2030 09/01/24 2141 09/01/24 2156 09/01/24 2200   BP: 96/70 110/87  105/68   BP Location:    Left arm   Pulse: 95 111 (!) 128 104   Resp: 11 14 14 13   Temp: 97.7  F (36.5  C) 97.8  F (36.6  C)     TempSrc: Axillary Axillary     SpO2: 98% 98%  99%   Weight:       Height:           Gen: visibly upset  Cardio: RRR, S1/S2, no murmurs  Resp: CTAB, no wheezing or crackles  Abdomen: soft, appropriately tender, incision covered with bandage - no shadowing, ostomy site in tact with froth and dark liquid in bag  Extremities: Non-tender, trace edema    A/P: 39 year old POD#0 s/p Exploratory laparotomy with radical hysterectomy, bilateral salpingo-oophorectomy, radical en bloc rectosigmoid colon resection with posterior vaginectomy; Total supracolic omentectomy; Diverting loop ileostomy. Feeling overwhelmed and upset; working on pain control    Dz: Adenocarcinoma on frozen  FEN: CLD, mIVF 125 mL/hr  Pain: schd Tylenol, PRN oxy, IV diluadid; s/p TAP block  Heme: Hgb 11.2 > EBL 2500mL > 2U pRBC.   CV: NI  Pulm: Hx PE  GI: S/p gastric bypass PTA protononix. NO bowel reg  : Cat to remain in place until POD#3  ID: s/p Ancef, Flagyl preop  Endo: NI  Psych/Neuro/MSK: Restless leg/Insomnia/MDD/SHIVAM: PTA Camilla ativan, seroquel, ramelteon, topiramate, trazodone, buspar, pristiq, ambien  PPX: SCDs, IS, ppx lovenox POD#1  Drains/Lines: PIV, Cat, ostomy    Dispo:  Admitted to Gyn/Onc; dispo pending ostomy output    Lee Fien MD  [facility]  Gynecology Oncology Resident, PGY-2  09/01/2024 10:18 PM    To reach the GYNECOLOGY ONCOLOGY team for this patient, please page [phone]

## 2024-09-02 NOTE — ANESTHESIA CARE TRANSFER NOTE
Patient: Marizol Granados    Procedure: Procedure(s):  Laparotomy exploratory, Rectosigmoid colon resection, diverting ileostomy, total omentectomy; Hysterectomy total abdominal radical  Hysterectomy total abdominal radical  Rectosigmoid colon resection, diverting ileostomy, total omentectomy       Diagnosis: Adnexal mass [N94.89]  Diagnosis Additional Information: No value filed.    Anesthesia Type:   General     Note:    Oropharynx: oropharynx clear of all foreign objects  Level of Consciousness: awake  Oxygen Supplementation: face mask  Level of Supplemental Oxygen (L/min / FiO2): 5  Independent Airway: airway patency satisfactory and stable  Dentition: dentition changed  Vital Signs Stable: post-procedure vital signs reviewed and stable  Report to RN Given: handoff report given  Patient transferred to: PACU    Handoff Report: Identifed the Patient, Identified the Reponsible Provider, Reviewed the pertinent medical history, Discussed the surgical course, Reviewed Intra-OP anesthesia mangement and issues during anesthesia, Set expectations for post-procedure period and Allowed opportunity for questions and acknowledgement of understanding      Vitals:  Vitals Value Taken Time   BP     Temp     Pulse 105 09/01/24 1914   Resp 10 09/01/24 1914   SpO2 100 % 09/01/24 1914   Vitals shown include unfiled device data.    Electronically Signed By: Maggie Douglas MD  September 1, 2024  7:15 PM

## 2024-09-02 NOTE — PROGRESS NOTES
"Brief Gyn Oncology Note  Note delayed d/t urgent patient cares, patient seen at ~ 1400    To room to PM round on patient. Marizol reports her pain is not improved, if anything it feels worse. Describes it as a deep, sharp/stabbing abdominal pain that is worse on inspiration. Dilaudid seems to help the most with her pain, oxycodone does not help at all. Denies lightheadedness, dizziness, chest pain, shortness of breath. Denies nausea/vomiting.     /48   Pulse 117   Temp 98.3  F (36.8  C) (Oral)   Resp 18   Ht 1.67 m (5' 5.75\")   Wt 78.2 kg (172 lb 6.4 oz)   SpO2 94%   BMI 28.04 kg/m    Gen: resting in bed, breathing comfortably, intermittently groaning w/pain  CV: Mildly tachycardic, regular rhythm; no murmurs appreciated  Pulm: CTAB, shallow breathing  Abd: abdomen soft, non-distended; tenderness present in epigastric region, no rebound or guarding, no tenderness in lower quadrants. Incision with overlying dressing clean, dry, intact with minimal strikethrough; no adjacent erythema or edema.   : waite catheter in place, draining clear yellow urine, just emptied for ~1000cc while at bedside    Abdomen soft, overall exam reassuring. Suspect tachycardia likely secondary to pain and/or under-resuscitation. Tachycardia does seem to be slowly improving, down from 120s this morning. Currently resuscitating with FFP (just completed) and 1u pRBC (started at 1453). Plan for 2-hr post-transfusion CBC, coags. Working on pain control, will add below:  - Start dilaudid PCA now w/ 0.2mg bolus q10min prn (max 1.2mg/h), continue for maximum of 24 hours  - Simethicone for gas pain, gabapentin TID for multimodal pain management  - No bowel regimen (no miralax, no senna); simethicone okay  - Re-ordered intake/output as strict I/Os q4h to more closely monitor UOP  - Continue serial abdominal exams  - SCDs, incentive spirometry   - Will start lovenox later tonight or tomorrow morning pending Hgb   - OT for R hand weakness " post-operatively    Darlene Mace MD  Obstetrics & Gynecology, PGY-3  09/02/2024 3:53 PM

## 2024-09-02 NOTE — PLAN OF CARE
Goal Outcome Evaluation:      Plan of Care Reviewed With: patient, parent    Overall Patient Progress: no changeOverall Patient Progress: no change    Outcome Evaluation: IDT will continue to follow and support the pt in safe d/c planning and emotional support

## 2024-09-03 ENCOUNTER — APPOINTMENT (OUTPATIENT)
Dept: CARDIOLOGY | Facility: CLINIC | Age: 39
End: 2024-09-03
Attending: STUDENT IN AN ORGANIZED HEALTH CARE EDUCATION/TRAINING PROGRAM
Payer: COMMERCIAL

## 2024-09-03 ENCOUNTER — APPOINTMENT (OUTPATIENT)
Dept: PHYSICAL THERAPY | Facility: CLINIC | Age: 39
End: 2024-09-03
Attending: STUDENT IN AN ORGANIZED HEALTH CARE EDUCATION/TRAINING PROGRAM
Payer: COMMERCIAL

## 2024-09-03 LAB
ABO/RH(D): NORMAL
ALBUMIN SERPL BCG-MCNC: 2.5 G/DL (ref 3.5–5.2)
ALBUMIN SERPL BCG-MCNC: 2.6 G/DL (ref 3.5–5.2)
ALBUMIN UR-MCNC: NEGATIVE MG/DL
ALP SERPL-CCNC: 30 U/L (ref 40–150)
ALP SERPL-CCNC: 34 U/L (ref 40–150)
ALT SERPL W P-5'-P-CCNC: 14 U/L (ref 0–50)
ALT SERPL W P-5'-P-CCNC: 14 U/L (ref 0–50)
ANION GAP SERPL CALCULATED.3IONS-SCNC: 6 MMOL/L (ref 7–15)
ANION GAP SERPL CALCULATED.3IONS-SCNC: 8 MMOL/L (ref 7–15)
ANTIBODY SCREEN: NEGATIVE
APPEARANCE UR: CLEAR
APTT PPP: 31 SECONDS (ref 22–38)
APTT PPP: 34 SECONDS (ref 22–38)
APTT PPP: 36 SECONDS (ref 22–38)
AST SERPL W P-5'-P-CCNC: 35 U/L (ref 0–45)
AST SERPL W P-5'-P-CCNC: 36 U/L (ref 0–45)
BASE EXCESS BLDV CALC-SCNC: -0.4 MMOL/L (ref -3–3)
BASE EXCESS BLDV CALC-SCNC: -0.6 MMOL/L (ref -3–3)
BILIRUB SERPL-MCNC: 0.3 MG/DL
BILIRUB SERPL-MCNC: 0.3 MG/DL
BILIRUB UR QL STRIP: NEGATIVE
BUN SERPL-MCNC: 11 MG/DL (ref 6–20)
BUN SERPL-MCNC: 12.7 MG/DL (ref 6–20)
CA-I BLD-MCNC: 4.4 MG/DL (ref 4.4–5.2)
CA-I BLD-MCNC: 4.6 MG/DL (ref 4.4–5.2)
CALCIUM SERPL-MCNC: 7.6 MG/DL (ref 8.8–10.4)
CALCIUM SERPL-MCNC: 7.6 MG/DL (ref 8.8–10.4)
CF REDUC 60M P MA LENFR BLD TEG: 7.6 % (ref 0–15)
CFT BLD TEG: 1.2 MINUTE (ref 1–3)
CHLORIDE SERPL-SCNC: 110 MMOL/L (ref 98–107)
CHLORIDE SERPL-SCNC: 111 MMOL/L (ref 98–107)
CI (COAGULATION INDEX)(Z) NON NATIVE: 2.7 (ref -3–3)
CLOT ANGLE BLD TEG: 73.5 DEGREES (ref 53–72)
CLOT INIT BLD TEG: 3.3 MINUTE (ref 5–10)
CLOT LYSIS 30M P MA LENFR BLD TEG: 3.5 % (ref 0–8)
CLOT STRENGTH BLD TEG: 8.1 KD/SC (ref 4.5–11)
COLOR UR AUTO: ABNORMAL
CREAT SERPL-MCNC: 0.83 MG/DL (ref 0.51–0.95)
CREAT SERPL-MCNC: 0.85 MG/DL (ref 0.51–0.95)
EGFRCR SERPLBLD CKD-EPI 2021: 90 ML/MIN/1.73M2
EGFRCR SERPLBLD CKD-EPI 2021: >90 ML/MIN/1.73M2
ERYTHROCYTE [DISTWIDTH] IN BLOOD BY AUTOMATED COUNT: 16.1 % (ref 10–15)
ERYTHROCYTE [DISTWIDTH] IN BLOOD BY AUTOMATED COUNT: 16.2 % (ref 10–15)
ERYTHROCYTE [DISTWIDTH] IN BLOOD BY AUTOMATED COUNT: 16.3 % (ref 10–15)
ERYTHROCYTE [DISTWIDTH] IN BLOOD BY AUTOMATED COUNT: 16.6 % (ref 10–15)
FIBRINOGEN PPP-MCNC: 526 MG/DL (ref 170–510)
GLUCOSE BLDC GLUCOMTR-MCNC: 101 MG/DL (ref 70–99)
GLUCOSE SERPL-MCNC: 104 MG/DL (ref 70–99)
GLUCOSE SERPL-MCNC: 126 MG/DL (ref 70–99)
GLUCOSE UR STRIP-MCNC: NEGATIVE MG/DL
HBA1C MFR BLD: 5.2 %
HCO3 BLDV-SCNC: 25 MMOL/L (ref 21–28)
HCO3 BLDV-SCNC: 25 MMOL/L (ref 21–28)
HCO3 SERPL-SCNC: 22 MMOL/L (ref 22–29)
HCO3 SERPL-SCNC: 23 MMOL/L (ref 22–29)
HCT VFR BLD AUTO: 23.7 % (ref 35–47)
HCT VFR BLD AUTO: 23.9 % (ref 35–47)
HCT VFR BLD AUTO: 24.1 % (ref 35–47)
HCT VFR BLD AUTO: 24.3 % (ref 35–47)
HGB BLD-MCNC: 7.6 G/DL (ref 11.7–15.7)
HGB BLD-MCNC: 7.7 G/DL (ref 11.7–15.7)
HGB UR QL STRIP: NEGATIVE
HOLD SPECIMEN: NORMAL
INR PPP: 1.53 (ref 0.85–1.15)
INR PPP: 1.58 (ref 0.85–1.15)
KETONES UR STRIP-MCNC: NEGATIVE MG/DL
LACTATE SERPL-SCNC: 1 MMOL/L (ref 0.7–2)
LACTATE SERPL-SCNC: 1.2 MMOL/L (ref 0.7–2)
LACTATE SERPL-SCNC: 1.4 MMOL/L (ref 0.7–2)
LEUKOCYTE ESTERASE UR QL STRIP: NEGATIVE
LVEF ECHO: NORMAL
MAGNESIUM SERPL-MCNC: 1.6 MG/DL (ref 1.7–2.3)
MCF BLD TEG: 61.8 MM (ref 50–70)
MCH RBC QN AUTO: 29.3 PG (ref 26.5–33)
MCH RBC QN AUTO: 29.3 PG (ref 26.5–33)
MCH RBC QN AUTO: 29.5 PG (ref 26.5–33)
MCH RBC QN AUTO: 29.6 PG (ref 26.5–33)
MCHC RBC AUTO-ENTMCNC: 31.7 G/DL (ref 31.5–36.5)
MCHC RBC AUTO-ENTMCNC: 32 G/DL (ref 31.5–36.5)
MCHC RBC AUTO-ENTMCNC: 32.1 G/DL (ref 31.5–36.5)
MCHC RBC AUTO-ENTMCNC: 32.2 G/DL (ref 31.5–36.5)
MCV RBC AUTO: 92 FL (ref 78–100)
MCV RBC AUTO: 93 FL (ref 78–100)
NITRATE UR QL: NEGATIVE
O2/TOTAL GAS SETTING VFR VENT: 0 %
O2/TOTAL GAS SETTING VFR VENT: 10 %
OXYHGB MFR BLDV: 62 % (ref 70–75)
OXYHGB MFR BLDV: 72 % (ref 70–75)
PCO2 BLDV: 43 MM HG (ref 40–50)
PCO2 BLDV: 45 MM HG (ref 40–50)
PH BLDV: 7.36 [PH] (ref 7.32–7.43)
PH BLDV: 7.38 [PH] (ref 7.32–7.43)
PH UR STRIP: 8 [PH] (ref 5–7)
PHOSPHATE SERPL-MCNC: 1.8 MG/DL (ref 2.5–4.5)
PHOSPHATE SERPL-MCNC: 2.6 MG/DL (ref 2.5–4.5)
PLATELET # BLD AUTO: 114 10E3/UL (ref 150–450)
PLATELET # BLD AUTO: 114 10E3/UL (ref 150–450)
PLATELET # BLD AUTO: 127 10E3/UL (ref 150–450)
PLATELET # BLD AUTO: 145 10E3/UL (ref 150–450)
PO2 BLDV: 33 MM HG (ref 25–47)
PO2 BLDV: 39 MM HG (ref 25–47)
POTASSIUM SERPL-SCNC: 3.8 MMOL/L (ref 3.4–5.3)
POTASSIUM SERPL-SCNC: 4.2 MMOL/L (ref 3.4–5.3)
PROT SERPL-MCNC: 4.3 G/DL (ref 6.4–8.3)
PROT SERPL-MCNC: 4.3 G/DL (ref 6.4–8.3)
RBC # BLD AUTO: 2.59 10E6/UL (ref 3.8–5.2)
RBC # BLD AUTO: 2.6 10E6/UL (ref 3.8–5.2)
RBC # BLD AUTO: 2.61 10E6/UL (ref 3.8–5.2)
RBC # BLD AUTO: 2.63 10E6/UL (ref 3.8–5.2)
RBC URINE: 1 /HPF
SAO2 % BLDV: 63.6 % (ref 70–75)
SAO2 % BLDV: 73.7 % (ref 70–75)
SODIUM SERPL-SCNC: 139 MMOL/L (ref 135–145)
SODIUM SERPL-SCNC: 141 MMOL/L (ref 135–145)
SP GR UR STRIP: 1.01 (ref 1–1.03)
SPECIMEN EXPIRATION DATE: NORMAL
UROBILINOGEN UR STRIP-MCNC: NORMAL MG/DL
WBC # BLD AUTO: 8.5 10E3/UL (ref 4–11)
WBC # BLD AUTO: 8.5 10E3/UL (ref 4–11)
WBC # BLD AUTO: 9.2 10E3/UL (ref 4–11)
WBC # BLD AUTO: 9.4 10E3/UL (ref 4–11)
WBC URINE: 1 /HPF

## 2024-09-03 PROCEDURE — 250N000013 HC RX MED GY IP 250 OP 250 PS 637: Performed by: OBSTETRICS & GYNECOLOGY

## 2024-09-03 PROCEDURE — 86923 COMPATIBILITY TEST ELECTRIC: CPT

## 2024-09-03 PROCEDURE — 86900 BLOOD TYPING SEROLOGIC ABO: CPT | Performed by: STUDENT IN AN ORGANIZED HEALTH CARE EDUCATION/TRAINING PROGRAM

## 2024-09-03 PROCEDURE — 250N000013 HC RX MED GY IP 250 OP 250 PS 637: Performed by: STUDENT IN AN ORGANIZED HEALTH CARE EDUCATION/TRAINING PROGRAM

## 2024-09-03 PROCEDURE — 250N000013 HC RX MED GY IP 250 OP 250 PS 637

## 2024-09-03 PROCEDURE — 99024 POSTOP FOLLOW-UP VISIT: CPT | Performed by: OBSTETRICS & GYNECOLOGY

## 2024-09-03 PROCEDURE — 36415 COLL VENOUS BLD VENIPUNCTURE: CPT | Performed by: STUDENT IN AN ORGANIZED HEALTH CARE EDUCATION/TRAINING PROGRAM

## 2024-09-03 PROCEDURE — 83735 ASSAY OF MAGNESIUM: CPT | Performed by: STUDENT IN AN ORGANIZED HEALTH CARE EDUCATION/TRAINING PROGRAM

## 2024-09-03 PROCEDURE — 99254 IP/OBS CNSLTJ NEW/EST MOD 60: CPT | Performed by: PSYCHIATRY & NEUROLOGY

## 2024-09-03 PROCEDURE — 82330 ASSAY OF CALCIUM: CPT | Performed by: STUDENT IN AN ORGANIZED HEALTH CARE EDUCATION/TRAINING PROGRAM

## 2024-09-03 PROCEDURE — 82805 BLOOD GASES W/O2 SATURATION: CPT | Performed by: STUDENT IN AN ORGANIZED HEALTH CARE EDUCATION/TRAINING PROGRAM

## 2024-09-03 PROCEDURE — 36415 COLL VENOUS BLD VENIPUNCTURE: CPT

## 2024-09-03 PROCEDURE — P9045 ALBUMIN (HUMAN), 5%, 250 ML: HCPCS | Performed by: INTERNAL MEDICINE

## 2024-09-03 PROCEDURE — 97530 THERAPEUTIC ACTIVITIES: CPT | Mod: GP

## 2024-09-03 PROCEDURE — 93306 TTE W/DOPPLER COMPLETE: CPT | Mod: 26 | Performed by: STUDENT IN AN ORGANIZED HEALTH CARE EDUCATION/TRAINING PROGRAM

## 2024-09-03 PROCEDURE — 85027 COMPLETE CBC AUTOMATED: CPT | Performed by: STUDENT IN AN ORGANIZED HEALTH CARE EDUCATION/TRAINING PROGRAM

## 2024-09-03 PROCEDURE — 85384 FIBRINOGEN ACTIVITY: CPT | Performed by: STUDENT IN AN ORGANIZED HEALTH CARE EDUCATION/TRAINING PROGRAM

## 2024-09-03 PROCEDURE — 85610 PROTHROMBIN TIME: CPT | Performed by: STUDENT IN AN ORGANIZED HEALTH CARE EDUCATION/TRAINING PROGRAM

## 2024-09-03 PROCEDURE — 80053 COMPREHEN METABOLIC PANEL: CPT | Performed by: STUDENT IN AN ORGANIZED HEALTH CARE EDUCATION/TRAINING PROGRAM

## 2024-09-03 PROCEDURE — 258N000003 HC RX IP 258 OP 636

## 2024-09-03 PROCEDURE — 83605 ASSAY OF LACTIC ACID: CPT | Performed by: STUDENT IN AN ORGANIZED HEALTH CARE EDUCATION/TRAINING PROGRAM

## 2024-09-03 PROCEDURE — 84100 ASSAY OF PHOSPHORUS: CPT | Performed by: OBSTETRICS & GYNECOLOGY

## 2024-09-03 PROCEDURE — 97161 PT EVAL LOW COMPLEX 20 MIN: CPT | Mod: GP

## 2024-09-03 PROCEDURE — 120N000002 HC R&B MED SURG/OB UMMC

## 2024-09-03 PROCEDURE — 99291 CRITICAL CARE FIRST HOUR: CPT | Performed by: INTERNAL MEDICINE

## 2024-09-03 PROCEDURE — 85730 THROMBOPLASTIN TIME PARTIAL: CPT | Performed by: STUDENT IN AN ORGANIZED HEALTH CARE EDUCATION/TRAINING PROGRAM

## 2024-09-03 PROCEDURE — 84100 ASSAY OF PHOSPHORUS: CPT | Performed by: STUDENT IN AN ORGANIZED HEALTH CARE EDUCATION/TRAINING PROGRAM

## 2024-09-03 PROCEDURE — 87040 BLOOD CULTURE FOR BACTERIA: CPT | Performed by: STUDENT IN AN ORGANIZED HEALTH CARE EDUCATION/TRAINING PROGRAM

## 2024-09-03 PROCEDURE — 999N000128 HC STATISTIC PERIPHERAL IV START W/O US GUIDANCE

## 2024-09-03 PROCEDURE — 250N000011 HC RX IP 250 OP 636: Performed by: STUDENT IN AN ORGANIZED HEALTH CARE EDUCATION/TRAINING PROGRAM

## 2024-09-03 PROCEDURE — 93306 TTE W/DOPPLER COMPLETE: CPT

## 2024-09-03 PROCEDURE — 83036 HEMOGLOBIN GLYCOSYLATED A1C: CPT

## 2024-09-03 PROCEDURE — 81003 URINALYSIS AUTO W/O SCOPE: CPT | Performed by: STUDENT IN AN ORGANIZED HEALTH CARE EDUCATION/TRAINING PROGRAM

## 2024-09-03 PROCEDURE — 250N000011 HC RX IP 250 OP 636: Performed by: OBSTETRICS & GYNECOLOGY

## 2024-09-03 PROCEDURE — 250N000011 HC RX IP 250 OP 636

## 2024-09-03 PROCEDURE — 250N000011 HC RX IP 250 OP 636: Performed by: INTERNAL MEDICINE

## 2024-09-03 PROCEDURE — 999N000111 HC STATISTIC OT IP EVAL DEFER

## 2024-09-03 RX ORDER — ACETAMINOPHEN 325 MG/10.15ML
650 LIQUID ORAL EVERY 4 HOURS PRN
Status: DISCONTINUED | OUTPATIENT
Start: 2024-09-03 | End: 2024-09-03

## 2024-09-03 RX ORDER — AMOXICILLIN 250 MG
2 CAPSULE ORAL 2 TIMES DAILY PRN
Status: DISCONTINUED | OUTPATIENT
Start: 2024-09-03 | End: 2024-09-03

## 2024-09-03 RX ORDER — ACETAMINOPHEN 325 MG/1
650 TABLET ORAL EVERY 4 HOURS PRN
Status: DISCONTINUED | OUTPATIENT
Start: 2024-09-03 | End: 2024-09-03

## 2024-09-03 RX ORDER — ACETAMINOPHEN 325 MG/1
650 TABLET ORAL EVERY 6 HOURS
Status: DISCONTINUED | OUTPATIENT
Start: 2024-09-03 | End: 2024-09-06 | Stop reason: HOSPADM

## 2024-09-03 RX ORDER — ENOXAPARIN SODIUM 100 MG/ML
40 INJECTION SUBCUTANEOUS EVERY 24 HOURS
Status: DISCONTINUED | OUTPATIENT
Start: 2024-09-03 | End: 2024-09-05

## 2024-09-03 RX ORDER — HYDROMORPHONE HCL IN WATER/PF 6 MG/30 ML
.2-.4 PATIENT CONTROLLED ANALGESIA SYRINGE INTRAVENOUS
Status: DISCONTINUED | OUTPATIENT
Start: 2024-09-03 | End: 2024-09-03

## 2024-09-03 RX ORDER — HYDROMORPHONE HYDROCHLORIDE 4 MG/1
2 TABLET ORAL EVERY 4 HOURS PRN
Status: DISCONTINUED | OUTPATIENT
Start: 2024-09-03 | End: 2024-09-06 | Stop reason: HOSPADM

## 2024-09-03 RX ORDER — ONDANSETRON 4 MG/1
4 TABLET, ORALLY DISINTEGRATING ORAL EVERY 6 HOURS PRN
Status: DISCONTINUED | OUTPATIENT
Start: 2024-09-03 | End: 2024-09-04

## 2024-09-03 RX ORDER — SODIUM CHLORIDE, SODIUM LACTATE, POTASSIUM CHLORIDE, CALCIUM CHLORIDE 600; 310; 30; 20 MG/100ML; MG/100ML; MG/100ML; MG/100ML
INJECTION, SOLUTION INTRAVENOUS CONTINUOUS
Status: DISCONTINUED | OUTPATIENT
Start: 2024-09-03 | End: 2024-09-04

## 2024-09-03 RX ORDER — NOREPINEPHRINE BITARTRATE 0.06 MG/ML
.01-.6 INJECTION, SOLUTION INTRAVENOUS CONTINUOUS
Status: DISCONTINUED | OUTPATIENT
Start: 2024-09-03 | End: 2024-09-03

## 2024-09-03 RX ORDER — AMOXICILLIN 250 MG
1 CAPSULE ORAL 2 TIMES DAILY PRN
Status: DISCONTINUED | OUTPATIENT
Start: 2024-09-03 | End: 2024-09-03

## 2024-09-03 RX ORDER — METHOCARBAMOL 500 MG/1
500 TABLET, FILM COATED ORAL 4 TIMES DAILY
Status: DISCONTINUED | OUTPATIENT
Start: 2024-09-03 | End: 2024-09-06 | Stop reason: HOSPADM

## 2024-09-03 RX ORDER — MAGNESIUM SULFATE HEPTAHYDRATE 40 MG/ML
2 INJECTION, SOLUTION INTRAVENOUS ONCE
Status: COMPLETED | OUTPATIENT
Start: 2024-09-03 | End: 2024-09-03

## 2024-09-03 RX ORDER — HYDROMORPHONE HYDROCHLORIDE 2 MG/1
2 TABLET ORAL EVERY 4 HOURS PRN
Status: DISCONTINUED | OUTPATIENT
Start: 2024-09-03 | End: 2024-09-03

## 2024-09-03 RX ORDER — DEXTROSE MONOHYDRATE 25 G/50ML
25-50 INJECTION, SOLUTION INTRAVENOUS
Status: DISCONTINUED | OUTPATIENT
Start: 2024-09-03 | End: 2024-09-04

## 2024-09-03 RX ORDER — TRAZODONE HYDROCHLORIDE 50 MG/1
150 TABLET, FILM COATED ORAL AT BEDTIME
Status: DISCONTINUED | OUTPATIENT
Start: 2024-09-03 | End: 2024-09-06 | Stop reason: HOSPADM

## 2024-09-03 RX ORDER — HYDROMORPHONE HCL IN WATER/PF 6 MG/30 ML
0.2 PATIENT CONTROLLED ANALGESIA SYRINGE INTRAVENOUS EVERY 4 HOURS PRN
Status: DISCONTINUED | OUTPATIENT
Start: 2024-09-03 | End: 2024-09-04

## 2024-09-03 RX ORDER — ACETAMINOPHEN 10 MG/ML
1000 INJECTION, SOLUTION INTRAVENOUS EVERY 6 HOURS
Status: DISCONTINUED | OUTPATIENT
Start: 2024-09-03 | End: 2024-09-03

## 2024-09-03 RX ORDER — HYDROMORPHONE HCL IN WATER/PF 6 MG/30 ML
0.2 PATIENT CONTROLLED ANALGESIA SYRINGE INTRAVENOUS
Status: DISCONTINUED | OUTPATIENT
Start: 2024-09-03 | End: 2024-09-03

## 2024-09-03 RX ORDER — ALBUTEROL SULFATE 90 UG/1
2 AEROSOL, METERED RESPIRATORY (INHALATION) 4 TIMES DAILY PRN
Status: DISCONTINUED | OUTPATIENT
Start: 2024-09-03 | End: 2024-09-06 | Stop reason: HOSPADM

## 2024-09-03 RX ORDER — NICOTINE POLACRILEX 4 MG
15-30 LOZENGE BUCCAL
Status: DISCONTINUED | OUTPATIENT
Start: 2024-09-03 | End: 2024-09-04

## 2024-09-03 RX ORDER — IBUPROFEN 200 MG
600 TABLET ORAL EVERY 6 HOURS
Status: DISCONTINUED | OUTPATIENT
Start: 2024-09-03 | End: 2024-09-04

## 2024-09-03 RX ORDER — HYDROMORPHONE HYDROCHLORIDE 2 MG/1
2 TABLET ORAL EVERY 4 HOURS
Status: COMPLETED | OUTPATIENT
Start: 2024-09-03 | End: 2024-09-03

## 2024-09-03 RX ORDER — METHOCARBAMOL 500 MG/1
500 TABLET, FILM COATED ORAL EVERY 6 HOURS
Status: DISCONTINUED | OUTPATIENT
Start: 2024-09-03 | End: 2024-09-03

## 2024-09-03 RX ORDER — ONDANSETRON 2 MG/ML
4 INJECTION INTRAMUSCULAR; INTRAVENOUS EVERY 6 HOURS PRN
Status: DISCONTINUED | OUTPATIENT
Start: 2024-09-03 | End: 2024-09-04

## 2024-09-03 RX ORDER — HYDROMORPHONE HYDROCHLORIDE 4 MG/1
4 TABLET ORAL EVERY 4 HOURS PRN
Status: DISCONTINUED | OUTPATIENT
Start: 2024-09-03 | End: 2024-09-04

## 2024-09-03 RX ADMIN — RAMELTEON 8 MG: 8 TABLET ORAL at 22:39

## 2024-09-03 RX ADMIN — HYDROMORPHONE HYDROCHLORIDE 0.2 MG: 0.2 INJECTION, SOLUTION INTRAMUSCULAR; INTRAVENOUS; SUBCUTANEOUS at 09:43

## 2024-09-03 RX ADMIN — METHOCARBAMOL 500 MG: 500 TABLET ORAL at 16:22

## 2024-09-03 RX ADMIN — GABAPENTIN 300 MG: 300 CAPSULE ORAL at 14:04

## 2024-09-03 RX ADMIN — HYDROMORPHONE HYDROCHLORIDE 0.2 MG: 0.2 INJECTION, SOLUTION INTRAMUSCULAR; INTRAVENOUS; SUBCUTANEOUS at 18:09

## 2024-09-03 RX ADMIN — ACETAMINOPHEN 1000 MG: 10 INJECTION, SOLUTION INTRAVENOUS at 00:43

## 2024-09-03 RX ADMIN — HYDROMORPHONE HYDROCHLORIDE 4 MG: 2 TABLET ORAL at 20:32

## 2024-09-03 RX ADMIN — TOPIRAMATE 50 MG: 50 TABLET, FILM COATED ORAL at 08:57

## 2024-09-03 RX ADMIN — POTASSIUM & SODIUM PHOSPHATES POWDER PACK 280-160-250 MG 2 PACKET: 280-160-250 PACK at 06:17

## 2024-09-03 RX ADMIN — HYDROMORPHONE HYDROCHLORIDE 0.2 MG: 0.2 INJECTION, SOLUTION INTRAMUSCULAR; INTRAVENOUS; SUBCUTANEOUS at 12:26

## 2024-09-03 RX ADMIN — GABAPENTIN 300 MG: 300 CAPSULE ORAL at 20:32

## 2024-09-03 RX ADMIN — DESVENLAFAXINE SUCCINATE 150 MG: 100 TABLET, FILM COATED, EXTENDED RELEASE ORAL at 20:32

## 2024-09-03 RX ADMIN — LORAZEPAM 1 MG: 0.5 TABLET ORAL at 20:32

## 2024-09-03 RX ADMIN — TOPIRAMATE 50 MG: 50 TABLET, FILM COATED ORAL at 20:32

## 2024-09-03 RX ADMIN — ACETAMINOPHEN 650 MG: 325 TABLET ORAL at 09:44

## 2024-09-03 RX ADMIN — ENOXAPARIN SODIUM 40 MG: 40 INJECTION SUBCUTANEOUS at 18:09

## 2024-09-03 RX ADMIN — BUSPIRONE HYDROCHLORIDE 30 MG: 30 TABLET ORAL at 08:57

## 2024-09-03 RX ADMIN — TRAZODONE HYDROCHLORIDE 150 MG: 150 TABLET ORAL at 22:39

## 2024-09-03 RX ADMIN — HYDROMORPHONE HYDROCHLORIDE 4 MG: 2 TABLET ORAL at 16:22

## 2024-09-03 RX ADMIN — ZOLPIDEM TARTRATE 5 MG: 5 TABLET, COATED ORAL at 22:44

## 2024-09-03 RX ADMIN — SIMETHICONE 80 MG: 80 TABLET, CHEWABLE ORAL at 18:09

## 2024-09-03 RX ADMIN — ACETAMINOPHEN 650 MG: 325 TABLET ORAL at 22:44

## 2024-09-03 RX ADMIN — ALBUMIN HUMAN 25 G: 0.05 INJECTION, SOLUTION INTRAVENOUS at 04:33

## 2024-09-03 RX ADMIN — POTASSIUM & SODIUM PHOSPHATES POWDER PACK 280-160-250 MG 2 PACKET: 280-160-250 PACK at 09:43

## 2024-09-03 RX ADMIN — GABAPENTIN 300 MG: 300 CAPSULE ORAL at 08:56

## 2024-09-03 RX ADMIN — PANTOPRAZOLE SODIUM 40 MG: 40 TABLET, DELAYED RELEASE ORAL at 08:57

## 2024-09-03 RX ADMIN — Medication 150 MG: at 22:52

## 2024-09-03 RX ADMIN — ACETAMINOPHEN 650 MG: 325 TABLET ORAL at 04:34

## 2024-09-03 RX ADMIN — HYDROMORPHONE HYDROCHLORIDE 2 MG: 2 TABLET ORAL at 04:34

## 2024-09-03 RX ADMIN — POTASSIUM & SODIUM PHOSPHATES POWDER PACK 280-160-250 MG 2 PACKET: 280-160-250 PACK at 14:04

## 2024-09-03 RX ADMIN — METHOCARBAMOL 500 MG: 500 TABLET ORAL at 08:57

## 2024-09-03 RX ADMIN — SODIUM CHLORIDE, POTASSIUM CHLORIDE, SODIUM LACTATE AND CALCIUM CHLORIDE 1000 ML: 600; 310; 30; 20 INJECTION, SOLUTION INTRAVENOUS at 01:04

## 2024-09-03 RX ADMIN — HYDROMORPHONE HYDROCHLORIDE 2 MG: 2 TABLET ORAL at 00:44

## 2024-09-03 RX ADMIN — ACETAMINOPHEN 650 MG: 325 TABLET ORAL at 16:22

## 2024-09-03 RX ADMIN — MAGNESIUM SULFATE HEPTAHYDRATE 2 G: 2 INJECTION, SOLUTION INTRAVENOUS at 06:22

## 2024-09-03 RX ADMIN — IBUPROFEN 600 MG: 200 TABLET, FILM COATED ORAL at 20:32

## 2024-09-03 RX ADMIN — BUSPIRONE HYDROCHLORIDE 30 MG: 30 TABLET ORAL at 20:32

## 2024-09-03 RX ADMIN — METHOCARBAMOL 500 MG: 500 TABLET ORAL at 20:33

## 2024-09-03 ASSESSMENT — ACTIVITIES OF DAILY LIVING (ADL)
ADLS_ACUITY_SCORE: 31
ADLS_ACUITY_SCORE: 24
ADLS_ACUITY_SCORE: 30
ADLS_ACUITY_SCORE: 31
ADLS_ACUITY_SCORE: 30
ADLS_ACUITY_SCORE: 24
ADLS_ACUITY_SCORE: 29
ADLS_ACUITY_SCORE: 31
ADLS_ACUITY_SCORE: 30
ADLS_ACUITY_SCORE: 24
ADLS_ACUITY_SCORE: 30
ADLS_ACUITY_SCORE: 31
ADLS_ACUITY_SCORE: 24
ADLS_ACUITY_SCORE: 30

## 2024-09-03 NOTE — PLAN OF CARE
Assumed nursing care from [id number]. Alert, oriented, and not yet out of bed post surgery. Anxious. Tachycardic; otherwise vitally stable on 1 LPM via nasal cannula. Capnography monitoring ON. Pain ineffectively managed with Dilaudid PCA, scheduled Tylenol and gabapentin, and PRN Robaxin and Atarax; rating pain at 7-8 out of 10 with all medications and refusing to get out of bed due to pain. Tolerating clear liquids; intake is poor; denies nausea. Cat catheter patent with good urine output. Ileostomy with small amount of dark brown liquid output. Midline abdominal incision covered with Primapore dressing; abdominal binder ON. I unit RBCs currently infusing for Hgb = 7.1. L PIV infusing PCA pump and carrier fluid.

## 2024-09-03 NOTE — PROGRESS NOTES
[facility]  Gyn Onc Brief Progress Note      HD# 4 low back pain, L leg pain and numbness, pelvic mass; POD#2 rad hyst, BSO, rectosigmoid colectomy w post vaginectomy, supracolic omentectomy, diverting loop ileostomy     Disease: Frozen: adenocarcinoma, CTAP 12.1 x 11.9 x 1.1 cm cystic mass in the central pelvis w/ intermediate thickness irregular septation, enhancing nodularity and calcification.  27, AFP/HcG wnl     S: Patient similar to earlier this AM. She reports ongoing pain, drowsiness, and minimal appetite. She is drinking some water. Denies flatus.    O:  Vitals:    09/03/24 0900 09/03/24 1000 09/03/24 1100 09/03/24 1200   BP: 103/71 103/61 105/57 110/59   BP Location:    Left arm   Cuff Size:    Adult Regular   Pulse: 95 97 91 90   Resp: 16 19 14 13   Temp:    98.7  F (37.1  C)   TempSrc:    Oral   SpO2: 93% 96% 97% 98%   Weight:       Height:          Lab Results   Component Value Date    WBC 9.2 09/03/2024    HGB 7.6 (L) 09/03/2024    HCT 23.7 (L) 09/03/2024     (L) 09/03/2024     09/03/2024    POTASSIUM 3.8 09/03/2024    CHLORIDE 110 (H) 09/03/2024    CO2 23 09/03/2024    BUN 11.0 09/03/2024    CR 0.83 09/03/2024     (H) 09/03/2024    SED 22 (H) 11/05/2012    DD 0.35 08/03/2023    AST 36 09/03/2024    ALT 14 09/03/2024    ALKPHOS 34 (L) 09/03/2024    BILITOTAL 0.3 09/03/2024    INR 1.58 (H) 09/03/2024        Gen: Uncomfortable sitting in chair  CV: RRR  Resp: Shallow breathing on room air  Abd: Soft and non-distended. Diffusely tender, stoma is pink and active with small amount of dark green liquid output  Inc: bandage covering incision is clean and dry, to be removed this afternoon  Ext: SCDs in place  Neuro: appears drowsy, but not somnolent    A/P: 39 year old who initially presented with abdominal and back and leg paresthesias and was found to have a complex mass in the rectosigmoid space. She has a history of DVT/PE on eliquis, gastric bypass,  MDD/SHIVAM, and restless leg who is POD#2 s/p rad hyst, BSO, rectosigmoid colectomy w post vaginectomy, supracolic omentectomy, diverting loop ileostomy with frozen notable for adenocarcinoma in the setting of known endometriosis. Transferred to SICU POD#2 due to hypotension; now improved with IVF resuscitation and not requiring pressors. Ongoing pain despite multimodal pain control.    # Complex mass from rectosigmoid space  # Adenocarcinoma   # Postoperative state  # Hypomag/phos  - Poor pain control immediately postop, s/p dilaudid PCA; continue multimodal pain regimen; consider pain consult.  - Ostomy in place with minimal output, pending return of bowel function. No bowel regimen, nothing per rectum.  - CLD, not currently on IV fluids. Encouraged patient to eat jello or other food, patient amenable.  - Afternoon labs pending  - Cat in place until POD#3 for radical hysterectomy    # Acute loss blood anemia  # Coagulopathy  # Hypotension, tachycardia  Overnight, she became hypotensive with tachycardia. SICU was made aware and recommended trial of fluid resuscitation as well as adjustment to meds as listed below. She was initially responsive to the first liter bolus but her MAP dropped below 65 and remained persistently below 60 despite a second liter bolus. This morning, blood pressures and tachycardia have improved s/p IVF resuscitation and discontinuation of dilaudid PCA. Not currently requiring pressors.   - Hgb 11.2 > EBL 2500mL > 2U pRBC postoperatively. AM Hgb 7.5 > 7.1> 10mg Vit K, 1u FFP, 1u pRBC > PM hgb (2 hours post transfusion) 7.1 >1u pRBC > 7.7  - INR 1.35 > 1.53 > 1.65, FFP and vitamin K given in the setting of elevated INR and concern for bleeding due to extensive surgery. PM INR 1.53  - Per SICU  - Labs: CBC, CMP, VBG, INR, iCal, lactate, TEG  - pause dPCA, given naloxone  - 1L bolus x2  - stop dPCA; add IV tylenol, 2 doses schd PO dilaudid > prn in AM  - Echo: Normal LV, RV function, EF 55-60%.  Dilation of the inferior vena cava is present with abnormal respiratory variation in diameter.  - Hgb 7.7>7.6, Plt 114>127, WBC 8.5>9.2  - UOP adequate, vitals stable, abdominal exam unchanged. Will continue to monitor.  - Continue to trend hgb, PLT and INR which were stable this morning    # Postoperative fever  Suspect atelectasis as patient was requiring 1 LPM O2 by NC, now on RA.   - Tm/l 100.9F at 0415 today. Subsequent temperature within normal limits.  - UA without signs of infection. Blood culture pending.  - IS at bedside, unclear if patient using. Encourage patient to use IS as directed.  - Continue to monitor, consider further workup if persistent     # Hx DVT  # Hx PE  - Most superficial thrombophlebitis found recently 05/2024, on therapeutic Eliquis BID PTA (held).   - Once CBC showing appropriate rise - will resume anticoagulation with lovenox prophylactic dosing once hemoglobin is stable, then uptitrate to therapeutic dose.     # Restless leg  # MDD/SHIVAM   # Insomnia   Patient with significant psychiatric medication history in setting of refractory anxiety/depression as well as intermittent RLS. Medication reconcilation perfromed morning of 8/31 with bedside RN, confirmed medications and dosing schedule as below, plan to continue prior to admission regimen while in house.   - PTA regimen: Buspirone 30mg PO BID, Desvenlafaxine 150mg PO qAM, Lorazapam 1mg PO BID, Topirimate 50mg BID, Propanolol 10mg PO BID [held], Quetiapine 200mg PO at bedtime, Zolpidem 5mg PO at bedtime, Trazodone 225mg PO at bedtime   - Psychiatry consult for polypharmacy, appreciate recommendations     # Moderate persistent asthma   Noted per chart review. Patient reports she is largely asymptomatic and has not had exacerbations that have required hospitalizaiton or intubation.   - Flovent 220mcg 1 puff Q12H       PPX: SCDs, IS, lovenox held in setting of coagulopathy  Dispo: Pending postoperative goals  Drains/Lines: mariann BRYAN  ostomy    Liliana Freedman MD, MSc  Gynecologic Oncology, PGY-1  09/03/2024 12:06 PM    Gyn Onc Pager: [phone]

## 2024-09-03 NOTE — PROGRESS NOTES
Brief Progress Note    MD to bedside for PM check in. Patient reports pain is still persistent but is better controlled since using the PCA. Does not feel like anything fully resolves the pain yet. Has not yet ambulated, waite in place, has not yet passed flatus or had BM per her report. Is tolerating PO intake without nausea or vomiting.     Ostomy site pink and appears well. Abdomen non distended, no guarding, diffusely tender - worst in LLQ and RUQ near ostomy. Discussed plan for PCA overnight then transitioning off tomorrow and also discussed addition of gabapentin today. Patient has no questions or concerns at this time.    Lee Fine MD  [facility]  Gynecology Oncology Resident, PGY-2  09/02/2024 8:21 PM    To reach the GYNECOLOGY ONCOLOGY team for this patient, please page [phone]    ADDENDUM   Evening labs notable for stable hgb of 7.1 despite pRBCS. Spoke with overnight fellow  - transfuse 1 additional unit pRBC  - AM labs as planned    Lee Fine MD  Obstetrics & Gynecology, PGY-2  09/02/2024 20:40

## 2024-09-03 NOTE — PLAN OF CARE
Admitted/transferred from:   Reason for admission/transfer: Hypotension, suspect hypovolemia 2/2 blood loss  Patient status upon admission/transfer: A&Ox4, rates incisional pain 8/10. MAP >65 after 2L bolus LR. Abd soft, no drainage noted from incision. Cat in place. Illeostomy in place with stool present. LR infusing at 50mL/hr.   Interventions: Pain meds administered. Continue MIVF. Give 25g albumin. Labs drawn. Need UA.   Plan: Monitor hemodynamic status closely. Per SICU, slowly reintroduce PCA for pain control.   2 RN skin assessment: completed by Shyla LAU and Jana TOLENTINO  Sexual Orientation and Gender Identity (SOGI) smartfom completed: Done  Result of skin assessment and interventions/actions: Midline abd incision. Mepilex on sacrum, no open skin underneath.   Height, weight, drug calc weight: Done  Patient belongings (see Flowsheet - Adult Profile for details): In room with patient.   MDRO education (if applicable):  DIANE Barton, RN on 9/3/2024 at 5:08 AM      Problem: Fluid Volume Deficit  Goal: Fluid Balance  Outcome: Progressing

## 2024-09-03 NOTE — PROVIDER NOTIFICATION
Paged gyn onc resident re: [phone] MD I unit RBCs currently infusing; BP 89/46, pulse 124, RR 14, O2 96% on 1 LPM, afebrile; denies symptoms other than being very tired. Please advise. Thank you. Marjan WHITE [phone]

## 2024-09-03 NOTE — PROGRESS NOTES
SURGICAL ICU PROGRESS NOTE  09/03/2024        Date of Service (when I saw the patient): 09/03/2024    ASSESSMENT:   Marizol Granados is a 39 year old female with a past medical history of Depression, Anxiety, Restless Leg Syndrome and PE who was admitted on 8/31/2024 for leg/back pain w/ pelvic mass s/p radical hysterectomy, BSO, rectosigmoid colectomy w/ post vaginectomy, supracolic omentectomy, diverting loop ileostomy on 9/1/24 w/ path showing adenocarcinoma, w/ post-op course c/b hypotension, initially thought to be 2/2 PCA but w/ persistent hypotension and anemia inadequately responsive to pRBC now s/p 2L LR, 2u pRBC, 1u FFP, transferred to SICU for pressors requirement (management of hypovolemic shock).     CHANGES and MAJOR THINGS TODAY:   -Re-check Hemoglobin this afternoon  -Schedule Robaxin  -Decrease Scheduled Tylenol and transition to PO  -Increase Dilaudid PRN to 0.2-0.4 mg q 2 hours PRN  -Unhold Trazodone, Hold Zolpidem  -Discontinue Senna  -Psychiatry consult  -Obtain TTE  -Obtain Hemoglobin A1c  -Initiate PRN Albuterol 2 puffs four times daily  -Transfer to floor when bed available    PLAN:  Neurological/Pain:  # Acute post-operative pain   # Abdominal pain  # Back pain  # Leg pain, paresthesias  # Restless leg  # MDD/SHIVAM   # Insomnia   # Acute encephalopathy (hypoactive)  - Monitor neurological status. Delirium preventions and precautions.   - Pain: PO Hydromorphone overnight 9/2-9/3 for 2 doses, IV tylenol,  PCA stopped 9/3/24 after concern for altered mental status/decreased level of consciousness with other neurotropic medication administration, briefly responded to Narcan, avoid sedating medications as able       -Scheduled: Tylenol 1,000 mg IV q 6 hours decreased to 650 mg oral q 6 hours on 9/3, Gabapentin 300 mg three times daily, PRN: Tylenol (discontinued 9/3), Dilaudid increased to 0.2-0.4 mg q 2 hours PRN (9/3), Hydroxyzine 25 mg q 6 hours PRN, Robaxin 500 mg q 6 hours PRN transitioned to  scheduled 9/3  - Continue: Buspirone, Topiramate, Trazodone (unheld 9/3), Desvenlafaxine     - HOLD: Propanolol, Lorazapam, Zolpidem (held 9/3), Ramelteon, Seroquel, Belsomra          -Per chart review, Moderate suicide risk precautions, psychiatry also consulted for polypharmacy, appreciate recommendations       -Patient denies desire to hurt self or others on exam 9/3 am    Pulmonary:  # Asthma, history of   - Currently on 1 LPM NC 9/3 am  - Supplemental oxygen to keep saturation above 92 %.  - Incentive spirometer every 15- 30 minutes.   - PRN Fluticasone daily, Albuterol PRN    Cardiovascular:    # Shock, improved  - s/p  2 L of LR, 2u pRBC, 1u FFP received on the floor 9/2 for Hypotension, received 500 ml 5% albumin (25 g) on 9/3 am  - Not currently requiring NE/vasopressor support for MAP goal >65 on 9/3 am  - Lactate resulted within reference range 9/3 am, continue to monitor  - TTE pending 9/3    GI/Nutrition:    # Hypoalbuminemia  # Hx of gastric bypass, panniculectomy - History of uncomplicated gastric bypass completed in 2022 followed by panniculectomy in 2023 for excess skin removal. Postoperative recovery c/b C. Diff during inpatient admission.   # Hx of C.diff  # GERD, history of  - Advance diet as per tolerance, clear liquid diet 9/3  - Zofran for nausea PRN, Simethicone PRN  - Pantoprazole 40 mg daily  - C. 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, at least stage II  # Adnexal mass s/p exploratory laparotomy with radical hysterectomy, bilateral salpingo-oophorectomy, radical en bloc rectosigmoid colon resection  with posterior vaginectomy, Total supracolic omentectomy, Diverting loop ileostomy on 9/1/24 by GynOnc  -Gynecology/Oncology primary  -Per Gyn/Oncology note 9/2, 12 cm complex ovarian mass found to be adenocarcinoma on frozen  -Nothing per rectum, no bowel regimen    Renal/Fluids/Electrolytes:  # Hypophosphatemia  # Hypomagnesemia  - s/p 2L LR, LR PRN for IV fluid hydration 9/2  - Electrolyte replacement protocol per unit   - Avoid nephrotoxic meds   - Will continue to monitor intake and output.   - Maintain Cat until POD3 per Oncology  - Lactate resulted within reference range 9/3 am  - Creatinine downtrended to 0.83 on 9/3 am    Endocrine:  # Hyperglycemia, concern for   - Obtain A1c 9/3  - Goal to keep BG< 180 for optimal wound healing     Infectious disease:  # Galina-operative prophylaxis  # MRSA infection, history of (per chart review)  - Patient afebrile, WBC stable at 8.5, urinalysis negative for nitrites, leuk esterase, blood cultures 9/3 in process  - Cytology 9/1 from peritoneum in process  - To complete perioperative regimen: Ancef, Metronidazole, duration extended for additional 24 hours per Oncology for contamination    Hematology:    # Acute blood loss anemia    # Anemia of critical illness   # Thrombocytopenia  # Hx of DVT/PE (home apixaban)   - Transfuse if hgb <7.0 or signs/symptoms of hypoperfusion. Monitor and trend.   - Hold home Apixaban  - Hold lovenox ppx dose  - Received 2 units RBCs, 1 unit FFP on 9/2  -Transfuse for Hb<7, Fibrinogen <150, Plt, 20K.  - Hemoglobin resulted stable at 7.7, platelets stable at 114 on 9/3 am, repeat CBC this afternoon to monitor for stability 9/3    Musculoskeletal/Skin:  # Weakness and deconditioning of critical illness   - Physical and occupational therapy consults.  - dilgent cares to prevent skin breakdown and wound formation.      General Cares/Prophylaxis:    DVT Prophylaxis: VTE Prophylaxis contraindicated due to bleeding. Chemical prophylaxis lovenox (on  "hold), home apixaban on hold   GI Prophylaxis: PPI, No bowel regimen per gynecology/oncology  Restraints: Restraints for medical healing needed: NO    Lines/ tubes/ drains:  - Waite 9/2/24  - Ileostomy 9/2/24  - PIV    Disposition:  - Transfer to floor when bed available    Patient seen, findings and plan discussed with surgical ICU staff, Dr. Dhillon.    Time spent on this Encounter   Billing:  I spent 54 minutes bedside and on the inpatient unit today managing the critical care of Marizol Granados in relation to the issues listed in this note.      Twyla Fan PA-C    ====================================  INTERVAL HISTORY: Patient awake upright in bed, responding appropriately to questions. Endorsing numbness of LLE and inability to move big toe, per patient is her baseline and unchanged since operation. Endorses passing gas. Endorsing abdominal pain, improved with pain medication regimen, denies nausea. Endorses feeling safe in hospital, denies desire to hurt self or others today. Endorses breathing feeling \"fine\".    OBJECTIVE:   1. VITAL SIGNS:   Temp:  [98  F (36.7  C)-100.9  F (38.3  C)] 99  F (37.2  C)  Pulse:  [] 97  Resp:  [10-18] 17  BP: ()/() 94/51  SpO2:  [89 %-100 %] 93 %  Resp: 17    2. INTAKE/ OUTPUT:   I/O last 3 completed shifts:  In: 1310.17 [P.O.:120; I.V.:890.17]  Out: 2625 [Urine:2325; Stool:300]    3. PHYSICAL EXAMINATION:  General: Seated upright in bed, awake and alert.  HEENT: Glasses in place.  Neuro: Alert, responding appropriately to questions, moving all extremities. Endorses inability to move left big toe at baseline and numbness, moving ankle and left leg.  Pulm/Resp: Clear breath sounds bilaterally on auscultation.  CV: Regular rate and rhythm.  Abdomen: Soft, non-distended, tender on palpation throughout.  : waite catheter in place, yellow urine in reservoir.  Incisions/Skin: Abdominal binder in place without strikethrough. Ostomy with thin brown " output in reservoir.  MSK/Extremities: Extremities warm and well perfused, DP and radial pulses palpable bilaterally.    4. INVESTIGATIONS:   Arterial Blood Gases   No lab results found in last 7 days.  Complete Blood Count   Recent Labs   Lab 09/03/24 0457 09/02/24 2353 09/02/24 1955 09/02/24 0938   WBC 8.5 8.5 8.9 8.3   HGB 7.7* 7.7* 7.1* 7.1*   * 114* 125* 128*     Basic Metabolic Panel  Recent Labs   Lab 09/03/24 0457 09/03/24 0415 09/02/24 2353 09/02/24 2331 09/02/24  0519 09/01/24  2315     --  141  --  133* 136   POTASSIUM 3.8  --  4.2  --  4.3 5.1   CHLORIDE 110*  --  111*  --  106 106   CO2 23  --  22  --  20* 18*   BUN 11.0  --  12.7  --  17.8 17.2   CR 0.83  --  0.85  --  1.00* 0.99*   * 101* 126* 139* 210* 243*     Liver Function Tests  Recent Labs   Lab 09/03/24 0457 09/02/24 2353 09/02/24 1955 09/02/24 0938 09/01/24 2315 09/01/24  0646 08/31/24  0424   AST 36 35  --   --   --  30 95*   ALT 14 14  --   --   --  35 52*   ALKPHOS 34* 30*  --   --   --  48 55   BILITOTAL 0.3 0.3  --   --   --  0.2 <0.2   ALBUMIN 2.6* 2.5*  --   --   --  3.8 4.0   INR 1.58* 1.53* 1.53* 1.65*   < > 1.23*  --     < > = values in this interval not displayed.     Pancreatic Enzymes  No lab results found in last 7 days.  Coagulation Profile  Recent Labs   Lab 09/03/24 0457 09/02/24 2353 09/02/24 1955 09/02/24 0938 09/02/24  0519   INR 1.58* 1.53* 1.53* 1.65* 1.53*   PTT 31 34  --  32 30         5. RADIOLOGY:   No results found for this or any previous visit (from the past 24 hour(s)).    =========================================

## 2024-09-03 NOTE — PROGRESS NOTES
Brief Progress Note - follow up to prior plan    Shortly after the first liter bolus finished, MAP dropped to 61. Per plan with SICU, the second liter was started. Patient received the entire second bolus with all Bps showing a consistent MAP <65. SICU attending made aware of poor response longterm through bolus and at completion of it. Per attending, they will transfer her to the SICU    Vitals:    09/03/24 0205 09/03/24 0210 09/03/24 0215 09/03/24 0220   BP: 97/48 97/48 94/45 94/48   Pulse:       Resp:       Temp:       TempSrc:       SpO2: 96% 96% 96% 96%   Weight:       Height:          A/P:    # Hypotension  # Acute blood loss anemia  # Tachycardia  Most concerning etiology on the differential is ongoing bleeding. However, abdominal exam has remained soft and non-distended with appropriate tenderness. As documented in prior notes, also suspected multimodal etiology in the setting of dilaudid and various sedating PTA evening meds as well as suspected intravascular depletion. Patient's somnolence improved with narcan and BP initially responded to fluid resuscitation. However, BP ultimately down-trended despite additional bolus. Patient has likely reached capacity of floor resuscitation  - transfer to SICU per Dr Gunderson  - zuly per the SICU team      Lee Fine MD  [facility]  Gynecology Oncology Resident, PGY-2  09/03/2024 2:38 AM    To reach the GYNECOLOGY ONCOLOGY team for this patient, please page [phone]

## 2024-09-03 NOTE — PROVIDER NOTIFICATION
Notified gyn/onc team that patient is still having 7/10 pain after receiving po dilaudid. Gave PRN IV dilaudid per orders. Team also recommended giving simethicone for gas pain. No other interventions ordered at this time.

## 2024-09-03 NOTE — PLAN OF CARE
Goal Outcome Evaluation:      Plan of Care Reviewed With: patient    Overall Patient Progress: improvingOverall Patient Progress: improving    Outcome Evaluation: Patient has not needed pressors since arriving in the ICU, MAP > 65 without intervention. Floor orders placed today.    Major Shift Events: AOx4, follows commands, pupils PERRLA. 5/5 strength in BUE and RLE. 3/5 strength in LLE, with associated numbness and tingling, baseline prior to admission. Reports pain up to 8/10 in abdomen, given scheduled pain regimen and PRN dilaudid with better pain control. SR/ST 90s-110s. MAP > 65 without intervention. Afebrile. RA, clear LS. Encouraged IS use and showed patient the proper technique for it. Clear liquid diet, poor appetite. Cat with high UOP. Minimal stool output from illeostomy, hypoactive bowel sounds. Incision is WDL, dressing changed x1 today per order.  Plan: Continue to monitor. Transfer to floor when bed becomes available.  For vital signs and complete assessments, please see documentation flowsheets.
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
## 2024-09-03 NOTE — CONSULTS
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 Green Top ([employer] Heparin) Tube    Collection Time: 09/03/24  9:59 AM   Result Value Ref Range    Hold Specimen [facility]           Physical and Psychiatric Examination:     /59 (BP Location: Left arm, Cuff Size: Adult Regular)   Pulse 90   Temp 98.7  F (37.1  C) (Oral)   Resp 13   Ht 1.67 m (5' 5.75\")   Wt 83.4 kg (183 lb 13.8 oz)   SpO2 98%   BMI 29.90 kg/m    Weight is 183 lbs 13.82 oz  Body mass index is 29.9 kg/m .    Physical Exam:  I have reviewed the physical exam as documented by by the medical team and agree with findings and assessment and have no additional findings to add at this time.         MSE:   Appearance: awake, alert and adequately groomed  Attitude:  cooperative  Eye Contact:  good  Mood:  \"fair\"  Affect:  slightly restricted  Speech:  clear, coherent  Psychomotor Behavior:  no evidence of tardive dyskinesia, dystonia, or tics  Muscle strength and tone: intact   Thought Process:  logical  Associations:  no loose associations  Thought Content:  no evidence of suicidal ideation or homicidal ideation  Insight:  good  Judgement:  intact  Oriented to:  time, person, and place  Attention Span and " "Concentration:  intact  Recent and Remote Memory:  intact      QTc: 393 ms 5/20          DSM-5 Diagnosis:   296.35 (F33.41)  Major Depressive Disorder, Recurrent Episode, In partial remission With anxious distress  300.02 (F41.1) Generalized Anxiety Disorder  309.81 (F43.10) Posttraumatic Stress Disorder (includes Posttraumatic Stress Disorder for Children 6 Years and Younger)  Without dissociative symptoms          Assessment:   She was on a rather peculiar assortment of medications for sleep but she is very fixated on staying on the same medicines because of her implicit trust in her provider of many years.  However, she said that she could do with lower doses of Seroquel trazodone and can do without the Belsomra as well; she did not always take this since it was hard to get through insurance.  I do not think she would tolerate a lower dose of Ativan, but if she starts showing signs of delirium we will have to decrease this to 0.5 mg/day and discontinue the Ambien.          Summary of Recommendations:   Seroquel 150 mg at bedtime  Rozerem 8 mg at bedtime  May hold the Belsomra   Trazodone 150 mg at bedtime.  Ambien 5 mg bedtime  Continue the PTA doses of Pristiq and Buspar  A visit from psychology may be helpful, but I would first ask her (I didn't have a chance to with her parents arrival).     Contact me or re-consult psychiatry as needed (psychiatry is signing off).     Alex Gardner M.D.   Consult liaison psychiatry   [facility]   Can message me with [employer]  If I am not available, then [facility] intake ([phone]) should know who   Is on call            \"This dictation was performed with voice recognition software and may contain errors,  omissions and inadvertent word substitution.\"           "

## 2024-09-03 NOTE — PROGRESS NOTES
[facility]  Gyn Onc Brief Progress Note    HD# 4 low back pain, L leg pain and numbness, pelvic mass; POD#2 rad hyst, BSO, rectosigmoid colectomy w post vaginectomy, supracolic omentectomy, diverting loop ileostomy     Disease: Frozen: adenocarcinoma, CTAP 12.1 x 11.9 x 1.1 cm cystic mass in the central pelvis w/ intermediate thickness irregular septation, enhancing nodularity and calcification.  27, AFP/HcG wnl     S: Patient tearful due to pain and concerns for what surgery means for her life. Reports pain when taking a deep breath and feels very cold, which is abnormal for her. Endorses some fluid intake today. No gas from ostomy. Denies headache, chest pain, and leg pain.    O:  Vitals:    09/03/24 1300 09/03/24 1400 09/03/24 1500 09/03/24 1600   BP: 102/59 105/58 111/62 112/63   BP Location:    Left arm   Cuff Size:    Adult Regular   Pulse: 99 102 98 103   Resp: (!) 9 20 20 17   Temp:    100.2  F (37.9  C)   TempSrc:    Oral   SpO2: 98% 100% 98% 97%   Weight:       Height:            Gen: Tearful in bed, uncomfortable  CV: RRR, no murmurs  Resp: Regular breathing on room air, pain with deep breaths. All lung fields clear to auscultation with no wheezes, crackles, or rales.   Abd: Significantly tender to palpation of epigastric region. Minimally tender in lower quadrants. Stoma pink, no gas in ostomy bag.  Inc: Clean and dry, bandage removed  Ext: No edema, no discoloration. SCDs not on.    A/P: 39 year old POD#2 s/p rad hyst, BSO, rectosigmoid colectomy w post vaginectomy, supracolic omentectomy, diverting loop ileostomy with frozen notable for adenocarcinoma in the setting of known endometriosis. Transferred to SICU POD#2 due to hypotension; now improved with IVF resuscitation and not requiring pressors, and being transferred back to floor. She has significant ongoing pain.    # Postoperative fever  Suspect atelectasis as patient was requiring 1 LPM O2 by NC, now on RA.   -  Tm/l 100.9F at 0415 today, then 100.2 at 1600. Other temperatures wnl. Tachycardic to 115 but returned to 90s when talking with patient.   - Good UOP, patient taking fluids PO. UA without signs of infection. Blood culture pending.  - IS at bedside, patient reports using less than recommended. Encourage patient to use IS as directed.  - Will consider CT chest/abdomen/pelvis with contrast if she has another fever to assess for anastomotic leak vs atelectasis vs PE.    # Complex mass from rectosigmoid space  # Adenocarcinoma   # Postoperative state  # Hypomag/phos  - Poor pain control immediately postop, s/p dilaudid PCA; continue multimodal pain regimen; consider pain consult.  - Ostomy in place with minimal output, pending return of bowel function. No bowel regimen, nothing per rectum.  - CLD, not currently on IV fluids. Encouraged patient to eat jello or other food, patient amenable.  - Incision clean and dry with no erythema or drainage  - Cat in place until POD#3 for radical hysterectomy    # Hx DVT  # Hx PE  - Most superficial thrombophlebitis found recently 05/2024, on therapeutic Eliquis BID PTA (held).   - Hgb stable at 7.6, will restart lovenox.    # Restless leg  # MDD/SHIVAM   # Insomnia   - Medication reconcilation perfromed morning of 8/31 with bedside RN, confirmed medications and dosing schedule as below, plan to continue prior to admission regimen while in house.   - PTA regimen: Buspirone 30mg PO BID, Desvenlafaxine 150mg PO qAM, Lorazapam 1mg PO BID, Topirimate 50mg BID, Propanolol 10mg PO BID [held], Quetiapine 200mg PO at bedtime, Zolpidem 5mg PO at bedtime, Trazodone 225mg PO at bedtime   - Psychiatry consulted, appreciate recommendations: Seroquel 150mg at bedtime, Rozerem 8mg at bedtime, Trazodone 150mg at bedtime, Ambien 5mg at bedtime. Buspar, Desvenlafaxine, and Ativan as prescribed. Discontinue Rozerem. If she continues to be sedated, can lower Ativan to 0.5 mg/day and discontinue  Romaine.    Liliana Freedman MD, MSc  Gynecologic Oncology, PGY-1  09/03/2024 4:50 PM    Gyn Onc Pager: [phone]

## 2024-09-03 NOTE — PLAN OF CARE
Goal Outcome Evaluation:      Plan of Care Reviewed With: patient    Overall Patient Progress: declining    [id number]: Pt hypotensive and somewhat unresponsive at start of shift. Pt on 1L NC. Concern for overdose of benzodiazepines, PCA pump stopped, narcan given x 1 with good response. BP of 96/56 and MAP of 66, 1 L bolus given, MAP rechecked and resulted in 61. Another 1 L bolus given, MAP resulted in 61. Plan is now to transfer to SICU. Pain regimen changed to PO dilaudid and IV tylenol. Continuous pulse ox and capno. LR running in PIV at 50 mL/hr. Cat in place. Continue with plan of care.

## 2024-09-03 NOTE — PROGRESS NOTES
Gynecology Oncology Progress Note  09/03/2024      HD# 4 low back pain, L leg pain and numbness, pelvic mass; POD#2 rad hyst, BSO, rectosigmoid colectomy w post vaginectomy, supracolic omentectomy, diverting loop ileostomy    Disease: Frozen: adenocarcinoma, CTAP 12.1 x 11.9 x 1.1 cm cystic mass in the central pelvis w/ intermediate thickness irregular septation, enhancing nodularity and calcification.  27, AFP/HcG wnl     24 hour events:   - LR bolus, 1u pRBC, 1u FFP, vit K  - Incr INR, down trending hgb  - s/p SW: referral or HC and ileostomy supplies  - started on dilaudid PCA x24h  - PM labs hgb 7.1, INR 1.53 > 1u pRBC  - Hypotensive, tachycardic; SICU aware - labs and fluid resuscitation per their recs  - Gave naloxone, stopped dPCA; add IV tylenol, 2 doses schd PO dilaudid > prn in AM  - 2L bolus: initially responsive then MAP persistently <65, no improvement> SICU transfer    Subjective: Patient is doing well this morning.  Pain is better controlled. Feeling tired. Not yet ambulating. Denies chest pain, SOB, nausea/vomiting, fevers/chills, dizziness.     Objective:   Patient Vitals for the past 24 hrs:   BP Temp Temp src Pulse Resp SpO2 Weight   09/03/24 0545 96/53 -- -- 98 -- 97 % --   09/03/24 0530 96/54 -- -- 95 -- 99 % --   09/03/24 0515 101/57 -- -- 102 -- 98 % --   09/03/24 0500 101/62 99  F (37.2  C) Oral 105 16 100 % --   09/03/24 0445 109/62 -- -- 85 -- 99 % --   09/03/24 0430 103/64 -- -- 106 -- 99 % --   09/03/24 0415 98/56 (!) 100.9  F (38.3  C) Oral 97 16 98 % 83.4 kg (183 lb 13.8 oz)   09/03/24 0410 104/50 -- -- -- -- 96 % --   09/03/24 0400 103/54 -- -- -- -- 94 % --   09/03/24 0350 99/55 -- -- -- -- 95 % --   09/03/24 0340 100/51 -- -- -- -- 96 % --   09/03/24 0330 100/52 -- -- -- -- 97 % --   09/03/24 0320 97/48 -- -- -- -- 96 % --   09/03/24 0315 96/52 -- -- -- -- 96 % --   09/03/24 0310 96/50 -- -- -- -- 96 % --   09/03/24 0300 98/48 -- -- -- -- 97 % --   09/03/24 0250 95/48 -- -- --  -- 97 % --   09/03/24 0245 99/48 -- -- -- -- 96 % --   09/03/24 0240 100/50 -- -- -- -- 96 % --   09/03/24 0235 98/48 -- -- -- -- 95 % --   09/03/24 0230 97/48 -- -- -- -- 96 % --   09/03/24 0225 94/48 -- -- -- -- 97 % --   09/03/24 0220 94/48 -- -- -- -- 96 % --   09/03/24 0215 94/45 -- -- -- -- 96 % --   09/03/24 0210 97/48 -- -- -- -- 96 % --   09/03/24 0205 97/48 -- -- -- -- 96 % --   09/03/24 0200 96/44 -- -- -- -- 96 % --   09/03/24 0155 99/50 -- -- -- -- 96 % --   09/03/24 0150 93/49 -- -- -- -- 96 % --   09/03/24 0145 95/48 -- -- -- -- 96 % --   09/03/24 0140 96/47 -- -- -- -- 96 % --   09/03/24 0135 92/49 -- -- -- -- 96 % --   09/03/24 0130 96/46 -- -- -- -- 95 % --   09/03/24 0125 95/43 -- -- -- -- 95 % --   09/03/24 0120 100/47 -- -- -- -- 96 % --   09/03/24 0115 91/48 -- -- -- -- 96 % --   09/03/24 0110 97/45 -- -- -- -- 97 % --   09/03/24 0105 97/45 -- -- -- -- 97 % --   09/03/24 0100 (!) 122/103 -- -- -- -- 97 % --   09/03/24 0055 97/47 -- -- -- -- 98 % --   09/03/24 0050 90/53 -- -- -- -- 98 % --   09/03/24 0045 108/52 -- -- -- -- 98 % --   09/03/24 0040 102/61 -- -- -- -- 100 % --   09/03/24 0035 97/53 -- -- -- -- 100 % --   09/03/24 0030 96/56 -- -- -- -- 100 % --   09/03/24 0025 94/55 -- -- -- -- 100 % --   09/03/24 0020 99/56 -- -- -- -- 100 % --   09/03/24 0015 97/57 -- -- -- -- 100 % --   09/03/24 0010 96/60 -- -- -- -- 100 % --   09/03/24 0005 99/52 -- -- -- -- 100 % --   09/03/24 0000 102/49 -- -- -- -- 100 % --   09/02/24 2355 100/60 -- -- -- -- 100 % --   09/02/24 2352 101/58 98  F (36.7  C) -- 105 12 -- --   09/02/24 2350 101/58 -- -- -- -- (!) 89 % --   09/02/24 2345 (!) 145/89 -- -- -- -- 99 % --   09/02/24 2340 101/49 -- -- -- -- 97 % --   09/02/24 2335 103/45 -- -- -- -- 97 % --   09/02/24 2330 101/48 98.2  F (36.8  C) -- 97 10 97 % --   09/02/24 2319 98/47 -- -- -- -- 98 % --   09/02/24 2245 -- -- -- -- -- 96 % --   09/02/24 2235 (!) 87/49 -- -- -- -- 96 % --   09/02/24 2223 (!) 87/45  -- -- -- -- -- --   09/02/24 2221 (!) 89/46 98.6  F (37  C) Oral -- 14 94 % --   09/02/24 2218 (!) 86/49 -- -- -- -- 93 % --   09/02/24 2215 -- -- -- -- -- 95 % --   09/02/24 2159 -- -- -- 97 16 -- --   09/02/24 2157 112/60 98.7  F (37.1  C) Oral -- -- 97 % --   09/02/24 2014 102/58 98.5  F (36.9  C) Oral 120 16 98 % --   09/02/24 1657 105/61 98.6  F (37  C) -- 107 18 99 % --   09/02/24 1600 114/59 98.8  F (37.1  C) Oral 107 18 96 % --   09/02/24 1509 107/48 98.3  F (36.8  C) Oral 117 18 94 % --   09/02/24 1448 102/59 98.2  F (36.8  C) Oral 104 16 97 % --   09/02/24 1400 117/64 98.5  F (36.9  C) Oral -- 16 97 % --   09/02/24 1300 105/58 98.4  F (36.9  C) Oral -- 14 95 % --   09/02/24 1238 -- -- -- -- -- 94 % --   09/02/24 1218 98/63 98.4  F (36.9  C) Oral (!) 123 16 95 % --   09/02/24 1154 100/58 98.2  F (36.8  C) Oral -- 14 95 % --   09/02/24 1101 99/63 98.8  F (37.1  C) Oral 111 16 96 % --   09/02/24 0912 101/50 99.2  F (37.3  C) Oral -- 16 93 % --   09/02/24 0820 99/53 98.3  F (36.8  C) Oral -- 16 95 % --       General: NAD, appears comfortable in bed  CV: RRR, no m/r/g  Resp: CTAB anteriorly, no wheezes  Abdomen: soft, tender to palpation diffusely, non-distended  Incision: c/d/i, dressing in place, ileostomy pink with small amount of green output  Extremities: warm, well-perfused, nontender, trace edema, SCDs in place    I/Os  (Yesterday // Since Midnight)   cc // 0 ml  Blood products 526 ml // 0 cc  IVF 1890 cc // 1000 cc  Urine 2025 cc // 1980 ml > 1.012 ml/kg/h yesterday; 3.91 ml/kg/h since MN    New Labs/Imaging-   Results for orders placed or performed during the hospital encounter of 08/31/24 (from the past 24 hour(s))   Prepare red blood cells (unit)   Result Value Ref Range    Blood Component Type Red Blood Cells     Product Code X7464S48     Unit Status Transfused     Unit Number [id number]     CROSSMATCH Compatible     CODING SYSTEM [id number]     ISSUE DATE AND TIME 82833426044704     UNIT  ABO/RH A+     UNIT TYPE ISBT 6200    CBC with platelets   Result Value Ref Range    WBC Count 8.3 4.0 - 11.0 10e3/uL    RBC Count 2.44 (L) 3.80 - 5.20 10e6/uL    Hemoglobin 7.1 (L) 11.7 - 15.7 g/dL    Hematocrit 21.8 (L) 35.0 - 47.0 %    MCV 89 78 - 100 fL    MCH 29.1 26.5 - 33.0 pg    MCHC 32.6 31.5 - 36.5 g/dL    RDW 15.9 (H) 10.0 - 15.0 %    Platelet Count 128 (L) 150 - 450 10e3/uL   INR   Result Value Ref Range    INR 1.65 (H) 0.85 - 1.15   Partial thromboplastin time   Result Value Ref Range    aPTT 32 22 - 38 Seconds   Fibrinogen activity   Result Value Ref Range    Fibrinogen Activity 422 170 - 510 mg/dL   Prepare plasma (unit)   Result Value Ref Range    Blood Component Type Plasma     Product Code D5516H44     Unit Status Transfused     Unit Number [id number]     CODING SYSTEM [id number]     ISSUE DATE AND TIME [id number]     UNIT ABO/RH A+     UNIT TYPE ISBT 6200    CBC with platelets   Result Value Ref Range    WBC Count 8.9 4.0 - 11.0 10e3/uL    RBC Count 2.45 (L) 3.80 - 5.20 10e6/uL    Hemoglobin 7.1 (L) 11.7 - 15.7 g/dL    Hematocrit 22.0 (L) 35.0 - 47.0 %    MCV 90 78 - 100 fL    MCH 29.0 26.5 - 33.0 pg    MCHC 32.3 31.5 - 36.5 g/dL    RDW 15.5 (H) 10.0 - 15.0 %    Platelet Count 125 (L) 150 - 450 10e3/uL   INR   Result Value Ref Range    INR 1.53 (H) 0.85 - 1.15   Prepare red blood cells (unit)   Result Value Ref Range    Blood Component Type Red Blood Cells     Product Code D1465P07     Unit Status Transfused     Unit Number [id number]     CROSSMATCH Compatible     CODING SYSTEM [id number]     ISSUE DATE AND TIME [id number]     UNIT ABO/RH A+     UNIT TYPE ISBT 6200    Glucose by meter   Result Value Ref Range    GLUCOSE BY METER POCT 139 (H) 70 - 99 mg/dL   Blood gas venous   Result Value Ref Range    pH Venous 7.36 7.32 - 7.43    pCO2 Venous 45 40 - 50 mm Hg    pO2 Venous 33 25 - 47 mm Hg    Bicarbonate Venous 25 21 - 28 mmol/L    Base Excess/Deficit Venous -0.6 -3.0 - 3.0 mmol/L    FIO2 0      Oxyhemoglobin Venous 62 (L) 70 - 75 %    O2 Sat, Venous 63.6 (L) 70.0 - 75.0 %    Narrative    In healthy individuals, oxyhemoglobin (O2Hb) and oxygen saturation (SO2) are approximately equal. In the presence of dyshemoglobins, oxyhemoglobin can be considerably lower than oxygen saturation.   TEG without Heparinase   Result Value Ref Range    R (Time until clot forms) 3.3 (L) 5.0 - 10.0 Minute    K ( Time to Spec. clot strength) 1.2 1.0 - 3.0 Minute    Angle (Rate of Clot Growth) 73.5 (H) 53.0 - 72.0 Degrees    MA ( Maximum Clot Strength) 61.8 50.0 - 70.0 mm    CI (coagulation index) 2.7 -3.0 - 3.0    G (actual clot strength) 8.1 4.5 - 11.0 Kd/sc    LY30 (lysis at 30 minutes) 3.5 0.0 - 8.0 %    LY60 (lysis at 60 minutes) 7.6 0.0 - 15.0 %   CBC with platelets   Result Value Ref Range    WBC Count 8.5 4.0 - 11.0 10e3/uL    RBC Count 2.63 (L) 3.80 - 5.20 10e6/uL    Hemoglobin 7.7 (L) 11.7 - 15.7 g/dL    Hematocrit 24.3 (L) 35.0 - 47.0 %    MCV 92 78 - 100 fL    MCH 29.3 26.5 - 33.0 pg    MCHC 31.7 31.5 - 36.5 g/dL    RDW 16.3 (H) 10.0 - 15.0 %    Platelet Count 114 (L) 150 - 450 10e3/uL   Comprehensive metabolic panel   Result Value Ref Range    Sodium 141 135 - 145 mmol/L    Potassium 4.2 3.4 - 5.3 mmol/L    Carbon Dioxide (CO2) 22 22 - 29 mmol/L    Anion Gap 8 7 - 15 mmol/L    Urea Nitrogen 12.7 6.0 - 20.0 mg/dL    Creatinine 0.85 0.51 - 0.95 mg/dL    GFR Estimate 90 >60 mL/min/1.73m2    Calcium 7.6 (L) 8.8 - 10.4 mg/dL    Chloride 111 (H) 98 - 107 mmol/L    Glucose 126 (H) 70 - 99 mg/dL    Alkaline Phosphatase 30 (L) 40 - 150 U/L    AST 35 0 - 45 U/L    ALT 14 0 - 50 U/L    Protein Total 4.3 (L) 6.4 - 8.3 g/dL    Albumin 2.5 (L) 3.5 - 5.2 g/dL    Bilirubin Total 0.3 <=1.2 mg/dL   INR   Result Value Ref Range    INR 1.53 (H) 0.85 - 1.15   Partial thromboplastin time   Result Value Ref Range    aPTT 34 22 - 38 Seconds   Ionized Calcium   Result Value Ref Range    Calcium Ionized Whole Blood 4.4 4.4 - 5.2 mg/dL    Lactic acid whole blood   Result Value Ref Range    Lactic Acid 1.4 0.7 - 2.0 mmol/L   Glucose by meter   Result Value Ref Range    GLUCOSE BY METER POCT 101 (H) 70 - 99 mg/dL   Blood gas venous   Result Value Ref Range    pH Venous 7.38 7.32 - 7.43    pCO2 Venous 43 40 - 50 mm Hg    pO2 Venous 39 25 - 47 mm Hg    Bicarbonate Venous 25 21 - 28 mmol/L    Base Excess/Deficit Venous -0.4 -3.0 - 3.0 mmol/L    FIO2 10     Oxyhemoglobin Venous 72 70 - 75 %    O2 Sat, Venous 73.7 70.0 - 75.0 %    Narrative    In healthy individuals, oxyhemoglobin (O2Hb) and oxygen saturation (SO2) are approximately equal. In the presence of dyshemoglobins, oxyhemoglobin can be considerably lower than oxygen saturation.   Comprehensive metabolic panel   Result Value Ref Range    Sodium 139 135 - 145 mmol/L    Potassium 3.8 3.4 - 5.3 mmol/L    Carbon Dioxide (CO2) 23 22 - 29 mmol/L    Anion Gap 6 (L) 7 - 15 mmol/L    Urea Nitrogen 11.0 6.0 - 20.0 mg/dL    Creatinine 0.83 0.51 - 0.95 mg/dL    GFR Estimate >90 >60 mL/min/1.73m2    Calcium 7.6 (L) 8.8 - 10.4 mg/dL    Chloride 110 (H) 98 - 107 mmol/L    Glucose 104 (H) 70 - 99 mg/dL    Alkaline Phosphatase 34 (L) 40 - 150 U/L    AST 36 0 - 45 U/L    ALT 14 0 - 50 U/L    Protein Total 4.3 (L) 6.4 - 8.3 g/dL    Albumin 2.6 (L) 3.5 - 5.2 g/dL    Bilirubin Total 0.3 <=1.2 mg/dL   Magnesium   Result Value Ref Range    Magnesium 1.6 (L) 1.7 - 2.3 mg/dL   Phosphorus   Result Value Ref Range    Phosphorus 1.8 (L) 2.5 - 4.5 mg/dL   Ionized Calcium   Result Value Ref Range    Calcium Ionized Whole Blood 4.6 4.4 - 5.2 mg/dL   Lactic acid whole blood   Result Value Ref Range    Lactic Acid 1.2 0.7 - 2.0 mmol/L   Fibrinogen activity   Result Value Ref Range    Fibrinogen Activity 526 (H) 170 - 510 mg/dL   ABO/Rh type and screen    Narrative    The following orders were created for panel order ABO/Rh type and screen.  Procedure                               Abnormality         Status                      ---------                               -----------         ------                     Adult Type and Screen[[id number]]                            Final result                 Please view results for these tests on the individual orders.   CBC with platelets   Result Value Ref Range    WBC Count 8.5 4.0 - 11.0 10e3/uL    RBC Count 2.61 (L) 3.80 - 5.20 10e6/uL    Hemoglobin 7.7 (L) 11.7 - 15.7 g/dL    Hematocrit 23.9 (L) 35.0 - 47.0 %    MCV 92 78 - 100 fL    MCH 29.5 26.5 - 33.0 pg    MCHC 32.2 31.5 - 36.5 g/dL    RDW 16.1 (H) 10.0 - 15.0 %    Platelet Count 114 (L) 150 - 450 10e3/uL   INR   Result Value Ref Range    INR 1.58 (H) 0.85 - 1.15   Partial thromboplastin time   Result Value Ref Range    aPTT 31 22 - 38 Seconds   Adult Type and Screen   Result Value Ref Range    ABO/RH(D) A POS     Antibody Screen Negative Negative    SPECIMEN EXPIRATION DATE [id number]    UA with Microscopic reflex to Culture    Specimen: Urine, Cat Catheter   Result Value Ref Range    Color Urine Light Yellow Colorless, Straw, Light Yellow, Yellow    Appearance Urine Clear Clear    Glucose Urine Negative Negative mg/dL    Bilirubin Urine Negative Negative    Ketones Urine Negative Negative mg/dL    Specific Gravity Urine 1.009 1.003 - 1.035    Blood Urine Negative Negative    pH Urine 8.0 (H) 5.0 - 7.0    Protein Albumin Urine Negative Negative mg/dL    Urobilinogen Urine Normal Normal, 2.0 mg/dL    Nitrite Urine Negative Negative    Leukocyte Esterase Urine Negative Negative    RBC Urine 1 <=2 /HPF    WBC Urine 1 <=5 /HPF    Narrative    Urine Culture not indicated       Assessment: 39 year old female who initially presented with abdominal and back and leg paresthesias and was found to have a complex mass in the rectosigmoid space. She has a history of DVT/PE on eliquis, gastric bypass, MDD/SHIVAM, and restless leg who is POD#2 s/p rad hyst, BSO, rectosigmoid colectomy w post vaginectomy, supracolic omentectomy, diverting loop  ileostomy with frozen notable for adenocarcinoma in the setting of known endometriosis. Transferred to SICU POD#2 due to hypotension; now improved with IVF resuscitation and not requiring pressors.     # Complex mass from rectosigmoid space  # Adenocarcinoma   # Postoperative state  # Hypomag/phos  - Poor pain control immediately postop, s/p dilaudid PCA; continue multimodal pain regimen; consider pain consult PRN  - Ostomy in place, pending return of bowel function  - no bowel regimen, nothing per rectum  - CLD, mIVF 50 mL/hr LR  - ERP  - Cat in place until POD#3 for radical hysterectomy  - S/p ancef/flagyl for 24hr postop    # Acute loss blood anemia  # Coagulopathy  # Hypotension, tachycardia  Overnight, she became hypotensive with tachycardia. SICU was made aware and recommended trial of fluid resuscitation as well as adjustment to meds as listed below. She was initially responsive to the first liter bolus but her MAP dropped below 65 and remained persistently below 60 despite a second liter bolus. This morning, blood pressures and tachycardia have improved s/p IVF resuscitation and discontinuation of dilaudid PCA. Not currently requiring pressors.   - Hgb 11.2 > EBL 2500mL > 2U pRBC postoperatively. AM Hgb 7.5 > 7.1> 10mg Vit K, 1u FFP, 1u pRBC > PM hgb (2 hours post transfusion) 7.1 >1u pRBC > 7.7  - INR 1.35 > 1.53 > 1.65, FFP and vitamin K given in the setting of elevated INR and concern for bleeding due to extensive surgery. PM INR 1.53  - Per SICU  - Labs: CBC, CMP, VBG, INR, iCal, lactate, TEG  - pause dPCA, given naloxone  - 1L bolus x2  - stop dPCA; add IV tylenol, 2 doses schd PO dilaudid > prn in AM  - Will monitor closely for vitals, UOP, abdominal exam  - Continue to trend hgb, PLT and INR which were stable this morning    # Postoperative fever  Suspect atelectasis as patient requiring 1 LPM O2 by NC. Encourage IS.  - Tm/l 100.9F at 0415 today. Subsequent temperature within normal limits.  - UA  without signs of infection. Blood culture pending.  - Continue to monitor, consider further workup if persistent    # Hx DVT  # Hx PE  - Most superficial thrombophlebitis found recently 05/2024, on therapeutic Eliquis BID PTA (held).   - Once CBC showing appropriate rise - will resume anticoagulation with lovenox prophylactic dosing once hemoglobin is stable, then uptitrate to therapeutic dose.     # Hx of gastric bypass, panniculectomy  # Hx of C.diff  History of uncomplicated gastric bypass completed in 2022 followed by panniculectomy in 2023 for excess skin removal. Postoperative recovery c/b C. Diff during inpatient admission.  - Enteric precautions per BMT unit guideline pending negative C. Diff on admission   - Pantoprazole 20mg BID      # Restless leg  # MDD/SHIVMA   # Insomnia   Patient with significant psychiatric medication history in setting of refractory anxiety/depression as well as intermittent RLS. Medication reconcilation perfromed morning of 8/31 with bedside RN, confirmed medications and dosing schedule as below, plan to continue prior to admission regimen while in house.   - PTA regimen: Buspirone 30mg PO BID, Desvenlafaxine 150mg PO qAM, Lorazapam 1mg PO BID, Topirimate 50mg BID, Propanolol 10mg PO BID [held], Quetiapine 200mg PO at bedtime, Zolpidem 5mg PO at bedtime, Trazodone 225mg PO at bedtime   - Psychiatry consult for polypharmacy, appreciate recommendations     # Moderate persistent asthma   Noted per chart review. Patient reports she is largely asymptomatic and has not had exacerbations that have required hospitalizaiton or intubation.   - Flovent 220mcg 1 puff Q12H       PPX: SCDs, IS, lovenox held in setting of coagulopathy  Dispo: Pending postoperative goals  Drains/Lines: IRVIN, margareth waite MD, MPH  Ob/Gyn Resident, PGY-4    Provider Disclosure:   I agree with above History, Review of Systems, Physical exam and Plan. I have reviewed the content of the documentation and  have edited it as needed. I have personally performed the services documented here and the documentation accurately represents those services and the decisions I have made.     Electronically signed by:   Lakeisha Montes MD   Gynecologic Oncology   [facility]

## 2024-09-03 NOTE — PROGRESS NOTES
Transferred to: Unit [address]  at 1830  Belongings: All belongings sent with patient. Clothing, purse, and cellphone with .  Cat removed? No: Per orders, keep until POD 3.  Central line removed? NA  Chart and medications sent with patient Yes  Family notified: Yes. Parents were notified when they were here earlier today that patient would be transferred to floor when bed becomes available.

## 2024-09-03 NOTE — PROGRESS NOTES
[facility]  CRITICAL CARE STAFF NOTE    I am managing these acute and ongoing critical issues resulting in critical condition that impairs one or more vital organ systems, incur a high probability of imminent or life-threatening deterioration in the patient's condition and providing frequent personal assessment and manipulation of medications and life support equipment.     Hx DVT/PE on apixiban (last dose 8/29), cystic mass in central pelvis (s/p 9/1/2024 rad hyst, BSO, rectosigmoid colectomy w post vaginectomy, supracolic omentectomy, diverting loop ileostomy being transferred to SICU due to persistent hypotension despite IVFs and blood products/shock.      1. Hypotension/shock- overnight resuscitated with 2L with Map<65 mmHg. Additional fluid maybe helpful (may try 5% albumin) but early use of vasopressors next. Ionized Ca and lactate normal. Will get UA w/ reflux culture and potential Bcx1 and star antibiotics early. Surgical site unlikely to be infective- no concern of discharge at this time. No fever, no leukocytosis so will hold off on antibiotics for now, but low threshold to start.   Ddx- hemorrhagic vs vasodilatory shock.   Formal TTE as well.  2. Post-operative anemia- concern for continued non-operative oozing intra-abdominally. No AQUILINO drain. Transfuse for Hb<7, Fibrinogen <150, Plt, 20K. Trend CBC q6 hrs   Gyn-onc- heavily prefers resuscitation see if any stabilize before imaging/possible OR unless difficult to manage  3. Coagulopathy- INR mildly elevated but normal PTT and fibrinogen.  TEG normal but received 1 U plt and 1 FFP following 3 U pRBC when drawn- suspect moving forward balanced transfusions to help preventing coagulopathy. TXA would not be helpful currently. Already received Vit K x1.  Trend coags q12h initially then daily for short period of time if stable   4. Encephalopathy- glucose and VBG wnl. Developed decreased consciousness with Dilaudid PCA and neurotropic meds given at  night. Responded to narcan for a period of time; has become more somnolent but arouses more easily overall. Hold opioids/benzos first until mental status improves then add on PCA med first then other meds back slowly. Monitor renal and liver function to ensure proper dosing.   5. Hx of DVT/PE- holding A/C for now in setting of acute anemia/post-operative course  6. Pelvic mass- PD#2 with previous imaging showing enhancing nodularity and calcification. Prelim path from OR report in, but awaiting final path for further details       ICU Interventions: parenteral medications necessitating continuous monitoring including vasoactive medications, medication for heart rhythm control, insulin infusion, and/or sedative/opiates , management of bleeding diathesis including critical illness coagulopathy, anemia of critical illness, aplastic anemia, DIC, hemophilia, ITP, leukemia, and/or TTP, and interpretation of lab values, cardiac output, cxr, pulse oximetry, blood gases, and/or information/data stored in computers    The patient was seen and examined with the resident/fellow/LESTER and/or medical student.  We have discussed the patient in detail and I agree with the findings, assessment, and plan as documented when this note was cosigned on this day. The plan was formulated in conjunction with pharmacy, ICU nurses, and respiratory therapist. I have evaluated all laboratory values and imaging studies for the past 24 hours. I have reviewed all the consults that have been ordered and are active for this patient.      Critical Care Time:70 min.  I spent this time (excluding procedures) personally providing and directing critical care services at the bedside and on the critical care unit.      Srikanth Gunderson MD   of Medicine, Pulmonary/CC  September 3, 2024

## 2024-09-03 NOTE — PROGRESS NOTES
"Brief Progress Note    Paged by RN: 1U pRBCS infusing, BP 89/46, pulse 124, RR 14, O2 96% on 1LPM; denies other symptoms    Called RN to continue transfusion    To bedside to assess. Per RN patient recently received her evening meds. On chart review she received seroquel, remelteon, trazodone, ambien just before 2200.     S; On exam she is drowsy but rouses to voice. Patient actually reports no abdominal pain at baseline right now, denies chest pain, trouble breathing, dizziness, light headedness, nausea. When asked if she has any other symptoms, concerns or feels anything else in her body she states \"no\".     O:  Abdomen: soft and non distended without rebound tenderness or guarding; remains consistently tender in RUQ and LLQ; ostomy pink       # Hypotension  # Tachycardia  # somnolence  Vitals concerning for ongoing blood loss but abdominal exam is reassuring. Evening depressant medications could be contributing to hypotension with tachycardia being reflex response. Spoke with Fellow, Dr Méndez by phone. Dr Méndez will come in.  - request transfer to SICU  - called and spoke with resident, requesting 1L LR bolus to begin optimizing  - per SICU resident, given staff's phone number - called without answer; page sent  - STAT CBC, CMP, INR, PTT    Lee Fine MD  Obstetrics & Gynecology, PGY-2  09/02/2024 10:59 PM    ADDENDUM  At bedside with SICU attending. He is concerned about confounding effects of benzos and opioids with intravascular depletion. On exam he notes pinpoint nonreactive pupils. Per his recs:   - STAT ionized calcium, TEG, BVG, lactate   - stop dPCA  - give naloxone  0.4  - BG 130s  - running 1L/hr, will reassess in 1 hour    Lee Fine MD  [facility]  Gynecology Oncology Resident, PGY-2  09/02/2024 11:39 PM    To reach the GYNECOLOGY ONCOLOGY team for this patient, please page [phone]    ADDENDUM 2  Patient's somnolence improved s/p naloxone, expressing frustration " that we want her to be able to wake up after getting her evening meds. BP responsive to first half of 1L bolus. Spoke with SICU attending again, plan as agreed upon with him:  - monitor vitals, if MAP <65 give another 1L bolus  - if non responsive to second liter, transfer to SICU  - dPCA discontinued  - 2 doses PO dilaudid scheduled; then transition to PRN  - IV tylenol added  - psych consulted for polypharmacy guidance given copious sedating medications all taken at night    Lee Fine MD  Obstetrics & Gynecology, PGY-2  09/03/2024 12:33 AM    ---------    Physician Attestation   I saw this patient at bedside with Dr. Fine and Dr. Gunderson (ICU attending). Patient with hypotension and tachycardia likely a combination of bleeding/coagulopathy with polypharmacy sedation. She received narcan and 1L bolus with improved mentation and VS, MAP 70, HR improved to 90s. UOP adequate, Abdominal exam unchanged from prior, nondistended and soft. Will continue to monitor on floor for now, dPCA discontinued, pain control with IV tylenol and PO dilaudid. If become hypotensive, will give 2nd liter bolus - goal MAP of 65. If cannot maintain MAP, plan to transfer to SICU per Dr. Gunderson.     Maria De Jesus Méndez MD  Gynecology Oncology Fellow  September 3, 2024, 12:37 AM

## 2024-09-03 NOTE — PROGRESS NOTES
09/03/24 0953   Appointment Info   Signing Clinician's Name / Credentials (PT) Chidi Knight, PT, DPT   Rehab Comments (PT) PT Only   Living Environment   People in Home child(enrico), dependent   Current Living Arrangements   (townhouse)   Home Accessibility stairs within home   Living Environment Comments no IRMA, 2 flights to bedroom and walk-in shower, kitchen on main level. No shower chair or grab bars. Lives with 10 and 11 yo kids.   Self-Care   Usual Activity Tolerance good   Current Activity Tolerance fair   Equipment Currently Used at Home none   Fall history within last six months no   Activity/Exercise/Self-Care Comment IND with mobility and ADLs, drives, works desk-based remote job   General Information   Onset of Illness/Injury or Date of Surgery 08/31/24   Referring Physician Maisha Méndez MD   Patient/Family Therapy Goals Statement (PT) return home   Pertinent History of Current Problem (include personal factors and/or comorbidities that impact the POC) Pt s/p rad hyst, BSO, rectosigmoid colectomy w post vaginectomy, supracolic omentectomy, diverting loop ileostomy on 9/1   Existing Precautions/Restrictions abdominal   General Observations Act : ICU protocol   Cognition   Cognitive Status Comments Followed commands appropriately   Integumentary/Edema   Integumentary/Edema no deficits were identifed   Posture    Posture Not impaired   Range of Motion (ROM)   ROM Comment B heel cord tightness noted   Strength (Manual Muscle Testing)   Strength Comments Minimal L ankle DF strength, pt reports this is baseline since back pain with radicular symptoms in last few months   Bed Mobility   Bed Mobility supine-sit   Comment, (Bed Mobility) Slow speed, tolerated fairly well   Transfers   Transfers sit-stand transfer   Comment, (Transfers) CGA, slow speed, B UE support on walker   Gait/Stairs (Locomotion)   [place]oup Level (Gait) contact guard   Assistive Device (Gait) walker, front-wheeled   Comment,  (Gait/Stairs) Short lateral steps, B Ue support on walker   Balance   Balance Comments Unsupported sitting: IND   Sensory Examination   Sensory Perception Comments Pt reports dorsal L foot numbness that has been present since back pain and radicular symptoms   Clinical Impression   Criteria for Skilled Therapeutic Intervention Yes, treatment indicated   PT Diagnosis (PT) impaired mobility   Influenced by the following impairments strength, pain, activity tolerance   Functional limitations due to impairments transfers, gait, bed mobility   Clinical Presentation (PT Evaluation Complexity) stable   Clinical Presentation Rationale clinical judgement   Clinical Decision Making (Complexity) low complexity   Planned Therapy Interventions (PT) balance training;bed mobility training;gait training;home exercise program;neuromuscular re-education;stair training;strengthening;transfer training;progressive activity/exercise   Risk & Benefits of therapy have been explained evaluation/treatment results reviewed;care plan/treatment goals reviewed;risks/benefits reviewed;current/potential barriers reviewed;participants voiced agreement with care plan;participants included;patient   PT Total Evaluation Time   PT Eval, Low Complexity Minutes (22272) 10   Physical Therapy Goals   PT Frequency 5x/week   PT Predicted Duration/Target Date for Goal Attainment 10/18/24   PT Goals Bed Mobility;Transfers;Gait;Stairs   PT: Bed Mobility Independent;Within precautions   PT: Transfers Modified independent;Within precautions   PT: Gait Modified independent;50 feet   PT: Stairs Modified independent;Greater than 10 stairs   Interventions   Interventions Quick Adds Gait Training;Therapeutic Activity;Neuromuscular Re-ed   Therapeutic Activity   Therapeutic Activities: dynamic activities to improve functional performance Minutes (72607) 18   Treatment Detail/Skilled Intervention Pt supine upon arrival, agreeable to therapy session with encouragement,  RN ok'd mobility. To progress functional independence, pt completed transfers. Edu on abd precautions and log roll. Graded support for bed mobiltiy, faciltiated maximal pt participation. Monitoring of BP in sitting, no change, pt denied dizziness or lightheadedness. Following pivot to chair, assist with positioning with pillows. Pt declined further out of chair mobiltiy due to pain, despite encouragement. Pt provided supplies for self cares at sink, instructions provided to complete with nursing later. Edu on ongoing PT goals,, discussion held on discharge recs and reasoning. Pt upright in chair at end of session, call light in reach, denied further needs.   PT Discharge Planning   PT Plan Provide ABD precaution handout, initiate forward gait, stairs as able   PT Discharge Recommendation (DC Rec) home with assist;home with home care physical therapy   PT Rationale for DC Rec Pt requiring Ax1 and new FWW, primarily limited by pain with max tolerance of pivot. Anticipate continued progression and recommend discharge home with assist and HH PT. Of note, pt has 2 flights of stairs to her bedroom   PT Brief overview of current status Ax1 FWW, u pto chair 3x/day   Total Session Time   Timed Code Treatment Minutes 18   Total Session Time (sum of timed and untimed services) 28

## 2024-09-03 NOTE — PROVIDER NOTIFICATION
Notified GYN/ONC team that patient has a low grade temperature with scheduled tylenol. Also notified team that patient is tachycardic, has had high urine output and has not drank much today, recommended considering some maintenance IV fluids. Provider assessed patient and said they would put in some orders.

## 2024-09-03 NOTE — PLAN OF CARE
Occupational Therapy: Orders received. Chart reviewed and discussed with care team.? Occupational Therapy not indicated due to discussion with PT, pt limited by pain but requiring SBA for transfers and ADLs, anticipate appropriate for one discipline while IP.? Defer discharge recommendations to PT.? Will complete orders. Reorder as needed.

## 2024-09-04 ENCOUNTER — APPOINTMENT (OUTPATIENT)
Dept: PHYSICAL THERAPY | Facility: CLINIC | Age: 39
End: 2024-09-04
Attending: OBSTETRICS & GYNECOLOGY
Payer: COMMERCIAL

## 2024-09-04 LAB
ALBUMIN SERPL BCG-MCNC: 2.7 G/DL (ref 3.5–5.2)
ALP SERPL-CCNC: 48 U/L (ref 40–150)
ALT SERPL W P-5'-P-CCNC: 11 U/L (ref 0–50)
ANION GAP SERPL CALCULATED.3IONS-SCNC: 5 MMOL/L (ref 7–15)
AST SERPL W P-5'-P-CCNC: 23 U/L (ref 0–45)
BILIRUB SERPL-MCNC: 0.2 MG/DL
BLD PROD TYP BPU: NORMAL
BLOOD COMPONENT TYPE: NORMAL
BUN SERPL-MCNC: 8.1 MG/DL (ref 6–20)
CALCIUM SERPL-MCNC: 8 MG/DL (ref 8.8–10.4)
CHLORIDE SERPL-SCNC: 112 MMOL/L (ref 98–107)
CODING SYSTEM: NORMAL
CREAT SERPL-MCNC: 0.75 MG/DL (ref 0.51–0.95)
CROSSMATCH: NORMAL
EGFRCR SERPLBLD CKD-EPI 2021: >90 ML/MIN/1.73M2
ERYTHROCYTE [DISTWIDTH] IN BLOOD BY AUTOMATED COUNT: 16 % (ref 10–15)
ERYTHROCYTE [DISTWIDTH] IN BLOOD BY AUTOMATED COUNT: 16.6 % (ref 10–15)
ERYTHROCYTE [DISTWIDTH] IN BLOOD BY AUTOMATED COUNT: 16.8 % (ref 10–15)
GLUCOSE SERPL-MCNC: 93 MG/DL (ref 70–99)
HCO3 SERPL-SCNC: 22 MMOL/L (ref 22–29)
HCT VFR BLD AUTO: 21.1 % (ref 35–47)
HCT VFR BLD AUTO: 22 % (ref 35–47)
HCT VFR BLD AUTO: 27.3 % (ref 35–47)
HGB BLD-MCNC: 6.7 G/DL (ref 11.7–15.7)
HGB BLD-MCNC: 7.1 G/DL (ref 11.7–15.7)
HGB BLD-MCNC: 8.7 G/DL (ref 11.7–15.7)
INHIBIN B SERPL-MCNC: 8 PG/ML
INR PPP: 1.44 (ref 0.85–1.15)
ISSUE DATE AND TIME: NORMAL
MAGNESIUM SERPL-MCNC: 1.9 MG/DL (ref 1.7–2.3)
MCH RBC QN AUTO: 29.4 PG (ref 26.5–33)
MCH RBC QN AUTO: 29.5 PG (ref 26.5–33)
MCH RBC QN AUTO: 29.7 PG (ref 26.5–33)
MCHC RBC AUTO-ENTMCNC: 31.8 G/DL (ref 31.5–36.5)
MCHC RBC AUTO-ENTMCNC: 31.9 G/DL (ref 31.5–36.5)
MCHC RBC AUTO-ENTMCNC: 32.3 G/DL (ref 31.5–36.5)
MCV RBC AUTO: 92 FL (ref 78–100)
MCV RBC AUTO: 92 FL (ref 78–100)
MCV RBC AUTO: 93 FL (ref 78–100)
PATH REPORT.COMMENTS IMP SPEC: ABNORMAL
PATH REPORT.COMMENTS IMP SPEC: ABNORMAL
PATH REPORT.COMMENTS IMP SPEC: YES
PATH REPORT.FINAL DX SPEC: ABNORMAL
PATH REPORT.GROSS SPEC: ABNORMAL
PATH REPORT.MICROSCOPIC SPEC OTHER STN: ABNORMAL
PATH REPORT.RELEVANT HX SPEC: ABNORMAL
PHOSPHATE SERPL-MCNC: 2.7 MG/DL (ref 2.5–4.5)
PLATELET # BLD AUTO: 123 10E3/UL (ref 150–450)
PLATELET # BLD AUTO: 130 10E3/UL (ref 150–450)
PLATELET # BLD AUTO: 154 10E3/UL (ref 150–450)
POTASSIUM SERPL-SCNC: 3.6 MMOL/L (ref 3.4–5.3)
PROT SERPL-MCNC: 4.6 G/DL (ref 6.4–8.3)
RBC # BLD AUTO: 2.27 10E6/UL (ref 3.8–5.2)
RBC # BLD AUTO: 2.39 10E6/UL (ref 3.8–5.2)
RBC # BLD AUTO: 2.96 10E6/UL (ref 3.8–5.2)
SODIUM SERPL-SCNC: 139 MMOL/L (ref 135–145)
UNIT ABO/RH: NORMAL
UNIT NUMBER: NORMAL
UNIT STATUS: NORMAL
UNIT TYPE ISBT: 6200
WBC # BLD AUTO: 6.2 10E3/UL (ref 4–11)
WBC # BLD AUTO: 7.7 10E3/UL (ref 4–11)
WBC # BLD AUTO: 8.4 10E3/UL (ref 4–11)

## 2024-09-04 PROCEDURE — 97116 GAIT TRAINING THERAPY: CPT | Mod: GP | Performed by: PHYSICAL THERAPIST

## 2024-09-04 PROCEDURE — 84100 ASSAY OF PHOSPHORUS: CPT | Performed by: STUDENT IN AN ORGANIZED HEALTH CARE EDUCATION/TRAINING PROGRAM

## 2024-09-04 PROCEDURE — 36415 COLL VENOUS BLD VENIPUNCTURE: CPT | Performed by: OBSTETRICS & GYNECOLOGY

## 2024-09-04 PROCEDURE — 85014 HEMATOCRIT: CPT | Performed by: STUDENT IN AN ORGANIZED HEALTH CARE EDUCATION/TRAINING PROGRAM

## 2024-09-04 PROCEDURE — 250N000013 HC RX MED GY IP 250 OP 250 PS 637

## 2024-09-04 PROCEDURE — 999N000128 HC STATISTIC PERIPHERAL IV START W/O US GUIDANCE

## 2024-09-04 PROCEDURE — 258N000003 HC RX IP 258 OP 636

## 2024-09-04 PROCEDURE — 97530 THERAPEUTIC ACTIVITIES: CPT | Mod: GP | Performed by: PHYSICAL THERAPIST

## 2024-09-04 PROCEDURE — 36415 COLL VENOUS BLD VENIPUNCTURE: CPT | Performed by: STUDENT IN AN ORGANIZED HEALTH CARE EDUCATION/TRAINING PROGRAM

## 2024-09-04 PROCEDURE — P9016 RBC LEUKOCYTES REDUCED: HCPCS

## 2024-09-04 PROCEDURE — 85027 COMPLETE CBC AUTOMATED: CPT | Performed by: STUDENT IN AN ORGANIZED HEALTH CARE EDUCATION/TRAINING PROGRAM

## 2024-09-04 PROCEDURE — 250N000013 HC RX MED GY IP 250 OP 250 PS 637: Performed by: OBSTETRICS & GYNECOLOGY

## 2024-09-04 PROCEDURE — 83735 ASSAY OF MAGNESIUM: CPT | Performed by: OBSTETRICS & GYNECOLOGY

## 2024-09-04 PROCEDURE — 250N000009 HC RX 250

## 2024-09-04 PROCEDURE — 250N000013 HC RX MED GY IP 250 OP 250 PS 637: Performed by: STUDENT IN AN ORGANIZED HEALTH CARE EDUCATION/TRAINING PROGRAM

## 2024-09-04 PROCEDURE — 82040 ASSAY OF SERUM ALBUMIN: CPT | Performed by: STUDENT IN AN ORGANIZED HEALTH CARE EDUCATION/TRAINING PROGRAM

## 2024-09-04 PROCEDURE — 120N000002 HC R&B MED SURG/OB UMMC

## 2024-09-04 PROCEDURE — 99024 POSTOP FOLLOW-UP VISIT: CPT | Performed by: OBSTETRICS & GYNECOLOGY

## 2024-09-04 PROCEDURE — 85027 COMPLETE CBC AUTOMATED: CPT | Performed by: OBSTETRICS & GYNECOLOGY

## 2024-09-04 PROCEDURE — 85610 PROTHROMBIN TIME: CPT

## 2024-09-04 RX ORDER — PROPRANOLOL HYDROCHLORIDE 10 MG/1
10 TABLET ORAL 2 TIMES DAILY
Status: DISCONTINUED | OUTPATIENT
Start: 2024-09-04 | End: 2024-09-06 | Stop reason: HOSPADM

## 2024-09-04 RX ORDER — PROPRANOLOL HYDROCHLORIDE 10 MG/1
10 TABLET ORAL 2 TIMES DAILY
Status: ON HOLD | COMMUNITY
End: 2024-09-18

## 2024-09-04 RX ORDER — MAGNESIUM OXIDE 400 MG/1
400 TABLET ORAL EVERY 4 HOURS
Status: COMPLETED | OUTPATIENT
Start: 2024-09-04 | End: 2024-09-04

## 2024-09-04 RX ORDER — HYDROMORPHONE HYDROCHLORIDE 4 MG/1
4 TABLET ORAL EVERY 4 HOURS PRN
Status: DISCONTINUED | OUTPATIENT
Start: 2024-09-04 | End: 2024-09-06 | Stop reason: HOSPADM

## 2024-09-04 RX ADMIN — HYDROMORPHONE HYDROCHLORIDE 4 MG: 4 TABLET ORAL at 16:40

## 2024-09-04 RX ADMIN — GABAPENTIN 300 MG: 300 CAPSULE ORAL at 14:35

## 2024-09-04 RX ADMIN — METHOCARBAMOL 500 MG: 500 TABLET ORAL at 16:40

## 2024-09-04 RX ADMIN — TOPIRAMATE 50 MG: 50 TABLET, FILM COATED ORAL at 08:45

## 2024-09-04 RX ADMIN — TRAZODONE HYDROCHLORIDE 150 MG: 150 TABLET ORAL at 21:36

## 2024-09-04 RX ADMIN — ACETAMINOPHEN 650 MG: 325 TABLET ORAL at 03:29

## 2024-09-04 RX ADMIN — ACETAMINOPHEN 650 MG: 325 TABLET ORAL at 21:36

## 2024-09-04 RX ADMIN — DESVENLAFAXINE SUCCINATE 150 MG: 100 TABLET, FILM COATED, EXTENDED RELEASE ORAL at 19:54

## 2024-09-04 RX ADMIN — RAMELTEON 8 MG: 8 TABLET ORAL at 21:36

## 2024-09-04 RX ADMIN — HYDROMORPHONE HYDROCHLORIDE 2 MG: 4 TABLET ORAL at 19:53

## 2024-09-04 RX ADMIN — TOPIRAMATE 50 MG: 50 TABLET, FILM COATED ORAL at 19:54

## 2024-09-04 RX ADMIN — Medication 150 MG: at 21:36

## 2024-09-04 RX ADMIN — HYDROMORPHONE HYDROCHLORIDE 4 MG: 2 TABLET ORAL at 08:47

## 2024-09-04 RX ADMIN — MAGNESIUM OXIDE TAB 400 MG (241.3 MG ELEMENTAL MG) 400 MG: 400 (241.3 MG) TAB at 08:46

## 2024-09-04 RX ADMIN — LORAZEPAM 1 MG: 0.5 TABLET ORAL at 08:45

## 2024-09-04 RX ADMIN — LORAZEPAM 1 MG: 0.5 TABLET ORAL at 19:54

## 2024-09-04 RX ADMIN — MAGNESIUM OXIDE TAB 400 MG (241.3 MG ELEMENTAL MG) 400 MG: 400 (241.3 MG) TAB at 12:59

## 2024-09-04 RX ADMIN — POTASSIUM PHOSPHATE, MONOBASIC AND POTASSIUM PHOSPHATE, DIBASIC 9 MMOL: 224; 236 INJECTION, SOLUTION, CONCENTRATE INTRAVENOUS at 09:06

## 2024-09-04 RX ADMIN — BUSPIRONE HYDROCHLORIDE 30 MG: 30 TABLET ORAL at 19:53

## 2024-09-04 RX ADMIN — HYDROMORPHONE HYDROCHLORIDE 2 MG: 4 TABLET ORAL at 12:59

## 2024-09-04 RX ADMIN — METHOCARBAMOL 500 MG: 500 TABLET ORAL at 19:53

## 2024-09-04 RX ADMIN — ZOLPIDEM TARTRATE 5 MG: 5 TABLET, COATED ORAL at 21:36

## 2024-09-04 RX ADMIN — SIMETHICONE 80 MG: 80 TABLET, CHEWABLE ORAL at 16:58

## 2024-09-04 RX ADMIN — ACETAMINOPHEN 650 MG: 325 TABLET ORAL at 16:40

## 2024-09-04 RX ADMIN — HYDROMORPHONE HYDROCHLORIDE 4 MG: 4 TABLET ORAL at 21:36

## 2024-09-04 RX ADMIN — METHOCARBAMOL 500 MG: 500 TABLET ORAL at 08:46

## 2024-09-04 RX ADMIN — SODIUM CHLORIDE, POTASSIUM CHLORIDE, SODIUM LACTATE AND CALCIUM CHLORIDE: 600; 310; 30; 20 INJECTION, SOLUTION INTRAVENOUS at 02:39

## 2024-09-04 RX ADMIN — BUSPIRONE HYDROCHLORIDE 30 MG: 30 TABLET ORAL at 08:45

## 2024-09-04 RX ADMIN — GABAPENTIN 300 MG: 300 CAPSULE ORAL at 19:53

## 2024-09-04 RX ADMIN — ACETAMINOPHEN 650 MG: 325 TABLET ORAL at 09:07

## 2024-09-04 RX ADMIN — PANTOPRAZOLE SODIUM 40 MG: 40 TABLET, DELAYED RELEASE ORAL at 08:45

## 2024-09-04 RX ADMIN — IBUPROFEN 600 MG: 200 TABLET, FILM COATED ORAL at 02:40

## 2024-09-04 RX ADMIN — GABAPENTIN 300 MG: 300 CAPSULE ORAL at 08:46

## 2024-09-04 RX ADMIN — METHOCARBAMOL 500 MG: 500 TABLET ORAL at 12:59

## 2024-09-04 ASSESSMENT — ACTIVITIES OF DAILY LIVING (ADL)
ADLS_ACUITY_SCORE: 32
ADLS_ACUITY_SCORE: 31
ADLS_ACUITY_SCORE: 32
ADLS_ACUITY_SCORE: 30
ADLS_ACUITY_SCORE: 31
ADLS_ACUITY_SCORE: 32
ADLS_ACUITY_SCORE: 31
ADLS_ACUITY_SCORE: 30
ADLS_ACUITY_SCORE: 32
ADLS_ACUITY_SCORE: 31
ADLS_ACUITY_SCORE: 32
ADLS_ACUITY_SCORE: 30
ADLS_ACUITY_SCORE: 32
ADLS_ACUITY_SCORE: 32

## 2024-09-04 NOTE — PROGRESS NOTES
"Gynecology Oncology Progress Note  09/04/2024      HD#5 low back pain, L leg pain and numbness, pelvic mass; POD#3 rad hyst, BSO, rectosigmoid colectomy w post vaginectomy, supracolic omentectomy, diverting loop ileostomy    Disease: Frozen: adenocarcinoma, CTAP 12.1 x 11.9 x 1.1 cm cystic mass in the central pelvis w/ intermediate thickness irregular septation, enhancing nodularity and calcification.  27, AFP/HcG wnl     24 hour events:   - Echo: EF 55-60%, \"Dilation of the inferior vena cava is present with abnormal respiratory variation in diameter.\"  - s/p psychiatry consult  - Transferring out of SICU  - Lovenox restarted  - Ibuprofen added  - hgb drop this morning, lovenox and NSAIDs held    Subjective: Patient is drowsy this morning but rouses to voice. Denies abdominal pain, chest pain, nausea, vomiting. Reports she did not have much PO intake overnight as she was asleep. Discussed plan to likely remove waite today. She had no questions, wanted to go back to sleep.  Per RN ostomy had a large amount of gas overnight, causing the bag to open and spill. Exact output of ostomy is therefore inaccurate in I/Os but gas was noted.      Objective:   Patient Vitals for the past 24 hrs:   BP Temp Temp src Pulse Resp SpO2 Weight   09/04/24 0700 93/55 97.8  F (36.6  C) Oral -- 16 97 % 81 kg (178 lb 9.2 oz)   09/04/24 0455 100/65 -- -- -- -- -- --   09/04/24 0438 -- 98  F (36.7  C) Oral -- -- -- --   09/04/24 0400 -- -- -- 78 -- 96 % --   09/04/24 0200 -- -- -- 88 -- 96 % --   09/04/24 0019 97/51 -- -- 95 -- 96 % --   09/04/24 0000 -- -- -- 98 -- 95 % --   09/03/24 2348 97/48 98.6  F (37  C) Axillary 101 16 95 % --   09/03/24 1856 113/78 99.3  F (37.4  C) Oral 103 16 94 % 81.2 kg (179 lb 0.2 oz)   09/03/24 1800 108/56 -- -- 111 16 99 % --   09/03/24 1700 112/64 -- -- 105 15 96 % --   09/03/24 1600 112/63 100.2  F (37.9  C) Oral 103 17 97 % --   09/03/24 1500 111/62 -- -- 98 20 98 % --   09/03/24 1400 105/58 -- -- 102 " 20 100 % --   09/03/24 1300 102/59 -- -- 99 (!) 9 98 % --   09/03/24 1200 110/59 98.7  F (37.1  C) Oral 90 13 98 % --   09/03/24 1100 105/57 -- -- 91 14 97 % --       General: NAD, appears comfortable in bed  CV: RRR, no m/r/g  Resp: CTAB anteriorly, no wheezes  Abdomen: soft, tender to palpation diffusely, non-distended  Incision: c/d/i, dressing in place, ileostomy pink with small amount of gas and some green output  Extremities: warm, well-perfused, nontender, trace edema, SCDs in place    I/Os  (Yesterday // Since Midnight)  PO 970cc // not documented  IVF 1371.66cc // not documented  Urine 5615cc // 350cc  Stool 555cc // 300cc    UOP 2.88 ml/kg/h yesterday; 0.72 ml/kg/h since MN  Called RN to request intake be documented prior to end of shift    New Labs/Imaging-   Results for orders placed or performed during the hospital encounter of 08/31/24 (from the past 24 hour(s))   CBC with platelets   Result Value Ref Range    WBC Count 9.2 4.0 - 11.0 10e3/uL    RBC Count 2.59 (L) 3.80 - 5.20 10e6/uL    Hemoglobin 7.6 (L) 11.7 - 15.7 g/dL    Hematocrit 23.7 (L) 35.0 - 47.0 %    MCV 92 78 - 100 fL    MCH 29.3 26.5 - 33.0 pg    MCHC 32.1 31.5 - 36.5 g/dL    RDW 16.2 (H) 10.0 - 15.0 %    Platelet Count 127 (L) 150 - 450 10e3/uL   Phosphorus   Result Value Ref Range    Phosphorus 2.6 2.5 - 4.5 mg/dL   Lactic acid whole blood   Result Value Ref Range    Lactic Acid 1.0 0.7 - 2.0 mmol/L   CBC with platelets   Result Value Ref Range    WBC Count 9.4 4.0 - 11.0 10e3/uL    RBC Count 2.60 (L) 3.80 - 5.20 10e6/uL    Hemoglobin 7.7 (L) 11.7 - 15.7 g/dL    Hematocrit 24.1 (L) 35.0 - 47.0 %    MCV 93 78 - 100 fL    MCH 29.6 26.5 - 33.0 pg    MCHC 32.0 31.5 - 36.5 g/dL    RDW 16.6 (H) 10.0 - 15.0 %    Platelet Count 145 (L) 150 - 450 10e3/uL   Partial thromboplastin time   Result Value Ref Range    aPTT 36 22 - 38 Seconds   CBC with platelets   Result Value Ref Range    WBC Count 7.7 4.0 - 11.0 10e3/uL    RBC Count 2.39 (L) 3.80 -  5.20 10e6/uL    Hemoglobin 7.1 (L) 11.7 - 15.7 g/dL    Hematocrit 22.0 (L) 35.0 - 47.0 %    MCV 92 78 - 100 fL    MCH 29.7 26.5 - 33.0 pg    MCHC 32.3 31.5 - 36.5 g/dL    RDW 16.6 (H) 10.0 - 15.0 %    Platelet Count 130 (L) 150 - 450 10e3/uL   Magnesium   Result Value Ref Range    Magnesium 1.9 1.7 - 2.3 mg/dL   Comprehensive metabolic panel   Result Value Ref Range    Sodium 139 135 - 145 mmol/L    Potassium 3.6 3.4 - 5.3 mmol/L    Carbon Dioxide (CO2) 22 22 - 29 mmol/L    Anion Gap 5 (L) 7 - 15 mmol/L    Urea Nitrogen 8.1 6.0 - 20.0 mg/dL    Creatinine 0.75 0.51 - 0.95 mg/dL    GFR Estimate >90 >60 mL/min/1.73m2    Calcium 8.0 (L) 8.8 - 10.4 mg/dL    Chloride 112 (H) 98 - 107 mmol/L    Glucose 93 70 - 99 mg/dL    Alkaline Phosphatase 48 40 - 150 U/L    AST 23 0 - 45 U/L    ALT 11 0 - 50 U/L    Protein Total 4.6 (L) 6.4 - 8.3 g/dL    Albumin 2.7 (L) 3.5 - 5.2 g/dL    Bilirubin Total 0.2 <=1.2 mg/dL   Phosphorus   Result Value Ref Range    Phosphorus 2.7 2.5 - 4.5 mg/dL   CBC with platelets   Result Value Ref Range    WBC Count 6.2 4.0 - 11.0 10e3/uL    RBC Count 2.27 (L) 3.80 - 5.20 10e6/uL    Hemoglobin 6.7 (LL) 11.7 - 15.7 g/dL    Hematocrit 21.1 (L) 35.0 - 47.0 %    MCV 93 78 - 100 fL    MCH 29.5 26.5 - 33.0 pg    MCHC 31.8 31.5 - 36.5 g/dL    RDW 16.8 (H) 10.0 - 15.0 %    Platelet Count 123 (L) 150 - 450 10e3/uL   INR   Result Value Ref Range    INR 1.44 (H) 0.85 - 1.15   Prepare red blood cells (unit)   Result Value Ref Range    Blood Component Type Red Blood Cells     Product Code [id number]     Unit Status Ready for issue     Unit Number [id number]     CROSSMATCH Compatible     CODING SYSTEM [id number]        Assessment: 39 year old female who initially presented with abdominal and back and leg paresthesias and was found to have a complex mass in the rectosigmoid space. She has a history of DVT/PE on eliquis, gastric bypass, MDD/SHIVAM, and restless leg who is POD#3 s/p rad hyst, BSO, rectosigmoid colectomy w  post vaginectomy, supracolic omentectomy, diverting loop ileostomy with frozen notable for adenocarcinoma in the setting of known endometriosis. Transferred to SICU POD#2 due to hypotension; now improved with IVF resuscitation and not requiring pressors. Transferred out of SICU on afternoon of POD#2 back to floor.    # Complex mass from rectosigmoid space  # Adenocarcinoma   # Postoperative state  # Hypomag/phos, resolved  - Poor pain control immediately postop, s/p dilaudid PCA; continue multimodal pain regimen with ibuprofen added last night but held this AM due to low Hgb; consider pain consult PRN  - Ostomy in place, pending return of bowel function  - no bowel regimen, nothing per rectum  - CLD, mIVF 75 mL/hr LR > stopped this AM  - ERP  - Cat in place until POD#3 for radical hysterectomy; plan for removal today  - S/p ancef/flagyl for 24hr postop  - Phos and mag wnl this morning    # Acute loss blood anemia  # Coagulopathy  # Hypotension, tachycardia  Overnight, she became hypotensive with tachycardia. SICU was made aware and recommended trial of fluid resuscitation as well as adjustment to meds as listed below. She was initially responsive to the first liter bolus but her MAP dropped below 65 and remained persistently below 60 despite a second liter bolus. This morning, blood pressures and tachycardia have improved s/p IVF resuscitation and discontinuation of dilaudid PCA. Not currently requiring pressors.   - Hgb 11.2 > EBL 2500mL > 2U pRBC postoperatively. AM Hgb 7.5 > 7.1> 10mg Vit K, 1u FFP, 1u pRBC > PM hgb (2 hours post transfusion) 7.1 >1u pRBC > 7.7> 7.6> 7.7> 7.1> 6.7   - prep/transfuse 1U pRBC   - lvx held, NSAIDs held   - INR 1.35 > 1.53 > 1.65, FFP and vitamin K given in the setting of elevated INR and concern for bleeding due to extensive surgery > 1.53 > 1.53 > 1.58> 1.44  - Will monitor closely for vitals, UOP, abdominal exam    # Postoperative fever  Suspect atelectasis as patient requiring  1 LPM O2 by NC. Encourage IS.  - Tm/l 100.9F at 0415 9/2. Subsequent temperature within normal limits.  - UA without signs of infection. Blood culture pending.  - Continue to monitor, consider further workup if recurs    # Hx DVT  # Hx PE  - Most superficial thrombophlebitis found recently 05/2024, on therapeutic Eliquis BID PTA (held).   - Lovenox restarted yesterday - held this morning given hgb decrease    # Hx of gastric bypass, panniculectomy  # Hx of C.diff  History of uncomplicated gastric bypass completed in 2022 followed by panniculectomy in 2023 for excess skin removal. Postoperative recovery c/b C. Diff during inpatient admission.  - Enteric precautions per BMT unit guideline pending negative C. Diff on admission   - Pantoprazole 20mg BID      # Restless leg  # MDD/SHIVAM   # Insomnia   Patient with significant psychiatric medication history in setting of refractory anxiety/depression as well as intermittent RLS. Medication reconcilation perfromed morning of 8/31 with bedside RN, confirmed medications and dosing schedule as below, plan to continue prior to admission regimen while in house.   - PTA regimen: Buspirone 30mg PO BID, Desvenlafaxine 150mg PO qAM, Lorazapam 1mg PO BID, Topirimate 50mg BID, Propanolol 10mg PO BID [held], Quetiapine 200mg PO at bedtime [changed], Zolpidem 5mg PO at bedtime, Trazodone 225mg PO at bedtime [changed], Ramelteon [changed]  - Psychiatry recommendations 9/3 as below   - Seroquel 150 mg at bedtime  - Rozerem 8 mg at bedtime  - May hold the Belsomra   - Trazodone 150 mg at bedtime.  - Ambien 5 mg bedtime  - Continue the PTA doses of Pristiq and Buspar  - If delirium: decrease ativan to 0.5mg/day and discontinue ambien  - patient was offered psychology and declined     # Moderate persistent asthma   Noted per chart review. Patient reports she is largely asymptomatic and has not had exacerbations that have required hospitalizaiton or intubation.   - Flovent 220mcg 1 puff Q12H        PPX: SCDs, IS  Dispo: Pending postoperative goals  Drains/Lines: PIV, waite, ostomy    Lee Fine MD  [facility]  Gynecology Oncology Resident, PGY-2  09/04/2024 10:06 AM    To reach the GYNECOLOGY ONCOLOGY team for this patient, please page [phone]    Provider Disclosure:   I agree with above History, Review of Systems, Physical exam and Plan. I have reviewed the content of the documentation and have edited it as needed. I have seen and personally performed the services documented here and the documentation accurately represents those services and the decisions I have made.     Electronically signed by:   Xavi Al MD   Gynecologic Oncology   [facility]

## 2024-09-04 NOTE — PLAN OF CARE
"Goal Outcome Evaluation:    /65 (BP Location: Right arm)   Pulse 78   Temp 98  F (36.7  C) (Oral)   Resp 16   Ht 1.67 m (5' 5.75\")   Wt 81.2 kg (179 lb 0.2 oz)   SpO2 96%   BMI 29.12 kg/m      POD- 3 rad hyst, BSO, rectosigmoid colectomy w post vaginectomy, supracolic omentectomy, diverting loop ileostomy      PT noted to be comfortable on this shift, VSS, cont on tele monitor, did c/o abdominal pain schedule ibuprofen administer with schedule tylenol. Ileostomy is draining large amount of output, midline incision liquid bandage, KANDY, and cover with ABD. Cat catheter is patent and draining well,  is on LR infusing @ 75 ml/hr, cont POC.       Per lab Hgb 6.7  this morning, order was obtained transfuse 1 PRBC.                  "

## 2024-09-04 NOTE — PROGRESS NOTES
Brief Progress Note    MD to bedside for PM check in. Recently paged that patient has pain despite using all pain meds.     Patient sitting with bed positioned upright, alert. Reports that she is more uncomfortable than last night but understands why she is on a lower regimen now. Added ibuprofen scheduled to help, patient reports she does take this medication at home. Discussed plan for her to receive PO diluadid shortly (due again at 2020) and then her usual evening meds at 2200 as usually she is more drowsy/comfortable after those and then assess. She agrees with this plan. Spoke with RN to page me if she continues to have pain after those medications or if she is hypotensive or febrile (>100.4). No other concerns at this time.    Lee Fine MD  [facility]  Gynecology Oncology Resident, PGY-2  09/03/2024 8:01 PM    To reach the GYNECOLOGY ONCOLOGY team for this patient, please page [phone]

## 2024-09-04 NOTE — PROGRESS NOTES
[facility]  Gyn Onc Brief Progress Note      S: Feeling better than she was this morning. Reports she is less drowsy than earlier. Endorses ongoing pain and feels that the gabapentin isn't helping. She was able to eat almost her whole lunch today without nausea, and is taking in fluids. No BM, ostomy with good output. She is not walking yet and has not voided spontaneously as her waite is still in place.    O:  Vitals:    09/04/24 1140 09/04/24 1221 09/04/24 1324 09/04/24 1426   BP: 108/71 99/66 101/73 106/77   BP Location: Right arm Right arm Right arm    Cuff Size: Adult Regular      Pulse: 78 75 62 88   Resp: 16 17 16 17   Temp: 98.2  F (36.8  C) 98.1  F (36.7  C) 98.2  F (36.8  C) 98.2  F (36.8  C)   TempSrc: Oral Oral Oral Oral   SpO2: 97% 100% 100% 100%   Weight:       Height:            Gen: sitting in chair, alert and aware  CV: RRR  Resp: unlabored breathing  Abd: soft, moderately tender, stoma pink, dark green thick liquid in ostomy bag.   Inc: clean and dry  Ext: no edema    A/P: 39 year old POD#3 rad hyst, BSO, rectosigmoid colectomy w post vaginectomy, supracolic omentectomy, diverting loop ileostomy.     # Postoperative state  - FEN: Low fiber diet  - Pain: scheduled tylenol, robaxin, and gabapentin, PRN oxy. Ibuprofen held given Hgb  - : Waite in place, will be removed after PT today  - MSK: PT at bedside, encouraged patient to walk    # Acute loss blood anemia  # Coagulopathy  # Hypotension, tachycardia  - Hgb 6.7 this AM, now s/p 1u pRBC  - Repeat Hgb at 1800, ordered    # Hx DVT  # Hx PE  - PTA eliquis held, lovenox ordered but held given drop in Hgb. Can restart when Hgb normalizes    Liliana Freedman MD, MSc  Gynecologic Oncology, PGY-1  09/04/2024 3:24 PM    Gyn Onc Pager: [phone]

## 2024-09-04 NOTE — PROGRESS NOTES
[id number] Aox4. VSS. On RA. Pain managed with earnest tylenol, robaxin, gabapentin and prn dilaudid/simethicone. Denies nausea. Midline abd inc intact. Ileostomy leaked x1 around 1100, bag changed and functioning well, +gas and stool. MD notified for a WOC consult. Cat removed at 1650, DTV at 2250. PIV Sl'd. Tolerating diet with good appetite. Worked with PT and up in chair x1. Received 1 unit of blood for hgb of 6.7, recheck 8.7. Replaced mg and phos. Up with A1, GB and a walker.

## 2024-09-04 NOTE — PROVIDER NOTIFICATION
Provider notified (name): Lee ANDERSON  Reason for notification: Hgb low 6.7 per lab   Recommendation/request given to provider: transfuse blood   Response from provider: order obtained

## 2024-09-04 NOTE — PHARMACY-ADMISSION MEDICATION HISTORY
Pharmacist Admission Medication History    Admission medication history is complete. The information provided in this note is only as accurate as the sources available at the time of the update.    Information Source(s): Patient, Family member, and CareEverywhere/SureScripts via in-person    Patient reports taking quetiapine 150 mg PO daily. Only has refill for 200 mg tablets so it is possible the dose is 200 mg daily.     Changes made to PTA medication list:  Added: Propranolol 10 mg PO twice daily  Deleted: -Levonorgestrel 20 mcg/day IUD  -Prednisone 40 mg PO daily  Changed: Quetiapine 150 mg PO daily     Allergies reviewed with patient and updates made in EHR: yes    Medication History Completed By: [place] Sanches, Brooke 9/4/2024 3:46 PM    Prior to Admission medications    Medication Sig Last Dose Taking? Auth Provider Long Term End Date   propranolol (INDERAL) 10 MG tablet Take 10 mg by mouth 2 times daily. 8/30/2024 Yes Unknown, Entered By History     Suvorexant (BELSOMRA) 20 MG tablet Take 20 mg by mouth At Bedtime Past Month Yes Reported, Patient     apixaban ANTICOAGULANT (ELIQUIS) 5 MG tablet Take 1 tablet (5 mg) by mouth 2 times daily 8/30/2024  Abe Mcguire PA-C     busPIRone (BUSPAR) 15 MG tablet Take 22.5 mg by mouth 2 times daily 8/30/2024  Reported, Patient Yes    calcium carbonate-vitamin D (OS-LORY WITH D) 500-200 MG-UNIT tablet Take 2 tablets by mouth daily 8/30/2024  Reported, Patient     Calcium Polycarbophil (FIBER) 625 MG tablet Take 4 tablets (2,500 mg) by mouth daily 8/30/2024  Georgie Hernández, GERALDO CNP     cholecalciferol 25 MCG (1000 UT) TABS Take 2,000 Units by mouth daily 8/30/2024  Reported, Patient     desvenlafaxine (PRISTIQ) 100 MG 24 hr tablet Take 100 mg by mouth daily 8/30/2024  Reported, Patient Yes    desvenlafaxine (PRISTIQ) 50 MG 24 hr tablet TAKE 1 TABLET BY MOUTH ONCE DAILY. TAKE WITH 100MG TABLET FOR TOTAL OF 150MG DAILY 8/30/2024  Reported, Patient Yes    LORazepam  (ATIVAN) 1 MG tablet Take 1 mg by mouth 2 times daily 8/30/2024  Reported, Patient     Multiple Vitamins-Minerals (MULTIVITAL PO) Take 1 tablet by mouth daily 8/30/2024  Reported, Patient     omeprazole (PRILOSEC) 40 MG DR capsule TAKE 1 CAPSULE(40 MG) BY MOUTH DAILY 8/30/2024  [place] Joellen Vidal MD     phentermine (ADIPEX-P) 37.5 MG tablet Take 0.5-1 tablets (18.75-37.5 mg) by mouth every morning (before breakfast). 8/30/2024  [place] Joellen Vidal MD     PROBIOTIC PRODUCT PO Take 1 tablet by mouth At Bedtime 8/30/2024  Reported, Patient     QUEtiapine (SEROQUEL) 200 MG tablet Take 150 mg by mouth at bedtime. 8/30/2024  Reported, Patient No    ramelteon (ROZEREM) 8 MG tablet Take 8 mg by mouth At Bedtime 8/30/2024  Reported, Patient     topiramate (TOPAMAX) 50 MG tablet TAKE 1 TABLET(50 MG) BY MOUTH TWICE DAILY 8/30/2024  [place] Joellen Vidal MD Yes    traZODone (DESYREL) 150 MG tablet Take 225 mg by mouth At Bedtime 8/30/2024  Reported, Patient Yes    zolpidem (AMBIEN) 5 MG tablet Take 5 mg by mouth nightly as needed for sleep 8/30/2024  Reported, Patient No

## 2024-09-04 NOTE — PROVIDER NOTIFICATION
MD paged at 2099:    9616 MD. Patient still having 7/10 pain and grimacing despite all pain meds on board. Anything else we can do? Thanks. Call 5A or phi

## 2024-09-05 LAB
ANION GAP SERPL CALCULATED.3IONS-SCNC: 9 MMOL/L (ref 7–15)
BUN SERPL-MCNC: 8.2 MG/DL (ref 6–20)
CALCIUM SERPL-MCNC: 8.1 MG/DL (ref 8.8–10.4)
CHLORIDE SERPL-SCNC: 113 MMOL/L (ref 98–107)
CREAT SERPL-MCNC: 0.71 MG/DL (ref 0.51–0.95)
EGFRCR SERPLBLD CKD-EPI 2021: >90 ML/MIN/1.73M2
ERYTHROCYTE [DISTWIDTH] IN BLOOD BY AUTOMATED COUNT: 16.3 % (ref 10–15)
GLUCOSE SERPL-MCNC: 96 MG/DL (ref 70–99)
HCO3 SERPL-SCNC: 18 MMOL/L (ref 22–29)
HCT VFR BLD AUTO: 27.1 % (ref 35–47)
HGB BLD-MCNC: 8.4 G/DL (ref 11.7–15.7)
INR PPP: 1.17 (ref 0.85–1.15)
MAGNESIUM SERPL-MCNC: 1.6 MG/DL (ref 1.7–2.3)
MCH RBC QN AUTO: 29.5 PG (ref 26.5–33)
MCHC RBC AUTO-ENTMCNC: 31 G/DL (ref 31.5–36.5)
MCV RBC AUTO: 95 FL (ref 78–100)
PHOSPHATE SERPL-MCNC: 3.4 MG/DL (ref 2.5–4.5)
PLATELET # BLD AUTO: 162 10E3/UL (ref 150–450)
POTASSIUM SERPL-SCNC: 3.8 MMOL/L (ref 3.4–5.3)
RBC # BLD AUTO: 2.85 10E6/UL (ref 3.8–5.2)
SODIUM SERPL-SCNC: 140 MMOL/L (ref 135–145)
WBC # BLD AUTO: 6.6 10E3/UL (ref 4–11)

## 2024-09-05 PROCEDURE — 36415 COLL VENOUS BLD VENIPUNCTURE: CPT

## 2024-09-05 PROCEDURE — 250N000013 HC RX MED GY IP 250 OP 250 PS 637

## 2024-09-05 PROCEDURE — 250N000011 HC RX IP 250 OP 636: Performed by: OBSTETRICS & GYNECOLOGY

## 2024-09-05 PROCEDURE — G0463 HOSPITAL OUTPT CLINIC VISIT: HCPCS

## 2024-09-05 PROCEDURE — 250N000013 HC RX MED GY IP 250 OP 250 PS 637: Performed by: OBSTETRICS & GYNECOLOGY

## 2024-09-05 PROCEDURE — 99024 POSTOP FOLLOW-UP VISIT: CPT | Performed by: OBSTETRICS & GYNECOLOGY

## 2024-09-05 PROCEDURE — 120N000002 HC R&B MED SURG/OB UMMC

## 2024-09-05 PROCEDURE — 85027 COMPLETE CBC AUTOMATED: CPT

## 2024-09-05 PROCEDURE — 85610 PROTHROMBIN TIME: CPT

## 2024-09-05 PROCEDURE — 83735 ASSAY OF MAGNESIUM: CPT

## 2024-09-05 PROCEDURE — 250N000013 HC RX MED GY IP 250 OP 250 PS 637: Performed by: STUDENT IN AN ORGANIZED HEALTH CARE EDUCATION/TRAINING PROGRAM

## 2024-09-05 PROCEDURE — 250N000011 HC RX IP 250 OP 636: Performed by: STUDENT IN AN ORGANIZED HEALTH CARE EDUCATION/TRAINING PROGRAM

## 2024-09-05 PROCEDURE — 80048 BASIC METABOLIC PNL TOTAL CA: CPT

## 2024-09-05 PROCEDURE — 84100 ASSAY OF PHOSPHORUS: CPT

## 2024-09-05 RX ORDER — SIMETHICONE 80 MG
80 TABLET,CHEWABLE ORAL 4 TIMES DAILY
Status: DISCONTINUED | OUTPATIENT
Start: 2024-09-05 | End: 2024-09-06 | Stop reason: HOSPADM

## 2024-09-05 RX ORDER — MAGNESIUM OXIDE 400 MG/1
400 TABLET ORAL EVERY 4 HOURS
Status: COMPLETED | OUTPATIENT
Start: 2024-09-05 | End: 2024-09-05

## 2024-09-05 RX ORDER — DIAPER,BRIEF,INFANT-TODD,DISP
EACH MISCELLANEOUS 2 TIMES DAILY
Status: DISCONTINUED | OUTPATIENT
Start: 2024-09-05 | End: 2024-09-06 | Stop reason: HOSPADM

## 2024-09-05 RX ORDER — ENOXAPARIN SODIUM 100 MG/ML
40 INJECTION SUBCUTANEOUS EVERY 24 HOURS
Status: DISCONTINUED | OUTPATIENT
Start: 2024-09-05 | End: 2024-09-05

## 2024-09-05 RX ORDER — ENOXAPARIN SODIUM 100 MG/ML
1 INJECTION SUBCUTANEOUS EVERY 12 HOURS
Status: DISCONTINUED | OUTPATIENT
Start: 2024-09-05 | End: 2024-09-06 | Stop reason: HOSPADM

## 2024-09-05 RX ADMIN — HYDROMORPHONE HYDROCHLORIDE 4 MG: 4 TABLET ORAL at 04:13

## 2024-09-05 RX ADMIN — PANTOPRAZOLE SODIUM 40 MG: 40 TABLET, DELAYED RELEASE ORAL at 10:12

## 2024-09-05 RX ADMIN — SIMETHICONE 80 MG: 80 TABLET, CHEWABLE ORAL at 18:13

## 2024-09-05 RX ADMIN — MAGNESIUM OXIDE TAB 400 MG (241.3 MG ELEMENTAL MG) 400 MG: 400 (241.3 MG) TAB at 14:40

## 2024-09-05 RX ADMIN — TOPIRAMATE 50 MG: 50 TABLET, FILM COATED ORAL at 10:12

## 2024-09-05 RX ADMIN — BUSPIRONE HYDROCHLORIDE 30 MG: 30 TABLET ORAL at 10:12

## 2024-09-05 RX ADMIN — METHOCARBAMOL 500 MG: 500 TABLET ORAL at 14:40

## 2024-09-05 RX ADMIN — HYDROXYZINE HYDROCHLORIDE 25 MG: 25 TABLET, FILM COATED ORAL at 20:00

## 2024-09-05 RX ADMIN — MAGNESIUM OXIDE TAB 400 MG (241.3 MG ELEMENTAL MG) 400 MG: 400 (241.3 MG) TAB at 10:13

## 2024-09-05 RX ADMIN — ZOLPIDEM TARTRATE 5 MG: 5 TABLET, COATED ORAL at 21:10

## 2024-09-05 RX ADMIN — DESVENLAFAXINE SUCCINATE 150 MG: 100 TABLET, FILM COATED, EXTENDED RELEASE ORAL at 21:08

## 2024-09-05 RX ADMIN — LORAZEPAM 1 MG: 0.5 TABLET ORAL at 10:41

## 2024-09-05 RX ADMIN — ENOXAPARIN SODIUM 80 MG: 80 INJECTION SUBCUTANEOUS at 21:10

## 2024-09-05 RX ADMIN — TRAZODONE HYDROCHLORIDE 150 MG: 150 TABLET ORAL at 21:08

## 2024-09-05 RX ADMIN — METHOCARBAMOL 500 MG: 500 TABLET ORAL at 18:13

## 2024-09-05 RX ADMIN — HYDROMORPHONE HYDROCHLORIDE 4 MG: 4 TABLET ORAL at 20:11

## 2024-09-05 RX ADMIN — LORAZEPAM 0.5 MG: 0.5 TABLET ORAL at 21:10

## 2024-09-05 RX ADMIN — Medication 150 MG: at 21:10

## 2024-09-05 RX ADMIN — GABAPENTIN 300 MG: 300 CAPSULE ORAL at 14:40

## 2024-09-05 RX ADMIN — HYDROMORPHONE HYDROCHLORIDE 2 MG: 4 TABLET ORAL at 12:52

## 2024-09-05 RX ADMIN — ACETAMINOPHEN 650 MG: 325 TABLET ORAL at 10:12

## 2024-09-05 RX ADMIN — SIMETHICONE 80 MG: 80 TABLET, CHEWABLE ORAL at 21:08

## 2024-09-05 RX ADMIN — GABAPENTIN 300 MG: 300 CAPSULE ORAL at 19:59

## 2024-09-05 RX ADMIN — METHOCARBAMOL 500 MG: 500 TABLET ORAL at 10:12

## 2024-09-05 RX ADMIN — ACETAMINOPHEN 650 MG: 325 TABLET ORAL at 21:09

## 2024-09-05 RX ADMIN — ACETAMINOPHEN 650 MG: 325 TABLET ORAL at 17:08

## 2024-09-05 RX ADMIN — HYDROMORPHONE HYDROCHLORIDE 4 MG: 4 TABLET ORAL at 14:40

## 2024-09-05 RX ADMIN — HYDROMORPHONE HYDROCHLORIDE 4 MG: 4 TABLET ORAL at 10:12

## 2024-09-05 RX ADMIN — METHOCARBAMOL 500 MG: 500 TABLET ORAL at 21:09

## 2024-09-05 RX ADMIN — ENOXAPARIN SODIUM 40 MG: 40 INJECTION SUBCUTANEOUS at 12:52

## 2024-09-05 RX ADMIN — SIMETHICONE 80 MG: 80 TABLET, CHEWABLE ORAL at 14:40

## 2024-09-05 RX ADMIN — HYDROCORTISONE: 0.5 CREAM TOPICAL at 22:53

## 2024-09-05 RX ADMIN — ACETAMINOPHEN 650 MG: 325 TABLET ORAL at 04:12

## 2024-09-05 RX ADMIN — RAMELTEON 8 MG: 8 TABLET ORAL at 21:08

## 2024-09-05 RX ADMIN — SIMETHICONE 80 MG: 80 TABLET, CHEWABLE ORAL at 10:12

## 2024-09-05 RX ADMIN — TOPIRAMATE 50 MG: 50 TABLET, FILM COATED ORAL at 19:59

## 2024-09-05 RX ADMIN — HYDROMORPHONE HYDROCHLORIDE 2 MG: 4 TABLET ORAL at 18:13

## 2024-09-05 RX ADMIN — BUSPIRONE HYDROCHLORIDE 30 MG: 30 TABLET ORAL at 19:59

## 2024-09-05 RX ADMIN — GABAPENTIN 300 MG: 300 CAPSULE ORAL at 10:13

## 2024-09-05 ASSESSMENT — ACTIVITIES OF DAILY LIVING (ADL)
ADLS_ACUITY_SCORE: 30
ADLS_ACUITY_SCORE: 29
ADLS_ACUITY_SCORE: 30
ADLS_ACUITY_SCORE: 29
ADLS_ACUITY_SCORE: 33
ADLS_ACUITY_SCORE: 30
ADLS_ACUITY_SCORE: 29
ADLS_ACUITY_SCORE: 30
ADLS_ACUITY_SCORE: 29

## 2024-09-05 NOTE — PROGRESS NOTES
Care Management Follow Up    Length of Stay (days): 5    Expected Discharge Date: 09/06/2024     Concerns to be Addressed: all concerns addressed in this encounter     Patient plan of care discussed at interdisciplinary rounds: Yes    Anticipated Discharge Disposition: Home Care, DME     Anticipated Discharge Services: Home Care SN and PT  Anticipated Discharge DME: Other (see comment) (ostomy)    Patient/family educated on Medicare website which has current facility and service quality ratings: no  Education Provided on the Discharge Plan: Yes  Patient/Family in Agreement with the Plan: yes    Referrals Placed by CM/SW: Homecare  Private pay costs discussed: to be discussed once home care accepting.    Discussed  Partnership in Safe Discharge Planning  document with patient/family:      Handoff Completed: NO -IHO    Additional Information:  low back pain, L leg pain and numbness, pelvic mass; POD#4 rad hyst, BSO, rectosigmoid colectomy w post vaginectomy, supracolic omentectomy, diverting loop ileostomy   Patient has PIV, waite was removed yesterday but patient struggled to urinated overnight. Waite may need to be placed- pending.   Home Care referral sent to [facility] for SN for new ostomy.    1400 ACCEPTING AGENCY:     [facility] for SN/PT with delayed SOC 9/9    Services Available   Home Health Services      Address   [address]  [facility] [address]             Contact Information    [phone]        Next Steps:   RNCC to confirm discharge with team 9/6- SOC will be 9/9 approved by provider.  Update accepting HC agency and update patient. SN and PT  MABLE 9/6    Enma Umanzor RNCC float  Nurse Coordinator    Covering for 5A  Phone [phone]  Social Work and Care Management Department   SEARCHABLE in AMCOM - search CARE COORDINATOR     [place] [address] [place] ([phone]) Saturday & Sunday; [phone]) [facility] Recognized Holidays     Units: 5A Onc 5201 thru 5263  RNCC, 5A Onc 5220 thru 5240 RNCC, 5C OFFSERVICE 4094-7103 RNCC & 5C OFF SERVICE 0533-1230 RNCC Pager: [phone]    Units: 6B Vocera, 6C Card 6401 thru 6420 RNCC, 6C Card 6502 thru 6514 RNCC, & 6C Card 6515 thru 6519 RNCC  Pager: [phone]    Units: 7A SOT RNCC Vocera, 7B Med Surg Vocera, 7C Med Surg 7401 thru 7418 RNCC & 7C Med Surg 7502 thru 0525 RNCC Pager: [phone]    Units: 6A Vocera & 4A CVICU Vocera, 4C MICU Vocera, and 4E SICU Vocera   Pager: [phone]    Units: 5 Ortho Vocera & 5 Med Surg Vocera  Pager: [phone]    Units: 6 Med Surg Vocera & 8 Med Surg Vocera  Pager [phone]

## 2024-09-05 NOTE — PLAN OF CARE
"/55 (BP Location: Left arm)   Pulse 97   Temp 98.5  F (36.9  C) (Oral)   Resp 18   Ht 1.67 m (5' 5.75\")   Wt 81 kg (178 lb 9.2 oz)   SpO2 98%   BMI 29.04 kg/m        Status: POD # 5 s/p hysterectomy, sigmoid colon resection with creation of temporary ileostomy  Activity: SBA/Ax1 + walker  Neuros: A&Ox4, tearful, no deficits noted.  Cardiac: WDL, denies chest pain.  Respiratory: WDL on RA, denies SOB.  GI/: +BS, + stool and gas output via ileostomy. Cat removed last evening, due to void at 2250, but unable to void spontaneously; straight cath x2 overnight with adequate output.  Diet: Tolerating regular/low fiber diet.  Skin/Incisions: WDL ex midline incision derma-bonded, KANDY/CDI.  Lines/Drains: PIC SL, ileostomy CDI with small output.  Pain/Nausea: Pain managed with PRN PO Dilaudid and scheduled meds. Denies nausea.  New Changes: No acute changes this shift ex pt unable to void spontaneously overnight; straight caths with adequate urine output, team aware.      "

## 2024-09-05 NOTE — CONSULTS
[facility]  WO Nurse Inpatient Assessment     Consulted for: new diverting loop ileostomy       Patient History (according to provider note(s):      39 year old female who initially presented with abdominal and back and leg paresthesias and was found to have a complex mass in the rectosigmoid space. She has a history of DVT/PE on eliquis, gastric bypass, MDD/SHIVAM, and restless leg who is POD#4 s/p rad hyst, BSO, rectosigmoid colectomy w post vaginectomy, supracolic omentectomy, diverting loop ileostomy with frozen notable for adenocarcinoma in the setting of known endometriosis. Transferred to SICU POD#2 due to hypotension; now improved with IVF resuscitation and not requiring pressors. Transferred out of SICU on afternoon of POD#2 back to floor.     Assessment:      Areas visualized during today's visit: Abdomen    Assessment of new loop Ileostomy:  Diagnosis Pertinent to Stoma: Cancer - Colon or Rectum      Surgery Date: 9/1/2024--[facility] service not consulted until 9/4  Surgeon: Jessenia Sosa MD      Hospital: [facility]  Pouching system in place on assessment today: Jose two piece, flat, and barrier ring   Pouch barrier status: Leaking at 9 o clock  Pouch last changed/wear time: 1 day  Reason for pouch change today: ostomy education, leakage, and initial post-op assessment  Effectiveness of current pouching/ supply plan: Attempting new system, will re-evaluate next assessment--pt requesting two piece if possible so trying again prior to switching to one piece or may need soft convex two piece next if current bag fails, os is at 12 o clock near skin.   Change made with ostomy management today: No  Pouching system placed today: Jose two piece, flat, and barrier ring   Supplies: gathered and at bedside    Stoma location: RLQ  Stoma size: 1 3/8 x 1 5/8 inches,   Stoma appearance: edematous and protruberant, appears os is at 12 o clock near skin  Mucocutaneous  "junction:  intact  Peristomal complication(s): none   Output: watery, brown, and undigested food   Output volume emptied during visit: 80ml--placed in I&O section of flowsheet   Abdominal assessment: Soft  Surgical site(s): staples intact  NG still in place? No  Pain: Cramping and Sharp  Is patient still on a PCA? No    Ostomy education assessment:  Participant of teaching session today: patient   Education completed today: Initial fitting, Stoma assessment, Pouching system assessment , Pouch change demonstration, Ostomy accessory product use , Introduction to pouches, Peristomal skin care, Pouch emptying demonstration, and Intake and output recording  Educational materials/methods: Verbal and Demonstration  Education still needed: Pouch change return demonstration, Pouch emptying return demonstration, Fluid and electrolyte balance , Importance of hydration, When to seek medical attention, Low fiber diet , Odor/flatus management , Infection prevention/hygiene , Hernia prevention, Lifestyle adjustments , and Discharge instructions  Learning Comprehension:   Psychosocial assessment: Anxious, ostomy was unexpected. Patient's father has a urostomy and colostomy, per pt she has \"only heard him complain about it\"  Patient readiness for education today: observing and attentive  Following today's visit: patient  is able to demonstrate;         1. How to empty their pouch? Demo provided         2. How to change their pouch? Demo provided         3. How to read and record intake and output correctly? Yes  Preparation for discharge completed: No discharge preparation started yet  Preparation for discharge still needed: Placed prescription recommendations in discharge navigator for MD to sign, Ensured patient has extra supplies for discharge, Discussed how to order supplies after discharge , Ordered samples from  after gaining consent from patient/caregiver, Discussed how and when to make an outpatient WOC nurse " "appointment after discharge, Prepared for discharge home with home care, and Discuss signs/symptoms of when to seek medical attention  Pt support system on discharge: parents   WOC recommend home care? Yes and By WOC RN if possible  Face to face time: 30 minutes    Treatment Plan:     RLQ Ileostomy pouching plan:   Pouching system: ostomy supplies pouches: [place] 57 FECAL ([id number]) ostomy supplies barrier: [place] 57mm FLAT ([id number])  Accessories used: WO ostomy accessories: 2\" Cera Barrier Ring ([id number])   Frequency of pouch changes: Twice weekly and PRN leakage  WOC follow up plan: Daily Monday-Friday (as able)  Bedside RN interventions: Change pouch PRN if leaking using the supplies above, Empty pouch when 1/3 to 1/2 full, ensure to clean pouch outlet after emptying to prevent odor, Notify WOC for ongoing pouch leakage, Document stoma appearance and output volume, color, and consistency every shift, and Encourage patient to empty pouch with assist     Orders: Written    RECOMMEND PRIMARY TEAM ORDER: None, at this time  Education provided: plan of care  Discussed plan of care with: Patient  WOC nurse follow-up plan: daily M-F  Notify WOC if wound(s) deteriorate.  Nursing to notify the Provider(s) and re-consult the WOC Nurse if new skin concern.    DATA:     Current support surface: Standard  Standard gel mattress (Isoflex)  Containment of urine/stool: Continent of bladder  BMI: Body mass index is 29.04 kg/m .   Active diet order: Orders Placed This Encounter      Advance Diet as Tolerated: Low Fiber     Output: I/O last 3 completed shifts:  In: 150 [P.O.:120]  Out: 2975 [Urine:1825; Stool:1150]     Labs:   Recent Labs   Lab 09/05/24  0530 09/04/24  1740 09/04/24  0421 09/03/24  1204 09/03/24  0956   ALBUMIN  --   --  2.7*  --   --    HGB 8.4*   < > 6.7*   < >  --    INR 1.17*  --  1.44*  --   --    WBC 6.6   < > 6.2   < >  --    A1C  --   --   --   --  5.2    < > = values in this interval not displayed. "     Pressure injury risk assessment:   Sensory Perception: 3-->slightly limited  Moisture: 3-->occasionally moist  Activity: 3-->walks occasionally  Mobility: 3-->slightly limited  Nutrition: 2-->probably inadequate  Friction and Shear: 2-->potential problem  Terry Score: 16      Pager no longer is use, please contact through [employer] group: [facility] Nurse [place]   Dept. Office Number: [phone]

## 2024-09-05 NOTE — PROGRESS NOTES
"Brief Progress Note    MD to bedside for PM check in. Patient tearful tonight, feeling overwhelmed and anxious. Tried to sit up to eat but had too much pain and is now very anxious about making it to the bathroom as she no longer has a waite. Encouraged her to call for help when she is going to ambulate or when she feels like she needs to go to the restroom. She is endorsing some pain but happy to repeat the plan from last evening of taking her dilaudid and then her bedtime meds and seeing what she needs from their. Reviewed her PRNs and that she has to request them, also reviewed that she can take her evening meds earlier as they are scheduled for \"bedtime\". Spoke with RN that patient may want these meds closer to 9/930 than 10 so she can sleep earlier, RN aware.      Lee Fine MD  [facility]  Gynecology Oncology Resident, PGY-2  09/04/2024 8:32 PM    To reach the GYNECOLOGY ONCOLOGY team for this patient, please page [phone]     "

## 2024-09-05 NOTE — PLAN OF CARE
Goal Outcome Evaluation:      Plan of Care Reviewed With: patient    Overall Patient Progress: improvingOverall Patient Progress: improving    POD 4 rad hyst, BSO, rectosigmoid colectomy w post vaginectomy, supracolic omentectomy, diverting loop ileostomy. AVSS on room air. Pain somewhat managed with scheduled Tylenol, Robaxin, Simethicone and prn Dilaudid. On scheduled Ativan. Tearful at times but patient motivated to learn about how to take care of herself. Positive reinforcement given. Unable to void, waite was replaced and with good output. Ileostomy with gas and stool. WOC worked with patient. Midline incision with liquid bandage. PIV saline locked. Showered. Up with stand by assist. Continue with monitor ostomy output and encourage independence.

## 2024-09-05 NOTE — PROGRESS NOTES
Gynecology Oncology Progress Note  09/05/2024      HD#6 low back pain, L leg pain and numbness, pelvic mass; POD#4 rad hyst, BSO, rectosigmoid colectomy w post vaginectomy, supracolic omentectomy, diverting loop ileostomy    Disease: Frozen: adenocarcinoma, CTAP 12.1 x 11.9 x 1.1 cm cystic mass in the central pelvis w/ intermediate thickness irregular septation, enhancing nodularity and calcification.  27, AFP/HcG wnl     24 hour events:   - hgb 6.7> 1u pRBC, lvx/NSAIDs held  - waite out  - seen by PT  - post transfusion 8.7    Subjective: Doing well this morning. Frustrated that she was unable to pee overnight, feeling like her body isn't working right. Tolerated PO without nausea or emesis. Gas per ostomy. Reports it was very full this morning and was just changed. Discussed that if she cannot pee this morning she may have the waite replaced. Explained concern for bladder distention. She expressed understanding.     Objective:   Patient Vitals for the past 24 hrs:   BP Temp Temp src Pulse Resp SpO2 Weight   09/05/24 0449 102/69 98.2  F (36.8  C) Oral 74 16 99 % --   09/05/24 0005 110/78 98  F (36.7  C) -- 78 16 98 % --   09/04/24 1926 106/55 98.5  F (36.9  C) Oral 97 18 98 % --   09/04/24 1500 115/77 98  F (36.7  C) Oral 90 17 100 % --   09/04/24 1426 106/77 98.2  F (36.8  C) Oral 88 17 100 % --   09/04/24 1324 101/73 98.2  F (36.8  C) Oral 62 16 100 % --   09/04/24 1221 99/66 98.1  F (36.7  C) Oral 75 17 100 % --   09/04/24 1140 108/71 98.2  F (36.8  C) Oral 78 16 97 % --   09/04/24 0700 93/55 97.8  F (36.6  C) Oral -- 16 97 % 81 kg (178 lb 9.2 oz)       General: NAD, appears comfortable in bed  CV: RRR, no m/r/g  Resp: CTAB anteriorly, no wheezes  Abdomen: soft, tender to palpation diffusely, non-distended  Incision: c/d/i, dressing in place, ileostomy pink with output in bag  Extremities: warm, well-perfused, nontender, trace edema, SCDs in place    I/Os  (Yesterday // Since Midnight)  PO none documented  // 120cc  IVF none documented  Urine 1775cc // 400cc    Orders reflect strict I/Os should be documented    UOP: 0.91 cc/kg/hr (yday) // 0.82 cc/kg/hr (since [place])    New Labs/Imaging-   Results for orders placed or performed during the hospital encounter of 08/31/24 (from the past 24 hour(s))   CBC with platelets   Result Value Ref Range    WBC Count 8.4 4.0 - 11.0 10e3/uL    RBC Count 2.96 (L) 3.80 - 5.20 10e6/uL    Hemoglobin 8.7 (L) 11.7 - 15.7 g/dL    Hematocrit 27.3 (L) 35.0 - 47.0 %    MCV 92 78 - 100 fL    MCH 29.4 26.5 - 33.0 pg    MCHC 31.9 31.5 - 36.5 g/dL    RDW 16.0 (H) 10.0 - 15.0 %    Platelet Count 154 150 - 450 10e3/uL   CBC with platelets   Result Value Ref Range    WBC Count 6.6 4.0 - 11.0 10e3/uL    RBC Count 2.85 (L) 3.80 - 5.20 10e6/uL    Hemoglobin 8.4 (L) 11.7 - 15.7 g/dL    Hematocrit 27.1 (L) 35.0 - 47.0 %    MCV 95 78 - 100 fL    MCH 29.5 26.5 - 33.0 pg    MCHC 31.0 (L) 31.5 - 36.5 g/dL    RDW 16.3 (H) 10.0 - 15.0 %    Platelet Count 162 150 - 450 10e3/uL   INR   Result Value Ref Range    INR 1.17 (H) 0.85 - 1.15       Assessment: 39 year old female who initially presented with abdominal and back and leg paresthesias and was found to have a complex mass in the rectosigmoid space. She has a history of DVT/PE on eliquis, gastric bypass, MDD/SHIVAM, and restless leg who is POD#4 s/p rad hyst, BSO, rectosigmoid colectomy w post vaginectomy, supracolic omentectomy, diverting loop ileostomy with frozen notable for adenocarcinoma in the setting of known endometriosis. Transferred to SICU POD#2 due to hypotension; now improved with IVF resuscitation and not requiring pressors. Transferred out of SICU on afternoon of POD#2 back to floor.    # Complex mass from rectosigmoid space  # Adenocarcinoma   # Postoperative state  # Hypomag/phos, resolved  - Poor pain control immediately postop, s/p dilaudid PCA; continue multimodal pain regimen; consider pain consult PRN  - Ostomy in place, pending return of  bowel function  - no bowel regimen, nothing per rectum  - ERP  - S/p Waite with straight cath x2 overnight > will reassess at 1000, and if no spontaneous voiding, will replace waite  - S/p ancef/flagyl for 24hr postop  - Phos wnl this morning  - Mag 1.6    # Acute loss blood anemia  # Coagulopathy  # Hypotension, tachycardia  Overnight, she became hypotensive with tachycardia. SICU was made aware and recommended trial of fluid resuscitation as well as adjustment to meds as listed below. She was initially responsive to the first liter bolus but her MAP dropped below 65 and remained persistently below 60 despite a second liter bolus. This morning, blood pressures and tachycardia have improved s/p IVF resuscitation and discontinuation of dilaudid PCA. Not currently requiring pressors.   - Hgb 11.2 > EBL 2500mL > 2U pRBC postoperatively. AM Hgb 7.5 > 7.1> 10mg Vit K, 1u FFP, 1u pRBC > PM hgb (2 hours post transfusion) 7.1 >1u pRBC > 7.7> 7.6> 7.7> 7.1> 6.7> 1u pRBC> 8.7  - INR 1.35 > 1.53 > 1.65, FFP and vitamin K given in the setting of elevated INR and concern for bleeding due to extensive surgery > 1.53 > 1.53 > 1.58> 1.44 > 1.17  - Will monitor closely for vitals, UOP, abdominal exam    # Postoperative fever  Suspect atelectasis as patient requiring 1 LPM O2 by NC. Encourage IS.  - Tm/l 100.9F at 0415 9/2. Subsequent temperature within normal limits.  - UA without signs of infection. Blood culture pending.  - Continue to monitor, consider further workup if recurs    # Hx DVT  # Hx PE  - Most superficial thrombophlebitis found recently 05/2024, on therapeutic Eliquis BID PTA (held).   - Lovenox restarted this AM (9/5)    # Hx of gastric bypass, panniculectomy  # Hx of C.diff  History of uncomplicated gastric bypass completed in 2022 followed by panniculectomy in 2023 for excess skin removal. Postoperative recovery c/b C. Diff during inpatient admission.  - Enteric precautions per BMT unit guideline pending negative  C. Diff on admission   - Pantoprazole 20mg BID      # Restless leg  # MDD/SHIVAM   # Insomnia   Patient with significant psychiatric medication history in setting of refractory anxiety/depression as well as intermittent RLS. Medication reconcilation perfromed morning of 8/31 with bedside RN, confirmed medications and dosing schedule as below, plan to continue prior to admission regimen while in house.   - PTA regimen: Buspirone 30mg PO BID, Desvenlafaxine 150mg PO qAM, Lorazapam 1mg PO BID, Topirimate 50mg BID, Propanolol 10mg PO BID [held], Quetiapine 200mg PO at bedtime [changed], Zolpidem 5mg PO at bedtime, Trazodone 225mg PO at bedtime [changed], Ramelteon [changed]  - Psychiatry recommendations 9/3 as below   - Seroquel 150 mg at bedtime  - Rozerem 8 mg at bedtime  - May hold the Belsomra   - Trazodone 150 mg at bedtime.  - Ambien 5 mg bedtime  - Continue the PTA doses of Pristiq and Buspar  - If delirium: decrease ativan to 0.5mg/day and discontinue ambien  - patient was offered psychology and declined     # Moderate persistent asthma   Noted per chart review. Patient reports she is largely asymptomatic and has not had exacerbations that have required hospitalizaiton or intubation.   - Flovent 220mcg 1 puff Q12H       PPX: SCDs, IS  Dispo: Pending postoperative goals  Drains/Lines: mariann BRYAN ostomy Andrea Clinch, MD  [facility]  Gynecology Oncology Resident, PGY-2  09/05/2024 6:11 AM    To reach the GYNECOLOGY ONCOLOGY team for this patient, please page [phone]    Provider Disclosure:   I agree with above History, Review of Systems, Physical exam and Plan. I have reviewed the content of the documentation and have edited it as needed. I have seen and personally performed the services documented here and the documentation accurately represents those services and the decisions I have made.     Electronically signed by:   Xavi Al MD   Gynecologic Oncology    [facility]

## 2024-09-06 ENCOUNTER — APPOINTMENT (OUTPATIENT)
Dept: PHYSICAL THERAPY | Facility: CLINIC | Age: 39
End: 2024-09-06
Attending: OBSTETRICS & GYNECOLOGY
Payer: COMMERCIAL

## 2024-09-06 ENCOUNTER — TELEPHONE (OUTPATIENT)
Dept: FAMILY MEDICINE | Facility: CLINIC | Age: 39
End: 2024-09-06

## 2024-09-06 VITALS
OXYGEN SATURATION: 97 % | BODY MASS INDEX: 28.7 KG/M2 | DIASTOLIC BLOOD PRESSURE: 76 MMHG | HEART RATE: 78 BPM | RESPIRATION RATE: 16 BRPM | HEIGHT: 66 IN | WEIGHT: 178.57 LBS | TEMPERATURE: 98.2 F | SYSTOLIC BLOOD PRESSURE: 107 MMHG

## 2024-09-06 LAB
ANION GAP SERPL CALCULATED.3IONS-SCNC: 8 MMOL/L (ref 7–15)
BUN SERPL-MCNC: 9.1 MG/DL (ref 6–20)
CALCIUM SERPL-MCNC: 8.5 MG/DL (ref 8.8–10.4)
CHLORIDE SERPL-SCNC: 111 MMOL/L (ref 98–107)
CREAT SERPL-MCNC: 0.72 MG/DL (ref 0.51–0.95)
EGFRCR SERPLBLD CKD-EPI 2021: >90 ML/MIN/1.73M2
ERYTHROCYTE [DISTWIDTH] IN BLOOD BY AUTOMATED COUNT: 15.9 % (ref 10–15)
GLUCOSE SERPL-MCNC: 92 MG/DL (ref 70–99)
HCO3 SERPL-SCNC: 19 MMOL/L (ref 22–29)
HCT VFR BLD AUTO: 27.5 % (ref 35–47)
HGB BLD-MCNC: 8.8 G/DL (ref 11.7–15.7)
INR PPP: 1.14 (ref 0.85–1.15)
MAGNESIUM SERPL-MCNC: 1.7 MG/DL (ref 1.7–2.3)
MCH RBC QN AUTO: 29.7 PG (ref 26.5–33)
MCHC RBC AUTO-ENTMCNC: 32 G/DL (ref 31.5–36.5)
MCV RBC AUTO: 93 FL (ref 78–100)
PHOSPHATE SERPL-MCNC: 3.9 MG/DL (ref 2.5–4.5)
PLATELET # BLD AUTO: 192 10E3/UL (ref 150–450)
POTASSIUM SERPL-SCNC: 3.8 MMOL/L (ref 3.4–5.3)
RBC # BLD AUTO: 2.96 10E6/UL (ref 3.8–5.2)
SODIUM SERPL-SCNC: 138 MMOL/L (ref 135–145)
WBC # BLD AUTO: 5.9 10E3/UL (ref 4–11)

## 2024-09-06 PROCEDURE — 999N000111 HC STATISTIC OT IP EVAL DEFER

## 2024-09-06 PROCEDURE — 36415 COLL VENOUS BLD VENIPUNCTURE: CPT

## 2024-09-06 PROCEDURE — 250N000013 HC RX MED GY IP 250 OP 250 PS 637

## 2024-09-06 PROCEDURE — 85048 AUTOMATED LEUKOCYTE COUNT: CPT

## 2024-09-06 PROCEDURE — 85610 PROTHROMBIN TIME: CPT

## 2024-09-06 PROCEDURE — 80048 BASIC METABOLIC PNL TOTAL CA: CPT

## 2024-09-06 PROCEDURE — 250N000013 HC RX MED GY IP 250 OP 250 PS 637: Performed by: OBSTETRICS & GYNECOLOGY

## 2024-09-06 PROCEDURE — G0463 HOSPITAL OUTPT CLINIC VISIT: HCPCS

## 2024-09-06 PROCEDURE — 83735 ASSAY OF MAGNESIUM: CPT

## 2024-09-06 PROCEDURE — 250N000011 HC RX IP 250 OP 636: Performed by: STUDENT IN AN ORGANIZED HEALTH CARE EDUCATION/TRAINING PROGRAM

## 2024-09-06 PROCEDURE — 99024 POSTOP FOLLOW-UP VISIT: CPT | Performed by: OBSTETRICS & GYNECOLOGY

## 2024-09-06 PROCEDURE — 84100 ASSAY OF PHOSPHORUS: CPT

## 2024-09-06 PROCEDURE — 250N000013 HC RX MED GY IP 250 OP 250 PS 637: Performed by: STUDENT IN AN ORGANIZED HEALTH CARE EDUCATION/TRAINING PROGRAM

## 2024-09-06 PROCEDURE — 97116 GAIT TRAINING THERAPY: CPT | Mod: GP

## 2024-09-06 RX ORDER — METHOCARBAMOL 500 MG/1
500 TABLET, FILM COATED ORAL EVERY 6 HOURS PRN
Qty: 28 TABLET | Refills: 0 | Status: ON HOLD | OUTPATIENT
Start: 2024-09-06 | End: 2024-09-18

## 2024-09-06 RX ORDER — ACETAMINOPHEN 325 MG/1
650 TABLET ORAL EVERY 6 HOURS
COMMUNITY
Start: 2024-09-06

## 2024-09-06 RX ORDER — SIMETHICONE 80 MG
80 TABLET,CHEWABLE ORAL EVERY 6 HOURS PRN
Qty: 120 TABLET | Refills: 2 | Status: SHIPPED | OUTPATIENT
Start: 2024-09-06 | End: 2024-12-05

## 2024-09-06 RX ORDER — GABAPENTIN 300 MG/1
300 CAPSULE ORAL 3 TIMES DAILY
Qty: 42 CAPSULE | Refills: 0 | Status: SHIPPED | OUTPATIENT
Start: 2024-09-06 | End: 2024-09-20

## 2024-09-06 RX ORDER — ALBUTEROL SULFATE 90 UG/1
2 AEROSOL, METERED RESPIRATORY (INHALATION) 4 TIMES DAILY PRN
Qty: 18 G | Refills: 1 | Status: CANCELLED | OUTPATIENT
Start: 2024-09-06

## 2024-09-06 RX ORDER — HYDROMORPHONE HYDROCHLORIDE 4 MG/1
4 TABLET ORAL EVERY 4 HOURS PRN
Qty: 42 TABLET | Refills: 0 | Status: ON HOLD | OUTPATIENT
Start: 2024-09-06 | End: 2024-09-18

## 2024-09-06 RX ADMIN — SIMETHICONE 80 MG: 80 TABLET, CHEWABLE ORAL at 12:46

## 2024-09-06 RX ADMIN — HYDROMORPHONE HYDROCHLORIDE 4 MG: 4 TABLET ORAL at 12:29

## 2024-09-06 RX ADMIN — SIMETHICONE 80 MG: 80 TABLET, CHEWABLE ORAL at 08:16

## 2024-09-06 RX ADMIN — METHOCARBAMOL 500 MG: 500 TABLET ORAL at 12:46

## 2024-09-06 RX ADMIN — ACETAMINOPHEN 650 MG: 325 TABLET ORAL at 15:58

## 2024-09-06 RX ADMIN — METHOCARBAMOL 500 MG: 500 TABLET ORAL at 08:16

## 2024-09-06 RX ADMIN — GABAPENTIN 300 MG: 300 CAPSULE ORAL at 15:58

## 2024-09-06 RX ADMIN — ACETAMINOPHEN 650 MG: 325 TABLET ORAL at 04:03

## 2024-09-06 RX ADMIN — SIMETHICONE 80 MG: 80 TABLET, CHEWABLE ORAL at 15:58

## 2024-09-06 RX ADMIN — GABAPENTIN 300 MG: 300 CAPSULE ORAL at 08:16

## 2024-09-06 RX ADMIN — HYDROMORPHONE HYDROCHLORIDE 4 MG: 4 TABLET ORAL at 04:03

## 2024-09-06 RX ADMIN — TOPIRAMATE 50 MG: 50 TABLET, FILM COATED ORAL at 08:16

## 2024-09-06 RX ADMIN — HYDROCORTISONE: 0.5 CREAM TOPICAL at 08:17

## 2024-09-06 RX ADMIN — LORAZEPAM 1 MG: 0.5 TABLET ORAL at 08:16

## 2024-09-06 RX ADMIN — METHOCARBAMOL 500 MG: 500 TABLET ORAL at 15:58

## 2024-09-06 RX ADMIN — HYDROMORPHONE HYDROCHLORIDE 4 MG: 4 TABLET ORAL at 08:15

## 2024-09-06 RX ADMIN — ACETAMINOPHEN 650 MG: 325 TABLET ORAL at 10:47

## 2024-09-06 RX ADMIN — BUSPIRONE HYDROCHLORIDE 30 MG: 30 TABLET ORAL at 08:16

## 2024-09-06 RX ADMIN — ENOXAPARIN SODIUM 80 MG: 80 INJECTION SUBCUTANEOUS at 10:47

## 2024-09-06 RX ADMIN — MICONAZOLE NITRATE: 20 POWDER TOPICAL at 08:17

## 2024-09-06 RX ADMIN — PANTOPRAZOLE SODIUM 40 MG: 40 TABLET, DELAYED RELEASE ORAL at 08:16

## 2024-09-06 ASSESSMENT — ACTIVITIES OF DAILY LIVING (ADL)
ADLS_ACUITY_SCORE: 34
ADLS_ACUITY_SCORE: 33
ADLS_ACUITY_SCORE: 34
ADLS_ACUITY_SCORE: 33
ADLS_ACUITY_SCORE: 34

## 2024-09-06 NOTE — PROGRESS NOTES
CLINICAL NUTRITION SERVICES    Reason for Assessment:    Nutrition education regarding low fiber diet education    Diet History:  Patient has no history of low fiber diet education.    Nutrition Diagnosis: Food- and nutrition-related knowledge deficit r/t no previous knowledge of low fiber diet as evidenced by patient report    Interventions:    Provided instruction on low fiber diet. Discussed each food group and foods to eat and avoid. Answered patient's questions regarding diet.     Provided handouts : Fiber-Restricted Nutrition Therapy (Nutrition Care Manual).    Goals:     Patient will verbalize at least 5 low fiber foods acceptable on diet and 5 high fiber foods to avoid.    Follow-up:      Patient to ask any further nutrition-related questions before discharge.  In addition, pt may request outpatient RD appointment.    Jerrica Horton MS/RD/LD/CNSC  Available on [employer]   5A (non-Heme Onc pts) and 7B ([phone])   Weekend/Holiday Dietitian (7am-3:30pm)    ** Clinical Dietitians no longer available on pager

## 2024-09-06 NOTE — PROGRESS NOTES
Care Management Discharge Note    Discharge Date: 09/06/2024       Discharge Disposition: Home Care, DME (snet home with waite & ostomy)    Discharge Services: Home Care    Discharge DME: Other (see comment) (ostomy)    Discharge Transportation: family or friend will provide    Private pay costs discussed: Not applicable    Does the patient's insurance plan have a 3 day qualifying hospital stay waiver?  No    PAS Confirmation Code: n/a  Patient/family educated on Medicare website which has current facility and service quality ratings: no    Education Provided on the Discharge Plan: Yes  Persons Notified of Discharge Plans: Provider, Homecare Agency, Bedside RN  Patient/Family in Agreement with the Plan: yes    Handoff Referral Completed: Yes, [facility] PCP: Internal handoff referral completed    Additional Information:  Per Provider, Pt is medically ready for discharge. Pt will be discharging with a waite (leg bag requested from Bedside RN) and ostomy (supplies provided by [facility]). Pt will be starting with [facility] with start of care date 9/9 (Pt is ok with this). Order for home care RN/PT requested by Writer to Provider. Writer updated home care agency, they have EPIC access for orders and will schedule pt for 9/9. Writer requested Bedside RN reach out to Writer with any further discharge needs.     [facility] for SN/PT with delayed SOC 9/9          Services Available   Home Health Services      Address   [address]  [facility] [address]             Contact Information    [phone]          Carloz Fowler RNCC  Covering for 5A  Phone [phone]    RN Care Coordinator     Social Work and Care Management Department      SEARCHABLE in [employer] - search CARE COORDINATOR       [employer] [place] ([id number]) Saturday & Sunday; ([phone]) [facility] Recognized Holidays     Units: 5A Onc Vocera & 5C Vocera    Units: 6B Vocera & 6C Vocera    Units: 7A SOT  RNCC Vocera, 7B Med Surg Vocera, & 7C Med Surg Vocera    Units: 6A Vocera & 4A CVICU Vocera, 4C MICU Vocera, and 4E SICU Vocera    Units: 5 Ortho Vocera & 5 Med Surg Vocera    Units: 6 Med Surg Vocera & 8 Med Surg Vocera

## 2024-09-06 NOTE — PROGRESS NOTES
[facility]  WO Nurse Inpatient Assessment     Consulted for: new diverting loop ileostomy       Patient History (according to provider note(s):      39 year old female who initially presented with abdominal and back and leg paresthesias and was found to have a complex mass in the rectosigmoid space. She has a history of DVT/PE on eliquis, gastric bypass, MDD/SHIVAM, and restless leg who is POD#4 s/p rad hyst, BSO, rectosigmoid colectomy w post vaginectomy, supracolic omentectomy, diverting loop ileostomy with frozen notable for adenocarcinoma in the setting of known endometriosis. Transferred to SICU POD#2 due to hypotension; now improved with IVF resuscitation and not requiring pressors. Transferred out of SICU on afternoon of POD#2 back to floor.     Assessment:      Areas visualized during today's visit: Abdomen    Assessment of new loop Ileostomy:  Diagnosis Pertinent to Stoma: Cancer - Colon or Rectum      Surgery Date: 9/1/2024--[facility] service not consulted until 9/4  Surgeon: Jessenia Sosa MD      Hospital: [facility]  Pouching system in place on assessment today: Jose two piece, flat, and barrier ring   Pouch barrier status: intact and 20% melted  Pouch last changed/wear time: 1 day  Reason for pouch change today: ostomy education  Effectiveness of current pouching/ supply plan: Needs soft convexity at discharge --pt requesting two piece if possible. os is at 12 o clock near skin.   Change made with ostomy management today: Yes  Pouching system placed today: Jose two piece, flat, and barrier ring   Supplies: ordered soft convex barriers    Stoma location: RLQ  Stoma size: 1 3/8 x 1 5/8 inches,   Stoma appearance: edematous and protruberant, appears os is at 12 o clock near skin  Mucocutaneous junction:  intact  Peristomal complication(s): none   Output: watery, brown, and undigested food   Output volume emptied during visit: 50ml--placed in I&O section of  "flowsheet   Abdominal assessment: Soft  Surgical site(s): staples intact  NG still in place? No  Pain: Cramping and Sharp  Is patient still on a PCA? No    Ostomy education assessment:  Participant of teaching session today: patient   Education completed today: Stoma assessment, Pouching system assessment , Pouch change return demonstration, Ostomy accessory product use , Peristomal skin care, Pouch emptying return demonstration, Fluid and electrolyte balance , When to seek medical attention, Low fiber diet , Odor/flatus management , Infection prevention/hygiene , Hernia prevention, Lifestyle adjustments , and Discharge instructions  Educational materials/methods: Verbal, Demonstration, Return demonstration, FOD [place] education hand out, and Addressed patient/caregiver questions/concerns  Education still needed: Pouch change return demonstration with home care  Learning Comprehension:   Psychosocial assessment: Anxious, ostomy was unexpected. Patient's father has a urostomy and colostomy, per pt she has \"only heard him complain about it\"  Patient readiness for education today: active participation  Following today's visit: patient  is able to demonstrate;         1. How to empty their pouch? Yes        2. How to change their pouch? Yes        3. How to read and record intake and output correctly? Yes  Preparation for discharge completed: Placed prescription recommendations in discharge navigator for MD to sign, Ensured patient has extra supplies for discharge, Discussed how to order supplies after discharge , Ordered samples from  after gaining consent from patient/caregiver, Discussed how and when to make an outpatient WOC nurse appointment after discharge, and Discuss signs/symptoms of when to seek medical attention  Preparation for discharge still needed: none  Pt support system on discharge: parents   WOC recommend home care? Yes and By WOC RN if possible  Face to face time: > 60 " "minutes    Treatment Plan:     RLQ Ileostomy pouching plan:   Pouching system: ostomy supplies pouches: Jose 57 FECAL (592021) ostomy supplies barrier: Masury 57mm SOFT CONVEX ([id number])  Accessories used: WOC ostomy accessories: 2\" Cera Barrier Ring (104123)   Frequency of pouch changes: Twice weekly and PRN leakage  WOC follow up plan: Daily Monday-Friday (as able)  Bedside RN interventions: Change pouch PRN if leaking using the supplies above, Empty pouch when 1/3 to 1/2 full, ensure to clean pouch outlet after emptying to prevent odor, Notify WOC for ongoing pouch leakage, Document stoma appearance and output volume, color, and consistency every shift, and Encourage patient to empty pouch with assist     Outpatient plan:  Request the following supplies:      Masury    2 piece soft convex wafer 57mm #61804    Fecal pouch 57mm- # 24903  Accessories  2  barrier ring #8805     No sting film barrier # 3345                        Adapt universal adhesive remover #8060                          Adapt M-9 Spray room deodorizer #7732                           Jose barrier extenders #99075                                 Change your pouch 3 times a week, more often if leaking.  Orders: Written    RECOMMEND PRIMARY TEAM ORDER: None, at this time  Education provided: plan of care  Discussed plan of care with: Patient  WOC nurse follow-up plan: daily M-F  Notify WOC if wound(s) deteriorate.  Nursing to notify the Provider(s) and re-consult the WOC Nurse if new skin concern.    DATA:     Current support surface: Standard  Standard gel mattress (Isoflex)  Containment of urine/stool: Continent of bladder  BMI: Body mass index is 29.04 kg/m .   Active diet order: Orders Placed This Encounter      Advance Diet as Tolerated: Low Fiber      Diet      Diet     Output: I/O last 3 completed shifts:  In: 600 [P.O.:600]  Out: 2315 [Urine:1525; Stool:790]     Labs:   Recent Labs   Lab 09/06/24  0747 09/04/24  1740 " 09/04/24  0421 09/03/24  1204 09/03/24  0956   ALBUMIN  --   --  2.7*  --   --    HGB 8.8*   < > 6.7*   < >  --    INR 1.14   < > 1.44*  --   --    WBC 5.9   < > 6.2   < >  --    A1C  --   --   --   --  5.2    < > = values in this interval not displayed.     Pressure injury risk assessment:   Sensory Perception: 4-->no impairment  Moisture: 4-->rarely moist  Activity: 3-->walks occasionally  Mobility: 3-->slightly limited  Nutrition: 2-->probably inadequate  Friction and Shear: 3-->no apparent problem  Terry Score: 19      Pager no longer is use, please contact through [employer] group: [facility] Nurse Llewellyn   Dept. Office Number: [phone]

## 2024-09-06 NOTE — PROVIDER NOTIFICATION
Repaged at [phone]    Paged gyn onc resident re: [phone] MD Patient will discharge soon, however needs DME script for ostomy supplies, per [facility] nurse. Will you please order this? Thank you. Marjan WHITE -[phone]

## 2024-09-06 NOTE — DISCHARGE INSTRUCTIONS
[facility]     Name: Marizol Granados  Date: 9/6/24    To order your ostomy supplies    The ostomy Supplier needs this supply list  to process your order. You will need to fax/deliver this list, along with your Insurance information. Your home care nurse can assist with this process.    List of Ostomy Distributors      [facility]  Ph. [phone] Fax # [phone]  [employer]   Ph. [phone]  [facility]   Ph. [phone]  [facility]   Ph. [phone] [phone]  Or Call your insurance provider for their preferred supplier    Your Medical Supplier will need your surgeon's name, phone and fax number    Clinic:                     Phone                            Fax  Gynecology Oncology:             [phone]  Verbal Order for ostomy supplies for 1 Month per:   Sandra Shea, RN, CWOCN                                                                                                                                      Authorizing MD: Jessenia Saunders    Quantity of pouches:  20   /mo.    Request the following supplies:      Jose    2 piece soft convex wafer 57mm #02297    Fecal pouch 57mm- # 22872  Accessories  2  barrier ring #4105     No sting film barrier # 0999                        Adapt universal adhesive remover #9186                          Adapt M-9 Spray room deodorizer #2550                           Jose barrier extenders #40023                                 Change your pouch 3 times a week, more often if leaking.   If you are cutting a hole in the wafer of your pouch, recheck stoma size and adjust pouch opening as needed every week    . Call the Ostomy Nurse at [facility]       [address] [place] [address], [place] : [phone]   Schedule a follow-up visit in 2 to 4 weeks after your surgery, sooner if having problems Bring a complete set of pouch-changing  supplies to this visit    [facility] department  [address] [place] [address]   number- [phone]     Problems you should Report  - The stoma turns blue or darker in color.  - Cuts or sores around the stoma.  - Red, raw or painful skin around the stoma.  - Any bulging of the skin around the stoma.  - A pouch that leaks every day.  - Problems making the right size hole in the pouch wafer.   Please call with any questions or concerns.

## 2024-09-06 NOTE — PLAN OF CARE
Goal Outcome Evaluation:    POD# 5 s/p BSO, rectosigmoid colectomy w post vaginectomy, supracolic omentectomy, diverting loop ileostomy. Patient reports good pain control with current regimen, denies nausea. PIV in place. Ileostomy appliance intact, waite patent. Pt is on low fiber diet, strict I/Os. Using the IS, SCDs in place. Plan is to discharge home with waite in place. Continuous pulse oxymetry, call light in reach. Continuous pulse oxymetry.    Per MD recommendation, and patient understanding; PO Dilaudid ( 2 mg and 4 mg) to be given every 4 hours.

## 2024-09-06 NOTE — TELEPHONE ENCOUNTER
Patient calls stating she is currently admitted at the  of [facility], s/p hysterectomy for pelvic mass-showing adenocarcinoma. New ileostomy. Pt was told she needed to schedule hospital follow up prior to being discharged. I scheduled her for a visit with Jonas Hart PA-C on Monday, 9/9/24. Also scheduled future follow up with PCP per pt's request.    Jenny Noriega RN on 9/6/2024 at 9:23 AM

## 2024-09-06 NOTE — PLAN OF CARE
Goal Outcome Evaluation:    POD 5 extensive surgery with new ileostomy. Seen by dietician. Currently reviewing ostomy cares with WOC. Will discharge with waite and per patient had all teaching completed yesterday. Supplies at bedside for catheter cares and ileostomy. Meds ready in pharmacy. Plan discharge to home after WOC teaching

## 2024-09-06 NOTE — PROGRESS NOTES
Brief Progress Note    MD to bedside for PM rounds. Patient anxious on arrival, some confusion about her orders with her varied doses of oxy PRN, RN uncertain if she can get another dose now. Reviewed orders and explained intentional duplicate PRN at different doses. RN understanding. Patient aware that her plain plan is a bit atypical which caused the confusion, understanding. Abdomen soft, non distended, with ostomy pink and green output in bag. Rash in inguinal fold, patient reports this is new since she showered. Denies itching, endorses some mild burning. Rash not warm to the touch, appears to be contact irritation from skin fold, likely due to being more stationary in hospital. Also has rash in antecubital fossa, likely contact dermatitis from prior adhesive. Hydrocortisone cream made available and encouraged use of aquaphor as barrier cream for rash dryness. No other concerns.      Lee Fine MD  [facility]  Gynecology Oncology Resident, PGY-2  09/05/2024 9:12 PM    To reach the GYNECOLOGY ONCOLOGY team for this patient, please page [phone]

## 2024-09-06 NOTE — PLAN OF CARE
5A OT: DEFER    Occupational Therapy: Orders received. Chart reviewed and discussed with care team.? Occupational Therapy not indicated due to no acute IP OT needs at this time. PT following to address mobility, balance, and strength needs as they arise. Defer discharge recommendations to PT and medical team.? Will complete orders.

## 2024-09-06 NOTE — PROGRESS NOTES
Gynecology Oncology Progress Note  09/06/2024      HD#7 low back pain, L leg pain and numbness, pelvic mass; POD#5 rad hyst, BSO, rectosigmoid colectomy w post vaginectomy, supracolic omentectomy, diverting loop ileostomy    Disease: Frozen: adenocarcinoma, CTAP 12.1 x 11.9 x 1.1 cm cystic mass in the central pelvis w/ intermediate thickness irregular septation, enhancing nodularity and calcification.  27, AFP/HcG wnl     24 hour events:   - lovenox restarted 0815  - waite replaced 9/5 am  - Home care able to start Monday  - L foot numbness  - rash> hydrocortisone cream    Subjective: Doing well this morning. Tolerated PO without nausea or emesis. Gas per ostomy. Waite in place. Reports she did not try the hydrocortisone cream last night but is not bothered by the rash. Is intermittently having the sensation that she needs to have a BM, discussed this may be due to high stool burden in her colon despite the ileostomy. Wondering about the foot numbness she noticed last night.    Objective:   Patient Vitals for the past 24 hrs:   BP Temp Temp src Pulse Resp SpO2   09/06/24 0336 121/65 98.6  F (37  C) Oral 77 16 99 %   09/06/24 0000 98/61 98.3  F (36.8  C) Oral 81 16 96 %   09/05/24 1900 118/76 98.5  F (36.9  C) Oral 100 17 98 %   09/05/24 1500 109/69 98.5  F (36.9  C) Oral 90 16 97 %   09/05/24 1130 118/80 98.8  F (37.1  C) Oral 86 16 96 %   09/05/24 0802 105/64 98.6  F (37  C) Oral 70 16 97 %       General: NAD, appears comfortable in bed  CV: RRR, no m/r/g  Resp: CTAB anteriorly, no wheezes  Abdomen: soft, tender to palpation diffusely, non-distended  Incision: c/d/i, dressing in place, ileostomy pink with dark liquid output in bag  Extremities: warm, well-perfused, nontender, trace edema, SCDs in place    I/Os  (Yesterday // Since Midnight)  PO 1560cc // not documented  Urine 1775cc // 650cc  Stool 1380cc // 50cc    UOP: 0.81 cc/kg/hr (yday) // 1.34 cc/kg/hr (since [place])    New Labs/Imaging-   No results found  for this or any previous visit (from the past 24 hour(s)).      Assessment: 39 year old female who initially presented with abdominal and back and leg paresthesias and was found to have a complex mass in the rectosigmoid space. She has a history of DVT/PE on eliquis, gastric bypass, MDD/SHIVAM, and restless leg who is POD#5 s/p rad hyst, BSO, rectosigmoid colectomy w post vaginectomy, supracolic omentectomy, diverting loop ileostomy with frozen notable for adenocarcinoma in the setting of known endometriosis. Transferred to SICU POD#2 due to hypotension; now improved with IVF resuscitation and not requiring pressors. Transferred out of SICU on afternoon of POD#2 back to floor.    # Complex mass from rectosigmoid space  # Adenocarcinoma   # Postoperative state  # Hypomag/phos, resolved  - Poor pain control immediately postop, s/p dilaudid PCA; continue multimodal pain regimen; consider pain consult PRN  - Ostomy in place  - no bowel regimen, nothing per rectum  - ERP  - Cat replaced after unable to void, will discharge with this in place  - S/p ancef/flagyl for 24hr postop  - Mag, phos pending collection this morning    # Rash  New rash noted yesterday at inguinal folds and also right antecubital fossa. Suspect groin rash is due to decreased mobility, increased skin on skin contact. Not warm to the touch, denies itching. Suspect other rash is from adhesive given distribution.  - hydrocortisone cream  - aquaphor for skin dryness    # Acute loss blood anemia, improved  # Coagulopathy, improved  # Hypotension, tachycardia - resolved  Overnight, she became hypotensive with tachycardia. SICU was made aware and recommended trial of fluid resuscitation as well as adjustment to meds as listed below. She was initially responsive to the first liter bolus but her MAP dropped below 65 and remained persistently below 60 despite a second liter bolus. This morning, blood pressures and tachycardia have improved s/p IVF resuscitation  and discontinuation of dilaudid PCA. Not currently requiring pressors.   - Hgb 11.2 > EBL 2500mL > 2U pRBC postoperatively. AM Hgb 7.5 > 7.1> 10mg Vit K, 1u FFP, 1u pRBC > PM hgb (2 hours post transfusion) 7.1 >1u pRBC > 7.7> 7.6> 7.7> 7.1> 6.7> 1u pRBC> 8.7> pending collection this morning  - INR 1.35 > 1.53 > 1.65, FFP and vitamin K given in the setting of elevated INR and concern for bleeding due to extensive surgery > 1.53 > 1.53 > 1.58> 1.44 > 1.17> pending collection this morning  - Will monitor closely for vitals, UOP, abdominal exam    # Postoperative fever  Suspect atelectasis as patient requiring 1 LPM O2 by NC. Encourage IS.  - Tm/l 100.9F at 0415 9/2. Subsequent temperature within normal limits.  - UA without signs of infection. Blood culture pending.  - Continue to monitor, consider further workup if recurs    # Hx DVT  # Hx PE  - Most superficial thrombophlebitis found recently 05/2024, on therapeutic Eliquis BID PTA (held).   - Lovenox restarted 9/5    # Hx of gastric bypass, panniculectomy  # Hx of C.diff  History of uncomplicated gastric bypass completed in 2022 followed by panniculectomy in 2023 for excess skin removal. Postoperative recovery c/b C. Diff during inpatient admission.  - Enteric precautions per BMT unit guideline pending negative C. Diff on admission   - Pantoprazole 20mg BID      # Restless leg  # MDD/SHIVAM   # Insomnia   Patient with significant psychiatric medication history in setting of refractory anxiety/depression as well as intermittent RLS. Medication reconcilation perfromed morning of 8/31 with bedside RN, confirmed medications and dosing schedule as below, plan to continue prior to admission regimen while in house.   - PTA regimen: Buspirone 30mg PO BID, Desvenlafaxine 150mg PO qAM, Lorazapam 1mg PO BID, Topirimate 50mg BID, Propanolol 10mg PO BID [held], Quetiapine 200mg PO at bedtime [changed], Zolpidem 5mg PO at bedtime, Trazodone 225mg PO at bedtime [changed], Ramelteon  [changed]  - Psychiatry recommendations 9/3 as below   - Seroquel 150 mg at bedtime  - Rozerem 8 mg at bedtime  - May hold the Belsomra   - Trazodone 150 mg at bedtime.  - Ambien 5 mg bedtime  - Continue the PTA doses of Pristiq and Buspar  - If delirium: decrease ativan to 0.5mg/day and discontinue ambien  - patient was offered psychology and declined     # Moderate persistent asthma   Noted per chart review. Patient reports she is largely asymptomatic and has not had exacerbations that have required hospitalizaiton or intubation.   - Flovent 220mcg 1 puff Q12H       PPX: SCDs, IS, lvx  Dispo: Pending postoperative goals  Drains/Lines: PIV, waite, ostomy    Lee Fine MD  [facility]  Gynecology Oncology Resident, PGY-2  09/06/2024 5:56 AM    To reach the GYNECOLOGY ONCOLOGY team for this patient, please page [phone]    Provider Disclosure:   I agree with above History, Review of Systems, Physical exam and Plan. I have reviewed the content of the documentation and have edited it as needed. I have seen and personally performed the services documented here and the documentation accurately represents those services and the decisions I have made.     Electronically signed by:   Xavi Al MD   Gynecologic Oncology   [facility]

## 2024-09-06 NOTE — PLAN OF CARE
Patient discharged to home ~1750 with AVS and all personal belongings. Also provided with ostomy and Cat catheter supplies.

## 2024-09-07 ASSESSMENT — ASTHMA QUESTIONNAIRES
ACT_TOTALSCORE: 20
ACT_TOTALSCORE: 20
QUESTION_5 LAST FOUR WEEKS HOW WOULD YOU RATE YOUR ASTHMA CONTROL: WELL CONTROLLED
QUESTION_2 LAST FOUR WEEKS HOW OFTEN HAVE YOU HAD SHORTNESS OF BREATH: ONCE A DAY
QUESTION_1 LAST FOUR WEEKS HOW MUCH OF THE TIME DID YOUR ASTHMA KEEP YOU FROM GETTING AS MUCH DONE AT WORK, SCHOOL OR AT HOME: NONE OF THE TIME
QUESTION_3 LAST FOUR WEEKS HOW OFTEN DID YOUR ASTHMA SYMPTOMS (WHEEZING, COUGHING, SHORTNESS OF BREATH, CHEST TIGHTNESS OR PAIN) WAKE YOU UP AT NIGHT OR EARLIER THAN USUAL IN THE MORNING: NOT AT ALL
QUESTION_4 LAST FOUR WEEKS HOW OFTEN HAVE YOU USED YOUR RESCUE INHALER OR NEBULIZER MEDICATION (SUCH AS ALBUTEROL): ONCE A WEEK OR LESS

## 2024-09-07 NOTE — PLAN OF CARE
Physical Therapy Discharge Summary    Reason for therapy discharge:    Discharged to home with home therapy.    Progress towards therapy goal(s). See goals on Care Plan in [facility] electronic health record for goal details.  Goals partially met.  Barriers to achieving goals:   discharge from facility.    Therapy recommendation(s):    Continued therapy is recommended.  Rationale/Recommendations:  home PT to address deficits in strength and functional mobility.

## 2024-09-08 LAB — BACTERIA BLD CULT: NO GROWTH

## 2024-09-08 ASSESSMENT — PATIENT HEALTH QUESTIONNAIRE - PHQ9
SUM OF ALL RESPONSES TO PHQ QUESTIONS 1-9: 22
SUM OF ALL RESPONSES TO PHQ QUESTIONS 1-9: 22
10. IF YOU CHECKED OFF ANY PROBLEMS, HOW DIFFICULT HAVE THESE PROBLEMS MADE IT FOR YOU TO DO YOUR WORK, TAKE CARE OF THINGS AT HOME, OR GET ALONG WITH OTHER PEOPLE: EXTREMELY DIFFICULT

## 2024-09-09 ENCOUNTER — PATIENT OUTREACH (OUTPATIENT)
Dept: CARE COORDINATION | Facility: CLINIC | Age: 39
End: 2024-09-09

## 2024-09-09 ENCOUNTER — PATIENT OUTREACH (OUTPATIENT)
Dept: ONCOLOGY | Facility: CLINIC | Age: 39
End: 2024-09-09

## 2024-09-09 ENCOUNTER — OFFICE VISIT (OUTPATIENT)
Dept: FAMILY MEDICINE | Facility: CLINIC | Age: 39
End: 2024-09-09
Payer: COMMERCIAL

## 2024-09-09 ENCOUNTER — ONCOLOGY VISIT (OUTPATIENT)
Dept: ONCOLOGY | Facility: CLINIC | Age: 39
End: 2024-09-09
Attending: NURSE PRACTITIONER
Payer: COMMERCIAL

## 2024-09-09 VITALS
HEART RATE: 99 BPM | SYSTOLIC BLOOD PRESSURE: 104 MMHG | BODY MASS INDEX: 27.54 KG/M2 | RESPIRATION RATE: 16 BRPM | WEIGHT: 165.3 LBS | HEIGHT: 65 IN | TEMPERATURE: 98.2 F | DIASTOLIC BLOOD PRESSURE: 58 MMHG | OXYGEN SATURATION: 99 %

## 2024-09-09 VITALS
WEIGHT: 165 LBS | HEIGHT: 65 IN | TEMPERATURE: 98.2 F | HEART RATE: 99 BPM | SYSTOLIC BLOOD PRESSURE: 104 MMHG | DIASTOLIC BLOOD PRESSURE: 58 MMHG | RESPIRATION RATE: 16 BRPM | BODY MASS INDEX: 27.49 KG/M2

## 2024-09-09 DIAGNOSIS — T83.9XXA PROBLEM WITH FOLEY CATHETER, INITIAL ENCOUNTER (H): ICD-10-CM

## 2024-09-09 DIAGNOSIS — Z09 HOSPITAL DISCHARGE FOLLOW-UP: Primary | ICD-10-CM

## 2024-09-09 DIAGNOSIS — Z98.890 POSTOPERATIVE STATE: Primary | ICD-10-CM

## 2024-09-09 DIAGNOSIS — Z93.2 STATUS POST ILEOSTOMY (H): Primary | ICD-10-CM

## 2024-09-09 DIAGNOSIS — Z93.2 ILEOSTOMY STATUS (H): ICD-10-CM

## 2024-09-09 DIAGNOSIS — N94.89 ADNEXAL MASS: ICD-10-CM

## 2024-09-09 DIAGNOSIS — F33.9 RECURRENT MAJOR DEPRESSIVE DISORDER, REMISSION STATUS UNSPECIFIED (H): ICD-10-CM

## 2024-09-09 LAB
BASE EXCESS BLDV CALC-SCNC: -5.1 MMOL/L (ref -3–3)
CA-I BLD-MCNC: 4.5 MG/DL (ref 4.4–5.2)
GLUCOSE BLD-MCNC: 168 MG/DL (ref 70–99)
HCO3 BLDV-SCNC: 22 MMOL/L (ref 21–28)
HGB BLD-MCNC: 10.3 G/DL (ref 11.7–15.7)
LACTATE BLD-SCNC: 1.4 MMOL/L (ref 0.7–2)
O2/TOTAL GAS SETTING VFR VENT: 56 %
OXYHGB MFR BLDV: 44 % (ref 70–75)
PATH REPORT.COMMENTS IMP SPEC: ABNORMAL
PATH REPORT.COMMENTS IMP SPEC: YES
PATH REPORT.FINAL DX SPEC: ABNORMAL
PATH REPORT.GROSS SPEC: ABNORMAL
PATH REPORT.INTRAOP OBS SPEC DOC: ABNORMAL
PATH REPORT.MICROSCOPIC SPEC OTHER STN: ABNORMAL
PATH REPORT.RELEVANT HX SPEC: ABNORMAL
PATHOLOGY SYNOPTIC REPORT: ABNORMAL
PCO2 BLDV: 46 MM HG (ref 40–50)
PH BLDV: 7.28 [PH] (ref 7.32–7.43)
PHOTO IMAGE: ABNORMAL
PO2 BLDV: 29 MM HG (ref 25–47)
POTASSIUM BLD-SCNC: 4.6 MMOL/L (ref 3.4–5.3)
SAO2 % BLDV: 45 % (ref 70–75)
SODIUM BLD-SCNC: 134 MMOL/L (ref 135–145)

## 2024-09-09 PROCEDURE — 99024 POSTOP FOLLOW-UP VISIT: CPT | Performed by: NURSE PRACTITIONER

## 2024-09-09 PROCEDURE — G2211 COMPLEX E/M VISIT ADD ON: HCPCS | Performed by: PHYSICIAN ASSISTANT

## 2024-09-09 PROCEDURE — 99214 OFFICE O/P EST MOD 30 MIN: CPT | Performed by: PHYSICIAN ASSISTANT

## 2024-09-09 PROCEDURE — G0463 HOSPITAL OUTPT CLINIC VISIT: HCPCS | Performed by: NURSE PRACTITIONER

## 2024-09-09 ASSESSMENT — PAIN SCALES - GENERAL
PAINLEVEL: SEVERE PAIN (7)
PAINLEVEL: SEVERE PAIN (7)

## 2024-09-09 NOTE — PROGRESS NOTES
RN reached out to home care and they have a home visit set up for tomorrow     RN will still have her seen in clinic today as patient still has more urgent needs     Randi Hernandez RN

## 2024-09-09 NOTE — PROGRESS NOTES
Clinic Care Coordination Contact  Care Coordination Clinician Chart Review    Situation: Patient chart reviewed by care coordinator.    Background: Clinic Care Coordination Referral received from inpatient care team for transition handoff communication following hospital admission.    Assessment: Upon chart review, patient is not a candidate for Primary Care Clinic Care Coordination enrollment due to reason stated below:  Primary Care Clinic Care Coordination will defer follow-up outreach to Specialty Clinic Care Coordination team who are already closely following patient. Patient has seen Primary provider today for hospital follow up and will also see the oncology team today at 3pm.    Plan/Recommendations: Clinic Care Coordination Referral/order cancelled. RN/MALDONADO CC will perform no further monitoring/outreaches at this time and will remain available as needed. If new needs arise, a new Care Coordination Referral may be placed.    CHAVA MendozaN, RN, PHN   Care Coordinator-Ambulatory Care Management  [facility] and [facility]  [phone]
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
## 2024-09-09 NOTE — PROGRESS NOTES
Patient presents with new ileostomy. Assisted patient with ileostomy pouch change (one piece). Stoma moist, red and swollen. Erythema noted on surrounding skin. Output runny/green. All questions answered. Patient will follow up with surgery team as she does not have the correct size pouch supplies.    Jenny Noriega RN on 9/9/2024 at 10:37 AM

## 2024-09-09 NOTE — NURSING NOTE
"Oncology Rooming Note    September 9, 2024 3:03 PM   Marizol Granados is a 39 year old female who presents for:    Chief Complaint   Patient presents with    Oncology Clinic Visit     Cystic mass in central pelvis     Initial Vitals: /58   Pulse 99   Temp 98.2  F (36.8  C) (Oral)   Resp 16   Ht 1.651 m (5' 5\")   Wt 74.8 kg (165 lb)   LMP  (LMP Unknown)   BMI 27.46 kg/m   Estimated body mass index is 27.46 kg/m  as calculated from the following:    Height as of this encounter: 1.651 m (5' 5\").    Weight as of this encounter: 74.8 kg (165 lb). Body surface area is 1.85 meters squared.  Severe Pain (7) Comment: Data Unavailable   No LMP recorded (lmp unknown). Patient has had a hysterectomy.  Allergies reviewed: Yes  Medications reviewed: Yes    Medications: Medication refills not needed today.  Pharmacy name entered into [facility]:    [facility] [place], [place] - [address] [facility] [id number] [place], [place] - [address] [address] AT [place] [address] & [facility] [address] [facility]    Frailty Screening:   Is the patient here for a new oncology consult visit in cancer care? 2. No      Clinical concerns: none       Nurys Harris            "

## 2024-09-09 NOTE — PROGRESS NOTES
Assessment & Plan     Hospital discharge follow-up  Adnexal mass  Ileostomy status (H)  Problem with Cat catheter, initial encounter (H24)  Patient here with several issues acutely since hospital discharge.  She found out in office today that Home Health cannot come out today to assist.  She has immediate concerns regarding her Cat and Ileostomy that are not able to be properly addressed in this clinic.  I paged GYN-ONC and was able to discuss this patient.  They will be contacting her today for an appointment to address her concerns today.      Recurrent major depressive disorder, remission status unspecified (H24)  Mood not controlled.  She is under a great deal of stress with diagnosis, lack of home health care and obligations she has at home right now.  Patient is on medication and see's Psych regularly.  No SI.        MED REC REQUIRED  Post Medication Reconciliation Status: discharge medications reconciled, continue medications without change    Depression Screening Follow Up        9/8/2024     9:10 AM   PHQ   PHQ-9 Total Score 22   Q9: Thoughts of better off dead/self-harm past 2 weeks Several days   F/U: Thoughts of suicide or self-harm Yes   F/U: Self harm-plan No   F/U: Self-harm action No   F/U: Safety concerns No       Subjective   Marizol is a 39 year old, presenting for the following health issues:  Hospital F/U        9/9/2024     8:23 AM   Additional Questions   Roomed by Stephany LAL LPN   Accompanied by Liv HERNANDEZ       Hospital Follow-up Visit:    Hospital/Nursing Home/IP Rehab Facility: [facility]  Date of Admission: 8/31/2024  Date of Discharge: 9/6/2024  Reason(s) for Admission: Ovarian Mass   Was the patient in the ICU or did the patient experience delirium during hospitalization?  Yes   Do you have any other stressors you would like to discuss with your provider? OTHER: WOULD LIKE TO SEE IF HER CATHETER CAN BE REMOVED TODAY.   HOME  "HEALTH NURSE WILL BE COMING TODAY.  SHE IS HAVING HER INITIAL HOME EVALUATION TODAY AND WILL THEN NEED ORDERS SIGNED WHEN NECESSARY     Problems taking medications regularly:  None  Medication changes since discharge: None  Problems adhering to non-medication therapy:  None    Summary of hospitalization:  [facility] discharge summary reviewed  Diagnostic Tests/Treatments reviewed.  Follow up needed: YES  Other Healthcare Providers Involved in Patient s Care:         Homecare and Specialist appointment - Friday 9/13  Update since discharge: worsened.         Plan of care communicated with patient and family           Discharged Friday- needs home health orders    Ileostomy leaking  Home health can't come until Friday  Surgery appointment this Friday    Has Cat catheter in and wants it out- leaking, not working properly.  Doesn't have leg bag so carrying it around right now.    Very stressed, kids at home, things to do at home.    [facility]  [phone]   [address]  [facility] [address]       [address] or [facility]          Review of Systems  Constitutional, HEENT, cardiovascular, pulmonary, gi and gu systems are negative, except as otherwise noted.      Objective    /58   Pulse 99   Temp 98.2  F (36.8  C) (Oral)   Resp 16   Ht 1.651 m (5' 5\")   Wt 75 kg (165 lb 4.8 oz)   LMP  (LMP Unknown)   SpO2 99%   BMI 27.51 kg/m    Body mass index is 27.51 kg/m .  Physical Exam   GENERAL: alert and no distress  ABDOMEN: ileostomy present  : Cat present  MS: no gross musculoskeletal defects noted, no edema  SKIN: no suspicious lesions or rashes  PSYCH: mentation appears normal, tearful, anxious, and fatigued            Signed Electronically by: Jonas Hart PA-C    Answers submitted by the patient for this visit:  Patient Health Questionnaire (Submitted on 9/8/2024)  If you checked off any problems, how difficult have these problems made it " for you to do your work, take care of things at home, or get along with other people?: Extremely difficult  PHQ9 TOTAL SCORE: 22

## 2024-09-09 NOTE — PROGRESS NOTES
"Patient called and stated that the supplies she was given at the hospital were not the right ones     She was only given two one piece bags and then given a \"bunch of two piece bags\", However, the two piece bags are not the correct size for her stoma, they are two small.    Her PCP office was able to help her get the last one piece bag on and she is hopeful this will stay     She will still see GYn/Onc NP today and is open to seeing WOC today or any other time she can get in so that she has a person she can call    Patient wants to make sure the waite catheter is working and she really would like a leg bag for ease of moving around     Patient confirmed she had the location for the visit with NP today     Patient was very appreciative of RN time     Randi Hernandez RN      "
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
## 2024-09-09 NOTE — PROGRESS NOTES
RN was updated by in-patient team that patient was at PCP and had concerns they could not help with     RN reached out to patient    Patients states:  ~Home health can't come until next week. Home Health says inpatient team screwed up  ~I am soaking through underwear from my rectum   ~Was given the wrong day bag and all I can use is the over night bag and I can't go in public with that   ~ Feels like my bladder is working and I want the catheter out  ~Ileostomy leaked all over last night and now exploded in the MD office and has stool everywhere  ~ Never has changed the Ileostomy bag on my own as [facility]   ~ This is all too much     Patient was rightfully frustrated and tearful on the phone     Patient clarified that she is draining urine into the overnight bag but does seem to have some leaking around the catheter    She is soaking through underwear because she doesn't have a pad on but will be getting some    Patient is having chills but no fevers of any other signs of concern     RN and patient reviewed seeing Gyn/Onc NP in clinic today in [place] and potentially seeing [facility] if possible and if not RN will assist with trying to get Home care in sooner       Patient was very appreciative of the RN time     RN will update patient once an appt is made and we know if [facility] can see her       Randi Hernandez RN

## 2024-09-10 ENCOUNTER — MEDICAL CORRESPONDENCE (OUTPATIENT)
Dept: HEALTH INFORMATION MANAGEMENT | Facility: CLINIC | Age: 39
End: 2024-09-10

## 2024-09-10 ENCOUNTER — OFFICE VISIT (OUTPATIENT)
Dept: WOUND CARE | Facility: CLINIC | Age: 39
End: 2024-09-10
Payer: COMMERCIAL

## 2024-09-10 DIAGNOSIS — Z93.2 STATUS POST ILEOSTOMY (H): Primary | ICD-10-CM

## 2024-09-10 PROCEDURE — 99024 POSTOP FOLLOW-UP VISIT: CPT

## 2024-09-10 NOTE — PROGRESS NOTES
"WO Ostomy Assessment  Patient comes to clinic for consultation regarding ostomy issues.    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    Objective:  Type: Ileostomy since 09/01/2024  Stoma: 32*38 mm  loop normal-appearing, oval, edematous, and protuberant   Location: right lower quadrant     Complications: none   Mucutaneous junction:  intact    Output: soft    Peristomal skin: intact  and barrier is intact   Frequency of pouch change: three times weekly. This is scheduled.   Received home care [facility] assessment after discharge:Yes  Ordering supplies from:  [employer] , Account # [id number],Fax: [phone], Customer Service: [phone]  Current pouch system/supplies: [place]   two piece, cut to fit, flat, and barrier ring    Assessment: Patient is doing okay after an unexpected ileostomy.  She did not receive the right pouches and barriers after discharge so ran out of supplies.  She has experienced some leaking.  Her skin shows a slight fungal infection and is itching.  .    Intervention/Plan: I suspect she will do fine with a flat barrier since the stoma protrudes nicely.  The Marie ring will hold up to the ileostomy output.  If she uses the 2 piece a belt might be useful to keep things in place better.  I recommend she changes the pouch 3 times a week to 2 it expecting to have diarrhea when she starts chemo.  Placed order for " the following supplies: [place] flat 95762, pouch no filter clear 89784, Marie seal [id number], belt 7300, adhesive remover 7760, barrier extenders 10732, No sting barrier film 3345, Deodorizer 7732.  Nystop given to patient to treat light fungal infection.  Sprinkle on lightly and wipe off with a dry cloth.  She has my contact information and can contact me through [employer] if she has problems with the current supplies.  Several pouches were given to her to hold her over until the order comes in.  I only had flat 1 piece to give her for now.    Patient  has consented to receive samples from the industry    Return to clinic elissa Mcintyre NP  was available for supervision of care if needed or if questions should arise and regarding plan of care. Janette Wells RN  RN CWON

## 2024-09-12 ENCOUNTER — APPOINTMENT (OUTPATIENT)
Dept: CT IMAGING | Facility: CLINIC | Age: 39
End: 2024-09-12
Attending: EMERGENCY MEDICINE
Payer: COMMERCIAL

## 2024-09-12 ENCOUNTER — NURSE TRIAGE (OUTPATIENT)
Dept: NURSING | Facility: CLINIC | Age: 39
End: 2024-09-12
Payer: COMMERCIAL

## 2024-09-12 ENCOUNTER — HOSPITAL ENCOUNTER (EMERGENCY)
Facility: CLINIC | Age: 39
Discharge: ANOTHER HOSPITAL | End: 2024-09-14
Attending: EMERGENCY MEDICINE | Admitting: EMERGENCY MEDICINE
Payer: COMMERCIAL

## 2024-09-12 DIAGNOSIS — G89.18 POSTOPERATIVE PAIN: ICD-10-CM

## 2024-09-12 DIAGNOSIS — N30.00 ACUTE CYSTITIS WITHOUT HEMATURIA: ICD-10-CM

## 2024-09-12 LAB
ABO/RH(D): NORMAL
ALBUMIN SERPL BCG-MCNC: 4.1 G/DL (ref 3.5–5.2)
ALBUMIN UR-MCNC: 30 MG/DL
ALP SERPL-CCNC: 59 U/L (ref 40–150)
ALT SERPL W P-5'-P-CCNC: 17 U/L (ref 0–50)
ANION GAP SERPL CALCULATED.3IONS-SCNC: 12 MMOL/L (ref 7–15)
ANTIBODY SCREEN: NEGATIVE
APPEARANCE UR: ABNORMAL
AST SERPL W P-5'-P-CCNC: 16 U/L (ref 0–45)
BACTERIA #/AREA URNS HPF: ABNORMAL /HPF
BASOPHILS # BLD AUTO: 0 10E3/UL (ref 0–0.2)
BASOPHILS NFR BLD AUTO: 1 %
BILIRUB SERPL-MCNC: 0.2 MG/DL
BILIRUB UR QL STRIP: NEGATIVE
BUN SERPL-MCNC: 13.8 MG/DL (ref 6–20)
CALCIUM SERPL-MCNC: 9.2 MG/DL (ref 8.8–10.4)
CHLORIDE SERPL-SCNC: 105 MMOL/L (ref 98–107)
COLOR UR AUTO: YELLOW
CREAT SERPL-MCNC: 0.82 MG/DL (ref 0.51–0.95)
EGFRCR SERPLBLD CKD-EPI 2021: >90 ML/MIN/1.73M2
EOSINOPHIL # BLD AUTO: 0.3 10E3/UL (ref 0–0.7)
EOSINOPHIL NFR BLD AUTO: 4 %
ERYTHROCYTE [DISTWIDTH] IN BLOOD BY AUTOMATED COUNT: 15.1 % (ref 10–15)
GLUCOSE SERPL-MCNC: 94 MG/DL (ref 70–99)
GLUCOSE UR STRIP-MCNC: NEGATIVE MG/DL
HCO3 SERPL-SCNC: 19 MMOL/L (ref 22–29)
HCT VFR BLD AUTO: 30.1 % (ref 35–47)
HGB BLD-MCNC: 9.6 G/DL (ref 11.7–15.7)
HGB UR QL STRIP: ABNORMAL
HOLD SPECIMEN: NORMAL
IMM GRANULOCYTES # BLD: 0.1 10E3/UL
IMM GRANULOCYTES NFR BLD: 1 %
INR PPP: 1.23 (ref 0.85–1.15)
KETONES UR STRIP-MCNC: 10 MG/DL
LACTATE SERPL-SCNC: 0.6 MMOL/L (ref 0.7–2)
LEUKOCYTE ESTERASE UR QL STRIP: ABNORMAL
LYMPHOCYTES # BLD AUTO: 1.8 10E3/UL (ref 0.8–5.3)
LYMPHOCYTES NFR BLD AUTO: 22 %
MCH RBC QN AUTO: 29.2 PG (ref 26.5–33)
MCHC RBC AUTO-ENTMCNC: 31.9 G/DL (ref 31.5–36.5)
MCV RBC AUTO: 92 FL (ref 78–100)
MONOCYTES # BLD AUTO: 0.6 10E3/UL (ref 0–1.3)
MONOCYTES NFR BLD AUTO: 7 %
MUCOUS THREADS #/AREA URNS LPF: PRESENT /LPF
NEUTROPHILS # BLD AUTO: 5.6 10E3/UL (ref 1.6–8.3)
NEUTROPHILS NFR BLD AUTO: 66 %
NITRATE UR QL: POSITIVE
NRBC # BLD AUTO: 0 10E3/UL
NRBC BLD AUTO-RTO: 0 /100
PH UR STRIP: 5.5 [PH] (ref 5–7)
PLATELET # BLD AUTO: 467 10E3/UL (ref 150–450)
POTASSIUM SERPL-SCNC: 3.7 MMOL/L (ref 3.4–5.3)
PROCALCITONIN SERPL IA-MCNC: 0.04 NG/ML
PROT SERPL-MCNC: 7.2 G/DL (ref 6.4–8.3)
RBC # BLD AUTO: 3.29 10E6/UL (ref 3.8–5.2)
RBC URINE: 9 /HPF
SODIUM SERPL-SCNC: 136 MMOL/L (ref 135–145)
SP GR UR STRIP: 1.02 (ref 1–1.03)
SPECIMEN EXPIRATION DATE: NORMAL
UROBILINOGEN UR STRIP-MCNC: NORMAL MG/DL
WBC # BLD AUTO: 8.5 10E3/UL (ref 4–11)
WBC URINE: >182 /HPF

## 2024-09-12 PROCEDURE — 250N000011 HC RX IP 250 OP 636: Performed by: PHYSICIAN ASSISTANT

## 2024-09-12 PROCEDURE — 83605 ASSAY OF LACTIC ACID: CPT | Performed by: EMERGENCY MEDICINE

## 2024-09-12 PROCEDURE — 250N000011 HC RX IP 250 OP 636: Performed by: EMERGENCY MEDICINE

## 2024-09-12 PROCEDURE — 258N000003 HC RX IP 258 OP 636: Performed by: PHYSICIAN ASSISTANT

## 2024-09-12 PROCEDURE — 258N000003 HC RX IP 258 OP 636: Performed by: EMERGENCY MEDICINE

## 2024-09-12 PROCEDURE — 96365 THER/PROPH/DIAG IV INF INIT: CPT | Mod: 59

## 2024-09-12 PROCEDURE — 85025 COMPLETE CBC W/AUTO DIFF WBC: CPT | Performed by: EMERGENCY MEDICINE

## 2024-09-12 PROCEDURE — 36415 COLL VENOUS BLD VENIPUNCTURE: CPT | Performed by: EMERGENCY MEDICINE

## 2024-09-12 PROCEDURE — 250N000009 HC RX 250: Performed by: EMERGENCY MEDICINE

## 2024-09-12 PROCEDURE — 80053 COMPREHEN METABOLIC PANEL: CPT | Performed by: EMERGENCY MEDICINE

## 2024-09-12 PROCEDURE — 87186 SC STD MICRODIL/AGAR DIL: CPT | Performed by: EMERGENCY MEDICINE

## 2024-09-12 PROCEDURE — 250N000013 HC RX MED GY IP 250 OP 250 PS 637: Performed by: PHYSICIAN ASSISTANT

## 2024-09-12 PROCEDURE — 87040 BLOOD CULTURE FOR BACTERIA: CPT | Performed by: EMERGENCY MEDICINE

## 2024-09-12 PROCEDURE — 85610 PROTHROMBIN TIME: CPT | Performed by: EMERGENCY MEDICINE

## 2024-09-12 PROCEDURE — 74177 CT ABD & PELVIS W/CONTRAST: CPT

## 2024-09-12 PROCEDURE — 96375 TX/PRO/DX INJ NEW DRUG ADDON: CPT

## 2024-09-12 PROCEDURE — 87086 URINE CULTURE/COLONY COUNT: CPT | Performed by: EMERGENCY MEDICINE

## 2024-09-12 PROCEDURE — 84145 PROCALCITONIN (PCT): CPT | Performed by: HOSPITALIST

## 2024-09-12 PROCEDURE — 96367 TX/PROPH/DG ADDL SEQ IV INF: CPT

## 2024-09-12 PROCEDURE — 250N000013 HC RX MED GY IP 250 OP 250 PS 637: Performed by: EMERGENCY MEDICINE

## 2024-09-12 PROCEDURE — 99254 IP/OBS CNSLTJ NEW/EST MOD 60: CPT | Performed by: PHYSICIAN ASSISTANT

## 2024-09-12 PROCEDURE — 99291 CRITICAL CARE FIRST HOUR: CPT | Mod: 25

## 2024-09-12 PROCEDURE — 81001 URINALYSIS AUTO W/SCOPE: CPT | Performed by: EMERGENCY MEDICINE

## 2024-09-12 PROCEDURE — 86900 BLOOD TYPING SEROLOGIC ABO: CPT | Performed by: EMERGENCY MEDICINE

## 2024-09-12 PROCEDURE — 96366 THER/PROPH/DIAG IV INF ADDON: CPT

## 2024-09-12 PROCEDURE — 96376 TX/PRO/DX INJ SAME DRUG ADON: CPT

## 2024-09-12 RX ORDER — QUETIAPINE FUMARATE 200 MG/1
200 TABLET, FILM COATED ORAL AT BEDTIME
Status: DISCONTINUED | OUTPATIENT
Start: 2024-09-12 | End: 2024-09-14 | Stop reason: HOSPADM

## 2024-09-12 RX ORDER — NALOXONE HYDROCHLORIDE 0.4 MG/ML
.1-.4 INJECTION, SOLUTION INTRAMUSCULAR; INTRAVENOUS; SUBCUTANEOUS
Status: DISCONTINUED | OUTPATIENT
Start: 2024-09-12 | End: 2024-09-14 | Stop reason: HOSPADM

## 2024-09-12 RX ORDER — FENTANYL CITRATE 50 UG/ML
50 INJECTION, SOLUTION INTRAMUSCULAR; INTRAVENOUS ONCE
Status: COMPLETED | OUTPATIENT
Start: 2024-09-12 | End: 2024-09-12

## 2024-09-12 RX ORDER — PROPRANOLOL HYDROCHLORIDE 10 MG/1
10 TABLET ORAL 2 TIMES DAILY
Status: DISCONTINUED | OUTPATIENT
Start: 2024-09-12 | End: 2024-09-14 | Stop reason: HOSPADM

## 2024-09-12 RX ORDER — TOPIRAMATE 25 MG/1
50 TABLET, FILM COATED ORAL 2 TIMES DAILY
Status: DISCONTINUED | OUTPATIENT
Start: 2024-09-12 | End: 2024-09-14 | Stop reason: HOSPADM

## 2024-09-12 RX ORDER — SIMETHICONE 80 MG
80 TABLET,CHEWABLE ORAL EVERY 6 HOURS PRN
Status: DISCONTINUED | OUTPATIENT
Start: 2024-09-12 | End: 2024-09-14 | Stop reason: HOSPADM

## 2024-09-12 RX ORDER — ACETAMINOPHEN 325 MG/1
650 TABLET ORAL EVERY 4 HOURS PRN
Status: DISCONTINUED | OUTPATIENT
Start: 2024-09-12 | End: 2024-09-14 | Stop reason: HOSPADM

## 2024-09-12 RX ORDER — HYDROMORPHONE HYDROCHLORIDE 2 MG/1
4 TABLET ORAL EVERY 4 HOURS PRN
Status: DISCONTINUED | OUTPATIENT
Start: 2024-09-12 | End: 2024-09-14 | Stop reason: HOSPADM

## 2024-09-12 RX ORDER — IOPAMIDOL 755 MG/ML
500 INJECTION, SOLUTION INTRAVASCULAR ONCE
Status: COMPLETED | OUTPATIENT
Start: 2024-09-12 | End: 2024-09-12

## 2024-09-12 RX ORDER — MORPHINE SULFATE 4 MG/ML
4 INJECTION, SOLUTION INTRAMUSCULAR; INTRAVENOUS
Status: COMPLETED | OUTPATIENT
Start: 2024-09-12 | End: 2024-09-12

## 2024-09-12 RX ORDER — SODIUM CHLORIDE, SODIUM LACTATE, POTASSIUM CHLORIDE, CALCIUM CHLORIDE 600; 310; 30; 20 MG/100ML; MG/100ML; MG/100ML; MG/100ML
INJECTION, SOLUTION INTRAVENOUS CONTINUOUS
Status: DISCONTINUED | OUTPATIENT
Start: 2024-09-12 | End: 2024-09-14 | Stop reason: HOSPADM

## 2024-09-12 RX ORDER — ONDANSETRON 4 MG/1
4 TABLET, ORALLY DISINTEGRATING ORAL EVERY 6 HOURS PRN
Status: DISCONTINUED | OUTPATIENT
Start: 2024-09-12 | End: 2024-09-14 | Stop reason: HOSPADM

## 2024-09-12 RX ORDER — ZOLPIDEM TARTRATE 5 MG/1
5 TABLET ORAL AT BEDTIME
Status: DISCONTINUED | OUTPATIENT
Start: 2024-09-12 | End: 2024-09-14 | Stop reason: HOSPADM

## 2024-09-12 RX ORDER — DESVENLAFAXINE 100 MG/1
100 TABLET, EXTENDED RELEASE ORAL AT BEDTIME
Status: DISCONTINUED | OUTPATIENT
Start: 2024-09-12 | End: 2024-09-12

## 2024-09-12 RX ORDER — HYDROMORPHONE HYDROCHLORIDE 1 MG/ML
0.5 INJECTION, SOLUTION INTRAMUSCULAR; INTRAVENOUS; SUBCUTANEOUS
Status: COMPLETED | OUTPATIENT
Start: 2024-09-12 | End: 2024-09-13

## 2024-09-12 RX ORDER — TRAZODONE HYDROCHLORIDE 100 MG/1
300 TABLET ORAL AT BEDTIME
Status: DISCONTINUED | OUTPATIENT
Start: 2024-09-12 | End: 2024-09-14 | Stop reason: HOSPADM

## 2024-09-12 RX ORDER — GABAPENTIN 300 MG/1
300 CAPSULE ORAL 3 TIMES DAILY
Status: DISCONTINUED | OUTPATIENT
Start: 2024-09-12 | End: 2024-09-14 | Stop reason: HOSPADM

## 2024-09-12 RX ORDER — BUSPIRONE HYDROCHLORIDE 10 MG/1
30 TABLET ORAL 2 TIMES DAILY
Status: DISCONTINUED | OUTPATIENT
Start: 2024-09-12 | End: 2024-09-14 | Stop reason: HOSPADM

## 2024-09-12 RX ORDER — CEFTRIAXONE 1 G/1
1 INJECTION, POWDER, FOR SOLUTION INTRAMUSCULAR; INTRAVENOUS ONCE
Status: COMPLETED | OUTPATIENT
Start: 2024-09-12 | End: 2024-09-12

## 2024-09-12 RX ORDER — KETAMINE HYDROCHLORIDE 10 MG/ML
5 INJECTION, SOLUTION INTRAMUSCULAR; INTRAVENOUS
Status: DISCONTINUED | OUTPATIENT
Start: 2024-09-12 | End: 2024-09-14 | Stop reason: HOSPADM

## 2024-09-12 RX ORDER — METHOCARBAMOL 500 MG/1
500 TABLET, FILM COATED ORAL EVERY 6 HOURS PRN
Status: DISCONTINUED | OUTPATIENT
Start: 2024-09-12 | End: 2024-09-14 | Stop reason: HOSPADM

## 2024-09-12 RX ORDER — PIPERACILLIN SODIUM, TAZOBACTAM SODIUM 3; .375 G/15ML; G/15ML
3.38 INJECTION, POWDER, LYOPHILIZED, FOR SOLUTION INTRAVENOUS EVERY 6 HOURS
Status: DISCONTINUED | OUTPATIENT
Start: 2024-09-12 | End: 2024-09-14 | Stop reason: HOSPADM

## 2024-09-12 RX ORDER — QUETIAPINE FUMARATE 50 MG/1
50 TABLET, FILM COATED ORAL ONCE
Status: COMPLETED | OUTPATIENT
Start: 2024-09-12 | End: 2024-09-12

## 2024-09-12 RX ORDER — ACETAMINOPHEN 650 MG/1
650 SUPPOSITORY RECTAL EVERY 4 HOURS PRN
Status: DISCONTINUED | OUTPATIENT
Start: 2024-09-12 | End: 2024-09-14 | Stop reason: HOSPADM

## 2024-09-12 RX ORDER — LORAZEPAM 1 MG/1
1 TABLET ORAL 2 TIMES DAILY
Status: DISCONTINUED | OUTPATIENT
Start: 2024-09-12 | End: 2024-09-14 | Stop reason: HOSPADM

## 2024-09-12 RX ORDER — RAMELTEON 8 MG/1
8 TABLET ORAL AT BEDTIME
Status: DISCONTINUED | OUTPATIENT
Start: 2024-09-12 | End: 2024-09-14 | Stop reason: HOSPADM

## 2024-09-12 RX ORDER — PANTOPRAZOLE SODIUM 40 MG/1
40 TABLET, DELAYED RELEASE ORAL 2 TIMES DAILY
Status: DISCONTINUED | OUTPATIENT
Start: 2024-09-12 | End: 2024-09-14 | Stop reason: HOSPADM

## 2024-09-12 RX ORDER — LORAZEPAM 1 MG/1
1 TABLET ORAL 2 TIMES DAILY
Status: DISCONTINUED | OUTPATIENT
Start: 2024-09-12 | End: 2024-09-12

## 2024-09-12 RX ADMIN — Medication 5 MG: at 20:45

## 2024-09-12 RX ADMIN — MORPHINE SULFATE 4 MG: 4 INJECTION INTRAVENOUS at 08:34

## 2024-09-12 RX ADMIN — SODIUM CHLORIDE 60 ML: 9 INJECTION, SOLUTION INTRAVENOUS at 04:48

## 2024-09-12 RX ADMIN — SODIUM CHLORIDE, POTASSIUM CHLORIDE, SODIUM LACTATE AND CALCIUM CHLORIDE: 600; 310; 30; 20 INJECTION, SOLUTION INTRAVENOUS at 15:15

## 2024-09-12 RX ADMIN — HYDROMORPHONE HYDROCHLORIDE 0.5 MG: 1 INJECTION, SOLUTION INTRAMUSCULAR; INTRAVENOUS; SUBCUTANEOUS at 17:21

## 2024-09-12 RX ADMIN — QUETIAPINE FUMARATE 50 MG: 50 TABLET ORAL at 12:34

## 2024-09-12 RX ADMIN — SODIUM CHLORIDE 1000 ML: 9 INJECTION, SOLUTION INTRAVENOUS at 03:00

## 2024-09-12 RX ADMIN — MORPHINE SULFATE 4 MG: 4 INJECTION INTRAVENOUS at 10:30

## 2024-09-12 RX ADMIN — TOPIRAMATE 50 MG: 25 TABLET, FILM COATED ORAL at 19:47

## 2024-09-12 RX ADMIN — QUETIAPINE FUMARATE 200 MG: 200 TABLET ORAL at 21:26

## 2024-09-12 RX ADMIN — MORPHINE SULFATE 4 MG: 4 INJECTION INTRAVENOUS at 03:00

## 2024-09-12 RX ADMIN — IOPAMIDOL 81 ML: 755 INJECTION, SOLUTION INTRAVENOUS at 04:48

## 2024-09-12 RX ADMIN — BUSPIRONE HYDROCHLORIDE 30 MG: 10 TABLET ORAL at 12:34

## 2024-09-12 RX ADMIN — HYDROMORPHONE HYDROCHLORIDE 4 MG: 2 TABLET ORAL at 14:25

## 2024-09-12 RX ADMIN — SIMETHICONE 80 MG: 80 TABLET, CHEWABLE ORAL at 17:21

## 2024-09-12 RX ADMIN — BUSPIRONE HYDROCHLORIDE 30 MG: 10 TABLET ORAL at 19:47

## 2024-09-12 RX ADMIN — TOPIRAMATE 50 MG: 25 TABLET, FILM COATED ORAL at 12:34

## 2024-09-12 RX ADMIN — DESVENLAFAXINE 150 MG: 50 TABLET, EXTENDED RELEASE ORAL at 22:50

## 2024-09-12 RX ADMIN — FENTANYL CITRATE 50 MCG: 50 INJECTION, SOLUTION INTRAMUSCULAR; INTRAVENOUS at 06:45

## 2024-09-12 RX ADMIN — MORPHINE SULFATE 4 MG: 4 INJECTION INTRAVENOUS at 07:34

## 2024-09-12 RX ADMIN — TRAZODONE HYDROCHLORIDE 300 MG: 100 TABLET ORAL at 22:47

## 2024-09-12 RX ADMIN — PIPERACILLIN AND TAZOBACTAM 3.38 G: 3; .375 INJECTION, POWDER, FOR SOLUTION INTRAVENOUS at 19:47

## 2024-09-12 RX ADMIN — PIPERACILLIN AND TAZOBACTAM 3.38 G: 3; .375 INJECTION, POWDER, FOR SOLUTION INTRAVENOUS at 14:24

## 2024-09-12 RX ADMIN — LORAZEPAM 1 MG: 1 TABLET ORAL at 11:06

## 2024-09-12 RX ADMIN — METHOCARBAMOL 500 MG: 500 TABLET ORAL at 16:44

## 2024-09-12 RX ADMIN — PANTOPRAZOLE SODIUM 40 MG: 40 TABLET, DELAYED RELEASE ORAL at 12:34

## 2024-09-12 RX ADMIN — PIPERACILLIN AND TAZOBACTAM 3.38 G: 3; .375 INJECTION, POWDER, FOR SOLUTION INTRAVENOUS at 08:23

## 2024-09-12 RX ADMIN — LORAZEPAM 1 MG: 1 TABLET ORAL at 19:47

## 2024-09-12 RX ADMIN — GABAPENTIN 300 MG: 300 CAPSULE ORAL at 12:34

## 2024-09-12 RX ADMIN — HYDROMORPHONE HYDROCHLORIDE 4 MG: 2 TABLET ORAL at 19:47

## 2024-09-12 RX ADMIN — GABAPENTIN 300 MG: 300 CAPSULE ORAL at 19:47

## 2024-09-12 RX ADMIN — PANTOPRAZOLE SODIUM 40 MG: 40 TABLET, DELAYED RELEASE ORAL at 19:47

## 2024-09-12 RX ADMIN — CEFTRIAXONE 1 G: 1 INJECTION, POWDER, FOR SOLUTION INTRAMUSCULAR; INTRAVENOUS at 06:48

## 2024-09-12 RX ADMIN — PROPRANOLOL HYDROCHLORIDE 10 MG: 10 TABLET ORAL at 12:34

## 2024-09-12 ASSESSMENT — ACTIVITIES OF DAILY LIVING (ADL)
ADLS_ACUITY_SCORE: 38

## 2024-09-12 NOTE — PROGRESS NOTES
PGY5 Brief Progress Note    Appreciate medicine team consulting while patient is awaiting transfer to [facility].     In short, pt is POD#12 from radical hysterectomy, BSO, posterior vaginectomy, omentectomy rectosigmoid resection with reanastomosis, diverting loop ileostomy-procedure performed for abdominal/back pain that was worsening. Abdomen with evidence of extensive endometriosis, with pathology showing low grade serous carcinoma.    She presented to [facility] ER per report due to bleeding and discharge from her midline incision at the superior aspect. Per exam by ER physician low concern for infection and no active bleeding on exam. CT scan without findings consistent with incisional wound infection. Of note, she is on chronic anticoagulation with eliquis for history of PE/DVT in past (was planned for lifeline anticoagulation).     Patient is afebrile, normal WBC, lactic acid normal. UA with signs of UTI, however patient has had indwelling catheter due to radical hysterectomy- was planned for voiding trial potentially in office at appointment on 9/13.     On review of CT images with radiology at [facility], concern for possible rectosigmoid anastomotic leak with fluid collection, also concerned for possible colo-vaginal fistulous tract formation. Given she is already diverted which will limit flow through the site of leak, primary goal is source control of fluid collection/potential leakage. Will plan to evaluate for fistula once she is transferred, per report is not reporting any symptoms of stool from vagina. Because patient is stable without signs of sepsis, collection is hopefully contained and amenable to drain placement by IR. Given extended transfer time, IR contacted at [facility] to facilitate placement to prevent worsening infection.     Plan:  - IR consulted for drainage of sigmoid fluid collection with drain cultures  - Continue Zosyn  - Hold anticoagulation and NPO per IR procedure  - Otherwise continue home  medications  - Maintain waite at this time  - Monitor bleeding from incision    Please contact with any changes in status, and appreciate teams for contributions to her care    Sara Alva MD  Gynecologic Oncology PGY-5  September 12, 2024 2:21 PM
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
## 2024-09-12 NOTE — ED NOTES
"Pt upset about wait time. Viktoria, charge nurse talking with pt and pt's mom. Pt verbalized understanding, states she' \"just frustrated\".   "

## 2024-09-12 NOTE — ED PROVIDER NOTES
At sign out pt was pending xfer to [facility] for gyn onc post op concern.       11:20 AM: I spoke with Sarah small hospitalist team to help consult on pt while she boards. I also spoke with ED pharmacist to assist with reordering home meds.      Gustavo Montenegro MD  09/12/24 1126
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
## 2024-09-12 NOTE — ED TRIAGE NOTES
Here for warmth, redness and oozing from hysterectomy site. Had sx on 8/31. Is on blood thinners. States was bleeding at home. Is on eliquis. Denies increased pain in abdomen, however does complain of increased pain in back unable to take prescribed pain medications. Not on abx post surgery. Was recently diagnosed with pelvic mass which was cancerous. Also has ostomy and waite post-surgical. States has been feeling feverish at home.

## 2024-09-12 NOTE — TELEPHONE ENCOUNTER
Nurse Triage SBAR    Is this a 2nd Level Triage? NO    Situation: Surgical incision concerns    Background: Patient had a hysterctomy on 8/31/24 top of incision towards umbilicus is weeping brownish fluid, is painful rating pain 5/10, there is increased redness, patient also reports that the area is hot to touch. There is a small amount of bleeding noted. Patient has applied gauze and pressure for 10 minutes and bleeding continues. Patient is on a blood thinner.     Assessment:  top of incision towards umbilicus is weeping brownish fluid, is painful rating pain 5/10, there is increased redness, patient also reports that the area is hot to touch. There is a small amount of bleeding noted. Patient has applied gauze and pressure for 10 minutes and bleeding continues. Patient is on a blood thinner.     Protocol Recommended Disposition:   No disposition on file.    Recommendation:  Go to ED now.           Does the patient meet one of the following criteria for ADS visit consideration? 16+ years old, with an [facility] PCP     TIP  Providers, please consider if this condition is appropriate for management at one of our Acute and Diagnostic Services sites.     If patient is a good candidate, please use dotphrase <dot>triageresponse and select Refer to ADS to document.    Reason for Disposition   [1] Bleeding from incision AND [2] won't stop after 10 minutes of direct pressure    Additional Information   Negative: [1] Major abdominal surgical incision AND [2] wound gaping open AND [3] visible internal organs   Negative: Sounds like a life-threatening emergency to the triager    Protocols used: Post-Op Incision Symptoms and [id number]

## 2024-09-12 NOTE — CONSULTS
Patient is on CT schedule Friday 9/13/24 for a CT guided pelvic fluid drain placement with IV moderate sedation.     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     -Labs WNL for procedure.    -Eliquis on hold 9/12 until after drain placement  -Orders for NPO and antibiotics have been entered.   -Consent will be done prior to procedure.     Temp:  [98.3  F (36.8  C)-98.4  F (36.9  C)] 98.4  F (36.9  C)  Pulse:  [71-85] 71  Resp:  [18] 18  BP: (105-129)/(69-88) 106/71  SpO2:  [93 %-100 %] 99 %    ROUTINE ICU LABS (Last four results)  CMP  Recent Labs   Lab 09/12/24 0223 09/06/24  0747    138   POTASSIUM 3.7 3.8   CHLORIDE 105 111*   CO2 19* 19*   ANIONGAP 12 8   GLC 94 92   BUN 13.8 9.1   CR 0.82 0.72   GFRESTIMATED >90 >90   LORY 9.2 8.5*   MAG  --  1.7   PHOS  --  3.9   PROTTOTAL 7.2  --    ALBUMIN 4.1  --    BILITOTAL 0.2  --    ALKPHOS 59  --    AST 16  --    ALT 17  --      CBC  Recent Labs   Lab 09/12/24 0223 09/06/24  0747   WBC 8.5 5.9   RBC 3.29* 2.96*   HGB 9.6* 8.8*   HCT 30.1* 27.5*   MCV 92 93   MCH 29.2 29.7   MCHC 31.9 32.0   RDW 15.1* 15.9*   * 192     INR  Recent Labs   Lab 09/12/24 0223 09/06/24  0747   INR 1.23* 1.14     Please contact the CT department for procedural related questions.     Total time: 20 minutes    Thanks, Diane [facility] Interventional Radiology CNP ([phone]) (phone [phone])

## 2024-09-12 NOTE — PHARMACY-ADMISSION MEDICATION HISTORY
Pharmacist Admission Medication History    Admission medication history is complete. The information provided in this note is only as accurate as the sources available at the time of the update.    Information Source(s): Patient via in-person    Pertinent Information: Pt states Belsomra dc'd this past Wednesday at last appt.    Changes made to PTA medication list:  Added: None  Deleted: Fiber tabs, Belsomra  Changed: buspirone 15 mg--> 30 mg, quetiapine 150mg at bedtime --> 200mg at bedtime, trazodone 225mg at bedtime --> 300mg at bedtime, Ambien prn --> scheduled    Allergies reviewed with patient and updates made in EHR: yes    Medication History Completed By: Rosanne Stoll RPH 9/12/2024 11:28 AM    PTA Med List   Medication Sig Last Dose    acetaminophen (TYLENOL) 325 MG tablet Take 2 tablets (650 mg) by mouth every 6 hours. 9/11/2024 at x2    apixaban ANTICOAGULANT (ELIQUIS) 5 MG tablet Take 1 tablet (5 mg) by mouth 2 times daily 9/11/2024 at am    busPIRone HCl (BUSPAR) 30 MG tablet Take 30 mg by mouth 2 times daily. 9/11/2024 at am    calcium carbonate-vitamin D (OS-LORY WITH D) 500-200 MG-UNIT tablet Take 2 tablets by mouth daily 9/11/2024 at am    cholecalciferol 25 MCG (1000 UT) TABS Take 2,000 Units by mouth daily 9/11/2024 at am    desvenlafaxine (PRISTIQ) 100 MG 24 hr tablet Take 100 mg by mouth daily 9/10/2024 at hs    desvenlafaxine (PRISTIQ) 50 MG 24 hr tablet TAKE 1 TABLET BY MOUTH ONCE DAILY. TAKE WITH 100MG TABLET FOR TOTAL OF 150MG DAILY 9/10/2024 at hs    gabapentin (NEURONTIN) 300 MG capsule Take 1 capsule (300 mg) by mouth 3 times daily for 14 days. 9/11/2024 at x2    HYDROmorphone (DILAUDID) 4 MG tablet Take 1 tablet (4 mg) by mouth every 4 hours as needed for severe pain. prn at prn    LORazepam (ATIVAN) 1 MG tablet Take 1 mg by mouth 2 times daily 9/11/2024 at am    methocarbamol (ROBAXIN) 500 MG tablet Take 1 tablet (500 mg) by mouth every 6 hours as needed for muscle spasms. prn at prn     miconazole (MICATIN) 2 % external powder Apply topically 2 times daily. new rx at not started    Multiple Vitamins-Minerals (MULTIVITAL PO) Take 1 tablet by mouth daily 9/11/2024 at am    naloxone (NARCAN) 4 MG/0.1ML nasal spray Spray 1 spray (4 mg) into one nostril alternating nostrils as needed for opioid reversal. every 2-3 minutes until assistance arrives prn at prn    omeprazole (PRILOSEC) 40 MG DR capsule TAKE 1 CAPSULE(40 MG) BY MOUTH DAILY 9/11/2024 at am    phentermine (ADIPEX-P) 37.5 MG tablet Take 0.5-1 tablets (18.75-37.5 mg) by mouth every morning (before breakfast). 9/11/2024 at am    PROBIOTIC PRODUCT PO Take 1 tablet by mouth At Bedtime 9/11/2024 at hs    propranolol (INDERAL) 10 MG tablet Take 10 mg by mouth 2 times daily. 9/11/2024 at am    QUEtiapine (SEROQUEL) 200 MG tablet Take 200 mg by mouth at bedtime. 9/10/2024 at hs    ramelteon (ROZEREM) 8 MG tablet Take 8 mg by mouth At Bedtime 9/10/2024 at hs    simethicone (MYLICON) 80 MG chewable tablet Take 1 tablet (80 mg) by mouth every 6 hours as needed for flatulence or cramping. prn at prn    topiramate (TOPAMAX) 50 MG tablet TAKE 1 TABLET(50 MG) BY MOUTH TWICE DAILY 9/11/2024 at am    traZODone (DESYREL) 150 MG tablet Take 300 mg by mouth at bedtime. 9/10/2024 at hs    zolpidem (AMBIEN) 5 MG tablet Take 5 mg by mouth at bedtime. 9/10/2024 at hs

## 2024-09-13 ENCOUNTER — APPOINTMENT (OUTPATIENT)
Dept: CT IMAGING | Facility: CLINIC | Age: 39
End: 2024-09-13
Payer: COMMERCIAL

## 2024-09-13 ENCOUNTER — TELEPHONE (OUTPATIENT)
Dept: FAMILY MEDICINE | Facility: CLINIC | Age: 39
End: 2024-09-13

## 2024-09-13 LAB
ANION GAP SERPL CALCULATED.3IONS-SCNC: 15 MMOL/L (ref 7–15)
BUN SERPL-MCNC: 5.1 MG/DL (ref 6–20)
CALCIUM SERPL-MCNC: 8.5 MG/DL (ref 8.8–10.4)
CHLORIDE SERPL-SCNC: 107 MMOL/L (ref 98–107)
CREAT SERPL-MCNC: 0.77 MG/DL (ref 0.51–0.95)
EGFRCR SERPLBLD CKD-EPI 2021: >90 ML/MIN/1.73M2
ERYTHROCYTE [DISTWIDTH] IN BLOOD BY AUTOMATED COUNT: 14.8 % (ref 10–15)
GLUCOSE SERPL-MCNC: 88 MG/DL (ref 70–99)
HCO3 SERPL-SCNC: 16 MMOL/L (ref 22–29)
HCT VFR BLD AUTO: 28.3 % (ref 35–47)
HGB BLD-MCNC: 9 G/DL (ref 11.7–15.7)
MCH RBC QN AUTO: 29.1 PG (ref 26.5–33)
MCHC RBC AUTO-ENTMCNC: 31.8 G/DL (ref 31.5–36.5)
MCV RBC AUTO: 92 FL (ref 78–100)
PLATELET # BLD AUTO: 400 10E3/UL (ref 150–450)
POTASSIUM SERPL-SCNC: 3.9 MMOL/L (ref 3.4–5.3)
RBC # BLD AUTO: 3.09 10E6/UL (ref 3.8–5.2)
SODIUM SERPL-SCNC: 138 MMOL/L (ref 135–145)
WBC # BLD AUTO: 6.6 10E3/UL (ref 4–11)

## 2024-09-13 PROCEDURE — 85041 AUTOMATED RBC COUNT: CPT | Performed by: PHYSICIAN ASSISTANT

## 2024-09-13 PROCEDURE — 250N000011 HC RX IP 250 OP 636: Performed by: PHYSICIAN ASSISTANT

## 2024-09-13 PROCEDURE — 258N000003 HC RX IP 258 OP 636: Performed by: EMERGENCY MEDICINE

## 2024-09-13 PROCEDURE — 250N000011 HC RX IP 250 OP 636: Performed by: EMERGENCY MEDICINE

## 2024-09-13 PROCEDURE — 272N000431 CT RETROPERITONEAL ABSCESS DRAIN W CATH PLACE

## 2024-09-13 PROCEDURE — 96372 THER/PROPH/DIAG INJ SC/IM: CPT | Performed by: EMERGENCY MEDICINE

## 2024-09-13 PROCEDURE — 87075 CULTR BACTERIA EXCEPT BLOOD: CPT | Performed by: EMERGENCY MEDICINE

## 2024-09-13 PROCEDURE — 36415 COLL VENOUS BLD VENIPUNCTURE: CPT | Performed by: PHYSICIAN ASSISTANT

## 2024-09-13 PROCEDURE — 87205 SMEAR GRAM STAIN: CPT | Performed by: EMERGENCY MEDICINE

## 2024-09-13 PROCEDURE — 80048 BASIC METABOLIC PNL TOTAL CA: CPT | Performed by: PHYSICIAN ASSISTANT

## 2024-09-13 PROCEDURE — 96374 THER/PROPH/DIAG INJ IV PUSH: CPT

## 2024-09-13 PROCEDURE — 250N000011 HC RX IP 250 OP 636: Performed by: NURSE PRACTITIONER

## 2024-09-13 PROCEDURE — 258N000003 HC RX IP 258 OP 636: Performed by: PHYSICIAN ASSISTANT

## 2024-09-13 PROCEDURE — 250N000013 HC RX MED GY IP 250 OP 250 PS 637: Performed by: PHYSICIAN ASSISTANT

## 2024-09-13 PROCEDURE — 99215 OFFICE O/P EST HI 40 MIN: CPT | Performed by: INTERNAL MEDICINE

## 2024-09-13 PROCEDURE — 250N000009 HC RX 250: Performed by: NURSE PRACTITIONER

## 2024-09-13 PROCEDURE — 96376 TX/PRO/DX INJ SAME DRUG ADON: CPT | Mod: XU

## 2024-09-13 PROCEDURE — 96375 TX/PRO/DX INJ NEW DRUG ADDON: CPT

## 2024-09-13 RX ORDER — HYDROMORPHONE HYDROCHLORIDE 1 MG/ML
0.5 INJECTION, SOLUTION INTRAMUSCULAR; INTRAVENOUS; SUBCUTANEOUS EVERY 30 MIN PRN
Status: COMPLETED | OUTPATIENT
Start: 2024-09-13 | End: 2024-09-14

## 2024-09-13 RX ORDER — ENOXAPARIN SODIUM 100 MG/ML
1 INJECTION SUBCUTANEOUS EVERY 12 HOURS
Status: DISCONTINUED | OUTPATIENT
Start: 2024-09-14 | End: 2024-09-13

## 2024-09-13 RX ORDER — NALOXONE HYDROCHLORIDE 0.4 MG/ML
0.2 INJECTION, SOLUTION INTRAMUSCULAR; INTRAVENOUS; SUBCUTANEOUS
Status: DISCONTINUED | OUTPATIENT
Start: 2024-09-13 | End: 2024-09-14 | Stop reason: HOSPADM

## 2024-09-13 RX ORDER — FENTANYL CITRATE 50 UG/ML
25-50 INJECTION, SOLUTION INTRAMUSCULAR; INTRAVENOUS EVERY 5 MIN PRN
Status: DISCONTINUED | OUTPATIENT
Start: 2024-09-13 | End: 2024-09-13

## 2024-09-13 RX ORDER — NALOXONE HYDROCHLORIDE 0.4 MG/ML
0.4 INJECTION, SOLUTION INTRAMUSCULAR; INTRAVENOUS; SUBCUTANEOUS
Status: DISCONTINUED | OUTPATIENT
Start: 2024-09-13 | End: 2024-09-14 | Stop reason: HOSPADM

## 2024-09-13 RX ORDER — FLUMAZENIL 0.1 MG/ML
0.2 INJECTION, SOLUTION INTRAVENOUS
Status: DISCONTINUED | OUTPATIENT
Start: 2024-09-13 | End: 2024-09-13

## 2024-09-13 RX ORDER — ENOXAPARIN SODIUM 100 MG/ML
40 INJECTION SUBCUTANEOUS ONCE
Status: COMPLETED | OUTPATIENT
Start: 2024-09-13 | End: 2024-09-13

## 2024-09-13 RX ORDER — ENOXAPARIN SODIUM 100 MG/ML
1 INJECTION SUBCUTANEOUS EVERY 12 HOURS
Status: DISCONTINUED | OUTPATIENT
Start: 2024-09-13 | End: 2024-09-13

## 2024-09-13 RX ADMIN — SODIUM CHLORIDE, POTASSIUM CHLORIDE, SODIUM LACTATE AND CALCIUM CHLORIDE: 600; 310; 30; 20 INJECTION, SOLUTION INTRAVENOUS at 03:20

## 2024-09-13 RX ADMIN — PIPERACILLIN AND TAZOBACTAM 3.38 G: 3; .375 INJECTION, POWDER, FOR SOLUTION INTRAVENOUS at 19:39

## 2024-09-13 RX ADMIN — HYDROMORPHONE HYDROCHLORIDE 0.5 MG: 1 INJECTION, SOLUTION INTRAMUSCULAR; INTRAVENOUS; SUBCUTANEOUS at 05:20

## 2024-09-13 RX ADMIN — PIPERACILLIN AND TAZOBACTAM 3.38 G: 3; .375 INJECTION, POWDER, FOR SOLUTION INTRAVENOUS at 01:35

## 2024-09-13 RX ADMIN — LORAZEPAM 1 MG: 1 TABLET ORAL at 08:21

## 2024-09-13 RX ADMIN — METHOCARBAMOL 500 MG: 500 TABLET ORAL at 08:35

## 2024-09-13 RX ADMIN — FENTANYL CITRATE 50 MCG: 50 INJECTION, SOLUTION INTRAMUSCULAR; INTRAVENOUS at 12:41

## 2024-09-13 RX ADMIN — ACETAMINOPHEN 325MG 650 MG: 325 TABLET ORAL at 18:21

## 2024-09-13 RX ADMIN — SODIUM CHLORIDE 500 ML: 9 INJECTION, SOLUTION INTRAVENOUS at 00:48

## 2024-09-13 RX ADMIN — ENOXAPARIN SODIUM 40 MG: 40 INJECTION SUBCUTANEOUS at 17:58

## 2024-09-13 RX ADMIN — ZOLPIDEM TARTRATE 5 MG: 5 TABLET ORAL at 21:53

## 2024-09-13 RX ADMIN — TRAZODONE HYDROCHLORIDE 300 MG: 100 TABLET ORAL at 21:53

## 2024-09-13 RX ADMIN — PIPERACILLIN AND TAZOBACTAM 3.38 G: 3; .375 INJECTION, POWDER, FOR SOLUTION INTRAVENOUS at 08:20

## 2024-09-13 RX ADMIN — TOPIRAMATE 75 MG: 25 TABLET, FILM COATED ORAL at 19:33

## 2024-09-13 RX ADMIN — PIPERACILLIN AND TAZOBACTAM 3.38 G: 3; .375 INJECTION, POWDER, FOR SOLUTION INTRAVENOUS at 14:12

## 2024-09-13 RX ADMIN — PROPRANOLOL HYDROCHLORIDE 10 MG: 10 TABLET ORAL at 19:50

## 2024-09-13 RX ADMIN — FENTANYL CITRATE 50 MCG: 50 INJECTION, SOLUTION INTRAMUSCULAR; INTRAVENOUS at 12:39

## 2024-09-13 RX ADMIN — PANTOPRAZOLE SODIUM 40 MG: 40 TABLET, DELAYED RELEASE ORAL at 19:33

## 2024-09-13 RX ADMIN — BUSPIRONE HYDROCHLORIDE 30 MG: 10 TABLET ORAL at 19:32

## 2024-09-13 RX ADMIN — MIDAZOLAM 1 MG: 1 INJECTION INTRAMUSCULAR; INTRAVENOUS at 12:38

## 2024-09-13 RX ADMIN — HYDROMORPHONE HYDROCHLORIDE 4 MG: 2 TABLET ORAL at 20:38

## 2024-09-13 RX ADMIN — PROPRANOLOL HYDROCHLORIDE 10 MG: 10 TABLET ORAL at 08:21

## 2024-09-13 RX ADMIN — HYDROMORPHONE HYDROCHLORIDE 4 MG: 2 TABLET ORAL at 14:13

## 2024-09-13 RX ADMIN — GABAPENTIN 300 MG: 300 CAPSULE ORAL at 08:21

## 2024-09-13 RX ADMIN — HYDROMORPHONE HYDROCHLORIDE 0.5 MG: 1 INJECTION, SOLUTION INTRAMUSCULAR; INTRAVENOUS; SUBCUTANEOUS at 15:19

## 2024-09-13 RX ADMIN — METHOCARBAMOL 500 MG: 500 TABLET ORAL at 17:58

## 2024-09-13 RX ADMIN — QUETIAPINE FUMARATE 200 MG: 200 TABLET ORAL at 21:53

## 2024-09-13 RX ADMIN — PANTOPRAZOLE SODIUM 40 MG: 40 TABLET, DELAYED RELEASE ORAL at 08:21

## 2024-09-13 RX ADMIN — SIMETHICONE 80 MG: 80 TABLET, CHEWABLE ORAL at 17:46

## 2024-09-13 RX ADMIN — MIDAZOLAM 1 MG: 1 INJECTION INTRAMUSCULAR; INTRAVENOUS at 12:41

## 2024-09-13 RX ADMIN — BUSPIRONE HYDROCHLORIDE 30 MG: 10 TABLET ORAL at 08:21

## 2024-09-13 RX ADMIN — FENTANYL CITRATE 50 MCG: 50 INJECTION, SOLUTION INTRAMUSCULAR; INTRAVENOUS at 12:38

## 2024-09-13 RX ADMIN — GABAPENTIN 300 MG: 300 CAPSULE ORAL at 14:13

## 2024-09-13 RX ADMIN — RAMELTEON 8 MG: 8 TABLET, FILM COATED ORAL at 21:47

## 2024-09-13 RX ADMIN — SODIUM CHLORIDE, POTASSIUM CHLORIDE, SODIUM LACTATE AND CALCIUM CHLORIDE: 600; 310; 30; 20 INJECTION, SOLUTION INTRAVENOUS at 15:19

## 2024-09-13 RX ADMIN — HYDROMORPHONE HYDROCHLORIDE 0.5 MG: 1 INJECTION, SOLUTION INTRAMUSCULAR; INTRAVENOUS; SUBCUTANEOUS at 17:59

## 2024-09-13 RX ADMIN — MIDAZOLAM 1 MG: 1 INJECTION INTRAMUSCULAR; INTRAVENOUS at 12:36

## 2024-09-13 RX ADMIN — TOPIRAMATE 50 MG: 25 TABLET, FILM COATED ORAL at 08:21

## 2024-09-13 RX ADMIN — LORAZEPAM 1 MG: 1 TABLET ORAL at 19:33

## 2024-09-13 RX ADMIN — GABAPENTIN 300 MG: 300 CAPSULE ORAL at 19:34

## 2024-09-13 RX ADMIN — LIDOCAINE HYDROCHLORIDE 30 ML: 10 INJECTION, SOLUTION EPIDURAL; INFILTRATION; INTRACAUDAL; PERINEURAL at 12:40

## 2024-09-13 RX ADMIN — HYDROMORPHONE HYDROCHLORIDE 0.2 MG: 1 INJECTION, SOLUTION INTRAMUSCULAR; INTRAVENOUS; SUBCUTANEOUS at 00:50

## 2024-09-13 ASSESSMENT — ACTIVITIES OF DAILY LIVING (ADL)
ADLS_ACUITY_SCORE: 38

## 2024-09-13 NOTE — ED NOTES
Discussed due/overdue medications with Dr Miner as pt has noted b/p 97/63, then 101/56. Pt declined rozerem. Propranolol, and ambien both held due to b/p readings per provider.

## 2024-09-13 NOTE — PROGRESS NOTES
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     In the ED she is afebrile with heart rate of 84, pressure 114/85 and breathing comfortably on room air without hypoxia.  Lab work was remarkable for normal BMP with the exception of CO2 of 19, normal LFTs, glucose 94, WBC 8.5, hemoglobin 9.6 and platelet count 467.  Urinalysis shows large amount of leukocyte Estrace, large amount of white blood cells, positive nitrites and was sent for culture.  CT abdomen showed recent postoperative changes of hysterectomy with fluid collection along the right lateral aspect of the hysterectomy extending along the rectum measuring 5.5 x 1.8 x 2.5 cm additionally there is ill-defined fluid in this region extending posteriorly to the rectum circumferentially that may  represent hematoma, seroma or developing abscess.  Patient was given a dose of ceftriaxone and then a dose of Zosyn.  Her Gyn Onc specialist at the [facility] were called recommending transfer to their facility but there are no beds at this point so hospitalist service was consulted to manage while she is in the ED.    Interventional radiology was consulted on 9/13 for drainage of retroperitoneal abscess.  A AQUILINO drain was placed.  Fluid was sent to the lab for Gram stain and culture.      Per IR radiologist notes from  "retroperitoneal abscess drainage on 9/13:    \"Limited pre procedure CT shows there is a wide mouth fistula between the rectum and the vagina with stool in the fistula tract and within the vagina. There is a pelvic abscess.     Placement of a 10 Burkinan pelvic abscess. Contrast was injected though the drain which shows a fistulous connection between the abscess and the vagina.\"    Patient remains boarded in the ER until she can be transferred [facility] when bed is available    Plans today:  - continue IV Zosyn  - continue to hold DOAC  - consider starting Lovenox tomorrow while DOAC on hold if patient still not transferred to [facility]  - await transfer to [facility].  Needs higher level of care to address abscess and rectovaginal fistula.  Her surgical team is at [facility]  -I spoke with the ER provider managing this patient, Dr. Jones today.  No bed available at [facility] yet.  Dr. Jones has communicated with the patient's gyn oncology team at [facility] today  -I updated the patient's mother at bedside.  Both patient and mother are frustrated by lack of transfer to [facility].    Addendum:  I communicated with gyn oncology fellow at [facility] today.  Pt is apparently next on the list for transfer after there is another discharge at [facility]     # History of adenocarcinoma of rectosigmoid space s/p exploratory laparotomy with radical hysterectomy, bilateral salpingo-oophorectomy, radical en bloc rectosigmoid colon resection with posterior vaginectomy, total supracolic omentectomy and diverting loop ileostomy on 9/1/2024  # Above procedure complicated by pelvic abscess and rectovaginal fistula  # Status post IR drainage of pelvic abscess and placement of AQUILINO drain on 9/13  -Patient had above procedure performed by Dr. Jessenia Sosa at the [facility].  She discharged home on 9/6 and overall was doing well with the exception of dysuria and some mild redness around incision site  -Patient presented to the [facility] emergency room on 9/12/2024 with complaint " of brownish/bloody discharge from her abdominal wall incision.  She otherwise felt well.  CT imaging showed fluid collection along the right lateral aspect of the hysterectomy extending along the rectum measuring 5.5 x 1.8 x 2.5 cm as well as ill-defined fluid in this region extending posteriorly to the rectum circumferentially concerning for abscess  -The [facility] team was called recommending transfer to the [facility] but unfortunately this is likely not to happen in 48 hours hence consult for hospitalist medicine.    - They recommended drainage by IR of the fluid collection which was performed 9/13  -Continue with Zosyn  -Follow-up blood culture  -Continue PTA Dilaudid, gabapentin, Robaxin  - [facility] contact: Sara Alva from the gyn onc team who can be reached through [employer] or by cell [phone] through 9/13     # Urinary tract infection  # Urinary retention  -Patient developed urinary retention is a postoperative complication at the [facility].  She reports they were going to discuss removing the Cat at her appointment on 9/13   -She reports burning with urination that started earlier in the week  -UA is abnormal concerning for infection.  Catheter was exchanged in the ED on 9/12  - urine cx positive for ecoli and enterococcus.  Sensitivities pending  - continue Zosyn as above     #Hx of DVT/PE  -Holding Apixaban for now; pt may require additional surgical procedure at [facility]     # Anxiety   # Depression  -Continue BuSpar, Pristiq, oral Ativan, propranolol, Seroquel, Rozerem, Topamax, trazodone and Ambien    DVT Prophylaxis: DOAC on hold for procedures  Code Status: Full Code  Discharge Dispo: transfer to [facility] when bed available  Estimated Disch Date / # of Days until Disch: when bed available        Interval History (Subjective):      I met with patient and mother at bedside today.  They both remain frustrated that she has not yet been transferred to [facility].  Discussed with them that [facility] does not  "currently beds available                  Physical Exam:      Last Vital Signs:  BP 95/54   Pulse 66   Temp 98.4  F (36.9  C) (Oral)   Resp 16   Ht 1.651 m (5' 5\")   Wt 72.8 kg (160 lb 7.9 oz)   LMP  (LMP Unknown)   SpO2 98%   BMI 26.71 kg/m        Intake/Output Summary (Last 24 hours) at 9/13/2024 1541  Last data filed at 9/13/2024 1416  Gross per 24 hour   Intake 1453.33 ml   Output 1430 ml   Net 23.33 ml       Constitutional: Awake, alert, cooperative, no apparent distress     Respiratory: Clear to auscultation bilaterally, no crackles or wheezing   Cardiovascular: Regular rate and rhythm, normal S1 and S2, and no murmur noted   Abdomen: Normal bowel sounds, soft, non-distended, non-tender   Skin: No rashes, no cyanosis, dry to touch   Neuro: Alert and oriented x3, no weakness, numbness, memory loss   Extremities: No edema, normal range of motion   Other(s): Mother present at bedside       All other systems: Negative          Medications:      All current medications were reviewed with changes reflected in problem list.         Data:      All new lab and imaging data was reviewed.   Labs:       Lab Results   Component Value Date     09/13/2024     09/12/2024     09/06/2024     11/05/2012    Lab Results   Component Value Date    CHLORIDE 107 09/13/2024    CHLORIDE 105 09/12/2024    CHLORIDE 111 09/06/2024    CHLORIDE 105 11/05/2012    Lab Results   Component Value Date    BUN 5.1 09/13/2024    BUN 13.8 09/12/2024    BUN 9.1 09/06/2024    BUN 4 11/05/2012      Lab Results   Component Value Date    POTASSIUM 3.9 09/13/2024    POTASSIUM 3.7 09/12/2024    POTASSIUM 3.8 09/06/2024    POTASSIUM 4.6 09/01/2024    POTASSIUM 4.5 09/01/2024    POTASSIUM 4.2 09/01/2024    POTASSIUM 4.2 11/05/2012    Lab Results   Component Value Date    CO2 16 09/13/2024    CO2 19 09/12/2024    CO2 19 09/06/2024    CO2 26 11/05/2012    Lab Results   Component Value Date    CR 0.77 09/13/2024    CR 0.82 " 09/12/2024    CR 0.72 09/06/2024    CR 0.73 11/05/2012        Recent Labs   Lab 09/13/24  0926   WBC 6.6   HGB 9.0*   HCT 28.3*   MCV 92         Imaging:   No results found for this or any previous visit (from the past 24 hour(s)).

## 2024-09-13 NOTE — TELEPHONE ENCOUNTER
Itzel, home care coordinator calling from [facility] [phone]      Home Care is calling regarding an established patient with [facility].       Requesting orders from: Georgie Hernández  Provider is following patient: No       Orders Requested    Skilled Nursing  Request for initial certification (first set of orders)   Frequency:  2x/wk for 3 wks and 1xwk/3wks    Information was gathered and will be sent to provider for review.  RN will contact Home Care with information after provider review.  Information was gathered and will be sent to provider to confirm provider will be following patient.  RN will contact Home Care with information after provider review.  Confirmed ok to leave a detailed message with call back.  Contact information confirmed and updated as needed.    Carmen Lackey RN

## 2024-09-13 NOTE — ED PROVIDER NOTES
ED signout note    Prolonged boarding due to bed availability at the [facility].    Complex 39-year-old recently postop from a complex GYN/pelvic surgery.  Concern for fistula, anastomotic leak, abscess etc.    Hospitalist performed consult ordered ongoing medications, morning labs.    Throughout shift struggled with symptom control and so breakthrough IV Dilaudid on top of oral Dilaudid.  Also discussed analgesic dose ketamine.    If still at [facility] tomorrow interventional radiology will attempt to place a drain.    Signed out to the overnight team.    MD Kristofer Pruitt Jerome Richard, MD  09/12/24 7732

## 2024-09-13 NOTE — ED PROVIDER NOTES
ED signout note     Prolonged boarding due to bed unavailability at the [facility].     Complex 39-year-old recently postop from a complex GYN/pelvic surgery.  Concern for fistula, anastomotic leak, abscess etc.     Hospitalist performed consult ordered ongoing medications, morning labs.     12:56 PM discussed the case with Dr. Thompson from interventional radiology who was able to place a CT-guided percutaneous drain into the pelvic abscess.  She reports approximately 20 mL of purulent fluid was drained.  On her evaluation of the images she reports that there is a rectovaginal fistula as well as a separate fistulous tract from the abscess to the vagina.    Patient continues to await bed availability at the [facility].     Iain Jones MD  09/13/24 1257

## 2024-09-13 NOTE — PRE-PROCEDURE
GENERAL PRE-PROCEDURE:   Procedure:  Postoperative pelvic abscess image guided drainage.  Date/Time:  9/13/2024 12:21 PM    Written consent obtained?: Yes    Risks and benefits: Risks, benefits and alternatives were discussed    Consent given by:  Patient  Patient states understanding of procedure being performed: Yes    Patient's understanding of procedure matches consent: Yes    Procedure consent matches procedure scheduled: Yes    Expected level of sedation:  Moderate  Appropriately NPO:  Yes  ASA Class:  2  Mallampati  :  Grade 3- soft palate visible, posterior pharyngeal wall not visible  Lungs:  Lungs clear with good breath sounds bilaterally  Heart:  Normal heart sounds and rate  History & Physical reviewed:  History and physical reviewed and no updates needed  Statement of review:  I have reviewed the lab findings, diagnostic data, medications, and the plan for sedation

## 2024-09-13 NOTE — PROCEDURES
Procedure: Pelvic abscess drain.    Date/Time: 9/13/2024 12:56 PM    Performed by: Randi Thompson DO  Authorized by: Randi Thompson DO  IR Fellow Physician:    Pre Procedure Diagnosis: postoperative pelvic abscess  Post Procedure Diagnosis: same.    UNIVERSAL PROTOCOL   Site Marked: Yes  Prior Images Obtained and Reviewed:  Yes  Required items: Required blood products, implants, devices and special equipment available    Patient identity confirmed:  Verbally with patient  Patient was reevaluated immediately before administering moderate or deep sedation or anesthesia  Confirmation Checklist:  Patient's identity using two indicators, procedure was appropriate and matched the consent or emergent situation, relevant allergies and correct equipment/implants were available  Time out: Immediately prior to the procedure a time out was called    Universal Protocol: the Joint Commission Universal Protocol was followed    Preparation: Patient was prepped and draped in usual sterile fashion       ANESTHESIA    Local Anesthetic:  Lidocaine 1% without epinephrine      SEDATION  Patient Sedated: Yes    Sedation Type:  Moderate (conscious) sedation  Vital signs: Vital signs monitored during sedation    Findings: Limited pre procedure CT shows there is a wide mouth fistula between the rectum and the vagina with stool in the fistula tract and within the vagina. There is a pelvic abscess.     Placement of a 10 Mauritanian pelvic abscess. Contrast was injected though the drain which shows a fistulous connection between the abscess and the vagina.     Specimens: fluid and/or tissue for gram stain and culture    Procedural Complications: None    Condition: Stable    Plan: Drain to bulb suction. Flushing as ordered.       PROCEDURE    Length of time physician/provider present for 1:1 monitoring during sedation:  0-22 min

## 2024-09-13 NOTE — PROGRESS NOTES
Abscess drain placement into left pelvis done with image guidance by Dr. Thompson. Pt tolerated the procedure well. Vital signs stable throughout the procedure. 40mls of brown, purulent aspirate removed from site and sent for analysis. Sedation given per MAR. Patient returned to ER room in stable condition. Report given to primary RN.

## 2024-09-14 ENCOUNTER — HOSPITAL ENCOUNTER (INPATIENT)
Facility: CLINIC | Age: 39
LOS: 4 days | Discharge: HOME HEALTH | End: 2024-09-18
Attending: OBSTETRICS & GYNECOLOGY | Admitting: OBSTETRICS & GYNECOLOGY
Payer: COMMERCIAL

## 2024-09-14 VITALS
BODY MASS INDEX: 26.74 KG/M2 | HEART RATE: 94 BPM | DIASTOLIC BLOOD PRESSURE: 68 MMHG | HEIGHT: 65 IN | TEMPERATURE: 98.6 F | OXYGEN SATURATION: 99 % | SYSTOLIC BLOOD PRESSURE: 110 MMHG | WEIGHT: 160.5 LBS | RESPIRATION RATE: 18 BRPM

## 2024-09-14 DIAGNOSIS — K62.89 RECTAL PAIN: ICD-10-CM

## 2024-09-14 DIAGNOSIS — C80.1 ADENOCARCINOID TUMOR (H): ICD-10-CM

## 2024-09-14 DIAGNOSIS — N82.3 RECTOVAGINAL FISTULA: ICD-10-CM

## 2024-09-14 DIAGNOSIS — Z98.84 S/P GASTRIC BYPASS: Primary | ICD-10-CM

## 2024-09-14 DIAGNOSIS — C48.1 PRIMARY MALIGNANT NEOPLASM OF PELVIC PERITONEUM (H): ICD-10-CM

## 2024-09-14 DIAGNOSIS — Z48.00 CHANGE OF DRESSING: ICD-10-CM

## 2024-09-14 DIAGNOSIS — K91.89 LEAK OF ANASTOMOSIS BETWEEN GASTROINTESTINAL STRUCTURES: ICD-10-CM

## 2024-09-14 DIAGNOSIS — N94.89 ADNEXAL MASS: ICD-10-CM

## 2024-09-14 DIAGNOSIS — F41.1 GENERALIZED ANXIETY DISORDER: ICD-10-CM

## 2024-09-14 LAB
ANION GAP SERPL CALCULATED.3IONS-SCNC: 11 MMOL/L (ref 7–15)
BACTERIA UR CULT: ABNORMAL
BACTERIA UR CULT: ABNORMAL
BUN SERPL-MCNC: 4.2 MG/DL (ref 6–20)
CALCIUM SERPL-MCNC: 8.3 MG/DL (ref 8.8–10.4)
CHLORIDE SERPL-SCNC: 109 MMOL/L (ref 98–107)
CREAT SERPL-MCNC: 0.75 MG/DL (ref 0.51–0.95)
EGFRCR SERPLBLD CKD-EPI 2021: >90 ML/MIN/1.73M2
ERYTHROCYTE [DISTWIDTH] IN BLOOD BY AUTOMATED COUNT: 14.9 % (ref 10–15)
GLUCOSE SERPL-MCNC: 134 MG/DL (ref 70–99)
HCO3 SERPL-SCNC: 19 MMOL/L (ref 22–29)
HCT VFR BLD AUTO: 26.4 % (ref 35–47)
HGB BLD-MCNC: 8.4 G/DL (ref 11.7–15.7)
MCH RBC QN AUTO: 29.2 PG (ref 26.5–33)
MCHC RBC AUTO-ENTMCNC: 31.8 G/DL (ref 31.5–36.5)
MCV RBC AUTO: 92 FL (ref 78–100)
PLATELET # BLD AUTO: 376 10E3/UL (ref 150–450)
POTASSIUM SERPL-SCNC: 3.5 MMOL/L (ref 3.4–5.3)
RBC # BLD AUTO: 2.88 10E6/UL (ref 3.8–5.2)
SODIUM SERPL-SCNC: 139 MMOL/L (ref 135–145)
WBC # BLD AUTO: 6.7 10E3/UL (ref 4–11)

## 2024-09-14 PROCEDURE — 250N000011 HC RX IP 250 OP 636: Performed by: EMERGENCY MEDICINE

## 2024-09-14 PROCEDURE — 85014 HEMATOCRIT: CPT | Performed by: PHYSICIAN ASSISTANT

## 2024-09-14 PROCEDURE — 250N000011 HC RX IP 250 OP 636: Performed by: PHYSICIAN ASSISTANT

## 2024-09-14 PROCEDURE — 250N000013 HC RX MED GY IP 250 OP 250 PS 637

## 2024-09-14 PROCEDURE — 250N000011 HC RX IP 250 OP 636

## 2024-09-14 PROCEDURE — 36415 COLL VENOUS BLD VENIPUNCTURE: CPT | Performed by: INTERNAL MEDICINE

## 2024-09-14 PROCEDURE — 80048 BASIC METABOLIC PNL TOTAL CA: CPT | Performed by: INTERNAL MEDICINE

## 2024-09-14 PROCEDURE — 120N000002 HC R&B MED SURG/OB UMMC

## 2024-09-14 PROCEDURE — 96374 THER/PROPH/DIAG INJ IV PUSH: CPT

## 2024-09-14 PROCEDURE — 250N000013 HC RX MED GY IP 250 OP 250 PS 637: Performed by: PHYSICIAN ASSISTANT

## 2024-09-14 PROCEDURE — 96376 TX/PRO/DX INJ SAME DRUG ADON: CPT

## 2024-09-14 PROCEDURE — 99233 SBSQ HOSP IP/OBS HIGH 50: CPT | Performed by: INTERNAL MEDICINE

## 2024-09-14 PROCEDURE — 999N000127 HC STATISTIC PERIPHERAL IV START W US GUIDANCE

## 2024-09-14 RX ORDER — ZOLPIDEM TARTRATE 5 MG/1
5 TABLET ORAL AT BEDTIME
Status: DISCONTINUED | OUTPATIENT
Start: 2024-09-14 | End: 2024-09-14

## 2024-09-14 RX ORDER — LIDOCAINE 40 MG/G
CREAM TOPICAL
Status: DISCONTINUED | OUTPATIENT
Start: 2024-09-14 | End: 2024-09-18 | Stop reason: HOSPADM

## 2024-09-14 RX ORDER — QUETIAPINE FUMARATE 200 MG/1
200 TABLET, FILM COATED ORAL AT BEDTIME
Status: DISCONTINUED | OUTPATIENT
Start: 2024-09-14 | End: 2024-09-14

## 2024-09-14 RX ORDER — ENOXAPARIN SODIUM 100 MG/ML
40 INJECTION SUBCUTANEOUS EVERY 12 HOURS
Status: DISCONTINUED | OUTPATIENT
Start: 2024-09-14 | End: 2024-09-17

## 2024-09-14 RX ORDER — TOPIRAMATE 25 MG/1
50 TABLET, FILM COATED ORAL 2 TIMES DAILY
Status: DISCONTINUED | OUTPATIENT
Start: 2024-09-14 | End: 2024-09-18 | Stop reason: HOSPADM

## 2024-09-14 RX ORDER — GABAPENTIN 300 MG/1
300 CAPSULE ORAL 3 TIMES DAILY
Status: DISCONTINUED | OUTPATIENT
Start: 2024-09-14 | End: 2024-09-18 | Stop reason: HOSPADM

## 2024-09-14 RX ORDER — HYDROMORPHONE HYDROCHLORIDE 1 MG/ML
0.5 INJECTION, SOLUTION INTRAMUSCULAR; INTRAVENOUS; SUBCUTANEOUS ONCE
Status: COMPLETED | OUTPATIENT
Start: 2024-09-14 | End: 2024-09-14

## 2024-09-14 RX ORDER — NALOXONE HYDROCHLORIDE 0.4 MG/ML
0.2 INJECTION, SOLUTION INTRAMUSCULAR; INTRAVENOUS; SUBCUTANEOUS
Status: DISCONTINUED | OUTPATIENT
Start: 2024-09-14 | End: 2024-09-18 | Stop reason: HOSPADM

## 2024-09-14 RX ORDER — ENOXAPARIN SODIUM 100 MG/ML
40 INJECTION SUBCUTANEOUS EVERY 24 HOURS
Status: DISCONTINUED | OUTPATIENT
Start: 2024-09-14 | End: 2024-09-14 | Stop reason: HOSPADM

## 2024-09-14 RX ORDER — ZOLPIDEM TARTRATE 5 MG/1
5 TABLET ORAL AT BEDTIME
Status: DISCONTINUED | OUTPATIENT
Start: 2024-09-14 | End: 2024-09-18 | Stop reason: HOSPADM

## 2024-09-14 RX ORDER — RAMELTEON 8 MG/1
8 TABLET ORAL AT BEDTIME
Status: DISCONTINUED | OUTPATIENT
Start: 2024-09-14 | End: 2024-09-18 | Stop reason: HOSPADM

## 2024-09-14 RX ORDER — AMOXICILLIN 250 MG
2 CAPSULE ORAL 2 TIMES DAILY PRN
Status: DISCONTINUED | OUTPATIENT
Start: 2024-09-14 | End: 2024-09-14

## 2024-09-14 RX ORDER — AMOXICILLIN 250 MG
1 CAPSULE ORAL 2 TIMES DAILY PRN
Status: DISCONTINUED | OUTPATIENT
Start: 2024-09-14 | End: 2024-09-14

## 2024-09-14 RX ORDER — HYDROMORPHONE HYDROCHLORIDE 1 MG/ML
0.5 INJECTION, SOLUTION INTRAMUSCULAR; INTRAVENOUS; SUBCUTANEOUS ONCE
Status: DISCONTINUED | OUTPATIENT
Start: 2024-09-14 | End: 2024-09-14

## 2024-09-14 RX ORDER — NALOXONE HYDROCHLORIDE 0.4 MG/ML
0.4 INJECTION, SOLUTION INTRAMUSCULAR; INTRAVENOUS; SUBCUTANEOUS
Status: DISCONTINUED | OUTPATIENT
Start: 2024-09-14 | End: 2024-09-18 | Stop reason: HOSPADM

## 2024-09-14 RX ORDER — SIMETHICONE 80 MG
80 TABLET,CHEWABLE ORAL EVERY 6 HOURS PRN
Status: DISCONTINUED | OUTPATIENT
Start: 2024-09-14 | End: 2024-09-18 | Stop reason: HOSPADM

## 2024-09-14 RX ORDER — IBUPROFEN 200 MG
600 TABLET ORAL EVERY 6 HOURS
Status: DISCONTINUED | OUTPATIENT
Start: 2024-09-14 | End: 2024-09-14

## 2024-09-14 RX ORDER — TRAZODONE HYDROCHLORIDE 50 MG/1
150 TABLET, FILM COATED ORAL AT BEDTIME
Status: DISCONTINUED | OUTPATIENT
Start: 2024-09-14 | End: 2024-09-14

## 2024-09-14 RX ORDER — ONDANSETRON 4 MG/1
4 TABLET, ORALLY DISINTEGRATING ORAL EVERY 6 HOURS PRN
Status: DISCONTINUED | OUTPATIENT
Start: 2024-09-14 | End: 2024-09-18 | Stop reason: HOSPADM

## 2024-09-14 RX ORDER — ACETAMINOPHEN 325 MG/1
650 TABLET ORAL EVERY 4 HOURS
Status: DISCONTINUED | OUTPATIENT
Start: 2024-09-14 | End: 2024-09-16

## 2024-09-14 RX ORDER — CALCIUM CARBONATE 500 MG/1
1000 TABLET, CHEWABLE ORAL 4 TIMES DAILY PRN
Status: DISCONTINUED | OUTPATIENT
Start: 2024-09-14 | End: 2024-09-18 | Stop reason: HOSPADM

## 2024-09-14 RX ORDER — ACETAMINOPHEN 650 MG/1
650 SUPPOSITORY RECTAL EVERY 4 HOURS PRN
Status: DISCONTINUED | OUTPATIENT
Start: 2024-09-14 | End: 2024-09-14

## 2024-09-14 RX ORDER — DIPHENHYDRAMINE HYDROCHLORIDE, ZINC ACETATE 2; .1 G/100G; G/100G
CREAM TOPICAL 3 TIMES DAILY PRN
Status: DISCONTINUED | OUTPATIENT
Start: 2024-09-14 | End: 2024-09-18 | Stop reason: HOSPADM

## 2024-09-14 RX ORDER — TRAZODONE HYDROCHLORIDE 50 MG/1
150 TABLET, FILM COATED ORAL AT BEDTIME
Status: DISCONTINUED | OUTPATIENT
Start: 2024-09-14 | End: 2024-09-16

## 2024-09-14 RX ORDER — HYDROMORPHONE HYDROCHLORIDE 4 MG/1
4 TABLET ORAL EVERY 4 HOURS PRN
Status: DISCONTINUED | OUTPATIENT
Start: 2024-09-14 | End: 2024-09-16

## 2024-09-14 RX ORDER — METHOCARBAMOL 500 MG/1
500 TABLET, FILM COATED ORAL EVERY 6 HOURS PRN
Status: DISCONTINUED | OUTPATIENT
Start: 2024-09-14 | End: 2024-09-16

## 2024-09-14 RX ORDER — RAMELTEON 8 MG/1
8 TABLET ORAL AT BEDTIME
Status: DISCONTINUED | OUTPATIENT
Start: 2024-09-14 | End: 2024-09-14

## 2024-09-14 RX ORDER — ONDANSETRON 2 MG/ML
4 INJECTION INTRAMUSCULAR; INTRAVENOUS EVERY 6 HOURS PRN
Status: DISCONTINUED | OUTPATIENT
Start: 2024-09-14 | End: 2024-09-18 | Stop reason: HOSPADM

## 2024-09-14 RX ORDER — ACETAMINOPHEN 325 MG/1
650 TABLET ORAL EVERY 4 HOURS PRN
Status: DISCONTINUED | OUTPATIENT
Start: 2024-09-14 | End: 2024-09-14

## 2024-09-14 RX ORDER — PROPRANOLOL HYDROCHLORIDE 10 MG/1
10 TABLET ORAL 2 TIMES DAILY
Status: DISCONTINUED | OUTPATIENT
Start: 2024-09-14 | End: 2024-09-18 | Stop reason: HOSPADM

## 2024-09-14 RX ORDER — TRAZODONE HYDROCHLORIDE 150 MG/1
300 TABLET ORAL AT BEDTIME
Status: DISCONTINUED | OUTPATIENT
Start: 2024-09-14 | End: 2024-09-14

## 2024-09-14 RX ORDER — LORAZEPAM 0.5 MG/1
1 TABLET ORAL 2 TIMES DAILY
Status: DISCONTINUED | OUTPATIENT
Start: 2024-09-14 | End: 2024-09-16

## 2024-09-14 RX ADMIN — PIPERACILLIN AND TAZOBACTAM 3.38 G: 3; .375 INJECTION, POWDER, FOR SOLUTION INTRAVENOUS at 08:06

## 2024-09-14 RX ADMIN — SIMETHICONE 80 MG: 80 TABLET, CHEWABLE ORAL at 20:57

## 2024-09-14 RX ADMIN — BUSPIRONE HYDROCHLORIDE 30 MG: 10 TABLET ORAL at 08:06

## 2024-09-14 RX ADMIN — ACETAMINOPHEN 650 MG: 325 TABLET ORAL at 21:48

## 2024-09-14 RX ADMIN — HYDROMORPHONE HYDROCHLORIDE 1 MG: 1 INJECTION, SOLUTION INTRAMUSCULAR; INTRAVENOUS; SUBCUTANEOUS at 15:46

## 2024-09-14 RX ADMIN — LORAZEPAM 1 MG: 0.5 TABLET ORAL at 21:48

## 2024-09-14 RX ADMIN — PIPERACILLIN AND TAZOBACTAM 3.38 G: 3; .375 INJECTION, POWDER, FOR SOLUTION INTRAVENOUS at 15:56

## 2024-09-14 RX ADMIN — RAMELTEON 8 MG: 8 TABLET, FILM COATED ORAL at 21:54

## 2024-09-14 RX ADMIN — HYDROMORPHONE HYDROCHLORIDE 4 MG: 2 TABLET ORAL at 08:06

## 2024-09-14 RX ADMIN — GABAPENTIN 300 MG: 300 CAPSULE ORAL at 21:47

## 2024-09-14 RX ADMIN — ACETAMINOPHEN 325MG 650 MG: 325 TABLET ORAL at 00:19

## 2024-09-14 RX ADMIN — GABAPENTIN 300 MG: 300 CAPSULE ORAL at 08:06

## 2024-09-14 RX ADMIN — ACETAMINOPHEN 650 MG: 325 TABLET ORAL at 18:14

## 2024-09-14 RX ADMIN — TOPIRAMATE 50 MG: 25 TABLET, FILM COATED ORAL at 08:06

## 2024-09-14 RX ADMIN — PANTOPRAZOLE SODIUM 40 MG: 40 TABLET, DELAYED RELEASE ORAL at 08:06

## 2024-09-14 RX ADMIN — LORAZEPAM 1 MG: 1 TABLET ORAL at 08:06

## 2024-09-14 RX ADMIN — HYDROMORPHONE HYDROCHLORIDE 4 MG: 4 TABLET ORAL at 22:05

## 2024-09-14 RX ADMIN — HYDROMORPHONE HYDROCHLORIDE 1 MG: 1 INJECTION, SOLUTION INTRAMUSCULAR; INTRAVENOUS; SUBCUTANEOUS at 12:10

## 2024-09-14 RX ADMIN — GABAPENTIN 300 MG: 300 CAPSULE ORAL at 14:52

## 2024-09-14 RX ADMIN — PIPERACILLIN AND TAZOBACTAM 3.38 G: 3; .375 INJECTION, POWDER, FOR SOLUTION INTRAVENOUS at 01:02

## 2024-09-14 RX ADMIN — TRAZODONE HYDROCHLORIDE 150 MG: 50 TABLET ORAL at 21:46

## 2024-09-14 RX ADMIN — HYDROMORPHONE HYDROCHLORIDE 0.5 MG: 1 INJECTION, SOLUTION INTRAMUSCULAR; INTRAVENOUS; SUBCUTANEOUS at 05:16

## 2024-09-14 RX ADMIN — TOPIRAMATE 50 MG: 25 TABLET, FILM COATED ORAL at 21:48

## 2024-09-14 RX ADMIN — PROPRANOLOL HYDROCHLORIDE 10 MG: 10 TABLET ORAL at 21:49

## 2024-09-14 RX ADMIN — METHOCARBAMOL 500 MG: 500 TABLET ORAL at 08:13

## 2024-09-14 RX ADMIN — Medication 150 MG: at 21:55

## 2024-09-14 RX ADMIN — METHOCARBAMOL 500 MG: 500 TABLET ORAL at 18:58

## 2024-09-14 RX ADMIN — ACETAMINOPHEN 325MG 650 MG: 325 TABLET ORAL at 08:13

## 2024-09-14 RX ADMIN — SIMETHICONE 80 MG: 80 TABLET, CHEWABLE ORAL at 08:13

## 2024-09-14 RX ADMIN — HYDROMORPHONE HYDROCHLORIDE 4 MG: 4 TABLET ORAL at 18:15

## 2024-09-14 RX ADMIN — DESVENLAFAXINE SUCCINATE 150 MG: 100 TABLET, FILM COATED, EXTENDED RELEASE ORAL at 21:55

## 2024-09-14 RX ADMIN — ACETAMINOPHEN 325MG 650 MG: 325 TABLET ORAL at 12:14

## 2024-09-14 RX ADMIN — BUSPIRONE HYDROCHLORIDE 22.5 MG: 15 TABLET ORAL at 21:55

## 2024-09-14 RX ADMIN — MICONAZOLE NITRATE: 20 POWDER TOPICAL at 21:56

## 2024-09-14 RX ADMIN — HYDROMORPHONE HYDROCHLORIDE 0.5 MG: 1 INJECTION, SOLUTION INTRAMUSCULAR; INTRAVENOUS; SUBCUTANEOUS at 20:57

## 2024-09-14 RX ADMIN — ZOLPIDEM TARTRATE 5 MG: 5 TABLET, COATED ORAL at 21:47

## 2024-09-14 RX ADMIN — ENOXAPARIN SODIUM 40 MG: 40 INJECTION SUBCUTANEOUS at 21:46

## 2024-09-14 ASSESSMENT — ACTIVITIES OF DAILY LIVING (ADL)
ADLS_ACUITY_SCORE: 46
ADLS_ACUITY_SCORE: 38
ADLS_ACUITY_SCORE: 46
ADLS_ACUITY_SCORE: 38
ADLS_ACUITY_SCORE: 46
ADLS_ACUITY_SCORE: 38
ADLS_ACUITY_SCORE: 46
ADLS_ACUITY_SCORE: 38

## 2024-09-14 NOTE — ED NOTES
Pt c/o pain in right IV. Good blood return and flushed well. Nurse attempted x2 and left IV placed. Post flush pt reports burning and requests IV to be pulled out. IV had good blood return and flushes well.

## 2024-09-14 NOTE — PROGRESS NOTES
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     In the ED she is afebrile with heart rate of 84, pressure 114/85 and breathing comfortably on room air without hypoxia.  Lab work was remarkable for normal BMP with the exception of CO2 of 19, normal LFTs, glucose 94, WBC 8.5, hemoglobin 9.6 and platelet count 467.  Urinalysis shows large amount of leukocyte Estrace, large amount of white blood cells, positive nitrites and was sent for culture.  CT abdomen showed recent postoperative changes of hysterectomy with fluid collection along the right lateral aspect of the hysterectomy extending along the rectum measuring 5.5 x 1.8 x 2.5 cm additionally there is ill-defined fluid in this region extending posteriorly to the rectum circumferentially that may  represent hematoma, seroma or developing abscess.  Patient was given a dose of ceftriaxone and then a dose of Zosyn.  Her Gyn Onc specialist at the [facility] were called recommending transfer to their facility but there are no beds at this point so hospitalist service was consulted to manage while she is in the ED.     Interventional radiology was consulted on 9/13 for drainage of retroperitoneal abscess.  A AQUILINO drain was placed.  Fluid was sent to the lab for Gram stain and culture.  Culture growing polymicrobial species including ecoli, enterococcus faecalis, and GPC  "(further identification pending).  Remains on IV Zosyn     Per IR radiologist notes from retroperitoneal abscess drainage on 9/13:     \"Limited pre procedure CT shows there is a wide mouth fistula between the rectum and the vagina with stool in the fistula tract and within the vagina. There is a pelvic abscess.      Placement of a 10 Macedonian pelvic abscess. Contrast was injected though the drain which shows a fistulous connection between the abscess and the vagina.\"     Patient remains boarded in the ER until she can be transferred [facility] when bed is available     Plans today:  - continue IV Zosyn  - continue to hold DOAC  - await transfer to [facility].  Needs higher level of care to address abscess and rectovaginal fistula.  Her surgical team is at [facility]  - I called [facility] transfer line today.  They confirmed that patient remains on transfer list.  Await bed availability  -I updated the patient's mother at bedside.  Culture results reviewed.  Both patient and mother remain frustrated by lack of ability transfer to [facility].        # History of adenocarcinoma of rectosigmoid space s/p exploratory laparotomy with radical hysterectomy, bilateral salpingo-oophorectomy, radical en bloc rectosigmoid colon resection with posterior vaginectomy, total supracolic omentectomy and diverting loop ileostomy on 9/1/2024  # Above procedure complicated by pelvic abscess and rectovaginal fistula  # Status post IR drainage of pelvic abscess and placement of AQUILINO drain on 9/13  -Patient had above procedure performed by Dr. Jessenia Sosa at the [facility].  She discharged home on 9/6 and overall was doing well with the exception of dysuria and some mild redness around incision site  -Patient presented to the [facility] emergency room on 9/12/2024 with complaint of brownish/bloody discharge from her abdominal wall incision.  She otherwise felt well.  CT imaging showed fluid collection along the right lateral aspect of the hysterectomy " extending along the rectum measuring 5.5 x 1.8 x 2.5 cm as well as ill-defined fluid in this region extending posteriorly to the rectum circumferentially concerning for abscess  -The [facility] team was called recommending transfer to the [facility] but unfortunately bed not yet available.  Pt remains on transfer list   - Gyn onc recommended drainage of abscess by IR  which was performed 9/13  - Culture growing polymicrobial species including ecoli, enterococcus faecalis, and GPC (further identification pending). Remains on IV Zosyn   -Continue with Zosyn  -Follow-up blood culture  -Continue PTA Dilaudid, gabapentin, Robaxin  - [facility] contact: Sara Alva from the gyn onc team who can be reached through [employer] or by cell [phone] through 9/13     # Urinary tract infection  # Urinary retention  -Patient developed urinary retention is a postoperative complication at the [facility].  She reports they were going to discuss removing the Cat at her appointment on 9/13   -She reports burning with urination that started earlier in the week  -UA is abnormal concerning for infection.  Catheter was exchanged in the ED on 9/12  - urine cx positive for ecoli and enterococcus.  Sensitivities pending  - continue Zosyn as above     #Hx of DVT/PE  -Holding Apixaban for now; pt may require additional surgical procedure at [facility]     # Anxiety   # Depression  -Continue BuSpar, Pristiq, oral Ativan, propranolol, Seroquel, Rozerem, Topamax, trazodone and Ambien     DVT Prophylaxis: Lovenox.  DOAC on hold for further procedures  Code Status: Full Code  Discharge Dispo: transfer to [facility] when bed available  Estimated Disch Date / # of Days until Disch: when bed available at [facility]        Interval History (Subjective):      Remains in ER awaiting bed at [facility].  I called [facility] today to confirm patient remains on transfer list.  [facility] not sure if bed will be available later today for transfer                  Physical Exam:      Last  "Vital Signs:  /62   Pulse 77   Temp 98.6  F (37  C) (Oral)   Resp 16   Ht 1.651 m (5' 5\")   Wt 72.8 kg (160 lb 7.9 oz)   LMP  (LMP Unknown)   SpO2 97%   BMI 26.71 kg/m        Intake/Output Summary (Last 24 hours) at 9/14/2024 1356  Last data filed at 9/14/2024 0810  Gross per 24 hour   Intake --   Output 990 ml   Net -990 ml       Constitutional: Awake, alert, cooperative, no apparent distress     Respiratory: Clear to auscultation bilaterally, no crackles or wheezing   Cardiovascular: Regular rate and rhythm, normal S1 and S2, and no murmur noted   Abdomen: Normal bowel sounds, soft, non-distended, non-tender   Skin: No rashes, no cyanosis, dry to touch.  Mid-abd incision healing well.  No redness   Neuro: Alert and oriented x3, no weakness, numbness, memory loss   Extremities: No edema, normal range of motion   Other(s): Mother Atif at bedside    AQUILINO drain with gray colored fluid   All other systems: Negative          Medications:      All current medications were reviewed with changes reflected in problem list.         Data:      All new lab and imaging data was reviewed.   Labs:       Lab Results   Component Value Date     09/14/2024     09/13/2024     09/12/2024     11/05/2012    Lab Results   Component Value Date    CHLORIDE 109 09/14/2024    CHLORIDE 107 09/13/2024    CHLORIDE 105 09/12/2024    CHLORIDE 105 11/05/2012    Lab Results   Component Value Date    BUN 4.2 09/14/2024    BUN 5.1 09/13/2024    BUN 13.8 09/12/2024    BUN 4 11/05/2012      Lab Results   Component Value Date    POTASSIUM 3.5 09/14/2024    POTASSIUM 3.9 09/13/2024    POTASSIUM 3.7 09/12/2024    POTASSIUM 4.6 09/01/2024    POTASSIUM 4.5 09/01/2024    POTASSIUM 4.2 09/01/2024    POTASSIUM 4.2 11/05/2012    Lab Results   Component Value Date    CO2 19 09/14/2024    CO2 16 09/13/2024    CO2 19 09/12/2024    CO2 26 11/05/2012    Lab Results   Component Value Date    CR 0.75 09/14/2024    CR 0.77 09/13/2024 "    CR 0.82 09/12/2024    CR 0.73 11/05/2012        Recent Labs   Lab 09/14/24  0929   WBC 6.7   HGB 8.4*   HCT 26.4*   MCV 92         Imaging:   No results found for this or any previous visit (from the past 24 hour(s)).
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
## 2024-09-14 NOTE — PHARMACY-ADMISSION MEDICATION HISTORY
Admission medication history completed at [facility]. Please see Pharmacist Admission Medication History note from 9/12/2024.    Dagmar WellsD

## 2024-09-14 NOTE — ED PROVIDER NOTES
Pt signed out to me by Dr. Jones pending transfer to [facility]. Pt unable to transfer elsewhere due to potentially very complicated gyn onc surgery.     Pt had percutaneous abscess drainage performed today with confirmation of rectovaginal and vaginal/abscess fistulae.     I discussed with gyn onc, Dr. Alva, who stated pt was first on the list for a bed pending discharges. She asked that I give a dose of prophylactic lovenox (40mg), while eliquis continues to be held.     Pt signed out to my overnight partner, Dr. Vasquez.      Pierre Talbot MD  09/13/24 0081

## 2024-09-15 LAB
ANION GAP SERPL CALCULATED.3IONS-SCNC: 8 MMOL/L (ref 7–15)
BUN SERPL-MCNC: 4 MG/DL (ref 6–20)
CALCIUM SERPL-MCNC: 8.6 MG/DL (ref 8.8–10.4)
CHLORIDE SERPL-SCNC: 110 MMOL/L (ref 98–107)
CREAT SERPL-MCNC: 0.66 MG/DL (ref 0.51–0.95)
EGFRCR SERPLBLD CKD-EPI 2021: >90 ML/MIN/1.73M2
ERYTHROCYTE [DISTWIDTH] IN BLOOD BY AUTOMATED COUNT: 15 % (ref 10–15)
GLUCOSE SERPL-MCNC: 110 MG/DL (ref 70–99)
HCO3 SERPL-SCNC: 21 MMOL/L (ref 22–29)
HCT VFR BLD AUTO: 27.6 % (ref 35–47)
HGB BLD-MCNC: 8.8 G/DL (ref 11.7–15.7)
MCH RBC QN AUTO: 29.4 PG (ref 26.5–33)
MCHC RBC AUTO-ENTMCNC: 31.9 G/DL (ref 31.5–36.5)
MCV RBC AUTO: 92 FL (ref 78–100)
PLATELET # BLD AUTO: 389 10E3/UL (ref 150–450)
POTASSIUM SERPL-SCNC: 3.7 MMOL/L (ref 3.4–5.3)
RBC # BLD AUTO: 2.99 10E6/UL (ref 3.8–5.2)
SODIUM SERPL-SCNC: 139 MMOL/L (ref 135–145)
WBC # BLD AUTO: 6.8 10E3/UL (ref 4–11)

## 2024-09-15 PROCEDURE — 250N000013 HC RX MED GY IP 250 OP 250 PS 637

## 2024-09-15 PROCEDURE — 36415 COLL VENOUS BLD VENIPUNCTURE: CPT

## 2024-09-15 PROCEDURE — 250N000011 HC RX IP 250 OP 636

## 2024-09-15 PROCEDURE — 120N000002 HC R&B MED SURG/OB UMMC

## 2024-09-15 PROCEDURE — 80048 BASIC METABOLIC PNL TOTAL CA: CPT

## 2024-09-15 PROCEDURE — 96376 TX/PRO/DX INJ SAME DRUG ADON: CPT

## 2024-09-15 PROCEDURE — 85027 COMPLETE CBC AUTOMATED: CPT

## 2024-09-15 PROCEDURE — 96374 THER/PROPH/DIAG INJ IV PUSH: CPT

## 2024-09-15 PROCEDURE — 250N000013 HC RX MED GY IP 250 OP 250 PS 637: Performed by: OBSTETRICS & GYNECOLOGY

## 2024-09-15 RX ORDER — METRONIDAZOLE 500 MG/1
500 TABLET ORAL 2 TIMES DAILY
Status: DISCONTINUED | OUTPATIENT
Start: 2024-09-15 | End: 2024-09-18 | Stop reason: HOSPADM

## 2024-09-15 RX ORDER — AMOXICILLIN AND CLAVULANATE POTASSIUM 500; 125 MG/1; MG/1
1 TABLET, FILM COATED ORAL EVERY 8 HOURS SCHEDULED
Status: DISCONTINUED | OUTPATIENT
Start: 2024-09-15 | End: 2024-09-15

## 2024-09-15 RX ORDER — LIDOCAINE 4 G/G
1 PATCH TOPICAL
Status: DISCONTINUED | OUTPATIENT
Start: 2024-09-15 | End: 2024-09-18 | Stop reason: HOSPADM

## 2024-09-15 RX ORDER — SULFAMETHOXAZOLE/TRIMETHOPRIM 800-160 MG
1 TABLET ORAL 2 TIMES DAILY
Status: DISCONTINUED | OUTPATIENT
Start: 2024-09-15 | End: 2024-09-15

## 2024-09-15 RX ORDER — HYDROMORPHONE HYDROCHLORIDE 1 MG/ML
0.5 INJECTION, SOLUTION INTRAMUSCULAR; INTRAVENOUS; SUBCUTANEOUS
Status: COMPLETED | OUTPATIENT
Start: 2024-09-15 | End: 2024-09-15

## 2024-09-15 RX ORDER — HYDROXYZINE HYDROCHLORIDE 25 MG/1
25 TABLET, FILM COATED ORAL EVERY 6 HOURS PRN
Status: DISCONTINUED | OUTPATIENT
Start: 2024-09-15 | End: 2024-09-16

## 2024-09-15 RX ORDER — HYDROMORPHONE HYDROCHLORIDE 1 MG/ML
0.5 INJECTION, SOLUTION INTRAMUSCULAR; INTRAVENOUS; SUBCUTANEOUS ONCE
Status: COMPLETED | OUTPATIENT
Start: 2024-09-15 | End: 2024-09-15

## 2024-09-15 RX ORDER — CIPROFLOXACIN 500 MG/1
500 TABLET, FILM COATED ORAL EVERY 12 HOURS SCHEDULED
Status: DISCONTINUED | OUTPATIENT
Start: 2024-09-15 | End: 2024-09-18 | Stop reason: HOSPADM

## 2024-09-15 RX ORDER — METRONIDAZOLE 500 MG/1
500 TABLET ORAL 2 TIMES DAILY
Status: DISCONTINUED | OUTPATIENT
Start: 2024-09-15 | End: 2024-09-15

## 2024-09-15 RX ORDER — PIPERACILLIN SODIUM, TAZOBACTAM SODIUM 3; .375 G/15ML; G/15ML
3.38 INJECTION, POWDER, LYOPHILIZED, FOR SOLUTION INTRAVENOUS EVERY 6 HOURS
Status: DISCONTINUED | OUTPATIENT
Start: 2024-09-15 | End: 2024-09-15

## 2024-09-15 RX ADMIN — PIPERACILLIN AND TAZOBACTAM 3.38 G: 3; .375 INJECTION, POWDER, FOR SOLUTION INTRAVENOUS at 08:56

## 2024-09-15 RX ADMIN — METHOCARBAMOL 500 MG: 500 TABLET ORAL at 03:28

## 2024-09-15 RX ADMIN — SIMETHICONE 80 MG: 80 TABLET, CHEWABLE ORAL at 22:08

## 2024-09-15 RX ADMIN — LORAZEPAM 1 MG: 0.5 TABLET ORAL at 08:03

## 2024-09-15 RX ADMIN — HYDROMORPHONE HYDROCHLORIDE 4 MG: 4 TABLET ORAL at 12:15

## 2024-09-15 RX ADMIN — ACETAMINOPHEN 650 MG: 325 TABLET ORAL at 03:28

## 2024-09-15 RX ADMIN — DESVENLAFAXINE SUCCINATE 150 MG: 100 TABLET, FILM COATED, EXTENDED RELEASE ORAL at 20:16

## 2024-09-15 RX ADMIN — PROPRANOLOL HYDROCHLORIDE 10 MG: 10 TABLET ORAL at 08:11

## 2024-09-15 RX ADMIN — SIMETHICONE 80 MG: 80 TABLET, CHEWABLE ORAL at 03:28

## 2024-09-15 RX ADMIN — LORAZEPAM 1 MG: 0.5 TABLET ORAL at 20:15

## 2024-09-15 RX ADMIN — TRAZODONE HYDROCHLORIDE 150 MG: 50 TABLET ORAL at 21:35

## 2024-09-15 RX ADMIN — HYDROMORPHONE HYDROCHLORIDE 4 MG: 4 TABLET ORAL at 08:03

## 2024-09-15 RX ADMIN — TOPIRAMATE 50 MG: 25 TABLET, FILM COATED ORAL at 20:16

## 2024-09-15 RX ADMIN — HYDROMORPHONE HYDROCHLORIDE 0.5 MG: 1 INJECTION, SOLUTION INTRAMUSCULAR; INTRAVENOUS; SUBCUTANEOUS at 22:09

## 2024-09-15 RX ADMIN — HYDROMORPHONE HYDROCHLORIDE 4 MG: 4 TABLET ORAL at 03:28

## 2024-09-15 RX ADMIN — METHOCARBAMOL 500 MG: 500 TABLET ORAL at 10:29

## 2024-09-15 RX ADMIN — HYDROMORPHONE HYDROCHLORIDE 0.5 MG: 1 INJECTION, SOLUTION INTRAMUSCULAR; INTRAVENOUS; SUBCUTANEOUS at 18:03

## 2024-09-15 RX ADMIN — ZOLPIDEM TARTRATE 5 MG: 5 TABLET, COATED ORAL at 21:35

## 2024-09-15 RX ADMIN — TOPIRAMATE 50 MG: 25 TABLET, FILM COATED ORAL at 08:03

## 2024-09-15 RX ADMIN — PROPRANOLOL HYDROCHLORIDE 10 MG: 10 TABLET ORAL at 20:22

## 2024-09-15 RX ADMIN — Medication 150 MG: at 21:35

## 2024-09-15 RX ADMIN — CIPROFLOXACIN HYDROCHLORIDE 500 MG: 500 TABLET, FILM COATED ORAL at 20:15

## 2024-09-15 RX ADMIN — METHOCARBAMOL 500 MG: 500 TABLET ORAL at 17:59

## 2024-09-15 RX ADMIN — GABAPENTIN 300 MG: 300 CAPSULE ORAL at 08:03

## 2024-09-15 RX ADMIN — RAMELTEON 8 MG: 8 TABLET, FILM COATED ORAL at 21:35

## 2024-09-15 RX ADMIN — LIDOCAINE 1 PATCH: 4 PATCH TOPICAL at 20:11

## 2024-09-15 RX ADMIN — HYDROXYZINE HYDROCHLORIDE 25 MG: 25 TABLET ORAL at 21:35

## 2024-09-15 RX ADMIN — METRONIDAZOLE 500 MG: 500 TABLET ORAL at 20:15

## 2024-09-15 RX ADMIN — MICONAZOLE NITRATE: 20 POWDER TOPICAL at 08:11

## 2024-09-15 RX ADMIN — BUSPIRONE HYDROCHLORIDE 22.5 MG: 15 TABLET ORAL at 08:57

## 2024-09-15 RX ADMIN — ENOXAPARIN SODIUM 40 MG: 40 INJECTION SUBCUTANEOUS at 08:03

## 2024-09-15 RX ADMIN — GABAPENTIN 300 MG: 300 CAPSULE ORAL at 20:16

## 2024-09-15 RX ADMIN — BUSPIRONE HYDROCHLORIDE 22.5 MG: 15 TABLET ORAL at 20:16

## 2024-09-15 RX ADMIN — HYDROMORPHONE HYDROCHLORIDE 4 MG: 4 TABLET ORAL at 20:36

## 2024-09-15 RX ADMIN — ENOXAPARIN SODIUM 40 MG: 40 INJECTION SUBCUTANEOUS at 22:14

## 2024-09-15 RX ADMIN — HYDROMORPHONE HYDROCHLORIDE 4 MG: 4 TABLET ORAL at 16:35

## 2024-09-15 RX ADMIN — METRONIDAZOLE 500 MG: 500 TABLET ORAL at 12:15

## 2024-09-15 RX ADMIN — ACETAMINOPHEN 650 MG: 325 TABLET ORAL at 12:15

## 2024-09-15 RX ADMIN — MICONAZOLE NITRATE: 20 POWDER TOPICAL at 19:03

## 2024-09-15 RX ADMIN — ACETAMINOPHEN 650 MG: 325 TABLET ORAL at 08:03

## 2024-09-15 RX ADMIN — ACETAMINOPHEN 650 MG: 325 TABLET ORAL at 20:15

## 2024-09-15 RX ADMIN — GABAPENTIN 300 MG: 300 CAPSULE ORAL at 14:46

## 2024-09-15 RX ADMIN — ACETAMINOPHEN 650 MG: 325 TABLET ORAL at 16:30

## 2024-09-15 ASSESSMENT — ACTIVITIES OF DAILY LIVING (ADL)
ADLS_ACUITY_SCORE: 32
ADLS_ACUITY_SCORE: 46
ADLS_ACUITY_SCORE: 32
ADLS_ACUITY_SCORE: 32
ADLS_ACUITY_SCORE: 46
ADLS_ACUITY_SCORE: 33
ADLS_ACUITY_SCORE: 32
ADLS_ACUITY_SCORE: 47
ADLS_ACUITY_SCORE: 32
DEPENDENT_IADLS:: INDEPENDENT
ADLS_ACUITY_SCORE: 32

## 2024-09-15 NOTE — PLAN OF CARE
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  Mother at bedside.  AVSS.  Pt took her normal HS meds all together as she does at home and seemed to sleep well.  Easily wakes to voice.  Tolerating regular diet, denies nausea.  DLI functioning well with gas and stool.  Cat with adequate output.  Abscess drain with small amt thick tan output, irrigated 1x.  Rectal pain managed with PO meds, 1x IV dilaudid given after vaginal exam by MD.  Pain is well managed at this time.  Stool leaking from vagina intermittently, freedom cares and brief changed 2x.  Pt appropriately emotional given the circumstance.

## 2024-09-15 NOTE — PROVIDER NOTIFICATION
Amcom to on-call resident at 1700: [id number].-I'm not sure who I spoke to a bit ago, but pt states pain is intolerable (very tearful), dilaudid barely touches. Oxy not useful from prior surgeries. Is there anything else to try? TY! Emelina Pruett, [phone]

## 2024-09-15 NOTE — PLAN OF CARE
Nursing Focus: Admission    D: Patient admitted/transferred from [facility] to address abscess and rectovaginal fistula .      I: Upon arrival to the unit patient was oriented to room, unit, and call light. Patient s vital signs were obtained. Allergies reviewed and allergy band applied. MD notified of patient s arrival on the unit. Head to toe assessment completed. Care plan initiated.     A: Vital signs stable upon admission. Patient rates pain at 9/10. Unable to complete two RN skin assessment and admission due to pain - team working on pain plan and night RN updated.    P: Continue to monitor patient s pain/fistula drainage, and intervene as needed. Continue with plan of care. Notify MD with any concerns or changes in patient status.

## 2024-09-15 NOTE — PLAN OF CARE
Goal Outcome Evaluation:      Plan of Care Reviewed With: patient    Overall Patient Progress: no changeOverall Patient Progress: no change    Outcome Evaluation: Plan to return home with home RN/PT and wound care supplies for an established ostomy.    Veronika Blue RN    7C RN Coordinator  Phone: [phone]  9/15/2024  Units: 7C Med Surg 7401 thru 7418 RNCC     Social Work and Care Management Department   SEARCHABLE in [facility] - search CARE COORDINATOR   [place] [employer] ([phone]) Saturday & Sunday; ([phone]) [facility] Recognized Holidays   Units: 5A Onc 5201 - 5219 RNCC,  5A Onc 5220 thru 5240 RNCC, 5C OFFSERVICE [phone] RNCC & 5C OFF SERVICE [phone] RNCC   Units: 6B Vocera, 6C Card 6401 thru 6420 RNCC, 6C Card 6502 thru 6514 RNCC & 6C Card 6515 thru 6519 RNCC    Units: 7A SOT RNCC Vocera, 7B Med Surg Vocera, & 7C Med Surg 7502 thru 7521 RNCC   Units: 6A Vocera & 4A CVICU Vocera, 4C MICU Vocera, and 4E SICU Vocera     Units: 5 Ortho Vocera & 5 Med Surg Vocera    Units: 6 Med Surg Vocera & 8 Med Surg Vocera            NEGATIVE

## 2024-09-15 NOTE — CONSULTS
Care Management Initial Consult    General Information  Assessment completed with: Patient, VM-chart review, Pt Marizol  Type of CM/SW Visit: Initial Assessment    Primary Care Provider verified and updated as needed: Yes (Georgie Hernández [phone] [id number] [id number] with [facility])   Readmission within the last 30 days: current reason for admission unrelated to previous admission      Reason for Consult: discharge planning  Advance Care Planning: Advance Care Planning Reviewed: concerns discussed (Pt was so tired and wouldn't like to address at this time)        Communication Assessment  Patient's communication style: spoken language (English or Bilingual)    Hearing Difficulty or Deaf: no   Wear Glasses or Blind: no    Cognitive  Cognitive/Neuro/Behavioral: WDL                      Living Environment:   People in home: child(enrico), dependent (10 and 11 years old)     Current living Arrangements: house      Able to return to prior arrangements: yes     Family/Social Support:  Care provided by: self  Provides care for: child(enrico)  Marital Status: Single  Support system: Parent(s) (Aunt from [place])          Description of Support System: Supportive, Involved       Current Resources:   Patient receiving home care services: Yes  Skilled Home Care Services: Skilled Nursing ([facility])     Community Resources: None  Equipment currently used at home: none  Supplies currently used at home: Other (Ostomy supplies)    Employment/Financial:  Employment Status: unemployed        Financial Concerns:   not at this time, pt will apply for assistance from the [facility].     Does the patient's insurance plan have a 3 day qualifying hospital stay waiver?  No    Lifestyle & Psychosocial Needs:  Social Determinants of Health     Food Insecurity: Low Risk  (8/31/2024)    Food Insecurity     Within the past 12 months, did you worry that your food would run out before you got money to buy more?: No      Within the past 12 months, did the food you bought just not last and you didn t have money to get more?: No   Depression: At risk (9/9/2024)    PHQ-2     PHQ-2 Score: 6   Housing Stability: High Risk (8/31/2024)    Housing Stability     Do you have housing? : No     Are you worried about losing your housing?: No   Tobacco Use: Low Risk  (9/11/2024)    Received from [facility]    Patient History     Smoking Tobacco Use: Never     Smokeless Tobacco Use: Never     Passive Exposure: Not on file   Financial Resource Strain: Low Risk  (8/31/2024)    Financial Resource Strain     Within the past 12 months, have you or your family members you live with been unable to get utilities (heat, electricity) when it was really needed?: No   Alcohol Use: Not At Risk (5/13/2024)    AUDIT-C     Frequency of Alcohol Consumption: Monthly or less     Average Number of Drinks: 1 or 2     Frequency of Binge Drinking: Never   Transportation Needs: Low Risk  (8/31/2024)    Transportation Needs     Within the past 12 months, has lack of transportation kept you from medical appointments, getting your medicines, non-medical meetings or appointments, work, or from getting things that you need?: No   Physical Activity: Sufficiently Active (5/13/2024)    Exercise Vital Sign     Days of Exercise per Week: 4 days     Minutes of Exercise per Session: 60 min   Interpersonal Safety: Not At Risk (9/11/2024)    Received from [facility]    Intimate Partner Violence     Are you in a relationship where you are physically hurt, threatened and/or made to feel afraid?: No   Recent Concern: Interpersonal Safety - High Risk (8/31/2024)    Interpersonal Safety     Do you feel physically and emotionally safe where you currently live?: No     Within the past 12 months, have you been hit, slapped, kicked or otherwise physically hurt by someone?: No     Within the past 12 months, have you been humiliated or emotionally  "abused in other ways by your partner or ex-partner?: No   Stress: Stress Concern Present (5/13/2024)    [employer] - Occupational Stress Questionnaire     Feeling of Stress : Very much   Social Connections: Moderately Isolated (5/13/2024)    Social Connection and Isolation Panel [NHANES]     Frequency of Communication with Friends and Family: Three times a week     Frequency of Social Gatherings with Friends and Family: Once a week     Attends Congregational Services: Never     Active Member of Clubs or Organizations: No     Attends Club or Organization Meetings: Never     Marital Status: Living with partner   Health Literacy: Not on file     Functional Status:  Prior to admission patient needed assistance:   Dependent ADLs:: Independent  Dependent IADLs:: Independent     Mental Health Status:  Mental Health Status: Current Concern (\"worried about my kids at home and income since I can't work any more\")       Chemical Dependency Status:  Chemical Dependency Status: No Current Concerns           Values/Beliefs:  Spiritual, Cultural Beliefs, Congregational Practices, Values that affect care: yes  Description of Beliefs that Will Affect Care: Eloina          Discussed  Partnership in Safe Discharge Planning  document with patient/family: Yes    Additional Information:    Care management assessment completed at the bedside with the pt, Marizol, d/t elevated SYMONE. RNCC introduced self and explained the nature of the care management assessment. Pt agreed to speak with RNCC. The pt verified address, PCP, and health insurance in chart is current.    Support concerns: Marizol lives with her two dependent children, 10 and 11 years old. She is independent but can't work d/t her current illness. Her aunt from [place] is here and help take care of the children. However, there is conflict between her and her aunt and Marizol decided when she gets home, her aunt will leave. Kristel mother and sister live in [facility]. They " usually can help Marizol and her children for the short period of time but her dad about to have surgery so her mom and sister are not available to help her this time. Besides her aunt, parents, and siblings, Marizol didn't have any other support. The SW printed out [place] child support assistance for the pt to apply.    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 Since she was sick, she was not able to work therefore unable to earn income. She was provided SSDI on how to apply through her county. The SW printed out [employer] application. Writer collaborated with oncology team Mariella Julio to fill out applicable parts and instructed the pt to fill out the rest and submitted to get free prepared meals if she is qualified. No need to reach out to financial counselor at this time. Marizol stated she is doing okay and will apply give resources for future assistance.    Wound care supplies and home care concerns: Pt stated at the last discharge, the HC company told her that they can't provide RN for another week since they don't have staff. She went home with wrong supplies for her ostomy. She had to go to the clinic urgently to get correct supplies.   Writer called [facility] today to follow-up but no response. RNCC will follow-up tomorrow.   Writer requested for a [facility] RN order to place correct wound care supplies for the pt. Once the orders are in, please fax to appropriate DME to provide supplies for the pt after discharge.    Next Steps:   Follow-up with [facility] note for correct ostomy supplies and send to [employer], has not had DME company after last discharge.    Follow-up with [facility] to make sure they can provide nursing and PT when pt discharge home.    Discharge Resource:  Established [facility]: [facility]  P: [phone]  F: [phone]  SN/PT  Needs resumption order        Veronika Blue RN    7C RN Coordinator  Phone:  [phone]  9/15/2024  Units: 7C Med Surg 7401 thru 7418 RNCC     Social Work and Care Management Department   SEARCHABLE in AMCOM - search CARE COORDINATOR   [place] & [facility] ([phone]) Saturday & Sunday; ([phone]) [facility] Recognized Holidays   Units: 5A Onc 5201 - 5219 RNCC,  5A Onc 5220 thru 5240 RNCC, 5C OFFSERVICE [phone] RNCC & 5C OFF SERVICE [phone] RNCC   Units: 6B Vocera, 6C Card 6401 thru 6420 RNCC, 6C Card 6502 thru 6514 RNCC & 6C Card 6515 thru 6519 RNCC    Units: 7A SOT RNCC Vocera, 7B Med Surg Vocera, & 7C Med Surg 7502 thru 7521 RNCC   Units: 6A Vocera & 4A CVICU Vocera, 4C MICU Vocera, and 4E SICU Vocera     Units: 5 Ortho Vocera & 5 Med Surg Vocera    Units: 6 Med Surg Vocera & 8 Med Surg Vocera

## 2024-09-15 NOTE — PROGRESS NOTES
"[facility]  Gyn Onc Brief Progress Note    Presented to bedside to evaluate patient after being informed that there was suture seen coming out of patient's vagina while performing cares.    S: Patient reports feeling stable although is still experiencing some pain. Does not know whether stool is coming from her vagina or elsewhere. Reports no vaginal pain, all pain is in butt. Continues to deny fever, chills, and reports minimal abdominal pain.    O:  Vitals:    09/14/24 1700   BP: 105/69   BP Location: Left arm   Pulse: 89   Resp: 18   Temp: 99  F (37.2  C)   TempSrc: Oral   SpO2: 98%   Height: 1.651 m (5' 5\")        Gen: Sitting in bed eating, NAD  SSE: vault filled with stool that continually recollects. Unable to visualize any structures including cuff.   SVE: stool observed coming from vagina, several cm of suture hanging from introitus, removed with no resistance, no evidence of knot, ~4cm defect palpated posteriorly, vaginal cuff palpated intact.    A/P: 39 year old HD#1 after transfer from [facility] for anastomotic leak in the setting of recent rad hyst, BSO, post vaginectomy, omentectomy, rectosig resection w/ reanastomosis, diverting loop ileostomy (9/1). Patient previously declined vaginal exam due to pain but was amenable in the setting of this new finding of suture in her vagina. Exam findings described above confirm rectovaginal fistula. Unclear origin of suture in vagina, but cuff palpated intact and all pain continues to be in rectum so no concern for vaginal cuff defect or dehiscence at this time. Patient continues to have output from ostomy with no nausea or vomiting. The significant amount of stool coming from vagina is most likely due to large stool burden prior to surgery and is self limited as her bowel has been diverted. Will continue supportive care, monitor closely overnight and reevaluate tomorrow AM.    Nader Coleman MD  [facility]" [place]  Gynecology Oncology Resident, PGY-2  09/14/2024 9:00 PM    To reach the GYNECOLOGY ONCOLOGY team for this patient, please page [phone]

## 2024-09-15 NOTE — PROGRESS NOTES
Gynecology Oncology Progress Note  09/16/2024    Disease: Low grade serous carcinoma, endometriosis. CT AP (9/12) w/ recent postop changes, fluid collection along R lateral aspect of hyst to rectum and posterior fluid around the rectum c/f hematoma, seroma, developing abscess.    24 hour events:   - Transitioned to PO antibiotics  - Successful void trial  - [facility] IR recommend CT imaging PTD    Subjective: Patient continues to have rectal pressure and pain limiting comfortable positions. Just received PO dilaudid dose. Tolerating PO and able to transfer and ambulate. Denies chest pain, SOB, nausea/vomiting, fevers/chills, dizziness, headache.     Objective:   Patient Vitals for the past 24 hrs:   BP Temp Temp src Pulse Resp SpO2   09/16/24 0420 (!) 81/56 -- -- -- -- --   09/16/24 0412 (!) 76/47 98  F (36.7  C) Axillary -- 16 96 %   09/16/24 0308 (!) 80/49 98.6  F (37  C) Oral 76 18 96 %   09/15/24 2022 102/63 98.4  F (36.9  C) Oral 88 -- --   09/15/24 1800 -- 97.9  F (36.6  C) Oral 69 18 --   09/15/24 1215 (!) 86/53 97.9  F (36.6  C) Oral 87 18 99 %   09/15/24 0747 90/64 98.6  F (37  C) Oral 72 16 98 %       General: NAD, appears comfortable in bed  CV: RRR, well perfused  Resp: CTAB, breathing comfortably on room air  Abdomen: soft, mildly tender, non-distended  Incision: c/d/i  Extremities: warm, well-perfused, nontender, minimal edema, SCDs in place    I/Os  (Yesterday // Since Midnight)   cc // 100 cc  IVF 8 cc // NR  Urine 1950 cc // 250cc > 1.08 ml/kg/h yesterday  Drain 47 cc // 0 cc  Stool (ostomy) 1425 cc // NR    New Labs/Imaging-    Latest Reference Range & Units 09/16/24 05:37   Sodium 135 - 145 mmol/L 139   Potassium 3.4 - 5.3 mmol/L 3.8   Chloride 98 - 107 mmol/L 110 (H)   Carbon Dioxide (CO2) 22 - 29 mmol/L 20 (L)   Urea Nitrogen 6.0 - 20.0 mg/dL 6.0   Creatinine 0.51 - 0.95 mg/dL 0.67   GFR Estimate >60 mL/min/1.73m2 >90   Calcium 8.8 - 10.4 mg/dL 8.7 (L)   Anion Gap 7 - 15 mmol/L 9   Glucose 70  - 99 mg/dL 93   WBC 4.0 - 11.0 10e3/uL 5.5   Hemoglobin 11.7 - 15.7 g/dL 9.3 (L)   Hematocrit 35.0 - 47.0 % 29.3 (L)   Platelet Count 150 - 450 10e3/uL 426   RBC Count 3.80 - 5.20 10e6/uL 3.19 (L)   MCV 78 - 100 fL 92   MCH 26.5 - 33.0 pg 29.2   MCHC 31.5 - 36.5 g/dL 31.7   RDW 10.0 - 15.0 % 14.8   (H): Data is abnormally high  (L): Data is abnormally low    Pending cultures (date obtained):   Aerobic abscess Cx (9/13): E. Coli, E faecalis, pansensitive; Gram pos cocci, bacilli, Gram neg bacilli  Anaerobic abscess Cx (9/13): Pending  Ucx (9/12): E Coli, E faecalis, pansensitive  Bcx (9/12): NGTD x3D      Assessment: 39 year old HD#4 for anastomotic leak, POD#15 rad hyst, BSO, post vaginectomy, omentectomy, rectosig resection w/ reanastomosis, diverting loop ileostomy and POD#3 IR drain placement. She was initially admitted to [facility] where IR drain was placed prior to transfer to [facility] for further management.     # Low grade serous carcinoma s/p XL, rad hyst, BSO, rectosigmoid colon resection with w/ reanastomosis, posterior vaginectomy, omentectomy and diverting loop ileostomy  # Endometriosis  # Rectovaginal fistula  # Pelvic abscess, s/p IR drain placement  Continues to be afebrile and clinically stable. Exam yesterday with continuously leaking stool, large posterior vaginal defect and intact cuff.   - Drain initially with minimal output > flushed > still low output but more than prior. Discussed with [facility] IR yesterday regarding repeat imaging, which is recommended prior to discharge. Repeat ordered for today.  - Abscess Cx & Ucx with pan-sensitive E Coli & E faecalis. 9/14 Bcx NGTD  - s/p 3D Zosyn, D#2 ciprofloxacin/flagyl through 9/25  - Scheduled tylenol and PTA gabapentin; PRN robaxin, PO dilaudid  - IV dilaudid only for breakthrough pain especially in evening as patient takes several sedating medications before bed  - Palliative consulted for pain regimen, appreciate recs  - Strict I&Os, consider IVF bolus  this morning vs increased PO hydration given high ostomy output yesterday     # UTI  # Postoperative urinary retention  - Ucx and abx as discussed as above.  - Successful TOV yesterday, but then unable to void overnight. Cat catheter replaced w/ 650 ml out. Will do TOV outpatient in 1 week.  - Continue to monitor UOP     # MDD   # SHIVAM  # Restless Leg Syndrome  # Insomnia  Medications per last visit per psych consult: Seroquel 150 mg HS, Rozerem 8 mg HS, Trazodone 150 mg HS, Ambien 5 mg HS; PTA Pristiq and Buspar    # H/o gastric bypass  - Avoid NSAIDs as able    # H/o DVT/PE  - PTA Eliquis held in house  - Lovenox in house  - Plan to resume Eliquis on discharge    PPX: SCDs, IS, lvx  Dispo: Pending postoperative goals  Drains/Lines: PIV, ostomy, IR drain    Kuldip Long MD, MPH  Gynecologic Oncology Resident PGY-4    Addendum by:  Liliana Freedman MD, MSc  Gynecologic Oncology, PGY-1  09/16/2024 8:49 AM      To reach the GYNECOLOGY ONCOLOGY team for this patient, please page [phone]

## 2024-09-15 NOTE — PLAN OF CARE
Goal Outcome Evaluation:  Marizol has been afe this am.  Received IV Zosyn x1 then switched to po antibiotics.  Abscess drain in placed with thick tan drainage - irrigated with 10cc NS per orders.  Rating rectal and AQUILINO site pain 6-10.  Having a hard time laying on back.  Continues to ooze stool from vagina.  Ileostomy with good output and bag was changed by MD this am.  Eating fair and drinking ok.  Trial void done at 1415.  300 ml NS instilled in bladder and waite removed.  Pt due to void by 1445.

## 2024-09-15 NOTE — PROGRESS NOTES
"Gynecology Oncology Progress Note  09/15/2024      HD#3 Anastomotic leak  POD#14 s/p rad hyst, BSO, post vaginectomy, omentectomy, rectosig resection w/ reanastomosis, diverting loop ileostomy   POD#2 IR drain placement    Disease: Low grade serous carcinoma, endometriosis. CT AP (9/12) w/ recent postop changes, fluid collection along R lateral aspect of hyst to rectum and posterior fluid around the rectum c/f hematoma, seroma, developing abscess.    24 hour events:   - transferred from [facility]  - Vaginal exam with significant stool, posterior defect, cuff intact    Subjective: Patient feels fine this morning. Pain is adequately controlled. Tolerating PO and able to transfer and ambulate although with significant rectal pain. Denies chest pain, SOB, nausea/vomiting, fevers/chills, dizziness.    Objective:   Patient Vitals for the past 24 hrs:   BP Temp Temp src Pulse Resp SpO2 Height   09/15/24 0747 90/64 98.6  F (37  C) Oral 72 16 98 % --   09/15/24 0606 91/64 98.6  F (37  C) Oral 71 16 97 % --   09/15/24 0137 97/57 -- -- -- -- 97 % --   09/15/24 0132 (!) 77/44 98.4  F (36.9  C) Oral 83 15 96 % --   09/14/24 2149 100/59 98.7  F (37.1  C) Oral 99 16 100 % --   09/14/24 1700 105/69 99  F (37.2  C) Oral 89 18 98 % 1.651 m (5' 5\")       General: NAD, appears comfortable in bed  CV: RRR, no m/r/g  Resp: CTAB, no wheezes  Abdomen: soft, mildly tender, non-distended  Incision: c/d/i  Extremities: warm, well-perfused, nontender, minimal edema, SCDs in place    I/Os  (Yesterday // Since Midnight)  PO 220cc // none documented cc  IVF none cc // none cc  Urine 750 cc // 400cc  Drain 12 ml // 7 ml  Stool (ostomy) 300cc // 150cc    New Labs/Imaging-   Results for orders placed or performed during the hospital encounter of 09/14/24 (from the past 24 hour(s))   Basic metabolic panel   Result Value Ref Range    Sodium 139 135 - 145 mmol/L    Potassium 3.7 3.4 - 5.3 mmol/L    Chloride 110 (H) 98 - 107 mmol/L    Carbon Dioxide (CO2) " 21 (L) 22 - 29 mmol/L    Anion Gap 8 7 - 15 mmol/L    Urea Nitrogen 4.0 (L) 6.0 - 20.0 mg/dL    Creatinine 0.66 0.51 - 0.95 mg/dL    GFR Estimate >90 >60 mL/min/1.73m2    Calcium 8.6 (L) 8.8 - 10.4 mg/dL    Glucose 110 (H) 70 - 99 mg/dL   CBC with platelets   Result Value Ref Range    WBC Count 6.8 4.0 - 11.0 10e3/uL    RBC Count 2.99 (L) 3.80 - 5.20 10e6/uL    Hemoglobin 8.8 (L) 11.7 - 15.7 g/dL    Hematocrit 27.6 (L) 35.0 - 47.0 %    MCV 92 78 - 100 fL    MCH 29.4 26.5 - 33.0 pg    MCHC 31.9 31.5 - 36.5 g/dL    RDW 15.0 10.0 - 15.0 %    Platelet Count 389 150 - 450 10e3/uL       Pending cultures (date obtained):   Abscess Cx (9/13): E. Coli, E faecalis, pansensitive  Ucx (9/12): E Coli, E faecalis, pansensitive  Bcx (9/12): NGTD      Assessment: 39 year old HD#3 for anastomotic leak, POD#14 rad hyst, BSO, post vaginectomy, omentectomy, rectosig resection w/ reanastomosis, diverting loop ileostomy and POD#2 IR drain placement     # Pain  - sched tylenol PTA gabapentin  - PRN robaxin, PO dilaudid  - IV dilaudid only for breakthrough pain especially in evening as patient takes several sedating medications before bed     # Low grade serous carcinoma s/p exploratory laparotomy with radical hysterectomy, bilateral salpingo-oophorectomy, radical en bloc rectosigmoid colon resection with posterior vaginectomy, total supracolic omentectomy and diverting loop ileostomy on 9/1/2024    # Endometriosis  # Rectovaginal fistula  # Pelvic abscess s/p IR drain placement  Continues to be afebrile and clinically stable. Exam yesterday with continuously leaking stool, large posterior defect and intact cuff.   - POD#2 s/p drain placement yesterday. Drain initially with minimal output > flushed > still low output but more than prior  - Abscess Cx & Ucx with pan-sensitive E Coli & E faecalis. 9/14 Bcx NGTD  - s/p 3D Zosyn, plan to transition to PO abx today  - Conservative management     # UTI  # Urinary retention  - Ucx and abx as  discussed as above  - plan active TOV today     # MDD   # SHIVAM  # Restless Leg Syndrome  # Insomnia  Medications per last visit per psych consult  Seroquel 150 mg at bedtime  Rozerem 8 mg at bedtime  Trazodone 150 mg at bedtime.  Ambien 5 mg bedtime  YASH Coleman MD  [facility]  Gynecology Oncology Resident, PGY-2  09/15/2024 8:04 AM    To reach the GYNECOLOGY ONCOLOGY team for this patient, please page [phone]

## 2024-09-16 ENCOUNTER — TELEPHONE (OUTPATIENT)
Dept: FAMILY MEDICINE | Facility: CLINIC | Age: 39
End: 2024-09-16
Payer: COMMERCIAL

## 2024-09-16 ENCOUNTER — APPOINTMENT (OUTPATIENT)
Dept: CT IMAGING | Facility: CLINIC | Age: 39
End: 2024-09-16
Payer: COMMERCIAL

## 2024-09-16 LAB
ANION GAP SERPL CALCULATED.3IONS-SCNC: 9 MMOL/L (ref 7–15)
BACTERIA ABSC ANAEROBE+AEROBE CULT: ABNORMAL
BUN SERPL-MCNC: 6 MG/DL (ref 6–20)
CALCIUM SERPL-MCNC: 8.7 MG/DL (ref 8.8–10.4)
CHLORIDE SERPL-SCNC: 110 MMOL/L (ref 98–107)
CREAT SERPL-MCNC: 0.67 MG/DL (ref 0.51–0.95)
EGFRCR SERPLBLD CKD-EPI 2021: >90 ML/MIN/1.73M2
ERYTHROCYTE [DISTWIDTH] IN BLOOD BY AUTOMATED COUNT: 14.8 % (ref 10–15)
GLUCOSE SERPL-MCNC: 93 MG/DL (ref 70–99)
GRAM STAIN RESULT: ABNORMAL
HCO3 SERPL-SCNC: 20 MMOL/L (ref 22–29)
HCT VFR BLD AUTO: 29.3 % (ref 35–47)
HGB BLD-MCNC: 9.3 G/DL (ref 11.7–15.7)
MCH RBC QN AUTO: 29.2 PG (ref 26.5–33)
MCHC RBC AUTO-ENTMCNC: 31.7 G/DL (ref 31.5–36.5)
MCV RBC AUTO: 92 FL (ref 78–100)
PLATELET # BLD AUTO: 426 10E3/UL (ref 150–450)
POTASSIUM SERPL-SCNC: 3.8 MMOL/L (ref 3.4–5.3)
RBC # BLD AUTO: 3.19 10E6/UL (ref 3.8–5.2)
SODIUM SERPL-SCNC: 139 MMOL/L (ref 135–145)
WBC # BLD AUTO: 5.5 10E3/UL (ref 4–11)

## 2024-09-16 PROCEDURE — 250N000011 HC RX IP 250 OP 636

## 2024-09-16 PROCEDURE — 74177 CT ABD & PELVIS W/CONTRAST: CPT

## 2024-09-16 PROCEDURE — 250N000009 HC RX 250: Performed by: STUDENT IN AN ORGANIZED HEALTH CARE EDUCATION/TRAINING PROGRAM

## 2024-09-16 PROCEDURE — G0463 HOSPITAL OUTPT CLINIC VISIT: HCPCS

## 2024-09-16 PROCEDURE — 250N000013 HC RX MED GY IP 250 OP 250 PS 637

## 2024-09-16 PROCEDURE — 99254 IP/OBS CNSLTJ NEW/EST MOD 60: CPT | Mod: GC | Performed by: INTERNAL MEDICINE

## 2024-09-16 PROCEDURE — 85027 COMPLETE CBC AUTOMATED: CPT

## 2024-09-16 PROCEDURE — 36415 COLL VENOUS BLD VENIPUNCTURE: CPT

## 2024-09-16 PROCEDURE — 250N000013 HC RX MED GY IP 250 OP 250 PS 637: Performed by: STUDENT IN AN ORGANIZED HEALTH CARE EDUCATION/TRAINING PROGRAM

## 2024-09-16 PROCEDURE — 74177 CT ABD & PELVIS W/CONTRAST: CPT | Mod: 26 | Performed by: STUDENT IN AN ORGANIZED HEALTH CARE EDUCATION/TRAINING PROGRAM

## 2024-09-16 PROCEDURE — 250N000013 HC RX MED GY IP 250 OP 250 PS 637: Performed by: INTERNAL MEDICINE

## 2024-09-16 PROCEDURE — 80048 BASIC METABOLIC PNL TOTAL CA: CPT

## 2024-09-16 PROCEDURE — 120N000002 HC R&B MED SURG/OB UMMC

## 2024-09-16 RX ORDER — METHOCARBAMOL 500 MG/1
500 TABLET, FILM COATED ORAL 2 TIMES DAILY PRN
Status: DISCONTINUED | OUTPATIENT
Start: 2024-09-16 | End: 2024-09-16

## 2024-09-16 RX ORDER — LORAZEPAM 0.5 MG/1
0.5 TABLET ORAL EVERY EVENING
Status: DISCONTINUED | OUTPATIENT
Start: 2024-09-16 | End: 2024-09-18 | Stop reason: HOSPADM

## 2024-09-16 RX ORDER — TRAZODONE HYDROCHLORIDE 50 MG/1
100 TABLET, FILM COATED ORAL AT BEDTIME
Status: DISCONTINUED | OUTPATIENT
Start: 2024-09-16 | End: 2024-09-18 | Stop reason: HOSPADM

## 2024-09-16 RX ORDER — HYDROXYZINE HYDROCHLORIDE 25 MG/1
25 TABLET, FILM COATED ORAL EVERY 6 HOURS PRN
Status: DISCONTINUED | OUTPATIENT
Start: 2024-09-16 | End: 2024-09-18 | Stop reason: HOSPADM

## 2024-09-16 RX ORDER — LIDOCAINE HYDROCHLORIDE 20 MG/ML
JELLY TOPICAL EVERY 4 HOURS PRN
Status: DISCONTINUED | OUTPATIENT
Start: 2024-09-16 | End: 2024-09-18 | Stop reason: HOSPADM

## 2024-09-16 RX ORDER — DICYCLOMINE HYDROCHLORIDE 10 MG/1
10 CAPSULE ORAL 3 TIMES DAILY
Status: DISCONTINUED | OUTPATIENT
Start: 2024-09-16 | End: 2024-09-18 | Stop reason: HOSPADM

## 2024-09-16 RX ORDER — HYDROMORPHONE HYDROCHLORIDE 4 MG/1
4 TABLET ORAL EVERY 4 HOURS PRN
Status: DISCONTINUED | OUTPATIENT
Start: 2024-09-16 | End: 2024-09-18 | Stop reason: HOSPADM

## 2024-09-16 RX ORDER — HYDROMORPHONE HYDROCHLORIDE 4 MG/1
4 TABLET ORAL EVERY 4 HOURS PRN
Status: DISCONTINUED | OUTPATIENT
Start: 2024-09-16 | End: 2024-09-16

## 2024-09-16 RX ORDER — IOPAMIDOL 755 MG/ML
99 INJECTION, SOLUTION INTRAVASCULAR ONCE
Status: COMPLETED | OUTPATIENT
Start: 2024-09-16 | End: 2024-09-16

## 2024-09-16 RX ORDER — LORAZEPAM 0.5 MG/1
1 TABLET ORAL EVERY MORNING
Status: DISCONTINUED | OUTPATIENT
Start: 2024-09-17 | End: 2024-09-18 | Stop reason: HOSPADM

## 2024-09-16 RX ORDER — ACETAMINOPHEN 325 MG/1
975 TABLET ORAL 3 TIMES DAILY
Status: DISCONTINUED | OUTPATIENT
Start: 2024-09-16 | End: 2024-09-18 | Stop reason: HOSPADM

## 2024-09-16 RX ORDER — HYDROXYZINE HYDROCHLORIDE 25 MG/1
50 TABLET, FILM COATED ORAL EVERY 6 HOURS PRN
Status: DISCONTINUED | OUTPATIENT
Start: 2024-09-16 | End: 2024-09-18 | Stop reason: HOSPADM

## 2024-09-16 RX ADMIN — TRAZODONE HYDROCHLORIDE 100 MG: 50 TABLET ORAL at 22:34

## 2024-09-16 RX ADMIN — HYDROXYZINE HYDROCHLORIDE 50 MG: 25 TABLET ORAL at 19:58

## 2024-09-16 RX ADMIN — GABAPENTIN 300 MG: 300 CAPSULE ORAL at 20:51

## 2024-09-16 RX ADMIN — METRONIDAZOLE 500 MG: 500 TABLET ORAL at 20:52

## 2024-09-16 RX ADMIN — ACETAMINOPHEN 650 MG: 325 TABLET ORAL at 13:01

## 2024-09-16 RX ADMIN — TOPIRAMATE 50 MG: 25 TABLET, FILM COATED ORAL at 09:55

## 2024-09-16 RX ADMIN — DESVENLAFAXINE SUCCINATE 150 MG: 100 TABLET, FILM COATED, EXTENDED RELEASE ORAL at 20:53

## 2024-09-16 RX ADMIN — HYDROXYZINE HYDROCHLORIDE 25 MG: 25 TABLET ORAL at 05:23

## 2024-09-16 RX ADMIN — ACETAMINOPHEN 650 MG: 325 TABLET ORAL at 09:51

## 2024-09-16 RX ADMIN — CIPROFLOXACIN HYDROCHLORIDE 500 MG: 500 TABLET, FILM COATED ORAL at 20:52

## 2024-09-16 RX ADMIN — HYDROMORPHONE HYDROCHLORIDE 4 MG: 4 TABLET ORAL at 05:24

## 2024-09-16 RX ADMIN — CIPROFLOXACIN HYDROCHLORIDE 500 MG: 500 TABLET, FILM COATED ORAL at 09:50

## 2024-09-16 RX ADMIN — TOPIRAMATE 50 MG: 25 TABLET, FILM COATED ORAL at 20:52

## 2024-09-16 RX ADMIN — HYDROMORPHONE HYDROCHLORIDE 6 MG: 4 TABLET ORAL at 22:34

## 2024-09-16 RX ADMIN — HYDROMORPHONE HYDROCHLORIDE 4 MG: 4 TABLET ORAL at 10:39

## 2024-09-16 RX ADMIN — ENOXAPARIN SODIUM 40 MG: 40 INJECTION SUBCUTANEOUS at 22:33

## 2024-09-16 RX ADMIN — METHOCARBAMOL 500 MG: 500 TABLET ORAL at 01:23

## 2024-09-16 RX ADMIN — IOPAMIDOL 99 ML: 755 INJECTION, SOLUTION INTRAVENOUS at 08:23

## 2024-09-16 RX ADMIN — ACETAMINOPHEN 650 MG: 325 TABLET ORAL at 01:23

## 2024-09-16 RX ADMIN — Medication 150 MG: at 22:35

## 2024-09-16 RX ADMIN — ACETAMINOPHEN 650 MG: 325 TABLET ORAL at 05:24

## 2024-09-16 RX ADMIN — GABAPENTIN 300 MG: 300 CAPSULE ORAL at 09:51

## 2024-09-16 RX ADMIN — LORAZEPAM 0.5 MG: 0.5 TABLET ORAL at 17:09

## 2024-09-16 RX ADMIN — DICYCLOMINE HYDROCHLORIDE 10 MG: 10 CAPSULE ORAL at 20:53

## 2024-09-16 RX ADMIN — LORAZEPAM 1 MG: 0.5 TABLET ORAL at 09:49

## 2024-09-16 RX ADMIN — ZOLPIDEM TARTRATE 5 MG: 5 TABLET, COATED ORAL at 22:35

## 2024-09-16 RX ADMIN — BUSPIRONE HYDROCHLORIDE 22.5 MG: 15 TABLET ORAL at 20:52

## 2024-09-16 RX ADMIN — LIDOCAINE HYDROCHLORIDE: 20 JELLY TOPICAL at 20:58

## 2024-09-16 RX ADMIN — HYDROMORPHONE HYDROCHLORIDE 6 MG: 4 TABLET ORAL at 18:22

## 2024-09-16 RX ADMIN — ENOXAPARIN SODIUM 40 MG: 40 INJECTION SUBCUTANEOUS at 09:49

## 2024-09-16 RX ADMIN — SIMETHICONE 80 MG: 80 TABLET, CHEWABLE ORAL at 14:54

## 2024-09-16 RX ADMIN — ACETAMINOPHEN 975 MG: 325 TABLET ORAL at 22:35

## 2024-09-16 RX ADMIN — MICONAZOLE NITRATE: 20 POWDER TOPICAL at 14:55

## 2024-09-16 RX ADMIN — DICYCLOMINE HYDROCHLORIDE 10 MG: 10 CAPSULE ORAL at 09:54

## 2024-09-16 RX ADMIN — METRONIDAZOLE 500 MG: 500 TABLET ORAL at 09:54

## 2024-09-16 RX ADMIN — BUSPIRONE HYDROCHLORIDE 22.5 MG: 15 TABLET ORAL at 09:52

## 2024-09-16 RX ADMIN — DICYCLOMINE HYDROCHLORIDE 10 MG: 10 CAPSULE ORAL at 14:54

## 2024-09-16 RX ADMIN — HYDROMORPHONE HYDROCHLORIDE 4 MG: 4 TABLET ORAL at 01:30

## 2024-09-16 RX ADMIN — RAMELTEON 8 MG: 8 TABLET, FILM COATED ORAL at 22:35

## 2024-09-16 RX ADMIN — HYDROMORPHONE HYDROCHLORIDE 6 MG: 4 TABLET ORAL at 14:21

## 2024-09-16 RX ADMIN — GABAPENTIN 300 MG: 300 CAPSULE ORAL at 14:21

## 2024-09-16 ASSESSMENT — ACTIVITIES OF DAILY LIVING (ADL)
ADLS_ACUITY_SCORE: 32

## 2024-09-16 NOTE — PROGRESS NOTES
Brief Progress Note    Notified by bedside RN of hypotension, MAP 59. Repeat persistent hypotension. In to assess.    Patient continues to have pain/rectal pressure. Worsens with certain positions, so she is currently lying on her side. Does not want to roll to her back due to worsening of the pain. Denies headache, dizziness, fevers/chills, chest pain, SOB, nausea/vomiting, or other concerns.     Temp: 98  F (36.7  C) Temp src: Axillary BP: (!) 81/56 Pulse: 76   Resp: 16 SpO2: 96 % O2 Device: None (Room air)    Gen: NAD, lying in bed  CV: RRR, well perfused  Pulm: Breathing comfortably on room air  Abd: Soft, mildly tender, non-distended, incision c/d/I  Extremities: warm, well-perfused     Drain w/ minimal purulent, tan fluid.  Ostomy w/ gas and soft stool.    Exam consistent with PM check in earlier this evening. Given improvement in MAP with slight position change during assessment, suspect hypotension related to multiple sedating medications and position. Unfortunately, multiple pain medications given at the same time overnight limiting availability currently; continue conservative measures with position changes, ice/heat packs. Will order AM labs now for further assessment.     Kuldip Long MD, MPH  Gyn Onc Resident, PGY-4  09/16/24 4:34 AM

## 2024-09-16 NOTE — PROGRESS NOTES
"Brief Progress Note    Patient still experiencing pain primarily in her rectum. Feels like she needs to push. Also worried about not being able to void. Successful TOV at about 1500 but hasn't gone since and really doesn't want a catheter replaced. Drinking and eating okay. Concerned regarding antibiotics as she has multiple drug allergies including to amoxicillin, so she doesn't want to take Augmentin. Also feels itchy all over her abdomen.     BP (!) 86/53 (BP Location: Left arm)   Pulse 69   Temp 97.9  F (36.6  C) (Oral)   Resp 18   Ht 1.651 m (5' 5\")   LMP  (LMP Unknown)   SpO2 99%   BMI 26.71 kg/m    Gen: Appears intermittently uncomfortable.     Drain w/ thin, purulent appearing fluid.    Discussed antibiotic selection with pharmacy who recommends cipro/flagyl. Add hydroxyzine to pain regimen and for pruritus. Discussed attempts at maximizing oral pain regimen prior to additional IV dilaudid doses. However, if patient unable to sleep after maximizing PO pain regimen and her home medications, consider additional dose. Patient in agreement with this plan.     Kuldip Long MD, MPH  Gyn Onc Resident, PGY-4  09/15/24 8:04 PM   "

## 2024-09-16 NOTE — DISCHARGE SUMMARY
Gynecologic Oncology Discharge Summary    Marizol Granados  [id number]    Admit Date: 9/14/2024  Discharge Date: 9/18/2024  Admitting Provider: Destiny Rubio MD  Discharge Provider: Darci Parish MD    Admission Dx:   - Low grade serous carcinoma s/p XL, rad ABHISHEK, BSO, omentectomy, rectosigmoid resection w/ reanastomosis, posterior vaginectomy, diverting loop ileostomy  - Pelvic abscess  - Anastomotic leak  - Rectovaginal fistula  - POD#1 IR drain insertion  - Hx DVT, PE   - Hx of gastric bypass, panniculectomy  - Hx of C.diff  - Restless leg syndrome  - MDD/SHIVAM   - Insomnia   - Moderate persistent asthma  - Urinary retention    Discharge Dx:  - Pelvic abscess, s/p drain placement, POD#5  - Rectovaginal fistula  - Anastomotic leak  - Urinary retention, s/p failed TOV  - Low grade serous carcinoma s/p XL, rad ABHISHEK, BSO, omentectomy, rectosigmoid resection w/ reanastomosis, posterior vaginectomy, diverting loop ileostomy  - Hx DVT, PE   - Hx of gastric bypass, panniculectomy  - Hx of C.diff  - Restless leg syndrome  - MDD/SHIVAM   - Insomnia   - Moderate persistent asthma    Patient Active Problem List   Diagnosis    Therapeutic drug monitoring    Social phobia    S/P gastric bypass    Restless legs    Recurrent major depressive disorder (H24)    Other B-complex deficiencies    Moderate persistent asthma    Irritable bowel syndrome with both constipation and diarrhea    Intractable migraine without aura and with status migrainosus    Insomnia    Hypothyroidism    Hypercholesterolemia    History of thromboembolism    Generalized anxiety disorder    Frequent UTI    Chronic fatigue    Allergic rhinitis    Genital herpes simplex    Postoperative intestinal malabsorption    GERD (gastroesophageal reflux disease)    Low back pain    Morbid obesity (H)    Ptosis of both breasts    Adnexal mass    Leak of anastomosis between gastrointestinal structures    Rectovaginal fistula       Procedures: None    Prior to Admission  Medications:  Medications Prior to Admission   Medication Sig Dispense Refill Last Dose    acetaminophen (TYLENOL) 325 MG tablet Take 2 tablets (650 mg) by mouth every 6 hours.       apixaban ANTICOAGULANT (ELIQUIS) 5 MG tablet Take 1 tablet (5 mg) by mouth 2 times daily 60 tablet 11     busPIRone HCl (BUSPAR) 30 MG tablet Take 30 mg by mouth 2 times daily.       calcium carbonate-vitamin D (OS-LORY WITH D) 500-200 MG-UNIT tablet Take 2 tablets by mouth daily       cholecalciferol 25 MCG (1000 UT) TABS Take 2,000 Units by mouth daily       desvenlafaxine (PRISTIQ) 100 MG 24 hr tablet Take 100 mg by mouth daily       desvenlafaxine (PRISTIQ) 50 MG 24 hr tablet TAKE 1 TABLET BY MOUTH ONCE DAILY. TAKE WITH 100MG TABLET FOR TOTAL OF 150MG DAILY       gabapentin (NEURONTIN) 300 MG capsule Take 1 capsule (300 mg) by mouth 3 times daily for 14 days. 42 capsule 0     miconazole (MICATIN) 2 % external powder Apply topically 2 times daily.       Multiple Vitamins-Minerals (MULTIVITAL PO) Take 1 tablet by mouth daily       naloxone (NARCAN) 4 MG/0.1ML nasal spray Spray 1 spray (4 mg) into one nostril alternating nostrils as needed for opioid reversal. every 2-3 minutes until assistance arrives 2 each 0     omeprazole (PRILOSEC) 40 MG DR capsule TAKE 1 CAPSULE(40 MG) BY MOUTH DAILY 90 capsule 3     Ostomy Supplies MISC 1 each every other day. 20 each 11     PROBIOTIC PRODUCT PO Take 1 tablet by mouth At Bedtime       QUEtiapine (SEROQUEL) 200 MG tablet Take 200 mg by mouth at bedtime.       ramelteon (ROZEREM) 8 MG tablet Take 8 mg by mouth At Bedtime       simethicone (MYLICON) 80 MG chewable tablet Take 1 tablet (80 mg) by mouth every 6 hours as needed for flatulence or cramping. 120 tablet 2     topiramate (TOPAMAX) 50 MG tablet TAKE 1 TABLET(50 MG) BY MOUTH TWICE DAILY 180 tablet 3     zolpidem (AMBIEN) 5 MG tablet Take 5 mg by mouth at bedtime.       [DISCONTINUED] HYDROmorphone (DILAUDID) 4 MG tablet Take 1 tablet (4 mg)  by mouth every 4 hours as needed for severe pain. 42 tablet 0     [DISCONTINUED] LORazepam (ATIVAN) 1 MG tablet Take 1 mg by mouth 2 times daily       [DISCONTINUED] methocarbamol (ROBAXIN) 500 MG tablet Take 1 tablet (500 mg) by mouth every 6 hours as needed for muscle spasms. 28 tablet 0     [DISCONTINUED] phentermine (ADIPEX-P) 37.5 MG tablet Take 0.5-1 tablets (18.75-37.5 mg) by mouth every morning (before breakfast). 90 tablet 1     [DISCONTINUED] propranolol (INDERAL) 10 MG tablet Take 10 mg by mouth 2 times daily.       [DISCONTINUED] traZODone (DESYREL) 150 MG tablet Take 300 mg by mouth at bedtime.          Discharge Medications:     Review of your medicines        START taking        Dose / Directions   ciprofloxacin 500 MG tablet  Commonly known as: CIPRO  Indication: Infection Following Surgery      Dose: 500 mg  Take 1 tablet (500 mg) by mouth every 12 hours for 9 days.  Quantity: 18 tablet  Refills: 0     dicyclomine 10 MG capsule  Commonly known as: BENTYL      Dose: 10 mg  Take 1 capsule (10 mg) by mouth 3 times daily as needed (cramping).  Quantity: 90 capsule  Refills: 0     metroNIDAZOLE 500 MG tablet  Commonly known as: FLAGYL  Indication: Infection Following Surgery      Dose: 500 mg  Take 1 tablet (500 mg) by mouth 2 times daily for 9 days.  Quantity: 18 tablet  Refills: 0     sodium chloride (PF) 0.9% PF flush      Dose: 10 mL  Inject 10 mLs into catheter every 8 hours. Flush drain 2-3 times per day  Quantity: 500 mL  Refills: 1            CONTINUE these medicines which may have CHANGED, or have new prescriptions. If we are uncertain of the size of tablets/capsules you have at home, strength may be listed as something that might have changed.        Dose / Directions   HYDROmorphone 4 MG tablet  Commonly known as: DILAUDID  This may have changed:   how much to take  reasons to take this  Used for: Adnexal mass      Dose: 4-6 mg  Take 1-1.5 tablets (4-6 mg) by mouth every 4 hours as needed for  moderate to severe pain (4 mg for moderate pain and 6 mg for severe pain).  Quantity: 40 tablet  Refills: 0     LORazepam 1 MG tablet  Commonly known as: ATIVAN  This may have changed:   how much to take  additional instructions      Dose: 0.5-1 mg  Take 0.5-1 tablets (0.5-1 mg) by mouth 2 times daily. Take 1 mg in the morning and 0.5 mg in the evening  Refills: 0     * naloxone 4 MG/0.1ML nasal spray  Commonly known as: NARCAN  This may have changed: Another medication with the same name was added. Make sure you understand how and when to take each.  Used for: Adnexal mass      Dose: 4 mg  Spray 1 spray (4 mg) into one nostril alternating nostrils as needed for opioid reversal. every 2-3 minutes until assistance arrives  Quantity: 2 each  Refills: 0     * naloxone 4 MG/0.1ML nasal spray  Commonly known as: NARCAN  This may have changed: You were already taking a medication with the same name, and this prescription was added. Make sure you understand how and when to take each.      Dose: 4 mg  Spray 1 spray (4 mg) into one nostril alternating nostrils as needed for opioid reversal. every 2-3 minutes until assistance arrives  Quantity: 2 each  Refills: 0     propranolol 10 MG tablet  Commonly known as: INDERAL  This may have changed: additional instructions      Dose: 10 mg  Take 1 tablet (10 mg) by mouth 2 times daily. Hold for HR <60 and SBP <90  Refills: 0     traZODone 100 MG tablet  Commonly known as: DESYREL  This may have changed:   medication strength  how much to take  Used for: Generalized anxiety disorder      Dose: 100 mg  Take 1 tablet (100 mg) by mouth at bedtime.  Quantity: 30 tablet  Refills: 0           * This list has 2 medication(s) that are the same as other medications prescribed for you. Read the directions carefully, and ask your doctor or other care provider to review them with you.                CONTINUE these medicines which have NOT CHANGED        Dose / Directions   acetaminophen 325 MG  tablet  Commonly known as: TYLENOL  Used for: Adnexal mass      Dose: 650 mg  Take 2 tablets (650 mg) by mouth every 6 hours.  Refills: 0     apixaban ANTICOAGULANT 5 MG tablet  Commonly known as: ELIQUIS  Used for: Personal history of pulmonary embolism      Dose: 5 mg  Take 1 tablet (5 mg) by mouth 2 times daily  Quantity: 60 tablet  Refills: 11     busPIRone HCl 30 MG tablet  Commonly known as: BUSPAR      Dose: 30 mg  Take 30 mg by mouth 2 times daily.  Refills: 0     calcium carbonate-vitamin D 500-200 MG-UNIT tablet  Commonly known as: OS-LORY with D      Dose: 2 tablet  Take 2 tablets by mouth daily  Refills: 0     * desvenlafaxine 100 MG 24 hr tablet  Commonly known as: PRISTIQ      Dose: 100 mg  Take 100 mg by mouth daily  Refills: 0     * desvenlafaxine 50 MG 24 hr tablet  Commonly known as: PRISTIQ      TAKE 1 TABLET BY MOUTH ONCE DAILY. TAKE WITH 100MG TABLET FOR TOTAL OF 150MG DAILY  Refills: 0     gabapentin 300 MG capsule  Commonly known as: NEURONTIN  Used for: Adnexal mass      Dose: 300 mg  Take 1 capsule (300 mg) by mouth 3 times daily for 14 days.  Quantity: 42 capsule  Refills: 0     miconazole 2 % external powder  Commonly known as: MICATIN  Used for: Adnexal mass      Apply topically 2 times daily.  Refills: 0     MULTIVITAL PO      Dose: 1 tablet  Take 1 tablet by mouth daily  Refills: 0     omeprazole 40 MG DR capsule  Commonly known as: PriLOSEC  Used for: Epigastric discomfort, S/P gastric bypass      Dose: 40 mg  TAKE 1 CAPSULE(40 MG) BY MOUTH DAILY  Quantity: 90 capsule  Refills: 3     Ostomy Supplies Misc  Used for: Status post ileostomy (H)      Dose: 1 each  1 each every other day.  Quantity: 20 each  Refills: 11     PROBIOTIC PRODUCT PO      Dose: 1 tablet  Take 1 tablet by mouth At Bedtime  Refills: 0     QUEtiapine 200 MG tablet  Commonly known as: SEROquel      Dose: 200 mg  Take 200 mg by mouth at bedtime.  Refills: 0     ramelteon 8 MG tablet  Commonly known as: ROZEREM       Dose: 8 mg  Take 8 mg by mouth At Bedtime  Refills: 0     simethicone 80 MG chewable tablet  Commonly known as: MYLICON  Used for: Adnexal mass      Dose: 80 mg  Take 1 tablet (80 mg) by mouth every 6 hours as needed for flatulence or cramping.  Quantity: 120 tablet  Refills: 2     topiramate 50 MG tablet  Commonly known as: TOPAMAX  Used for: Intractable migraine without aura and with status migrainosus      TAKE 1 TABLET(50 MG) BY MOUTH TWICE DAILY  Quantity: 180 tablet  Refills: 3     Vitamin D3 25 mcg (1000 units) tablet  Commonly known as: CHOLECALCIFEROL      Dose: 2,000 Units  Take 2,000 Units by mouth daily  Refills: 0     zolpidem 5 MG tablet  Commonly known as: AMBIEN      Dose: 5 mg  Take 5 mg by mouth at bedtime.  Refills: 0           * This list has 2 medication(s) that are the same as other medications prescribed for you. Read the directions carefully, and ask your doctor or other care provider to review them with you.                STOP taking      methocarbamol 500 MG tablet  Commonly known as: ROBAXIN        phentermine 37.5 MG tablet  Commonly known as: ADIPEX-P                  Where to get your medicines        These medications were sent to [facility] - [place], [place] - [address]  [address] [address]      Phone: [phone]   ciprofloxacin 500 MG tablet  dicyclomine 10 MG capsule  HYDROmorphone 4 MG tablet  metroNIDAZOLE 500 MG tablet  naloxone 4 MG/0.1ML nasal spray  sodium chloride (PF) 0.9% PF flush  traZODone 100 MG tablet           Consultations:  Wound and Ostomy Care  Palliative Care    Brief History of Illness:  Marizol Granados is a 39 year old admitted to an OSH for anastomotic leak and subsequent abscess and vaginal rectovaginal fistula after underoing XL, rad hyst, BSO, post vaginectomy, omentectomy, rectosig resection w/ reanastomosis, diverting loop ileostomy for low grade serous carcinoma in the setting of endometriosis. During her  stay at the OSH, she was started on IV antibiotics and had an IR drain placement. She was subsequently transferred on HD#2 for further management at [facility].    Hospital Course:  Dz:   - Low grade serous carcinoma. She will follow-up postoperatively for a care plan.  FEN:   - By discharge, she was tolerating a low residue diet without nausea and vomiting and able to maintain her hydration without IVF supplementation.  Pain:   - Her pain was poorly controlled with scheduled tylenol and gabapentin, and PRN robaxin, dilaudid, vistaril, and lidocaine patches, requiring IV dilaudid. Her blood pressures were low, likely secondary to polypharmacy. Palliative was consulted and recommended topical lidocaine at the anus, scheduled bentyl, and atarax, and decreased doses of Ativan, Trazodone, and Robaxin to assist with sedation. Her pain was well controlled on a PO regimen prior to discharge, and she was discharged home with these medications.  CV:   - She has no history of CV issues. On Monday 9/16, her . Her vital signs were stable while in house and she had no acute CV issues.  PULM:   - She has a history of asthma and takes no meds. She had no acute pulmonary issues while in house.  HEME:   - She has a history of DVT and PE. Her PTA Eliquis was held, and she was given prophylactic lovenox until it was confirmed that she would not go to the OR, at which point it was increased to therapeutic dosing. Her Hgb was stable throughout her admission. She had no other acute heme issues while in house.  GI:   - She presented to the outside hospital with increased rectal pressure and stool leaking from the vagina, and was found to have a rectovaginal fistula and anastomotic leak causing a pelvic abscess. The abscess was treated with IV Zosyn then ciprofloxacin and flagyl which will continue through 9/28 for a total of 14 days. On HD#2, IR placed a drain in the abscess. Repeat CT on 9/16 showed slight improvement and she continued to put  out a small amount of purulent fluid. She will follow up with [facility] outpatient for further drain management.  :    - She had postoperative urinary retention and underwent TOV on HD#3. She initially passed but failed to urinate spontaneously afterwards, and waite was replaced. She was also treated for a UTI. For rectovaginal fistula, see GI. She had no acute  issues while in house.  ID:   - The patient was AF during her hospitalization.  She received IV Zosyn at the outside hospital. Following 3 days of this, she was transitioned to an oral regimen of ciprofloxacin and flagyl. These were continued on discharge for a 14 day course.  ENDO:   - no issues  PSYCH/NEURO:   - She has MDD, SHIVAM, RLS, and insomnia, and her PTA desvenlafazine, busprione, Xanax, seroquel, ramelteon, trazodone, ambien, and topomax were continued at the previous admission's doses. Her propranolol was held given her low blood pressures. No acute issues.  PPX:    - She was given SCDs, PPI, and lovenox during her hospital course. Her PTA Eliquis was held during admission; see Heme. She tolerated these prophylactic interventions without incident. They were discontinued at the time of her discharge.      Discharge Instructions and Follow up:  Ms. Marizol Granados was discharged from the hospital with follow up for her abscess drain with IR and with the Gyn Oncology team for her cancer.    Discharge Diet: Low residue  Discharge Activity: Activity as tolerated, continue restrictions given after surgery at last hospitalization (no lifting >15 lbs for a total of 6-8 weeks postoperatively, nothing in the vagina).  Discharge Follow up:  - 9/20 11 am with [facility] for drain bandage replacement. Their office number is [phone].  - 9/24 2:30 pm with GERALDO Sorenson at [facility] for waite catheter management  - 9/27 3:30 pm with Dr. Sosa for surgical and cancer follow up    Discharge Disposition:  Discharged to home with home care for her  ostomy.    Discharge Staff: Dr. Darci Freedman MD, MSc  Gynecologic Oncology, PGY-1  09/18/2024 9:56 AM    To reach the Gynecologic Oncology resident, please page [phone]

## 2024-09-16 NOTE — PROGRESS NOTES
Care Management Follow Up    Length of Stay (days): 2    Expected Discharge Date: 09/16/2024     Concerns to be Addressed: discharge planning     Patient plan of care discussed at interdisciplinary rounds: Yes    Anticipated Discharge Disposition: Home, Home Care              Anticipated Discharge Services: None  Anticipated Discharge DME: Other (see comment) (ostomy supplies)    Patient/family educated on Medicare website which has current facility and service quality ratings: other (see comments) (Foster resources: child assistance, [employer] for free prepared meals, SSDI instruction to apply)  Education Provided on the Discharge Plan: Yes  Patient/Family in Agreement with the Plan: yes    Referrals Placed by CM/SW: Homecare  Private pay costs discussed: Not applicable    Discussed  Partnership in Safe Discharge Planning  document with patient/family: No     Handoff Completed:     Additional Information:     STEFANY orders added for [facility].  Updated [facility] via [facility] of plan for discharge tomorrow.    Next Steps: Continue to follow for discharge needs.    Emelina Keys RN, BA     Registered Nurse Care Coordinator , 5 A beds [phone] and 5 C [phone] (non- BMT)  [phone]    Social Work and Care Management Department       SEARCHABLE in [facility] - search CARE COORDINATOR       [place] [address] [place] ([phone]) Saturday & Sunday; ([phone]) [facility] Recognized Holidays     Units: 5A Onc 5201 - 5219 RNCC,  5A Onc 5220 thru 5240 RNCC, 5C OFFSERVICE [phone] RNCC & 5C OFF SERVICE [phone] RNCC       Units: 6B Vocera, 6C Card 6401 thru 6420 RNCC, 6C Card 6502 thru 6514 RNCC & 6C Card 6515 thru 6519 RNCC        Units: 7A SOT RNCC Vocera, 7B Med Surg Vocera, 7C Med Surg 7401 thru 7418 RNCC & 7C Med Surg 7502 thru 7521 RNCC       Units: 6A Vocera & 4A CVICU Vocera, 4C MICU Vocera, and 4E SICU Vocera         Units: 5 Ortho Vocera & 5 Med Surg Vocera        Units: 6 Med Surg Vocera & 8 Med Surg Vocera

## 2024-09-16 NOTE — PLAN OF CARE
Goal Outcome Evaluation:    Alert. VSS on RA, blood pressures can be soft, holding propanolol. Regular diet. SBA to get out of bed. PIV is saline locked. Cat catheter for urine retention post surgical procedure. Ileostomy having green loose output. Pt is also stooling from vagina due to RV fistula. Palliative care consulted. Pt reports pain in rectal area. Pain is being managed with PO dilaudid, tylenol, and robaxin. L buttock drain having small amount of tan output.

## 2024-09-16 NOTE — PLAN OF CARE
Goal Outcome Evaluation:      Plan of Care Reviewed With: patient    Overall Patient Progress: no change    Outcome Evaluation: Increased rectal pain/pressure, low BPs    Alert, oriented, not OOB overnight.  Soft BPs 70s-80s/40s-50s, OVSS, asymptomatic.  Side-lying, does not want to reposition because it is too painful to lie on her back.  C/o rectal pain/pressure rated 8-10. Pain unmanaged with PO dilaudid, tylenol, robaxin.  MD aware.  Tolerating regular diet, denies nausea.  DLI functioning well with gas and stool output.  Cat with adequate output.  Abscess drain with small amt thin tan output, irrigated 1x.  Sm amt stool leaking from vagina intermittently.

## 2024-09-16 NOTE — PROGRESS NOTES
Gynecology Oncology Progress Note  09/17/2024    Disease: Low grade serous carcinoma, endometriosis. CT AP (9/12) w/ recent postop changes, fluid collection along R lateral aspect of hyst to rectum and posterior fluid around the rectum c/f hematoma, seroma, developing abscess.    24 hour events:   - S/p palliative consult: bentyl, topical lidocaine added  - CTAP decr pelvic abscess, similar rectovaginal fistula, small ventral hernia  - Propranolol held  - S/p WOC consult    Subjective: Patient continues to have rectal pressure and pain limiting comfortable positions. Rates pain as 5/10 currently. Had ostomy site changed last night due to leaking. Now functioning properly. Tolerating PO. Denies chest pain, SOB, nausea/vomiting, fevers/chills, dizziness, headache. Continues to have loose stool from vagina.    Objective:   Patient Vitals for the past 24 hrs:   BP Temp Temp src Pulse Resp SpO2   09/17/24 0412 111/73 -- -- -- -- --   09/17/24 0410 -- 97.7  F (36.5  C) Temporal -- 12 98 %   09/16/24 2112 -- 98.5  F (36.9  C) Oral -- -- --   09/16/24 2000 105/67 -- -- 91 18 100 %   09/16/24 1525 95/63 98  F (36.7  C) Oral 77 17 98 %   09/16/24 0954 93/58 -- -- 64 -- --   09/16/24 0839 (!) 88/54 98.1  F (36.7  C) Oral 60 16 --       General: NAD, appears comfortable in bed  CV: RRR, well perfused  Resp: CTAB, breathing comfortably on room air  Abdomen: soft, mildly tender, non-distended, ileostomy pink and patent with stool in the bag.  Incision: c/d/i  Extremities: warm, well-perfused, nontender, minimal edema, SCDs in place    I/Os  (Yesterday // Since Midnight)   cc // NR  IVF 3 cc // NR  Urine 1250 cc // 425 cc > 0.69 ml/kg/h yesterday  Drain 20  cc // 0 cc  Stool (ostomy) 1425 cc // NR    New Labs/Imaging-   AM CBC, BMP pending    Pending cultures (date obtained):   Aerobic abscess Cx (9/13): E. Coli, E faecalis, pansensitive; Gram pos cocci, bacilli, Gram neg bacilli  Anaerobic abscess Cx (9/13): Pending  Ucx  (9/12): E Coli, E faecalis, pansensitive  Bcx (9/12): NGTD x3D      Assessment: 39 year old HD#5 for anastomotic leak, POD#16 rad hyst, BSO, post vaginectomy, omentectomy, rectosig resection w/ reanastomosis, diverting loop ileostomy and POD#4 IR drain placement. She was initially admitted to [facility] where IR drain was placed prior to transfer to [facility] for further management.     # Low grade serous carcinoma s/p XL, rad hyst, BSO, rectosigmoid colon resection with w/ reanastomosis, posterior vaginectomy, omentectomy and diverting loop ileostomy  # Endometriosis  # Rectovaginal fistula  # Pelvic abscess, s/p IR drain placement  Continues to be afebrile and clinically stable. Exam on admission with continuously leaking stool, large posterior vaginal defect and intact cuff.   - Repeat CT AP with decreased pelvic abscess size and stable rectovaginal fistula. Follow up outpatient for further management.  - Abscess Cx & Ucx with pan-sensitive E Coli & E faecalis. 9/14 Bcx NGTD  - s/p 3D Zosyn, D#3 ciprofloxacin/flagyl through 9/25  - Scheduled tylenol and PTA gabapentin; PRN robaxin, PO dilaudid, atarax, topical lidocaine  - Maximizing PO pain regimen in anticipation for possible discharge; no IV dilaudid in the last 24 hours  - Palliative consulted for assistance with pain regimen, appreciate recs  - Strict I&Os     # UTI  # Postoperative urinary retention  - Ucx and abx as discussed as above.  - Successful TOV yesterday, but then unable to void overnight. Cat catheter replaced w/ 650 ml out.   - Continue to monitor UOP  - Plan for TOV outpatient in 1 week.     # MDD   # SHIVAM  # Restless Leg Syndrome  # Insomnia  Medications per last visit per psych consult: Seroquel 150 mg HS, Rozerem 8 mg HS, Trazodone 150 mg HS, Ambien 5 mg HS; PTA Pristiq and Buspar; adjusted per palliative recommendations.  - Appreciate palliative team expertise    # H/o gastric bypass  - Avoid NSAIDs as able    # H/o DVT/PE  - PTA Eliquis held in  house  - Lovenox in house  - Plan to resume Eliquis on discharge    PPX: SCDs, IS, lvx  Dispo: Pending pain control.  Drains/Lines: PIV, ostomy, IR drain, waite.    Kuldip Long MD, MPH  Gynecologic Oncology Resident PGY-4    To reach the GYNECOLOGY ONCOLOGY team for this patient, please page [phone]

## 2024-09-16 NOTE — PROVIDER NOTIFICATION
Notified Gyn Onc resident re: low BP, meeting notify parameters.  Spoke with Kuldip Long MD.  First recheck BP was not improved, pt was lying on R side for both and refused to reposition d/t pain.  @nd recheck slightly improved. Asymptomatic.  MD at bedside. No pain meds available to give. Will give prn pain meds as soon as they become available.  Labs will be drawn early.  Continue to monitor closely.

## 2024-09-16 NOTE — PROVIDER NOTIFICATION
"Provider notification;    Doctor notified at [address] [facility] gyn on pager.    Reason for notification \"[id number] [id number] M.D.: Pt wanting clarification on when and how to use atarax. Can you come to bedside? Paco Malone RN\"    "

## 2024-09-16 NOTE — TELEPHONE ENCOUNTER
No further action needed. Sent to abstraction.     Georgie Nolasco  Lead   [facility]

## 2024-09-16 NOTE — PROGRESS NOTES
PGY5 Brief Progress Note    In room to discuss patient's concern regards to pathology results and pain control. Palliative saw patient today. Unfortunately recommend against rectal lidocaine, can given external topical lidcaine, and recommend against Gastrogaffin as this time.     Discussed addition of bentyl and continued regimen of PO dilaudid for pain control. Also planned for donut for sitting to help with rectal pain and encouraged ambulation.     Discussed CT results denoting ventral hernia- which we will follow during postoperative recovery, discussed improvement in abscess from prior CT. Also clarified patients concerns for her pathology results.     All questions addressed.     Sara Alva MD  Gynecologic Oncology PGY-5  September 16, 2024 4:18 PM

## 2024-09-16 NOTE — PROVIDER NOTIFICATION
Provider Notification 10:32    RE: IRIS Grossman MD  CT completed but not read. Pt very uncomfortable, pain 8/10. Can you clarify PRN dilaudid orders 2mg vs 4mg? Is it okay to do each an hour apart? Pt asking for pain mgmt plan for today  Zay Shoemaker  [phone]    Action: Paged Gyn Onc resident via Carma    Response: Confirmed use of both 4 mg dilaudid and 2 mg dilaudid at least one hour apart. Palliative will be consulted for pain management help.     Provider Notification 13:09    RE: IRIS Grossman MD  Pt just saw surgical pathology results from 9/1, and is very upset. She would like to speak with the team to better understand these results.   [phone] TF RN    Action: Paged Gyn Onc resident via Carma    Response: pending

## 2024-09-16 NOTE — PLAN OF CARE
"Time 2526-0508    /63   Pulse 88   Temp 98.4  F (36.9  C) (Oral)   Resp 18   Ht 1.651 m (5' 5\")   LMP  (LMP Unknown)   SpO2 99%   BMI 26.71 kg/m      A&O x4. AVSS on RA, denies SOB, N/V. Pain not managed well 7-9/10. Paged provider, ordered dose of IV dilaudid, lidocaine patch applied to buttox (cut in 2 pieces), and hydroxine. PRN oral dilaudid and robaxin also given. Patient expressed feeling defeated with pain. Provided therapeutic communication, active listening. Offered spiritual support, but declined. Unable to void since waite removal around 1500. New waite placed at 2230 with 650 mL output. PIV SL. AQUILINO drain irrigated and draining milky tan. Regular diet, tolerating well. Ileostomy with good output, plus gas. Continues to ooze stool from vagina. SBA ambulated in hallway x1.    Goal Outcome Evaluation:      Plan of Care Reviewed With: patient    Overall Patient Progress: no changeOverall Patient Progress: no change    Outcome Evaluation: Pain 7-9/10 during shift, uncomfortable, expresses feeling defeated      "

## 2024-09-16 NOTE — TELEPHONE ENCOUNTER
Forms/Letter Request    Type of form/letter: Home Health Certification      Do we have the form/letter: Yes:     Who is the form from? [facility]     Where did/will the form come from? form was faxed in    How would you like the form/letter returned: Fax : [phone]    Natasha Jimenez Patient Rep.

## 2024-09-16 NOTE — CONSULTS
Palliative Care Consultation Note  [facility]      Patient: Marizol Granados  Date of Admission:  9/14/2024    Requesting Clinician / Team: Gyn-Onc  Reason for consult: Pain management, +/- Goals of care       Recommendations & Counseling     GOALS OF CARE:   Restorative without limits however did not discuss in detail today  Not fully discussed today as patient is still coping and adjusting to new diagnosis. At this point pursuing treatment options and has not yet started chemotherapy/decided on treatment.     ADVANCE CARE PLANNING:  No health care directive on file. Per system policy, Surrogate Decision-makers for Patients With Diminished Decision-making Capacity offers guidance on possible decision-makers. Mother Atif has been identified as a surrogate decision maker.   There is no POLST form on file, defer to patient and/or next of kin for decisions   Code status: Full Code    MEDICAL MANAGEMENT:     #Rectal pain related to rectovaginal fistula and likely stool burden  Intractable rectal pain after surgery. Minimal abdominal pain and other MSK complaints d/t being bedbound during hospital stay however majority of pain is localized. Unfortunately, patient cannot have enema or suppository for pain given anastamotic leak which is quite low in rectum. This is expected to take up to a month to heal so no near future plans for localized pain control. Given this, patient will need improved pain control with systemic medications which are very difficult to treat rectal pain. Patient has had hypotension and c/f sedation d/t multiple medications so will decrease some other centrally acting medications in order to increase or better tolerate pain medications. Discussed at length with patient who is in agreement with regimen for now though may need opiate adjusting over next few days.  - Dilaudid 4-6 mg PO Q4H PRN (ordered), will adjust pending response  - Change Tylenol 975mg  TID so patient not worked up in middle of night - ordered for you  - Atarax 25 mg Q6H PRN for itching, anxiety, adjuvant pain  - Gabapentin 300 mg TID which is likely targeting anxiety as well so will keep for now (patient states not working for pain)  - DECREASE Robaxin 500 mg BID PRN (ordered) d/t sedation concerns  - Consider topical lidocaine jelly (urojet) rectally or vaginally as able, discussed with gyn-onc team and defer to them on feasibility  - Consider oral gastrograffin to address underlying stool burden possibly contributing to rectal pain, defer to primary    #C/f sedation and hypotension d/t polypharmacy  Has long psych history with anxiety and depression, been on multiple psychoactive medications for years, especially many sleep medications. Suspect patient may be hypotensive at home with this regimen to some degree however worsened by use of opioids. Agree with limiting these as able while allowing patient to continue sleeping while in the hospital. Discussed which meds are most effective and will trial below regimen for now.   - DECREASE Trazodone to 100 mg nightly (ordered)  - Change Ativan to 1 mg at 0800 and 0.5 mg at 1600 (ordered)  - Continue Ramelteon, Seroquel, Desvenlafaxine, Buspar, Ambien and Topamax at PTA dosing for now   > Could consider stopping Ramelteon in the future based on conversation with patient today however would discuss with her prior to making changes    PSYCHOSOCIAL/SPIRITUAL SUPPORT:  Family Has 2 kids age 10 and 11, one girl and one boy. They are aware of what is happening but not able to understand the full scope of her diagnosis. They are upset that she is not home with them. She leans on her parents for support but her father is also currently admitted to the hospital for surgery and has active cancer and a colostomy as well. Mother is support system. Has an aunt who is currently at home caring for her children and will stay with her and help her manage at home on  "discharge. Does have concerns about this aunt.   Friends none discussed today.    Palliative Care will continue to follow. Thank you for the consult and allowing us to aid in the care of Marizol Granados.    These recommendations have been discussed with attending, Dr. Rivero and GYN onc team.     Farida Osei MD  [facility], Palliative Care  Securely message with the Vocera Web Console (learn more here) or  Text page via [employer] Paging/Directory     Thank you for the opportunity to continue to participate in the care of this patient and family.  Please feel free to contact on-call palliative provider with any emergent needs.  We can be reached via Securely message with the Vocera Web Console (learn more here) or Text page via [employer] Paging/Directory    If you are not sure who specifically to contact -- please text the \"Palliative Care [facility]\" voice group in [employer].   Physician Attestation:   I, Cassia Rivero MD, saw this patient and agree with Dr. Osei's findings and plan of care as documented in the note.   Cassia Rivero MD / Palliative Medicine   Medical Decision Making             Assessment      Marizol Granados is a 39 year old female with a past medical history of newly diagnosed stage IIIB low grade serous carcinoma, SHIVAM/MDD who presented on 9/14 with leaking ostomy site found to have rectovaginal fistula and abscess and now refractory rectal pain iso recent complex debulking surgery and diverting ileostomy.      Today, the patient was seen for:  pelvic adenocarcinoma s/p complex abdominal surgery for debulking requiring diverting ileostomy c/b rectovaginal fistula and abscess s/p drain placement  Refractory rectal pain due to above  Significant stool burden with c/f possible impaction however stool mobilization limited by anastamotic leak and fistula  Polypharmacy d/t difficult to control anxiety and depression with now acute abdominal and post operative rectal pain    History of Present Illness   Met " with patient at bedside.   I introduced our role as an extra layer of support and how we help patients and families dealing with serious, potentially life-limiting illnesses. I explained the composition of the palliative care team.  Palliative care helps patients and families navigate their care while focusing on the whole person; providing emotional, social and spiritual support  Palliative care often assists with symptom management, information sharing about what to expect from the illness, available treatment options and what effect those options may have on the disease course, and provide effective communication and caring support. and Introduced the role of palliative care as an interdisciplinary team that cares for patients with serious illness to help support symptom management, communication, coping for patients and their families as well as support with medical decision making.    Prognosis, Goals, & Planning:   Functional Status just prior to this current hospitalization:  Fully independent, lived at home with her 2 children and cared for them while working full time in a remote job in [place]. Did not have chronic pain or other major medical issues but has always dealt with severe depression/anxiety and insomnia for which she has been prescribed medications for years.   Interim history/status while hospitalized: Deconditioning related to prolonged hospitalization and mostly bedbound with Cat and drains in place    Prognosis, Goals, and/or Advance Care Planning:  Introduced prognosis and goals with eyes on the future and her treatment options however patient was not ready to discuss these things today. Was focused on pain control and getting out of the hospital at this point, will continue to address in coming days.    Code Status was addressed today:   No      Patient's decision making preferences: independently        Patient has decision-making capacity today for complex decisions: Intact         "  Coping, Meaning, & Spirituality:   Mood, coping, and/or meaning in the context of serious illness were addressed today: Yes  Patient states that she lillian by \"just dealing with it\" and taking it day by day  Leans on parents for support  Did not express specific things that keep her going or coping strategies  Denies Taoism or spiritual affiliation    Social:   Living situation:lives with family  Important relationships/caregivers:Parents, aunt  Occupation: remote, full time, in sales  Areas of fulfillment/gauri: did not elaborate on this today, will continue to discuss    Medications:  Reviewed this patient's medication profile and medications from this hospitalization. Notable medications:Gabapentin, Ativan, Dilaudid, Atarax, Robaxin, Buspar, Desvenlafaxine, Bentyl, Seroquel, Ramelteon, Trazodone, Ambien, Topamax   [place] Board of Pharmacy Data Base Reviewed: No    ROS:  Comprehensive ROS is reviewed and is negative except as here & per HPI:     Physical Exam   Vital Signs with Ranges  Temp:  [97.9  F (36.6  C)-98.6  F (37  C)] 98.1  F (36.7  C)  Pulse:  [60-88] 64  Resp:  [16-18] 16  BP: ()/(47-63) 93/58  SpO2:  [96 %] 96 %  Wt Readings from Last 10 Encounters:   09/12/24 72.8 kg (160 lb 7.9 oz)   09/09/24 74.8 kg (165 lb)   09/09/24 75 kg (165 lb 4.8 oz)   08/30/24 78.9 kg (173 lb 15.1 oz)   08/30/24 75.8 kg (167 lb)   07/11/24 83.5 kg (184 lb)   06/26/24 83.5 kg (184 lb 1.6 oz)   06/14/24 80.9 kg (178 lb 4.8 oz)   05/20/24 84.2 kg (185 lb 10 oz)   05/13/24 82.1 kg (181 lb)     0 lbs 0 oz    PHYSICAL EXAM:  Constitutional: alert, no distress, and cooperative   Psychiatric: mentation appears normal and affect normal/bright  Head: Normocephalic. EOMI grossly  ENT: MMM  Abdomen: Abdomen soft, non-tender. Abscess drain in place draining purulent fluid.  : Cat in place draining dark urine  NEURO: no FND, alert and oriented, moving all 4 extremities spontaneously  SKIN: no suspicious lesions or " margarita    Data reviewed:  Results for orders placed or performed during the hospital encounter of 09/14/24 (from the past 24 hour(s))   Basic metabolic panel   Result Value Ref Range    Sodium 139 135 - 145 mmol/L    Potassium 3.8 3.4 - 5.3 mmol/L    Chloride 110 (H) 98 - 107 mmol/L    Carbon Dioxide (CO2) 20 (L) 22 - 29 mmol/L    Anion Gap 9 7 - 15 mmol/L    Urea Nitrogen 6.0 6.0 - 20.0 mg/dL    Creatinine 0.67 0.51 - 0.95 mg/dL    GFR Estimate >90 >60 mL/min/1.73m2    Calcium 8.7 (L) 8.8 - 10.4 mg/dL    Glucose 93 70 - 99 mg/dL   CBC with platelets   Result Value Ref Range    WBC Count 5.5 4.0 - 11.0 10e3/uL    RBC Count 3.19 (L) 3.80 - 5.20 10e6/uL    Hemoglobin 9.3 (L) 11.7 - 15.7 g/dL    Hematocrit 29.3 (L) 35.0 - 47.0 %    MCV 92 78 - 100 fL    MCH 29.2 26.5 - 33.0 pg    MCHC 31.7 31.5 - 36.5 g/dL    RDW 14.8 10.0 - 15.0 %    Platelet Count 426 150 - 450 10e3/uL   CT Abdomen Pelvis w Contrast    Narrative    EXAMINATION: CT ABDOMEN PELVIS W CONTRAST, 9/16/2024 9:09 AM    INDICATION: Pelvic abscess, anastomotic leak after rectosigmoid  resection in setting of new gynecologic cancer.    COMPARISON STUDY: CT AP 9/12/2024. CT abscess drain placement  9/13/2024.    TECHNIQUE: CT scan of the abdomen and pelvis was performed on  multidetector CT scanner using volumetric acquisition technique and  images were reconstructed in multiple planes with variable thickness  and reviewed on dedicated workstations.     CONTRAST: 99 mL Isovue-370 injected IV without oral contrast    CT scan radiation dose is optimized to minimum requisite dose using  automated dose modulation techniques.    FINDINGS:  Lower thorax: Bilateral basilar atelectasis. No suspicious pulmonary  nodules in the visualized lungs. Normal heart size. No pericardial or  pleural effusion. Elevation of the right hemidiaphragm.    Liver: No mass. No intrahepatic biliary ductal dilation.    Biliary System: cholecystectomy. No extrahepatic biliary  ductal  dilation.    Pancreas: No mass or pancreatic ductal dilation.    Adrenal glands: No mass or nodules.    Spleen: Normal. Subcapsular calcifications, likely granulomas.    Kidneys: No suspicious mass, obstructing calculus or hydronephrosis.    Gastrointestinal tract: Postoperative changes of Brea-en-Y gastric  bypass. Postoperative changes of rectosigmoid colon resection with  right midabdomen diverting ileostomy. Moderate amount of residual  stool in the colon. Normal caliber small bowel.    Mesentery/peritoneum/retroperitoneum: No mass.  Slightly decreased  size of the ill-defined abscess in the left hemipelvis, surrounding  the rectum and vagina and extending superiorly along from the  superolateral left bladder wall (series 4 images [id number]). Left  gluteal approach pigtail catheter in place within the abscess.  Scattered foci of air within the abscess, the largest of which is  superior and adjacent to the left bladder wall (series 4 image 472).  Small amount of residual contrast within the abscess adjacent to the  fistula between the abscess and vagina.    Lymph nodes: No significant lymphadenopathy.    Vasculature: Patent major abdominal vasculature.    Pelvis: Urinary bladder is nondistended with a Cat catheter in  place. Focus of air within the urinary bladder, likely secondary to  catheterization. Postoperative changes of hysterectomy. Similar  fistula between the posterolateral left vaginal wall and pelvic  abscess (series 4 images [id number]) and prominent fistula between the  vagina and rectum (series 4 image 473). There is a small amount of  stool in the vagina.    Osseous structures: No aggressive or acute osseous lesion. Congenital  sternal defect.      Soft tissues: Small fat-containing ventral hernia in the central lower  abdominal wall. Postoperative subcutaneous edema and skin thickening  in the right hemiabdomen near the ostomy site.      Impression    IMPRESSION: In this patient with a  history of low-grade serous  carcinoma status post recent rectosigmoid colon resection and  hysterectomy:  1. Slightly decreased size of the ill-defined pelvic abscess with  similar fistula between the abscess and posterolateral left vaginal  wall. Left gluteal approach pigtail catheter in position.  2. Similar prominent rectovaginal fistula with a small amount of stool  in the vagina.  3. Stable postoperative changes of rectosigmoid colon resection with  diverting ileostomy in the right mid abdomen.  4. Stable postoperative changes of Brea-en-Y gastric bypass.    I have personally reviewed the examination and initial interpretation  and I agree with the findings.    JUAN MANUEL CELIS DO         SYSTEM ID:  [id number]       Medical Decision Making       Please see A&P for additional details of medical decision making.

## 2024-09-17 ENCOUNTER — TELEPHONE (OUTPATIENT)
Dept: FAMILY MEDICINE | Facility: CLINIC | Age: 39
End: 2024-09-17
Payer: COMMERCIAL

## 2024-09-17 LAB
ANION GAP SERPL CALCULATED.3IONS-SCNC: 13 MMOL/L (ref 7–15)
BACTERIA BLD CULT: NO GROWTH
BUN SERPL-MCNC: 8.1 MG/DL (ref 6–20)
CALCIUM SERPL-MCNC: 8.9 MG/DL (ref 8.8–10.4)
CHLORIDE SERPL-SCNC: 109 MMOL/L (ref 98–107)
CREAT SERPL-MCNC: 0.68 MG/DL (ref 0.51–0.95)
EGFRCR SERPLBLD CKD-EPI 2021: >90 ML/MIN/1.73M2
ERYTHROCYTE [DISTWIDTH] IN BLOOD BY AUTOMATED COUNT: 14.6 % (ref 10–15)
GLUCOSE SERPL-MCNC: 91 MG/DL (ref 70–99)
HCO3 SERPL-SCNC: 18 MMOL/L (ref 22–29)
HCT VFR BLD AUTO: 30.7 % (ref 35–47)
HGB BLD-MCNC: 9.7 G/DL (ref 11.7–15.7)
MCH RBC QN AUTO: 28.9 PG (ref 26.5–33)
MCHC RBC AUTO-ENTMCNC: 31.6 G/DL (ref 31.5–36.5)
MCV RBC AUTO: 91 FL (ref 78–100)
PLATELET # BLD AUTO: 436 10E3/UL (ref 150–450)
POTASSIUM SERPL-SCNC: 3.8 MMOL/L (ref 3.4–5.3)
RBC # BLD AUTO: 3.36 10E6/UL (ref 3.8–5.2)
SODIUM SERPL-SCNC: 140 MMOL/L (ref 135–145)
WBC # BLD AUTO: 5.3 10E3/UL (ref 4–11)

## 2024-09-17 PROCEDURE — 99232 SBSQ HOSP IP/OBS MODERATE 35: CPT | Performed by: INTERNAL MEDICINE

## 2024-09-17 PROCEDURE — 120N000002 HC R&B MED SURG/OB UMMC

## 2024-09-17 PROCEDURE — 85027 COMPLETE CBC AUTOMATED: CPT

## 2024-09-17 PROCEDURE — 250N000013 HC RX MED GY IP 250 OP 250 PS 637

## 2024-09-17 PROCEDURE — G0463 HOSPITAL OUTPT CLINIC VISIT: HCPCS

## 2024-09-17 PROCEDURE — 250N000013 HC RX MED GY IP 250 OP 250 PS 637: Performed by: INTERNAL MEDICINE

## 2024-09-17 PROCEDURE — 36415 COLL VENOUS BLD VENIPUNCTURE: CPT

## 2024-09-17 PROCEDURE — 250N000011 HC RX IP 250 OP 636: Performed by: STUDENT IN AN ORGANIZED HEALTH CARE EDUCATION/TRAINING PROGRAM

## 2024-09-17 PROCEDURE — 82374 ASSAY BLOOD CARBON DIOXIDE: CPT

## 2024-09-17 PROCEDURE — 250N000011 HC RX IP 250 OP 636

## 2024-09-17 PROCEDURE — 250N000013 HC RX MED GY IP 250 OP 250 PS 637: Performed by: STUDENT IN AN ORGANIZED HEALTH CARE EDUCATION/TRAINING PROGRAM

## 2024-09-17 RX ORDER — ENOXAPARIN SODIUM 100 MG/ML
1 INJECTION SUBCUTANEOUS EVERY 12 HOURS
Status: DISCONTINUED | OUTPATIENT
Start: 2024-09-17 | End: 2024-09-18 | Stop reason: HOSPADM

## 2024-09-17 RX ADMIN — SIMETHICONE 80 MG: 80 TABLET, CHEWABLE ORAL at 04:18

## 2024-09-17 RX ADMIN — TRAZODONE HYDROCHLORIDE 100 MG: 50 TABLET ORAL at 22:29

## 2024-09-17 RX ADMIN — HYDROMORPHONE HYDROCHLORIDE 6 MG: 4 TABLET ORAL at 12:43

## 2024-09-17 RX ADMIN — Medication 150 MG: at 22:29

## 2024-09-17 RX ADMIN — ENOXAPARIN SODIUM 70 MG: 80 INJECTION SUBCUTANEOUS at 20:20

## 2024-09-17 RX ADMIN — DESVENLAFAXINE SUCCINATE 150 MG: 100 TABLET, FILM COATED, EXTENDED RELEASE ORAL at 20:12

## 2024-09-17 RX ADMIN — RAMELTEON 8 MG: 8 TABLET, FILM COATED ORAL at 22:29

## 2024-09-17 RX ADMIN — ACETAMINOPHEN 975 MG: 325 TABLET ORAL at 22:29

## 2024-09-17 RX ADMIN — LORAZEPAM 0.5 MG: 0.5 TABLET ORAL at 16:34

## 2024-09-17 RX ADMIN — ZOLPIDEM TARTRATE 5 MG: 5 TABLET, COATED ORAL at 22:29

## 2024-09-17 RX ADMIN — ACETAMINOPHEN 975 MG: 325 TABLET ORAL at 06:07

## 2024-09-17 RX ADMIN — DICYCLOMINE HYDROCHLORIDE 10 MG: 10 CAPSULE ORAL at 20:06

## 2024-09-17 RX ADMIN — LORAZEPAM 1 MG: 0.5 TABLET ORAL at 09:03

## 2024-09-17 RX ADMIN — CIPROFLOXACIN HYDROCHLORIDE 500 MG: 500 TABLET, FILM COATED ORAL at 09:03

## 2024-09-17 RX ADMIN — DICYCLOMINE HYDROCHLORIDE 10 MG: 10 CAPSULE ORAL at 14:46

## 2024-09-17 RX ADMIN — MICONAZOLE NITRATE: 20 POWDER TOPICAL at 09:04

## 2024-09-17 RX ADMIN — BUSPIRONE HYDROCHLORIDE 22.5 MG: 15 TABLET ORAL at 09:03

## 2024-09-17 RX ADMIN — BUSPIRONE HYDROCHLORIDE 22.5 MG: 15 TABLET ORAL at 20:06

## 2024-09-17 RX ADMIN — ACETAMINOPHEN 975 MG: 325 TABLET ORAL at 14:46

## 2024-09-17 RX ADMIN — GABAPENTIN 300 MG: 300 CAPSULE ORAL at 20:06

## 2024-09-17 RX ADMIN — GABAPENTIN 300 MG: 300 CAPSULE ORAL at 14:45

## 2024-09-17 RX ADMIN — HYDROMORPHONE HYDROCHLORIDE 6 MG: 4 TABLET ORAL at 09:02

## 2024-09-17 RX ADMIN — HYDROMORPHONE HYDROCHLORIDE 6 MG: 4 TABLET ORAL at 20:36

## 2024-09-17 RX ADMIN — SIMETHICONE 80 MG: 80 TABLET, CHEWABLE ORAL at 22:29

## 2024-09-17 RX ADMIN — HYDROMORPHONE HYDROCHLORIDE 6 MG: 4 TABLET ORAL at 04:12

## 2024-09-17 RX ADMIN — ENOXAPARIN SODIUM 40 MG: 40 INJECTION SUBCUTANEOUS at 09:02

## 2024-09-17 RX ADMIN — HYDROXYZINE HYDROCHLORIDE 25 MG: 25 TABLET, FILM COATED ORAL at 16:34

## 2024-09-17 RX ADMIN — METRONIDAZOLE 500 MG: 500 TABLET ORAL at 20:06

## 2024-09-17 RX ADMIN — CIPROFLOXACIN HYDROCHLORIDE 500 MG: 500 TABLET, FILM COATED ORAL at 20:06

## 2024-09-17 RX ADMIN — METRONIDAZOLE 500 MG: 500 TABLET ORAL at 09:03

## 2024-09-17 RX ADMIN — GABAPENTIN 300 MG: 300 CAPSULE ORAL at 09:03

## 2024-09-17 RX ADMIN — HYDROMORPHONE HYDROCHLORIDE 6 MG: 4 TABLET ORAL at 16:34

## 2024-09-17 RX ADMIN — TOPIRAMATE 50 MG: 25 TABLET, FILM COATED ORAL at 09:03

## 2024-09-17 RX ADMIN — TOPIRAMATE 50 MG: 25 TABLET, FILM COATED ORAL at 20:05

## 2024-09-17 RX ADMIN — DICYCLOMINE HYDROCHLORIDE 10 MG: 10 CAPSULE ORAL at 09:07

## 2024-09-17 ASSESSMENT — ACTIVITIES OF DAILY LIVING (ADL)
ADLS_ACUITY_SCORE: 32

## 2024-09-17 NOTE — PROGRESS NOTES
PALLIATIVE CARE PROGRESS NOTE  [facility]     Patient Name: Marizol Granados  Date of Admission: 9/14/2024        Recommendations & Counseling       GOALS OF CARE:   Restorative without limits however we did not discuss this today.  Patient is still adjusting to her new diagnosis and waiting to be able to meet with the oncologist once she discharges from the hospital at which point she will be able to hear her treatment options.    ADVANCE CARE PLANNING:  No health care directive on file. Per system policy, Surrogate Decision-makers for Patients With Diminished Decision-making Capacity offers guidance on possible decision-makers.  MotherAtif has been identified as a surrogate decision maker.   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  Patient has been experiencing intractable rectal pain since surgery which should improve as fistula closes and once stool burden decreases.  She has minimal abdominal pain or other musculoskeletal complaints.  Until today, she did feel that that pain was limiting her ability to move.  She does feel that pain is better managed today and she is going to try and walk the halls.  She feels that taking hydromorphone every 4 hours while she is awake is significantly helping to reduce pain.  He does not want to make any medication adjustments today as many were made yesterday.  Continue medications as indicated below.  -Continue Tylenol 975 mg TID scheduled  -Continue Bentyl 10 mg TID scheduled -this was started 9/16  -Continue gabapentin 300 mg TID scheduled -started during patient's first hospitalization on 9/1.  In review of records it looks like patient may have been on gabapentin back in 2014 and 2015 for generalized body pains  -Continue hydromorphone 4-6 mg  "q4hrs PRN -when patient ready for discharge, she will need a 2-week supply until she is able to meet with palliative care as an outpatient  -Topical lidocaine to rectal area as needed    # Long history of anxiety and depression, on multiple psychoactive medications for years which patient feels controls both her anxiety and depression.  We were able to decrease some of those medications was still able to sleep so would therefore discharge on decreased doses as indicated below  -Trazodone 100 mg nightly-lorazepam 1 mg at 8 AM and 0.5 mg at 1600  - Continue Ramelteon, Seroquel, Desvenlafaxine, Buspar, Ambien and Topamax at PTA dosing for now              > Could consider stopping Ramelteon in the future based on conversation with patient today however would discuss with her prior to making changes    PSYCHOSOCIAL/SPIRITUAL:  Family has 2 kids ages 10 and 11.  Mother is very supportive.  Father is also currently admitted to the hospital for surgery and has active cancer.  And came from [place] to help care for her children and will stay with patient once she is discharged from the hospital.  Patient did not feel she needed support from palliative care  as she does not use talk therapy as a means for coping.  Palliative care will continue to support patient as needed    Palliative Care will continue to follow. Thank you for the consult and allowing us to aid in the care of Marizol Granados.        Cassia Rivero MD  Securely message with [employer] (more info)  Text page via [facility] Paging/Directory   If you are not sure who specifically to contact -- please text the \"Palliative Care [facility]\" voice group in Vocera.        Assessment:           Marizol Granados is a 39 year old female with a past medical history of newly diagnosed stage IIIB low grade serous carcinoma, SHIVAM/MDD who presented on 9/14 with leaking ostomy site found to have rectovaginal fistula and abscess and now refractory rectal pain iso recent " complex debulking surgery and diverting ileostomy.       Today, the patient was seen for:  Pelvic adenocarcinoma s/p complex abdominal surgery for debulking requiring diverting ileostomy c/b rectovaginal fistula and abscess s/p drain placement  Refractory rectal pain due to above  Significant stool burden   Polypharmacy d/t anxiety and depression            Interval History:     Multidisciplinary collaboration:  All chart notes reviewed  No acute events overnight    Patient/family narrative  Patient feels pain much better controlled today than yesterday  She plans to try walking the halls today and hopes to be able to discharge home tomorrow       Review of Systems:     Besides above, ROS was reviewed and is unremarkable           Medications:     I have reviewed this patient's medication profile and medications during this hospitalization.    Tylenol 975mg TID  Bentyl 10mg TID scheduled started 9/16  Gabapentin 300mg TID - started 9/1/2024  Dilaudid 4-6mg q4hrs PRN - 34mg x 24hrs   Atarax 25-50mg q6hrs PRN -   Robaxin discontinued    Lorazepam 1mg qAM and 0.5mg 4pm - decreased from 1mg BID on 9/16  Buspar 22.5mg BID  Desvenlafaxine 150mg every day  Quetiapine 150mg at bedtime  Ramelteon 8mg at bedtime  Topiramate 50mg BID  Trazodone 100mg at bedtime (decreased from 150mg at bedtime on 9/16)   Ambien 5mg at bedtime               Physical Exam:   Temp:  [97.7  F (36.5  C)-98.5  F (36.9  C)] 97.7  F (36.5  C)  Pulse:  [64-91] 91  Resp:  [12-18] 12  BP: ()/(58-73) 111/73  SpO2:  [98 %-100 %] 98 %  0 lbs 0 oz    Gen: Alert, sitting in bedside reclining chair, appears stated age, comfortable, in no acute distress, engaged, appropriate, sensorium intact                 Current Problem List:   Active Problems:    Leak of anastomosis between gastrointestinal structures      Allergies   Allergen Reactions    Bactrim [Sulfamethoxazole W-Trimethoprim] Anaphylaxis    Mesalamine Anaphylaxis and Hives    Other Environmental  Allergy Hives, Other (See Comments) and Shortness Of Breath     [place]/Pollen  oak    Sulfa Antibiotics Anaphylaxis    Asacol [Mesalamine] Hives    Vancomycin Itching    Amoxicillin Other (See Comments)     Hypersensitivity allergic reaction  Other reaction(s): Unknown/Not Verified  Gets UTI  Urinary sx's.        Molds & Smuts Other (See Comments)     Other reaction(s): Runny Nose, Unknown/Not Verified    Pollen            Data Reviewed:     Reviewed recent labs and pertinent imaging  ROUTINE ICU LABS (Last four results)  CMP  Recent Labs   Lab 09/17/24  0507 09/16/24  0537 09/15/24  0608 09/14/24  0929 09/13/24  0926 09/12/24  0223    139 139 139   < > 136   POTASSIUM 3.8 3.8 3.7 3.5   < > 3.7   CHLORIDE 109* 110* 110* 109*   < > 105   CO2 18* 20* 21* 19*   < > 19*   ANIONGAP 13 9 8 11   < > 12   GLC 91 93 110* 134*   < > 94   BUN 8.1 6.0 4.0* 4.2*   < > 13.8   CR 0.68 0.67 0.66 0.75   < > 0.82   GFRESTIMATED >90 >90 >90 >90   < > >90   LORY 8.9 8.7* 8.6* 8.3*   < > 9.2   PROTTOTAL  --   --   --   --   --  7.2   ALBUMIN  --   --   --   --   --  4.1   BILITOTAL  --   --   --   --   --  0.2   ALKPHOS  --   --   --   --   --  59   AST  --   --   --   --   --  16   ALT  --   --   --   --   --  17    < > = values in this interval not displayed.     CBC  Recent Labs   Lab 09/17/24  0507 09/16/24  0537 09/15/24  0608 09/14/24  0929   WBC 5.3 5.5 6.8 6.7   RBC 3.36* 3.19* 2.99* 2.88*   HGB 9.7* 9.3* 8.8* 8.4*   HCT 30.7* 29.3* 27.6* 26.4*   MCV 91 92 92 92   MCH 28.9 29.2 29.4 29.2   MCHC 31.6 31.7 31.9 31.8   RDW 14.6 14.8 15.0 14.9    426 389 376     INR  Recent Labs   Lab 09/12/24  0223   INR 1.23*     Arterial Blood GasNo lab results found in last 7 days.             Medical Decision Making

## 2024-09-17 NOTE — PROGRESS NOTES
Brief Progress Note    Patient doing well. Getting up and changing own ostomy bag. Reports ongoing rectal pressure but improved. Using the donut helps somewhat. Wondering about what her home pain regimen will be.    Vitals:    09/17/24 0410 09/17/24 0412 09/17/24 0900 09/17/24 1129   BP:  111/73 98/55 95/57   BP Location:  Left arm Left arm Left arm   Pulse:    80   Resp: 12  18 16   Temp: 97.7  F (36.5  C)  98.3  F (36.8  C) 98.3  F (36.8  C)   TempSrc: Temporal  Oral Oral   SpO2: 98%   98%   Height:         Plan to continue working on pain control and ostomy care. Will do TOV in clinic at [facility] on Tuesday, 9/24/2024. Appointment with Dr. Sosa to be scheduled, tentatively 9/27/2024.     Liliana Freedman MD, MSc  Gynecologic Oncology, PGY-1  09/17/2024 3:47 PM    Addendum: no RN in office at [facility] 9/24 to do TOV, will follow up new appt.

## 2024-09-17 NOTE — TELEPHONE ENCOUNTER
Forms/Letter Request    Type of form/letter: Home Health Certification      Do we have the form/letter: Yes:     Who is the form from? [facility]     Where did/will the form come from? form was faxed in    How would you like the form/letter returned: Fax : [phone]      Natasha Jimenez Patient Rep.

## 2024-09-17 NOTE — PROGRESS NOTES
Brief Progress Note    Patient initially appeared comfortable as bedside RN team obtaining vitals. However, patient became tearful upon introduction. Reports last dilaudid dose did not work as well as the last. Unable to ambulate due to pain. Now concerned about her ostomy site leaking during the night. Reports corner of the bag is pealing up and was told this will ultimately lead to leakage. Has tried with daytime nursing team adjustments to fix this and bedside RN present to try another intervention. However, patient states the only solution is to replace the ostomy site. Had texted her mom to see if she would drive to the hospital to do so. Concerned about positioning in the night due to ostomy and pain. However, declines assistance with change in position at this time.     Temp: 98  F (36.7  C) Temp src: Oral BP: 105/67 Pulse: 91   Resp: 18 SpO2: 98 % O2 Device: None (Room air)    Gen: Tearful  Abd: Soft, nondistended, mildly ttp; incision c/d/i  Ostomy site with formed and liquid stool inside bag. Slight detachment from skin at lower left corner. No signs of leakage currently.     Bedside RN working with charge to formulate appropriate plan for ostomy site overnight. Patient due for multiple pain medications but is undecided if she will take them at this time. Also awaiting donut cushion to assist with positioning and discomfort.     Kuldip Long MD, MPH  Gyn Onc Resident, PGY-4  09/16/24 8:24 PM

## 2024-09-17 NOTE — PROGRESS NOTES
"[facility]  WO Nurse Inpatient Assessment     Consulted for: ileostomy    Patient History (according to provider note(s):     39 year old HD#4 for anastomotic leak, POD#15 rad hyst, BSO, post vaginectomy, omentectomy, rectosig resection w/ reanastomosis, diverting loop ileostomy and POD#3 IR drain placement. She was initially admitted to [facility] where IR drain was placed prior to transfer to [facility] for further management.       Assessment:      Assessment of Established loop Ileostomy:  How long has patient had ostomy: 9/1  Mucutaneous junction: intact  Peristomal skin: fungal appearing rash   Output: liquid and brown    Is patient independent with ostomy care?: Yes  Home pouching system: Jose 2 piece flat 57 fecal pouch with Marie ring--added brava strips and belt as well as a piece of barrier ring within crease just inferior to stoma along with barrier ring around stoma   Pouching issues and/or educational needs:Stoma assessment, Pouching system assessment , Refitting of appliance , and Adjustment of pouching plan  Interventions completed today: Pouching system assessment  and reviewed supply order.  Patient seen by outpatient ostomy nurse on 9/10 who submitted new order for supplies. Followed up with supply company who reported supplies were delivered 9/15, verified it was to correct address verified by patient.  Pouching system in use while hospitalized:  Jose two piece, cut to fit, flat, and barrier ring  Supplies location: gathered, at bedside, ordered Marie rings, discussed with RN, and discussed with patient       Treatment Plan:     RLQ Ileostomy pouching plan:   Pouching system: ostomy supplies pouches: [place] 57 FECAL ([id number]) ostomy supplies barrier: [place] 57mm FLAT ([id number])  Accessories used: [facility] ostomy accessories: 2\" Marie Ring ([id number]) and Cavilon no sting barrier film ([id number])   Frequency of pouch changes: Twice weekly and PRN leakage  WO " follow up plan: As needed   Bedside RN interventions: Change pouch PRN if leaking using the supplies above, Empty pouch when 1/3 to 1/2 full, ensure to clean pouch outlet after emptying to prevent odor, Notify WOC for ongoing pouch leakage, Document stoma appearance and output volume, color, and consistency every shift, Encourage patient to empty pouch with assist, Encourage patient to empty pouch independently, and Assist patient to measure and record output      Orders: Written    RECOMMEND PRIMARY TEAM ORDER: None, at this time  WOC nurse follow-up plan: prn and weekly  Notify WOC if wound(s) deteriorate.  Nursing to notify the Provider(s) and re-consult the WOC Nurse if new skin concern.    DATA:     Current support surface: Standard  Standard gel mattress (Isoflex)  BMI: Body mass index is 26.71 kg/m .   Active diet order: Orders Placed This Encounter      Combination Diet Regular Diet Adult     Output: I/O last 3 completed shifts:  In: 1203 [P.O.:1200; I.V.:3]  Out: 1855 [Urine:1225; Other:5; Stool:625]     Labs:   Recent Labs   Lab 09/17/24  0507 09/13/24  0926 09/12/24  0223   ALBUMIN  --   --  4.1   HGB 9.7*   < > 9.6*   INR  --   --  1.23*   WBC 5.3   < > 8.5    < > = values in this interval not displayed.     Pressure injury risk assessment:   Sensory Perception: 4-->no impairment  Moisture: 3-->occasionally moist  Activity: 3-->walks occasionally  Mobility: 3-->slightly limited  Nutrition: 3-->adequate  Friction and Shear: 3-->no apparent problem  Terry Score: 19      Pager no longer is use, please contact through [employer] group: [facility] Nurse [place]  Dept. Office Number: [phone]

## 2024-09-17 NOTE — PROGRESS NOTES
Gynecology Oncology Progress Note  09/18/2024    Disease: Low grade serous carcinoma, endometriosis. CT AP (9/12) w/ recent postop changes, fluid collection along R lateral aspect of hyst to rectum and posterior fluid around the rectum c/f hematoma, seroma, developing abscess.    24 hour events:   - Lvx increased to therapeutic dosing    Subjective: Patient feels better today. Improved abdominal pain and rectal pressure. Ambulated multiple times with her mother yesterday. No issues with this. Tolerating PO. Denies dizziness, chest pain, SOB, nausea/vomiting, fevers/chills, dizziness, headache. Continues to have loose stool from vagina. Hoping to go home today. Reports some leakage of fluid from right ear and a little sore. Started yesterday and had the nurse look at it. No concerns for cough, runny nose but does have a mild sore throat. Bedside RN looking for otoscope for reassessment.    Objective:   Patient Vitals for the past 24 hrs:   BP Temp Temp src Pulse Resp SpO2   09/18/24 0308 101/68 97.9  F (36.6  C) Oral -- 16 97 %   09/17/24 2359 94/62 98  F (36.7  C) Oral 70 18 97 %   09/17/24 2000 106/65 98.3  F (36.8  C) Oral 64 18 100 %   09/17/24 1129 95/57 98.3  F (36.8  C) Oral 80 16 98 %   09/17/24 0900 98/55 98.3  F (36.8  C) Oral -- 18 --       General: NAD, appears comfortable in bed  CV: RRR, well perfused  Resp: CTAB, breathing comfortably on room air  Abdomen: soft, mildly tender, non-distended, ileostomy pink and patent with stool in the bag.  Incision: c/d/i  Extremities: warm, well-perfused, nontender, minimal edema, SCDs in place    AQUILINO drain with minimal tan, purulent fluid in the bulb.    I/Os  (Yesterday // Since Midnight)  PO 1200 cc // NR  IVF 3 cc // NR  Urine 1450 cc // 450 cc > 0.8 ml/kg/h yesterday  Drain 0 cc // NR  Stool (ostomy) 925 cc // NR    New Labs/Imaging-   AM CBC, BMP pending    Pending cultures (date obtained):   Aerobic abscess Cx (9/13): E. Coli, E faecalis, pansensitive; Gram pos  cocci, bacilli, Gram neg bacilli  Anaerobic abscess Cx (9/13): Pending  Ucx (9/12): E Coli, E faecalis, pansensitive  Bcx (9/12): NGTD x3D      Assessment: 39 year old HD#6 for anastomotic leak, POD#17 rad hyst, BSO, post vaginectomy, omentectomy, rectosig resection w/ reanastomosis, diverting loop ileostomy and POD#5 IR drain placement. She was initially admitted to [facility] where IR drain was placed prior to transfer to [facility] for further management. Now meeting goals for discharge to home.     # Low grade serous carcinoma s/p XL, rad hyst, BSO, rectosigmoid colon resection with w/ reanastomosis, posterior vaginectomy, omentectomy and diverting loop ileostomy  # Endometriosis  # Rectovaginal fistula  # Pelvic abscess, s/p IR drain placement  Continues to be afebrile and clinically stable. Exam on admission with continuously leaking stool, large posterior vaginal defect and intact cuff.   - Repeat CT AP with decreased pelvic abscess size and stable rectovaginal fistula. Follow up outpatient for further management.  - Abscess Cx & Ucx with pan-sensitive E Coli & E faecalis. 9/14 Bcx NGTD  - s/p 3D Zosyn, D#4 ciprofloxacin/flagyl through 9/28 for 14 day course  - Scheduled tylenol and PTA gabapentin; PRN robaxin, PO dilaudid, atarax, topical lidocaine  - Maximizing PO pain regimen in anticipation for possible discharge; no IV dilaudid in the last 24 hours  - Palliative consulted for assistance with pain regimen, appreciate recs  - Strict I&Os     # UTI  # Postoperative urinary retention  - Ucx and abx as discussed as above.  - Successful TOV yesterday, but then unable to void overnight. Cat catheter replaced w/ 650 ml out.   - Continue to monitor UOP  - Plan for TOV outpatient in 1 week.     # MDD   # SHIVAM  # Restless Leg Syndrome  # Insomnia  Medications per last visit per psych consult: Seroquel 150 mg HS, Rozerem 8 mg HS, Trazodone 150 mg HS, Ambien 5 mg HS; PTA Pristiq and Buspar; adjusted per palliative  recommendations.  - Appreciate palliative team expertise    # H/o gastric bypass  - Avoid NSAIDs as able    # H/o DVT/PE  - PTA Eliquis held in house  - Lovenox in house  - Plan to resume Eliquis on discharge    PPX: SCDs, IS, lvx  Dispo: Pending pain control.  Drains/Lines: PIV, ostomy, IR drain, waite.    Kuldip Long MD, MPH  Gynecologic Oncology Resident PGY-4    To reach the GYNECOLOGY ONCOLOGY team for this patient, please page [phone]

## 2024-09-17 NOTE — PLAN OF CARE
"Time 7835-1058    /67 (BP Location: Left arm)   Pulse 91   Temp 98.5  F (36.9  C) (Oral)   Resp 18   Ht 1.651 m (5' 5\")   LMP  (LMP Unknown)   SpO2 100%   BMI 26.71 kg/m      A&O x4. AVSS on RA, denies SOB, N/V. Pain continues to be not managed well 7-9/10. Paged provider, increased dose of hydroxine, lidocaine gel applied, 6mg of PRN dilaudid given x2. Donut at bedside. Patient teary feeling powerless with pain. Provided therapeutic communication, active listening. PIV SL. AQUILINO drain irrigated and draining milky tan. Regular diet, tolerating well. Ileostomy with good output, plus gas. Changed appliance x2. Continues to ooze stool from vagina. Pt remained in bed for shift.     Goal Outcome Evaluation:      Plan of Care Reviewed With: patient    Overall Patient Progress: no changeOverall Patient Progress: no change    Outcome Evaluation: pain still unmanageable      "

## 2024-09-17 NOTE — PLAN OF CARE
Goal Outcome Evaluation:      Plan of Care Reviewed With: patient    Overall Patient Progress: improvingOverall Patient Progress: improving    Outcome Evaluation: HD#5 for anastomotic leak, POD#16 rad hyst, BSO, post vaginectomy, omentectomy, rectosig resection w/ reanastomosis, diverting loop ileostomy and POD#4 IR drain placement for abscess and fistula. VSS. Pain controlled on oral regimen but limiting activity. AUOP. Good output from ostomy. Small stool leakage from vagina. Up in chair with donut and walking halls. Hope is to d/c tomorrow on oral pain regimen with drain in place. Needs drain teaching with mirror.

## 2024-09-17 NOTE — PROGRESS NOTES
Care Management Follow Up    Length of Stay (days): 3    Expected Discharge Date: 09/18/2024     Concerns to be Addressed: discharge planning     Patient plan of care discussed at interdisciplinary rounds: Yes    Anticipated Discharge Disposition: Home, Home Care              Anticipated Discharge Services: None  Anticipated Discharge DME: Other (see comment) (ostomy supplies)    Patient/family educated on Medicare website which has current facility and service quality ratings: other (see comments) (Foster resources: child assistance, [employer] for free prepared meals, SSDI instruction to apply)  Education Provided on the Discharge Plan: Yes  Patient/Family in Agreement with the Plan: yes    Referrals Placed by CM/SW: Homecare  Private pay costs discussed: Not applicable    Discussed  Partnership in Safe Discharge Planning  document with patient/family: No     Handoff Completed: No, handoff not indicated or clinically appropriate    Additional Information:  SW was alerted that pt and her mother wanted to talk to SW about possible community resources. When SW arrived at pt bedside, pt's mother had already left. Pt told SW that she is a single mom with cancer. She has BCBS advantage and her kids have MA. Pt lives in [place]. Pt told SW that she wanted to get connected with community resources, wanted to fill out a living will, and also wanted to address what would happen to her children if she could no longer care for them, as the father does not have any custody or visitation and pt does not want children living with him.    SW explained to pt that SW can help get her connected with community resources, but the children's custody agreement would need to be worked out with a  and/or . [employer]O printed a HCD for pt and left it with pt for her to fill out. MALDON[employer]O talked to pt about possible Medicaid coverage and talked about the eligibility requirements for Medicaid. MALDONADO explained waiver services that may be  accessible through Medicaid. SW brought pt information on Medicaid, [place] and also a brochure for [employer] assessment. SW explained the documents to pt. SW asked pt to look over the documents and told pt that SW would follow up with her tomorrow.     Pt expressed that she was very tired and stressed out. Pt told SW that she would be discharging tomorrow so does not think a follow up would be useful. Pt told SW she would follow up with a  about child custody and the living will, and will look over the documents to see if they may be something worth looking into.       Next Steps:   -Follow up with pt if pt is still here tomorrow (9/17)    JEREMIAS Rodriguez   [facility]  Available via [employer]  Covering 5A/C MAL[employer]O, ph: [phone]

## 2024-09-17 NOTE — PROVIDER NOTIFICATION
Amcom to GynOn resident at 1939: Summation of page: 0x, 0041, M.D. Can you add 50mg atarax per order. Patient wants to see if that is more effective. Her 2nd 6mg dilaudid dose wasn't as effective.     Emelina Pruett RN

## 2024-09-17 NOTE — PLAN OF CARE
"/73 (BP Location: Left arm)   Pulse 91   Temp 97.7  F (36.5  C) (Temporal)   Resp 12   Ht 1.651 m (5' 5\")   LMP  (LMP Unknown)   SpO2 98%   BMI 26.71 kg/m       Goal Outcome Evaluation:  [id number]      Plan of Care Reviewed With: patient    Overall Patient Progress: no changeOverall Patient Progress: no change     VSS, on RA. A/Ox4. Ax1 GB/W, pt did not want to get out of bed. Combination/reg diet. Denies nausea. Pain somewhat tolerated w/ PRN PO Dilaudid. AQUILINO abscess drain, milky/tan output. Ileostomy intact, gas & watery stool. Cat AUO. Cont POC.       "

## 2024-09-17 NOTE — PROGRESS NOTES
Care Management Follow Up    Length of Stay (days): 3    Expected Discharge Date: 09/18/2024     Concerns to be Addressed: discharge planning     Patient plan of care discussed at interdisciplinary rounds: Yes    Anticipated Discharge Disposition: Home, Home Care   Anticipated Discharge Services: None  Anticipated Discharge DME: Other (see comment) (ostomy supplies)    Patient/family educated on Medicare website which has current facility and service quality ratings: other (see comments) (Foster resources: child assistance, [employer] for free prepared meals, SSDI instruction to apply)  Education Provided on the Discharge Plan: Yes  Patient/Family in Agreement with the Plan: yes    Referrals Placed by CM/SW: Homecare  Private pay costs discussed: Not applicable    Discussed  Partnership in Safe Discharge Planning  document with patient/family:  yes    Handoff Completed: pending    Additional Information:    Home care order already placed.   Touched basis with home care about discharge plan and patient concerns with home care visits timeline after her last discharge. Requested if patient can be seen right away after her discharge within 24 -48 hours after discharge date. Grace said she will notify the team to have patient scheduled.    Met with patient and mom at bedside for discharge education. Mom was a bit anxious due to the fact that home care did not show up as promised for one week and patient ended up int he ER.  Mom was asking the specific schedule that home Nurse is coming to see patient. Educated both of them on how home care works and the scheduling part is between them and the home care Nurse but the expectation is they should see her within 24 hours to 48 hours after discharge and if she does not hear from them she can call them or call her primary doc for any concern. Mom and patient both acknowledged teaching. Mom requested to send them the S.W because they want some specific resources from the  [facility]. Writer  acknowledged their request. Information passed onto S.W to follow up with patient and mom.     Next Steps:   Fax discharge order to home care and notify them on discharge day.    Discharge resources:   [facility]  Phone: [phone]   Fax: [phone]    CC will continue to follow up.  Mayra Mendenhall RN, PHN, BSN   Float Nurse Care Coordinator  Covering for Unit 5A/C  Phone [phone]

## 2024-09-17 NOTE — CONSULTS
Palliative Care Initial Social Work Note  Location: [facility]    Patient Info:  Marizol Granados is a 39 year old female with newly diagnosed IIIB low grade serous carcinoma.     Brief summary of visit:   Joint visit with Palliative Care MD and Marizol. She reports that she feels her pain is better controlled during the day today. She reports that she feels her medications for her depression and anxiety are working well and she denies new symptoms.     Marizol asked good questions about what resources might be available at home. She reports that she had home care set up after a previous hospitalization and they did not come to see her in a timely manner. She also wonders about additional resources. 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 She does wonder how long this can last and what other options might be available. PCSW explained role and unfortunately doesn't have answers to these questions. Made verbal referral to Unit RNCC to assist.       Date of Admission: 9/14/2024    Reason for consult: Patient and family support    Sources of information: Patient  Staff -Palliative Care MD, Unit RNCC  Other -chart review    Recommendations & Plan:  -PCSW will remain available for ongoing emotional support as needed while Palliative Care Team is following        Symptoms & Concerns Addressed Today:  Family  Mental health -Marizol reports that she has depression & anxiety; but both are stable at this time.  Physical -Marizol reports that her pain seems better today.     Strengths Identified:    -Marizol is able to advocate for herself.     Relationships & Support:  Aspects of relationships and support assessed today:  Identified family members: mom, 2 young children  Professional supports: medical teams  Family coping: not assessed  Bereavement Risk concerns: not assessed    Coping, Mental Health & Adjustment to Illness:    Adjustment to Illness/Hospitalization    Goals, Decision Making & Advance Care Planning:   Prognosis, Goals, and/or Advance Care Planning were assessed today: No  If yes, brief summary of discussion: n/a  Preferred language: English  Patient's decision making preferences: independently  I have concerns about the patient/family's health literacy today: No  Patient has a completed Health Care Directive: No.   Code status per chart review: Full Code      Clinical Social Work Interventions:   Assessment of palliative specific issues    Introduction of Palliative clinical social work interventions  Facilitation of processing of thoughts/feelings  Psychoeducation   Resource referral -Unit RNCC for home care questions      ADRYAN Cobb  [facility], Palliative Care  Securely message with the Jacked Web Console (learn more here) or  Text page via [facility] Paging/Directory

## 2024-09-18 ENCOUNTER — PATIENT OUTREACH (OUTPATIENT)
Dept: ONCOLOGY | Facility: CLINIC | Age: 39
End: 2024-09-18
Payer: COMMERCIAL

## 2024-09-18 ENCOUNTER — NURSE TRIAGE (OUTPATIENT)
Dept: NURSING | Facility: CLINIC | Age: 39
End: 2024-09-18
Payer: COMMERCIAL

## 2024-09-18 ENCOUNTER — DOCUMENTATION ONLY (OUTPATIENT)
Dept: ONCOLOGY | Facility: CLINIC | Age: 39
End: 2024-09-18
Payer: COMMERCIAL

## 2024-09-18 VITALS
SYSTOLIC BLOOD PRESSURE: 95 MMHG | DIASTOLIC BLOOD PRESSURE: 66 MMHG | TEMPERATURE: 98.5 F | BODY MASS INDEX: 26.71 KG/M2 | OXYGEN SATURATION: 100 % | RESPIRATION RATE: 18 BRPM | HEIGHT: 65 IN | HEART RATE: 82 BPM

## 2024-09-18 DIAGNOSIS — Z98.890 POSTOPERATIVE STATE: ICD-10-CM

## 2024-09-18 DIAGNOSIS — K91.89 LEAK OF ANASTOMOSIS BETWEEN GASTROINTESTINAL STRUCTURES: Primary | ICD-10-CM

## 2024-09-18 PROBLEM — N82.3 RECTOVAGINAL FISTULA: Status: ACTIVE | Noted: 2024-09-18

## 2024-09-18 LAB
ANION GAP SERPL CALCULATED.3IONS-SCNC: 11 MMOL/L (ref 7–15)
BUN SERPL-MCNC: 8.2 MG/DL (ref 6–20)
CALCIUM SERPL-MCNC: 9.2 MG/DL (ref 8.8–10.4)
CHLORIDE SERPL-SCNC: 109 MMOL/L (ref 98–107)
CREAT SERPL-MCNC: 0.69 MG/DL (ref 0.51–0.95)
EGFRCR SERPLBLD CKD-EPI 2021: >90 ML/MIN/1.73M2
ERYTHROCYTE [DISTWIDTH] IN BLOOD BY AUTOMATED COUNT: 14.6 % (ref 10–15)
GLUCOSE SERPL-MCNC: 97 MG/DL (ref 70–99)
HCO3 SERPL-SCNC: 19 MMOL/L (ref 22–29)
HCT VFR BLD AUTO: 30.4 % (ref 35–47)
HGB BLD-MCNC: 9.9 G/DL (ref 11.7–15.7)
MCH RBC QN AUTO: 29.2 PG (ref 26.5–33)
MCHC RBC AUTO-ENTMCNC: 32.6 G/DL (ref 31.5–36.5)
MCV RBC AUTO: 90 FL (ref 78–100)
PLATELET # BLD AUTO: 427 10E3/UL (ref 150–450)
POTASSIUM SERPL-SCNC: 3.5 MMOL/L (ref 3.4–5.3)
RBC # BLD AUTO: 3.39 10E6/UL (ref 3.8–5.2)
SODIUM SERPL-SCNC: 139 MMOL/L (ref 135–145)
WBC # BLD AUTO: 4.8 10E3/UL (ref 4–11)

## 2024-09-18 PROCEDURE — 36415 COLL VENOUS BLD VENIPUNCTURE: CPT

## 2024-09-18 PROCEDURE — 85027 COMPLETE CBC AUTOMATED: CPT

## 2024-09-18 PROCEDURE — 80048 BASIC METABOLIC PNL TOTAL CA: CPT

## 2024-09-18 PROCEDURE — 250N000013 HC RX MED GY IP 250 OP 250 PS 637

## 2024-09-18 PROCEDURE — 99232 SBSQ HOSP IP/OBS MODERATE 35: CPT | Performed by: INTERNAL MEDICINE

## 2024-09-18 PROCEDURE — 250N000013 HC RX MED GY IP 250 OP 250 PS 637: Performed by: STUDENT IN AN ORGANIZED HEALTH CARE EDUCATION/TRAINING PROGRAM

## 2024-09-18 PROCEDURE — 250N000013 HC RX MED GY IP 250 OP 250 PS 637: Performed by: INTERNAL MEDICINE

## 2024-09-18 PROCEDURE — 250N000011 HC RX IP 250 OP 636: Performed by: STUDENT IN AN ORGANIZED HEALTH CARE EDUCATION/TRAINING PROGRAM

## 2024-09-18 PROCEDURE — 84443 ASSAY THYROID STIM HORMONE: CPT

## 2024-09-18 PROCEDURE — 84439 ASSAY OF FREE THYROXINE: CPT

## 2024-09-18 RX ORDER — TRAZODONE HYDROCHLORIDE 100 MG/1
100 TABLET ORAL AT BEDTIME
Qty: 30 TABLET | Refills: 0 | Status: SHIPPED | OUTPATIENT
Start: 2024-09-18

## 2024-09-18 RX ORDER — METRONIDAZOLE 500 MG/1
500 TABLET ORAL 2 TIMES DAILY
Qty: 18 TABLET | Refills: 0 | Status: SHIPPED | OUTPATIENT
Start: 2024-09-18 | End: 2024-09-27

## 2024-09-18 RX ORDER — LORAZEPAM 1 MG/1
.5-1 TABLET ORAL 2 TIMES DAILY
COMMUNITY
Start: 2024-09-18

## 2024-09-18 RX ORDER — CIPROFLOXACIN 500 MG/1
500 TABLET, FILM COATED ORAL EVERY 12 HOURS
Qty: 18 TABLET | Refills: 0 | Status: SHIPPED | OUTPATIENT
Start: 2024-09-18 | End: 2024-09-27

## 2024-09-18 RX ORDER — HYDROMORPHONE HYDROCHLORIDE 4 MG/1
4-6 TABLET ORAL EVERY 4 HOURS PRN
Qty: 40 TABLET | Refills: 0 | Status: SHIPPED | OUTPATIENT
Start: 2024-09-18

## 2024-09-18 RX ORDER — DICYCLOMINE HYDROCHLORIDE 10 MG/1
10 CAPSULE ORAL 3 TIMES DAILY PRN
Qty: 90 CAPSULE | Refills: 0 | Status: SHIPPED | OUTPATIENT
Start: 2024-09-18

## 2024-09-18 RX ORDER — PROPRANOLOL HYDROCHLORIDE 10 MG/1
10 TABLET ORAL 2 TIMES DAILY
COMMUNITY
Start: 2024-09-18

## 2024-09-18 RX ADMIN — HYDROXYZINE HYDROCHLORIDE 25 MG: 25 TABLET, FILM COATED ORAL at 03:16

## 2024-09-18 RX ADMIN — CIPROFLOXACIN HYDROCHLORIDE 500 MG: 500 TABLET, FILM COATED ORAL at 08:41

## 2024-09-18 RX ADMIN — LORAZEPAM 1 MG: 0.5 TABLET ORAL at 08:42

## 2024-09-18 RX ADMIN — TOPIRAMATE 50 MG: 25 TABLET, FILM COATED ORAL at 08:40

## 2024-09-18 RX ADMIN — ENOXAPARIN SODIUM 70 MG: 80 INJECTION SUBCUTANEOUS at 09:43

## 2024-09-18 RX ADMIN — DICYCLOMINE HYDROCHLORIDE 10 MG: 10 CAPSULE ORAL at 08:51

## 2024-09-18 RX ADMIN — ACETAMINOPHEN 975 MG: 325 TABLET ORAL at 06:06

## 2024-09-18 RX ADMIN — METRONIDAZOLE 500 MG: 500 TABLET ORAL at 08:41

## 2024-09-18 RX ADMIN — BUSPIRONE HYDROCHLORIDE 22.5 MG: 15 TABLET ORAL at 08:40

## 2024-09-18 RX ADMIN — HYDROMORPHONE HYDROCHLORIDE 6 MG: 4 TABLET ORAL at 03:16

## 2024-09-18 RX ADMIN — GABAPENTIN 300 MG: 300 CAPSULE ORAL at 08:41

## 2024-09-18 RX ADMIN — HYDROMORPHONE HYDROCHLORIDE 6 MG: 4 TABLET ORAL at 08:42

## 2024-09-18 ASSESSMENT — ACTIVITIES OF DAILY LIVING (ADL)
ADLS_ACUITY_SCORE: 29
ADLS_ACUITY_SCORE: 30
ADLS_ACUITY_SCORE: 29

## 2024-09-18 NOTE — PROGRESS NOTES
Care Management Follow Up    Patient discharged home today.  Patient's daughter left messages on RNCC line that [facility] called her and said they could not come to do STEFANY until 10/8.   Mayra RNCC spoke with them yesterday and requested she be seen  within 24-48 hours of discharge.  They agreed.  I called this morning and notified of discharge today and was not told that they could not see her.      [facility] to [facility] and spoke with Kia and told they cannot accommodate.  Explained that this is an STEFANY and that 10/7 is not acceptable as the CMS guidelines require STEFANY with 48 hours of discharge from the hospital.  She will look into it.    Left voice mail for Atif patient's mother providing my direct number and that I am looking into this and working on finding a new agency.    Referral sent to [facility].  [facility] cannot accept due to payor but will begin out sourcing.     Call back from Kia from [employer] and they will have her seen by an RN for STEFANY on 9/20 at 9 am. Kia will call Atif.  Left message for Atif myself with the information.       Call to Marizol to update on home care visit set up for 9 am on 9/20.  She is aware and pleased with the result.   She was notified already by [facility].     Emelina Keys, RN, BA     Registered Nurse Care Coordinator , 5 A beds 4079-7398 and 5 C 4423-9610 (non- BMT)  [phone]    Social Work and Care Management Department       SEARCHABLE in [facility] - search CARE COORDINATOR       [place] ([phone]) Saturday & Sunday; ([phone]) [facility] Recognized Holidays     Units: 5A Onc 5201 - 5219 RNCC,  5A Onc 5220 thru 5240 RNCC, 5C OFFSERVICE [phone] RNCC & 5C OFF SERVICE [phone] RNCC       Units: 6B Vocera, 6C Card 6401 thru 6420 RNCC, 6C Card 6502 thru 6514 RNCC & 6C Card 6515 thru 6519 RNCC        Units: 7A SOT RNCC Vocera, 7B Med Surg Vocera, 7C Med Surg 7401 thru 7418 RNCC & 7C Med Surg 7502 thru 7521 RNCC       Units: 6A Vocera  & 4A CVICU Vocera, 4C MICU Vocera, and 4E SICU Vocera         Units: 5 Ortho Vocera & 5 Med Surg Vocera        Units: 6 Med Surg Vocera & 8 Med Surg Vocera

## 2024-09-18 NOTE — PROGRESS NOTES
PALLIATIVE CARE PROGRESS NOTE  [facility]     Patient Name: Marizol Granados  Date of Admission: 9/14/2024        Recommendations & Counseling       GOALS OF CARE:   Restorative without limits however we did not discuss this today.  Patient is still adjusting to her new diagnosis and waiting to be able to meet with the oncologist once she discharges from the hospital at which point she will be able to hear her treatment options. She has oncology appointments scheduled for next week.     ADVANCE CARE PLANNING:  No health care directive on file. Per system policy, Surrogate Decision-makers for Patients With Diminished Decision-making Capacity offers guidance on possible decision-makers.  MotherAtif has been identified as a surrogate decision maker.   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  Patient has been experiencing intractable rectal pain since surgery which should improve as fistula closes and once stool burden decreases.  She has minimal abdominal pain or other musculoskeletal complaints.  Until today, she did feel that that pain was limiting her ability to move.  She does feel that pain is better managed today and she is going to try and walk the halls.  She feels that taking hydromorphone every 4 hours while she is awake is significantly helping to reduce pain.  He does not want to make any medication adjustments today as many were made yesterday.  Continue medications as indicated below.  -Continue Tylenol 975 mg TID scheduled  -Continue Bentyl 10 mg TID scheduled -this was started 9/16  -Continue gabapentin 300 mg TID scheduled -started during patient's first hospitalization on 9/1/2024.  In review of records it looks like patient may have been on gabapentin back in 2014 and 2015  "for generalized body pains  -Continue hydromorphone 4-6 mg q4hrs PRN -when patient ready for discharge, she will need a 2-week supply until she is able to meet with palliative care as an outpatient  -Topical lidocaine to rectal area as needed    # Long history of anxiety and depression, on multiple psychoactive medications for years which patient feels controls both her anxiety and depression.  We were able to decrease some of her medications while still allowing her to sleep and feel that her anxiety if controlled so would therefore discharge on decreased doses as indicated below  -Trazodone 100 mg nightly  -lorazepam 1 mg at 8 AM and 0.5 mg at 1600 (this is decreased from 1mg BID)  -Continue Ramelteon, Seroquel, Desvenlafaxine, Buspar, Ambien and Topamax at PTA dosing for now              > Could consider stopping Ramelteon in the future as this was added in the hospital     Outpatient palliative care clinic appointment to be scheduled after discharge. Palliative care phone numbers given to patient.     PSYCHOSOCIAL/SPIRITUAL:  Family has 2 kids ages 10 and 11.  Mother is very supportive.  Father is also currently admitted to the hospital for surgery and has active cancer.  And came from [place] to help care for her children and will stay with patient once she is discharged from the hospital.  Patient did not feel she needed support from palliative care  as she does not use talk therapy as a means for coping.  Palliative care will continue to support patient as needed    Palliative Care will continue to follow. Thank you for the consult and allowing us to aid in the care of Marizol Granados.        Cassia Rivero MD  Securely message with [employer] (more info)  Text page via [employer] Paging/Directory   If you are not sure who specifically to contact -- please text the \"Palliative Care [facility]\" voice group in [employer].        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       Today, the patient was seen for:  Pelvic adenocarcinoma s/p complex abdominal surgery for debulking requiring diverting ileostomy c/b rectovaginal fistula and abscess s/p drain placement  Refractory rectal pain due to above  Significant stool burden   Polypharmacy d/t anxiety and depression            Interval History:     Multidisciplinary collaboration:  All chart notes reviewed  No acute events overnight    Patient/family narrative  Patient feeling good this morning. Looking forward to going home. States pain well controlled with current hydromorphone regimen of 4-6mg PO q4hrs PRN. She plans to taper off hydromorphone once she is home as she believes she will have less pain being home and in her own bed and wants to get off pain medications in the same way that she did after surgery several weeks ago.        Review of Systems:     Besides above, ROS was reviewed and is unremarkable           Medications:     I have reviewed this patient's medication profile and medications during this hospitalization.    Tylenol 975mg TID  Bentyl 10mg TID scheduled started 9/16  Gabapentin 300mg TID - started 9/1/2024  Lidocaine patch started 9/15  Dilaudid 4-6mg q4hrs PRN - 36mg x 24hrs   Atarax 25-50mg q6hrs PRN -       Lorazepam 1mg qAM and 0.5mg 4pm - decreased from 1mg BID on 9/16  Buspar 22.5mg BID  Desvenlafaxine 150mg every day  Quetiapine 150mg at bedtime  Ramelteon 8mg at bedtime  Topiramate 50mg BID  Trazodone 100mg at bedtime (decreased from 150mg at bedtime on 9/16)   Ambien 5mg at bedtime               Physical Exam:   Temp:  [97.9  F (36.6  C)-99.1  F (37.3  C)] 98.5  F (36.9  C)  Pulse:  [64-82] 82  Resp:  [16-18] 18  BP: ()/(62-68) 95/66  SpO2:   [97 %-100 %] 100 %  0 lbs 0 oz    Gen: Alert, lying in bed, getting ready to get dressed so she can discharge from hospital, in NAD, appears comfortable, engaged, sensorium intact                 Current Problem List:   Active Problems:    Leak of anastomosis between gastrointestinal structures    Rectovaginal fistula      Allergies   Allergen Reactions    Bactrim [Sulfamethoxazole W-Trimethoprim] Anaphylaxis    Mesalamine Anaphylaxis and Hives    Other Environmental Allergy Hives, Other (See Comments) and Shortness Of Breath     [place]/Pollen  oak    Sulfa Antibiotics Anaphylaxis    Asacol [Mesalamine] Hives    Vancomycin Itching    Amoxicillin Other (See Comments)     Hypersensitivity allergic reaction  Other reaction(s): Unknown/Not Verified  Gets UTI  Urinary sx's.        Molds & Smuts Other (See Comments)     Other reaction(s): Runny Nose, Unknown/Not Verified    Pollen            Data Reviewed:     Reviewed recent labs and pertinent imaging  ROUTINE ICU LABS (Last four results)  CMP  Recent Labs   Lab 09/18/24 0527 09/17/24 0507 09/16/24  0537 09/15/24  0608 09/13/24  0926 09/12/24  0223    140 139 139   < > 136   POTASSIUM 3.5 3.8 3.8 3.7   < > 3.7   CHLORIDE 109* 109* 110* 110*   < > 105   CO2 19* 18* 20* 21*   < > 19*   ANIONGAP 11 13 9 8   < > 12   GLC 97 91 93 110*   < > 94   BUN 8.2 8.1 6.0 4.0*   < > 13.8   CR 0.69 0.68 0.67 0.66   < > 0.82   GFRESTIMATED >90 >90 >90 >90   < > >90   LORY 9.2 8.9 8.7* 8.6*   < > 9.2   PROTTOTAL  --   --   --   --   --  7.2   ALBUMIN  --   --   --   --   --  4.1   BILITOTAL  --   --   --   --   --  0.2   ALKPHOS  --   --   --   --   --  59   AST  --   --   --   --   --  16   ALT  --   --   --   --   --  17    < > = values in this interval not displayed.     CBC  Recent Labs   Lab 09/18/24 0527 09/17/24  0507 09/16/24  0537 09/15/24  0608   WBC 4.8 5.3 5.5 6.8   RBC 3.39* 3.36* 3.19* 2.99*   HGB 9.9* 9.7* 9.3* 8.8*   HCT 30.4* 30.7* 29.3* 27.6*   MCV  90 91 92 92   MCH 29.2 28.9 29.2 29.4   MCHC 32.6 31.6 31.7 31.9   RDW 14.6 14.6 14.8 15.0    436 426 389     INR  Recent Labs   Lab 09/12/24  0223   INR 1.23*     Arterial Blood GasNo lab results found in last 7 days.             Thank you for the opportunity to continue to participate in the care of this patient and family.  Please feel free to contact on-call palliative provider with any emergent needs.  We can be reached via Securely message with the Vocera Web Console (learn more here)    Cassia Rivero MD / Palliative Medicine     Medical Decision Making

## 2024-09-18 NOTE — PROGRESS NOTES
RN spoke with [facility] and patient is too high of level of care (to high of tier) so they will be reviewing and declining this referral     Patients original home care reached out and they were able to get staffing of this Friday at 9 am     Patient updated and felt good about the plan     Patient appreciative of RN assistance and that home care could get her in     Randi Hernandez RN

## 2024-09-18 NOTE — PROGRESS NOTES
Prior Authorization Approval    Medication: HYDROMORPHONE HCL 4 MG PO TABS  PA Initiated: 9/18/2024  PA Type: Quantity Limit  Qty Approved: 270 tabs per 30 days    Insurance: [facility] - Phone [phone] Fax [phone]  [facility] Key / Reference #: [id number] / [id number]   Authorization Effective Dates: 6/20/2024 - 3/18/2025    Expected CoPay: $ 0.00  CoPay Card Eligible: No    Filling Pharmacy: [facility] - [place], [place] - [address] [place] [address]      Hansa Dowd  [facility] CAREN Saucedo  Ph: [phone]  Fax: [phone]  Available on Teams and Vocera

## 2024-09-18 NOTE — PROGRESS NOTES
Care Management Discharge Note    Discharge Date: 09/18/2024       Discharge Disposition: Home, Home Care    Discharge Services: None    Discharge DME: Other (see comment) (ostomy supplies)    Discharge Transportation: family or friend will provide    Private pay costs discussed: Not applicable    Does the patient's insurance plan have a 3 day qualifying hospital stay waiver?  No    PAS Confirmation Code: NA  Patient/family educated on Medicare website which has current facility and service quality ratings: other (see comments) (Foster resources: child assistance, [employer] for free prepared meals, SSDI instruction to apply)    Education Provided on the Discharge Plan: Yes  Persons Notified of Discharge Plans: PCP  Patient/Family in Agreement with the Plan: yes    Handoff Referral Completed: Yes, [facility] PCP: Internal handoff referral completed    Additional Information:    Per patient care rounds plan is for Marizol to discharge home today.    Notified [facility] of plan for discharge and sent discharge orders via Home Care tab in [employer].      Discharge resources:   [facility]  Phone: [phone]   Fax: [phone]    No further needs.    Emelina Keys RN, BA     Registered Nurse Care Coordinator , 5 A beds 9419-6904 and 5 C 4156-7495 (non- BMT)  [phone]    Social Work and Care Management Department       SEARCHABLE in [facility] - search CARE COORDINATOR       [place] ([phone]) Saturday & Sunday; ([phone]) [facility] Recognized Holidays     Units: 5A Onc 5201 - 5219 RNCC,  5A Onc 5220 thru 5240 RNCC, 5C OFFSERVICE 8086-6813 RNCC & 5C OFF SERVICE 3810-0627 RNCC       Units: 6B Vocera, 6C Card 6401 thru 6420 RNCC, 6C Card 6502 thru 6514 RNCC & 6C Card 6515 thru 6519 RNCC        Units: 7A SOT RNCC Vocera, 7B Med Surg Vocera, 7C Med Surg 7401 thru 7418 RNCC & 7C Med Surg 7502 thru 7521 RNCC       Units: 6A Vocera & 4A CVICU Vocera, 4C MICU Vocera, and 4E SICU Vocera          Units: 5 Ortho Vocera & 5 Med Surg Vocera        Units: 6 Med Surg Vocera & 8 Med Surg Vocera

## 2024-09-18 NOTE — PLAN OF CARE
Goal Outcome Evaluation:    Nursing Focus: Discharge    D: Patient discharged to home at 1230. Patient was accompanied by Aunt.    I: Discharge prescriptions sent to discharge pharmacy to be filled. All discharge medications and instructions reviewed with Patient. Patient instructed to call clinic triage nurse if she experiences a fever >100.4, uncontrolled nausea, vomiting, diarrhea, or pain; or experiences any signs or symptoms of bleeding. Other phone numbers to call with questions or concerns after discharge reviewed. PIV removed. Education completed.    A: She verbalized understanding of discharge medications and instructions. Patient will  medications at discharge pharmacy. .     P: Patient to follow-up in clinic on friday with IR.

## 2024-09-18 NOTE — PROGRESS NOTES
Patient reached out tearful as she was contacted by home care and they stated the first available was Oct 1st. Patient stated that she was told that they would be out within 48 hours of her discharge and she doesn't know what to do     RN reached out to home care who states they got booked out and are low staff. They were trying to see if a staff member from a different area could accommodate something on 9/24. RN expressed concern that was too far out. Home Care asked what would be ideal and RN stated tomorrow     Home care checked with team and stated unless something falls through they will not be able to see the patient until 9/24     RN will place urgent order for elsewhere     Patient updated     Randi Hernandez RN

## 2024-09-19 ENCOUNTER — PATIENT OUTREACH (OUTPATIENT)
Dept: ONCOLOGY | Facility: CLINIC | Age: 39
End: 2024-09-19
Payer: COMMERCIAL

## 2024-09-19 ENCOUNTER — PATIENT OUTREACH (OUTPATIENT)
Dept: CARE COORDINATION | Facility: CLINIC | Age: 39
End: 2024-09-19
Payer: COMMERCIAL

## 2024-09-19 NOTE — TELEPHONE ENCOUNTER
This paperwork is not in my basket to sign.  Please check in with home care to see where the paper work is currently at.  Thank you.

## 2024-09-19 NOTE — PROGRESS NOTES
RN reached out to check in with patient as home care will not be out until tomorrow     Patient states all is good and she has what she needs     Patient appreciative of the call and will reach out if anything changes     Randi Hernandez RN

## 2024-09-19 NOTE — TELEPHONE ENCOUNTER
"Nurse Triage SBAR    Is this a 2nd Level Triage? YES, LICENSED PRACTITIONER REVIEW IS REQUIRED    Situation: Drain \"like a granade\" is draining blood.    Background: AQUILINO drain was placed on 9-13-24 by Randi Thompson in IR s/p gastric bypass.  Patient discharged today 9-18-24.     Assessment: Home today and tubing is full of dark red blood, and patient said this hadn't happened while she was in hospital.  The bulb is still compressed.  Writer tried to explain how it works, and that drainage is to be expected.  Patient wants to know if blood is normal, since it hasn't had any bloody drainage in the hospital, and \"nobody told me\" to expect bloody drainage.  Writer attempted to find information in the chart, but patient anxious so will page provider.    Protocol Recommended Disposition:   Call PCP Now - Call surgeon Now    Recommendation: Patient would like reassurance  and/or recommendation for new blood in AQUILINO drain tubing.    Paged to provider    Does the patient meet one of the following criteria for ADS visit consideration? 16+ years old, with an [facility] PCP     TIP  Providers, please consider if this condition is appropriate for management at one of our Acute and Diagnostic Services sites.     If patient is a good candidate, please use dotphrase <dot>triageresponse and select Refer to ADS to document.        Provider consult indicated.     Reason for page: Call to provider regarding AQUILINO drain.    Page sent to Dr. Laisha Boyle by Answering Service at 7:45p. To call patient back directly at [phone]    Courtney Mar RN         Reason for Disposition   [1] Caller has URGENT question AND [2] triager unable to answer question    Additional Information   Negative: Sounds like a life-threatening emergency to the triager   Negative: Chest pain   Negative: Difficulty breathing   Negative: [1] Widespread rash AND [2] bright red, sunburn-like   Negative: [1] SEVERE headache AND [2] after spinal (epidural) anesthesia   " Negative: [1] Vomiting AND [2] persists > 4 hours   Negative: [1] Vomiting AND [2] abdomen looks much more swollen than usual   Negative: [1] Drinking very little AND [2] dehydration suspected (e.g., no urine > 12 hours, very dry mouth, very lightheaded)   Negative: Patient sounds very sick or weak to the triager   Negative: Sounds like a serious complication to the triager   Negative: Fever > 100.4 F (38.0 C)   Negative: [1] SEVERE post-op pain (e.g., excruciating, pain scale 8-10) AND [2] not controlled with pain medications    Protocols used: Post-Op Symptoms and Mahndosnt-D-PY

## 2024-09-19 NOTE — PROGRESS NOTES
Clinic Care Coordination Contact  Transitions of Care Outreach  No chief complaint on file.      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  How are your symptoms? (Red Flag symptoms escalate to triage hotline per guidelines): Improved  Do you know how to contact your clinic care team if you have future questions or changes to your health status? : Yes  Does the patient have their discharge instructions? : Yes  Does the patient have questions regarding their discharge instructions? : No  Were you started on any new medications or were there changes to any of your previous medications? : Yes  Does the patient have all of their medications?: Yes  Do you have questions regarding any of your medications? : No  Do you have all of your needed medical supplies or equipment (DME)?  (i.e. oxygen tank, CPAP, cane, etc.): Yes         Post-op (Clinicians Only)  Did the patient have surgery or a procedure: No  Fever: No  Chills: No  Eating & Drinking: eating and drinking without complaints/concerns  PO Intake: regular diet  Additional Symptoms: decreased appetite  Bowel Function:  (ileostomy)  Urinary Status: voiding without complaint/concerns    Care Management   None    Follow up Plan     Patient declined Care Coordination services at this time.   Patient is aware of how to initiate Care Coordination services with the clinic in the future.     Patient declined to review medication list.     Discharge Follow-Up  Discharge follow up appointment  scheduled in alignment with recommended follow up timeframe or Transitions of Risk Category? (Low = within 30 days; Moderate= within 14 days; High= within 7 days): Yes  Discharge Follow Up Appointment Date: 09/20/24  Discharge Follow Up Appointment Scheduled with?: Specialty Care Provider    Future Appointments   Date Time Provider Department Center   9/20/2024 11:00 AM [facility] IMAGING NURSE [facility]   9/20/2024  1:00 PM [facility]   9/24/2024  2:30 PM Renetta Bhatia APRN CNP [facility]   9/25/2024 11:30 AM Georgie Hernández APRN CNP [facility]   9/27/2024  3:30 PM Jessenia Sosa MD [facility]   8/22/2025  9:00 AM Joellen Varela MD [facility]       Outpatient Plan as outlined on AVS reviewed with patient.    For any urgent concerns, please contact our 24 hour nurse triage line: [phone] ([phone])       Lyn Connolly RN

## 2024-09-19 NOTE — TELEPHONE ENCOUNTER
Received call back from Itzel with [facility]. Itzel had been asking for verbal approval for skilled nursing. Per Itzel, she had received verbal approval for skilled nursing from an RN named Fidelia on 9/13/24 at 3:47pm to call back number was listed as [phone]. Patient was admitted to hospital 9/14/24. Per Itzel, they will be calling to request new orders on patient tomorrow when patient's resumption of home care is scheduled.     Scott FIERRO RN 9/19/2024 at 11:19 AM

## 2024-09-20 ENCOUNTER — HOSPITAL ENCOUNTER (OUTPATIENT)
Dept: GENERAL RADIOLOGY | Facility: CLINIC | Age: 39
Discharge: HOME | End: 2024-09-20
Attending: RADIOLOGY
Payer: COMMERCIAL

## 2024-09-20 ENCOUNTER — ALLIED HEALTH/NURSE VISIT (OUTPATIENT)
Dept: ONCOLOGY | Facility: CLINIC | Age: 39
End: 2024-09-20

## 2024-09-20 ENCOUNTER — PATIENT OUTREACH (OUTPATIENT)
Dept: ONCOLOGY | Facility: CLINIC | Age: 39
End: 2024-09-20
Payer: COMMERCIAL

## 2024-09-20 ENCOUNTER — ONCOLOGY VISIT (OUTPATIENT)
Dept: ONCOLOGY | Facility: CLINIC | Age: 39
End: 2024-09-20
Attending: OBSTETRICS & GYNECOLOGY
Payer: COMMERCIAL

## 2024-09-20 ENCOUNTER — OFFICE VISIT (OUTPATIENT)
Dept: UROLOGY | Facility: CLINIC | Age: 39
End: 2024-09-20
Payer: COMMERCIAL

## 2024-09-20 ENCOUNTER — HOSPITAL ENCOUNTER (OUTPATIENT)
Dept: CT IMAGING | Facility: CLINIC | Age: 39
Discharge: HOME | End: 2024-09-20
Attending: RADIOLOGY
Payer: COMMERCIAL

## 2024-09-20 ENCOUNTER — TELEPHONE (OUTPATIENT)
Dept: FAMILY MEDICINE | Facility: CLINIC | Age: 39
End: 2024-09-20

## 2024-09-20 VITALS
SYSTOLIC BLOOD PRESSURE: 103 MMHG | WEIGHT: 155.4 LBS | TEMPERATURE: 98.1 F | BODY MASS INDEX: 25.86 KG/M2 | RESPIRATION RATE: 16 BRPM | HEART RATE: 87 BPM | OXYGEN SATURATION: 100 % | DIASTOLIC BLOOD PRESSURE: 72 MMHG

## 2024-09-20 VITALS
HEART RATE: 87 BPM | RESPIRATION RATE: 16 BRPM | OXYGEN SATURATION: 100 % | DIASTOLIC BLOOD PRESSURE: 72 MMHG | SYSTOLIC BLOOD PRESSURE: 103 MMHG

## 2024-09-20 DIAGNOSIS — R33.9 URINARY RETENTION: Primary | ICD-10-CM

## 2024-09-20 DIAGNOSIS — C48.1 PRIMARY MALIGNANT NEOPLASM OF PELVIC PERITONEUM (H): Primary | ICD-10-CM

## 2024-09-20 DIAGNOSIS — C56.9 PRIMARY LOW GRADE SEROUS ADENOCARCINOMA OF OVARY (H): ICD-10-CM

## 2024-09-20 DIAGNOSIS — N82.3 RECTOVAGINAL FISTULA: ICD-10-CM

## 2024-09-20 DIAGNOSIS — Z00.6 PATIENT IN CANCER RELATED RESEARCH STUDY: Primary | ICD-10-CM

## 2024-09-20 DIAGNOSIS — N73.9 PELVIC ABSCESS IN FEMALE: ICD-10-CM

## 2024-09-20 DIAGNOSIS — K91.89 LEAK OF ANASTOMOSIS BETWEEN GASTROINTESTINAL STRUCTURES: Primary | ICD-10-CM

## 2024-09-20 DIAGNOSIS — Z98.890 H/O PELVIC SURGERY: ICD-10-CM

## 2024-09-20 LAB
T4 FREE SERPL-MCNC: 1.06 NG/DL (ref 0.9–1.7)
TSH SERPL DL<=0.005 MIU/L-ACNC: 5.98 UIU/ML (ref 0.3–4.2)

## 2024-09-20 PROCEDURE — 99204 OFFICE O/P NEW MOD 45 MIN: CPT | Performed by: UROLOGY

## 2024-09-20 PROCEDURE — 72194 CT PELVIS W/O & W/DYE: CPT

## 2024-09-20 PROCEDURE — 250N000009 HC RX 250: Performed by: RADIOLOGY

## 2024-09-20 PROCEDURE — 99214 OFFICE O/P EST MOD 30 MIN: CPT | Mod: 24 | Performed by: OBSTETRICS & GYNECOLOGY

## 2024-09-20 PROCEDURE — G0463 HOSPITAL OUTPT CLINIC VISIT: HCPCS | Performed by: OBSTETRICS & GYNECOLOGY

## 2024-09-20 RX ADMIN — SODIUM CHLORIDE 10 ML: 9 INJECTION, SOLUTION INTRAVENOUS at 12:08

## 2024-09-20 ASSESSMENT — ASTHMA QUESTIONNAIRES
QUESTION_1 LAST FOUR WEEKS HOW MUCH OF THE TIME DID YOUR ASTHMA KEEP YOU FROM GETTING AS MUCH DONE AT WORK, SCHOOL OR AT HOME: A LITTLE OF THE TIME
QUESTION_3 LAST FOUR WEEKS HOW OFTEN DID YOUR ASTHMA SYMPTOMS (WHEEZING, COUGHING, SHORTNESS OF BREATH, CHEST TIGHTNESS OR PAIN) WAKE YOU UP AT NIGHT OR EARLIER THAN USUAL IN THE MORNING: NOT AT ALL
QUESTION_4 LAST FOUR WEEKS HOW OFTEN HAVE YOU USED YOUR RESCUE INHALER OR NEBULIZER MEDICATION (SUCH AS ALBUTEROL): NOT AT ALL
QUESTION_2 LAST FOUR WEEKS HOW OFTEN HAVE YOU HAD SHORTNESS OF BREATH: ONCE OR TWICE A WEEK
ACT_TOTALSCORE: 23
ACT_TOTALSCORE: 23
QUESTION_5 LAST FOUR WEEKS HOW WOULD YOU RATE YOUR ASTHMA CONTROL: COMPLETELY CONTROLLED

## 2024-09-20 ASSESSMENT — PAIN SCALES - GENERAL
PAINLEVEL: MODERATE PAIN (4)
PAINLEVEL: NO PAIN (0)
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
## 2024-09-20 NOTE — TELEPHONE ENCOUNTER
Forms/Letter Request    Type of form/letter: OTHER: Summary of missed visits      Do we have the form/letter: Yes:     Who is the form from? [facility]    Where did/will the form come from? form was faxed in    How would you like the form/letter returned: Fax : [phone]    Natasha Jimenez Patient Rep.

## 2024-09-20 NOTE — PROGRESS NOTES
CLINICAL TRIAL VISIT NOTE      Date of Visit: 9/20/2024     Study:      [facility]#: [id number]  CTSI#: [id number]   IDS#: [id number]  Subject Name: Marizol Granados    Visit: Consent/Screening     Did the study visit occur within the appropriate window allowed by the protocol? Yes     Clinical Trial Treatment History:  9/20/2024 ICF signed      Concomitant Medications and Medical History:  I have personally reviewed concomitant medications and Medical History and these have been recorded on the corresponding logs in Marizol's research file.     RECIST - Response evaluation criteria in solid tumors:  The patient will need a Chest/Abdomen/Pelvis CT scan scheduled next week in order to register patient to the study.    Informed Consent:    Date: 9/20/2024    The 2 consent forms (main consent and HIPAA consent) including purpose, risks and benefits, was reviewed with Marizol Granados, and all questions were answered before signing the consent form/s. The patient understands that the study involves an active treatment phase as well as a post-treatment follow up phase.    Present during the discussion were Dr. Jessenia Sosa and myself. A copy of the 2 signed form/s were provided to the patient. No procedures specific to this study were performed prior to the patient signing the consent form/s.    Main Consent Version Date: 05/23/23  Consent obtained by: Dr. Jessenia Sosa   Consent Date: 9/20/2024  HIPAA authorization signed?: Yes  HIPAA authorization version date: 05.13.21    RESEARCH COORDINATOR:    Was the Research Coordinator added to the patient's care team? Yes    Was the Research Coordinator's contact information entered into Epic? Yes      Overall Treatment Plan for Clinical Trial:   The study plan is a randomized, Phase III clinical trial, comparing two treatment  arms:    Arm 1: Intravenous 3-hour paclitaxel ( mg/m2) followed by intravenous carboplatin (AUC 5 or 6) on day 1 every 21 days x 6 cycles (one cycle = 21 days)  followed by Letrozole 2.5 mg orally once daily in maintenance until disease progression or unacceptable toxicity  Follow institutional policies/guidance for IV preparation and supportive care.    Arm 2: Letrozole 2.5 mg orally once daily x 6 cycles (one cycle = 21 days) followed by letrozole maintenance at the same dose until disease progression or unacceptable toxicity.     Research Impression:   Patient came in for a post-operative visit with Dr. Sosa. The patient has had a few complications post-operatively such as infection, fistula, and need for a catheter + ileostomy. See Dr. Sosa's note for clinical plan.     After talking with Dr. Sosa and myself, patient signed consent to participate in the  clinical trial. The patient is hoping to have labs and CT scan scheduled for next Tuesday at [facility], since she is already seeing another practitioner there on that date. Once these activities are complete, the study team will review eligibility and then plan to register and randomize the patient.      Marizol Granados was given the opportunity to ask any trial related questions. The patient knows to call with any new or worsening symptoms or concerns.      Please see provider progress note for full physical exam and other clinical information.      Sakina Mason RN, PHN, BSN  Clinical Research Coordinator   Department of Obstetrics, Gynecology and Women's Health  [facility]    [email or web address]  [phone]

## 2024-09-20 NOTE — PROGRESS NOTES
Patient here for CT sinogram.  Per Dr. Ibrahim drain must stay in as there is a fistula to the rectum and the vagina.  Dressing changed.  Site is intact.  Patient is following up with surgeon today.
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
## 2024-09-20 NOTE — NURSING NOTE
Chief Complaint   Patient presents with    CIC teaching       Blood pressure 103/72, pulse 87, resp. rate 16, SpO2 100%. There is no height or weight on file to calculate BMI.    Patient Active Problem List   Diagnosis    Therapeutic drug monitoring    Social phobia    S/P gastric bypass    Restless legs    Recurrent major depressive disorder (H24)    Other B-complex deficiencies    Moderate persistent asthma    Irritable bowel syndrome with both constipation and diarrhea    Intractable migraine without aura and with status migrainosus    Insomnia    Hypothyroidism    Hypercholesterolemia    History of thromboembolism    Generalized anxiety disorder    Frequent UTI    Chronic fatigue    Allergic rhinitis    Genital herpes simplex    Postoperative intestinal malabsorption    GERD (gastroesophageal reflux disease)    Low back pain    Morbid obesity (H)    Ptosis of both breasts    Adnexal mass    Leak of anastomosis between gastrointestinal structures    Rectovaginal fistula    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        Molds & Smuts Other (See Comments)     Other reaction(s): Runny Nose, Unknown/Not Verified    Pollen       Current Outpatient Medications   Medication Sig Dispense Refill    acetaminophen (TYLENOL) 325 MG tablet Take 2 tablets (650 mg) by mouth every 6 hours.      apixaban ANTICOAGULANT (ELIQUIS) 5 MG tablet Take 1 tablet (5 mg) by mouth 2 times daily 60 tablet 11    busPIRone HCl (BUSPAR) 30 MG tablet Take 30 mg by  mouth 2 times daily.      calcium carbonate-vitamin D (OS-LORY WITH D) 500-200 MG-UNIT tablet Take 2 tablets by mouth daily      cholecalciferol 25 MCG (1000 UT) TABS Take 2,000 Units by mouth daily      ciprofloxacin (CIPRO) 500 MG tablet Take 1 tablet (500 mg) by mouth every 12 hours for 9 days. 18 tablet 0    desvenlafaxine (PRISTIQ) 100 MG 24 hr tablet Take 100 mg by mouth daily      desvenlafaxine (PRISTIQ) 50 MG 24 hr tablet TAKE 1 TABLET BY MOUTH ONCE DAILY. TAKE WITH 100MG TABLET FOR TOTAL OF 150MG DAILY      dicyclomine (BENTYL) 10 MG capsule Take 1 capsule (10 mg) by mouth 3 times daily as needed (cramping). 90 capsule 0    gabapentin (NEURONTIN) 300 MG capsule Take 1 capsule (300 mg) by mouth 3 times daily for 14 days. 42 capsule 0    HYDROmorphone (DILAUDID) 4 MG tablet Take 1-1.5 tablets (4-6 mg) by mouth every 4 hours as needed for moderate to severe pain (4 mg for moderate pain and 6 mg for severe pain). 40 tablet 0    LORazepam (ATIVAN) 1 MG tablet Take 0.5-1 tablets (0.5-1 mg) by mouth 2 times daily. Take 1 mg in the morning and 0.5 mg in the evening      metroNIDAZOLE (FLAGYL) 500 MG tablet Take 1 tablet (500 mg) by mouth 2 times daily for 9 days. 18 tablet 0    miconazole (MICATIN) 2 % external powder Apply topically 2 times daily.      Multiple Vitamins-Minerals (MULTIVITAL PO) Take 1 tablet by mouth daily      naloxone (NARCAN) 4 MG/0.1ML nasal spray Spray 1 spray (4 mg) into one nostril alternating nostrils as needed for opioid reversal. every 2-3 minutes until assistance arrives 2 each 0    naloxone (NARCAN) 4 MG/0.1ML nasal spray Spray 1 spray (4 mg) into one nostril alternating nostrils as needed for opioid reversal. every 2-3 minutes until assistance arrives 2 each 0    omeprazole (PRILOSEC) 40 MG DR capsule TAKE 1 CAPSULE(40 MG) BY MOUTH DAILY 90 capsule 3    Ostomy Supplies MISC 1 each every other day. 20 each 11    PROBIOTIC PRODUCT PO Take 1 tablet by mouth At Bedtime      propranolol  (INDERAL) 10 MG tablet Take 1 tablet (10 mg) by mouth 2 times daily. Hold for HR <60 and SBP <90      QUEtiapine (SEROQUEL) 200 MG tablet Take 200 mg by mouth at bedtime.      ramelteon (ROZEREM) 8 MG tablet Take 8 mg by mouth At Bedtime      simethicone (MYLICON) 80 MG chewable tablet Take 1 tablet (80 mg) by mouth every 6 hours as needed for flatulence or cramping. 120 tablet 2    sodium chloride, PF, 0.9% PF flush Inject 10 mLs into catheter every 8 hours. Flush drain 2-3 times per day 500 mL 1    topiramate (TOPAMAX) 50 MG tablet TAKE 1 TABLET(50 MG) BY MOUTH TWICE DAILY 180 tablet 3    traZODone (DESYREL) 100 MG tablet Take 1 tablet (100 mg) by mouth at bedtime. 30 tablet 0    zolpidem (AMBIEN) 5 MG tablet Take 5 mg by mouth at bedtime.         Social History     Tobacco Use    Smoking status: Never    Smokeless tobacco: Never   Vaping Use    Vaping status: Never Used   Substance Use Topics    Alcohol use: Not Currently     Alcohol/week: 0.0 - 1.0 standard drinks of alcohol     Comment: Alcoholic Drinks/day: maybe one a month    Drug use: Never       Claudia Suero  9/20/2024  3:02 PM

## 2024-09-20 NOTE — PATIENT INSTRUCTIONS
Catheterize every 4 hours during the day    Monitor your volumes    Return one month for follow up    It was a pleasure meeting with you today.  Thank you for allowing me and my team the privilege of caring for you today.  YOU are the reason we are here, and I truly hope we provided you with the excellent service you deserve.  Please let us know if there is anything else we can do for you so that we can be sure you are leaving completely satisfied with your care experience.

## 2024-09-20 NOTE — NURSING NOTE
"Oncology Rooming Note    September 20, 2024 1:17 PM   Marizol Granados is a 39 year old female who presents for:    Chief Complaint   Patient presents with    Oncology Clinic Visit     Rtn CCSL      Initial Vitals: /72 (BP Location: Right arm, Patient Position: Right side, Cuff Size: Adult Regular)   Pulse 87   Temp 98.1  F (36.7  C) (Oral)   Resp 16   Wt 70.5 kg (155 lb 6.4 oz)   LMP  (LMP Unknown)   SpO2 100%   BMI 25.86 kg/m   Estimated body mass index is 25.86 kg/m  as calculated from the following:    Height as of 9/14/24: 1.651 m (5' 5\").    Weight as of this encounter: 70.5 kg (155 lb 6.4 oz). Body surface area is 1.8 meters squared.  Moderate Pain (4) Comment: Data Unavailable   No LMP recorded (lmp unknown). Patient has had a hysterectomy.  Allergies reviewed: Yes  Medications reviewed: Yes    Medications: Medication refills not needed today.  Pharmacy name entered into [employer]: [id number] [id number] AT [place] [address] & [facility]    Frailty Screening:   Is the patient here for a new oncology consult visit in cancer care? 2. No      Clinical concerns: none      [place]             "

## 2024-09-20 NOTE — PROGRESS NOTES
Writer received referral to Cancer Risk Management/Genetic Counseling.    Referred for:       Primary malignant neoplasm of pelvic peritoneum       Referred By    Provider Department Location Phone   Jessenia Sosa MD  Gyn Oncology [facility] [phone]     I have a message out to provider to inquire about need for requested urgent appointment.    9/20/24  Dr. Sosa has advised this is a new ovarian cancer (primary peritoneal) diagnosis. Ideally, would like to know this information during primary adjuvant treatment within 4 months.  March 2025 is too far out.    Referral reviewed for appropriate plan, and sent to New Patient Scheduling ([phone]) for completion.    Amina Sin RN, BSN  Oncology New Patient Nurse Navigator   [facility]        Amina Sin RN, BSN  Oncology New Patient Nurse Navigator   [facility]
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
## 2024-09-20 NOTE — PROGRESS NOTES
Dr Ibrahim spoke with patients OBGYN.  It is ok to remove the patients drain.  Appointment set up with patient to remove drain 9/23/24

## 2024-09-20 NOTE — PROGRESS NOTES
Per Julia Moseley MD: patient is to [facility] every 4 hours (6 times daily) due to chronic urinary retention.    Claudia Suero on 9/20/2024 at 3:34 PM

## 2024-09-21 ENCOUNTER — TELEPHONE (OUTPATIENT)
Dept: NURSING | Facility: CLINIC | Age: 39
End: 2024-09-21
Payer: COMMERCIAL

## 2024-09-21 NOTE — TELEPHONE ENCOUNTER
Pt calling needing catheter supplies;    States she saw Dr. Moseley on 9/20/24 and was sent home with instructions to self cath every 4 hours d/t urinary retention.    Pt states she was given cath supplies at urology appt on 9/20 for a male and only enough to last until 9/21 early afternoon.  Pt is crying, not knowing what to do.    Page sent to [facility] Provider, Dr. Paez who advised;    I will call pharmacy at [place] to arrange supplies and if that is not possible I will meet Pt and give her supplies after rounding today (after 11 am)    Pt is notified, verbalized understanding and agrees with this plan.   Pt 'Thanks Dr. Paez very much'    Deneen Croft, RN, Nurse Advisor 4:19 AM 9/21/2024

## 2024-09-23 ENCOUNTER — LAB (OUTPATIENT)
Dept: LAB | Facility: CLINIC | Age: 39
End: 2024-09-23
Attending: RADIOLOGY
Payer: COMMERCIAL

## 2024-09-23 ENCOUNTER — HOSPITAL ENCOUNTER (OUTPATIENT)
Dept: GENERAL RADIOLOGY | Facility: CLINIC | Age: 39
Discharge: HOME | End: 2024-09-23
Attending: RADIOLOGY
Payer: COMMERCIAL

## 2024-09-23 ENCOUNTER — TELEPHONE (OUTPATIENT)
Dept: FAMILY MEDICINE | Facility: CLINIC | Age: 39
End: 2024-09-23

## 2024-09-23 DIAGNOSIS — K91.2 POSTOPERATIVE MALABSORPTION: ICD-10-CM

## 2024-09-23 DIAGNOSIS — K59.00 CONSTIPATION, UNSPECIFIED CONSTIPATION TYPE: ICD-10-CM

## 2024-09-23 DIAGNOSIS — Z93.2 ILEOSTOMY STATUS (H): ICD-10-CM

## 2024-09-23 DIAGNOSIS — C48.1 PRIMARY MALIGNANT NEOPLASM OF PELVIC PERITONEUM (H): ICD-10-CM

## 2024-09-23 DIAGNOSIS — Z48.03 CHANGE OR REMOVAL OF DRAINS: ICD-10-CM

## 2024-09-23 LAB
ALBUMIN SERPL BCG-MCNC: 4.2 G/DL (ref 3.5–5.2)
ALP SERPL-CCNC: 66 U/L (ref 40–150)
ALT SERPL W P-5'-P-CCNC: 31 U/L (ref 0–50)
ANION GAP SERPL CALCULATED.3IONS-SCNC: 14 MMOL/L (ref 7–15)
AST SERPL W P-5'-P-CCNC: 19 U/L (ref 0–45)
BASOPHILS # BLD AUTO: 0 10E3/UL (ref 0–0.2)
BASOPHILS NFR BLD AUTO: 0 %
BILIRUB SERPL-MCNC: <0.2 MG/DL
BUN SERPL-MCNC: 11.4 MG/DL (ref 6–20)
CALCIUM SERPL-MCNC: 9.2 MG/DL (ref 8.8–10.4)
CANCER AG125 SERPL-ACNC: 17 U/ML
CHLORIDE SERPL-SCNC: 106 MMOL/L (ref 98–107)
CREAT SERPL-MCNC: 0.99 MG/DL (ref 0.51–0.95)
EGFRCR SERPLBLD CKD-EPI 2021: 75 ML/MIN/1.73M2
EOSINOPHIL # BLD AUTO: 0.3 10E3/UL (ref 0–0.7)
EOSINOPHIL NFR BLD AUTO: 5 %
ERYTHROCYTE [DISTWIDTH] IN BLOOD BY AUTOMATED COUNT: 15.6 % (ref 10–15)
EST. AVERAGE GLUCOSE BLD GHB EST-MCNC: 114 MG/DL
FOLATE SERPL-MCNC: 8.1 NG/ML (ref 4.6–34.8)
GLUCOSE SERPL-MCNC: 86 MG/DL (ref 70–99)
HBA1C MFR BLD: 5.6 %
HCO3 SERPL-SCNC: 19 MMOL/L (ref 22–29)
HCT VFR BLD AUTO: 34.9 % (ref 35–47)
HGB BLD-MCNC: 10.7 G/DL (ref 11.7–15.7)
IMM GRANULOCYTES # BLD: 0 10E3/UL
IMM GRANULOCYTES NFR BLD: 1 %
LYMPHOCYTES # BLD AUTO: 1.9 10E3/UL (ref 0.8–5.3)
LYMPHOCYTES NFR BLD AUTO: 33 %
MCH RBC QN AUTO: 28.4 PG (ref 26.5–33)
MCHC RBC AUTO-ENTMCNC: 30.7 G/DL (ref 31.5–36.5)
MCV RBC AUTO: 93 FL (ref 78–100)
MONOCYTES # BLD AUTO: 0.5 10E3/UL (ref 0–1.3)
MONOCYTES NFR BLD AUTO: 10 %
NEUTROPHILS # BLD AUTO: 2.9 10E3/UL (ref 1.6–8.3)
NEUTROPHILS NFR BLD AUTO: 52 %
NRBC # BLD AUTO: 0 10E3/UL
NRBC BLD AUTO-RTO: 0 /100
PLATELET # BLD AUTO: 347 10E3/UL (ref 150–450)
POTASSIUM SERPL-SCNC: 3.8 MMOL/L (ref 3.4–5.3)
PROT SERPL-MCNC: 7 G/DL (ref 6.4–8.3)
PTH-INTACT SERPL-MCNC: 34 PG/ML (ref 15–65)
RBC # BLD AUTO: 3.77 10E6/UL (ref 3.8–5.2)
SODIUM SERPL-SCNC: 139 MMOL/L (ref 135–145)
VIT B12 SERPL-MCNC: 563 PG/ML (ref 232–1245)
VIT D+METAB SERPL-MCNC: 43 NG/ML (ref 20–50)
WBC # BLD AUTO: 5.6 10E3/UL (ref 4–11)

## 2024-09-23 PROCEDURE — 84100 ASSAY OF PHOSPHORUS: CPT

## 2024-09-23 PROCEDURE — 84425 ASSAY OF VITAMIN B-1: CPT

## 2024-09-23 PROCEDURE — 82374 ASSAY BLOOD CARBON DIOXIDE: CPT

## 2024-09-23 PROCEDURE — 82746 ASSAY OF FOLIC ACID SERUM: CPT

## 2024-09-23 PROCEDURE — 36415 COLL VENOUS BLD VENIPUNCTURE: CPT

## 2024-09-23 PROCEDURE — 82306 VITAMIN D 25 HYDROXY: CPT

## 2024-09-23 PROCEDURE — 85041 AUTOMATED RBC COUNT: CPT

## 2024-09-23 PROCEDURE — 86304 IMMUNOASSAY TUMOR CA 125: CPT

## 2024-09-23 PROCEDURE — 83970 ASSAY OF PARATHORMONE: CPT

## 2024-09-23 PROCEDURE — 84590 ASSAY OF VITAMIN A: CPT

## 2024-09-23 PROCEDURE — 82607 VITAMIN B-12: CPT

## 2024-09-23 PROCEDURE — 83735 ASSAY OF MAGNESIUM: CPT

## 2024-09-23 PROCEDURE — 83036 HEMOGLOBIN GLYCOSYLATED A1C: CPT

## 2024-09-23 PROCEDURE — 84630 ASSAY OF ZINC: CPT

## 2024-09-23 PROCEDURE — 82040 ASSAY OF SERUM ALBUMIN: CPT

## 2024-09-23 NOTE — TELEPHONE ENCOUNTER
Home Care is calling regarding an established patient with [facility].       Requesting orders from: Georgie Hernández  Provider is following patient: Yes  Is this a 60-day recertification request?  No    Orders Requested    Skilled Nursing  Request for resumption in care - 9/20/24.     Skilled nursing  1x/week x 1 week  2x/week x4 weeks  1x/week x2 weeks    Declined home health aid services      Information was gathered and will be sent to provider for review.  RN will contact Home Care with information after provider review.  Confirmed ok to leave a detailed message with call back.  Contact information confirmed and updated as needed.    Please call Itzel back with provider approval at [phone].    Jerrica Diaz RN
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
## 2024-09-24 ENCOUNTER — ONCOLOGY VISIT (OUTPATIENT)
Dept: ONCOLOGY | Facility: CLINIC | Age: 39
End: 2024-09-24
Attending: NURSE PRACTITIONER
Payer: COMMERCIAL

## 2024-09-24 ENCOUNTER — HOSPITAL ENCOUNTER (OUTPATIENT)
Dept: CT IMAGING | Facility: CLINIC | Age: 39
Discharge: HOME | End: 2024-09-24
Attending: OBSTETRICS & GYNECOLOGY | Admitting: OBSTETRICS & GYNECOLOGY
Payer: COMMERCIAL

## 2024-09-24 VITALS
OXYGEN SATURATION: 100 % | SYSTOLIC BLOOD PRESSURE: 110 MMHG | HEART RATE: 79 BPM | RESPIRATION RATE: 16 BRPM | TEMPERATURE: 97.6 F | DIASTOLIC BLOOD PRESSURE: 73 MMHG

## 2024-09-24 DIAGNOSIS — C56.9 PRIMARY LOW GRADE SEROUS ADENOCARCINOMA OF OVARY (H): Primary | ICD-10-CM

## 2024-09-24 DIAGNOSIS — R33.9 URINARY RETENTION: ICD-10-CM

## 2024-09-24 DIAGNOSIS — N82.3 RECTOVAGINAL FISTULA: ICD-10-CM

## 2024-09-24 DIAGNOSIS — N73.9 PELVIC ABSCESS IN FEMALE: ICD-10-CM

## 2024-09-24 DIAGNOSIS — R79.89 ELEVATED SERUM CREATININE: ICD-10-CM

## 2024-09-24 DIAGNOSIS — Z93.2 ILEOSTOMY STATUS (H): ICD-10-CM

## 2024-09-24 DIAGNOSIS — C48.1 PRIMARY MALIGNANT NEOPLASM OF PELVIC PERITONEUM (H): ICD-10-CM

## 2024-09-24 LAB
MAGNESIUM SERPL-MCNC: 1.8 MG/DL (ref 1.7–2.3)
PHOSPHATE SERPL-MCNC: 3.7 MG/DL (ref 2.5–4.5)

## 2024-09-24 PROCEDURE — 250N000011 HC RX IP 250 OP 636: Performed by: OBSTETRICS & GYNECOLOGY

## 2024-09-24 PROCEDURE — 250N000009 HC RX 250: Performed by: OBSTETRICS & GYNECOLOGY

## 2024-09-24 PROCEDURE — 99215 OFFICE O/P EST HI 40 MIN: CPT | Mod: 24 | Performed by: NURSE PRACTITIONER

## 2024-09-24 PROCEDURE — G0463 HOSPITAL OUTPT CLINIC VISIT: HCPCS | Performed by: NURSE PRACTITIONER

## 2024-09-24 PROCEDURE — 71260 CT THORAX DX C+: CPT

## 2024-09-24 RX ORDER — IOPAMIDOL 755 MG/ML
78 INJECTION, SOLUTION INTRAVASCULAR ONCE
Status: COMPLETED | OUTPATIENT
Start: 2024-09-24 | End: 2024-09-24

## 2024-09-24 RX ADMIN — IOPAMIDOL 78 ML: 755 INJECTION, SOLUTION INTRAVENOUS at 11:44

## 2024-09-24 RX ADMIN — SODIUM CHLORIDE 65 ML: 9 INJECTION, SOLUTION INTRAVENOUS at 11:44
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
## 2024-09-24 NOTE — PROGRESS NOTES
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    Weight as of 9/20/24: 70.5 kg (155 lb 6.4 oz). There is no height or weight on file to calculate BSA.  Data Unavailable Comment: Data Unavailable   No LMP recorded (lmp unknown). Patient has had a hysterectomy.  Allergies reviewed: Yes  Medications reviewed: Yes    Medications: Medication refills not needed today.  Pharmacy name entered into [place]: [id number] [id number] [facility] AT [place] [address] & [facility]    Frailty Screening:   Is the patient here for a new oncology consult visit in cancer care? 2. No      Clinical concerns: no      Aurelia Brown CMA              "

## 2024-09-24 NOTE — PATIENT INSTRUCTIONS
Recheck the kidney on Thursday or Friday at your local [place] lab  Keep trying to drink at least 2-3 liters of fluid  Home care will be out tomorrow to assess- let us know if you need to see Janette Dorado RN should be following up with study information

## 2024-09-25 ENCOUNTER — ANCILLARY PROCEDURE (OUTPATIENT)
Dept: RADIOLOGY | Facility: CLINIC | Age: 39
End: 2024-09-25
Attending: OBSTETRICS & GYNECOLOGY
Payer: COMMERCIAL

## 2024-09-25 ENCOUNTER — NURSE TRIAGE (OUTPATIENT)
Dept: NURSING | Facility: CLINIC | Age: 39
End: 2024-09-25

## 2024-09-25 ENCOUNTER — HOSPITAL ENCOUNTER (EMERGENCY)
Facility: CLINIC | Age: 39
Discharge: HOME | End: 2024-09-25
Attending: EMERGENCY MEDICINE | Admitting: EMERGENCY MEDICINE
Payer: COMMERCIAL

## 2024-09-25 ENCOUNTER — OFFICE VISIT (OUTPATIENT)
Dept: FAMILY MEDICINE | Facility: CLINIC | Age: 39
End: 2024-09-25
Payer: COMMERCIAL

## 2024-09-25 VITALS
HEART RATE: 67 BPM | OXYGEN SATURATION: 100 % | RESPIRATION RATE: 18 BRPM | BODY MASS INDEX: 26.04 KG/M2 | HEIGHT: 65 IN | WEIGHT: 156.31 LBS | SYSTOLIC BLOOD PRESSURE: 95 MMHG | DIASTOLIC BLOOD PRESSURE: 60 MMHG | TEMPERATURE: 97.6 F

## 2024-09-25 VITALS
HEIGHT: 66 IN | HEART RATE: 89 BPM | DIASTOLIC BLOOD PRESSURE: 61 MMHG | SYSTOLIC BLOOD PRESSURE: 92 MMHG | RESPIRATION RATE: 14 BRPM | OXYGEN SATURATION: 98 % | BODY MASS INDEX: 24.86 KG/M2 | WEIGHT: 154.7 LBS | TEMPERATURE: 97.7 F

## 2024-09-25 DIAGNOSIS — C56.9 PRIMARY LOW GRADE SEROUS ADENOCARCINOMA OF OVARY (H): Primary | ICD-10-CM

## 2024-09-25 DIAGNOSIS — G43.011 INTRACTABLE MIGRAINE WITHOUT AURA AND WITH STATUS MIGRAINOSUS: ICD-10-CM

## 2024-09-25 DIAGNOSIS — M54.50 LOW BACK PAIN WITHOUT SCIATICA, UNSPECIFIED BACK PAIN LATERALITY, UNSPECIFIED CHRONICITY: ICD-10-CM

## 2024-09-25 DIAGNOSIS — E78.00 HYPERCHOLESTEROLEMIA: ICD-10-CM

## 2024-09-25 DIAGNOSIS — L30.9 DERMATITIS: ICD-10-CM

## 2024-09-25 DIAGNOSIS — A60.00 GENITAL HERPES SIMPLEX, UNSPECIFIED SITE: ICD-10-CM

## 2024-09-25 PROBLEM — E03.9 HYPOTHYROIDISM: Status: RESOLVED | Noted: 2020-10-21 | Resolved: 2024-09-25

## 2024-09-25 PROBLEM — K58.2 IRRITABLE BOWEL SYNDROME WITH BOTH CONSTIPATION AND DIARRHEA: Status: RESOLVED | Noted: 2020-04-14 | Resolved: 2024-09-25

## 2024-09-25 PROBLEM — G25.81 RESTLESS LEGS: Status: RESOLVED | Noted: 2019-05-28 | Resolved: 2024-09-25

## 2024-09-25 PROBLEM — N94.89 ADNEXAL MASS: Status: RESOLVED | Noted: 2024-08-31 | Resolved: 2024-09-25

## 2024-09-25 LAB
ANNOTATION COMMENT IMP: NORMAL
RETINYL PALMITATE SERPL-MCNC: <0.02 MG/L
VIT A SERPL-MCNC: 0.61 MG/L
ZINC SERPL-MCNC: 83.7 UG/DL

## 2024-09-25 PROCEDURE — G2211 COMPLEX E/M VISIT ADD ON: HCPCS | Performed by: NURSE PRACTITIONER

## 2024-09-25 PROCEDURE — 99214 OFFICE O/P EST MOD 30 MIN: CPT | Performed by: NURSE PRACTITIONER

## 2024-09-25 PROCEDURE — 99282 EMERGENCY DEPT VISIT SF MDM: CPT

## 2024-09-25 RX ORDER — VALACYCLOVIR HYDROCHLORIDE 500 MG/1
500 TABLET, FILM COATED ORAL 2 TIMES DAILY
Qty: 6 TABLET | Refills: 3 | Status: SHIPPED | OUTPATIENT
Start: 2024-09-25

## 2024-09-25 ASSESSMENT — ACTIVITIES OF DAILY LIVING (ADL)
ADLS_ACUITY_SCORE: 38
ADLS_ACUITY_SCORE: 36
ADLS_ACUITY_SCORE: 38

## 2024-09-25 ASSESSMENT — ENCOUNTER SYMPTOMS
RESPIRATORY NEGATIVE: 1
BACK PAIN: 1
WEAKNESS: 0
NUMBNESS: 0
DIZZINESS: 0
CARDIOVASCULAR NEGATIVE: 1
PARESTHESIAS: 0
FATIGUE: 1

## 2024-09-25 ASSESSMENT — PAIN SCALES - GENERAL: PAINLEVEL: MILD PAIN (3)

## 2024-09-25 NOTE — ASSESSMENT & PLAN NOTE
Patient actively managed by psychiatry.  Has upcoming appointment with psychiatry.  Discussed potential referral for therapy.  Patient does not feel like she has the energy for this currently.  Med rec today.

## 2024-09-25 NOTE — ASSESSMENT & PLAN NOTE
No recent outbreaks, and patient does not have any prn antivirals at home.  Rx for valtrex to be used if outbreak occurs.

## 2024-09-25 NOTE — ED TRIAGE NOTES
Pt arrives with concern for ileostomy bag. Concerned with redness and burning at ostomy site. Home health nurse came today and changed the bag, but bag continues leaking, cannot get a good seal. Denies fevers and chills. A&Ox4.

## 2024-09-25 NOTE — ASSESSMENT & PLAN NOTE
On propranolol 10 mg bid for prevention of migraine as well as topiramate 50 mg bid for prevention of migraine.  BP is low at 92/61.  Patient states she runs a low BP and has no dizzyness.  Discussed possible need to wean propranolol if any lower BP or symptoms.  Patient is reluctant to wean this currently.  Also discussed weight loss associated with topiramate for some patients.  Could consider other preventive medications if we need to optimize appetite and BP in future.

## 2024-09-25 NOTE — ASSESSMENT & PLAN NOTE
Close follow up with Oncology.  Multiple issues including:  Summary from yesterday's Oncology visit.  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 Recommend stopping the nystatin powder as I do worry this is impacting adherence of the appliance. Message sent to WOC RN, will also have home care RN out tomorrow.  Monitor output- if significantly increases or she starts to feel volume depleted, would recommend she call the clinic  Abscess drain:  Removed  RV fistula:  Output decreasing  Urinary retention:  Continues with self catheterization  Elevated creatinine:  Unclear etiology- no hydronephrosis on imaging today. Denies infectious symptoms and is able to successfully self cath. Ileostomy output does not seem excessive and she is hydrating well per her report. Rechecking BMP in three days.  Reviewed alarm s/sxs and when to seek further care  Patient verbalized understanding of and agreement with plan

## 2024-09-25 NOTE — ASSESSMENT & PLAN NOTE
No recent lipid panel evaluation.  Now with weight loss may not be an issue.Add lipid to blood previously collected.

## 2024-09-25 NOTE — TELEPHONE ENCOUNTER
"Patient reporting her Ileostomy has a leak.      Three weeks ago had hysterectomy, but patient denies issues with surgery.    Home health nurse was there today and replaced her bag, but it still leaked.  Alessandro has changed bag twice according to recommendations in care advice and it keeps leaking.       She also reports the skin around the stoma has spreading redness.  She denies fever.    Disposition is to see provider within 24 hours to rule out cellulitis. Patient doesn't want to \"leak poop\" all night, and would like to be seen today.  [facility] will not have the supplies to change her bag. She said she cannot get hold of home care nurse after hours, so she would go to the ED.    Courtney Mar RN  [facility]        Reason for Disposition   [1] Abdomen skin around stoma looks infected (spreading redness, pain) AND [2] large red area (> 2 in. or 5 cm)    Additional Information   Negative: Shock suspected (e.g., cold/pale/clammy skin, too weak to stand, low BP, rapid pulse)   Negative: Sounds like a life-threatening emergency to the triager   Negative: [1] Post-op AND [2] and general post-operative symptoms or questions, unrelated to ostomy   Negative: [1] SEVERE abdominal pain (e.g., excruciating) AND [2] present > 1 hour   Negative: [1] Bleeding from stoma tissue (stoma is moist, pink to red-colored tissue) AND [2] won't stop after 10 minutes of direct pressure (using correct technique)   Negative: Bloody stool (blood clots; or passing blood without stool)   Negative: Black or tarry bowel movements  (Exception: Chronic-unchanged black-grey BMs AND is taking iron pills or Pepto-Bismol.)   Negative: Stoma separates from the surrounding skin at the suture line (e.g., gaping wound next to stoma)   Negative: Stoma turns purple or black   Negative: [1] No stool or gas from ILEOSTOMY > 6 hours AND [2] abdominal pain or vomiting   Negative: [1] No stool or gas from ILEOSTOMY > 6 hours AND [2] no improvement after " using Care Advice   Negative: [1] No stool or gas from COLOSTOMY > 48 hours (2 days) AND [2] abdominal pain or vomiting   Negative: [1] Vomiting AND [2] contains bile (green color)   Negative: [1] Drinking very little AND [2] dehydration suspected (e.g., no urine > 12 hours, very dry mouth, very lightheaded)   Negative: Patient sounds very sick or weak to the triager   Negative: [1] MILD-MODERATE abdominal pain AND [2] constant AND [3] present > 2 hours   Negative: [1] Vomiting AND [2] abdomen looks much more swollen than usual   Negative: [1] Abdomen skin around stoma looks infected (spreading redness, pain) AND [2] fever   Negative: No stool or gas from ILEOSTOMY > 6 hours (and has not tried Care Advice)   Negative: SEVERE diarrhea (e.g., 1,500 ml or more a day; or twice as much as usual)   Negative: [1] Caller has URGENT question AND [2] triager unable to answer question    Protocols used: Ostomy Symptoms and [id number]

## 2024-09-25 NOTE — PATIENT INSTRUCTIONS
If we need to consider the inderal for migraines and/or the topiramate for migraines make a video visit with us to follow up     Message any time you feel that you have questions for primary care

## 2024-09-25 NOTE — PROGRESS NOTES
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 Possible change in topiramate as well.  Patient will follow up prn    Low back pain without sciatica, unspecified back pain laterality, unspecified chronicity   No significant back pain currently, no sciatica    Genital herpes simplex, unspecified site   Rx for potential recurrence  - valACYclovir (VALTREX) 500 MG tablet  Dispense: 6 tablet; Refill: 3    Hypercholesterolemia   follow up as indicated  - Lipid Profile (Chol, Trig, HDL, LDL calc)    No follow-ups on file.  Patient Instructions   If we need to consider the inderal for migraines and/or the topiramate for migraines make a video visit with us to follow up     Message any time you feel that you have questions for primary care    The longitudinal plan of care for the diagnosis(es)/condition(s) as documented were addressed during this visit. Due to the added complexity in care, I will continue to support Marizol in the subsequent management and with ongoing continuity of care.    GERALDO Tillman CNP  [facility] [place]  ============================================  Subjective    Here for follow up with primary care post surgical treatment of ovarian cancer and hospitalization for post surgical complications     Reviewed problem list, med list and discussed potential options for management changes given current clinical status.  Patient is well connected with Oncology and Psychiatry for ongoing management.    Low back pain  Low back pain is still present, but no leg pain.    Genital herpes simplex  No recent outbreaks, and patient does not have any prn antivirals at home.  Rx for valtrex to be used if outbreak occurs.    Hypercholesterolemia  No recent lipid panel evaluation.  Now with weight loss may not be an issue.Add lipid to blood previously  collected.      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  Multiple issues including:  Summary from yesterday's Oncology visit.  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 Recommend stopping the nystatin powder as I do worry this is impacting adherence of the appliance. Message sent to WOC RN, will also have home care RN out tomorrow.  Monitor output- if significantly increases or she starts to feel volume depleted, would recommend she call the clinic  Abscess drain:  Removed  RV fistula:  Output decreasing  Urinary retention:  Continues with self catheterization  Elevated creatinine:  Unclear etiology- no hydronephrosis on imaging today. Denies infectious symptoms and is able to successfully self cath. Ileostomy output does not seem excessive and she is hydrating well per her report. Rechecking BMP in three days.  Reviewed alarm s/sxs and when to seek further care  Patient verbalized understanding of and agreement with plan    Behavioral  Patient actively managed by psychiatry.  Has upcoming appointment with psychiatry.  Discussed potential referral for therapy.  Patient does not feel like she has the energy for this currently.  Med rec today.    Intractable migraine without aura and with status migrainosus  On propranolol 10 mg bid for prevention of migraine as well as topiramate 50 mg bid for prevention of migraine.  BP is low at 92/61.  Patient states she runs a low BP and has no dizzyness.  Discussed possible need to wean propranolol if any  "lower BP or symptoms.  Patient is reluctant to wean this currently.  Also discussed weight loss associated with topiramate for some patients.  Could consider other preventive medications if we need to optimize appetite and BP in future.      History of Present Illness       Reason for visit:  Catheter    She eats 2-3 servings of fruits and vegetables daily.She consumes 0 sweetened beverage(s) daily.She exercises with enough effort to increase her heart rate 9 or less minutes per day.  She exercises with enough effort to increase her heart rate 4 days per week.   She is taking medications regularly.        Reviewed and updated as needed this visit by Provider      Problems            Review of Systems   Constitutional:  Positive for fatigue.   Respiratory: Negative.     Cardiovascular: Negative.    Gastrointestinal:         Surgical pain, but improving   Musculoskeletal:  Positive for back pain.        No redness, swelling or increased warmth in legs.     Skin:  Positive for rash.        Irritation around ostomy   Neurological:  Negative for dizziness, weakness, numbness and paresthesias.     ============================================  Objective    BP 92/61 (BP Location: Right arm, Patient Position: Sitting, Cuff Size: Adult Regular)   Pulse 89   Temp 97.7  F (36.5  C) (Oral)   Resp 14   Ht 1.664 m (5' 5.5\")   Wt 70.2 kg (154 lb 11.2 oz)   LMP  (LMP Unknown)   SpO2 98%   BMI 25.35 kg/m    Physical Exam  Constitutional:       Comments: fatigued   Eyes:      Conjunctiva/sclera: Conjunctivae normal.   Cardiovascular:      Rate and Rhythm: Normal rate and regular rhythm.      Heart sounds: Normal heart sounds.   Pulmonary:      Effort: Pulmonary effort is normal.      Breath sounds: Normal breath sounds.   Abdominal:      Palpations: Abdomen is soft.      Comments: No significant tenderness at this time.   Musculoskeletal:         General: No swelling or tenderness.      Right lower leg: No edema.      Left " lower leg: No edema.   Skin:     General: Skin is warm and dry.      Findings: Rash present.      Comments: Irritation around ostomy   Neurological:      Mental Status: She is alert.   Psychiatric:         Mood and Affect: Mood normal.        ============================================  GERALDO Tillman CNP  Signed Electronically by: GERALDO Tillman CNP

## 2024-09-26 ENCOUNTER — TELEPHONE (OUTPATIENT)
Dept: FAMILY MEDICINE | Facility: CLINIC | Age: 39
End: 2024-09-26
Payer: COMMERCIAL

## 2024-09-26 ENCOUNTER — TELEPHONE (OUTPATIENT)
Dept: UROLOGY | Facility: CLINIC | Age: 39
End: 2024-09-26
Payer: COMMERCIAL

## 2024-09-26 ENCOUNTER — TELEPHONE (OUTPATIENT)
Dept: WOUND CARE | Facility: CLINIC | Age: 39
End: 2024-09-26
Payer: COMMERCIAL

## 2024-09-26 DIAGNOSIS — Z53.9 DIAGNOSIS NOT YET DEFINED: Primary | ICD-10-CM

## 2024-09-26 LAB — VIT B1 PYROPHOSHATE BLD-SCNC: 108 NMOL/L

## 2024-09-26 PROCEDURE — G0180 MD CERTIFICATION HHA PATIENT: HCPCS | Performed by: NURSE PRACTITIONER

## 2024-09-26 NOTE — TELEPHONE ENCOUNTER
[facility] Call Center    Phone Message    May a detailed message be left on voicemail: yes     Reason for Call: Other: Patient called in to make a 1 month follow up with Dr Moseley. Writer checked virtual and in person, next available was Feb 20th, 2025. One month for a follow up would be around 10/20/24. Please call patient to schedule a follow up for 1 month as writer was not able to accommodate this request.     Action Taken: Other: Urology    Travel Screening: Not Applicable

## 2024-09-26 NOTE — TELEPHONE ENCOUNTER
Outpatient Ostomy Clinic Telephone note    Received a referral regarding peristomal dermatitis. Patient reports she has an appointment scheduled with Janette at the [facility] on 9/27. Informed patient that normally our clinic space is available on Tuesday's and Wednesdays. Patient had no further needs at this time.     Micaela BARLOW   Dept. Vocera- Contact [facility] Nurse (Taniya) via  Vocera   Dept. Office Number: [phone]

## 2024-09-26 NOTE — TELEPHONE ENCOUNTER
Called Itzel at [facility] back and left voicemail on secure confidential voicemail informing of orders approved.    Katty SIMPSON RN

## 2024-09-26 NOTE — ED PROVIDER NOTES
Emergency Department Note      History of Present Illness     Chief Complaint   Ileostomy Concern    HPI   Marizol Granados is a 39 year old female with a history of PE, DVT, and hyperlipidemia who presents to the ER for ileostomy issues. Patient reports her ileostomy bag leaking recently and being unable to get a seal. She states that there is broken skin underneath and that there is redness in the area and it burns. She recalls that she had a 2cm cancerous mass removed that took some small bowel with it 3 weeks ago. Marizol describes having mixed output and occasional issues since. Patient's surgery was done at the [facility] and she has no history of allergies to adhesive.     Independent Historian   None    Review of External Notes   No    Past Medical History     Medical History and Problem List   Anxiety  Asthma  Colon polyp  Depression  DVT   Genital herpes simplex  GERD  Hyperlipidemia   Hypothyroidism  Migraine  Postoperative intestinal malabsorption  PTSD  Pulmonary embolism   Restless legs syndrome  Vitamin B12 deficiency  Insomnia   Migraine     Medications   Apixaban   Buspirone   Cholecalciferol   Ciprofloxacin   Desvenlafaxine   Hydromorphone  Lorazepam   Metronidazole  Propranolol   Quetiapine   Ramelteon   Simethicone  Topiramate   Trazodone   Valacyclovir   Zolpidem   Albuterol   Loperamide   Pantoprazole   Promethazine   Suvorexant     Surgical History   Cholecystectomy   Colectomy   Colonoscopy   Gastric bypass   Endoscopy x2  Hysterectomy   Lumbar puncture  Liver biopsy   Panniculectomy   Physical Exam     Patient Vitals for the past 24 hrs:   BP Temp Pulse Resp SpO2 Height Weight   09/25/24 2056 -- -- -- -- 100 % -- --   09/25/24 2054 95/60 -- 67 -- -- -- --   09/25/24 2015 -- -- -- -- 100 % -- --   09/25/24 2014 -- -- -- -- 100 % -- --   09/25/24 2013 -- -- -- -- 99 % -- --   09/25/24 2012 -- -- -- -- 100 % -- --   09/25/24 1912 -- -- -- -- 100 % -- --   09/25/24 1857 93/59 -- -- -- -- -- --  "  09/25/24 1850 97/67 97.6  F (36.4  C) 82 18 100 % -- --   09/25/24 1847 -- -- -- -- -- 1.651 m (5' 5\") 70.9 kg (156 lb 4.9 oz)     Physical Exam  Resp:  Non-labored  Neuro:  Alert and cooperative  MSkel:  Moving all extremities  Skin:  Diffuse dermatitis and erythema circumferentially beneath ostomy dressing and extending a little beyond it.  Soft brown stool.    Diagnostics     Lab Results   Labs Ordered and Resulted from Time of ED Arrival to Time of ED Departure - No data to display    Imaging   No orders to display     Independent Interpretation   None    ED Course      Medications Administered   Medications - No data to display    Discussion of Management   None    ED Course   ED Course as of 09/25/24 2118   Wed Sep 25, 2024   1907 I obtained the history and examined the patient as noted above.    2003 I rechecked patient and explained findings and plan of care.         Additional Documentation  Stress/Adjustment Disorders    Medical Decision Making / Diagnosis     CMS Diagnoses: None    MIPS       None    MDM   Marizol Granados is a 39 year old female presenting with pretty severe dermatitis associated with a new ostomy.  As a result she is not getting a good seal.  Clear emotional distress related to having the ostomy and the difficulty managing it so far.  Ostomy paste was obtained but ended up staying because of the products in it.  Different supplies were used to try to get a better seal for her.  Referral was placed to try to have her follow-up within the next 1 to 2 days since her current ostomy nurse is not available until Monday.  Hydrocolloid things may also be helpful for her but we were not able to find this product to try right now.  15-30 minutes ostomy breaks during the day may also be helpful.       Disposition   The patient was discharged.     Diagnosis     ICD-10-CM    1. Dermatitis  L30.9 Wound Care Referral    Around ostomy, leakage           Discharge Medications   New Prescriptions    No " medications on file         Scribe Disclosure:  I, Elian Juárez, am serving as a scribe at 7:29 PM on 9/25/2024 to document services personally performed by Keyanna Meyer MD based on my observations and the provider's statements to me.        Keyanna Meyer MD  10/02/24 0911

## 2024-09-26 NOTE — TELEPHONE ENCOUNTER
Sent patient mychart message with time and day given from [email or web address]. I set it so patient can respond to me if time given below will work.

## 2024-09-26 NOTE — TELEPHONE ENCOUNTER
Forms/Letter Request    Type of form/letter: OTHER: [facility]  Tracking # [id number]      Do we have the form/letter: Yes:  in basket     Who is the form from? Home care    Where did/will the form come from? form was faxed in    When is form/letter needed by: ASAP     How would you like the form/letter returned: Fax : [phone]    Patient Notified form requests are processed in 5-7 business days:No    Could we send this information to you in [employer] or would you prefer to receive a phone call?:   No preference   Okay to leave a detailed message?: No at Other phone number:  FAX: [phone]    Ryan Zhang  Patient Representative  [facility]

## 2024-09-26 NOTE — ED NOTES
Worked with patient for awhile to clean and find appropriate fit for skin seal to prevent leaking. Tried ostomy paste, that has alcohol in it so it irritated her skin rash. Went back to utilizing the barrier ring and so far it appears to be working. Patient has an appt with her ostomy care team on Monday. She is hoping this set up will not leak and will last her until then without further irritation. Pt. Was given additional supplies for her ostomy set up because we utilized some of hers as we tested out different leak-proof options. Pt. Was given some chux pads to lessen the mess for her in the event she has further difficulties at home.

## 2024-09-26 NOTE — DISCHARGE INSTRUCTIONS
Products to help the skin:  - ostomy paste  - skin sealant (available OTC and on amazon)  - hydrocolloid products and rings

## 2024-09-27 ENCOUNTER — ALLIED HEALTH/NURSE VISIT (OUTPATIENT)
Dept: ONCOLOGY | Facility: CLINIC | Age: 39
End: 2024-09-27

## 2024-09-27 ENCOUNTER — OFFICE VISIT (OUTPATIENT)
Dept: WOUND CARE | Facility: CLINIC | Age: 39
End: 2024-09-27
Payer: COMMERCIAL

## 2024-09-27 ENCOUNTER — TELEPHONE (OUTPATIENT)
Dept: FAMILY MEDICINE | Facility: CLINIC | Age: 39
End: 2024-09-27

## 2024-09-27 DIAGNOSIS — Z00.6 PATIENT IN CANCER RELATED RESEARCH STUDY: Primary | ICD-10-CM

## 2024-09-27 DIAGNOSIS — K94.19 IRRITANT CONTACT DERMATITIS DUE TO ILEOSTOMY (H): ICD-10-CM

## 2024-09-27 DIAGNOSIS — Z93.2 STATUS POST ILEOSTOMY (H): Primary | ICD-10-CM

## 2024-09-27 DIAGNOSIS — L24.9 IRRITANT CONTACT DERMATITIS DUE TO ILEOSTOMY (H): ICD-10-CM

## 2024-09-27 PROBLEM — C48.1: Status: ACTIVE | Noted: 2024-09-27

## 2024-09-27 PROCEDURE — 99024 POSTOP FOLLOW-UP VISIT: CPT

## 2024-09-27 RX ORDER — ALBUTEROL SULFATE 0.83 MG/ML
2.5 SOLUTION RESPIRATORY (INHALATION)
OUTPATIENT
Start: 2024-10-04

## 2024-09-27 RX ORDER — PALONOSETRON 0.05 MG/ML
0.25 INJECTION, SOLUTION INTRAVENOUS ONCE
OUTPATIENT
Start: 2024-10-04

## 2024-09-27 RX ORDER — MEPERIDINE HYDROCHLORIDE 25 MG/ML
25 INJECTION INTRAMUSCULAR; INTRAVENOUS; SUBCUTANEOUS EVERY 30 MIN PRN
OUTPATIENT
Start: 2024-10-04

## 2024-09-27 RX ORDER — EPINEPHRINE 1 MG/ML
0.3 INJECTION, SOLUTION INTRAMUSCULAR; SUBCUTANEOUS EVERY 5 MIN PRN
OUTPATIENT
Start: 2024-10-04

## 2024-09-27 RX ORDER — HEPARIN SODIUM (PORCINE) LOCK FLUSH IV SOLN 100 UNIT/ML 100 UNIT/ML
5 SOLUTION INTRAVENOUS
OUTPATIENT
Start: 2024-10-04

## 2024-09-27 RX ORDER — ALBUTEROL SULFATE 90 UG/1
1-2 AEROSOL, METERED RESPIRATORY (INHALATION)
Start: 2024-10-04

## 2024-09-27 RX ORDER — LORAZEPAM 2 MG/ML
0.5 INJECTION INTRAMUSCULAR EVERY 4 HOURS PRN
OUTPATIENT
Start: 2024-10-04

## 2024-09-27 RX ORDER — METHYLPREDNISOLONE SODIUM SUCCINATE 125 MG/2ML
125 INJECTION, POWDER, LYOPHILIZED, FOR SOLUTION INTRAMUSCULAR; INTRAVENOUS
Start: 2024-10-04

## 2024-09-27 RX ORDER — DIPHENHYDRAMINE HYDROCHLORIDE 50 MG/ML
50 INJECTION INTRAMUSCULAR; INTRAVENOUS
Start: 2024-10-04

## 2024-09-27 RX ORDER — HEPARIN SODIUM,PORCINE 10 UNIT/ML
5-20 VIAL (ML) INTRAVENOUS DAILY PRN
OUTPATIENT
Start: 2024-10-04

## 2024-09-27 RX ORDER — DIPHENHYDRAMINE HCL 25 MG
50 CAPSULE ORAL ONCE
Start: 2024-10-04

## 2024-09-27 NOTE — TELEPHONE ENCOUNTER
Form was faxed to the location and number listed below.     Renetta Rodriguez   [facility]

## 2024-09-27 NOTE — PROGRESS NOTES
"[facility] Ostomy Assessment  Patient comes to clinic for consultation regarding ostomy issues.    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    Objective:  Type: Ileostomy since 09/01/2024  Stoma: 25*32 mm  loop normal-appearing, oval, edematous, and protuberant only slightly and retracting when sitting  Location: right lower quadrant     Complications: none   Mucutaneous junction:  intact    Output: soft    Peristomal skin: bright red and barrier is leaking, pt arrived with a towel over the stoma  Frequency of pouch change: three times weekly. This is scheduled.   Received home care [facility] assessment after discharge:Yes  Ordering supplies from:  [employer] , Account # [id number],Fax: [phone], Customer Service: [phone]  Current pouch system/supplies: Jose   two piece, cut to fit, flat, and barrier ring    Assessment: She has tried several 2 piece pouches all the Jose.  But her stoma retracts in when she is sitting so these are probably not very helpful.  She has skin breakdown from leaking in 10 cm diameter around the stoma but weeping is most severe 2 cm around the stoma.  Patient has home care who does not bring her pouches but they need to order them for her.  They are being ordered at [employer].  .    Intervention/Plan: Suggested deep convex with a tight belt.  The pouch will need to be changed every day for the next 3 days " which hopefully will improve the skin.  Once the skin improves patient was told to press the Florin ring tightly and around the stoma and using heating pad.  Only use stoma powder if the skin is weeping.I do not see evidence of a fungal infection now    Coloplast deep convex 30766, florin ring [id number], belt 4236    Several convex pouches and rings were given to her to hold her over until the order comes in.    Patient  has consented to receive samples from the industry    Return to clinic prn       Dr ADITYA diaz was available for supervision of care if needed or if questions should arise and regarding plan of care. Janette Wells, RN  RN [facility]

## 2024-09-27 NOTE — PROGRESS NOTES
CLINICAL TRIAL VISIT NOTE      Date of Visit: 9/27/2024     Study:      [facility]#: [id number]  CTSI#: [id number]   IDS#: [id number]  Subject Name: Marizol Granados  Subject ID: --[id number]     Visit: Registration/Randomization     Did the study visit occur within the appropriate window allowed by the protocol? Yes     Clinical Trial Treatment History:  9/20/2024 Signed ICF  9/23/2024 Screening Labs  9/24/2024 CT scan, RECIST read  9/27/2024 Registration/Randomization    RECIST - Response evaluation criteria in solid tumors:  The CT scan from 9/24/2024 was reviewed by Dr. Renee and meets the criteria for continuation of study therapy. Measurements will be documented in a separate RECIST document.    Informed Consent:  Date: 9/20/2024     The 2 consent forms (main consent and HIPAA consent) including purpose, risks and benefits, was reviewed with Marizol Granados, and all questions were answered before signing the consent form/s. The patient understands that the study involves an active treatment phase as well as a post-treatment follow up phase.     Present during the discussion were Dr. Jessenia Sosa and myself. A copy of the 2 signed form/s were provided to the patient. No procedures specific to this study were performed prior to the patient signing the consent form/s.     Main Consent Version Date: 05/23/23  Consent obtained by: Dr. Jessenia Sosa   Consent Date: 9/20/2024  HIPAA authorization signed?: Yes  HIPAA authorization version date: 05.13.21      RESEARCH COORDINATOR:    Was the Research Coordinator added to the patient's care team? Yes    Was the Research Coordinator's contact information entered into Epic? Yes      Overall Treatment Plan for Clinical Trial:   Arm 1: Intravenous 3-hour paclitaxel ( mg/m2) followed by intravenous carboplatin (AUC 5 or 6) on day 1 every 21 days x 6 cycles (one cycle = 21 days) followed by Letrozole 2.5 mg orally once daily in maintenance until disease progression or  unacceptable toxicity  Follow institutional policies/guidance for IV preparation and supportive care.    Research Impression:   Dr. Sosa signed Eligibility Criteria on 9/27/2024 and then the patient was registered and randomized on 9/27/2024. The patient was randomized to Arm 1: Intravenous 3-hour paclitaxel ( mg/m2) followed by intravenous carboplatin (AUC 5 or 6) on day 1 every 21 days x 6 cycles (one cycle = 21 days) followed by Letrozole 2.5 mg orally once daily in maintenance until disease progression or unacceptable toxicity Follow institutional policies/guidance for IV preparation and supportive care.    Special considerations for today's visit were reviewed including: Patient finished course of antibiotics today so is no longer considered to have an active infection.      I called Marizol LUIS ALFREDO Granados to inform her of the results of randomization and she was given the opportunity to ask any trial related questions.     I have sent a scheduling request to start the patient's C1D1 infusion on 10/11/2024 at the [facility], 14 days from registration within protocol window.     The patient has been registered in [employer].     Please see provider progress note for full physical exam and other clinical information.     Sakina Mason RN, PHN, BSN  Clinical Research Coordinator   Department of Obstetrics, Gynecology and Women's Health  [facility]    [email or web address]  [phone]

## 2024-09-27 NOTE — TELEPHONE ENCOUNTER
Forms/Letter Request    Type of form/letter: OTHER:   [facility]  Tracking # [id number]      Do we have the form/letter: Yes:  in basket     Who is the form from? Home care    Where did/will the form come from? form was faxed in    When is form/letter needed by: ASAP     How would you like the form/letter returned: Fax : [phone]    Patient Notified form requests are processed in 5-7 business days:No    Could we send this information to you in StatusPage or would you prefer to receive a phone call?:   No preference   Okay to leave a detailed message?: No at Other phone number:    FAX: [phone]      Ryan Zhang  Patient Representative  [facility]

## 2024-09-27 NOTE — TELEPHONE ENCOUNTER
Form was faxed to the location and number listed below.     Renetta Rodriguez   [facility]

## 2024-09-30 ENCOUNTER — PATIENT OUTREACH (OUTPATIENT)
Dept: ONCOLOGY | Facility: CLINIC | Age: 39
End: 2024-09-30

## 2024-09-30 DIAGNOSIS — R63.2 HYPERPHAGIA: ICD-10-CM

## 2024-09-30 NOTE — PROGRESS NOTES
"RN reached out to patient for check in and review symptoms from [facility]     Patient denies any fever and chills and no new or increasing pain     She does continue to feel like she needs to have a bowel movement and feels she needs to push from pelvic pressure. That being said she is not having any discharge from her rectum and over all this seems the same just continued     Patient did state that while self cathing she has had a few episodes where the urine looks like poop and looks more like the discharge on her pad then urine. This happened a few times in a row and then was back to regular urine and then it happened again and now is back to normal again     Of note, patient states this started after completing the antibiotic     Patient also confirmed that after meeting with [facility]OC RN the \"bag\" has stayed on and she is having good output. Patient is grateful for this. She will meet again with WOC RN on 9/31     RN routed MD and messaged er with the above     MD recommendations   No scans at this time   See patient in clinic on Friday     Patient updated on the plan     Patient and RN reviewed calling versus going to ED     Patient appreciative of the call    Randi Hernandez RN   "

## 2024-10-01 ENCOUNTER — MEDICAL CORRESPONDENCE (OUTPATIENT)
Dept: HEALTH INFORMATION MANAGEMENT | Facility: CLINIC | Age: 39
End: 2024-10-01

## 2024-10-01 RX ORDER — PHENTERMINE HYDROCHLORIDE 37.5 MG/1
TABLET ORAL
Qty: 90 TABLET | Refills: 1 | Status: SHIPPED | OUTPATIENT
Start: 2024-10-01

## 2024-10-01 NOTE — TELEPHONE ENCOUNTER
Form has been faxed to the location and number below.     Renetta Rodriguez   [facility]

## 2024-10-02 ENCOUNTER — DOCUMENTATION ONLY (OUTPATIENT)
Dept: UROLOGY | Facility: CLINIC | Age: 39
End: 2024-10-02
Payer: COMMERCIAL

## 2024-10-02 ASSESSMENT — ENCOUNTER SYMPTOMS: FEVER: 1

## 2024-10-02 NOTE — PROGRESS NOTES
Action 10/02/24  10:04 AM KT   Action Taken  DME order and progress notes from 9/20 faxed to [facility] ([phone]).

## 2024-10-03 ENCOUNTER — PATIENT OUTREACH (OUTPATIENT)
Dept: ONCOLOGY | Facility: CLINIC | Age: 39
End: 2024-10-03
Payer: COMMERCIAL

## 2024-10-03 LAB — SPECIMEN STATUS: NORMAL

## 2024-10-03 NOTE — PROGRESS NOTES
Patient reached out wondering about the appt tomorrow as patients kids have off and have many appts     Patient would really like to talk with Dr Sosa but can't get to clinic and is wondering if we could at least do a video visit     RN requested a video appt taco SMITH tomorrow at a time that patient requested     Patient also had questions as she has not heard anything about port placement and or finalized chemotherapy start date. Per patient she was told she would start next Friday 10/11 but there is nothing scheduled yet. Patient has left a few messages with the       RN sent a note to study team     Patient appreciative of RN time     Randi Hernandez RN

## 2024-10-04 ENCOUNTER — PATIENT OUTREACH (OUTPATIENT)
Dept: ONCOLOGY | Facility: CLINIC | Age: 39
End: 2024-10-04

## 2024-10-04 ENCOUNTER — VIRTUAL VISIT (OUTPATIENT)
Dept: ONCOLOGY | Facility: CLINIC | Age: 39
End: 2024-10-04
Attending: OBSTETRICS & GYNECOLOGY
Payer: COMMERCIAL

## 2024-10-04 DIAGNOSIS — C56.9 PRIMARY LOW GRADE SEROUS ADENOCARCINOMA OF OVARY (H): Primary | ICD-10-CM

## 2024-10-04 DIAGNOSIS — Z93.2 ILEOSTOMY STATUS (H): ICD-10-CM

## 2024-10-04 PROCEDURE — 99214 OFFICE O/P EST MOD 30 MIN: CPT | Mod: 95 | Performed by: OBSTETRICS & GYNECOLOGY
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
## 2024-10-04 NOTE — PROGRESS NOTES
[facility]: Cancer Care Initial Note                                    Discussion with Patient:                                                      Primary malignant neoplasm of pelvic peritoneum   Chemotherapy teaching      Education Completed by RN but patient will also see NP chemotherapy teaching as well         Assessment:                                                      Initial       Plan of Care Education Review:   Assessment completed with:: Patient    Plan of Care Education   Diagnosis:: Primary malignant neoplasm of pelvic peritoneum  Does patient understand diagnosis?: Yes  Tx plan/regimen:: Arm 1 PACLitaxel / CARBOplatin / Letrozole (FVMMCORC-2022-NRG-) (Version: 5/23/23)  Does patient understand treatment plan/regimen?: Yes  Preparing for treatment:: Reviewed treatment preparation information with patient (vascular access, day of chemo, visitor policy, what to bring, etc.)  Vascular access education provided for:: Port  Side effect education:: Fatigue;Diarrhea/Constipation;DVT/PE;Nausea/Vomiting;Mylosuppression;Neuropathy;Hair loss;Infection  Safety/self care at home reviewed with patient:: Yes  Coping - concerns/fears reviewed with patient:: Yes  Plan of Care:: LESTER follow-up appointment  When to call provider:: Bleeding;Increased shortness of breath;New/worsening pain;Shaking chills;Temperature >100.4F;Uncontrolled diarrhea/constipation;Uncontrolled nausea/vomiting    Evaluation of Learning  Patient Education Provided: Yes  Readiness:: Eager  Method::  (Patient given info from Research RN)            Arm 1 PACLitaxel / CARBOplatin / Letrozole (FVCORC-2022-NRG-) (Version: 5/23/23)        Intervention/Education provided during outreach:                                                       PORT placement order placed   Chemo teach with NP requested   Research team working on treatment appointments     Patient to follow up as scheduled at next appt    Signature:  Randi  DONNELL Hernandez

## 2024-10-04 NOTE — NURSING NOTE
Current patient location: [address]  [facility] [address]    Is the patient currently in the state of [place]? YES    Visit mode:VIDEO    If the visit is dropped, the patient can be reconnected by: VIDEO VISIT: Text to cell phone:   Telephone Information:   Mobile [phone]       Will anyone else be joining the visit? NO  (If patient encounters technical issues they should call [phone]    Are changes needed to the allergy or medication list? Pt stated no changes to allergies and Pt stated no med changes    Are refills needed on medications prescribed by this physician? Discuss with provider    Rooming Documentation:  Questionnaire(s) completed    Reason for visit: RECHECK    Destiney CISNEROS

## 2024-10-08 DIAGNOSIS — Z51.11 ENCOUNTER FOR ANTINEOPLASTIC CHEMOTHERAPY: Primary | ICD-10-CM

## 2024-10-10 ENCOUNTER — TELEPHONE (OUTPATIENT)
Dept: FAMILY MEDICINE | Facility: CLINIC | Age: 39
End: 2024-10-10

## 2024-10-10 ENCOUNTER — VIRTUAL VISIT (OUTPATIENT)
Dept: ONCOLOGY | Facility: CLINIC | Age: 39
End: 2024-10-10
Attending: NURSE PRACTITIONER
Payer: COMMERCIAL

## 2024-10-10 VITALS — HEIGHT: 65 IN | BODY MASS INDEX: 24.66 KG/M2 | WEIGHT: 148 LBS

## 2024-10-10 DIAGNOSIS — Z71.9 HEALTH EDUCATION: ICD-10-CM

## 2024-10-10 DIAGNOSIS — C56.9 PRIMARY LOW GRADE SEROUS ADENOCARCINOMA OF OVARY (H): Primary | ICD-10-CM

## 2024-10-10 PROCEDURE — 99214 OFFICE O/P EST MOD 30 MIN: CPT | Mod: 95 | Performed by: NURSE PRACTITIONER

## 2024-10-10 ASSESSMENT — PAIN SCALES - GENERAL: PAINLEVEL: NO PAIN (0)

## 2024-10-10 NOTE — TELEPHONE ENCOUNTER
Forms/Letter Request    Type of form/letter: Home Health Certification      Do we have the form/letter: Yes: In front basket    Who is the form from?  [facility]     Where did/will the form come from? form was faxed in    When is form/letter needed by: ASAP    How would you like the form/letter returned: Fax : [phone]    Natasha Jimenez Patient Rep.

## 2024-10-10 NOTE — PATIENT INSTRUCTIONS
"Your visit today was with Renetta Bhatia CNP for chemotherapy education.    YOUR TEAM:    Gynecologic oncologist: Jessenia Sosa MD  RN care coordinator: Fidelia Hernandez RN; [phone]/ Sakina Mason RN  Nurse practitioner: Renetta Bhatia CNP  Triage RN line: [place] triage: [phone]    PLAN:    Your proposed regimen: carboplatin AUC 6 and paclitaxel 175mg/m2 q21d x6 cycles  These are both chemotherapies given through a vein (either IV or an implanted port)  Carboplatin infusion is usually about an hour, paclitaxel is about 3 hours  You will receive medications prior to the infusion (\"premeds\") to help prevent a reaction to the chemo and to help prevent nausea- these typically take about an hour to 1.5 hours  Sometimes you will also receive IV fluids or electrolytes, which can increase the time of the infusion. We try to know ahead of time, but things can change based on your labs and how you're feeling    Schedule: every 21 days x6 cycles  A \"cycle\" is every 21 days, with day 1 being chemo day  Sometimes this will need to be adjusted due to side effects, blood counts, illnesses, or scheduling requirements but we do our best to keep this on track    You will have a lab appointment and a provider appointment prior to your infusions- please note these times and arrive at the scheduled appointment times for both lab and your provider appointments 10-15 minutes early if possible.    You can eat before and during treatment- try to eat something lighter your first day of treatment just in case you don't feel well during it    After your chemotherapy is done for the day, your nurse will either remove your IV or remove the needle from your implanted port. You can then go home and do the things that you feel you can do- some people feel tired afterward, but some people feel just fine.    ANTINAUSEA MEDICATIONS:    Dexamethasone   This is a steroid given for nausea prevention that is taken " on a schedule.   Take 8mg (two tablets) daily for three days in a row, starting the morning after chemotherapy. Take with food and water.  This can make you feel flushed, hungry, sometimes a little high energy/difficulty sleeping. Let us know if you do not feel well with this.    Prochlorperazine (aka Compazine)  This is an anti-nausea medication that you can take as needed.   The dose is one tablet every six hours as needed.  Can sometimes make people feel a little sleepy  In rare cases, can cause strange movements or muscle problems- this is RARE, but if it happens, let us know and do not take any further doses.    Ondansetron (aka Zofran)   This is an anti-nausea medication that you can take as needed.   The dose is one tablet every eight hours as needed.   Wait until 72 hours after chemotherapy to take this medication as you were given a long acting medication in this same drug class.  This can cause constipation, which can be severe for some people. It can also cause headaches in some people.      MEDICATION CHEMO DAY   (DAY 1) DAY AFTER CHEMO (DAY 2) DAY 3 DAY 4 DAY 5 and beyond   Dexamethasone (Given in your IV) 8mg (two tablets) with breakfast 8mg (two tablets) with breakfast 8mg (two tablets) with breakfast No more until your next chemo   Prochlorperazine (aka Compazine) Can take at any time- every six hours as needed Can take at any time- every six hours as needed Can take at any time- every six hours as needed Can take at any time- every six hours as needed Can take at any time- every six hours as needed   Ondansetron (aka Zofran) Do not take  Do not take  Do not take  Can take at any time- every eight hours as needed Can take at any time- every eight hours as needed     Nausea tends to be hard to control once it spikes. If you are feeling even mildly queasy, please do not hesitate to try one of the aforementioned medications.  If you vomit more than once OR if you're having difficulty eating and  drinking, please let us know as we may need to adjust your nausea medications and/or get you in for IV fluids  Small sips of fluids throughout the day, drinking through a straw, ginger, peppermint, small frequent bland snacks may all be helpful- you will learn what works best for you  Sometimes other factors, like constipation or heartburn, can be associated with worsening nausea- make sure to manage these symptoms if they are bothersome to you (laxatives, hydration, fiber, and movement for constipation, avoiding irritating foods/drinks and possible use of antacids such as famotidine for heartburn)    BONE PAIN  Diffuse muscle/bone aches can occur due to taxane chemotherapy or growth factor injections. While some people don't have this side effect at all, for those who do, the pain can range from mild to severe.  Typically develops within the first 2-4 days after receiving treatment- sometimes earlier, sometimes later. Generally lasts a few days and then resolves, but again, every one is different.  Taking loratadine (Claritin) 10mg by mouth 1-2 times per day starting a day or two prior to chemotherapy can help. Continue to take this for the first several days after chemotherapy. If you have no bone pain, you can stop this and could consider not taking at all with your subsequent cycles.  If pain remains bothersome despite loratadine, you can try acetaminophen (Tylenol) 650-1000mg three times daily on a schedule until this resolves. Ibuprofen can be tried as well- 600mg every six hours as needed, take with food and water- some people can't take ibuprofen due to previous GI bleed, kidney function, certain medications like blood thinners, heart disease, or low platelets- please talk with your care team if you are unsure.  Movement can be helpful- try staying active and walking frequently  Warm baths and heat can help  Topical agents such as IcyHot, BenGay, and Poultney Balm may be helpful for some people  If these  "methods still do not control your bone pain, please let your care team know as we may need to evaluate you further and/or discuss further treatment options    CONSTIPATION  Constipation can be caused by many factors, including certain chemotherapy agents (like paclitaxel), certain medications (like the antinausea medication ondansetron or opioid pain medications), or disease burden from cancer  It is important to manage constipation during cancer treatment- constipation can lead to severe abdominal pain as well as nausea and vomiting  Make sure to hydrate well- your body needs enough water to help with bowel movements. Taking in adequate amounts of dietary fiber (20-25g if this is tolerable for you) can help. Staying physically active and walking regularly can also help.  If you have not had a bowel movement on a regular schedule for you and you are feeling uncomfortable, consider taking senna 1 tab- if you don't have a bowel movement in 6-12 hours, you can repeat this later that day. You can gradually increase (1-2 tablets twice daily as needed) this until you have bowel movements on a regular schedule that are comfortable to pass. Adding in Miralax can help as well- you can titrate the amount you use in order to have soft bowel movements.   Please call your care team if the above measures are not helpful      SIDE EFFECT MANAGEMENT:  A \"chemo kit\" can be helpful to have on hand to manage side effects  Miralax and/or senna for constipation- be proactive about this and consider taking either a tablet of senna or a capful of Miralax the night of chemotherapy infusion, continue 1-2 times daily until you have a normal bowel movement  Imodium (loperamide) for diarrhea- we need to know though if you're having three or more liquid stools per day, a fever, abdominal pain, or bloody diarrhea. In this case, you would not take loperamide until you have spoken with our team.  Claritin (loratadine) for bone pain or rashes- you " can start loratadine 1-2 times daily 2-3 days before your chemotherapy infusion and continue taking this until bone pain has resolved. Package directions will say only once daily- you can go up to 2 times daily if you need to for chemotherapy related bone pain or rashes.  Acetaminophen (Tylenol) for mild aches and pains- ibuprofen (Advil) needs to be used with caution due to risk of bleeding and impact on the kidneys, so please talk with your nurse practitioner before taking ibuprofen  Epsom salts or topical rubs like Tiger Balm, IcyHot, BenGay for muscle aches and pain  Nasal saline gel (like Ayr) for dry nose, scant nose bleeds  Sugar free gum and/or lozenges for dry mouth  Stefany chews, stefany tea, peppermint tea for mild nausea  A one liter water bottle to sip on throughout the day- the goal is two liters per day  A quality emollient type lotion- something you can scoop out of a jar is more hydrating than something that you pump out of a bottle. Examples include Cetaphil, CeraVe, and Vanicream.  A satin pillowcase can be helpful if you have a sore scalp from hair loss  Lubricating eye drops if your eyes get sore from chemo and/or hair loss  Some people find benefit from cryotherapy gloves and booties while undergoing treatment with a taxane type chemotherapy- evidence is mixed, but if you do not have Raynaud's and want to try this, they are available online to purchase    When to call for help:  A fever of 100.4 F or greater  Shaking chills  Shortness of breath or difficulty breathing  Repeated signs of bleeding, such as nose bleeds that do not stop with pressure, vaginal bleeding that saturates a pad an hour for two hours in a row, easy bruising or black tarry stools  Mouth sores that stop you from eating or cause pain when you swallow  Nausea and vomiting that do not get better with your medications  Diarrhea that does not get better (particularly three or more loose watery stools in one day)  Extreme  weakness  Feeling confused or extremely sleepy  Constipation for more than 48 hours that does not respond to laxatives  New rashes  New swelling in the legs, arms, or face- particularly if one leg is more swollen than the other, hot, red, or painful  Any new symptoms that you did not have before your treatments    WHO to call for help:  Call your RN care coordinator first- if you have difficulty reaching her, then call the triage line. If you have difficulty reaching triage during off hours, you can call [phone] and ask to speak to the gynecologic oncology resident on call.    For urgent questions or concerns, please call rather than send a medical message.       Wig/Headwear Resources  Name Details Address Phone Website   [employer] Nonprofit that makes no-cost human hair wigs for people experiencing hair loss due to cancer drugs/treatment. Requires referral page from oncologist.   $25 donated wigs available at https://[email or web address]/shop/  [address] [place] [address] [phone] [email or web address] [email or web address]    [facility] [employer] [facility] Synthetic and human hair wigs, custom made and on hand. [place], hair systems.  Appointment only.  [address] [place] [address] [phone] [email or web address]    [facility] [employer] Make human hair wigs out of your own hair (often supplement), require hair to be 8in long to use. Takes 8-11 wks, can rush 5-6 wks.  [address], [address]  [place] [address]  (By appointment only) [phone] [email or web address]://[email or web address]    [employer] Human and synthetic wigs, toppers, hair extensions & hair pieces. [address] [place] [address]      [address] [place] [address] [place]: [phone]  *appointment only    [place]: [phone]  *can walk in [email or web address]://www.Sword [email or web address] Plough/    [facility] Open Tue- Sat. Men & Women hats & wigs. Call to reserve a  consultation.  [address]  [place] [address] [phone] [email or web address]://[email or web address]/[email or web address]-uu-[email or web address]    [employer], founded by a cancer survivor and natural hair blogger. Provides access to contemporary, high quality, coily, curly synthetic wigs. Online shop. Mailing Address:   [employer]  [address] [place] [address] [address]  [place] [address] [email or web address]    [facility] Free, mailed wigs for women. Recipient of wig is responsible for shipping/handling costs. Required to send note from oncologist, photos of yourself, information about wig sought, and online request.  Based in [place] and [place] [phone] [email or web address]://[email or web address]/[email or web address]-[email or web address]    [facility] Lace wigs, wigs, nita, weaves, hair pieces, hair care products, make-up No standalone store Email: [email or web address]://www.[email or web address]/    Headcovers Headwear, wigs & hairpieces, eyebrows, cosmetics, clothing & accessories  Online only  [phone] https://www.[employer].inmobly/    It's Still Me Wigs, headwear, accessories. Call to reserve a consultation. [address], [place], [place] [address] [phone] http://www.Juice In The City    [place] Seeing clients in home. Will see people predominately in [facility], call for information. Wigs, head coverings, post-mastectomy items clothing and skin care products Your home, or another preferred location [phone] http://www.Silicon Clocks/    Nohemy Reynoso Wigs, head gear, skin care, make-up products, cosmetics  [place]: Certificates available, appointments only. Workers used to volunteer at [employer] Better    [place]: free fascial when you come in, essential oils, CBD  *call to schedule  [address] [place] [address] [place]    [address] [place] [address] [facility]: [phone]          [place]: [phone] [email or web address].co/homepage     Mary Rae[facility] Hair loss prevention and camouflage products, wigs for men/women/children. Coloring of human hair wigs/alterations. Headwear, turbans, wig care products and accessories. [address] [place], [phone] https://Combat Stroke.Clipyoo/    Susie Greenfield Wigs, wiglets, hair pieces for women. No standalone store, can request catalog [phone] [email or web address]://[email or web address]/    [employer] Free Wig Closet All wigs, hats, and hairdressing on wigs is free. [address] [place] [address] [phone] or   [phone] https://[facility] [email or web address]-[email or web address]-jutkha/    Salon [facility] Wigs and scarves. Call to schedule consultation [address] [place] [address]. [address] [phone] [email or web address]://[email or web address]    Butch DK     Hair regrowth, replacement & wigs [address] [place], [place] [address] [phone] https://www.Prover Technology.Clipyoo/    [facility] [place] [facility] [employer] Hats, scarves, wigs, bras, breast forms, post surgical garments. Male wigs available. No standalone store [phone]  Can call to order www.Balm [email or web address].org    [facility] Nonprofit that provides free human hair cap wigs to cancer patients. Prescription/letter from oncologist required, must be addressed to the [facility] and dated within 30 days of submission. Other forms will not be accepted and will delay your application approval process. [facility] [phone] https://Visible Path.[email or web address]/      Data Driven Delivery SystemautGruupMeet Class/Individual Connection  Name Details Locations Contact Information   [facility] [employer] Better [employer] Virtual Workshops are taught by beauty, wig, and styling professionals to provide tips and tricks to help cancer patients cope with appearance-related side effects of treatment and connect with fellow cancer patients across the country Virtual (to [place]) https://lookgoodfeelbetter.org/    Hello Gorgeous Virtual Makeovers, free resources, education on how to keep  you safe Virtual (to [place]) https://www.BorrologPangea Universal Holdings.org/      Health Insurance Coverage for Wig (information from Triage Cancer: https://triagecancer.org/quick-guides/manage-side-effects )  Some private insurance plans (e.g., individual or employer-sponsored plans) cover wigs for medically-induced hair loss under the classification of  durable medical goods.  Tip: get a prescription for a  cranial prosthesis.  Note: some insurance companies specifically list wigs as an excluded benefit. If your plan does cover the wig, you will likely still be responsible for out-of-pocket costs (i.e., a co-pay or co-insurance amount). Check your plan's summary of benefits and coverage for details.  Medicare Part B does not cover wigs for hair loss due to cancer treatment.  Medicaid does not cover wigs in any state.  [facility] and [facility] both cover one wig per the lifetime of a beneficiary, for hair loss that occurs due to cancer treatment. Note: this does not include wig supplies or maintenance (e.g., wig cap, comb, glue, etc.).
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
## 2024-10-10 NOTE — NURSING NOTE
Current patient location: [address]  [facility] [address]    Is the patient currently in the state of [place]? YES    Visit mode:VIDEO    If the visit is dropped, the patient can be reconnected by: VIDEO VISIT: Text to cell phone:   Telephone Information:   Mobile [phone]       Will anyone else be joining the visit? NO  (If patient encounters technical issues they should call [phone]    Are changes needed to the allergy or medication list? Pt stated no changes to allergies and Pt stated no med changes    Are refills needed on medications prescribed by this physician? NO    Rooming Documentation:  Questionnaire(s) not done per department protocol    Reason for visit: SOPHIE Ch LPN

## 2024-10-11 ENCOUNTER — TELEPHONE (OUTPATIENT)
Dept: ONCOLOGY | Facility: CLINIC | Age: 39
End: 2024-10-11

## 2024-10-11 ENCOUNTER — LAB (OUTPATIENT)
Dept: LAB | Facility: CLINIC | Age: 39
End: 2024-10-11
Attending: OBSTETRICS & GYNECOLOGY
Payer: COMMERCIAL

## 2024-10-11 ENCOUNTER — INFUSION THERAPY VISIT (OUTPATIENT)
Dept: ONCOLOGY | Facility: CLINIC | Age: 39
End: 2024-10-11
Attending: OBSTETRICS & GYNECOLOGY
Payer: COMMERCIAL

## 2024-10-11 ENCOUNTER — ALLIED HEALTH/NURSE VISIT (OUTPATIENT)
Dept: ONCOLOGY | Facility: CLINIC | Age: 39
End: 2024-10-11

## 2024-10-11 VITALS
TEMPERATURE: 97.6 F | DIASTOLIC BLOOD PRESSURE: 75 MMHG | RESPIRATION RATE: 18 BRPM | WEIGHT: 151.1 LBS | HEART RATE: 67 BPM | HEIGHT: 65 IN | BODY MASS INDEX: 25.18 KG/M2 | SYSTOLIC BLOOD PRESSURE: 113 MMHG | OXYGEN SATURATION: 100 %

## 2024-10-11 DIAGNOSIS — Z00.6 PATIENT IN CANCER RELATED RESEARCH STUDY: Primary | ICD-10-CM

## 2024-10-11 DIAGNOSIS — C48.1 PRIMARY MALIGNANT NEOPLASM OF PELVIC PERITONEUM (H): ICD-10-CM

## 2024-10-11 DIAGNOSIS — E78.00 HYPERCHOLESTEROLEMIA: ICD-10-CM

## 2024-10-11 DIAGNOSIS — C48.1 PRIMARY MALIGNANT NEOPLASM OF PELVIC PERITONEUM (H): Primary | ICD-10-CM

## 2024-10-11 DIAGNOSIS — R79.89 ELEVATED SERUM CREATININE: ICD-10-CM

## 2024-10-11 DIAGNOSIS — K59.00 CONSTIPATION, UNSPECIFIED CONSTIPATION TYPE: Primary | ICD-10-CM

## 2024-10-11 DIAGNOSIS — Z11.4 SCREENING FOR HIV (HUMAN IMMUNODEFICIENCY VIRUS): ICD-10-CM

## 2024-10-11 DIAGNOSIS — Z51.11 ENCOUNTER FOR ANTINEOPLASTIC CHEMOTHERAPY: ICD-10-CM

## 2024-10-11 LAB
ALBUMIN SERPL BCG-MCNC: 4.2 G/DL (ref 3.5–5.2)
ALP SERPL-CCNC: 51 U/L (ref 40–150)
ALT SERPL W P-5'-P-CCNC: 12 U/L (ref 0–50)
ANION GAP SERPL CALCULATED.3IONS-SCNC: 11 MMOL/L (ref 7–15)
AST SERPL W P-5'-P-CCNC: 16 U/L (ref 0–45)
BASOPHILS # BLD AUTO: 0 10E3/UL (ref 0–0.2)
BASOPHILS NFR BLD AUTO: 1 %
BILIRUB SERPL-MCNC: 0.2 MG/DL
BUN SERPL-MCNC: 13.6 MG/DL (ref 6–20)
CALCIUM SERPL-MCNC: 9.7 MG/DL (ref 8.8–10.4)
CANCER AG125 SERPL-ACNC: 10 U/ML
CHLORIDE SERPL-SCNC: 109 MMOL/L (ref 98–107)
CHOLEST SERPL-MCNC: 177 MG/DL
CREAT SERPL-MCNC: 0.87 MG/DL (ref 0.51–0.95)
EGFRCR SERPLBLD CKD-EPI 2021: 88 ML/MIN/1.73M2
EOSINOPHIL # BLD AUTO: 0.3 10E3/UL (ref 0–0.7)
EOSINOPHIL NFR BLD AUTO: 8 %
ERYTHROCYTE [DISTWIDTH] IN BLOOD BY AUTOMATED COUNT: 16.4 % (ref 10–15)
FASTING STATUS PATIENT QL REPORTED: YES
FASTING STATUS PATIENT QL REPORTED: YES
GLUCOSE SERPL-MCNC: 100 MG/DL (ref 70–99)
HCO3 SERPL-SCNC: 20 MMOL/L (ref 22–29)
HCT VFR BLD AUTO: 33.3 % (ref 35–47)
HDLC SERPL-MCNC: 74 MG/DL
HGB BLD-MCNC: 10.6 G/DL (ref 11.7–15.7)
HIV 1+2 AB+HIV1 P24 AG SERPL QL IA: NONREACTIVE
IMM GRANULOCYTES # BLD: 0 10E3/UL
IMM GRANULOCYTES NFR BLD: 0 %
LDLC SERPL CALC-MCNC: 74 MG/DL
LYMPHOCYTES # BLD AUTO: 2.1 10E3/UL (ref 0.8–5.3)
LYMPHOCYTES NFR BLD AUTO: 52 %
MAGNESIUM SERPL-MCNC: 1.9 MG/DL (ref 1.7–2.3)
MCH RBC QN AUTO: 28.7 PG (ref 26.5–33)
MCHC RBC AUTO-ENTMCNC: 31.8 G/DL (ref 31.5–36.5)
MCV RBC AUTO: 90 FL (ref 78–100)
MONOCYTES # BLD AUTO: 0.3 10E3/UL (ref 0–1.3)
MONOCYTES NFR BLD AUTO: 8 %
NEUTROPHILS # BLD AUTO: 1.3 10E3/UL (ref 1.6–8.3)
NEUTROPHILS NFR BLD AUTO: 32 %
NONHDLC SERPL-MCNC: 103 MG/DL
NRBC # BLD AUTO: 0 10E3/UL
NRBC BLD AUTO-RTO: 0 /100
PLATELET # BLD AUTO: 216 10E3/UL (ref 150–450)
POTASSIUM SERPL-SCNC: 4.1 MMOL/L (ref 3.4–5.3)
PROT SERPL-MCNC: 6.9 G/DL (ref 6.4–8.3)
RBC # BLD AUTO: 3.69 10E6/UL (ref 3.8–5.2)
SODIUM SERPL-SCNC: 140 MMOL/L (ref 135–145)
TRIGL SERPL-MCNC: 143 MG/DL
WBC # BLD AUTO: 4 10E3/UL (ref 4–11)

## 2024-10-11 PROCEDURE — 99214 OFFICE O/P EST MOD 30 MIN: CPT | Mod: 24 | Performed by: OBSTETRICS & GYNECOLOGY

## 2024-10-11 PROCEDURE — G0463 HOSPITAL OUTPT CLINIC VISIT: HCPCS | Mod: 25 | Performed by: OBSTETRICS & GYNECOLOGY

## 2024-10-11 PROCEDURE — 80061 LIPID PANEL: CPT | Performed by: OBSTETRICS & GYNECOLOGY

## 2024-10-11 PROCEDURE — 36415 COLL VENOUS BLD VENIPUNCTURE: CPT | Performed by: OBSTETRICS & GYNECOLOGY

## 2024-10-11 PROCEDURE — 85004 AUTOMATED DIFF WBC COUNT: CPT | Performed by: OBSTETRICS & GYNECOLOGY

## 2024-10-11 PROCEDURE — 87389 HIV-1 AG W/HIV-1&-2 AB AG IA: CPT | Performed by: OBSTETRICS & GYNECOLOGY

## 2024-10-11 PROCEDURE — 96413 CHEMO IV INFUSION 1 HR: CPT

## 2024-10-11 PROCEDURE — 96377 APPLICATON ON-BODY INJECTOR: CPT | Mod: 59

## 2024-10-11 PROCEDURE — 96367 TX/PROPH/DG ADDL SEQ IV INF: CPT

## 2024-10-11 PROCEDURE — 86304 IMMUNOASSAY TUMOR CA 125: CPT | Performed by: OBSTETRICS & GYNECOLOGY

## 2024-10-11 PROCEDURE — 83735 ASSAY OF MAGNESIUM: CPT | Performed by: OBSTETRICS & GYNECOLOGY

## 2024-10-11 PROCEDURE — 96372 THER/PROPH/DIAG INJ SC/IM: CPT | Performed by: OBSTETRICS & GYNECOLOGY

## 2024-10-11 PROCEDURE — 258N000003 HC RX IP 258 OP 636: Performed by: OBSTETRICS & GYNECOLOGY

## 2024-10-11 PROCEDURE — 80053 COMPREHEN METABOLIC PANEL: CPT | Performed by: OBSTETRICS & GYNECOLOGY

## 2024-10-11 PROCEDURE — 96415 CHEMO IV INFUSION ADDL HR: CPT

## 2024-10-11 PROCEDURE — 250N000011 HC RX IP 250 OP 636: Performed by: OBSTETRICS & GYNECOLOGY

## 2024-10-11 PROCEDURE — 96375 TX/PRO/DX INJ NEW DRUG ADDON: CPT

## 2024-10-11 PROCEDURE — 96417 CHEMO IV INFUS EACH ADDL SEQ: CPT

## 2024-10-11 PROCEDURE — 250N000013 HC RX MED GY IP 250 OP 250 PS 637: Performed by: OBSTETRICS & GYNECOLOGY

## 2024-10-11 RX ORDER — DIPHENHYDRAMINE HCL 25 MG
50 CAPSULE ORAL ONCE
Status: COMPLETED | OUTPATIENT
Start: 2024-10-11 | End: 2024-10-11

## 2024-10-11 RX ORDER — BISACODYL 10 MG
10 SUPPOSITORY, RECTAL RECTAL DAILY PRN
Qty: 15 SUPPOSITORY | Refills: 1 | Status: SHIPPED | OUTPATIENT
Start: 2024-10-11

## 2024-10-11 RX ORDER — SIMETHICONE 80 MG
80 TABLET,CHEWABLE ORAL EVERY 6 HOURS PRN
Qty: 30 TABLET | Refills: 1 | Status: SHIPPED | OUTPATIENT
Start: 2024-10-11

## 2024-10-11 RX ORDER — DEXAMETHASONE 4 MG/1
8 TABLET ORAL DAILY
Qty: 6 TABLET | Refills: 5 | Status: SHIPPED | OUTPATIENT
Start: 2024-10-11

## 2024-10-11 RX ORDER — ONDANSETRON 8 MG/1
8 TABLET, FILM COATED ORAL EVERY 8 HOURS PRN
Qty: 30 TABLET | Refills: 2 | Status: SHIPPED | OUTPATIENT
Start: 2024-10-11

## 2024-10-11 RX ORDER — PALONOSETRON 0.05 MG/ML
0.25 INJECTION, SOLUTION INTRAVENOUS ONCE
Status: COMPLETED | OUTPATIENT
Start: 2024-10-11 | End: 2024-10-11

## 2024-10-11 RX ORDER — PROCHLORPERAZINE MALEATE 10 MG
10 TABLET ORAL EVERY 6 HOURS PRN
Qty: 30 TABLET | Refills: 2 | Status: SHIPPED | OUTPATIENT
Start: 2024-10-11

## 2024-10-11 RX ADMIN — PALONOSETRON HYDROCHLORIDE 0.25 MG: 0.25 INJECTION INTRAVENOUS at 09:26

## 2024-10-11 RX ADMIN — FAMOTIDINE 20 MG: 10 INJECTION INTRAVENOUS at 09:26

## 2024-10-11 RX ADMIN — DIPHENHYDRAMINE HYDROCHLORIDE 50 MG: 25 CAPSULE ORAL at 09:26

## 2024-10-11 RX ADMIN — CARBOPLATIN 650 MG: 10 INJECTION, SOLUTION INTRAVENOUS at 13:31

## 2024-10-11 RX ADMIN — PACLITAXEL 315 MG: 6 INJECTION, SOLUTION INTRAVENOUS at 10:25

## 2024-10-11 RX ADMIN — FOSAPREPITANT: 150 INJECTION, POWDER, LYOPHILIZED, FOR SOLUTION INTRAVENOUS at 09:46

## 2024-10-11 RX ADMIN — PEGFILGRASTIM 6 MG: KIT SUBCUTANEOUS at 14:50

## 2024-10-11 ASSESSMENT — PAIN SCALES - GENERAL: PAINLEVEL: NO PAIN (0)

## 2024-10-11 NOTE — NURSING NOTE
Chief Complaint   Patient presents with    Blood Draw     Labs drawn via PIV by RN in lab, vitals taken.      Labs drawn from PIV placed by RN. Line flushed with saline. Vitals taken. Pt checked in for appointment(s).    Jayne Long RN

## 2024-10-11 NOTE — TELEPHONE ENCOUNTER
Prior Authorization Approval    Medication: ONDANSETRON HCL 8 MG PO TABS  Authorization Effective Date: 7/13/2024  Authorization Expiration Date: 10/11/2025  Approved Dose/Quantity: 30 per 10 DS  Reference #: [id number]   Insurance Company: [employer] - Phone [phone] Fax [phone]  Expected CoPay: $    CoPay Card Available:      Financial Assistance Needed:   Which Pharmacy is filling the prescription: [facility] [address] [phone]  Pharmacy Notified: yes  Patient Notified: yes          Thank you,    Dori Francis  Oncology Pharmacy Liaison [email or web address]  Phone: [phone]  Fax: [phone]

## 2024-10-11 NOTE — PROGRESS NOTES
CLINICAL TRIAL VISIT NOTE      Date of Visit: 10/11/2024     Study:      [facility]#: [id number]  CTSI#: [id number]   IDS#: [id number]  Subject Name: Marizol Granados  Subject ID: [id number]     Visit: C1D1     Did the study visit occur within the appropriate window allowed by the protocol? Yes     Clinical Trial Treatment History:  9/20/2024 Signed ICF  9/23/2024 Screening Labs  9/24/2024 CT scan, RECIST read  9/27/2024 Registration/Randomization  10/11/2024 C1D1 infusion    Medical History:  I reviewed Marizol's Medical History with her prior to infusion and this have been recorded in the corresponding log in Marizol's research file.     Adverse Event Grading:   I have personally interviewed Marizol Granados and reviewed her medical record for adverse events and these have been recorded on the corresponding logs in Marizol Granados's research file.     VITALS:  Vitals were done and any significant values were addressed and reviewed.      LAB RESULTS:  Labs were drawn- any significant lab values were addressed and reviewed.       -  Absolute Neutrophil Count was 1300.    Per the protocol:    Subsequent courses of treatment which contain cytotoxic chemotherapy (paclitaxel   and/or carboplatin) will not begin (day 1 of each cycle) until the ANC is ? 1,000 and the   platelet count is ? 100,000.    Filgrastim or pegfilgrastim or biosimilars can be used per treating investigators discretion   and/or institutional, NCCN and/or ASCO guidelines.    The patient will receive chemo today and continue with treatment. Dr. Sosa added pegfilgrastim.       Concomitant Medications:  I have personally reviewed concomitant medications and these have been recorded on the corresponding logs in Marizol's research file. See full list of medications below:       RECIST - Response evaluation criteria in solid tumors:  The CT scan from 9/24/2024 was reviewed by Dr. Renee and meets the criteria for continuation of study therapy. Measurements will be  documented in a separate RECIST document. Patient needs 12 week scan scheduled between 12/20/2024-1/17/2025.    RESEARCH COORDINATOR:    Was the Research Coordinator added to the patient's care team? Yes    Was the Research Coordinator's contact information entered into Epic? Yes      Overall Treatment Plan for Clinical Trial:   Arm 1: Intravenous 3-hour paclitaxel ( mg/m2) followed by intravenous carboplatin (AUC 5 or 6) on day 1 every 21 days x 6 cycles (one cycle = 21 days) followed by Letrozole 2.5 mg orally once daily in maintenance until disease progression or unacceptable toxicity  Follow institutional policies/guidance for IV preparation and supportive care.     Research Impression:   Patient got labs drawn prior to infusion. Pre-treatment plasma blood specimen was obtained and will be stored in a -80 freezer over the weekend since specimens cannot be shipped on Friday.     Patient saw Dr. Sosa and myself prior to infusion. See Dr. Sosa's note for physical exam and other clinical information.      Marizol Granados was given the opportunity to ask any trial related questions. The patient will be back 11/1 for C2D1. The patient knows to call with any new or worsening symptoms or concerns.      Please see provider progress note for full physical exam and other clinical information.      Sakina Mason RN, PHN, BSN  Clinical Research Coordinator   Department of Obstetrics, Gynecology and Women's Health  [facility]    [email or web address]  [phone]

## 2024-10-11 NOTE — TELEPHONE ENCOUNTER
PA Initiation    Medication: ONDANSETRON HCL 8 MG PO TABS  Insurance Company: [employer] - Phone [phone] Fax [phone]  Pharmacy Filling the Rx: [facility] [place], [place] - [address] [place] [address] [phone]  Filling Pharmacy Phone:    Filling Pharmacy Fax:    Start Date: 10/11/2024          Thank you,    Ninfa Francis  Oncology Pharmacy Liaison II  [email or web address]  Phone: [phone]  Fax: [phone]
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
## 2024-10-11 NOTE — PATIENT INSTRUCTIONS
[facility] Triage and after hours / weekends / holidays:  [phone]    Please call the triage or after hours line if you experience a temperature greater than or equal to 100.4, shaking chills, have uncontrolled nausea, vomiting and/or diarrhea, dizziness, shortness of breath, chest pain, bleeding, unexplained bruising, or if you have any other new/concerning symptoms, questions or concerns.      If you are having any concerning symptoms or wish to speak to a provider before your next infusion visit, please call triage to notify them so we can adequately serve you.     If you need a refill on a narcotic prescription or other medication, please call before your infusion appointment.       Marizol,    Do not take your Ondansetron (Zofran) for the next 72 hours, as it interacts with the pre-medication we gave you today. You can resume this on Monday 10/14 @ 9:30 AM. Feel free to take Prochlorperazine (Compazine) in the meantime if you have any nausea.    Take care!  DONNELL Sevilla              October 2024 Sunday Monday Tuesday Wednesday Thursday Friday Saturday             1     2     3     4    RETURN CCSL  12:15 PM   (30 min.)   Jessenia Sosa MD   [facility] 5       6     7     8     9     10    RETURN CCSL  12:45 PM   (45 min.)   Renetta Bhatia APRN CNP   [facility] 11    LAB CENTRAL   7:15 AM   (15 min.)   [facility] LAB DRAW   [facility]    RETURN CCSL   7:45 AM   (30 min.)   Jessenia Sosa MD   [facility]    ONC INFUSION 5 HR (300 MIN)   8:30 AM   (300 min.)    ONC INFUSION NURSE   [facility] 12       13     14     15     16     17    URGENT GC   9:00 AM   (60 min.)   Lorin Sanabria GC   [facility] 18     19       20     21     22     23     24     25     26       27     28     29     30     31    IR CHEST PORT PLACEMENT  >5 YRS   6:30 AM   (60 min.)   [id number]   [facility] [place]    INSERTION, VASCULAR ACCESS PORT   8:00 AM   Kelvin Castillo MD   [facility] [place]    Outpatient Visit   8:00 AM   [facility] OR [place]    RETURN PATIENT  10:00 AM   (15 min.)   Julia Moseley MD   [facility] RETURN OSTOMY NURSE  10:15 AM   (60 min.)   Janette Wells, RN   [facility] [place]                       November 2024 Sunday Monday Tuesday Wednesday Thursday Friday Saturday 1    LAB CENTRAL   9:45 AM   (15 min.)   [facility] DRAW   [facility]    ONC INFUSION 5 HR (300 MIN)  10:30 AM   (300 min.)    ONC INFUSION NURSE   [facility] 2       3     4     5     6     7     8     9       10     11     12     13     14     15     16       17     18     19     20     21     22    LAB CENTRAL  10:00 AM   (15 min.)    [facility] DRAW   [facility]    ONC INFUSION 5 HR (300 MIN)  10:30 AM   (300 min.)    ONC INFUSION NURSE   [facility] 23       24     25     26     27     28     29     30                    Recent Results (from the past 24 hour(s))   Comprehensive metabolic panel    Collection Time: 10/11/24  7:36 AM   Result Value Ref Range    Sodium 140 135 - 145 mmol/L    Potassium 4.1 3.4 - 5.3 mmol/L    Carbon Dioxide (CO2) 20 (L) 22 - 29 mmol/L    Anion Gap 11 7 - 15 mmol/L    Urea Nitrogen 13.6 6.0 - 20.0 mg/dL    Creatinine 0.87 0.51 - 0.95 mg/dL    GFR Estimate 88 >60 mL/min/1.73m2    Calcium 9.7 8.8 - 10.4 mg/dL    Chloride 109 (H) 98 - 107 mmol/L    Glucose 100 (H) 70 - 99 mg/dL    Alkaline Phosphatase 51 40 - 150 U/L    AST 16 0 - 45 U/L    ALT 12 0 - 50 U/L    Protein Total 6.9 6.4 - 8.3 g/dL    Albumin 4.2 3.5 - 5.2 g/dL    Bilirubin Total 0.2 <=1.2 mg/dL    Patient  Fasting > 8hrs? Yes    Magnesium    Collection Time: 10/11/24  7:36 AM   Result Value Ref Range    Magnesium 1.9 1.7 - 2.3 mg/dL   Lipid Profile (Chol, Trig, HDL, LDL calc)    Collection Time: 10/11/24  7:36 AM   Result Value Ref Range    Cholesterol 177 <200 mg/dL    Triglycerides 143 <150 mg/dL    Direct Measure HDL 74 >=50 mg/dL    LDL Cholesterol Calculated 74 <100 mg/dL    Non HDL Cholesterol 103 <130 mg/dL    Patient Fasting > 8hrs? Yes    CBC with platelets and differential    Collection Time: 10/11/24  7:36 AM   Result Value Ref Range    WBC Count 4.0 4.0 - 11.0 10e3/uL    RBC Count 3.69 (L) 3.80 - 5.20 10e6/uL    Hemoglobin 10.6 (L) 11.7 - 15.7 g/dL    Hematocrit 33.3 (L) 35.0 - 47.0 %    MCV 90 78 - 100 fL    MCH 28.7 26.5 - 33.0 pg    MCHC 31.8 31.5 - 36.5 g/dL    RDW 16.4 (H) 10.0 - 15.0 %    Platelet Count 216 150 - 450 10e3/uL    % Neutrophils 32 %    % Lymphocytes 52 %    % Monocytes 8 %    % Eosinophils 8 %    % Basophils 1 %    % Immature Granulocytes 0 %    NRBCs per 100 WBC 0 <1 /100    Absolute Neutrophils 1.3 (L) 1.6 - 8.3 10e3/uL    Absolute Lymphocytes 2.1 0.8 - 5.3 10e3/uL    Absolute Monocytes 0.3 0.0 - 1.3 10e3/uL    Absolute Eosinophils 0.3 0.0 - 0.7 10e3/uL    Absolute Basophils 0.0 0.0 - 0.2 10e3/uL    Absolute Immature Granulocytes 0.0 <=0.4 10e3/uL    Absolute NRBCs 0.0 10e3/uL

## 2024-10-11 NOTE — NURSING NOTE
"Oncology Rooming Note    October 11, 2024 7:54 AM   Marizol Granados is a 39 year old female who presents for:    Chief Complaint   Patient presents with    Blood Draw     Labs drawn via PIV by RN in lab, vitals taken.     Oncology Clinic Visit     Primary low grade serous adenocarcinoma of ovary     Initial Vitals: /75 (BP Location: Right arm, Patient Position: Sitting, Cuff Size: Adult Regular)   Pulse 67   Temp 97.6  F (36.4  C) (Oral)   Resp 18   Ht 1.651 m (5' 5\")   Wt 68.5 kg (151 lb 1.6 oz)   LMP  (LMP Unknown)   SpO2 100%   BMI 25.14 kg/m   Estimated body mass index is 25.14 kg/m  as calculated from the following:    Height as of this encounter: 1.651 m (5' 5\").    Weight as of this encounter: 68.5 kg (151 lb 1.6 oz). Body surface area is 1.77 meters squared.  No Pain (0) Comment: Data Unavailable   No LMP recorded (lmp unknown). Patient has had a hysterectomy.  Allergies reviewed: Yes  Medications reviewed: Yes    Medications: Medication refills not needed today.  Pharmacy name entered into [employer]: [id number] - [facility] [address] AT [place] [address] & [address] [facility]    Frailty Screening:   Is the patient here for a new oncology consult visit in cancer care? 2. No      Clinical concerns: Patient reports pain at IV site in her left wrist.  Patient is agreeable to have infusion RN access her IV prior to infusion.       Kristel Sanchez CMA              "

## 2024-10-11 NOTE — PROGRESS NOTES
Infusion Nursing Note:  Marizol Granados presents today for Cycle 1 Day 1 paclitaxel, carboplatin, neulasta onpro.    Patient seen by provider today: Yes: Dr. Sosa   present during visit today: Not Applicable.    Note: Pt presents to infusion today feeling well. Pt offers no new concerns following visit with provider today.    Pt new to infusion today. Teaching completed previously by Randi Hernandez RN and reinforced by this RN. All medications reviewed and questions answered. Pt oriented to infusion room, call light, bathrooms and unit routines. [facility] chills and/or temperature >100.4F, uncontrolled nausea/vomiting/diarrhea, shortness of breath, chest pain, bleeding or any questions/concerns. Sent home with thermometer.       Intravenous Access:  Peripheral IV placed.    Treatment Conditions:  Lab Results   Component Value Date    HGB 10.6 (L) 10/11/2024    WBC 4.0 10/11/2024    ANEU 4.2 11/05/2012    ANEUTAUTO 1.3 (L) 10/11/2024     10/11/2024        Lab Results   Component Value Date     10/11/2024    POTASSIUM 4.1 10/11/2024    MAG 1.9 10/11/2024    CR 0.87 10/11/2024    LORY 9.7 10/11/2024    BILITOTAL 0.2 10/11/2024    ALBUMIN 4.2 10/11/2024    ALT 12 10/11/2024    AST 16 10/11/2024     Results reviewed, labs did NOT meet treatment parameters: ANC > 1.5: ANC 1.3 today - see TORB.    TORB Dr. Jessenia Sosa/Di Burton, RN 10/11 @ 5290:  - OK to treat with ANC 1.3 today      Post Infusion Assessment:  Patient tolerated infusion without incident.  Blood return noted pre and post infusion.  Site patent and intact, free from redness, edema or discomfort.  No evidence of extravasations.  Access discontinued per protocol.     ONPRO  Was placed on patient's: back of right arm.  Was placed at 1450 PM  Podpal used: Yes  Patient education included: what patient can expect after application, what colored lights mean on the device, when to remove device, when  and where to call with questions or issues, all patients questions answered, and that Neulasta administration will occur at 1750.  Patient tolerated administration well.    Discharge Plan:   Prescription refills given for zofran, compazine, and dexamethasone.  Discharge instructions reviewed with: Patient and family.  Patient and/or family verbalized understanding of discharge instructions and all questions answered.  Copy of AVS reviewed with patient and/or family.  Patient will return 11/1 for next appointment.  Patient discharged in stable condition accompanied by: self and friend.  Departure Mode: Ambulatory.      Di Burton RN
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
## 2024-10-12 ENCOUNTER — MYC REFILL (OUTPATIENT)
Dept: SURGERY | Facility: CLINIC | Age: 39
End: 2024-10-12
Payer: COMMERCIAL

## 2024-10-12 ENCOUNTER — NURSE TRIAGE (OUTPATIENT)
Dept: NURSING | Facility: CLINIC | Age: 39
End: 2024-10-12
Payer: COMMERCIAL

## 2024-10-12 DIAGNOSIS — C48.1 PRIMARY MALIGNANT NEOPLASM OF PELVIC PERITONEUM (H): Primary | ICD-10-CM

## 2024-10-12 DIAGNOSIS — R63.2 HYPERPHAGIA: ICD-10-CM

## 2024-10-12 RX ORDER — DEXAMETHASONE 0.5 MG/1
8 TABLET ORAL DAILY
Status: DISCONTINUED | OUTPATIENT
Start: 2024-10-12 | End: 2024-10-12 | Stop reason: HOSPADM

## 2024-10-12 NOTE — TELEPHONE ENCOUNTER
Nurse Triage SBAR    Is this a 2nd Level Triage? Yes - paging out of courtesy to patient    Situation/Background: Patient is calling with concern of today's dose of dexamethasone medication not available due to the medication accidentally going through the wash. Patient has enough tablets for two more doses. Patient is newly started on chemotherapy. Is requesting Rx for two tablets due to driving distance.        Assessment:   Patient is requesting a Rx for two pills be called in to [facility] in [place].   [facility] in [place] would be an option on Monday.     Recommendation: Per disposition, Paged Provider now. Patient requested Rx today due to newly starting chemotherapy and driving distance.     Protocol Recommended Disposition: Paged/Called Provider        Provider consult indicated.     Reason for page: Paging On-Call Provider out of courtesy to patient newly started on chemotherapy.   Page sent to Dr. Kuldip Long by RN at 9:58 AM.   Tina Small, DONNELL     Provider, Dr. Kuldip Long, returning page to Nurse Advisors at 10:29 AM  Provider recommended plan of care: Provider will send Rx for dexamethasone to [facility] #[id number], [place].   Patient notified and verbalized understanding. Patient had no additional questions or concerns at this time. Advised patient to call back if additional concerns.     Tina Small, RN           Tina Small, RN on 10/12/2024 at 9:47 AM  [facility] Nurse Advisors  Reason for Disposition   [1] Caller has URGENT medicine question about med that PCP or specialist prescribed AND [2] triager unable to answer question    Protocols used: Medication Question Call-A-

## 2024-10-14 DIAGNOSIS — Z53.9 DIAGNOSIS NOT YET DEFINED: Primary | ICD-10-CM

## 2024-10-14 RX ORDER — PHENTERMINE HYDROCHLORIDE 37.5 MG/1
TABLET ORAL
Qty: 90 TABLET | Refills: 1 | Status: SHIPPED | OUTPATIENT
Start: 2024-10-14

## 2024-10-14 NOTE — TELEPHONE ENCOUNTER
Tina calling in dexamethasone 4mg 2 tabs never got called in to her pharmacy by provider over the weekend.     Call placed to [facility] in [place] to fill 2- 4mg tabs of dexamethasone, due to patient washing 2 tabs in her pants pocket over the weekend.     Call placed to Marizol to let her know that 2 tabs of dex was called in to pharmacy.

## 2024-10-15 ENCOUNTER — PATIENT OUTREACH (OUTPATIENT)
Dept: ONCOLOGY | Facility: CLINIC | Age: 39
End: 2024-10-15
Payer: COMMERCIAL

## 2024-10-15 DIAGNOSIS — R31.9 BLOOD IN URINE: Primary | ICD-10-CM

## 2024-10-15 NOTE — PROGRESS NOTES
RN was asked to reach via patients Shoshana     Patient has self cath'd about three times and has noticed light pink blood in her urine     Patient denies any other signs and symptoms of concern other then the above    RN reviewed when to seek more emergent care    RN placed an order for a UA as well    Patient is on her way to her dad's  and will leave a urine sample when she can and or if symptoms persist       Patient appreciative of the call    Randi Hernandez RN

## 2024-10-16 ENCOUNTER — TELEPHONE (OUTPATIENT)
Dept: FAMILY MEDICINE | Facility: CLINIC | Age: 39
End: 2024-10-16
Payer: COMMERCIAL

## 2024-10-16 NOTE — TELEPHONE ENCOUNTER
Missed visits report in provider basket. Fax when complete.      Rosy JAY, - [facility]  Primary Care- [facility]

## 2024-10-16 NOTE — TELEPHONE ENCOUNTER
Forms/Letter Request    Type of form/letter: Missed Visits       Do we have the form/letter: Yes:     Who is the form from? Home care  [facility]  Missed Visits     Where did/will the form come from? form was faxed in        How would you like the form/letter returned: Fax : [phone]    Augustina Gore Patient Representative

## 2024-10-17 ENCOUNTER — VIRTUAL VISIT (OUTPATIENT)
Dept: ONCOLOGY | Facility: CLINIC | Age: 39
End: 2024-10-17
Attending: OBSTETRICS & GYNECOLOGY
Payer: COMMERCIAL

## 2024-10-17 DIAGNOSIS — Z80.52 FAMILY HISTORY OF BLADDER CANCER: ICD-10-CM

## 2024-10-17 DIAGNOSIS — C48.1 PRIMARY MALIGNANT NEOPLASM OF PELVIC PERITONEUM (H): Primary | ICD-10-CM

## 2024-10-17 PROCEDURE — 96040 HC GENETIC COUNSELING, EACH 30 MINUTES: CPT | Mod: GT,95 | Performed by: GENETIC COUNSELOR, MS

## 2024-10-17 NOTE — NURSING NOTE
Current patient location: [address]  [facility] [address]      Is the patient currently in the state of [place]? YES    Visit mode:VIDEO    If the visit is dropped, the patient can be reconnected by: VIDEO VISIT: Text to cell phone:   Telephone Information:   Mobile [phone]       Will anyone else be joining the visit? NO  (If patient encounters technical issues they should call [phone]    How would you like to obtain your AVS? MyChart    Are changes needed to the allergy or medication list? N/A    Reason for visit: Consult     Vianney CISNEROS

## 2024-10-17 NOTE — PROGRESS NOTES
Cancer Risk Management Program Genetic Counseling Note    10/17/2024    Referring Provider: Dr. Jessenia Sosa    Presenting Information:   I had a video visit with Marizol Granados today for genetic counseling through the Cancer Risk Management Program, in order to discuss her diagnosis of early onset peritoneal cancer and her family history of cancer.  She presents today to review this history, cancer screening recommendations, and available genetic testing options.    Personal History:  Marizol is a 39 year old female. This summer, Marizol develop significant left leg pain and left foot numbness of uncertain cause. An initial lumbar MRI in July noted some foraminal narrowing of the lumbar/sacral region; a follow-up lumbosacral MRI in August, however, incidentally noted a large complex cystic mass of the pelvis. 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 Marizol had multiple complication after that surgery, including the development of an abscess and fistula (see her chart for more details). She is planning on starting her chemotherapy in the next couple of weeks.     In other cancer screening, Marizol had a colonoscopy in June, which noted no polyps. She has not yet hand any mammograms. In other medical history, it is reported that Marizol has a personal medical history of hypothyroidism, PTSD/anxiety/depression, a previous history of abnormal clotting (DVT/PE), and migraines.      Family History: (Please see scanned pedigree for detailed family history  information)  As noted above, Marizol has been diagnosed with a primary peritoneal cancer at the age of 39.  Marizol reports that her father just  last week of complications of bladder cancer; she reports that he had a past history of smoking.  Marizol states that her paternal grandfather also has a history of cancer, but she wasn't sure about the type of cancer.  Marizol reports that she is not aware of any family history of cancer on her mother's side of the family.  Information about genetic ancestry was collected, as specific genetic variants may be more common in certain ethnic backgrounds. Marizol reports that she has [place] (and possibly other ) ancestry. She reports that she has Ashkenazi [place]ism ancestry on both sides of her family. There is no reported consanguinity.    Discussion:  We reviewed the features of sporadic, familial, and hereditary cancers. In looking at Marizol's family history, it is possible that a cancer susceptibility gene mutation is present due to Marizol's own personal diagnosis of early onset peritoneal cancer. We talked about that it has been estimated that about 20-25% of ovarian/peritoneal cancers are thought to be related to the inheritance of a mutation in a known hereditary cancer gene. In addition, we talked about that it has been noted that there are some specific hereditary cancer gene mutations associated with an increased risk for peritoneal/ovarian cancer that occur more frequently in individuals of Ashkenazi Hoahaoism ancestry.  .  We discussed the natural history and genetics of hereditary cancer syndromes associated with peritoneal/ovarian cancer. A detailed handout regarding some of these hereditary cancer syndromes and the information we discussed was provided to Marizol after our appointment today and can be found in the after visit summary. Topics included: inheritance pattern, cancer risks, cancer screening recommendations, and also risks, benefits and limitations of  testing.    Based on her personal history, Marizol meets current National Comprehensive Cancer Network (NCCN) criteria for genetic testing of ovarian cancer susceptibility genes (including BRCA1, BRCA2, Dash syndrome genes,  PALB2, RAD51C, RAD51C, etc., because of her own personal history of peritoneal cancer.      We discussed that there are additional genes besides those listed above that could cause increased risk for peritoneal/ovarian (and other) cancer. As many of these genes present with overlapping features in a family and accurate cancer risk cannot always be established based upon the pedigree analysis alone, it would be reasonable for Marizol to consider panel genetic testing to analyze multiple genes at once.    Marizol stated that she is interested in pursuing genetic testing for genes associated with hereditary cancer syndromes, and so we reviewed the various panel options and their potential benefits and limitations. Based on the above discussion, Marizol ultimately decided that she would like to proceed with a genetic analysis for BRCA1 and BRCA2 (with an automatic reflex to the comprehensive hereditary cancer genetic testing panel) through the Molecular [facility] Lab at [facility].  As such, recommendations for Marizol's future cancer risk management will be discussed in detail once genetic testing is completed.      Medical Management: For Marizol, we reviewed that the information from genetic testing may determine:  additional cancer screening for which Marizol may qualify, if indicated/appropriate (eg. mammogram and breast MRI, more frequent colonoscopies, more frequent dermatologic exams, etc.),  options for risk-reducing surgeries Marizol could consider, if indicated/appropriate (eg. bilateral mastectomy, etc.),    and targeted therapies for Marizol, if she were to develop certain cancers in the future (eg. immunotherapy for individuals with Dash syndrome, PARP inhibitors for HBOC syndrome, etc.).      These recommendations and possible targeted therapies will be discussed in detail once genetic testing is completed.     Plan:  1) Today, Marizol decided to proceed with a genetic analysis for BRCA1 and BRCA2 (with an automatic reflex to the comprehensive hereditary cancer genetic testing panel) through the [facility] Lab at [facility]. Therefore, consent was reviewed and verbally obtained for this testing.    2) We reviewed the options for sample collection.  Marizol plans to have her blood drawn at her next planned laboratory visit. Test results are expected to be available within 4-6 weeks.  3) Marizol will either have a telephone visit or video visit to discuss the results.  Additional recommendations about screening will be made at that time.    Destniey Sanabria MS, [facility]  Genetic Counselor  Cancer Risk Management Program     Face to face time spent over video: 30 minutes    Virtual Visit Details    Type of service:  Video Visit   Video Start Time:  9:15AM  Video End Time: 9:45AM    Originating Location (pt. Location): Home  Distant Location (provider location):  Off-site  Platform used for Video Visit: Brii

## 2024-10-17 NOTE — LETTER
Cancer Risk Management  Program     Mailing Address  Cancer Risk Management Program  [address]  [place] [address]    New patient appointments  [phone]  October 20, 2024    Marizol Granados  [address]  [facility] [address]      Dear Marizol,    It was a pleasure talking with you via your virtual genetic counseling visit on 10-.  Below is a copy of the progress note from that recent visit through the Cancer Risk Management Program.  If you have any additional questions, please feel free to contact me.    Cancer Risk Management Program Genetic Counseling Note    10/17/2024    Referring Provider: Dr. Jessenia Sosa    Presenting Information:   I had a video visit with Marizol Granados today for genetic counseling through the Cancer Risk Management Program, in order to discuss her diagnosis of early onset peritoneal cancer and her family history of cancer.  She presents today to review this history, cancer screening recommendations, and available genetic testing options.    Personal History:  Marizol is a 39 year old female. This summer, Marizol develop significant left leg pain and left foot numbness of uncertain cause. An initial lumbar MRI in July noted some foraminal narrowing of the lumbar/sacral region; a follow-up lumbosacral MRI in August, however, incidentally noted a large complex cystic mass of the pelvis. 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 Marizol had multiple complication after that  surgery, including the development of an abscess and fistula (see her chart for more details). She is planning on starting her chemotherapy in the next couple of weeks.     In other cancer screening, Marizol had a colonoscopy in , which noted no polyps. She has not yet hand any mammograms. In other medical history, it is reported that Marizol has a personal medical history of hypothyroidism, PTSD/anxiety/depression, a previous history of abnormal clotting (DVT/PE), and migraines.      Family History: (Please see scanned pedigree for detailed family history information)  As noted above, Marizol has been diagnosed with a primary peritoneal cancer at the age of 39.  Marizol reports that her father just  last week of complications of bladder cancer; she reports that he had a past history of smoking.  Marizol states that her paternal grandfather also has a history of cancer, but she wasn't sure about the type of cancer.  Marizol reports that she is not aware of any family history of cancer on her mother's side of the family.  Information about genetic ancestry was collected, as specific genetic variants may be more common in certain ethnic backgrounds. Marizol reports that she has [place] (and possibly other ) ancestry. She reports that she has Ashkenazi Yazdanism ancestry on both sides of her family. There is no reported consanguinity.    Discussion:  We reviewed the features of sporadic, familial, and hereditary cancers. In looking at Marizol's family history, it is possible that a cancer susceptibility gene mutation is present due to Marizol's own personal diagnosis of early onset peritoneal cancer. We talked about that it has been estimated that about 20-25% of ovarian/peritoneal cancers are thought to be related to the inheritance of a mutation in a known hereditary cancer gene. In addition, we talked about that it has been noted that there are some specific hereditary cancer gene mutations associated with an increased  risk for peritoneal/ovarian cancer that occur more frequently in individuals of Ashkenazi Hinduism ancestry.  .  We discussed the natural history and genetics of hereditary cancer syndromes associated with peritoneal/ovarian cancer. A detailed handout regarding some of these hereditary cancer syndromes and the information we discussed was provided to Marizol after our appointment today and can be found in the after visit summary. Topics included: inheritance pattern, cancer risks, cancer screening recommendations, and also risks, benefits and limitations of testing.    Based on her personal history, Marizol meets current National Comprehensive Cancer Network (NCCN) criteria for genetic testing of ovarian cancer susceptibility genes (including BRCA1, BRCA2, Dash syndrome genes,  PALB2, RAD51C, RAD51C, etc., because of her own personal history of peritoneal cancer.      We discussed that there are additional genes besides those listed above that could cause increased risk for peritoneal/ovarian (and other) cancer. As many of these genes present with overlapping features in a family and accurate cancer risk cannot always be established based upon the pedigree analysis alone, it would be reasonable for Marizol to consider panel genetic testing to analyze multiple genes at once.    Marizol stated that she is interested in pursuing genetic testing for genes associated with hereditary cancer syndromes, and so we reviewed the various panel options and their potential benefits and limitations. Based on the above discussion, Marizol ultimately decided that she would like to proceed with a genetic analysis for BRCA1 and BRCA2 (with an automatic reflex to the comprehensive hereditary cancer genetic testing panel) through the Molecular Diagnostics Lab at [facility].  As such, recommendations for Marizol's future cancer risk management will be discussed in detail once genetic testing is completed.      Medical Management: For Marizol, we  reviewed that the information from genetic testing may determine:  additional cancer screening for which Marizol may qualify, if indicated/appropriate (eg. mammogram and breast MRI, more frequent colonoscopies, more frequent dermatologic exams, etc.),  options for risk-reducing surgeries Marizol could consider, if indicated/appropriate (eg. bilateral mastectomy, etc.),    and targeted therapies for Marizol, if she were to develop certain cancers in the future (eg. immunotherapy for individuals with Dash syndrome, PARP inhibitors for HBOC syndrome, etc.).     These recommendations and possible targeted therapies will be discussed in detail once genetic testing is completed.     Plan:  1) Today, Marizol decided to proceed with a genetic analysis for BRCA1 and BRCA2 (with an automatic reflex to the comprehensive hereditary cancer genetic testing panel) through the Molecular Diagnostics Lab at [facility]. Therefore, consent was reviewed and verbally obtained for this testing.    2) We reviewed the options for sample collection.  Marizol plans to have her blood drawn at her next planned laboratory visit. Test results are expected to be available within 4-6 weeks.  3) Marizol will either have a telephone visit or video visit to discuss the results.  Additional recommendations about screening will be made at that time.    Destiney Sanabria MS, formerly [facility]  Genetic Counselor  Cancer Risk Management Program

## 2024-10-17 NOTE — LETTER
10/17/2024      Marizol Granados  [address]  [place] [address]      Dear Colleague,    Thank you for referring your patient, Marizol Granados, to the [facility]. Please see a copy of my visit note below.    Cancer Risk Management Program Genetic Counseling Note    10/17/2024    Referring Provider: Dr. Jessenia Sosa    Presenting Information:   I had a video visit with Marizol Granados today for genetic counseling through the Cancer Risk Management Program, in order to discuss her diagnosis of early onset peritoneal cancer and her family history of cancer.  She presents today to review this history, cancer screening recommendations, and available genetic testing options.    Personal History:  Marizol is a 39 year old female. This summer, Marizol develop significant left leg pain and left foot numbness of uncertain cause. An initial lumbar MRI in July noted some foraminal narrowing of the lumbar/sacral region; a follow-up lumbosacral MRI in August, however, incidentally noted a large complex cystic mass of the pelvis. 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 Marizol had multiple complication after that surgery, including the development of an abscess and fistula (see her chart for more details). She is planning on starting her chemotherapy in the next couple of weeks.     In other cancer screening, Marizol had a colonoscopy in June, which noted no polyps. She has not yet hand any mammograms. In other medical history, it is  reported that Marizol has a personal medical history of hypothyroidism, PTSD/anxiety/depression, a previous history of abnormal clotting (DVT/PE), and migraines.      Family History: (Please see scanned pedigree for detailed family history information)  As noted above, Marizol has been diagnosed with a primary peritoneal cancer at the age of 39.  Marizol reports that her father just  last week of complications of bladder cancer; she reports that he had a past history of smoking.  Marizol states that her paternal grandfather also has a history of cancer, but she wasn't sure about the type of cancer.  Marizol reports that she is not aware of any family history of cancer on her mother's side of the family.  Information about genetic ancestry was collected, as specific genetic variants may be more common in certain ethnic backgrounds. Marizol reports that she has [place] (and possibly other ) ancestry. She reports that she has Ashkenazi Pentecostalism ancestry on both sides of her family. There is no reported consanguinity.    Discussion:  We reviewed the features of sporadic, familial, and hereditary cancers. In looking at Marizol's family history, it is possible that a cancer susceptibility gene mutation is present due to Marizol's own personal diagnosis of early onset peritoneal cancer. We talked about that it has been estimated that about 20-25% of ovarian/peritoneal cancers are thought to be related to the inheritance of a mutation in a known hereditary cancer gene. In addition, we talked about that it has been noted that there are some specific hereditary cancer gene mutations associated with an increased risk for peritoneal/ovarian cancer that occur more frequently in individuals of Ashkenazi Pentecostalism ancestry.  .  We discussed the natural history and genetics of hereditary cancer syndromes associated with peritoneal/ovarian cancer. A detailed handout regarding some of these hereditary cancer syndromes and the information we  discussed was provided to Marizol after our appointment today and can be found in the after visit summary. Topics included: inheritance pattern, cancer risks, cancer screening recommendations, and also risks, benefits and limitations of testing.    Based on her personal history, Marizol meets current National Comprehensive Cancer Network (NCCN) criteria for genetic testing of ovarian cancer susceptibility genes (including BRCA1, BRCA2, Dash syndrome genes,  PALB2, RAD51C, RAD51C, etc., because of her own personal history of peritoneal cancer.      We discussed that there are additional genes besides those listed above that could cause increased risk for peritoneal/ovarian (and other) cancer. As many of these genes present with overlapping features in a family and accurate cancer risk cannot always be established based upon the pedigree analysis alone, it would be reasonable for Marizol to consider panel genetic testing to analyze multiple genes at once.    Marizol stated that she is interested in pursuing genetic testing for genes associated with hereditary cancer syndromes, and so we reviewed the various panel options and their potential benefits and limitations. Based on the above discussion, Marizol ultimately decided that she would like to proceed with a genetic analysis for BRCA1 and BRCA2 (with an automatic reflex to the comprehensive hereditary cancer genetic testing panel) through the Molecular Diagnostics Lab at [facility].  As such, recommendations for Marizol's future cancer risk management will be discussed in detail once genetic testing is completed.      Medical Management: For Marizol, we reviewed that the information from genetic testing may determine:  additional cancer screening for which Marizol may qualify, if indicated/appropriate (eg. mammogram and breast MRI, more frequent colonoscopies, more frequent dermatologic exams, etc.),  options for risk-reducing surgeries Marizol could consider, if  indicated/appropriate (eg. bilateral mastectomy, etc.),    and targeted therapies for Marizol, if she were to develop certain cancers in the future (eg. immunotherapy for individuals with Dash syndrome, PARP inhibitors for HBOC syndrome, etc.).     These recommendations and possible targeted therapies will be discussed in detail once genetic testing is completed.     Plan:  1) Today, Marizol decided to proceed with a genetic analysis for BRCA1 and BRCA2 (with an automatic reflex to the comprehensive hereditary cancer genetic testing panel) through the Molecular Diagnostics Lab at [facility]. Therefore, consent was reviewed and verbally obtained for this testing.    2) We reviewed the options for sample collection.  Marizol plans to have her blood drawn at her next planned laboratory visit. Test results are expected to be available within 4-6 weeks.  3) Marizol will either have a telephone visit or video visit to discuss the results.  Additional recommendations about screening will be made at that time.    Destiney Sanabria MS, [facility]  Genetic Counselor  Cancer Risk Management Program     Face to face time spent over video: 30 minutes    Virtual Visit Details    Type of service:  Video Visit   Video Start Time:  9:15AM  Video End Time: 9:45AM    Originating Location (pt. Location): Home  Distant Location (provider location):  Off-site  Platform used for Video Visit: AmWell      Again, thank you for allowing me to participate in the care of your patient.        Sincerely,        Lorin Sanabria GC

## 2024-10-17 NOTE — PATIENT INSTRUCTIONS
Assessing Cancer Risk  Cancer is a common diagnosis which impacts many families.  Individuals may develop cancer due to environmental factors (such as exposures and lifestyle), aging, genetic predisposition, or a combination of these factors.      Only about 5-10% of cancers are thought to be due to an inherited cancer susceptibility gene.    These families often have:  Several people with the same or related types of cancer  Cancers diagnosed at a young age (before age 50)  Individuals with more than one primary cancer  Multiple generations of the family affected with cancer    Comprehensive Gynecologic Cancer Panel  We each inherit two copies of every gene in our bodies: one from our mother, and one from our father. Each gene has a specific job to do.  When a gene has a mistake or  mutation  in it, it does not work like it should.     Some people may be candidates for genetic testing of more than one gene.  For these families, genetic testing using a cancer panel may be offered. These panels can test many genes at once known to increase the risk for gynecologic (and other) cancers.    This handout will review common hereditary gynecologic cancer syndromes. The genes that will be discussed in this handout are: BRCA1, BRCA2, BRIP1, MLH1, MSH2, MSH6, PMS2, EPCAM, PTEN, PALB2, RAD51C, RAD51D, and TP53.     The purpose of this handout is to serve as a brief summary of the gynecologic cancer risk genes that have published clinical management guidelines for individuals who are found to carry a mutation. Inheriting a mutation does not mean a person will develop cancer, but it does significantly increase their risk above the general population risk.    ______________________________________________________________________________    Hereditary Breast and Ovarian Cancer Syndrome (BRCA1 and BRCA2)  A single mutation in one of the copies of BRCA1 or BRCA2 increases the risk for breast and ovarian cancer, among others.   The risk for pancreatic cancer and melanoma may also be slightly increased in some families.  The chart below shows the chance that someone with a BRCA mutation would develop cancer in his or her lifetime1,2,3,4.       Lifetime Cancer Risks    General Population BRCA1  BRCA2   Breast  12% >60% >60%   Ovarian  1-2% 39-58% 13-29%   Prostate 12% 7-26% 19-61%   Male Breast 0.1% 0.2-1.2% 1.8-7.1%   Pancreas 1-2% Up to 5% 5-10%     A person s ethnic background is also important to consider, as individuals of Ashkenazi Baptism ancestry have a higher chance of having a BRCA gene mutation.  There are three BRCA mutations that occur more frequently in this population.      Dash Syndrome (MLH1, MSH2, MSH6, PMS2, and EPCAM)  Currently five genes are known to cause Dash Syndrome: MLH1, MSH2, MSH6, PMS2, and EPCAM.  A single mutation in one of the Dash Syndrome genes increases the risk for colon, endometrial, ovarian, and stomach cancers.  Other cancers that occur less commonly in Dash Syndrome include urinary tract, skin, and brain cancers.  The chart below shows the chance that a person with Dash syndrome would develop cancer in his or her lifetime7.      Lifetime Cancer Risks    General Population Dash Syndrome   Colon 5% 10-61%   Endometrial 3% 13-57%   Ovarian 1-2% 1-38%   Stomach <1% 1-9%   *Cancer risk varies depending on Dash syndrome gene found      Cowden Syndrome (PTEN)  Cowden syndrome is a hereditary condition that increases the risk for breast, thyroid, endometrial, and kidney cancer.  Cowden syndrome is caused by a mutation in the PTEN gene.  A single mutation in one of the copies of PTEN causes Cowden syndrome and increases cancer risk.  The chart below shows the chance that someone with a PTEN mutation would develop cancer in their lifetime5,6.  Other benign features seen in some individuals with Cowden syndrome include benign skin lesions (facial papules, keratoses, lipomas), learning disability, autism,  thyroid nodules, colon polyps, and larger head size.      Lifetime Cancer Risks    General Population Cowden   Breast 12% 40-60%*   Thyroid 1% Up to 38%   Renal 1-2% Up to 35%   Endometrial 3% Up to 28%   Colon 5% Up to 9%   Melanoma 2-3% Up to 6%   *Emerging data suggests the risk for breast cancer could be greater than 60%      Li-Fraumeni Syndrome (TP53)  Li-Fraumeni Syndrome (LFS) is a cancer predisposition syndrome caused by a mutation in the TP53 gene. A single mutation in one of the copies of TP53 increases the risk for multiple cancers. Individuals with LFS are at an increased risk for developing cancer at a young age. The general lifetime risk for development of cancer is 50% by age 30 and 90% by age 60.   Core Cancers: Sarcomas, Breast, Brain, Lung, Leukemias/Lymphomas, Adrenocortical carcinomas  Other Cancers: Gastrointestinal, Thyroid, Skin, Genitourinary      Additional Genes    PALB2  Mutations in PALB2 have been shown to increase the risk of breast cancer up to 41-60% in some families; where individuals fall within this risk range is dependent upon family [email or web address]. PALB2 mutations have also been associated with increased risk for pancreatic cancer between 5-10%.  Individuals who inherit two PALB2 mutations--one from their mother and one from their father--have a condition called Fanconi Anemia.  This rare autosomal recessive condition is associated with short stature, developmental delay, bone marrow failure, and increased risk for childhood cancers.    BRIP1, RAD51C and RAD51D  Mutations in RAD51C and RAD51D have been shown to increase the risk of ovarian cancer and breast cancer. Mutations in BRIP1 have been shown to increase the risk of ovarian cancer and possibly female breast cancer.       Lifetime Cancer Risk    General Population        BRIP1   RAD51C  RAD51D   Breast 12% Not well defined 20-40% 20-40%   Ovarian 1-2% 5-15% 10-15% 10-20%      ______________________________________________________________________________    Inheritance  All of the cancer syndromes reviewed above are inherited in an autosomal dominant pattern.  This means that if a parent has a mutation, each of their children will have a 50% chance of inheriting that same mutation. Therefore, each child --male or female-- would have a 50% chance of being at increased risk for developing cancer.      Image obtained from Genetics Home Reference, 2013     Mutations in some genes can occur de adalberto, which means that a person s mutation occurred for the first time in them and was not inherited from a parent.  Now that they have the mutation, however, it can be passed on to future generations.    Genetic Testing  Genetic testing involves a blood test and will look for any harmful mutations that are associated with increased cancer risk.  If possible, it is recommended that the person(s) who has had cancer be tested before other family members.  That person will give us the most useful information about whether or not a specific gene is associated with the cancer in the family.    Results  There are three possible results of genetic testing:  Positive--a harmful mutation was identified in one or more of the genes  Negative--no mutations were identified in any of the genes tested   Variant of unknown significance--a variation in one of the genes was identified, but it is unclear how this impacts cancer risk in the family    Advantages and Disadvantages   There are advantages and disadvantages to genetic testing.    Advantages  May clarify your cancer risk  Can help you make medical decisions  May explain the cancers in your family  May give useful information to your family members (if you share your results)    Disadvantages  Possible negative emotional impact of learning about inherited cancer risk  Uncertainty in interpreting a negative test result in some situations  Possible genetic  discrimination concerns (see below)    Genetic Information Nondiscrimination Act (TINY)  The Genetic Information Nondiscrimination Act of 2008 (TINY) is a federal law that protects individuals from health insurance or employment discrimination based on a genetic test result alone (with some exceptions, including employers with fewer than 15 employees, and ).  Although rare, TINY  does not cover discrimination protections in terms of life insurance, long term care, or disability insurances.  Visit the National Human [employer] [place] website to learn more.    Reducing Cancer Risk  All of the genes described in this handout have nationally recognized cancer screening guidelines that would be recommended for individuals who test positive.  In addition to increased cancer screening, surgeries may be offered or recommended to reduce cancer risk.  Recommendations are based upon an individual s genetic test result as well as their personal and family history of cancer.    Questions to Think About Regarding Genetic Testing:  What effect will the test result have on me and my relationship with my family members if I have an inherited gene mutation?  If I don t have a gene mutation?  Should I share my test results, and how will my family react to this news, which may also affect them?  Are my children ready to learn new information that may one day affect their own health?    Hereditary Cancer Resources    FORCE: Facing Our Risk of Cancer Empowered facingourrisk.org   Bright Pink bebrightpink.org   Li-Fraumeni Syndrome Association lfsassociation.org   PTEN World PTENworld.com   Collaborative Group of the Americas on Inherited Colorectal Cancer (CGA) cgaicc.com    Cancer Care cancercare.org   American Cancer Society (ACS) cancer.org   National Cancer [place] (NCI) cancer.gov     Please call us if you have any questions or concerns.   Cancer Risk Management Program [phone] ([phone])  Evan  Watson, MS [facility] [place] [phone]  Twyla Gutierrez, MS, [facility] [place] [phone]  Destiney Sanabria, MS, [facility] [place]  [phone]     [email or web address]  Farida Arguelles, MS, [facility] [place]  [phone]  Elizabeth May, MS, [facility] [place]  [phone]  Rona Powell, MS, [facility] [place] [phone]  Gianna Layton, MS, [facility] [place] [phone]    References  Ida CALDERA, Shabbir PDP, Narbrianna S, Risch POP, Bradley JE, David JL, Carlos N, Odin H, Srini O, Shamika A, José Miguel B, Dee Dee P, Urmila S, Annmarie DM, Edwardo N, Geovanni E, Geraldine H, Fuentes E, Nura J, Hattie J, Nilay B, Tulinius H, Thorlacius S, Eerola H, Guera H, Wang K, Ananya OP. Average risks of breast and ovarian cancer associated with BRCA1 or BRCA2 mutations detected in case series unselected for family history: a combined analysis of 222 studies. Am J Hum Mary. 2003;72:1117-30.  Gerardo N, Desiree M, Bakari G.  BRCA1 and BRCA2 Hereditary Breast and Ovarian Cancer. Gene Reviews online. 2013.  Chris YC, Valeria S, Hubert G, Mahan S. Breast cancer risk among male BRCA1 and BRCA2 mutation carriers. J Natl Cancer Inst. 2007;99:1811-4.  Vel DG, Anette I, Rohit J, Orlando E, Darnell ER, Lalo F. Risk of breast cancer in male BRCA2 carriers. J Med Mary. 2010;47:710-1.  Nile MH, Francine J, Camron J, Shikha FOWLER, Li CAROLINA, Michelle C. Lifetime cancer risks in individuals with germline PTEN mutations. Clin Cancer Res. 2012;18:400-7.  Venus HOFFMAN. Cowden Syndrome: A Critical Review of the Clinical Literature. J Mary . 2009:18:13-27.  National Comprehensive Cancer Network. Clinical practice guidelines in oncology, colorectal cancer screening. Available online (registration required). 2015.  Ida GROSS et al. Breast-Cancer Risk in Families with Mutations in PALB2. NEJM. 2014; 371(6):497-506.

## 2024-10-21 ENCOUNTER — PRE VISIT (OUTPATIENT)
Dept: UROLOGY | Facility: CLINIC | Age: 39
End: 2024-10-21
Payer: COMMERCIAL

## 2024-10-21 NOTE — TELEPHONE ENCOUNTER
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 Was taught to [facility] and order placed for catheters.    Records/imaging/labs/orders: All records available.    Pt called: No need for a call    At Rooming: Standard rooming. Confirm that patient has been successfully cathing    Claudia Nimo  10/21/2024  9:40 AM

## 2024-10-25 DIAGNOSIS — C56.3 OVARIAN CANCER, BILATERAL (H): Primary | ICD-10-CM

## 2024-10-25 RX ORDER — OXYCODONE HYDROCHLORIDE 5 MG/1
5 CAPSULE ORAL EVERY 4 HOURS PRN
Qty: 20 CAPSULE | Refills: 0 | Status: SHIPPED | OUTPATIENT
Start: 2024-10-25

## 2024-10-25 NOTE — PROGRESS NOTES
Called to discuss pain with patient    She is having terrible rectal/anal pain. She describes it as sharp and constant. Difficult to sit down.     She has tried 2 suppositories and had no output. No longer draining per vagina. Ileostomy is functioning. Still requiring 5-6x daily ISC for bladder dysfunction.     Feels recovered from cycle 1 of chemo    I do not know why she is having worsening pain, but suspect it may be related to ongoing disimpaction from stool prior to surgery and a partial colon obstruction. Frankly, I do not know the best way to solve this problem.     I have given her an Rx for narcotics, and will reach out to my colorectal colleagues for any advice on next steps       Jessenia Sosa MD  Gynecologic Oncology  Pager [phone]

## 2024-10-28 ENCOUNTER — TELEPHONE (OUTPATIENT)
Dept: FAMILY MEDICINE | Facility: CLINIC | Age: 39
End: 2024-10-28
Payer: COMMERCIAL

## 2024-10-28 ENCOUNTER — MEDICAL CORRESPONDENCE (OUTPATIENT)
Dept: HEALTH INFORMATION MANAGEMENT | Facility: CLINIC | Age: 39
End: 2024-10-28
Payer: COMMERCIAL

## 2024-10-28 ENCOUNTER — TELEPHONE (OUTPATIENT)
Dept: SURGERY | Facility: CLINIC | Age: 39
End: 2024-10-28
Payer: COMMERCIAL

## 2024-10-28 ENCOUNTER — ANESTHESIA EVENT (OUTPATIENT)
Dept: SURGERY | Facility: AMBULATORY SURGERY CENTER | Age: 39
End: 2024-10-28
Payer: COMMERCIAL

## 2024-10-28 ENCOUNTER — PATIENT OUTREACH (OUTPATIENT)
Dept: ONCOLOGY | Facility: CLINIC | Age: 39
End: 2024-10-28
Payer: COMMERCIAL

## 2024-10-28 DIAGNOSIS — K61.1 PERIRECTAL ABSCESS: Primary | ICD-10-CM

## 2024-10-28 ASSESSMENT — LIFESTYLE VARIABLES: TOBACCO_USE: 0
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
## 2024-10-28 NOTE — TELEPHONE ENCOUNTER
Home Care is calling regarding an established patient with [facility].       Requesting orders from: Georgie Hernández  Provider is following patient: Yes  Is this a 60-day recertification request?  No    Orders Requested    Skilled Nursing  Request for continuation of care with no increase or decrease in frequency  Frequency:  1x/wk for 1 wks          Verbal orders given.  Home Care will send orders for provider to sign.  Confirmed ok to leave a detailed message with call back.  Contact information confirmed and updated as needed.    DONNELL Webster with [facility] [phone]    Jerrica Diaz RN

## 2024-10-28 NOTE — TELEPHONE ENCOUNTER
Called to check in with Marizol    Her pain was reasonable over the weekend. Took oxy 1-3x/day.     Reports more stool output in her pad/diaper, unsure if it was from the vagina or rectum.     Suspect she is still very impacted with stool given CT images showing large stool burden    Discussed w Dr Carpenter and he recommended an EUA with flex sig in order to potentially break up and clean out the colon.     Reviewed risks and benefits of surgery. She would like to proceed.     She does have chemo scheduled for Friday (C2D1) so this timing works out well to do it now when her counts are likely recovered.     Plan EUA, flex sig 10/29/24 in combination with CRS.       Jessenia Sosa MD  Gynecologic Oncology  Pager [phone]

## 2024-10-28 NOTE — PROGRESS NOTES
RN reached out to patient in regards to phentermine (ADIPEX-P) 37.5 MG tablet     Patient last took this yesterday   Patient will NOT take today's dose and will stop this all together per Dr Sosa's instructions     Patient is awaiting a call from the colorectal team and has no questions for Gyn/Onc team      Randi Hernandez RN

## 2024-10-28 NOTE — TELEPHONE ENCOUNTER
Forms/Letter Request    Type of form/letter: O   Tracking [id number]       Do we have the form/letter: Yes:     Who is the form from? Home care    Where did/will the form come from? form was faxed in      How would you like the form/letter returned:   Fax : [phone]    Augustina Gore Patient Representative

## 2024-10-29 ENCOUNTER — MYC MEDICAL ADVICE (OUTPATIENT)
Dept: WOUND CARE | Facility: CLINIC | Age: 39
End: 2024-10-29
Payer: COMMERCIAL

## 2024-10-29 ENCOUNTER — ANESTHESIA (OUTPATIENT)
Dept: SURGERY | Facility: AMBULATORY SURGERY CENTER | Age: 39
End: 2024-10-29
Payer: COMMERCIAL

## 2024-10-29 ENCOUNTER — HOSPITAL ENCOUNTER (OUTPATIENT)
Facility: AMBULATORY SURGERY CENTER | Age: 39
Discharge: HOME | End: 2024-10-29
Attending: SURGERY
Payer: COMMERCIAL

## 2024-10-29 VITALS
DIASTOLIC BLOOD PRESSURE: 69 MMHG | BODY MASS INDEX: 24.16 KG/M2 | TEMPERATURE: 97.2 F | OXYGEN SATURATION: 100 % | HEIGHT: 65 IN | SYSTOLIC BLOOD PRESSURE: 116 MMHG | RESPIRATION RATE: 16 BRPM | WEIGHT: 145 LBS | HEART RATE: 56 BPM

## 2024-10-29 DIAGNOSIS — N82.3 RECTOVAGINAL FISTULA: Primary | ICD-10-CM

## 2024-10-29 DIAGNOSIS — Z93.2 STATUS POST ILEOSTOMY (H): Primary | ICD-10-CM

## 2024-10-29 PROCEDURE — 45393 COLONOSCOPY W/DECOMPRESSION: CPT | Mod: 22 | Performed by: SURGERY

## 2024-10-29 PROCEDURE — 45330 DIAGNOSTIC SIGMOIDOSCOPY: CPT | Performed by: NURSE ANESTHETIST, CERTIFIED REGISTERED

## 2024-10-29 PROCEDURE — 45330 DIAGNOSTIC SIGMOIDOSCOPY: CPT | Performed by: STUDENT IN AN ORGANIZED HEALTH CARE EDUCATION/TRAINING PROGRAM

## 2024-10-29 PROCEDURE — 45393 COLONOSCOPY W/DECOMPRESSION: CPT

## 2024-10-29 RX ORDER — DEXAMETHASONE SODIUM PHOSPHATE 10 MG/ML
4 INJECTION, SOLUTION INTRAMUSCULAR; INTRAVENOUS
Status: CANCELLED | OUTPATIENT
Start: 2024-10-29

## 2024-10-29 RX ORDER — SODIUM CHLORIDE, SODIUM LACTATE, POTASSIUM CHLORIDE, CALCIUM CHLORIDE 600; 310; 30; 20 MG/100ML; MG/100ML; MG/100ML; MG/100ML
INJECTION, SOLUTION INTRAVENOUS CONTINUOUS
Status: CANCELLED | OUTPATIENT
Start: 2024-10-29

## 2024-10-29 RX ORDER — ONDANSETRON 4 MG/1
4 TABLET, ORALLY DISINTEGRATING ORAL
Status: DISCONTINUED | OUTPATIENT
Start: 2024-10-29 | End: 2024-10-30 | Stop reason: HOSPADM

## 2024-10-29 RX ORDER — ONDANSETRON 2 MG/ML
4 INJECTION INTRAMUSCULAR; INTRAVENOUS EVERY 30 MIN PRN
Status: CANCELLED | OUTPATIENT
Start: 2024-10-29

## 2024-10-29 RX ORDER — KETAMINE HYDROCHLORIDE 10 MG/ML
INJECTION INTRAMUSCULAR; INTRAVENOUS PRN
Status: DISCONTINUED | OUTPATIENT
Start: 2024-10-29 | End: 2024-10-29

## 2024-10-29 RX ORDER — NALOXONE HYDROCHLORIDE 0.4 MG/ML
0.1 INJECTION, SOLUTION INTRAMUSCULAR; INTRAVENOUS; SUBCUTANEOUS
Status: CANCELLED | OUTPATIENT
Start: 2024-10-29

## 2024-10-29 RX ORDER — PROPOFOL 10 MG/ML
INJECTION, EMULSION INTRAVENOUS CONTINUOUS PRN
Status: DISCONTINUED | OUTPATIENT
Start: 2024-10-29 | End: 2024-10-29

## 2024-10-29 RX ORDER — HYDROMORPHONE HYDROCHLORIDE 1 MG/ML
0.4 INJECTION, SOLUTION INTRAMUSCULAR; INTRAVENOUS; SUBCUTANEOUS ONCE
Status: COMPLETED | OUTPATIENT
Start: 2024-10-29 | End: 2024-10-29

## 2024-10-29 RX ORDER — OXYCODONE HYDROCHLORIDE 5 MG/1
10 TABLET ORAL
Status: CANCELLED | OUTPATIENT
Start: 2024-10-29

## 2024-10-29 RX ORDER — ACETAMINOPHEN 325 MG/1
975 TABLET ORAL ONCE
Status: COMPLETED | OUTPATIENT
Start: 2024-10-29 | End: 2024-10-29

## 2024-10-29 RX ORDER — METHOCARBAMOL 500 MG/1
500 TABLET, FILM COATED ORAL 4 TIMES DAILY PRN
Qty: 40 TABLET | Refills: 1 | Status: SHIPPED | OUTPATIENT
Start: 2024-10-29 | End: 2024-11-18

## 2024-10-29 RX ORDER — HYDROMORPHONE HYDROCHLORIDE 2 MG/1
2 TABLET ORAL ONCE
Status: COMPLETED | OUTPATIENT
Start: 2024-10-29 | End: 2024-10-29

## 2024-10-29 RX ORDER — FENTANYL CITRATE 50 UG/ML
50 INJECTION, SOLUTION INTRAMUSCULAR; INTRAVENOUS EVERY 5 MIN PRN
Status: CANCELLED | OUTPATIENT
Start: 2024-10-29

## 2024-10-29 RX ORDER — HYDROMORPHONE HYDROCHLORIDE 1 MG/ML
0.2 INJECTION, SOLUTION INTRAMUSCULAR; INTRAVENOUS; SUBCUTANEOUS EVERY 5 MIN PRN
Status: CANCELLED | OUTPATIENT
Start: 2024-10-29

## 2024-10-29 RX ORDER — SODIUM CHLORIDE, SODIUM LACTATE, POTASSIUM CHLORIDE, CALCIUM CHLORIDE 600; 310; 30; 20 MG/100ML; MG/100ML; MG/100ML; MG/100ML
INJECTION, SOLUTION INTRAVENOUS CONTINUOUS PRN
Status: DISCONTINUED | OUTPATIENT
Start: 2024-10-29 | End: 2024-10-29

## 2024-10-29 RX ORDER — LIDOCAINE HYDROCHLORIDE 20 MG/ML
INJECTION, SOLUTION INFILTRATION; PERINEURAL PRN
Status: DISCONTINUED | OUTPATIENT
Start: 2024-10-29 | End: 2024-10-29

## 2024-10-29 RX ORDER — ONDANSETRON 2 MG/ML
4 INJECTION INTRAMUSCULAR; INTRAVENOUS ONCE
Status: COMPLETED | OUTPATIENT
Start: 2024-10-29 | End: 2024-10-29

## 2024-10-29 RX ORDER — FENTANYL CITRATE 50 UG/ML
25 INJECTION, SOLUTION INTRAMUSCULAR; INTRAVENOUS EVERY 5 MIN PRN
Status: CANCELLED | OUTPATIENT
Start: 2024-10-29

## 2024-10-29 RX ORDER — HYDROMORPHONE HYDROCHLORIDE 1 MG/ML
0.4 INJECTION, SOLUTION INTRAMUSCULAR; INTRAVENOUS; SUBCUTANEOUS EVERY 5 MIN PRN
Status: CANCELLED | OUTPATIENT
Start: 2024-10-29

## 2024-10-29 RX ORDER — ONDANSETRON 2 MG/ML
INJECTION INTRAMUSCULAR; INTRAVENOUS PRN
Status: DISCONTINUED | OUTPATIENT
Start: 2024-10-29 | End: 2024-10-29

## 2024-10-29 RX ORDER — OXYCODONE HYDROCHLORIDE 5 MG/1
5 TABLET ORAL EVERY 8 HOURS PRN
Qty: 6 TABLET | Refills: 0 | Status: SHIPPED | OUTPATIENT
Start: 2024-10-29 | End: 2024-11-01

## 2024-10-29 RX ORDER — ONDANSETRON 4 MG/1
4 TABLET, ORALLY DISINTEGRATING ORAL EVERY 30 MIN PRN
Status: CANCELLED | OUTPATIENT
Start: 2024-10-29

## 2024-10-29 RX ORDER — PROPOFOL 10 MG/ML
INJECTION, EMULSION INTRAVENOUS PRN
Status: DISCONTINUED | OUTPATIENT
Start: 2024-10-29 | End: 2024-10-29

## 2024-10-29 RX ORDER — OXYCODONE HYDROCHLORIDE 5 MG/1
5 TABLET ORAL
Status: CANCELLED | OUTPATIENT
Start: 2024-10-29

## 2024-10-29 RX ADMIN — HYDROMORPHONE HYDROCHLORIDE 0.4 MG: 1 INJECTION, SOLUTION INTRAMUSCULAR; INTRAVENOUS; SUBCUTANEOUS at 17:59

## 2024-10-29 RX ADMIN — ACETAMINOPHEN 975 MG: 325 TABLET ORAL at 13:55

## 2024-10-29 RX ADMIN — KETAMINE HYDROCHLORIDE 15 MG: 10 INJECTION INTRAMUSCULAR; INTRAVENOUS at 15:37

## 2024-10-29 RX ADMIN — SODIUM CHLORIDE, SODIUM LACTATE, POTASSIUM CHLORIDE, CALCIUM CHLORIDE: 600; 310; 30; 20 INJECTION, SOLUTION INTRAVENOUS at 14:22

## 2024-10-29 RX ADMIN — PROPOFOL 150 MCG/KG/MIN: 10 INJECTION, EMULSION INTRAVENOUS at 16:19

## 2024-10-29 RX ADMIN — PROPOFOL 150 MCG/KG/MIN: 10 INJECTION, EMULSION INTRAVENOUS at 15:37

## 2024-10-29 RX ADMIN — PROPOFOL 250 MCG/KG/MIN: 10 INJECTION, EMULSION INTRAVENOUS at 14:45

## 2024-10-29 RX ADMIN — KETAMINE HYDROCHLORIDE 10 MG: 10 INJECTION INTRAMUSCULAR; INTRAVENOUS at 16:13

## 2024-10-29 RX ADMIN — KETAMINE HYDROCHLORIDE 10 MG: 10 INJECTION INTRAMUSCULAR; INTRAVENOUS at 16:38

## 2024-10-29 RX ADMIN — KETAMINE HYDROCHLORIDE 15 MG: 10 INJECTION INTRAMUSCULAR; INTRAVENOUS at 15:55

## 2024-10-29 RX ADMIN — ONDANSETRON 4 MG: 2 INJECTION INTRAMUSCULAR; INTRAVENOUS at 13:52

## 2024-10-29 RX ADMIN — ONDANSETRON 4 MG: 2 INJECTION INTRAMUSCULAR; INTRAVENOUS at 16:30

## 2024-10-29 RX ADMIN — PROPOFOL 75 MCG/KG/MIN: 10 INJECTION, EMULSION INTRAVENOUS at 17:19

## 2024-10-29 RX ADMIN — PROPOFOL 80 MG: 10 INJECTION, EMULSION INTRAVENOUS at 14:26

## 2024-10-29 RX ADMIN — HYDROMORPHONE HYDROCHLORIDE 2 MG: 2 TABLET ORAL at 18:14

## 2024-10-29 RX ADMIN — SODIUM CHLORIDE, SODIUM LACTATE, POTASSIUM CHLORIDE, CALCIUM CHLORIDE: 600; 310; 30; 20 INJECTION, SOLUTION INTRAVENOUS at 16:19

## 2024-10-29 RX ADMIN — LIDOCAINE HYDROCHLORIDE 60 MG: 20 INJECTION, SOLUTION INFILTRATION; PERINEURAL at 14:26

## 2024-10-29 RX ADMIN — PROPOFOL 150 MCG/KG/MIN: 10 INJECTION, EMULSION INTRAVENOUS at 14:26

## 2024-10-29 NOTE — ANESTHESIA CARE TRANSFER NOTE
Patient: Marizol Granados    Procedure: Procedure(s):  SIGMOIDOSCOPY, FLEXIBLE, EXAMINATION UNDER ANESTHESIA, FECAL DISIMPACTION       Diagnosis: Perirectal abscess [K61.1]  Diagnosis Additional Information: No value filed.    Anesthesia Type:   MAC     Note:    Oropharynx: oropharynx clear of all foreign objects and spontaneously breathing  Level of Consciousness: drowsy  Oxygen Supplementation: room air    Independent Airway: airway patency satisfactory and stable  Dentition: dentition unchanged  Vital Signs Stable: post-procedure vital signs reviewed and stable  Report to RN Given: handoff report given  Patient transferred to: Phase II    Handoff Report: Identifed the Patient, Identified the Reponsible Provider, Reviewed the pertinent medical history, Discussed the surgical course, Reviewed Intra-OP anesthesia mangement and issues during anesthesia, Set expectations for post-procedure period and Allowed opportunity for questions and acknowledgement of understanding      Vitals:  Vitals Value Taken Time   BP     Temp     Pulse 62    Resp 13    SpO2 100 % 10/29/24 1742   Vital signs per nursing documentation.       Electronically Signed By: GERALDO Mancuso CRNA  October 29, 2024  5:43 PM

## 2024-10-29 NOTE — ANESTHESIA POSTPROCEDURE EVALUATION
Patient: Marizol Granados    Procedure: Procedure(s):  SIGMOIDOSCOPY, FLEXIBLE, EXAMINATION UNDER ANESTHESIA, FECAL DISIMPACTION       Anesthesia Type:  MAC    Note:  Disposition: Outpatient   Postop Pain Control: Uneventful            Sign Out: Well controlled pain   PONV: No   Neuro/Psych: Uneventful            Sign Out: Acceptable/Baseline neuro status   Airway/Respiratory: Uneventful            Sign Out: Acceptable/Baseline resp. status   CV/Hemodynamics: Uneventful            Sign Out: Acceptable CV status; No obvious hypovolemia; No obvious fluid overload   Other NRE: NONE   DID A NON-ROUTINE EVENT OCCUR? No           Last vitals:  Vitals Value Taken Time   /72 10/29/24 1756   Temp 36.2  C (97.1  F) 10/29/24 1742   Pulse 47 10/29/24 1751   Resp 14 10/29/24 1756   SpO2 100 % 10/29/24 1813   Vitals shown include unfiled device data.    Electronically Signed By: Peter Elizabeth MD  October 29, 2024  6:14 PM

## 2024-10-29 NOTE — OP NOTE
[facility] Colorectal Surgery Operative Report  October 29, 2024    PREOPERATIVE DIAGNOSIS:  1. Primary peritoneal carcinomatosis arising in endometriosis  2. Rectovaginal fistula    POSTOPERATIVE DIAGNOSIS:   1. Primary peritoneal carcinomatosis arising in endometriosis  2. Rectovaginal fistula    PROCEDURE:  1. Fecal disimpaction  2. Colonoscopy  Modifier 22: This case was extremely difficult due to the extent of fecal impaction that made exposure and clearance very difficult difficult. This case was at least 50% more difficult and took 2 hours longer than typical for a similar case.       ANESTHESIA: MAC    SURGEON:  Gerson Carpenter M.D.    ASSISTANT(S): Jessenia Sosa M.D.    INDICATIONS FOR PROCEDURE  Ms. Granados is a 38 yo F with primary peritoneal carcinomatosis s/p debulking complicated by a rectovaginal fistula in the setting of diverting loop ileostomy. She has been having significant tenesmus likely from fecal impaction. She now presents for EUA with disimpaction. I thoroughly discussed the risks, benefits, and alternatives of operative treatment with the patient and she agreed to proceed. Risks of the colonoscopy were reviewed with the patient.    OPERATIVE PROCEDURE:    The patient was brought to the operating room where she was gently sedated under MAC. The patient was positioned in lithotomy position with appropriate padding. A time out was performed. A digital exam was performed which revealed a  mm rectovaginal fistula with staples palpated. A colonoscope was passed into the anus, advanced into the rectum. Upon identifying the staples, extraneous staples at the fistula were removed by jumbo grasper. We then proceeded with the disimpaction. Dr Sosa placed a cysto catheter with 3L with betadine in the efferent limb of the ileostomy while I proceeded proximal. There was major fecal burden, which altogether took 2.5 hours to completely disimpact the entirety of the colon. However, we were successful in  doing so. I reached the cecum, and then moved distal ensuring that the remainder of the colon consisted of liquid stool with no more solid fecal burden. The scope was then removed and the patient was awoken doing well. Postoperatively, the patient was transferred to the recovery room in stable condition.        Gerson Carpenter MD  Division of Colon and Rectal Surgery  [facility]  [phone]

## 2024-10-29 NOTE — OR NURSING
3 staples from patient's colon removed and discarded per both surgeons. Madison intact.   TROY Hook RN

## 2024-10-29 NOTE — DISCHARGE INSTRUCTIONS
[facility]  Home Care Following Anesthesia  For 24 hours after surgery:  Get plenty of rest.  A responsible adult must stay with you for at least 24 hours after you leave the surgery center.  Do not drive or use heavy equipment.  If you have weakness or tingling, don't drive or use heavy equipment until this feeling goes away.   Do not drink alcohol.   Avoid strenuous or risky activities.  Ask for help when climbing stairs.  You may feel lightheaded.  IF so, sit for a few minutes before standing.  Have someone help you get up.   If you have nausea (feel sick to your stomach): Drink only clear liquids such as apple juice, ginger ale, broth or 7-Up.  Rest may also help.  Be sure to drink enough fluids.  Move to a regular diet as you feel able.   You may have a slight fever.  Call the doctor if your fever is over 100 F (37.7 C) (taken under the tongue) or lasts longer than 24 hours.  You may have a dry mouth, a sore throat, muscle aches or trouble sleeping. These should go away after 24 hours.  Do not make important or legal decisions.   It is recommended to avoid smoking.               Tips for taking pain medications  To get the best pain relief possible, remember these points:  Take pain medications as directed, before pain becomes severe.  Pain medication can upset your stomach: taking it with food may help.  Constipation is a common side effect of pain medication. Drink plenty of  fluids.  Eat foods high in fiber. Take a stool softener if recommended by your doctor or pharmacist.  Do not drink alcohol, drive or operate machinery while taking pain medications.  Ask about other ways to control pain, such as with heat, ice or relaxation.    Tylenol/Acetaminophen Consumption    If you feel your pain relief is insufficient, you may take Tylenol/Acetaminophen in addition to your narcotic pain medication.   Be careful not to exceed 4,000 mg of Tylenol/Acetaminophen in a 24 hour  period from all sources.  If you are taking extra strength Tylenol/acetaminophen (500 mg), the maximum dose is 8 tablets in 24 hours.  If you are taking regular strength acetaminophen (325 mg), the maximum dose is 12 tablets in 24 hours.    Call a doctor for any of the following:  Signs of infection (fever, growing tenderness at the surgery site, a large amount of drainage or bleeding, severe pain, foul-smelling drainage, redness, swelling).  It has been over 8 to 10 hours since surgery and you are still not able to urinate (pass water).  Headache for over 24 hours.  Numbness, tingling or weakness the day after surgery (if you had spinal anesthesia).  Signs of Covid-19 infection (temperature over 100 degrees, shortness of breath, cough, loss of taste/smell, generalized body aches, persistent headache, chills, sore throat, nausea/vomiting/diarrhea)  Your doctor is:  Dr. Gerson Carpenter, Colon Rectal: [phone]                    Or dial [phone] and ask for the resident on call for:  Colon Rectal  For emergency care, call the:  [facility]:  [phone] (TTY for hearing impaired: [phone])

## 2024-10-30 ENCOUNTER — MEDICAL CORRESPONDENCE (OUTPATIENT)
Dept: HEALTH INFORMATION MANAGEMENT | Facility: CLINIC | Age: 39
End: 2024-10-30

## 2024-10-30 ENCOUNTER — PATIENT OUTREACH (OUTPATIENT)
Dept: ONCOLOGY | Facility: CLINIC | Age: 39
End: 2024-10-30
Payer: COMMERCIAL

## 2024-10-30 NOTE — PROGRESS NOTES
Patient had sent a MyChart asking for pain medications     Patient rectal pain/pressure is the same as it has been. No better no worse.     Patient has not tried anything but Tylenol     Patient was asking for Dilaudid and stated that she had not picked up the oxy that was prescribed. However, mom had picked this up yesterday and patient did have this Rx in her possession and did still have some tablets from Fridays Rx from MD     RN and patient reviewed medications and pain management. Reviewed options such as adding Ibuprofen after her port placement and review of blood counts on Friday     Patient denies any signs and symptoms of concern other then the continued rectal pain/pressure     Patient is concerned about port placement tomorrow so RN and patient reviewed what to expect     RN answered all questions to the best of her ability     Patient will review pain management with MD on 11/1 (Friday)     MD updated on above     Patient very appreciative of RN call and time       Randi Hernandez RN

## 2024-10-31 ENCOUNTER — ANCILLARY PROCEDURE (OUTPATIENT)
Dept: RADIOLOGY | Facility: AMBULATORY SURGERY CENTER | Age: 39
End: 2024-10-31
Attending: OBSTETRICS & GYNECOLOGY
Payer: COMMERCIAL

## 2024-10-31 ENCOUNTER — OFFICE VISIT (OUTPATIENT)
Dept: WOUND CARE | Facility: CLINIC | Age: 39
End: 2024-10-31
Payer: COMMERCIAL

## 2024-10-31 ENCOUNTER — OFFICE VISIT (OUTPATIENT)
Dept: UROLOGY | Facility: CLINIC | Age: 39
End: 2024-10-31
Payer: COMMERCIAL

## 2024-10-31 ENCOUNTER — HOSPITAL ENCOUNTER (OUTPATIENT)
Facility: AMBULATORY SURGERY CENTER | Age: 39
Discharge: HOME | End: 2024-10-31
Attending: RADIOLOGY | Admitting: RADIOLOGY
Payer: COMMERCIAL

## 2024-10-31 VITALS
RESPIRATION RATE: 16 BRPM | SYSTOLIC BLOOD PRESSURE: 89 MMHG | OXYGEN SATURATION: 98 % | WEIGHT: 144 LBS | TEMPERATURE: 98 F | HEIGHT: 65 IN | HEART RATE: 58 BPM | BODY MASS INDEX: 23.99 KG/M2 | DIASTOLIC BLOOD PRESSURE: 50 MMHG

## 2024-10-31 VITALS
SYSTOLIC BLOOD PRESSURE: 100 MMHG | HEART RATE: 69 BPM | RESPIRATION RATE: 18 BRPM | DIASTOLIC BLOOD PRESSURE: 66 MMHG | OXYGEN SATURATION: 100 %

## 2024-10-31 DIAGNOSIS — C56.9 PRIMARY LOW GRADE SEROUS ADENOCARCINOMA OF OVARY (H): ICD-10-CM

## 2024-10-31 DIAGNOSIS — R33.9 URINARY RETENTION: ICD-10-CM

## 2024-10-31 DIAGNOSIS — N82.3 RECTOVAGINAL FISTULA: ICD-10-CM

## 2024-10-31 DIAGNOSIS — Z98.890 H/O PELVIC SURGERY: Primary | ICD-10-CM

## 2024-10-31 DIAGNOSIS — Z93.2 STATUS POST ILEOSTOMY (H): Primary | ICD-10-CM

## 2024-10-31 LAB — INR PPP: 1.46 (ref 0.85–1.15)

## 2024-10-31 PROCEDURE — 77001 FLUOROGUIDE FOR VEIN DEVICE: CPT | Mod: 26 | Performed by: RADIOLOGY

## 2024-10-31 PROCEDURE — 36561 INSERT TUNNELED CV CATH: CPT | Performed by: RADIOLOGY

## 2024-10-31 PROCEDURE — 99152 MOD SED SAME PHYS/QHP 5/>YRS: CPT | Performed by: RADIOLOGY

## 2024-10-31 PROCEDURE — 85610 PROTHROMBIN TIME: CPT | Performed by: PATHOLOGY

## 2024-10-31 PROCEDURE — 76937 US GUIDE VASCULAR ACCESS: CPT | Mod: 26 | Performed by: RADIOLOGY

## 2024-10-31 PROCEDURE — 36561 INSERT TUNNELED CV CATH: CPT

## 2024-10-31 PROCEDURE — 99024 POSTOP FOLLOW-UP VISIT: CPT

## 2024-10-31 PROCEDURE — 99212 OFFICE O/P EST SF 10 MIN: CPT | Mod: 24 | Performed by: UROLOGY

## 2024-10-31 DEVICE — IMP SMART PORT LOW PROFILE 300 PSI 63CMX6FR H787CT60LPPDVI0: Type: IMPLANTABLE DEVICE | Site: CHEST | Status: FUNCTIONAL

## 2024-10-31 RX ORDER — NALOXONE HYDROCHLORIDE 0.4 MG/ML
0.4 INJECTION, SOLUTION INTRAMUSCULAR; INTRAVENOUS; SUBCUTANEOUS
Status: DISCONTINUED | OUTPATIENT
Start: 2024-10-31 | End: 2024-11-01 | Stop reason: HOSPADM

## 2024-10-31 RX ORDER — NALOXONE HYDROCHLORIDE 0.4 MG/ML
0.2 INJECTION, SOLUTION INTRAMUSCULAR; INTRAVENOUS; SUBCUTANEOUS
Status: DISCONTINUED | OUTPATIENT
Start: 2024-10-31 | End: 2024-11-01 | Stop reason: HOSPADM

## 2024-10-31 RX ORDER — FENTANYL CITRATE 50 UG/ML
25-50 INJECTION, SOLUTION INTRAMUSCULAR; INTRAVENOUS EVERY 5 MIN PRN
Status: DISCONTINUED | OUTPATIENT
Start: 2024-10-31 | End: 2024-11-01 | Stop reason: HOSPADM

## 2024-10-31 RX ORDER — HEPARIN SODIUM,PORCINE 10 UNIT/ML
5-10 VIAL (ML) INTRAVENOUS EVERY 24 HOURS
Status: DISCONTINUED | OUTPATIENT
Start: 2024-10-31 | End: 2024-11-01 | Stop reason: HOSPADM

## 2024-10-31 RX ORDER — LIDOCAINE 40 MG/G
CREAM TOPICAL
Status: DISCONTINUED | OUTPATIENT
Start: 2024-10-31 | End: 2024-11-01 | Stop reason: HOSPADM

## 2024-10-31 RX ORDER — HEPARIN SODIUM (PORCINE) LOCK FLUSH IV SOLN 100 UNIT/ML 100 UNIT/ML
SOLUTION INTRAVENOUS DAILY PRN
Status: DISCONTINUED | OUTPATIENT
Start: 2024-10-31 | End: 2024-10-31 | Stop reason: HOSPADM

## 2024-10-31 RX ORDER — CEFAZOLIN SODIUM 2 G/50ML
2 SOLUTION INTRAVENOUS
Status: COMPLETED | OUTPATIENT
Start: 2024-10-31 | End: 2024-10-31

## 2024-10-31 RX ORDER — FLUMAZENIL 0.1 MG/ML
0.2 INJECTION, SOLUTION INTRAVENOUS
Status: DISCONTINUED | OUTPATIENT
Start: 2024-10-31 | End: 2024-11-01 | Stop reason: HOSPADM

## 2024-10-31 RX ORDER — ACETAMINOPHEN 325 MG/1
650 TABLET ORAL
Status: CANCELLED | OUTPATIENT
Start: 2024-10-31

## 2024-10-31 RX ORDER — SODIUM CHLORIDE 9 MG/ML
INJECTION, SOLUTION INTRAVENOUS CONTINUOUS
Status: DISCONTINUED | OUTPATIENT
Start: 2024-10-31 | End: 2024-10-31

## 2024-10-31 RX ORDER — HEPARIN SODIUM (PORCINE) LOCK FLUSH IV SOLN 100 UNIT/ML 100 UNIT/ML
5-10 SOLUTION INTRAVENOUS
Status: DISCONTINUED | OUTPATIENT
Start: 2024-10-31 | End: 2024-11-01 | Stop reason: HOSPADM

## 2024-10-31 RX ORDER — HEPARIN SODIUM,PORCINE 10 UNIT/ML
5-10 VIAL (ML) INTRAVENOUS
Status: DISCONTINUED | OUTPATIENT
Start: 2024-10-31 | End: 2024-11-01 | Stop reason: HOSPADM

## 2024-10-31 RX ADMIN — CEFAZOLIN SODIUM 2 G: 2 SOLUTION INTRAVENOUS at 07:54

## 2024-10-31 ASSESSMENT — PAIN SCALES - GENERAL: PAINLEVEL_OUTOF10: NO PAIN (0)

## 2024-10-31 NOTE — DISCHARGE INSTRUCTIONS
A collaboration between [facility] and [facility]  Experts in minimally invasive, targeted treatments performed using imaging guidance    Venous Access Device,  Port Catheter or Tunneled or Non-Tunneled Central Line Placement    Today you had a procedure today to install a venous access device; either a tunneled central vein catheter or a subcutaneous port catheter.    After you go home:  Drink plenty of fluids.  Generally 6-8 (8 ounce) glasses a day is recommended.  Resume your regular diet unless otherwise ordered by a medical provider.  Keep any applied tape/gauze dressings clean and dry.  Change tape/gauze dressings if they get wet or soiled.  You may shower the following day after procedure, however cover and protect from moisture any tape/gauze dressings.  You may let water hit and run over dried skin glue, but do not scrub.  Pat the area dry after showering.  Port placement incisions are closed with absorbable suture, meaning they do not need to be removed at a later date, and a topical skin adhesive (skin glue).  This glue will wear off in 7-14 days.  Do not remove before this time.  If 14 days have passed and residual glue is present, you may gently remove it.  Do not apply gels, lotions, or ointments to the glue site for the first 10 days as this may cause the glue to prematurely soften and fail.  Do not perform strenuous activities or lift greater than 10 pounds for the next three days.  If there is bleeding or oozing from the procedure site, apply firm pressure to the area for 5-10 minutes.  If the bleeding continues seek medical advice at the numbers below.  Mild procedure site discomfort can be treated with an ice pack and over-the-counter pain relievers.        For 24 hours after any sedation used:  Relax and take it easy.  No strenuous activities.  Do not drive or operate machines at home or at work.  No alcohol consumption.  Do not make any  important or legal decisions.    Call our Interventional Radiology (IR) service if:  If you start bleeding from the procedure site.  If you do start to bleed from the site, lie down and hold some pressure on the site.  Our radiology provider can help you decide if you need to return to the hospital.  If you have new or worsening pain related to the procedure.  If you have concerning swelling at the procedure site.  If you develop persistent nausea or vomiting.  If you develop hives or a rash or any unexplained itching.  If you have a fever of greater than 100.5  F and chills in the first 5 days after procedure.  Any other concerns related to your procedure.      [facility]  Interventional Radiology (IR)  [address]  [address] [facility] [place] [address]    Contact Number:  [phone]  (IR Nurse Triage)  Monday - Friday 7am - 4pm    After hours for urgent concerns:  [phone]  After 4pm Monday - Friday, Weekends and Holidays.   Ask for Interventional Radiology on-call.  Someone is available 24 hours a day.  [facility] toll free number:  [phone]

## 2024-10-31 NOTE — PROGRESS NOTES
Has appt with you tomorrow for back pain, wanted to also discuss these lab findings.    October 31, 2024    Marizol was seen today for follow up.    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     Eliel SID Berry on 10/31/2024 at 10:11 AM    Attestation:  I agree with the PFSH and ROS as completed by the MS, except for changes made as needed.  The remainder of the encounter was performed by me and scribed by the MS.  The scribed note accurately reflects my personal services and the decisions made by me.    5 minutes were spent today on the day of the encounter in reviewing the EMR including Dr Carpenter's operative note, direct patient care, coordination of care and documentation    Julia Moseley MD MPH  (she/her/hers)   of Urology  [facility]  Marizol Granados is a 39 year old with a past medical history of primary malignant neoplasm of pelvic peritoneum presenting for follow up. She reports that self catherization has been going okay. Urine is mostly clear with occasional blood tinged and some brown tinged stool. Denies any stool matter. She denies any changes in health since last visit    Dr Sosa is aware of the occasional stool via catheter as patient just had an exam in the OR with Dr Carpenter 10/29/24-operative note reviewed in [facility].    /66   Pulse 69   Resp 18   LMP  (LMP Unknown)   SpO2 100%   GENERAL: Pale, appears tired. No distress  EYES: Eyes grossly normal to inspection, conjunctivae and sclerae normal  HENT: normal cephalic/atraumatic.  External ears, nose and mouth without ulcers or lesions.  RESP: no audible wheeze, cough, or visible cyanosis.  No visible retractions or  increased work of breathing.  Able to speak fully in complete sentences.  NEURO: Cranial nerves grossly intact, mentation intact and speech normal  PSYCH: mentation appears normal, affect normal/bright, judgement and insight intact, normal speech and appearance well-groomed      CC  Patient Care Team:  Georgie Hernández APRN CNP as PCP - General (Family Practice)  Adan Bell MD as Physician (Plastic Surgery)  Adan Bell MD as Assigned Surgical Provider  Georgie Hernández APRN CNP as Assigned PCP  Kelvin Wilkins DO as Assigned Neuroscience Provider  Janette Wells, RN as Registered Nurse (Wound Care)  Jessenia Sosa MD as Assigned Cancer Care Provider  Renetta Bhatia APRN CNP as Assigned OBGYN Provider  NATE HURLEY

## 2024-10-31 NOTE — PROGRESS NOTES
"[facility] Ostomy Assessment  Patient comes to clinic for consultation regarding ostomy issues.    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    Objective:  Type: Ileostomy since 09/01/2024  Stoma: 29 mm  loop normal-appearing, oval, edematous, and protuberant only slightly and retracting when sitting  Location: right lower quadrant     Output: soft    Peristomal skin: much improved from 4 weeks ago 9 some deterioration because the wrong bag was used after a procedure  Frequency of pouch change: three times weekly. This is scheduled.     Ordering supplies from:  [employer] , Account # [id number],Fax: [phone], Customer Service: [phone]  Current pouch system/supplies: Coloplast, 1 piece, cut to fit, deep convex, Mraie ring with stoma powder as needed and belt     assessment: Skin has improved much and patient has not experienced leaking when changing the pouch 3 times per week.  She has started chemo treatment and had a port placed today.  She was tired during the visit.  Her output was watery but she states that typically it is soft.  .     Intervention/Plan: Will continue with Coloplast deep convex but will order precut.  Her stoma is now round enough.  [id number] which is precut to 28 mm.  Return to clinic prn       Dr Gonzalez was available for supervision of care if needed or if questions should arise and regarding plan of care. " Janette Wells, RN  RN CWON

## 2024-10-31 NOTE — LETTER
10/31/2024       RE: Marizol Granados  [address]  [place] [address]     Dear Colleague,    Thank you for referring your patient, Marizol Granados, to the [facility] [place] [facility]. Please see a copy of my visit note below.    October 31, 2024    Marizol was seen today for follow up.    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     Eliel Berry on 10/31/2024 at 10:11 AM    Attestation:  I agree with the PFSH and ROS as completed by the MS, except for changes made as needed.  The remainder of the encounter was performed by me and scribed by the MS.  The scribed note accurately reflects my personal services and the decisions made by me.    5 minutes were spent today on the day of the encounter in reviewing the EMR including Dr Carpenter's operative note, direct patient care, coordination of care and documentation    Julia Moseley MD MPH  (she/her/hers)   of Urology  [facility]      Subjective  Marizol Granados is a 39 year old with a past medical history of primary malignant neoplasm of pelvic peritoneum presenting for follow up. She reports that self catherization has been going okay. Urine is mostly clear with occasional blood tinged and some brown tinged stool. Denies any stool matter. She denies any changes in health since last visit    Dr Sosa is aware of the occasional stool via catheter as patient just had an exam in the OR with Dr Carpenter 10/29/24-operative note reviewed in Epic.    /66   Pulse 69   Resp 18   LMP  (LMP  Unknown)   SpO2 100%   GENERAL: Pale, appears tired. No distress  EYES: Eyes grossly normal to inspection, conjunctivae and sclerae normal  HENT: normal cephalic/atraumatic.  External ears, nose and mouth without ulcers or lesions.  RESP: no audible wheeze, cough, or visible cyanosis.  No visible retractions or increased work of breathing.  Able to speak fully in complete sentences.  NEURO: Cranial nerves grossly intact, mentation intact and speech normal  PSYCH: mentation appears normal, affect normal/bright, judgement and insight intact, normal speech and appearance well-groomed      CC  Patient Care Team:  Georgie Hernández APRN CNP as PCP - General (Family Practice)  Adan Bell MD as Physician (Plastic Surgery)  Adan Bell MD as Assigned Surgical Provider  Georgie Hernández APRN CNP as Assigned PCP  Kelvin Wilkins DO as Assigned Neuroscience Provider  Janette Wells, RN as Registered Nurse (Wound Care)  Jessenia Sosa MD as Assigned Cancer Care Provider  Renetta Bhatia APRN CNP as Assigned OBGYN Provider  NATE MOSELEY        Again, thank you for allowing me to participate in the care of your patient.      Sincerely,    Nate Moseley MD

## 2024-10-31 NOTE — PATIENT INSTRUCTIONS
Continue to catheterize    Websites with free information:    [employer] patient website: www.voicesforpfd.org    Total Control Program: www.totalcontrolprogram.com    It was a pleasure meeting with you today.  Thank you for allowing me and my team the privilege of caring for you today.  YOU are the reason we are here, and I truly hope we provided you with the excellent service you deserve.  Please let us know if there is anything else we can do for you so that we can be sure you are leaving completely satisfied with your care experience.

## 2024-10-31 NOTE — IR NOTE
Interventional Radiology Pre-Procedure Sedation Assessment   Time of Assessment: 7:38 AM    Expected Level: Moderate Sedation    Indication: Sedation is required for the following type of Procedure:  Port placement    Sedation and procedural consent: Risks, benefits and alternatives were discussed with Patient    PO Intake: Appropriately NPO for procedure    ASA Class: Class 2 - MILD SYSTEMIC DISEASE, NO ACUTE PROBLEMS, NO FUNCTIONAL LIMITATIONS.    Mallampati: Grade 2:  Soft palate, base of uvula, tonsillar pillars, and portion of posterior pharyngeal wall visible    Lungs: Lungs Clear with good breath sounds bilaterally    Heart: Normal heart sounds and rate    History and physical reviewed and no updates needed. I have reviewed the lab findings, diagnostic data, medications, and the plan for sedation. I have determined this patient to be an appropriate candidate for the planned sedation and procedure and have reassessed the patient IMMEDIATELY PRIOR to sedation and procedure.    Kelvin Castillo MD

## 2024-10-31 NOTE — BRIEF OP NOTE
[facility]    Brief Operative Note    Pre-operative diagnosis: Primary malignant neoplasm of ovary, unspecified laterality (H) [C56.9]  Post-operative diagnosis Same as pre-operative diagnosis    Procedure: Insert port vascular access, Right - Chest    Surgeon: Surgeons and Role:     * Kelvin Castillo MD - Primary  Anesthesia: Moderate Sedation   Estimated Blood Loss: Minimal    Drains: None  Specimens: * No specimens in log *  Findings:   None.  Complications: None.  Implants:   Implant Name Type Inv. Item Serial No.  Lot No. LRB No. Used Action   IMP SMART PORT LOW PROFILE 300 PSI [id number] - [id number] Port IMP SMART PORT LOW PROFILE 300 PSI [id number]  [employer] [id number] N/A 1 Implanted           6 Khmer 20.5 cm single lumen AngioDynamics Plastic CT Smart Port placed via right internal jugular vein. Tip in the high right atrium. Heparin locked and ready for use.    2 mg Versed and 100 mcg Fentanyl     Sedation time: 32 minutes.

## 2024-10-31 NOTE — NURSING NOTE
Chief Complaint   Patient presents with    Follow Up       Blood pressure 100/66, pulse 69, resp. rate 18, SpO2 100%. There is no height or weight on file to calculate BMI.    Patient Active Problem List   Diagnosis    Therapeutic drug monitoring    Social phobia    S/P gastric bypass    Recurrent major depressive disorder (H)    Other B-complex deficiencies    Moderate persistent asthma    Intractable migraine without aura and with status migrainosus    Insomnia    Hypercholesterolemia    History of thromboembolism    Generalized anxiety disorder    Frequent UTI    Chronic fatigue    Allergic rhinitis    Genital herpes simplex    Postoperative intestinal malabsorption    GERD (gastroesophageal reflux disease)    Low back pain    Ptosis of both breasts    Leak of anastomosis between gastrointestinal structures    Rectovaginal fistula    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        Molds & Smuts Other (See Comments)     Other reaction(s): Runny Nose, Unknown/Not Verified    Pollen       Current Outpatient Medications   Medication Sig Dispense Refill    acetaminophen (TYLENOL) 325 MG tablet Take 2 tablets (650 mg) by mouth every 6 hours.      apixaban ANTICOAGULANT (ELIQUIS) 5 MG tablet Take 1 tablet (5 mg) by mouth 2 times daily 60 tablet 11    bisacodyl (DULCOLAX) 10 MG suppository Place 1 suppository (10 mg)  rectally daily as needed for constipation. 15 suppository 1    busPIRone HCl (BUSPAR) 30 MG tablet Take 30 mg by mouth 2 times daily.      cholecalciferol 25 MCG (1000 UT) TABS Take 2,000 Units by mouth daily      desvenlafaxine (PRISTIQ) 100 MG 24 hr tablet Take 100 mg by mouth daily      desvenlafaxine (PRISTIQ) 50 MG 24 hr tablet TAKE 1 TABLET BY MOUTH ONCE DAILY. TAKE WITH 100MG TABLET FOR TOTAL OF 150MG DAILY      dexAMETHasone (DECADRON) 4 MG tablet Take 2 tablets (8 mg) by mouth daily. Take for 3 days, starting the day after chemo. Take with food. 6 tablet 5    LORazepam (ATIVAN) 1 MG tablet Take 0.5-1 tablets (0.5-1 mg) by mouth 2 times daily. Take 1 mg in the morning and 0.5 mg in the evening      methocarbamol (ROBAXIN) 500 MG tablet Take 1 tablet (500 mg) by mouth 4 times daily as needed for muscle spasms. 40 tablet 1    Multiple Vitamins-Minerals (MULTIVITAL PO) Take 1 tablet by mouth daily      naloxone (NARCAN) 4 MG/0.1ML nasal spray Spray 1 spray (4 mg) into one nostril alternating nostrils as needed for opioid reversal. every 2-3 minutes until assistance arrives 2 each 0    omeprazole (PRILOSEC) 40 MG DR capsule TAKE 1 CAPSULE(40 MG) BY MOUTH DAILY 90 capsule 3    ondansetron (ZOFRAN) 8 MG tablet Take 1 tablet (8 mg) by mouth every 8 hours as needed for nausea. 30 tablet 2    Ostomy Supplies MISC 1 each every other day. 20 each 11    Ostomy Supplies MISC 1 each every other day. 20 each 11    Ostomy Supplies MISC 1 each every other day. 20 each 11    oxyCODONE (OXY-IR) 5 MG capsule Take 1 capsule (5 mg) by mouth every 4 hours as needed for severe pain. 20 capsule 0    oxyCODONE (ROXICODONE) 5 MG tablet Take 1 tablet (5 mg) by mouth every 8 hours as needed. 6 tablet 0    PROBIOTIC PRODUCT PO Take 1 tablet by mouth At Bedtime      prochlorperazine (COMPAZINE) 10 MG tablet Take 1 tablet (10 mg) by mouth every 6 hours as needed for nausea or vomiting. 30 tablet 2    propranolol (INDERAL) 10 MG tablet Take  1 tablet (10 mg) by mouth 2 times daily. Hold for HR <60 and SBP <90      QUEtiapine (SEROQUEL) 200 MG tablet Take 200 mg by mouth at bedtime.      ramelteon (ROZEREM) 8 MG tablet Take 8 mg by mouth At Bedtime      simethicone (MYLICON) 80 MG chewable tablet Take 1 tablet (80 mg) by mouth every 6 hours as needed for flatulence or cramping. 30 tablet 1    simethicone (MYLICON) 80 MG chewable tablet Take 1 tablet (80 mg) by mouth every 6 hours as needed for flatulence or cramping. 120 tablet 2    topiramate (TOPAMAX) 50 MG tablet TAKE 1 TABLET(50 MG) BY MOUTH TWICE DAILY 180 tablet 3    traZODone (DESYREL) 100 MG tablet Take 1 tablet (100 mg) by mouth at bedtime. (Patient taking differently: Take 200 mg by mouth at bedtime.) 30 tablet 0    valACYclovir (VALTREX) 500 MG tablet Take 1 tablet (500 mg) by mouth 2 times daily. 6 tablet 3    zolpidem (AMBIEN) 5 MG tablet Take 5 mg by mouth at bedtime.      dicyclomine (BENTYL) 10 MG capsule Take 1 capsule (10 mg) by mouth 3 times daily as needed (cramping). (Patient not taking: Reported on 10/31/2024) 90 capsule 0    pegfilgrastim (NEULASTA) 6 MG/0.6ML injection Inject 0.6 mLs (6 mg) subcutaneously once for 1 dose.      phentermine (ADIPEX-P) 37.5 MG tablet TAKE 1/2 TO 1 TABLET(18.75 TO 37.5 MG) BY MOUTH EVERY MORNING (Patient not taking: Reported on 10/31/2024) 90 tablet 1       Social History     Tobacco Use    Smoking status: Never    Smokeless tobacco: Never   Vaping Use    Vaping status: Never Used   Substance Use Topics    Alcohol use: Not Currently     Alcohol/week: 0.0 - 1.0 standard drinks of alcohol     Comment: Alcoholic Drinks/day: maybe one a month    Drug use: Never       Claudia Suero  10/31/2024  9:53 AM

## 2024-10-31 NOTE — TELEPHONE ENCOUNTER
Faxed, sent to abstracting and placed at [employer]  desk.    Rosy JAY, - [facility] 2  Primary Care- [place]  [facility]

## 2024-11-01 ENCOUNTER — ALLIED HEALTH/NURSE VISIT (OUTPATIENT)
Dept: ONCOLOGY | Facility: CLINIC | Age: 39
End: 2024-11-01

## 2024-11-01 ENCOUNTER — INFUSION THERAPY VISIT (OUTPATIENT)
Dept: ONCOLOGY | Facility: CLINIC | Age: 39
End: 2024-11-01
Attending: OBSTETRICS & GYNECOLOGY
Payer: COMMERCIAL

## 2024-11-01 ENCOUNTER — APPOINTMENT (OUTPATIENT)
Dept: LAB | Facility: CLINIC | Age: 39
End: 2024-11-01
Attending: OBSTETRICS & GYNECOLOGY
Payer: COMMERCIAL

## 2024-11-01 VITALS
TEMPERATURE: 97.7 F | HEART RATE: 76 BPM | OXYGEN SATURATION: 99 % | RESPIRATION RATE: 16 BRPM | BODY MASS INDEX: 23.96 KG/M2 | SYSTOLIC BLOOD PRESSURE: 90 MMHG | WEIGHT: 144 LBS | DIASTOLIC BLOOD PRESSURE: 61 MMHG

## 2024-11-01 DIAGNOSIS — Z00.6 PATIENT IN CANCER RELATED RESEARCH STUDY: Primary | ICD-10-CM

## 2024-11-01 DIAGNOSIS — Z51.11 ENCOUNTER FOR ANTINEOPLASTIC CHEMOTHERAPY: ICD-10-CM

## 2024-11-01 DIAGNOSIS — Z80.52 FAMILY HISTORY OF BLADDER CANCER: ICD-10-CM

## 2024-11-01 DIAGNOSIS — C48.1 PRIMARY MALIGNANT NEOPLASM OF PELVIC PERITONEUM (H): Primary | ICD-10-CM

## 2024-11-01 DIAGNOSIS — C48.1 PRIMARY MALIGNANT NEOPLASM OF PELVIC PERITONEUM (H): ICD-10-CM

## 2024-11-01 LAB
ALBUMIN SERPL BCG-MCNC: 4 G/DL (ref 3.5–5.2)
ALP SERPL-CCNC: 71 U/L (ref 40–150)
ALT SERPL W P-5'-P-CCNC: 27 U/L (ref 0–50)
ANION GAP SERPL CALCULATED.3IONS-SCNC: 9 MMOL/L (ref 7–15)
AST SERPL W P-5'-P-CCNC: 21 U/L (ref 0–45)
BASOPHILS # BLD AUTO: 0 10E3/UL (ref 0–0.2)
BASOPHILS NFR BLD AUTO: 0 %
BILIRUB SERPL-MCNC: 0.2 MG/DL
BUN SERPL-MCNC: 10.5 MG/DL (ref 6–20)
CALCIUM SERPL-MCNC: 9.5 MG/DL (ref 8.8–10.4)
CANCER AG125 SERPL-ACNC: 9 U/ML
CHLORIDE SERPL-SCNC: 112 MMOL/L (ref 98–107)
CREAT SERPL-MCNC: 0.71 MG/DL (ref 0.51–0.95)
EGFRCR SERPLBLD CKD-EPI 2021: >90 ML/MIN/1.73M2
EOSINOPHIL # BLD AUTO: 0.1 10E3/UL (ref 0–0.7)
EOSINOPHIL NFR BLD AUTO: 1 %
ERYTHROCYTE [DISTWIDTH] IN BLOOD BY AUTOMATED COUNT: 17.3 % (ref 10–15)
GLUCOSE SERPL-MCNC: 94 MG/DL (ref 70–99)
HCO3 SERPL-SCNC: 20 MMOL/L (ref 22–29)
HCT VFR BLD AUTO: 27.5 % (ref 35–47)
HGB BLD-MCNC: 8.9 G/DL (ref 11.7–15.7)
IMM GRANULOCYTES # BLD: 0 10E3/UL
IMM GRANULOCYTES NFR BLD: 0 %
INTERPRETATION: NORMAL
LAB PDF RESULT: NORMAL
LAB PDF RESULT: NORMAL
LOCATION OF TASK: NORMAL
LOCATION OF TASK: NORMAL
LYMPHOCYTES # BLD AUTO: 1.7 10E3/UL (ref 0.8–5.3)
LYMPHOCYTES NFR BLD AUTO: 22 %
MAGNESIUM SERPL-MCNC: 1.8 MG/DL (ref 1.7–2.3)
MCH RBC QN AUTO: 29.8 PG (ref 26.5–33)
MCHC RBC AUTO-ENTMCNC: 32.4 G/DL (ref 31.5–36.5)
MCV RBC AUTO: 92 FL (ref 78–100)
MONOCYTES # BLD AUTO: 0.6 10E3/UL (ref 0–1.3)
MONOCYTES NFR BLD AUTO: 8 %
NEUTROPHILS # BLD AUTO: 5.1 10E3/UL (ref 1.6–8.3)
NEUTROPHILS NFR BLD AUTO: 68 %
NRBC # BLD AUTO: 0 10E3/UL
NRBC BLD AUTO-RTO: 0 /100
PLATELET # BLD AUTO: 138 10E3/UL (ref 150–450)
POTASSIUM SERPL-SCNC: 4.1 MMOL/L (ref 3.4–5.3)
PROT SERPL-MCNC: 6.7 G/DL (ref 6.4–8.3)
RBC # BLD AUTO: 2.99 10E6/UL (ref 3.8–5.2)
SIGNIFICANT RESULTS: NORMAL
SIGNIFICANT RESULTS: NORMAL
SODIUM SERPL-SCNC: 141 MMOL/L (ref 135–145)
SPECIMEN TYPE: NORMAL
SPECIMEN TYPE: NORMAL
TEST DETAILS, MDL: NORMAL
TEST DETAILS, MDL: NORMAL
WBC # BLD AUTO: 7.5 10E3/UL (ref 4–11)

## 2024-11-01 PROCEDURE — 99215 OFFICE O/P EST HI 40 MIN: CPT | Mod: 24 | Performed by: OBSTETRICS & GYNECOLOGY

## 2024-11-01 PROCEDURE — 96375 TX/PRO/DX INJ NEW DRUG ADDON: CPT

## 2024-11-01 PROCEDURE — 83735 ASSAY OF MAGNESIUM: CPT | Performed by: OBSTETRICS & GYNECOLOGY

## 2024-11-01 PROCEDURE — 96415 CHEMO IV INFUSION ADDL HR: CPT

## 2024-11-01 PROCEDURE — G0463 HOSPITAL OUTPT CLINIC VISIT: HCPCS | Performed by: OBSTETRICS & GYNECOLOGY

## 2024-11-01 PROCEDURE — 36591 DRAW BLOOD OFF VENOUS DEVICE: CPT | Performed by: OBSTETRICS & GYNECOLOGY

## 2024-11-01 PROCEDURE — 96367 TX/PROPH/DG ADDL SEQ IV INF: CPT

## 2024-11-01 PROCEDURE — 80053 COMPREHEN METABOLIC PANEL: CPT | Performed by: OBSTETRICS & GYNECOLOGY

## 2024-11-01 PROCEDURE — 250N000013 HC RX MED GY IP 250 OP 250 PS 637: Performed by: OBSTETRICS & GYNECOLOGY

## 2024-11-01 PROCEDURE — 85025 COMPLETE CBC W/AUTO DIFF WBC: CPT | Performed by: OBSTETRICS & GYNECOLOGY

## 2024-11-01 PROCEDURE — 96377 APPLICATON ON-BODY INJECTOR: CPT | Mod: 59 | Performed by: OBSTETRICS & GYNECOLOGY

## 2024-11-01 PROCEDURE — 86304 IMMUNOASSAY TUMOR CA 125: CPT | Performed by: OBSTETRICS & GYNECOLOGY

## 2024-11-01 PROCEDURE — 96413 CHEMO IV INFUSION 1 HR: CPT

## 2024-11-01 PROCEDURE — 96417 CHEMO IV INFUS EACH ADDL SEQ: CPT

## 2024-11-01 PROCEDURE — 250N000011 HC RX IP 250 OP 636: Performed by: OBSTETRICS & GYNECOLOGY

## 2024-11-01 PROCEDURE — 258N000003 HC RX IP 258 OP 636: Performed by: OBSTETRICS & GYNECOLOGY

## 2024-11-01 PROCEDURE — 96372 THER/PROPH/DIAG INJ SC/IM: CPT | Performed by: OBSTETRICS & GYNECOLOGY

## 2024-11-01 RX ORDER — ALBUTEROL SULFATE 90 UG/1
1-2 INHALANT RESPIRATORY (INHALATION)
Status: CANCELLED
Start: 2024-11-01

## 2024-11-01 RX ORDER — HEPARIN SODIUM (PORCINE) LOCK FLUSH IV SOLN 100 UNIT/ML 100 UNIT/ML
5 SOLUTION INTRAVENOUS
Status: DISCONTINUED | OUTPATIENT
Start: 2024-11-01 | End: 2024-11-01 | Stop reason: HOSPADM

## 2024-11-01 RX ORDER — METHYLPREDNISOLONE SODIUM SUCCINATE 40 MG/ML
40 INJECTION INTRAMUSCULAR; INTRAVENOUS
Status: CANCELLED
Start: 2024-11-01

## 2024-11-01 RX ORDER — EPINEPHRINE 1 MG/ML
0.3 INJECTION, SOLUTION INTRAMUSCULAR; SUBCUTANEOUS EVERY 5 MIN PRN
Status: CANCELLED | OUTPATIENT
Start: 2024-11-01

## 2024-11-01 RX ORDER — ALBUTEROL SULFATE 0.83 MG/ML
2.5 SOLUTION RESPIRATORY (INHALATION)
Status: CANCELLED | OUTPATIENT
Start: 2024-11-01

## 2024-11-01 RX ORDER — HEPARIN SODIUM (PORCINE) LOCK FLUSH IV SOLN 100 UNIT/ML 100 UNIT/ML
5 SOLUTION INTRAVENOUS
Status: CANCELLED | OUTPATIENT
Start: 2024-11-01

## 2024-11-01 RX ORDER — HEPARIN SODIUM,PORCINE 10 UNIT/ML
5-20 VIAL (ML) INTRAVENOUS DAILY PRN
Status: CANCELLED | OUTPATIENT
Start: 2024-11-01

## 2024-11-01 RX ORDER — LORAZEPAM 2 MG/ML
0.5 INJECTION INTRAMUSCULAR EVERY 4 HOURS PRN
Status: CANCELLED | OUTPATIENT
Start: 2024-11-01

## 2024-11-01 RX ORDER — DIPHENHYDRAMINE HCL 25 MG
50 CAPSULE ORAL ONCE
Status: CANCELLED
Start: 2024-11-01

## 2024-11-01 RX ORDER — PALONOSETRON 0.05 MG/ML
0.25 INJECTION, SOLUTION INTRAVENOUS ONCE
Status: COMPLETED | OUTPATIENT
Start: 2024-11-01 | End: 2024-11-01

## 2024-11-01 RX ORDER — PALONOSETRON 0.05 MG/ML
0.25 INJECTION, SOLUTION INTRAVENOUS ONCE
Status: CANCELLED | OUTPATIENT
Start: 2024-11-01

## 2024-11-01 RX ORDER — DIPHENHYDRAMINE HYDROCHLORIDE 50 MG/ML
50 INJECTION INTRAMUSCULAR; INTRAVENOUS
Status: CANCELLED
Start: 2024-11-01

## 2024-11-01 RX ORDER — DIPHENHYDRAMINE HCL 25 MG
50 CAPSULE ORAL ONCE
Status: COMPLETED | OUTPATIENT
Start: 2024-11-01 | End: 2024-11-01

## 2024-11-01 RX ORDER — DIPHENHYDRAMINE HYDROCHLORIDE 50 MG/ML
25 INJECTION INTRAMUSCULAR; INTRAVENOUS
Status: CANCELLED
Start: 2024-11-01

## 2024-11-01 RX ORDER — MEPERIDINE HYDROCHLORIDE 25 MG/ML
25 INJECTION INTRAMUSCULAR; INTRAVENOUS; SUBCUTANEOUS
Status: CANCELLED | OUTPATIENT
Start: 2024-11-01

## 2024-11-01 RX ORDER — OXYCODONE HYDROCHLORIDE 5 MG/1
5 TABLET ORAL EVERY 6 HOURS PRN
Qty: 12 TABLET | Refills: 0 | Status: SHIPPED | OUTPATIENT
Start: 2024-11-01 | End: 2024-11-04

## 2024-11-01 RX ADMIN — FAMOTIDINE 20 MG: 10 INJECTION INTRAVENOUS at 09:23

## 2024-11-01 RX ADMIN — CARBOPLATIN 800 MG: 10 INJECTION, SOLUTION INTRAVENOUS at 13:23

## 2024-11-01 RX ADMIN — Medication 5 ML: at 14:11

## 2024-11-01 RX ADMIN — DIPHENHYDRAMINE HYDROCHLORIDE 50 MG: 25 CAPSULE ORAL at 09:06

## 2024-11-01 RX ADMIN — PACLITAXEL 315 MG: 6 INJECTION, SOLUTION INTRAVENOUS at 10:14

## 2024-11-01 RX ADMIN — FOSAPREPITANT: 150 INJECTION, POWDER, LYOPHILIZED, FOR SOLUTION INTRAVENOUS at 09:28

## 2024-11-01 RX ADMIN — PALONOSETRON HYDROCHLORIDE 0.25 MG: 0.25 INJECTION INTRAVENOUS at 09:22

## 2024-11-01 RX ADMIN — PEGFILGRASTIM 6 MG: KIT SUBCUTANEOUS at 13:25

## 2024-11-01 ASSESSMENT — PAIN SCALES - GENERAL: PAINLEVEL_OUTOF10: SEVERE PAIN (6)

## 2024-11-01 NOTE — PROGRESS NOTES
Infusion Nursing Note:  Marizol Granados presents today for Cycle 2 Day 1 Taxol and Carboplatin + Neulasta OnPro.    Patient seen by provider today: Yes: Dr. Sosa.   present during visit today: Not Applicable.    Note: Pt presents to infusion feeling well. She offers no new concerns following her provider appointment today.     Intravenous Access:  Implanted Port.    Treatment Conditions:  Lab Results   Component Value Date    HGB 8.9 (L) 11/01/2024    WBC 7.5 11/01/2024    ANEU 4.2 11/05/2012    ANEUTAUTO 5.1 11/01/2024     (L) 11/01/2024        Lab Results   Component Value Date     11/01/2024    POTASSIUM 4.1 11/01/2024    MAG 1.8 11/01/2024    CR 0.71 11/01/2024    LORY 9.5 11/01/2024    BILITOTAL 0.2 11/01/2024    ALBUMIN 4.0 11/01/2024    ALT 27 11/01/2024    AST 21 11/01/2024     Results reviewed, labs MET treatment parameters, ok to proceed with treatment.      Post Infusion Assessment:  Patient tolerated infusion without incident.  Blood return noted pre and post infusion.  Site patent and intact, free from redness, edema or discomfort.  No evidence of extravasations.  Access discontinued per protocol.     Neulasta Onpro On-Body injector applied to Right Arm at 1330.  Writer discussed Neulasta injection would start tomorrow 11/2 at 1630, approximately 27 hours after application applied today.    Written and Verbal instruction reviewed with patient.  Pt instructed when the dose delivery starts, it will take about 45 minutes to complete.  Pt aware Neulasta Onpro On-Body should have green flashing light and to call triage or on-call MD if injector flashes red or appears to be leaking.   Pt aware to keep Onpro On-Body Neulasta 4 inches away from electrical equipment and to avoid showering 4 hours prior to injection.     Discharge Plan:   Prescription refills given for Dexamethasone and Oxycodone.  Discharge instructions reviewed with: Patient and Family.  Patient and/or family verbalized  understanding of discharge instructions and all questions answered.  AVS to patient via [address]. Patient will return 11/22/24 for next appointment.   Patient discharged in stable condition accompanied by: mother.  Departure Mode: Ambulatory.      Juana Costa RN

## 2024-11-01 NOTE — PROGRESS NOTES
Date of Visit: 11/1/2024     Study:      [facility]#: [id number]  CTSI#: [id number]   IDS#: [id number]  Subject Name: Marizol Granados  Subject ID: --[id number]     Visit: C2D1     Did the study visit occur within the appropriate window allowed by the protocol? Yes     Clinical Trial Treatment History:  9/20/2024 Signed ICF  9/23/2024 Screening Labs  9/24/2024 CT scan, RECIST read  9/27/2024 Registration/Randomization  10/11/2024 C1D1 infusion  11/10/2024 C2D1 Infusion     Medical History:  I reviewed Marizol's Medical History with her prior to infusion and this have been recorded in the corresponding log in Marizol's research file.      Adverse Event Grading:   I have personally interviewed Marizol Granados and reviewed her medical record for adverse events and these have been recorded on the corresponding logs in Marizol Granados's research file.     VITALS:  Vitals were done and any significant values were addressed and reviewed.       LAB RESULTS:  Labs were drawn- any significant lab values were addressed and reviewed.        -  Platelet count was 138,000 and per protocol platelet count needs to be >100,000 to start treatment so patient is eligible to receive chemo infusion today. Patient will continue to receive pegfilgrastim per Dr. Sosa.      The patient will receive chemo today and continue with treatment. Dr. Sosa added pegfilgrastim.         Concomitant Medications:  I have personally reviewed concomitant medications and these have been recorded on the corresponding logs in Marizol's research file. See full list of medications below:         RECIST - Response evaluation criteria in solid tumors:  The CT scan from 9/24/2024 was reviewed by Dr. Renee and meets the criteria for continuation of study therapy. Measurements will be documented in a separate RECIST document. Patient needs 12 week scan scheduled between 12/20/2024-1/17/2025. A request has been sent to have patient scheduled for CT scan on 12/27/2024 utilizing  a -7 day window.      RESEARCH COORDINATOR:     Was the Research Coordinator added to the patient's care team? Yes     Was the Research Coordinator's contact information entered into Epic? Yes      Overall Treatment Plan for Clinical Trial:   Arm 1: Intravenous 3-hour paclitaxel ( mg/m2) followed by intravenous carboplatin (AUC 5 or 6) on day 1 every 21 days x 6 cycles (one cycle = 21 days) followed by Letrozole 2.5 mg orally once daily in maintenance until disease progression or unacceptable toxicity  Follow institutional policies/guidance for IV preparation and supportive care.     Research Impression:   Patient came to the clinic today for C2D1. Patient had flex sigmoidoscopy on 10/29/2024 and coordinator checked with sponsor to ensure this does not need to be reported as an JOSE GUADALUPE. Patient had port placed prior to infusion on 10/31/2024. Patient is still self cathing to deal with urinary retention that was noted in patient's medical history at baseline. Patient is still utilizing ileostomy to stool and dealing with complications of fistula that were noted at baseline, as well. Patient saw Dr. Sosa and myself prior to infusion. See Dr. Sosa's note for physical exam and other clinical information.      Marizol Granados was given the opportunity to ask any trial related questions. The patient will be back 11/22 for C3D1. The patient knows to call with any new or worsening symptoms or concerns.      Please see provider progress note for full physical exam and other clinical information.      Sakina Mason RN, PHN, BSN  Clinical Research Coordinator   Department of Obstetrics, Gynecology and Women's Health  [facility]     [email or web address]  [phone]

## 2024-11-01 NOTE — PATIENT INSTRUCTIONS
Marizol,    Do not take your Ondansetron (Zofran) for the next 72 hours, as it interacts with the pre-medication we gave you today. You can resume this on Monday (11/4) AM. Feel free to take Prochlorperazine (Compazine) in the meantime if you have any nausea.    Remember to start taking your Dexamethasone tomorrow. Take 2 tablets on 11/2, 11/3 and 11/4 with breakfast to prevent nausea.    Your Neulasta will start tomorrow (11/2) at approximately 4:30pm.  It will complete medication administration at approximately 5:15pm and will be ready to be removed then.  Neulasta Onpro On-Body should have green flashing light until it is ready to be removed.  Call [employer] triage if injector flashes red or appears to be leaking. Keep Onpro On-Body Neulasta 4 inches away from electrical equipment and avoid showering 4 hours prior to injection. You can take 10mg Claritin once daily for 3-5 days as needed for bone pain.      Take care!      [employer] Triage and after hours / weekends / holidays:  [phone]    Please call the triage or after hours line if you experience a temperature greater than or equal to 100.4, shaking chills, have uncontrolled nausea, vomiting and/or diarrhea, dizziness, shortness of breath, chest pain, bleeding, unexplained bruising, or if you have any other new/concerning symptoms, questions or concerns.      If you are having any concerning symptoms or wish to speak to a provider before your next infusion visit, please call triage to notify them so we can adequately serve you.     If you need a refill on a narcotic prescription or other medication, please call before your infusion appointment.                November 2024 Sunday Monday Tuesday Wednesday Thursday Friday Saturday                            1    LAB CENTRAL   7:30 AM   (15 min.)   [facility] LAB DRAW   [facility]    RETURN CCSL   7:45 AM   (30 min.)   Jessenia Sosa MD [facility]  Clinic    ONC INFUSION 5 HR (300 MIN)   8:30 AM   (300 min.)    ONC INFUSION NURSE   [facility] 2       3     4     5     6     7     8     9       10     11     12     13     14     15     16       17     18     19     20     21     22    LAB CENTRAL   7:30 AM   (15 min.)    [facility] DRAW   [facility]    RETURN CCSL   7:45 AM   (30 min.)   Jessenia Sosa MD   [facility]    ONC INFUSION 5 HR (300 MIN)  10:30 AM   (300 min.)    ONC INFUSION NURSE   [facility] 23       24     25     26     27     28     29     30                 December 2024 Sunday Monday Tuesday Wednesday Thursday Friday Saturday   1     2     3     4     5     6     7       8     9     10     11     12     13    LAB CENTRAL   7:30 AM   (15 min.)   [facility] DRAW   [facility]    RETURN CCSL   7:45 AM   (30 min.)   Jessenia Sosa MD   [facility]    ONC INFUSION 5 HR (300 MIN)   8:30 AM   (300 min.)    ONC INFUSION NURSE   [facility] 14       15     16     17     18     19     20     21       22     23     24     25     26     27     28       29     30     31                                         Lab Results:  Recent Results (from the past 12 hours)   Comprehensive metabolic panel    Collection Time: 11/01/24  8:17 AM   Result Value Ref Range    Sodium 141 135 - 145 mmol/L    Potassium 4.1 3.4 - 5.3 mmol/L    Carbon Dioxide (CO2) 20 (L) 22 - 29 mmol/L    Anion Gap 9 7 - 15 mmol/L    Urea Nitrogen 10.5 6.0 - 20.0 mg/dL    Creatinine 0.71 0.51 - 0.95 mg/dL    GFR Estimate >90 >60 mL/min/1.73m2    Calcium 9.5 8.8 - 10.4 mg/dL    Chloride 112 (H) 98 - 107 mmol/L    Glucose 94 70 - 99 mg/dL    Alkaline Phosphatase 71 40 - 150 U/L    AST 21 0 - 45 U/L    ALT 27 0 - 50 U/L    Protein Total 6.7 6.4 - 8.3 g/dL    Albumin 4.0 3.5 -  5.2 g/dL    Bilirubin Total 0.2 <=1.2 mg/dL   Magnesium    Collection Time: 11/01/24  8:17 AM   Result Value Ref Range    Magnesium 1.8 1.7 - 2.3 mg/dL   CBC with platelets and differential    Collection Time: 11/01/24  8:17 AM   Result Value Ref Range    WBC Count 7.5 4.0 - 11.0 10e3/uL    RBC Count 2.99 (L) 3.80 - 5.20 10e6/uL    Hemoglobin 8.9 (L) 11.7 - 15.7 g/dL    Hematocrit 27.5 (L) 35.0 - 47.0 %    MCV 92 78 - 100 fL    MCH 29.8 26.5 - 33.0 pg    MCHC 32.4 31.5 - 36.5 g/dL    RDW 17.3 (H) 10.0 - 15.0 %    Platelet Count 138 (L) 150 - 450 10e3/uL    % Neutrophils 68 %    % Lymphocytes 22 %    % Monocytes 8 %    % Eosinophils 1 %    % Basophils 0 %    % Immature Granulocytes 0 %    NRBCs per 100 WBC 0 <1 /100    Absolute Neutrophils 5.1 1.6 - 8.3 10e3/uL    Absolute Lymphocytes 1.7 0.8 - 5.3 10e3/uL    Absolute Monocytes 0.6 0.0 - 1.3 10e3/uL    Absolute Eosinophils 0.1 0.0 - 0.7 10e3/uL    Absolute Basophils 0.0 0.0 - 0.2 10e3/uL    Absolute Immature Granulocytes 0.0 <=0.4 10e3/uL    Absolute NRBCs 0.0 10e3/uL
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
## 2024-11-01 NOTE — NURSING NOTE
"Oncology Rooming Note    November 1, 2024 8:24 AM   Marizol Granados is a 39 year old female who presents for:    Chief Complaint   Patient presents with    Oncology Clinic Visit     RTN Primary Malignant neoplasm of pelvic peritoneum    Port Draw     Labs collected from port by RN. Vitals taken. Checked in for appointment(s).      Initial Vitals: BP 90/61 (BP Location: Right arm, Patient Position: Sitting, Cuff Size: Adult Regular)   Pulse 76   Temp 97.7  F (36.5  C)   Resp 16   Wt 65.3 kg (144 lb)   LMP  (LMP Unknown)   SpO2 99%   BMI 23.96 kg/m   Estimated body mass index is 23.96 kg/m  as calculated from the following:    Height as of 10/31/24: 1.651 m (5' 5\").    Weight as of this encounter: 65.3 kg (144 lb). Body surface area is 1.73 meters squared.  Severe Pain (6) Comment: Data Unavailable   No LMP recorded (lmp unknown). Patient has had a hysterectomy.  Allergies reviewed: Yes  Medications reviewed: Yes    Medications: Medication refills not needed today.  Pharmacy name entered into [employer]: [id number] [place], [place] - [address] [address] & [facility]    Frailty Screening:   Is the patient here for a new oncology consult visit in cancer care? 2. No      Clinical concerns: none      [place]"

## 2024-11-01 NOTE — NURSING NOTE
Chief Complaint   Patient presents with    Oncology Clinic Visit     RTN Primary Malignant neoplasm of pelvic peritoneum    Port Draw     Labs collected from port by RN. Vitals taken. Checked in for appointment(s).      Port accessed with 20 gauge 3/4 inch flat needle by RN, labs collected, line flushed with saline and heparin.  Vitals taken. Pt checked in for appointment(s).     Sanjana Wright RN

## 2024-11-04 DIAGNOSIS — C56.3 OVARIAN CANCER, BILATERAL (H): Primary | ICD-10-CM

## 2024-11-04 RX ORDER — VALACYCLOVIR HYDROCHLORIDE 500 MG/1
500 TABLET, FILM COATED ORAL 2 TIMES DAILY
Qty: 14 TABLET | Refills: 2 | Status: SHIPPED | OUTPATIENT
Start: 2024-11-04

## 2024-11-06 ENCOUNTER — TELEPHONE (OUTPATIENT)
Dept: FAMILY MEDICINE | Facility: CLINIC | Age: 39
End: 2024-11-06
Payer: COMMERCIAL

## 2024-11-06 NOTE — TELEPHONE ENCOUNTER
Forms/Letter Request    Type of form/letter: OTHER: Discharge Summary      Do we have the form/letter: Yes:  basket    Who is the form from? [facility]     Where did/will the form come from? form was faxed in    When is form/letter needed by: ASAP    How would you like the form/letter returned: Fax : [phone]    Natasha Jimenez Patient Rep.

## 2024-11-07 NOTE — TELEPHONE ENCOUNTER
Form has been faxed back to the location and number listed below.     Renetta Rodriguez   [facility]

## 2024-11-08 ENCOUNTER — DOCUMENTATION ONLY (OUTPATIENT)
Dept: LAB | Facility: CLINIC | Age: 39
End: 2024-11-08
Payer: COMMERCIAL

## 2024-11-08 NOTE — PROGRESS NOTES
Prior authorization for Marizol Granados's genetic testing is approved. Insurance authorization #[id number] with authorization dates 11/4/24-5/3/24. eOOP <$300 so MDL will proceed with testing. We will reach out with results when they are available.      Oracio Torres  Genomics Billing    [facility]  [facility]  [address] [address]  [email or web address]  Office: [phone]  Fax:   Employed by [employer]

## 2024-11-11 ENCOUNTER — LAB (OUTPATIENT)
Dept: LAB | Facility: CLINIC | Age: 39
End: 2024-11-11
Payer: COMMERCIAL

## 2024-11-11 DIAGNOSIS — Z80.52 FAMILY HISTORY OF BLADDER CANCER: ICD-10-CM

## 2024-11-11 DIAGNOSIS — C48.1 PRIMARY MALIGNANT NEOPLASM OF PELVIC PERITONEUM (H): Primary | ICD-10-CM

## 2024-11-11 PROCEDURE — 81162 BRCA1&2 GEN FULL SEQ DUP/DEL: CPT

## 2024-11-11 PROCEDURE — G0452 MOLECULAR PATHOLOGY INTERPR: HCPCS | Mod: 26 | Performed by: PATHOLOGY

## 2024-11-14 NOTE — OP NOTE
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 Her primary debulking surgery with diverting ileostomy was complicated by a rectovaginal fistula and anastamotic breakdown. She is having persistent fistula symptoms (leakage) likely due to a large stool burden in the colon, despite having a diverting ileostomy. Given this, she was brought to the operating room for disimpaction by colorectal surgery and I performed an exam under anesthesia. She did have 3 small staples that were lodged within the RV fistula tract. These were all removed.     FINDINGS:   On bimanual exam she had normal external genitalia. She had an in tact vagina cuff. There was a 7mm fistula tract between the vagina and rectum about 5 cm promixal to the introitus along the posterior vaginal wall. See Dr Carpenter's note for findings within her colon.     DESCRIPTION OF PROCEDURE:     After informed consent the patient was brought to the operating room where MAC anesthesia was administered. She was prepped and draped in the dorsal lithotomy position. A surgical timeout was performed. She was not given preoperative antibiotics. SCDs were placed on her lower extremities.     I performed a pelvic exam with the above findings noted. I did identify 3 staples that were lodged within the RV fistula tract. These were from the stapled rectal anastamosis from her primary debulking surgery and had likely migrated.  Given the potential that these were interfering with fistula healing, they were removed carefully with a combination of sharp dissection and colonoscopic guidance.     Please see Dr Souza' note for the remainder of the procedure.

## 2024-11-22 ENCOUNTER — APPOINTMENT (OUTPATIENT)
Dept: LAB | Facility: CLINIC | Age: 39
End: 2024-11-22
Attending: OBSTETRICS & GYNECOLOGY
Payer: COMMERCIAL

## 2024-11-23 ENCOUNTER — HEALTH MAINTENANCE LETTER (OUTPATIENT)
Age: 39
End: 2024-11-23

## 2024-11-26 ENCOUNTER — OFFICE VISIT (OUTPATIENT)
Dept: PEDIATRICS | Facility: CLINIC | Age: 39
End: 2024-11-26
Payer: COMMERCIAL

## 2024-11-26 ENCOUNTER — NURSE TRIAGE (OUTPATIENT)
Dept: FAMILY MEDICINE | Facility: CLINIC | Age: 39
End: 2024-11-26

## 2024-11-26 VITALS
OXYGEN SATURATION: 99 % | WEIGHT: 140.9 LBS | DIASTOLIC BLOOD PRESSURE: 52 MMHG | RESPIRATION RATE: 16 BRPM | BODY MASS INDEX: 23.47 KG/M2 | HEART RATE: 83 BPM | HEIGHT: 65 IN | TEMPERATURE: 98.4 F | SYSTOLIC BLOOD PRESSURE: 96 MMHG

## 2024-11-26 DIAGNOSIS — C56.9 PRIMARY LOW GRADE SEROUS ADENOCARCINOMA OF OVARY (H): ICD-10-CM

## 2024-11-26 DIAGNOSIS — L03.032 PARONYCHIA OF TOE, LEFT: Primary | ICD-10-CM

## 2024-11-26 PROCEDURE — 99213 OFFICE O/P EST LOW 20 MIN: CPT | Performed by: PHYSICIAN ASSISTANT

## 2024-11-26 RX ORDER — CEPHALEXIN 500 MG/1
500 CAPSULE ORAL 4 TIMES DAILY
Qty: 28 CAPSULE | Refills: 0 | Status: SHIPPED | OUTPATIENT
Start: 2024-11-26

## 2024-11-26 NOTE — PROGRESS NOTES
Assessment & Plan     Paronychia of toe, left    - cephALEXin (KEFLEX) 500 MG capsule; Take 1 capsule (500 mg) by mouth 4 times daily. For 7 days.    Primary low grade serous adenocarcinoma of ovary.      Will treat with antibiotic.  Patient is currently undergoing cancer treatment and is immunocompromised.  She is otherwise well appearing and vitals are stable.  Patient was given home care instructions as well.  Patient to seek immediate care if symptoms change or worsen in any way.  Patient understands and agrees with the plan today.        Depression Screening Follow Up        11/26/2024     2:18 PM   PHQ   PHQ-9 Total Score 24    Q9: Thoughts of better off dead/self-harm past 2 weeks More than half the days    F/U: Thoughts of suicide or self-harm No    F/U: Safety concerns No        Patient-reported     Subjective   Marizol is a 39 year old, presenting for the following health issues:  Toe Injury      11/26/2024     2:26 PM   Additional Questions   Roomed by Micaela Shipley CMA     History of Present Illness       Reason for visit:  Toe infection  Symptom onset:  1-2 weeks ago   She is taking medications regularly.     Please see triage note    Patient reports that she injured her toe about 2 weeks ago and after the injury, slowly grew a pus pocket infection at the left great toe.  Last night, her boyfriend popped it and drained out the bright green drainage until he got blood.  She reports that it is painful, like a 6/10.  She is not having fevers or chills, but she is currently being treated for ovarian cancer and is concerned that if this is an infection, she should have prompt treatment.      BP Readings from Last 6 Encounters:   11/26/24 96/52   11/22/24 95/63   11/01/24 90/61   10/31/24 (!) 89/50   10/31/24 100/66   10/29/24 116/69        Review of Systems  Constitutional, neuro, ENT, endocrine, pulmonary, cardiac, gastrointestinal, genitourinary, musculoskeletal, integument and psychiatric systems are  "negative, except as otherwise noted.      Objective    BP 96/52 (BP Location: Right arm, Patient Position: Sitting, Cuff Size: Adult Regular)   Pulse 83   Temp 98.4  F (36.9  C) (Temporal)   Resp 16   Ht 1.651 m (5' 5\")   Wt 63.9 kg (140 lb 14.4 oz)   LMP  (LMP Unknown)   SpO2 99%   BMI 23.45 kg/m    Body mass index is 23.45 kg/m .  Physical Exam   GENERAL: alert and no distress  EYES: Eyes grossly normal to inspection, PERRL and conjunctivae and sclerae normal  NECK: no adenopathy, no asymmetry, masses, or scars  MS: no gross musculoskeletal defects noted, no edema  SKIN: Left great toe with mild bleeding noted, stopped with pressure.  No other drainage or purulence noted.  Mild redness.    NEURO: Normal strength and tone, mentation intact and speech normal          Signed Electronically by: Christina Vieyra PA-C    "

## 2024-11-26 NOTE — TELEPHONE ENCOUNTER
"Patient calling due to left toe injury. She passed out a couple weeks ago and injured her left foot. She is unsure if she hit it on something or what happened when she passed out.    At time of the fall, the big toenail broke and there are infection symptoms on that toe. Her left food has a healing greenish bruise that was black and blue. The left big toenail has pus on the right side. The pus was lime green last night and now clear reddish drainage. She reports redness and swelling. She denies fever. She reports pain is 6-7/10 and the left foot is hard to walk on due to the toe symptoms.     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     Per symptoms, disposition is to be seen in office now. Patient agreeable. No appointment available in  or  today. Scheduled patient in [place].    Alyse Mckeon RN 11/26/2024 10:10 AM  [facility]    Reason for Disposition   Looks infected (e.g., spreading redness, red streak, pus)    Additional Information   Negative: Major bleeding (actively dripping or spurting) that can't be stopped   Negative: Amputation of toe   Negative: Sounds like a life-threatening emergency to the triager   Negative: Foot injury is main concern   Negative: Looks infected (e.g., spreading redness, pus)   Negative: High pressure injection injury (e.g., from paint gun, usually work-related   Negative: Skin is split open or gaping (length > 1/2 inch or 12 mm)   Negative: Bleeding won't stop after 10 minutes of direct pressure (using correct technique)   Negative: Dirt in the wound and not removed after 15 minutes of scrubbing   Negative: Sounds like a serious injury to the triager    Answer Assessment - Initial Assessment Questions  1. MECHANISM: \"How did the injury happen?\"       Patient passed out a couple weeks ago and injured her left foot during the fall  2. ONSET: \"When did the injury happen?\" (e.g., minutes, hours ago)       A couple " "weeks ago, unsure of exact date  3. LOCATION: \"What part of the toe is injured?\" \"Is the nail damaged?\"       Left big toe, nail is broken  4. APPEARANCE of TOE INJURY: \"What does the injury look like?\"       Greenish bruise, was black and blue. Infected on the right part of the left toenail. Lime green pus last night. Now clear reddish drainage.   5. SEVERITY: \"Can you use the foot normally?\" \"Can you walk?\"       Hard to walk on that foot   6. SIZE: For cuts, bruises, or swelling, ask: \"How large is it?\" (e.g., inches or centimeters;  entire toe)       Swelling and redness on the toe. Healing bruise on the foot  7. PAIN: \"Is there pain?\" If Yes, ask: \"How bad is the pain?\"   \"What does it keep you from doing?\" (Scale 0-10; or none, mild, moderate, severe)      7/10  8. TETANUS: For any breaks in the skin, ask: \"When was the last tetanus booster?\"      N/A  9. DIABETES: \"Do you have a history of diabetes or poor circulation in the feet?\"      Denies   10. OTHER SYMPTOMS: \"Do you have any other symptoms?\"         Denies fever  11. PREGNANCY: \"Is there any chance you are pregnant?\" \"When was your last menstrual period?\"        Denies    Protocols used: Toe Injury-A-[place]    "

## 2024-11-26 NOTE — PATIENT INSTRUCTIONS
We will have you start oral antibiotics for the treatment of this toenail infection.  You can take one tablet 4 times daily for 7 days.     Please also work on warm soapy water soaks of the toenail about 3 times daily.      Please followup if symptoms change or worsen in any way.

## 2024-11-27 DIAGNOSIS — Z80.52 FAMILY HISTORY OF BLADDER CANCER: ICD-10-CM

## 2024-11-27 DIAGNOSIS — C48.1 PRIMARY MALIGNANT NEOPLASM OF PELVIC PERITONEUM (H): Primary | ICD-10-CM

## 2024-12-08 NOTE — PATIENT INSTRUCTIONS
Plan: Patient may have regular showers, continue with drain care, follow-up with Dr. Bell scheduled for 02/02/2023 for possible drain removal right lower quadrant.  Also I have encouraged the patient to ambulate to prevent blood clots in addition to lovenox. Patient is aware she must wear abdominal binder 24/7 unless showering. Patient is happy with results and so am I.    Rupinder Brantley RN on 1/25/2023 at 10:16 AM     Roxanne Reeves

## 2024-12-13 ENCOUNTER — APPOINTMENT (OUTPATIENT)
Dept: LAB | Facility: CLINIC | Age: 39
End: 2024-12-13
Attending: OBSTETRICS & GYNECOLOGY
Payer: COMMERCIAL

## 2024-12-18 ENCOUNTER — APPOINTMENT (OUTPATIENT)
Dept: LAB | Facility: CLINIC | Age: 39
End: 2024-12-18
Payer: COMMERCIAL

## 2024-12-18 DIAGNOSIS — C56.9 PRIMARY LOW GRADE SEROUS ADENOCARCINOMA OF OVARY (H): Primary | ICD-10-CM

## 2024-12-18 DIAGNOSIS — K62.89 RECTUM PAIN: ICD-10-CM

## 2024-12-18 PROCEDURE — 80053 COMPREHEN METABOLIC PANEL: CPT | Performed by: OBSTETRICS & GYNECOLOGY

## 2024-12-18 PROCEDURE — 86304 IMMUNOASSAY TUMOR CA 125: CPT | Performed by: OBSTETRICS & GYNECOLOGY

## 2024-12-18 PROCEDURE — 85025 COMPLETE CBC W/AUTO DIFF WBC: CPT | Performed by: OBSTETRICS & GYNECOLOGY

## 2024-12-18 PROCEDURE — 36415 COLL VENOUS BLD VENIPUNCTURE: CPT | Performed by: OBSTETRICS & GYNECOLOGY

## 2024-12-18 PROCEDURE — 83735 ASSAY OF MAGNESIUM: CPT | Performed by: OBSTETRICS & GYNECOLOGY

## 2024-12-18 NOTE — CONFIDENTIAL NOTE
RN reached out to better address MyChart message     Patient states her rectal pain is back and it is worse then it was before. Patient states this pain started after her exam with MD on Friday     Patient has tried Tylenol, Ibuprofen and Oxycodone and a combination of these with no relief so she has given up trying.     Patient states her ileostomy is working normal but output has been different in that she has found some pills in the output but can't tell what pills these are    Patient over all feels unwell but denies fever and chills.     RN had mentioned the ED and patient very much is against this     RN reached out to MD and reviewed the above.     MD plan:  STAT CT scan as an outpatient or ED     RN reviewed MD recommendations and patient is still very against the ED. She would prefer to get the CT as soon as possible and will go to ED if she can't tolerate the pain any longer and or she gets fever/chills or other signs and symptoms of concern (these were reviewed)    Patient understands the importance of going in and will go in if she has to     STAT CT order placed and urgent scheduling message sent     Patient appreciative of the call    Randi Hernandez RN

## 2024-12-19 DIAGNOSIS — R31.9 BLOOD IN URINE: Primary | ICD-10-CM

## 2024-12-20 ENCOUNTER — HOSPITAL ENCOUNTER (OUTPATIENT)
Dept: CT IMAGING | Facility: CLINIC | Age: 39
Discharge: HOME | End: 2024-12-20
Attending: OBSTETRICS & GYNECOLOGY | Admitting: OBSTETRICS & GYNECOLOGY
Payer: COMMERCIAL

## 2024-12-20 DIAGNOSIS — C56.9 PRIMARY LOW GRADE SEROUS ADENOCARCINOMA OF OVARY (H): ICD-10-CM

## 2024-12-20 DIAGNOSIS — K62.89 RECTUM PAIN: ICD-10-CM

## 2024-12-20 PROCEDURE — 81001 URINALYSIS AUTO W/SCOPE: CPT | Performed by: OBSTETRICS & GYNECOLOGY

## 2024-12-20 PROCEDURE — 87088 URINE BACTERIA CULTURE: CPT | Performed by: OBSTETRICS & GYNECOLOGY

## 2024-12-20 PROCEDURE — 74177 CT ABD & PELVIS W/CONTRAST: CPT

## 2024-12-20 PROCEDURE — 250N000009 HC RX 250: Performed by: OBSTETRICS & GYNECOLOGY

## 2024-12-20 PROCEDURE — 250N000011 HC RX IP 250 OP 636: Performed by: OBSTETRICS & GYNECOLOGY

## 2024-12-20 PROCEDURE — 87186 SC STD MICRODIL/AGAR DIL: CPT | Performed by: OBSTETRICS & GYNECOLOGY

## 2024-12-20 RX ORDER — IOPAMIDOL 755 MG/ML
500 INJECTION, SOLUTION INTRAVASCULAR ONCE
Status: COMPLETED | OUTPATIENT
Start: 2024-12-20 | End: 2024-12-20

## 2024-12-20 RX ADMIN — SODIUM CHLORIDE 47 ML: 9 INJECTION, SOLUTION INTRAVENOUS at 13:18

## 2024-12-20 RX ADMIN — IOPAMIDOL 70 ML: 755 INJECTION, SOLUTION INTRAVENOUS at 13:18

## 2024-12-23 DIAGNOSIS — C56.9 PRIMARY LOW GRADE SEROUS ADENOCARCINOMA OF OVARY (H): Primary | ICD-10-CM

## 2024-12-30 ENCOUNTER — VIRTUAL VISIT (OUTPATIENT)
Dept: PALLIATIVE CARE | Facility: CLINIC | Age: 39
End: 2024-12-30
Attending: OBSTETRICS & GYNECOLOGY
Payer: COMMERCIAL

## 2024-12-30 VITALS — WEIGHT: 132 LBS | HEIGHT: 65 IN | BODY MASS INDEX: 21.99 KG/M2

## 2024-12-30 DIAGNOSIS — Z51.5 ENCOUNTER FOR PALLIATIVE CARE: Primary | ICD-10-CM

## 2024-12-30 DIAGNOSIS — G62.0 NEUROPATHY DUE TO CHEMOTHERAPEUTIC DRUG (H): ICD-10-CM

## 2024-12-30 DIAGNOSIS — C56.9 PRIMARY LOW GRADE SEROUS ADENOCARCINOMA OF OVARY (H): ICD-10-CM

## 2024-12-30 DIAGNOSIS — T45.1X5A NEUROPATHY DUE TO CHEMOTHERAPEUTIC DRUG (H): ICD-10-CM

## 2024-12-30 DIAGNOSIS — F33.2 SEVERE EPISODE OF RECURRENT MAJOR DEPRESSIVE DISORDER, WITHOUT PSYCHOTIC FEATURES (H): ICD-10-CM

## 2024-12-30 DIAGNOSIS — G89.3 CANCER ASSOCIATED PAIN: ICD-10-CM

## 2024-12-30 DIAGNOSIS — C56.9 PRIMARY LOW GRADE SEROUS ADENOCARCINOMA OF OVARY (H): Primary | ICD-10-CM

## 2024-12-30 PROCEDURE — G2211 COMPLEX E/M VISIT ADD ON: HCPCS | Mod: 95 | Performed by: FAMILY MEDICINE

## 2024-12-30 PROCEDURE — 99417 PROLNG OP E/M EACH 15 MIN: CPT | Performed by: FAMILY MEDICINE

## 2024-12-30 PROCEDURE — 99205 OFFICE O/P NEW HI 60 MIN: CPT | Mod: 95 | Performed by: FAMILY MEDICINE

## 2024-12-30 RX ORDER — HYDROMORPHONE HYDROCHLORIDE 2 MG/1
1-2 TABLET ORAL EVERY 6 HOURS PRN
Qty: 60 TABLET | Refills: 0 | Status: SHIPPED | OUTPATIENT
Start: 2024-12-30 | End: 2025-01-29

## 2024-12-30 RX ORDER — BUPRENORPHINE 5 UG/H
1 PATCH TRANSDERMAL
Qty: 4 PATCH | Refills: 4 | Status: SHIPPED | OUTPATIENT
Start: 2024-12-30

## 2024-12-30 RX ORDER — GABAPENTIN 100 MG/1
100 CAPSULE ORAL AT BEDTIME
Qty: 30 CAPSULE | Refills: 1 | Status: SHIPPED | OUTPATIENT
Start: 2024-12-30

## 2024-12-30 ASSESSMENT — PAIN SCALES - GENERAL: PAINLEVEL_OUTOF10: EXTREME PAIN (8)

## 2024-12-30 NOTE — PATIENT INSTRUCTIONS
"It was good to see you today, Marizol.    Here are the things we talked about:  Explore guided meditations or body scans as a way to soothe yourself when you feel really wound up.    I sent prescriptions for the pain patch to [facility]--use 1 patch and change it weekly; it's OK to get it wet but don't soak it in water on your body and don't put a cold pack or heating pad directly over it.    I also sent n a prescription for diluadid instead of oxycodone to use for breakthrough pain.  Record when you use it--that will guide me in future changes to your pain medicine.    I also sent in a prescription for gabapentin to approach a different pain pathway.  Use 1 capsule at bedtime to start.    Someone from the team will reach out to schedule a follow up appointment in 3 months, and Christina will call in a week to see if the patches are helping. You can use the oxycodone with the patches but use them sparingly and record when you do use the oxycodone--it will guide me if future patch strength adjustments are needed.      We talked about starting to look at completing a healthcare directive.  I recommend you check out- [email or web address]     They have several tools and some advice about getting started.  Under the \"How do I document my choices\" section, I am a fan of the full length healthcare directive (for adults with serious illness) because I think it has a good combination of the medical questions that are important as well as the social and personal questions that allow healthcare providers to get to know you as a person if you are unable to speak for yourself.  You do not have to complete every question on the document.  I generally recommend patients start with 3 or 4 questions on the goals page and work from there.     There is also a Short Form, which primarily serves to designate health care agents. These are people who can speak on your behalf about your " healthcare wishes/goals if you are not able to speak for yourself.  Tips for Conserving Your Energy   Cancer and cancer therapy can beaccompanied by feelings of extreme fatigue. To help you during the times you feel tired, these easy tips help conserve the energy you do have.   Activities of Daily Living   Plan ahead to avoid rushing.  Sit down to bathe and dry off. Wear a cruzito robe instead of drying off.   Use a shower/bath organizer to decrease leaning and reaching.   Use extension handles on sponges and brushes.   Install grab rails inthe bathroom or use an elevated toilet seat.   Lay out clothes and toiletries before dressing.   Minimize leaning over to put on clothes and shoes. Bring your foot to your knee to apply socks and shoes. Fasten bra infront then turn to back.   Modify your home to maximize efficient energy use. For example, place chairs strategically to allow for rest stops -- for instance, along a long hallway.   Wear comfortable shoes andlow-heeled, slip on shoes. Wear button front shirts rather than pullovers.   Housekeeping   Schedule household tasks throughout the week.   Do housework sitting down when possible. Use long-handleddusters, dust mops, etc. Use a wheeled cart or stroud's apron to carry supplies.   Delegate heavy housework, shopping, laundry and childcare when possible.   Drag or slide objects rather than lifting. If you do needto lift an object, use your leg muscles rather than your back muscles.   Sit when ironing and take rest periods.   Stop working before becoming overly tired.   Shopping   Organize list by ashley.   Use a grocery cart for support.   Shop at less busy times.   Ask for help in getting to the car.   Buy clothes that don't require ironing.   Meal Preparation   Use convenience and easy-to-preparefoods.   Use small appliances that take less effort to use.   Arrange the preparation environment for easy access to frequently used items.   Prepare meals sitting down.    Soak dishes instead of scrubbing and letdishes air dry.   Prepare double portions and freeze half.   Plan activities that can be done sitting down, such as drawing pictures, playing games, reading, and computer games.   Encourage children to climb up ontoyour lap or into the highchair instead of being lifted.   Make a game of the household chores so that children will want to help.   Delegate childcare when possible.   Workplace   Plan workload totake advantage of peak energy times. Alternate physically demanding tasks with less demanding tasks.   Arrange work environment for easy access to commonly used equipment and supplies.   Leisure Do activities with acompanion.   Select activities that match your energy level. Balance activity and rest. Don't get over-tired.     How to get a hold of us:  For non-urgent matters, MyChart works best.    For more urgent matters, or if you prefer not to use MyChart, call our clinic nurse coordinator Christina Flower RN at [phone]    We have an on-call number for evenings and weekends. Please call this only if you are having uncontrolled symptoms or serious side effects from your medicines: [phone].     For refills, please give us a week (5 working days) notice. We don't always have providers available everyday to do refills. If you call the day you run out of your medicine, we may not be able to refill it in time, so call 5 days in advance!    Arik Norton MD MS FAAFP CAQHPM  [facility]  Office [phone]  Fax [phone]

## 2024-12-30 NOTE — NURSING NOTE
Current patient location: [address]  [facility] [address]    Is the patient currently in the state of [place]? YES    Visit mode:VIDEO    If the visit is dropped, the patient can be reconnected by:VIDEO VISIT: Text to cell phone:   Telephone Information:   Mobile [phone]       Will anyone else be joining the visit? NO  (If patient encounters technical issues they should call [phone])    Are changes needed to the allergy or medication list? Pt completed echeck-in an confirms medications and allergies are correct.      Are refills needed on medications prescribed by this physician? NO    Rooming Documentation:  Questionnaire(s) not done per department protocol    Reason for visit: Consult    Oz CISNEROS
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
## 2024-12-31 ENCOUNTER — TELEPHONE (OUTPATIENT)
Dept: PALLIATIVE CARE | Facility: CLINIC | Age: 39
End: 2024-12-31
Payer: COMMERCIAL

## 2024-12-31 NOTE — TELEPHONE ENCOUNTER
Prior Authorization Retail Medication Request    Medication/Dose: Butrans  Diagnosis and ICD code (if different than what is on RX):     New/renewal/insurance change PA/secondary ins. PA:  Previously Tried and Failed:     Rationale:       Insurance   Primary:    Insurance ID:       Secondary (if applicable):   Insurance ID:       Pharmacy Information (if different than what is on RX)  Name:     Phone:     Fax:     Clinic Information  Preferred routing pool for dept communication:  Penelope Mabry

## 2024-12-31 NOTE — TELEPHONE ENCOUNTER
Retail Pharmacy Prior Authorization Team   Phone: [phone]    PA Initiation    Medication: BUPRENORPHINE 5 MCG/HR TD PTWK  Insurance Company: [employer] [place] - Phone [phone] Fax [phone]  Pharmacy Filling the Rx: [id number] [place], [place] - [address] AT [address] [address] & [facility]  Filling Pharmacy Phone: [phone]  Filling Pharmacy Fax:    Start Date: 12/31/2024    COMPLETED CRITERIA QUESTIONS VIA EPA - PENDING DETERMINATION

## 2025-01-02 ENCOUNTER — PATIENT OUTREACH (OUTPATIENT)
Dept: CARE COORDINATION | Facility: CLINIC | Age: 40
End: 2025-01-02
Payer: COMMERCIAL

## 2025-01-02 NOTE — TELEPHONE ENCOUNTER
MEDICATION APPEAL APPROVED    Medication: BUPRENORPHINE 5 MCG/HR TD PTWK  Authorization Effective Date: 1/1/2025  Authorization Expiration Date: 1/2/2026  Appeal Insurance Company: MICHELLE GOLDSMITH  [facility]illing Pharmacy: [id number] - [place], [place] - [address] AT [address] [address] & [facility]  Patient Notified: YES (faxed approval letter to pharmacy and notified patient via mychart message)  Comments:

## 2025-01-02 NOTE — PROGRESS NOTES
Social Work - Telephone/[employer] message  [facility]  Data:   Patient Name: Marizol Granados  Goes By: Marizol    DOMONIQUE/Age: 1985 (39 year old)      Referral Source: ANKUR Ibarra  Reason for Referral: Resources, support     Intervention: SW called Marizol to introduce self and offer resources/support. Left voicemail with contact information and availability. Sent message to Fidelia LAU and Dr. Norton re involvement.    Plan:  will await patient's return phone call/message and provide assistance at that time.     FLORIAN Hurley, [facility]  Adult Oncology Clinics  [place] (M,W), [place] (T) & [place] (Th)  *I am off Friday  Office: [phone]

## 2025-01-02 NOTE — TELEPHONE ENCOUNTER
Retail Pharmacy Prior Authorization Team   Phone: [phone]    RESUBMITTED PA WITH LMN ATTACHED IN HOPES THEY WILL APPROVED WITHOUT HAVING TO APPEAL    PA Initiation    Medication: BUPRENORPHINE 5 MCG/HR TD PTWK  Insurance Company: [employer] [place] - Phone [phone] Fax [phone]  Pharmacy Filling the Rx: [id number] - [place], [place] - [address] AT [address] [address] & [facility]  Filling Pharmacy Phone: [phone]  Filling Pharmacy Fax:    Start Date: 12/31/2024    SUBMITTED CRITERIA QUESTIONS - PENDING DETERMINATION

## 2025-01-02 NOTE — TELEPHONE ENCOUNTER
PRIOR AUTHORIZATION DENIED    Medication: BUPRENORPHINE 5 MCG/HR TD PTWK  Insurance Company: [employer] [place] - Phone [phone] Fax [phone]  Denial Date: 1/1/2025  Denial Reason(s): MUST TRY/FAIL TWO PREFERRED PRODUCTS - BELBUCA FILM, FENTANYL PATCHES, OR MORPHINE ER TABLETS        Appeal Information: IF THE PROVIDER WOULD LIKE TO APPEAL THIS DECISION PLEASE PROVIDE THE PA TEAM WITH A LETTER OF MEDICAL NECESSITY      Patient Notified: NO

## 2025-01-03 ENCOUNTER — APPOINTMENT (OUTPATIENT)
Dept: LAB | Facility: CLINIC | Age: 40
End: 2025-01-03
Attending: OBSTETRICS & GYNECOLOGY
Payer: COMMERCIAL

## 2025-01-09 ENCOUNTER — PATIENT OUTREACH (OUTPATIENT)
Dept: CARE COORDINATION | Facility: CLINIC | Age: 40
End: 2025-01-09
Payer: COMMERCIAL

## 2025-01-09 NOTE — PROGRESS NOTES
Social Work - Telephone/[employer] message  [facility]  Data:   Patient Name: Marizol Granados  Goes By: Marizol YOUSSEF/Age: 1985 (39 year old)    Intervention: 2nd attempt to connect with pt. Left voicemail and invited call back when available.    Plan:  will await patient's return phone call/message and provide assistance at that time.   FLORIAN Hurley, [facility]  Adult Oncology Clinics  [place] (M,W), [place] (T) & [place] (Th)  *I am off Friday  Office: [phone]

## 2025-01-13 DIAGNOSIS — G89.3 CANCER ASSOCIATED PAIN: ICD-10-CM

## 2025-01-13 DIAGNOSIS — C56.9 PRIMARY LOW GRADE SEROUS ADENOCARCINOMA OF OVARY (H): Primary | ICD-10-CM

## 2025-01-13 RX ORDER — BUPRENORPHINE 10 UG/H
1 PATCH TRANSDERMAL
Qty: 4 PATCH | Refills: 2 | Status: SHIPPED | OUTPATIENT
Start: 2025-01-13

## 2025-01-19 DIAGNOSIS — G43.011 INTRACTABLE MIGRAINE WITHOUT AURA AND WITH STATUS MIGRAINOSUS: ICD-10-CM

## 2025-01-20 RX ORDER — TOPIRAMATE 50 MG/1
TABLET, FILM COATED ORAL
Qty: 180 TABLET | Refills: 3 | Status: SHIPPED | OUTPATIENT
Start: 2025-01-20

## 2025-01-24 ENCOUNTER — APPOINTMENT (OUTPATIENT)
Dept: LAB | Facility: CLINIC | Age: 40
End: 2025-01-24
Attending: OBSTETRICS & GYNECOLOGY
Payer: COMMERCIAL

## 2025-01-27 ENCOUNTER — PATIENT OUTREACH (OUTPATIENT)
Dept: CARE COORDINATION | Facility: CLINIC | Age: 40
End: 2025-01-27
Payer: COMMERCIAL

## 2025-01-27 NOTE — PROGRESS NOTES
Social Work - Telephone/behaviewhart message  [facility]  Data:   Patient Name: Marizol Granados  Goes By: Marizoladrian YOUSSEF/Age: 1985 (39 year old)    Intervention: SW has been unsuccessful connecting with pt via phone. Sent my chart message with contact information and encouraged her to reach out with any resource/support needs.    Plan:  will await patient's return phone call/message and provide assistance at that time.      FLORIAN Hurley, [facility]  Adult Oncology Clinics  [place] (M,W), [place] (T) & [place] (Th)  *I am off Friday  Office: [phone]

## 2025-02-10 ENCOUNTER — PATIENT OUTREACH (OUTPATIENT)
Dept: CARE COORDINATION | Facility: CLINIC | Age: 40
End: 2025-02-10
Payer: COMMERCIAL

## 2025-02-10 NOTE — TELEPHONE ENCOUNTER
LVM message to Itzel at [facility]. Two more attempts will be made.     Renetta Rodriguez   [facility]     Yes - the patient is able to be screened

## 2025-02-14 ENCOUNTER — APPOINTMENT (OUTPATIENT)
Dept: LAB | Facility: CLINIC | Age: 40
End: 2025-02-14
Attending: NURSE PRACTITIONER
Payer: COMMERCIAL

## 2025-02-17 ENCOUNTER — DOCUMENTATION ONLY (OUTPATIENT)
Dept: ONCOLOGY | Facility: CLINIC | Age: 40
End: 2025-02-17
Payer: COMMERCIAL

## 2025-02-17 NOTE — PROGRESS NOTES
"Patient has lab only appt 2/24/2025 for \"Sosa labs\", no orders in chart.  Please place FUTURE orders in Epic if appropriate.    Thanks,   Michael lab    "

## 2025-02-24 ENCOUNTER — LAB (OUTPATIENT)
Dept: LAB | Facility: CLINIC | Age: 40
End: 2025-02-24
Payer: COMMERCIAL

## 2025-02-24 DIAGNOSIS — C56.3 OVARIAN CANCER, BILATERAL (H): ICD-10-CM

## 2025-02-24 DIAGNOSIS — R31.9 BLOOD IN URINE: ICD-10-CM

## 2025-02-24 DIAGNOSIS — R53.82 CHRONIC FATIGUE: ICD-10-CM

## 2025-02-24 DIAGNOSIS — N39.0 FREQUENT UTI: Primary | ICD-10-CM

## 2025-02-24 DIAGNOSIS — N39.0 FREQUENT UTI: ICD-10-CM

## 2025-02-24 LAB
ALBUMIN SERPL BCG-MCNC: 4 G/DL (ref 3.5–5.2)
ALBUMIN UR-MCNC: >=300 MG/DL
ALP SERPL-CCNC: 64 U/L (ref 40–150)
ALT SERPL W P-5'-P-CCNC: 16 U/L (ref 0–50)
ANION GAP SERPL CALCULATED.3IONS-SCNC: 10 MMOL/L (ref 7–15)
APPEARANCE UR: ABNORMAL
AST SERPL W P-5'-P-CCNC: 20 U/L (ref 0–45)
BACTERIA #/AREA URNS HPF: ABNORMAL /HPF
BASOPHILS # BLD AUTO: 0 10E3/UL (ref 0–0.2)
BASOPHILS NFR BLD AUTO: 0 %
BILIRUB SERPL-MCNC: <0.2 MG/DL
BILIRUB UR QL STRIP: NEGATIVE
BUN SERPL-MCNC: 16.8 MG/DL (ref 6–20)
CALCIUM SERPL-MCNC: 9.5 MG/DL (ref 8.8–10.4)
CHLORIDE SERPL-SCNC: 107 MMOL/L (ref 98–107)
COLOR UR AUTO: YELLOW
CREAT SERPL-MCNC: 0.84 MG/DL (ref 0.51–0.95)
EGFRCR SERPLBLD CKD-EPI 2021: >90 ML/MIN/1.73M2
EOSINOPHIL # BLD AUTO: 0.3 10E3/UL (ref 0–0.7)
EOSINOPHIL NFR BLD AUTO: 6 %
ERYTHROCYTE [DISTWIDTH] IN BLOOD BY AUTOMATED COUNT: 15.5 % (ref 10–15)
GLUCOSE SERPL-MCNC: 115 MG/DL (ref 70–99)
GLUCOSE UR STRIP-MCNC: NEGATIVE MG/DL
HCO3 SERPL-SCNC: 25 MMOL/L (ref 22–29)
HCT VFR BLD AUTO: 28.2 % (ref 35–47)
HGB BLD-MCNC: 9.5 G/DL (ref 11.7–15.7)
HGB UR QL STRIP: ABNORMAL
IMM GRANULOCYTES # BLD: 0 10E3/UL
IMM GRANULOCYTES NFR BLD: 0 %
KETONES UR STRIP-MCNC: NEGATIVE MG/DL
LEUKOCYTE ESTERASE UR QL STRIP: ABNORMAL
LYMPHOCYTES # BLD AUTO: 2.3 10E3/UL (ref 0.8–5.3)
LYMPHOCYTES NFR BLD AUTO: 54 %
MAGNESIUM SERPL-MCNC: 1.6 MG/DL (ref 1.7–2.3)
MCH RBC QN AUTO: 36.7 PG (ref 26.5–33)
MCHC RBC AUTO-ENTMCNC: 33.7 G/DL (ref 31.5–36.5)
MCV RBC AUTO: 109 FL (ref 78–100)
MONOCYTES # BLD AUTO: 0.3 10E3/UL (ref 0–1.3)
MONOCYTES NFR BLD AUTO: 8 %
NEUTROPHILS # BLD AUTO: 1.4 10E3/UL (ref 1.6–8.3)
NEUTROPHILS NFR BLD AUTO: 32 %
NITRATE UR QL: POSITIVE
PH UR STRIP: 5.5 [PH] (ref 5–7)
PLATELET # BLD AUTO: 130 10E3/UL (ref 150–450)
POTASSIUM SERPL-SCNC: 3.7 MMOL/L (ref 3.4–5.3)
PROT SERPL-MCNC: 6.5 G/DL (ref 6.4–8.3)
RBC # BLD AUTO: 2.59 10E6/UL (ref 3.8–5.2)
RBC #/AREA URNS AUTO: ABNORMAL /HPF
SODIUM SERPL-SCNC: 142 MMOL/L (ref 135–145)
SP GR UR STRIP: >=1.03 (ref 1–1.03)
UROBILINOGEN UR STRIP-ACNC: 0.2 E.U./DL
WBC # BLD AUTO: 4.3 10E3/UL (ref 4–11)
WBC #/AREA URNS AUTO: >100 /HPF

## 2025-02-24 RX ORDER — CIPROFLOXACIN 250 MG/1
250 TABLET, FILM COATED ORAL 2 TIMES DAILY
Qty: 14 TABLET | Refills: 0 | Status: SHIPPED | OUTPATIENT
Start: 2025-02-24

## 2025-02-24 NOTE — PROGRESS NOTES
UA concerning for another UTI    Plan another course of Cipro  250mg PO BID x 7 days    Will also refer back to urology given ongoing need for self cath and recurrent UTI. Consider suppressive antibitiocs, but would want them to weigh in on this          Jessenia Sosa MD  Gynecologic Oncology  Pager [phone]

## 2025-02-26 ENCOUNTER — TELEPHONE (OUTPATIENT)
Dept: UROLOGY | Facility: CLINIC | Age: 40
End: 2025-02-26
Payer: COMMERCIAL

## 2025-02-26 LAB — BACTERIA UR CULT: ABNORMAL

## 2025-02-26 NOTE — TELEPHONE ENCOUNTER
----- Message from Nava WHITE sent at 2/26/2025 12:21 PM CST -----  Regarding: FW: Not sure if you are the correct person  Please call to schedule this patient to see Selene  for recurrent UTI's.  thanks  ----- Message -----  From: Randi Hernandez RN  Sent: 2/26/2025   9:29 AM CST  To: Nava Campbell RN  Subject: Not sure if you are the correct person           Good Morning     I am in need of help     This patient saw Dr Moseley a while ago and Dr Sosa would like the patient to see her again     Due to self cathing still she has had continued problems with UTI's and Dr Sosa would like Dr Moseley's help in managing these     Do you know how I get her back in?     I appreciate your help    Juan Pablo

## 2025-03-05 DIAGNOSIS — G89.3 CANCER ASSOCIATED PAIN: ICD-10-CM

## 2025-03-05 DIAGNOSIS — C56.9 PRIMARY LOW GRADE SEROUS ADENOCARCINOMA OF OVARY (H): ICD-10-CM

## 2025-03-05 DIAGNOSIS — T45.1X5A NEUROPATHY DUE TO CHEMOTHERAPEUTIC DRUG: ICD-10-CM

## 2025-03-05 DIAGNOSIS — G62.0 NEUROPATHY DUE TO CHEMOTHERAPEUTIC DRUG: ICD-10-CM

## 2025-03-05 RX ORDER — GABAPENTIN 100 MG/1
100 CAPSULE ORAL AT BEDTIME
Qty: 30 CAPSULE | Refills: 3 | Status: SHIPPED | OUTPATIENT
Start: 2025-03-05

## 2025-03-05 NOTE — TELEPHONE ENCOUNTER
Received fax from pharmacy requesting refill of gabapentin.     Last refill: 1/30/25  Last office visit: 12/30/24  Scheduled for follow up 3/20/25     Will route request to MD/ for review.     [place]ed [place].

## 2025-03-11 ENCOUNTER — OFFICE VISIT (OUTPATIENT)
Dept: UROLOGY | Facility: CLINIC | Age: 40
End: 2025-03-11
Payer: COMMERCIAL

## 2025-03-11 VITALS — HEART RATE: 118 BPM | SYSTOLIC BLOOD PRESSURE: 100 MMHG | OXYGEN SATURATION: 99 % | DIASTOLIC BLOOD PRESSURE: 70 MMHG

## 2025-03-11 DIAGNOSIS — N82.3 RECTOVAGINAL FISTULA: ICD-10-CM

## 2025-03-11 DIAGNOSIS — R33.9 URINARY RETENTION: ICD-10-CM

## 2025-03-11 DIAGNOSIS — C56.9 PRIMARY LOW GRADE SEROUS ADENOCARCINOMA OF OVARY (H): ICD-10-CM

## 2025-03-11 DIAGNOSIS — Z98.890 H/O PELVIC SURGERY: Primary | ICD-10-CM

## 2025-03-11 PROCEDURE — 99214 OFFICE O/P EST MOD 30 MIN: CPT | Performed by: PHYSICIAN ASSISTANT

## 2025-03-11 ASSESSMENT — PATIENT HEALTH QUESTIONNAIRE - PHQ9: SUM OF ALL RESPONSES TO PHQ QUESTIONS 1-9: 9

## 2025-03-11 NOTE — PROGRESS NOTES
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 Unfortunately following surgery had readmission and exam confirmed posterior vaginal fistula. Has continued ISC 5x/day for urinary retention. Last seen by oncology on 2/14/25 and said to stop chemo and start letrozole. Oncology has discussed may need permanent end colostomy which she adamantly refused and anticipate will not heal until off of chemo. Per oncology note, patient having UTIs. Most recent CT on 12/20/24 showing gas in bladder but otherwise unremarkable. Planning to have repeat CT on 3/21/25. Patient states she does not have feeling in her pelvic region. Her UTI sx include gross hematuria, not feeling well and low back pain bilaterally.  Continues ISC 5x/day. Patient voices no other concerns at this time.            Allergies:     Allergies   Allergen Reactions    Bactrim [Sulfamethoxazole W-Trimethoprim] Anaphylaxis    Mesalamine Anaphylaxis and Hives    Other Environmental Allergy Hives, Other (See Comments) and Shortness Of Breath     [place]/Pollen  oak    Sulfa Antibiotics Anaphylaxis    Asacol [Mesalamine] Hives    Vancomycin Itching    Amoxicillin Other (See Comments)     Hypersensitivity allergic reaction  Other reaction(s): Unknown/Not Verified  Gets UTI  Urinary sx's.        Molds & Smuts Other (See Comments)     Other reaction(s): Runny Nose, Unknown/Not Verified    Pollen            Medications:     Current Outpatient Medications   Medication Sig Dispense Refill    acetaminophen  (TYLENOL) 325 MG tablet Take 2 tablets (650 mg) by mouth every 6 hours.      apixaban ANTICOAGULANT (ELIQUIS) 5 MG tablet Take 1 tablet (5 mg) by mouth 2 times daily 60 tablet 11    bisacodyl (DULCOLAX) 10 MG suppository Place 1 suppository (10 mg) rectally daily as needed for constipation. 15 suppository 1    buprenorphine (BUTRANS) 10 MCG/HR WK patch Place 1 patch over 168 hours onto the skin every 7 days. 4 patch 2    busPIRone HCl (BUSPAR) 30 MG tablet Take 30 mg by mouth 2 times daily.      cholecalciferol 25 MCG (1000 UT) TABS Take 2,000 Units by mouth daily      ciprofloxacin (CIPRO) 250 MG tablet Take 1 tablet (250 mg) by mouth 2 times daily. 14 tablet 0    ciprofloxacin (CIPRO) 250 MG tablet Take 1 tablet (250 mg) by mouth 2 times daily. 6 tablet 0    ciprofloxacin (CIPRO) 500 MG tablet Take 1 tablet (500 mg) by mouth daily. 3 tablet 0    cyanocobalamin (VITAMIN B-12) 1000 MCG tablet Take 1,000 mcg by mouth.      desvenlafaxine (PRISTIQ) 100 MG 24 hr tablet Take 100 mg by mouth daily      desvenlafaxine (PRISTIQ) 50 MG 24 hr tablet TAKE 1 TABLET BY MOUTH ONCE DAILY. TAKE WITH 100MG TABLET FOR TOTAL OF 150MG DAILY      dexAMETHasone (DECADRON) 4 MG tablet Take 2 tablets (8 mg) by mouth daily. Take for 3 days, starting the day after chemo. Take with food. 6 tablet 5    dicyclomine (BENTYL) 10 MG capsule Take 1 capsule (10 mg) by mouth 3 times daily as needed (cramping). 90 capsule 0    gabapentin (NEURONTIN) 100 MG capsule Take 1 capsule (100 mg) by mouth at bedtime. 30 capsule 3    letrozole (FEMARA) 2.5 MG tablet Take 1 tablet (2.5 mg) by mouth daily. 84 tablet 0    loperamide (IMODIUM) 2 MG capsule Take 2 mg by mouth.      LORazepam (ATIVAN) 1 MG tablet Take 0.5-1 tablets (0.5-1 mg) by mouth 2 times daily. Take 1 mg in the morning and 0.5 mg in the evening      Multiple Vitamins-Minerals (MULTIVITAL PO) Take 1 tablet by mouth daily      multivitamin w/minerals (MULTI-VITAMIN) tablet Take 1 tablet by mouth  daily.      naloxone (NARCAN) 4 MG/0.1ML nasal spray Spray 1 spray (4 mg) into one nostril alternating nostrils as needed for opioid reversal. every 2-3 minutes until assistance arrives 2 each 0    nitroFURantoin macrocrystal-monohydrate (MACROBID) 100 MG capsule Take 1 capsule (100 mg) by mouth 2 times daily. 14 capsule 0    omeprazole (PRILOSEC) 40 MG DR capsule TAKE 1 CAPSULE(40 MG) BY MOUTH DAILY 90 capsule 3    ondansetron (ZOFRAN) 8 MG tablet Take 1 tablet (8 mg) by mouth every 8 hours as needed for nausea. 30 tablet 2    Ostomy Supplies MISC 1 each every other day. 20 each 11    Ostomy Supplies MISC 1 each every other day. 20 each 11    Ostomy Supplies MISC 1 each every other day. 20 each 11    Ostomy Supplies MISC 1 each every other day. 20 each 11    oxyCODONE (ROXICODONE) 5 MG tablet       pantoprazole (PROTONIX) 40 MG EC tablet Take 1 tablet by mouth every morning.      pegfilgrastim (NEULASTA) 6 MG/0.6ML injection Inject 0.6 mLs (6 mg) subcutaneously once for 1 dose.      PROBIOTIC PRODUCT PO Take 1 tablet by mouth At Bedtime      prochlorperazine (COMPAZINE) 10 MG tablet Take 1 tablet (10 mg) by mouth every 6 hours as needed for nausea or vomiting. 30 tablet 2    prochlorperazine (COMPAZINE) 10 MG tablet Take 1 tablet (10 mg) by mouth every 6 hours as needed for nausea or vomiting. 30 tablet 2    promethazine (PHENERGAN) 25 MG tablet Take 1 tablet by mouth daily.      propranolol (INDERAL) 10 MG tablet Take 1 tablet (10 mg) by mouth 2 times daily. Hold for HR <60 and SBP <90      QUEtiapine (SEROQUEL) 200 MG tablet Take 200 mg by mouth at bedtime.      ramelteon (ROZEREM) 8 MG tablet Take 8 mg by mouth At Bedtime      simethicone (MYLICON) 80 MG chewable tablet Take 1 tablet (80 mg) by mouth every 6 hours as needed for flatulence or cramping. 30 tablet 1    topiramate (TOPAMAX) 50 MG tablet TAKE 1 TABLET(50 MG) BY MOUTH TWICE DAILY 180 tablet 3    traZODone (DESYREL) 150 MG tablet Take 2 tablets by  mouth at bedtime.      valACYclovir (VALTREX) 500 MG tablet Take 1 tablet (500 mg) by mouth 2 times daily. 14 tablet 2    valACYclovir (VALTREX) 500 MG tablet Take 1 tablet (500 mg) by mouth 2 times daily. 6 tablet 3    zolpidem (AMBIEN) 5 MG tablet Take 5 mg by mouth at bedtime.       No current facility-administered medications for this visit.               Past  Surgical History:     Past Surgical History:   Procedure Laterality Date    CHOLECYSTECTOMY      COLECTOMY WITHOUT COLOSTOMY N/A 09/01/2024    Procedure: Rectosigmoid colon resection, diverting ileostomy, total omentectomy;  Surgeon: Jessenia Sosa MD;  Location: [facility]    COLONOSCOPY N/A 06/21/2024    Procedure: Colonoscopy;  Surgeon: Jalen Regalado MD;  Location: [facility]    GASTRIC BYPASS  01/01/2008    HC CAPSULE ENDOSCOPY  11/20/2012    Procedure: CAPSULE/PILL CAM ENDOSCOPY;  Surgeon: Siva Mckenna MD;  Location: [facility]    HC CAPSULE ENDOSCOPY  12/10/2012    Procedure: CAPSULE/PILL CAM ENDOSCOPY;  Surgeon: Siva Mckenna MD;  Location: [facility]    HYSTERECTOMY RADICAL N/A 09/01/2024    Procedure: Hysterectomy total abdominal radical;  Surgeon: Jessenia Sosa MD;  Location: [facility]    INSERT PORT VASCULAR ACCESS Right 10/31/2024    Procedure: Insert port vascular access;  Surgeon: Kelvin Castillo MD;  Location: [facility] OR    IR CHEST PORT PLACEMENT > 5 YRS OF AGE  10/31/2024    IR LUMBAR PUNCTURE  09/11/2012    LAPAROSCOPIC BIOPSY LIVER      LAPAROTOMY EXPLORATORY N/A 09/01/2024    Procedure: Laparotomy exploratory;  Surgeon: Jessenia Sosa MD;  Location: [facility]    LIVER BIOPSY      liver polyps resected    PANNICULECTOMY N/A 01/23/2023    Procedure: [id number]'s panniculectomy with vertical component.;  Surgeon: Adan Bell MD;  Location: [facility]    REVISION VENESSA-EN-Y      SIGMOIDOSCOPY FLEXIBLE N/A 10/29/2024    Procedure: SIGMOIDOSCOPY, FLEXIBLE, EXAMINATION UNDER ANESTHESIA, FECAL DISIMPACTION;  Surgeon:  Gerson Carpenter MD;  [place] [facility] [place]             Physical Exam:     Vitals:    03/11/25 1401   BP: 100/70   Pulse: 118   SpO2: 99%     PSYCH: NAD  EYES: EOMI  NEURO: AAO x3    LABS:   Creatinine   Date Value Ref Range Status   02/24/2025 0.84 0.51 - 0.95 mg/dL Final   11/05/2012 0.73 0.52 - 1.04 mg/dL Final     UC  2/24/25  >100k Staph  2/14/25  No growth   1/24/25 >100k Enterococcus faecalis (pansensitive)  1/3/25 No growth  12/20/24 >100k Klebsiella pneumoniae (resistant to ampicillin and intermediate to macrobid) and 10-50k E. Coli (pansensitive)  9/12/24 >100k E. 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 Unfortunately following surgery had readmission and exam confirmed posterior vaginal fistula. Has continued ISC 5x/day for urinary retention.     Plan:  -  Continue ISC 5x/day indefinitely. Patient is frustrated that she does not have bladder sensations. Discussed with patient that this can take time and bladder sensation may/may not return. Could consider VUDS in the future.  - Discussed with Dr. Moseley. Recommend bladder irrigations. If sx persist, consider gent installations. Reached out to nurse coordinator to help with setting up supplies and to follow-up with patient in 1 month for sx check.   - discussed with patient that given she catheterizes can become colonized. Do not recommend random urine samples or test of cures.   - given having gross hematuria, recommend cysto. Will reach out to Dr. Moseley to discuss if cysto needs to be in OR or in clinic.   - keep CT scan as planned in a few weeks.  - continue to follow with oncology.   - After discussing the assessment and plan with patient, patient verbalizes  understanding and agrees to the above plan. All questions answered.     30 minutes spent on the date of the encounter doing chart review, review of oncology notes, review of labs, review of test results, interpretation of tests, patient visit and documentation.      Beatrice Braynt PA-C  Urology  March 11, 2025      Patient Care Team:  Georgie Hernández APRN CNP as PCP - General (Family Practice)  Adan Bell MD as Physician (Plastic Surgery)  Georgie Hernández APRN CNP as Assigned PCP  Kelvin Wilkins DO as Assigned Neuroscience Provider  Janette Wells RN as Registered Nurse (Wound Care)  Jessenia Sosa MD as Assigned Cancer Care Provider  Renetta Bhatia APRN CNP as Assigned OBGYN Provider  Julia Moseley MD as Assigned Surgical Provider  Arik Norton MD as MD (Palliative Care)  Arik Norton MD as Assigned Palliative Care Provider

## 2025-03-11 NOTE — LETTER
3/11/2025       RE: Marizol Granados  [address]  [place] [address]     Dear Colleague,    Thank you for referring your patient, Marizol Granados, to the [facility] at [facility]. Please see a copy of my visit note below.    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 Unfortunately following surgery had readmission and exam confirmed posterior vaginal fistula. Has continued ISC 5x/day for urinary retention. Last seen by oncology on 2/14/25 and said to stop chemo and start letrozole. Oncology has discussed may need permanent end colostomy which she adamantly refused and anticipate will not heal until off of chemo. Per oncology note, patient having UTIs. Most recent CT on 12/20/24 showing gas in bladder but otherwise unremarkable. Planning to have repeat CT on 3/21/25. Patient states she does not have feeling in her pelvic region. Her UTI sx include gross hematuria, not feeling well and low back pain bilaterally.  Continues ISC 5x/day. Patient voices no other concerns at this time.            Allergies:     Allergies   Allergen Reactions     Bactrim [Sulfamethoxazole W-Trimethoprim] Anaphylaxis     Mesalamine Anaphylaxis and Hives     Other Environmental Allergy Hives, Other (See Comments) and Shortness Of Breath     [place]/Pollen  oak     Sulfa Antibiotics Anaphylaxis     Asacol [Mesalamine] Hives     Vancomycin Itching     Amoxicillin Other (See Comments)      Hypersensitivity allergic reaction  Other reaction(s): Unknown/Not Verified  Gets UTI  Urinary sx's.         Molds & Smuts Other (See Comments)     Other reaction(s): Runny Nose, Unknown/Not Verified    Pollen            Medications:     Current Outpatient Medications   Medication Sig Dispense Refill     acetaminophen (TYLENOL) 325 MG tablet Take 2 tablets (650 mg) by mouth every 6 hours.       apixaban ANTICOAGULANT (ELIQUIS) 5 MG tablet Take 1 tablet (5 mg) by mouth 2 times daily 60 tablet 11     bisacodyl (DULCOLAX) 10 MG suppository Place 1 suppository (10 mg) rectally daily as needed for constipation. 15 suppository 1     buprenorphine (BUTRANS) 10 MCG/HR WK patch Place 1 patch over 168 hours onto the skin every 7 days. 4 patch 2     busPIRone HCl (BUSPAR) 30 MG tablet Take 30 mg by mouth 2 times daily.       cholecalciferol 25 MCG (1000 UT) TABS Take 2,000 Units by mouth daily       ciprofloxacin (CIPRO) 250 MG tablet Take 1 tablet (250 mg) by mouth 2 times daily. 14 tablet 0     ciprofloxacin (CIPRO) 250 MG tablet Take 1 tablet (250 mg) by mouth 2 times daily. 6 tablet 0     ciprofloxacin (CIPRO) 500 MG tablet Take 1 tablet (500 mg) by mouth daily. 3 tablet 0     cyanocobalamin (VITAMIN B-12) 1000 MCG tablet Take 1,000 mcg by mouth.       desvenlafaxine (PRISTIQ) 100 MG 24 hr tablet Take 100 mg by mouth daily       desvenlafaxine (PRISTIQ) 50 MG 24 hr tablet TAKE 1 TABLET BY MOUTH ONCE DAILY. TAKE WITH 100MG TABLET FOR TOTAL OF 150MG DAILY       dexAMETHasone (DECADRON) 4 MG tablet Take 2 tablets (8 mg) by mouth daily. Take for 3 days, starting the day after chemo. Take with food. 6 tablet 5     dicyclomine (BENTYL) 10 MG capsule Take 1 capsule (10 mg) by mouth 3 times daily as needed (cramping). 90 capsule 0     gabapentin (NEURONTIN) 100 MG capsule Take 1 capsule (100 mg) by mouth at bedtime. 30 capsule 3     letrozole (FEMARA) 2.5 MG tablet Take 1 tablet (2.5 mg) by mouth daily. 84 tablet 0     loperamide  (IMODIUM) 2 MG capsule Take 2 mg by mouth.       LORazepam (ATIVAN) 1 MG tablet Take 0.5-1 tablets (0.5-1 mg) by mouth 2 times daily. Take 1 mg in the morning and 0.5 mg in the evening       Multiple Vitamins-Minerals (MULTIVITAL PO) Take 1 tablet by mouth daily       multivitamin w/minerals (MULTI-VITAMIN) tablet Take 1 tablet by mouth daily.       naloxone (NARCAN) 4 MG/0.1ML nasal spray Spray 1 spray (4 mg) into one nostril alternating nostrils as needed for opioid reversal. every 2-3 minutes until assistance arrives 2 each 0     nitroFURantoin macrocrystal-monohydrate (MACROBID) 100 MG capsule Take 1 capsule (100 mg) by mouth 2 times daily. 14 capsule 0     omeprazole (PRILOSEC) 40 MG DR capsule TAKE 1 CAPSULE(40 MG) BY MOUTH DAILY 90 capsule 3     ondansetron (ZOFRAN) 8 MG tablet Take 1 tablet (8 mg) by mouth every 8 hours as needed for nausea. 30 tablet 2     Ostomy Supplies MISC 1 each every other day. 20 each 11     Ostomy Supplies MISC 1 each every other day. 20 each 11     Ostomy Supplies MISC 1 each every other day. 20 each 11     Ostomy Supplies MISC 1 each every other day. 20 each 11     oxyCODONE (ROXICODONE) 5 MG tablet        pantoprazole (PROTONIX) 40 MG EC tablet Take 1 tablet by mouth every morning.       pegfilgrastim (NEULASTA) 6 MG/0.6ML injection Inject 0.6 mLs (6 mg) subcutaneously once for 1 dose.       PROBIOTIC PRODUCT PO Take 1 tablet by mouth At Bedtime       prochlorperazine (COMPAZINE) 10 MG tablet Take 1 tablet (10 mg) by mouth every 6 hours as needed for nausea or vomiting. 30 tablet 2     prochlorperazine (COMPAZINE) 10 MG tablet Take 1 tablet (10 mg) by mouth every 6 hours as needed for nausea or vomiting. 30 tablet 2     promethazine (PHENERGAN) 25 MG tablet Take 1 tablet by mouth daily.       propranolol (INDERAL) 10 MG tablet Take 1 tablet (10 mg) by mouth 2 times daily. Hold for HR <60 and SBP <90       QUEtiapine (SEROQUEL) 200 MG tablet Take 200 mg by mouth at bedtime.        ramelteon (ROZEREM) 8 MG tablet Take 8 mg by mouth At Bedtime       simethicone (MYLICON) 80 MG chewable tablet Take 1 tablet (80 mg) by mouth every 6 hours as needed for flatulence or cramping. 30 tablet 1     topiramate (TOPAMAX) 50 MG tablet TAKE 1 TABLET(50 MG) BY MOUTH TWICE DAILY 180 tablet 3     traZODone (DESYREL) 150 MG tablet Take 2 tablets by mouth at bedtime.       valACYclovir (VALTREX) 500 MG tablet Take 1 tablet (500 mg) by mouth 2 times daily. 14 tablet 2     valACYclovir (VALTREX) 500 MG tablet Take 1 tablet (500 mg) by mouth 2 times daily. 6 tablet 3     zolpidem (AMBIEN) 5 MG tablet Take 5 mg by mouth at bedtime.       No current facility-administered medications for this visit.               Past  Surgical History:     Past Surgical History:   Procedure Laterality Date     CHOLECYSTECTOMY       COLECTOMY WITHOUT COLOSTOMY N/A 09/01/2024    Procedure: Rectosigmoid colon resection, diverting ileostomy, total omentectomy;  Surgeon: Jessenia Sosa MD;  Location: [facility] OR     COLONOSCOPY N/A 06/21/2024    Procedure: Colonoscopy;  Surgeon: Jalen Regalado MD;  Location:  GI     GASTRIC BYPASS  01/01/2008     HC CAPSULE ENDOSCOPY  11/20/2012    Procedure: CAPSULE/PILL CAM ENDOSCOPY;  Surgeon: Siva Mckenna MD;  Location: [facility]     HC CAPSULE ENDOSCOPY  12/10/2012    Procedure: CAPSULE/PILL CAM ENDOSCOPY;  Surgeon: Siva Mckenna MD;  Location: [facility]     HYSTERECTOMY RADICAL N/A 09/01/2024    Procedure: Hysterectomy total abdominal radical;  Surgeon: Jessenia Sosa MD;  Location: [facility]     INSERT PORT VASCULAR ACCESS Right 10/31/2024    Procedure: Insert port vascular access;  Surgeon: Kelvin Castillo MD;  Location: [facility] OR     IR CHEST PORT PLACEMENT > 5 YRS OF AGE  10/31/2024     IR LUMBAR PUNCTURE  09/11/2012     LAPAROSCOPIC BIOPSY LIVER       LAPAROTOMY EXPLORATORY N/A 09/01/2024    Procedure: Laparotomy exploratory;  Surgeon: Jessenia Sosa MD;  Location: [facility]      LIVER BIOPSY      liver polyps resected     PANNICULECTOMY N/A 01/23/2023    Procedure: [id number]'s panniculectomy with vertical component.;  Surgeon: Adan Bell MD;  [place] [facility] [place]     REVISION VENESSA-EN-Y       SIGMOIDOSCOPY FLEXIBLE N/A 10/29/2024    Procedure: SIGMOIDOSCOPY, FLEXIBLE, EXAMINATION UNDER ANESTHESIA, FECAL DISIMPACTION;  Surgeon: Gerson Carpenter MD;  [place] [facility] [place]             Physical Exam:     Vitals:    03/11/25 1401   BP: 100/70   Pulse: 118   SpO2: 99%     PSYCH: NAD  EYES: EOMI  NEURO: AAO x3    LABS:   Creatinine   Date Value Ref Range Status   02/24/2025 0.84 0.51 - 0.95 mg/dL Final   11/05/2012 0.73 0.52 - 1.04 mg/dL Final     UC  2/24/25  >100k Staph  2/14/25  No growth   1/24/25 >100k Enterococcus faecalis (pansensitive)  1/3/25 No growth  12/20/24 >100k Klebsiella pneumoniae (resistant to ampicillin and intermediate to macrobid) and 10-50k E. Coli (pansensitive)  9/12/24 >100k E. 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 Unfortunately following surgery had readmission and exam confirmed posterior vaginal fistula. Has continued ISC 5x/day for urinary retention.     Plan:  -  Continue ISC 5x/day indefinitely. Patient is frustrated that she does not have bladder sensations. Discussed with patient that this can take time and bladder sensation may/may not return. Could consider VUDS in the future.  - Discussed with Dr. Moseley. Recommend bladder irrigations. If sx persist, consider gent installations. Reached out to nurse coordinator to help with setting up supplies and to follow-up with patient in 1 month for sx check.   - discussed with patient that given she  catheterizes can become colonized. Do not recommend random urine samples or test of cures.   - given having gross hematuria, recommend cysto. Will reach out to Dr. Moseley to discuss if cysto needs to be in OR or in clinic.   - keep CT scan as planned in a few weeks.  - continue to follow with oncology.   - After discussing the assessment and plan with patient, patient verbalizes understanding and agrees to the above plan. All questions answered.     30 minutes spent on the date of the encounter doing chart review, review of oncology notes, review of labs, review of test results, interpretation of tests, patient visit and documentation.      Beatrice Bryant PA-C  Urology  March 11, 2025      Patient Care Team:  Georgie Hernández APRN CNP as PCP - General (Family Practice)  Adan Bell MD as Physician (Plastic Surgery)  Georgie Hernández APRN CNP as Assigned PCP  Kelvin Wilkins DO as Assigned Neuroscience Provider  Janette Wells, RN as Registered Nurse (Wound Care)  Jessenia Sosa MD as Assigned Cancer Care Provider  Renetta Bhatia APRN CNP as Assigned OBGYN Provider  Julia Moseley MD as Assigned Surgical Provider  Arik Norton MD as MD (Palliative Care)  Arik Norton MD as Assigned Palliative Care Provider           Again, thank you for allowing me to participate in the care of your patient.      Sincerely,    Beatrice Bryant PA-C

## 2025-03-11 NOTE — PATIENT INSTRUCTIONS
OptiFlo G solution (50 cc in, hold for a minute, then aspirate, then repeat with another 50 cc). This performed twice weekly can prevent catheter encrustation.   -Instructions for OptiFlo G use are found here:   -OptiFlo  G 3.23% Citric Acid 50mL CSG50     OR     Supplies for making  % acetic acid solution   1. Water- used either distilled water or boiled tap water that has been cooled   2. Vinegar-use plain white vinegar   3. Clean Container- Such as a harvey jar with lid, preferable boiled (or washed in )   Instructions:   1. The solution should not be kept for more than two (2) weeks, so do not make up more than you will   need in that length of time. It is best to keep it in the refrigerator after you begin using it, but be sure   to allow it to return to room temperature before irrigating the bladder.   2. Decide how much acetic acid you need and then make up one of the volumes below:   a. 1 Pint- add 5 teaspoons (tsp) of vinegar to 1 pint of water.   b. 1 Quart- add 10 teaspoons (tsp) of vinegar to 1 quart of water.   c. 1 Gallon- add 13 tablespoons (tap) of vinegar to 1 gallon of water.   3. Using a large (50-60ml) catheter tip syringe, slowly flush approximately 50ml of acetic acid through   the catheter into the bladder and attempt to aspirate the same volume back into the syringe. Do not   force fluid into the bladder or back into the syringe; fluid should flow easily. Gently wash catheter   and bladder by flushing and aspirating the solution two or three times. Disconnect syringe and   discard contents. Repeat above steps once more if necessary. Catheter irrigation should be performed   when needed and should not be used as a substitute for drinking more water.         ______________________________________    CYSTOSCOPY    What is a Cystoscopy?  This is a procedure done to check for problems inside the bladder.  Problems may include polyps (growths), tumors, inflammation (swelling and redness)  and other concerns.    The doctor inserts a thin tube (called a cystoscope) into the bladder.  The tube is about the size of a pencil.  We will give you numbing medicine to reduce the pain or discomfort you may feel.    The tube allows the doctor to:  The doctor will be able to see inside the bladder by filling the bladder with water.  The water makes it easier to see any problems that may be present.    If needed, the doctor may use the tube to:  The doctor is able to take tissue samples (biopsies).  Samples are sent to the lab for testing.  The doctor can also burn off any small growths or tumors that are found.  This is call fulguration.    What happens after the exam?  You may go back to your normal diet and activity as you feel ready, unless your doctor tells you not to.    For the next two days, you may notice:  Some blood in your urine.  Some burning when you urinate (use the toilet).  An urge to urinate more often.  Bladder spasms.    These are normal after the procedure. They should go away on their own after a day or two.      You can help to relieve the above listed symptoms by:  Drinking 6 to 8 large glasses of water each day (includes drinks at meals).  This will help clear the urine.  Take warm baths to relieve pain and bladder spasms.  Do not add anything to the bath water.  Your doctor may prescribe pain medicine.  You may also take Tylenol (acetaminophen) for pain.    When should I call my doctor?  A fever over 100.0 F (38 C) for more than a day.  (Before you call the doctor, check your temperature under your tongue.)  Chills.  Failure to urinate: No urine comes out when you try to use the toilet.  (Try soaking in a bathtub full of warm water.  If still no urine, call your doctor.)  A lot of blood in the urine or blood clots larger than a nickel.  Pain in the back or abdomen (belly / stomach area).  Pain or spasms that are not relieved by warm tub baths and pain medicine.  Severe pain, burning or  other problems while passing urine.  Pain that gets worse after two days.

## 2025-03-12 ENCOUNTER — PATIENT OUTREACH (OUTPATIENT)
Dept: ONCOLOGY | Facility: CLINIC | Age: 40
End: 2025-03-12
Payer: COMMERCIAL

## 2025-03-12 ENCOUNTER — CARE COORDINATION (OUTPATIENT)
Dept: UROLOGY | Facility: CLINIC | Age: 40
End: 2025-03-12
Payer: COMMERCIAL

## 2025-03-12 DIAGNOSIS — Z51.11 ENCOUNTER FOR ANTINEOPLASTIC CHEMOTHERAPY: ICD-10-CM

## 2025-03-12 DIAGNOSIS — C48.1 PRIMARY MALIGNANT NEOPLASM OF PELVIC PERITONEUM (H): ICD-10-CM

## 2025-03-12 RX ORDER — LETROZOLE 2.5 MG/1
2.5 TABLET, FILM COATED ORAL DAILY
Qty: 84 TABLET | Refills: 0 | Status: SHIPPED | OUTPATIENT
Start: 2025-03-12

## 2025-03-12 NOTE — PROGRESS NOTES
Patient Called and requested refill be sent to local pharmacy as she will not be at the [facility] anytime soon     Patient also has concerns as she is experiencing intolerable hot flashes. These are most intense at night and keep her up. When she has these hot flashes she feels like her skin is on fire and she has fire ants on her she can't get off. She has several a night and some during the day but night is the worst     Patient has tried all the recommended things and nothing is working.     RN offered some more ideas and patient was open to this     MD updated as well.    Randi Hernandez RN

## 2025-03-12 NOTE — PROGRESS NOTES
Discussed via telephone teaching regarding vinegar/water bladder irrigations and supplies needed.  Mailed vinegar/water bladder irrigation instructions and placed supply order to [facility] for 50 mL catheter tipped syringes and 14 [place]ic male intermittent catheters.  All questions were answered and patient states understanding.    Elizabeth Peace, RN, BSN  Urology Care Coordinator  [facility]  [phone]

## 2025-03-17 DIAGNOSIS — E89.41 HOT FLASHES DUE TO SURGICAL MENOPAUSE: Primary | ICD-10-CM

## 2025-03-17 NOTE — PROGRESS NOTES
Worsening hot flashes  Had surgical menopause but now on letrozole  She is trying all supportive care measures  Rx [facility]       Jessenia Sosa MD  Gynecologic Oncology  Pager [phone]

## 2025-03-20 ENCOUNTER — VIRTUAL VISIT (OUTPATIENT)
Dept: PALLIATIVE CARE | Facility: CLINIC | Age: 40
End: 2025-03-20
Attending: FAMILY MEDICINE
Payer: COMMERCIAL

## 2025-03-20 VITALS — BODY MASS INDEX: 22.16 KG/M2 | WEIGHT: 133 LBS | HEIGHT: 65 IN

## 2025-03-20 DIAGNOSIS — C56.9 PRIMARY LOW GRADE SEROUS ADENOCARCINOMA OF OVARY (H): ICD-10-CM

## 2025-03-20 DIAGNOSIS — R53.0 NEOPLASTIC MALIGNANT RELATED FATIGUE: ICD-10-CM

## 2025-03-20 DIAGNOSIS — F33.2 SEVERE EPISODE OF RECURRENT MAJOR DEPRESSIVE DISORDER, WITHOUT PSYCHOTIC FEATURES (H): ICD-10-CM

## 2025-03-20 DIAGNOSIS — G62.0 NEUROPATHY DUE TO CHEMOTHERAPEUTIC DRUG: ICD-10-CM

## 2025-03-20 DIAGNOSIS — G89.3 CANCER ASSOCIATED PAIN: ICD-10-CM

## 2025-03-20 DIAGNOSIS — C48.1 PRIMARY MALIGNANT NEOPLASM OF PELVIC PERITONEUM (H): ICD-10-CM

## 2025-03-20 DIAGNOSIS — Z51.5 ENCOUNTER FOR PALLIATIVE CARE: Primary | ICD-10-CM

## 2025-03-20 DIAGNOSIS — T45.1X5A NEUROPATHY DUE TO CHEMOTHERAPEUTIC DRUG: ICD-10-CM

## 2025-03-20 RX ORDER — METHYLPHENIDATE HYDROCHLORIDE 5 MG/1
TABLET ORAL
Qty: 60 TABLET | Refills: 0 | Status: SHIPPED | OUTPATIENT
Start: 2025-03-20

## 2025-03-20 ASSESSMENT — PAIN SCALES - GENERAL: PAINLEVEL_OUTOF10: NO PAIN (0)

## 2025-03-20 NOTE — PATIENT INSTRUCTIONS
It was good to see you today, Judith.  I'm sorry the chemo was so hard on you.    Here are the things we talked about:  I sent a new prescription for Ritalin to help your fatigue and depression.  Use 1 pill first thing every morning and then a second pill at lunch if needed for late afternoon or evening fatigue.  This should also help your depression.    Please consult a  ASAP to get things settled for Rosetta and Donovan.    Please also do the health care directive documents--this will be important since you aren't .  This lets you name someone else to speak for you if you can speak for yourself.    Continue with the Butrans patches routinely and change them weekly and use the dilaudid as needed for breakthrough pain.    Someone from the team will reach out to schedule a follow up appointment in 4-6 weeks and Christina will call you in 2 weeks to see if we need to adjust the Ritalin dose.     How to get a hold of us:  For non-urgent matters, MyChart works best.    For more urgent matters, or if you prefer not to use MyChart, call our clinic nurse coordinator Christina Flower RN at [phone]    We have an on-call number for evenings and weekends. Please call this only if you are having uncontrolled symptoms or serious side effects from your medicines: [phone].     For refills, please give us a week (5 working days) notice. We don't always have providers available everyday to do refills. If you call the day you run out of your medicine, we may not be able to refill it in time, so call 5 days in advance!    Arik Norton MD MS FAAFP CAQHPM  [facility]  Office [phone]  Fax [phone]
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
## 2025-03-20 NOTE — NURSING NOTE
Current patient location: [address]  [place] [address]    Is the patient currently in the state of [place]? YES    Visit mode: VIDEO    If the visit is dropped, the patient can be reconnected by:VIDEO VISIT: Text to cell phone:   Telephone Information:   Mobile [phone]       Will anyone else be joining the visit? NO  (If patient encounters technical issues they should call [phone]    Are changes needed to the allergy or medication list? No    Are refills needed on medications prescribed by this physician? Discuss with provider    Rooming Documentation:  Not applicable    Reason for visit: RECHECK Bobbilynn Grossaint VVF

## 2025-03-21 ENCOUNTER — ANCILLARY PROCEDURE (OUTPATIENT)
Dept: CT IMAGING | Facility: CLINIC | Age: 40
End: 2025-03-21
Attending: OBSTETRICS & GYNECOLOGY
Payer: COMMERCIAL

## 2025-03-21 ENCOUNTER — ANCILLARY PROCEDURE (OUTPATIENT)
Facility: CLINIC | Age: 40
End: 2025-03-21
Attending: OBSTETRICS & GYNECOLOGY
Payer: COMMERCIAL

## 2025-03-21 DIAGNOSIS — C56.3 OVARIAN CANCER, BILATERAL (H): ICD-10-CM

## 2025-03-21 PROCEDURE — 74177 CT ABD & PELVIS W/CONTRAST: CPT | Mod: GC | Performed by: RADIOLOGY

## 2025-03-21 PROCEDURE — 99207 CT RESEARCH READING: CPT | Performed by: RADIOLOGY

## 2025-03-21 PROCEDURE — 71260 CT THORAX DX C+: CPT | Mod: GC | Performed by: RADIOLOGY

## 2025-03-21 RX ORDER — IOPAMIDOL 755 MG/ML
76 INJECTION, SOLUTION INTRAVASCULAR ONCE
Status: COMPLETED | OUTPATIENT
Start: 2025-03-21 | End: 2025-03-21

## 2025-03-21 RX ORDER — HEPARIN SODIUM (PORCINE) LOCK FLUSH IV SOLN 100 UNIT/ML 100 UNIT/ML
500 SOLUTION INTRAVENOUS ONCE
Status: DISCONTINUED | OUTPATIENT
Start: 2025-03-21 | End: 2025-03-21

## 2025-03-21 RX ADMIN — IOPAMIDOL 76 ML: 755 INJECTION, SOLUTION INTRAVASCULAR at 10:29

## 2025-03-21 NOTE — DISCHARGE INSTRUCTIONS

## 2025-03-21 NOTE — PROGRESS NOTES
Patient reports her port is flipped and her provider Dr. Jessenia Sosa, usually flips it for her prior to access. Dr. Sosa is not available immediately. IV access obtained for today's scann. Requested IR consult from Dr. Sosa to assess port placement.

## 2025-04-02 ENCOUNTER — PRE VISIT (OUTPATIENT)
Dept: UROLOGY | Facility: CLINIC | Age: 40
End: 2025-04-02
Payer: COMMERCIAL

## 2025-04-02 ENCOUNTER — PATIENT OUTREACH (OUTPATIENT)
Dept: ONCOLOGY | Facility: CLINIC | Age: 40
End: 2025-04-02
Payer: COMMERCIAL

## 2025-04-02 DIAGNOSIS — Z98.890 H/O PELVIC SURGERY: Primary | ICD-10-CM

## 2025-04-02 RX ORDER — LIDOCAINE HYDROCHLORIDE 20 MG/ML
JELLY TOPICAL ONCE
Status: ACTIVE | OUTPATIENT
Start: 2025-04-10

## 2025-04-02 NOTE — PROGRESS NOTES
Patient sent [employer] message about concerns     RN reached out as this was better addressed through conversation     Reviewed patients concerns about being on Letrozole and weight gain     Symptoms that started after starting this medication:  Sever hot flashes   Deep bone pain that is making it hard to move. Especially in her arms.   Weight gain     Reviewed all the things that can contribute to weight gain but that the medication could be a piece of this     Reviewed cancer rehab but patient feels she can make herself move it just very much hurts. Patient will review with MD    Patient also states Ostomy has gotten bigger and is having issues with weight gain, ostomy change and getting appliance to work and not cause ostomy to swell. RN reviewed when to be concerned, how to cut the appliance to give more room and schedule a follow up with [facility].     RN and patient agreed it would be best for patient to have a virtual follow up with MD to review her concerns about being on Letrozole.     RN answered all questions to the best of her ability    Patient appreciative of RN call    Scheduling request sent     Randi Hernandez RN

## 2025-04-02 NOTE — TELEPHONE ENCOUNTER
"Reason for visit: Cystoscopy         Relevant information: Last seen by Selene- \"Unfortunately following surgery had readmission and exam confirmed posterior vaginal fistula. Has continued ISC 5x/day for urinary retention.\" Recommended continuing bladder irrigation and cystoscopy for gross hematuria.    MyChart info sent: Yes    Records/imaging/labs/orders: all records available    Pt called: no need for a call    At Rooming: Collect a urine sample (dip if patient has UTI symptoms)    Claudia Suero  4/2/2025  2:49 PM  "

## 2025-04-03 ENCOUNTER — TELEPHONE (OUTPATIENT)
Dept: PALLIATIVE CARE | Facility: CLINIC | Age: 40
End: 2025-04-03
Payer: COMMERCIAL

## 2025-04-03 DIAGNOSIS — R63.2 HYPERPHAGIA: ICD-10-CM

## 2025-04-03 RX ORDER — PHENTERMINE HYDROCHLORIDE 37.5 MG/1
TABLET ORAL
Qty: 90 TABLET | Refills: 1 | Status: SHIPPED | OUTPATIENT
Start: 2025-04-03

## 2025-04-03 NOTE — TELEPHONE ENCOUNTER
At the request of Dr. Norton, patient called to check to see if the Ritalin ishelpingher depression and fatigue.  Patient reports she has not noticed much difference.      Per Dr. Norton patient instructed increase Ritalin to 10 mg in Am and 5 mg prn at noon.      Patient verbalized understanding and had no further questions.    Will update Dr. Errol Mabry, RN  Palliative Care Nurse Clinician    [phone] (Direct)  [phone] (Main)  [phone] (Appointment Scheduling)

## 2025-04-06 DIAGNOSIS — F33.2 SEVERE EPISODE OF RECURRENT MAJOR DEPRESSIVE DISORDER, WITHOUT PSYCHOTIC FEATURES (H): ICD-10-CM

## 2025-04-06 DIAGNOSIS — C48.1 PRIMARY MALIGNANT NEOPLASM OF PELVIC PERITONEUM (H): ICD-10-CM

## 2025-04-06 DIAGNOSIS — R53.0 NEOPLASTIC MALIGNANT RELATED FATIGUE: ICD-10-CM

## 2025-04-06 DIAGNOSIS — C56.9 PRIMARY LOW GRADE SEROUS ADENOCARCINOMA OF OVARY (H): ICD-10-CM

## 2025-04-06 RX ORDER — METHYLPHENIDATE HYDROCHLORIDE 5 MG/1
TABLET ORAL
COMMUNITY
Start: 2025-04-06

## 2025-04-07 ENCOUNTER — TELEPHONE (OUTPATIENT)
Dept: ONCOLOGY | Facility: CLINIC | Age: 40
End: 2025-04-07
Payer: COMMERCIAL

## 2025-04-07 NOTE — PROGRESS NOTES
CLINICAL NUTRITION SERVICES- ONCOLOGY DISTRESS SCREENING     Identified Concern and Score From Distress Screenin. How concerned are you about your ability to eat? :  1  2. How concerned are you about unintended weight loss or your current weight? : 10     Date of Distress Screenin/4     Findings: Phone call with Marizol. Patient declined nutrition visit at this time.     Follow-up Required: As needed.     Rosy Alexis RD, LD  [phone]

## 2025-04-08 DIAGNOSIS — G89.3 CANCER ASSOCIATED PAIN: ICD-10-CM

## 2025-04-08 DIAGNOSIS — C56.9 PRIMARY LOW GRADE SEROUS ADENOCARCINOMA OF OVARY (H): ICD-10-CM

## 2025-04-08 RX ORDER — BUPRENORPHINE 10 UG/H
1 PATCH TRANSDERMAL
Qty: 4 PATCH | Refills: 2 | Status: SHIPPED | OUTPATIENT
Start: 2025-04-08

## 2025-04-08 NOTE — TELEPHONE ENCOUNTER
Received fax from pharmacy requesting refill of Butrans.     Last refill: 3/17/25  Last office visit: 3/20/25  Scheduled for follow up 4/24/25     Will route request to MD/ for review.     [place].

## 2025-04-09 ENCOUNTER — PATIENT OUTREACH (OUTPATIENT)
Dept: CARE COORDINATION | Facility: CLINIC | Age: 40
End: 2025-04-09
Payer: COMMERCIAL

## 2025-04-09 NOTE — PROGRESS NOTES
Social Work - Distress Screen Intervention  [facility] [place] [facility]    Identified Concern and Score from Distress Screenin. How concerned are you about your ability to eat? 1     2. How concerned are you about unintended weight loss or your current weight? (!) 10     3. How concerned are you about feeling depressed or very sad?  (!) 9     4. How concerned are you about feeling anxious or very scared?  (!) 9     5. Do you struggle with the loss of meaning and gauri in your life?  Somewhat     6. How concerned are you about work and home life issues that may be affected by your cancer?  7     7. How concerned are you about knowing what resources are available to help you?  3     8. Do you currently have what you would describe as Christianity or spiritual struggles? Not at all     9. If you want to be contacted by one of our professionals, I can send a message to them right now.  No data recorded     Date of Distress Screen: 2025   Data: At time of last visit, patient scored positive on distress screening.  outreached to patient today to follow up on elevated distress and introduce psychosocial services and support.  Intervention/Education provided:  contacted patient by phone to discuss distress screening results.  Patient reports that she forgets what she had questions about regarding her distress screen. SW provided examples of supportive resources that SW provides. Patient is agreeable to My chart follow up message with SW contact information and general mikey resources. Patient plans to follow up with SW as needed.   Follow-up Required:  will remain available to patient for support as needed    FLORIAN Sylvester, JEREMIAS  Clinical , Adult Oncology  [facility]   [address] [place] [id number]  [email or web address]  Office Phone: [phone]  Support Groups at [facility]: Social Work Services for Cancer  Patients ([email or web address])

## 2025-04-10 ENCOUNTER — LAB (OUTPATIENT)
Dept: LAB | Facility: CLINIC | Age: 40
End: 2025-04-10
Attending: OBSTETRICS & GYNECOLOGY
Payer: COMMERCIAL

## 2025-04-10 ENCOUNTER — OFFICE VISIT (OUTPATIENT)
Dept: UROLOGY | Facility: CLINIC | Age: 40
End: 2025-04-10
Payer: COMMERCIAL

## 2025-04-10 VITALS
DIASTOLIC BLOOD PRESSURE: 64 MMHG | BODY MASS INDEX: 24.66 KG/M2 | SYSTOLIC BLOOD PRESSURE: 93 MMHG | OXYGEN SATURATION: 98 % | HEIGHT: 65 IN | WEIGHT: 148 LBS | HEART RATE: 96 BPM

## 2025-04-10 DIAGNOSIS — N39.0 FREQUENT UTI: ICD-10-CM

## 2025-04-10 DIAGNOSIS — C56.3 OVARIAN CANCER, BILATERAL (H): ICD-10-CM

## 2025-04-10 DIAGNOSIS — Z98.890 H/O PELVIC SURGERY: Primary | ICD-10-CM

## 2025-04-10 DIAGNOSIS — C56.9 PRIMARY LOW GRADE SEROUS ADENOCARCINOMA OF OVARY (H): ICD-10-CM

## 2025-04-10 DIAGNOSIS — R33.9 URINARY RETENTION: ICD-10-CM

## 2025-04-10 LAB
ALBUMIN SERPL BCG-MCNC: 4.1 G/DL (ref 3.5–5.2)
ALBUMIN UR-MCNC: NEGATIVE MG/DL
ALP SERPL-CCNC: 62 U/L (ref 40–150)
ALT SERPL W P-5'-P-CCNC: 34 U/L (ref 0–50)
ANION GAP SERPL CALCULATED.3IONS-SCNC: 10 MMOL/L (ref 7–15)
APPEARANCE UR: CLEAR
AST SERPL W P-5'-P-CCNC: 33 U/L (ref 0–45)
BASOPHILS # BLD AUTO: 0 10E3/UL (ref 0–0.2)
BASOPHILS NFR BLD AUTO: 0 %
BILIRUB SERPL-MCNC: <0.2 MG/DL
BILIRUB UR QL STRIP: NEGATIVE
BUN SERPL-MCNC: 15.9 MG/DL (ref 6–20)
CALCIUM SERPL-MCNC: 9.6 MG/DL (ref 8.8–10.4)
CHLORIDE SERPL-SCNC: 109 MMOL/L (ref 98–107)
COLOR UR AUTO: YELLOW
CREAT SERPL-MCNC: 0.87 MG/DL (ref 0.51–0.95)
EGFRCR SERPLBLD CKD-EPI 2021: 87 ML/MIN/1.73M2
EOSINOPHIL # BLD AUTO: 0.4 10E3/UL (ref 0–0.7)
EOSINOPHIL NFR BLD AUTO: 7 %
ERYTHROCYTE [DISTWIDTH] IN BLOOD BY AUTOMATED COUNT: 11.7 % (ref 10–15)
GLUCOSE SERPL-MCNC: 125 MG/DL (ref 70–99)
GLUCOSE UR STRIP-MCNC: NEGATIVE MG/DL
HCO3 SERPL-SCNC: 22 MMOL/L (ref 22–29)
HCT VFR BLD AUTO: 31 % (ref 35–47)
HGB BLD-MCNC: 10.4 G/DL (ref 11.7–15.7)
HGB UR QL STRIP: ABNORMAL
IMM GRANULOCYTES # BLD: 0 10E3/UL
IMM GRANULOCYTES NFR BLD: 0 %
KETONES UR STRIP-MCNC: NEGATIVE MG/DL
LEUKOCYTE ESTERASE UR QL STRIP: ABNORMAL
LYMPHOCYTES # BLD AUTO: 2.9 10E3/UL (ref 0.8–5.3)
LYMPHOCYTES NFR BLD AUTO: 49 %
MAGNESIUM SERPL-MCNC: 1.5 MG/DL (ref 1.7–2.3)
MCH RBC QN AUTO: 34.1 PG (ref 26.5–33)
MCHC RBC AUTO-ENTMCNC: 33.5 G/DL (ref 31.5–36.5)
MCV RBC AUTO: 102 FL (ref 78–100)
MONOCYTES # BLD AUTO: 0.4 10E3/UL (ref 0–1.3)
MONOCYTES NFR BLD AUTO: 7 %
NEUTROPHILS # BLD AUTO: 2.1 10E3/UL (ref 1.6–8.3)
NEUTROPHILS NFR BLD AUTO: 36 %
NITRATE UR QL: NEGATIVE
NRBC # BLD AUTO: 0 10E3/UL
NRBC BLD AUTO-RTO: 0 /100
PH UR STRIP: 5.5 [PH] (ref 5–8)
PLATELET # BLD AUTO: 162 10E3/UL (ref 150–450)
POTASSIUM SERPL-SCNC: 3.8 MMOL/L (ref 3.4–5.3)
PROT SERPL-MCNC: 6.6 G/DL (ref 6.4–8.3)
RBC # BLD AUTO: 3.05 10E6/UL (ref 3.8–5.2)
SODIUM SERPL-SCNC: 141 MMOL/L (ref 135–145)
SP GR UR STRIP: 1.01 (ref 1–1.03)
UROBILINOGEN UR STRIP-ACNC: 0.2 E.U./DL
WBC # BLD AUTO: 5.8 10E3/UL (ref 4–11)

## 2025-04-10 PROCEDURE — 83735 ASSAY OF MAGNESIUM: CPT

## 2025-04-10 PROCEDURE — 82040 ASSAY OF SERUM ALBUMIN: CPT

## 2025-04-10 PROCEDURE — 36591 DRAW BLOOD OFF VENOUS DEVICE: CPT

## 2025-04-10 PROCEDURE — 85025 COMPLETE CBC W/AUTO DIFF WBC: CPT

## 2025-04-10 PROCEDURE — 250N000011 HC RX IP 250 OP 636: Performed by: OBSTETRICS & GYNECOLOGY

## 2025-04-10 RX ORDER — HEPARIN SODIUM (PORCINE) LOCK FLUSH IV SOLN 100 UNIT/ML 100 UNIT/ML
500 SOLUTION INTRAVENOUS ONCE
Status: COMPLETED | OUTPATIENT
Start: 2025-04-10 | End: 2025-04-10

## 2025-04-10 RX ADMIN — HEPARIN 500 UNITS: 100 SYRINGE at 10:35

## 2025-04-10 ASSESSMENT — PAIN SCALES - GENERAL: PAINLEVEL_OUTOF10: MODERATE PAIN (6)

## 2025-04-10 NOTE — NURSING NOTE
"Chief Complaint   Patient presents with    Port Draw     Labs drawn via port by RN in lab.        Port accessed with 20 gauge 3/4\" Power needle by RN, labs collected, line flushed with saline and heparin, then de-accessed.     Marjan Long RN    "
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
## 2025-04-10 NOTE — LETTER
"4/10/2025       RE: Marizol Granados  [address]  [place] [address]     Dear Colleague,    Thank you for referring your patient, Marizol Granados, to the [facility] UROLOGY CLINIC [place] [facility]. Please see a copy of my visit note below.    April 10, 2025    Return visit    Patient returns today for follow up.  Still has to cath which is going okay but she is not happy about. Follow up with Dr Sosa soon.   She denies any changes in her health since last visit.    BP 93/64   Pulse 96   Ht 1.651 m (5' 5\")   Wt 67.1 kg (148 lb)   LMP  (LMP Unknown)   SpO2 98%   BMI 24.63 kg/m    She is comfortable, in no distress, non-labored breathing.  Abdomen is soft, non-tender, non-distended.  Normal external female genitalia.  .    Cystoscopy Note: After informed consent was obtained patient was prepped and draped in the standard fashion.  The flexible cystoscope was inserted into a normal appearing urethral meatus.  The urothelium was carefully examined and there were no tumors, masses, stones, foreign bodies, or other urothelial abnormalities noted.  Did appear to have a large capacity bladder with some trabeculations noted at higher volumes  Bilateral ureteral orifices were noted in the normal orthotopic position and both effluxed clear urine.  The cystoscope was retroflexed and the bladder neck was unremarkable.  The urethra was carefully examined upon removing the cystoscope and was unremarkable.  Patient tolerated the procedure without complications noted.      A/P: 40 year old F with history of ovarian cancer s/p pelvic surgery with urinary retention and frequent UTIs    Cystoscopy without etiology for hematuria or UTIs    Recommend trying some OTCs for UTI prevention, can consider gentamicin irrigations    RTC 3 months to see Selene, sooner if needed    10 minutes were spent today on the date of the encounter in reviewing the EMR, direct patient " care, coordination of care and documentation in addition to the cystoscopy procedure    Nate Moseley MD MPH  (she/her/hers)   of Urology  [facility]  Patient Care Team:  Georgie Hernández APRN CNP as PCP - General (Family Practice)  Adan Bell MD as Physician (Plastic Surgery)  Georgie Hernández APRN CNP as Assigned PCP  Kelvin Wilkins DO as Assigned Neuroscience Provider  Janette Wells RN as Registered Nurse (Wound Care)  Jessenia Sosa MD as Assigned Cancer Care Provider  Renetta Bhatia APRN CNP as Assigned OBGYN Provider  Nate Moseley MD as Assigned Surgical Provider  Arik Norton MD as MD (Palliative Care)  Arik Norton MD as Assigned Palliative Care Provider  NATE MOSELEY                Again, thank you for allowing me to participate in the care of your patient.      Sincerely,    Nate Moseley MD

## 2025-04-10 NOTE — PROGRESS NOTES
"April 10, 2025    Return visit    Patient returns today for follow up.  Still has to cath which is going okay but she is not happy about. Follow up with Dr Sosa soon.   She denies any changes in her health since last visit.    BP 93/64   Pulse 96   Ht 1.651 m (5' 5\")   Wt 67.1 kg (148 lb)   LMP  (LMP Unknown)   SpO2 98%   BMI 24.63 kg/m    She is comfortable, in no distress, non-labored breathing.  Abdomen is soft, non-tender, non-distended.  Normal external female genitalia.  .    Cystoscopy Note: After informed consent was obtained patient was prepped and draped in the standard fashion.  The flexible cystoscope was inserted into a normal appearing urethral meatus.  The urothelium was carefully examined and there were no tumors, masses, stones, foreign bodies, or other urothelial abnormalities noted.  Did appear to have a large capacity bladder with some trabeculations noted at higher volumes  Bilateral ureteral orifices were noted in the normal orthotopic position and both effluxed clear urine.  The cystoscope was retroflexed and the bladder neck was unremarkable.  The urethra was carefully examined upon removing the cystoscope and was unremarkable.  Patient tolerated the procedure without complications noted.      A/P: 40 year old F with history of ovarian cancer s/p pelvic surgery with urinary retention and frequent UTIs    Cystoscopy without etiology for hematuria or UTIs    Recommend trying some OTCs for UTI prevention, can consider gentamicin irrigations    RTC 3 months to see Selene, sooner if needed    10 minutes were spent today on the date of the encounter in reviewing the EMR, direct patient care, coordination of care and documentation in addition to the cystoscopy procedure    Julia Moseley MD MPH  (she/her/hers)   of Urology  [facility]    CC  Patient Care Team:  Georgie Hernández APRN CNP as PCP - General (Family Practice)  Adan Bell MD as Physician " (Plastic Surgery)  Georgie Hernández APRN CNP as Assigned PCP  Kelvin Wilkins DO as Assigned Neuroscience Provider  Janette Wells, RN as Registered Nurse (Wound Care)  Jessenia Sosa MD as Assigned Cancer Care Provider  Renetta Bhatia APRN CNP as Assigned OBGYN Provider  Nate Moseley MD as Assigned Surgical Provider  Arik Norton MD as MD (Palliative Care)  Arik Norton MD as Assigned Palliative Care Provider  NATE MOSELEY

## 2025-04-10 NOTE — PATIENT INSTRUCTIONS
"Websites with free information:    American [employer] patient website: www.voicesforpfd.org    Total Control Program: www.totalcontrolprogram.com    Supplements to prevent UTI to consider  -Probiotics  -Cranberry (for these products let them know a doctor is recommending them)   Kassidy: https://ID Watchdog/   Theracran HP by [employer] [facility] [id number]  -d-mannose 2gm daily  -Vitamin C 500-1000mg twice a day    It was a pleasure meeting with you today.  Thank you for allowing me and my team the privilege of caring for you today.  YOU are the reason we are here, and I truly hope we provided you with the excellent service you deserve.  Please let us know if there is anything else we can do for you so that we can be sure you are leaving completely satisfied with your care experience.      AFTER YOUR CYSTOSCOPY        You have just completed a cystoscopy, or \"cysto\", which allowed your physician to learn more about your bladder (or to remove a stent placed after surgery). We suggest that you continue to avoid caffeine, fruit juice, and alcohol for the next 24 hours, however, you are encouraged to return to your normal activities.         A few things that are considered normal after your cystoscopy:     * Small amount of bleeding (or spotting) that clears within the next 24 hours     * Slight burning sensation with urination     * Sensation to of needing to avoid more frequently     * The feeling of \"air\" in your urine     * Mild discomfort that is relieved with Tylenol        Please contact our office promptly if you:     * Develop a fever above 101 degrees     * Are unable to urinate     * Develop bright red blood that does not stop     * Severe pain or swelling         Please contact our office with any concerns or questions @DEPHN.  "

## 2025-04-10 NOTE — NURSING NOTE
"Chief Complaint   Patient presents with    Cystoscopy     Pelvic pain started about a week ago        Blood pressure 93/64, pulse 96, height 1.651 m (5' 5\"), weight 67.1 kg (148 lb), SpO2 98%. Body mass index is 24.63 kg/m .    Patient Active Problem List   Diagnosis    Therapeutic drug monitoring    Social phobia    S/P gastric bypass    Recurrent major depressive disorder    Other B-complex deficiencies    Moderate persistent asthma    Intractable migraine without aura and with status migrainosus    Insomnia    Hypercholesterolemia    History of thromboembolism    Generalized anxiety disorder    Frequent UTI    Chronic fatigue    Allergic rhinitis    Genital herpes simplex    Postoperative intestinal malabsorption    GERD (gastroesophageal reflux disease)    Low back pain    Ptosis of both breasts    Leak of anastomosis between gastrointestinal structures    Rectovaginal fistula    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        Molds & Smuts Other (See Comments)     Other reaction(s): Runny Nose, Unknown/Not Verified    Pollen       Current Outpatient Medications   Medication Sig Dispense Refill    acetaminophen (TYLENOL) 325 MG tablet Take 2 tablets (650 mg) by mouth every 6 hours.      apixaban ANTICOAGULANT (ELIQUIS) 5 MG tablet Take 1 tablet (5 mg) by mouth 2 times daily 60 tablet 11    bisacodyl (DULCOLAX) 10 MG " suppository Place 1 suppository (10 mg) rectally daily as needed for constipation. 15 suppository 1    buprenorphine (BUTRANS) 10 MCG/HR WK patch Place 1 patch over 168 hours onto the skin every 7 days. 4 patch 2    busPIRone HCl (BUSPAR) 30 MG tablet Take 30 mg by mouth 2 times daily.      cholecalciferol 25 MCG (1000 UT) TABS Take 2,000 Units by mouth daily      cyanocobalamin (VITAMIN B-12) 1000 MCG tablet Take 1,000 mcg by mouth.      desvenlafaxine (PRISTIQ) 100 MG 24 hr tablet Take 100 mg by mouth daily      desvenlafaxine (PRISTIQ) 50 MG 24 hr tablet TAKE 1 TABLET BY MOUTH ONCE DAILY. TAKE WITH 100MG TABLET FOR TOTAL OF 150MG DAILY      dexAMETHasone (DECADRON) 4 MG tablet Take 2 tablets (8 mg) by mouth daily. Take for 3 days, starting the day after chemo. Take with food. 6 tablet 5    dicyclomine (BENTYL) 10 MG capsule Take 1 capsule (10 mg) by mouth 3 times daily as needed (cramping). 90 capsule 0    Fezolinetant 45 MG TABS Take 45 mg by mouth daily. 30 tablet 5    gabapentin (NEURONTIN) 100 MG capsule Take 1 capsule (100 mg) by mouth at bedtime. 30 capsule 3    letrozole (FEMARA) 2.5 MG tablet Take 1 tablet (2.5 mg) by mouth daily. 84 tablet 0    loperamide (IMODIUM) 2 MG capsule Take 2 mg by mouth.      LORazepam (ATIVAN) 1 MG tablet Take 0.5-1 tablets (0.5-1 mg) by mouth 2 times daily. Take 1 mg in the morning and 0.5 mg in the evening      methylphenidate (RITALIN) 5 MG tablet Take 2 tablets (10 mg) by mouth every morning AND 1 tablet (5 mg) daily (with lunch). Take lunch dose if needed for late afternoon or evening fatigue.      Multiple Vitamins-Minerals (MULTIVITAL PO) Take 1 tablet by mouth daily      multivitamin w/minerals (MULTI-VITAMIN) tablet Take 1 tablet by mouth daily.      naloxone (NARCAN) 4 MG/0.1ML nasal spray Spray 1 spray (4 mg) into one nostril alternating nostrils as needed for opioid reversal. every 2-3 minutes until assistance arrives 2 each 0    nitroFURantoin  macrocrystal-monohydrate (MACROBID) 100 MG capsule Take 1 capsule (100 mg) by mouth 2 times daily. 14 capsule 0    omeprazole (PRILOSEC) 40 MG DR capsule TAKE 1 CAPSULE(40 MG) BY MOUTH DAILY 90 capsule 3    ondansetron (ZOFRAN) 8 MG tablet Take 1 tablet (8 mg) by mouth every 8 hours as needed for nausea. 30 tablet 2    Ostomy Supplies MISC 1 each every other day. 20 each 11    Ostomy Supplies MISC 1 each every other day. 20 each 11    Ostomy Supplies MISC 1 each every other day. 20 each 11    Ostomy Supplies MISC 1 each every other day. 20 each 11    oxyCODONE (ROXICODONE) 5 MG tablet       pantoprazole (PROTONIX) 40 MG EC tablet Take 1 tablet by mouth every morning.      phentermine (ADIPEX-P) 37.5 MG tablet TAKE 1/2 TO 1 TABLET(18.75 TO 37.5 MG) BY MOUTH EVERY MORNING 90 tablet 1    PROBIOTIC PRODUCT PO Take 1 tablet by mouth At Bedtime      prochlorperazine (COMPAZINE) 10 MG tablet Take 1 tablet (10 mg) by mouth every 6 hours as needed for nausea or vomiting. 30 tablet 2    prochlorperazine (COMPAZINE) 10 MG tablet Take 1 tablet (10 mg) by mouth every 6 hours as needed for nausea or vomiting. 30 tablet 2    promethazine (PHENERGAN) 25 MG tablet Take 1 tablet by mouth daily.      propranolol (INDERAL) 10 MG tablet Take 1 tablet (10 mg) by mouth 2 times daily. Hold for HR <60 and SBP <90      QUEtiapine (SEROQUEL) 200 MG tablet Take 200 mg by mouth at bedtime.      ramelteon (ROZEREM) 8 MG tablet Take 8 mg by mouth At Bedtime      simethicone (MYLICON) 80 MG chewable tablet Take 1 tablet (80 mg) by mouth every 6 hours as needed for flatulence or cramping. 30 tablet 1    topiramate (TOPAMAX) 50 MG tablet TAKE 1 TABLET(50 MG) BY MOUTH TWICE DAILY 180 tablet 3    traZODone (DESYREL) 150 MG tablet Take 2 tablets by mouth at bedtime.      valACYclovir (VALTREX) 500 MG tablet Take 1 tablet (500 mg) by mouth 2 times daily. 14 tablet 2    valACYclovir (VALTREX) 500 MG tablet Take 1 tablet (500 mg) by mouth 2 times daily. 6  tablet 3    zolpidem (AMBIEN) 5 MG tablet Take 5 mg by mouth at bedtime.      pegfilgrastim (NEULASTA) 6 MG/0.6ML injection Inject 0.6 mLs (6 mg) subcutaneously once for 1 dose.         Social History     Tobacco Use    Smoking status: Never    Smokeless tobacco: Never   Vaping Use    Vaping status: Never Used   Substance Use Topics    Alcohol use: Not Currently     Alcohol/week: 0.0 - 1.0 standard drinks of alcohol     Comment: Alcoholic Drinks/day: maybe one a month    Drug use: Never       Invasive Procedure Safety Checklist:    Procedure: Cystoscopy    Action: Complete sections and checkboxes as appropriate.    Pre-procedure:  1. Patient ID Verified with 2 identifiers (Rosey and  or MRN) : YES    2. Procedure and site verified with patient/designee (when able) : YES    3. Accurate consent documentation in medical record : YES    4. H&P (or appropriate assessment) documented in medical record : N/A  H&P must be up to 30 days prior to procedure an updated within 24 hours of                 Procedure as applicable.     5. Relevant diagnostic and radiology test results appropriately labeled and displayed as applicable : YES    6. Blood products, implants, devices, and/or special equipment available for the procedure as applicable : YES    7. Procedure site(s) marked with provider initials [Exclusions: none] : NO    8. Marking not required. Reason : Yes  Procedure does not require site marking    Time Out:     Time-Out performed immediately prior to starting procedure, including verbal and active participation of all team members addressing: YES    1. Correct patient identity.  2. Confirmed that the correct side and site are marked.  3. An accurate procedure to be done.  4. Agreement on the procedure to be done.  5. Correct patient position.  6. Relevant images and results are properly labeled and appropriately displayed.  7. The need to administer antibiotics or fluids for irrigation purposes during the procedure  as applicable.  8. Safety precautions based on patient history or medication use.    During Procedure: Verification of correct person, site, and procedure occurs any time the responsibility for care of the patient is transferred to another member of the care team.    Patient was offered Lidocaine but declined.    Claudia Suero  4/10/2025  10:48 AM

## 2025-04-15 ENCOUNTER — TELEPHONE (OUTPATIENT)
Dept: UROLOGY | Facility: CLINIC | Age: 40
End: 2025-04-15
Payer: COMMERCIAL

## 2025-04-15 DIAGNOSIS — R39.89 SUSPECTED UTI: Primary | ICD-10-CM

## 2025-04-15 NOTE — TELEPHONE ENCOUNTER
Once this writer spoke the patient she reported the pain she had she has experienced before and she says she had the leakage before her cysto. Patient is unable to feel anything in the area and does have a fistula. Patient has noticed what her urine looks like and denies flank pain, chills, fever, or burning with urination.  Patient will have urine check tomorrow when she sees Dr Sosa.    Nava Campbell, RN, BSN, PHN  Care Coordinator Urology  [facility]  Urology Clinic  [phone]

## 2025-04-16 ENCOUNTER — TELEPHONE (OUTPATIENT)
Dept: WOUND CARE | Facility: CLINIC | Age: 40
End: 2025-04-16
Payer: COMMERCIAL

## 2025-04-16 ENCOUNTER — ONCOLOGY VISIT (OUTPATIENT)
Dept: ONCOLOGY | Facility: CLINIC | Age: 40
End: 2025-04-16
Attending: OBSTETRICS & GYNECOLOGY
Payer: COMMERCIAL

## 2025-04-16 VITALS
OXYGEN SATURATION: 99 % | BODY MASS INDEX: 24 KG/M2 | DIASTOLIC BLOOD PRESSURE: 72 MMHG | WEIGHT: 144.2 LBS | SYSTOLIC BLOOD PRESSURE: 106 MMHG | HEART RATE: 89 BPM | RESPIRATION RATE: 18 BRPM | TEMPERATURE: 97.9 F

## 2025-04-16 DIAGNOSIS — C48.2 PRIMARY PERITONEAL ADENOCARCINOMA (H): Primary | ICD-10-CM

## 2025-04-16 DIAGNOSIS — K56.41 FECAL IMPACTION OF COLON (H): ICD-10-CM

## 2025-04-16 PROCEDURE — G0463 HOSPITAL OUTPT CLINIC VISIT: HCPCS | Performed by: OBSTETRICS & GYNECOLOGY

## 2025-04-16 RX ORDER — HYDROMORPHONE HYDROCHLORIDE 4 MG/1
4 TABLET ORAL PRN
COMMUNITY
Start: 2024-09-01

## 2025-04-16 ASSESSMENT — PAIN SCALES - GENERAL: PAINLEVEL_OUTOF10: SEVERE PAIN (8)
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
## 2025-04-16 NOTE — NURSING NOTE
"Oncology Rooming Note    April 16, 2025 1:53 PM   Marizol Granados is a 40 year old female who presents for:    No chief complaint on file.    Initial Vitals: /72 (BP Location: Right arm, Patient Position: Sitting, Cuff Size: Adult Regular)   Pulse 89   Temp 97.9  F (36.6  C) (Oral)   Resp 18   Wt 65.4 kg (144 lb 3.2 oz)   LMP  (LMP Unknown)   SpO2 99%   BMI 24.00 kg/m   Estimated body mass index is 24 kg/m  as calculated from the following:    Height as of 4/10/25: 1.651 m (5' 5\").    Weight as of this encounter: 65.4 kg (144 lb 3.2 oz). Body surface area is 1.73 meters squared.  Severe Pain (8) Comment: Data Unavailable   No LMP recorded (lmp unknown). Patient has had a hysterectomy.  Allergies reviewed: Yes  Medications reviewed: Yes    Medications: Medication refills not needed today.  Pharmacy name entered into [facility]: [id number] [address] AT [place] [address] & [facility]    Frailty Screening:   Is the patient here for a new oncology consult visit in cancer care? 2. No    PHQ9:  Did this patient require a PHQ9?: No      Clinical concerns: none       Harry Stanton              "
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
## 2025-04-16 NOTE — TELEPHONE ENCOUNTER
Patient confirmed scheduled appointment:  Date: 4/22/25  Time: 130 pm   Visit type: Ret Ostomy Nurse   Provider: Janette Wells RN   Location: [facility]   Testing/imaging: n/a  Additional notes: follow-up per pt

## 2025-04-17 ENCOUNTER — TELEPHONE (OUTPATIENT)
Dept: ONCOLOGY | Facility: CLINIC | Age: 40
End: 2025-04-17
Payer: COMMERCIAL

## 2025-04-17 DIAGNOSIS — N82.3 RECTOVAGINAL FISTULA: Primary | ICD-10-CM

## 2025-04-17 RX ORDER — MAGNESIUM CARB/ALUMINUM HYDROX 105-160MG
296 TABLET,CHEWABLE ORAL ONCE
Qty: 296 ML | Refills: 0 | Status: SHIPPED | OUTPATIENT
Start: 2025-04-17 | End: 2025-04-17

## 2025-04-17 RX ORDER — POLYETHYLENE GLYCOL 3350 17 G/17G
238 POWDER, FOR SOLUTION ORAL SEE ADMIN INSTRUCTIONS
Qty: 238 EACH | Refills: 0 | Status: SHIPPED | OUTPATIENT
Start: 2025-04-17

## 2025-04-17 NOTE — PATIENT INSTRUCTIONS
It was a pleasure seeing you in clinic today-please reach out if there are any further questions that arise following today's visit.  During business hours, you may reach me at [phone].  For urgent/emergent questions after business hours, you may reach the on-call Gynecologic Oncology Resident through the [facility]  at [phone].    All normal test results are usually communicated via letter or [employer] message.  Abnormal results (those that require a change in the previously discussed plan of care) are usually communicated via a phone call.    I recommend signing up for [employer] access if you have not already done so.  This allows you online access to your lab results and also helps you communicate efficiently with your clinic should any questions arise in your care.    Md will reach out to discuss follow-up plan     Dr Ian Michael RN  Phone:  [phone]  Fax:  [phone]

## 2025-04-17 NOTE — TELEPHONE ENCOUNTER
Called patient to schedule surgery with Dr. Sosa and Dr. Carpenter    Spoke with: Marizol    Date of Surgery: 4/22/2025    Arrival time Discussed with Patient:  No    Location of surgery: [facility]     Pre-Op H&P: Dr. Sosa will update H&P DOS    Post-Op Appts: Post op with Dr. Sosa scheduled for 5/9 at 12:00 PM     Imaging:  NA    Discussed with patient pre-op RN will call 2-3 days prior to surgery with arrival time and instructions:  Yes     Packet sent out: Sending via [employer] on 4/17    Surgical Soap: Receiving via local clinic or pharmacy    Informed patient that they will need an adult  to bring patient home from surgery: Yes  : Patient is prepared to have a  available after surgery.        Additional Comments: Patient stated she is confused about the bowel prep she received via [employer] because she has an illeostomy bag and stated she may have received the prep mistakenly. Told patient writer would forward her concerns along to Dr. Carpenter team and have an RN reach out to follow up.       All patients questions were answered and patient was instructed to review surgical packet and call back with any questions or concerns.       Marcelina Benavides on 4/17/2025 at 2:15 PM

## 2025-04-17 NOTE — TELEPHONE ENCOUNTER
Spoke to Marizol regarding scheduling surgery for Dr. Sosa and Dr. Carpenter. Patient stated a date of 4/22 will work for her.     Let patient know writer will be in touch with her when this is finalized.     Marcelina Benavides on 4/17/2025 at 9:59 AM

## 2025-04-17 NOTE — TELEPHONE ENCOUNTER
Addendum to Surgery Scheduling Note:    Per Dr. Gerson Carpenter and his RNCC, No Bowel Prep*  *Patient has an Ileostomy    Surgery Packet sent to patient via [employer]    Chiara sinha on 4/17/2025 at 3:38 PM

## 2025-04-21 ENCOUNTER — ANESTHESIA EVENT (OUTPATIENT)
Dept: SURGERY | Facility: CLINIC | Age: 40
End: 2025-04-21
Payer: COMMERCIAL

## 2025-04-22 ENCOUNTER — ANESTHESIA (OUTPATIENT)
Dept: SURGERY | Facility: CLINIC | Age: 40
End: 2025-04-22
Payer: COMMERCIAL

## 2025-04-22 ENCOUNTER — HOSPITAL ENCOUNTER (OUTPATIENT)
Facility: CLINIC | Age: 40
Discharge: HOME | End: 2025-04-22
Attending: OBSTETRICS & GYNECOLOGY | Admitting: OBSTETRICS & GYNECOLOGY
Payer: COMMERCIAL

## 2025-04-22 VITALS
HEART RATE: 64 BPM | DIASTOLIC BLOOD PRESSURE: 68 MMHG | RESPIRATION RATE: 14 BRPM | TEMPERATURE: 97.4 F | HEIGHT: 65 IN | OXYGEN SATURATION: 100 % | BODY MASS INDEX: 24.13 KG/M2 | WEIGHT: 144.84 LBS | SYSTOLIC BLOOD PRESSURE: 97 MMHG

## 2025-04-22 DIAGNOSIS — K59.00 CONSTIPATION, UNSPECIFIED CONSTIPATION TYPE: Primary | ICD-10-CM

## 2025-04-22 LAB
ABO + RH BLD: NORMAL
BLD GP AB SCN SERPL QL: NEGATIVE
SPECIMEN EXP DATE BLD: NORMAL

## 2025-04-22 PROCEDURE — 272N000001 HC OR GENERAL SUPPLY STERILE: Performed by: OBSTETRICS & GYNECOLOGY

## 2025-04-22 PROCEDURE — 250N000013 HC RX MED GY IP 250 OP 250 PS 637: Performed by: ANESTHESIOLOGY

## 2025-04-22 PROCEDURE — 710N000012 HC RECOVERY PHASE 2, PER MINUTE: Performed by: OBSTETRICS & GYNECOLOGY

## 2025-04-22 PROCEDURE — 999N000141 HC STATISTIC PRE-PROCEDURE NURSING ASSESSMENT: Performed by: OBSTETRICS & GYNECOLOGY

## 2025-04-22 PROCEDURE — 250N000013 HC RX MED GY IP 250 OP 250 PS 637: Performed by: OBSTETRICS & GYNECOLOGY

## 2025-04-22 PROCEDURE — 370N000017 HC ANESTHESIA TECHNICAL FEE, PER MIN: Performed by: OBSTETRICS & GYNECOLOGY

## 2025-04-22 PROCEDURE — 360N000076 HC SURGERY LEVEL 3, PER MIN: Performed by: OBSTETRICS & GYNECOLOGY

## 2025-04-22 PROCEDURE — 36592 COLLECT BLOOD FROM PICC: CPT | Performed by: ANESTHESIOLOGY

## 2025-04-22 PROCEDURE — 86901 BLOOD TYPING SEROLOGIC RH(D): CPT | Performed by: ANESTHESIOLOGY

## 2025-04-22 PROCEDURE — 710N000010 HC RECOVERY PHASE 1, LEVEL 2, PER MIN: Performed by: OBSTETRICS & GYNECOLOGY

## 2025-04-22 PROCEDURE — 258N000003 HC RX IP 258 OP 636: Performed by: NURSE ANESTHETIST, CERTIFIED REGISTERED

## 2025-04-22 PROCEDURE — 250N000025 HC SEVOFLURANE, PER MIN: Performed by: OBSTETRICS & GYNECOLOGY

## 2025-04-22 PROCEDURE — 45393 COLONOSCOPY W/DECOMPRESSION: CPT | Mod: 22 | Performed by: SURGERY

## 2025-04-22 PROCEDURE — 250N000011 HC RX IP 250 OP 636: Performed by: NURSE ANESTHETIST, CERTIFIED REGISTERED

## 2025-04-22 PROCEDURE — 250N000011 HC RX IP 250 OP 636: Mod: JZ | Performed by: ANESTHESIOLOGY

## 2025-04-22 PROCEDURE — 250N000011 HC RX IP 250 OP 636: Performed by: OBSTETRICS & GYNECOLOGY

## 2025-04-22 RX ORDER — OXYCODONE HYDROCHLORIDE 10 MG/1
10 TABLET ORAL
Status: DISCONTINUED | OUTPATIENT
Start: 2025-04-22 | End: 2025-04-22 | Stop reason: HOSPADM

## 2025-04-22 RX ORDER — ONDANSETRON 4 MG/1
4 TABLET, ORALLY DISINTEGRATING ORAL EVERY 30 MIN PRN
Status: DISCONTINUED | OUTPATIENT
Start: 2025-04-22 | End: 2025-04-22 | Stop reason: HOSPADM

## 2025-04-22 RX ORDER — FENTANYL CITRATE 50 UG/ML
25 INJECTION, SOLUTION INTRAMUSCULAR; INTRAVENOUS EVERY 5 MIN PRN
Status: DISCONTINUED | OUTPATIENT
Start: 2025-04-22 | End: 2025-04-22 | Stop reason: HOSPADM

## 2025-04-22 RX ORDER — PROPOFOL 10 MG/ML
INJECTION, EMULSION INTRAVENOUS PRN
Status: DISCONTINUED | OUTPATIENT
Start: 2025-04-22 | End: 2025-04-22

## 2025-04-22 RX ORDER — FENTANYL CITRATE 50 UG/ML
INJECTION, SOLUTION INTRAMUSCULAR; INTRAVENOUS PRN
Status: DISCONTINUED | OUTPATIENT
Start: 2025-04-22 | End: 2025-04-22

## 2025-04-22 RX ORDER — SODIUM CHLORIDE, SODIUM LACTATE, POTASSIUM CHLORIDE, CALCIUM CHLORIDE 600; 310; 30; 20 MG/100ML; MG/100ML; MG/100ML; MG/100ML
INJECTION, SOLUTION INTRAVENOUS CONTINUOUS
Status: DISCONTINUED | OUTPATIENT
Start: 2025-04-22 | End: 2025-04-22 | Stop reason: HOSPADM

## 2025-04-22 RX ORDER — HEPARIN SODIUM,PORCINE 10 UNIT/ML
5-10 VIAL (ML) INTRAVENOUS
Status: DISCONTINUED | OUTPATIENT
Start: 2025-04-22 | End: 2025-04-22 | Stop reason: HOSPADM

## 2025-04-22 RX ORDER — FENTANYL CITRATE 50 UG/ML
50 INJECTION, SOLUTION INTRAMUSCULAR; INTRAVENOUS EVERY 5 MIN PRN
Status: DISCONTINUED | OUTPATIENT
Start: 2025-04-22 | End: 2025-04-22 | Stop reason: HOSPADM

## 2025-04-22 RX ORDER — HEPARIN SODIUM (PORCINE) LOCK FLUSH IV SOLN 100 UNIT/ML 100 UNIT/ML
5-10 SOLUTION INTRAVENOUS
Status: DISCONTINUED | OUTPATIENT
Start: 2025-04-22 | End: 2025-04-22 | Stop reason: HOSPADM

## 2025-04-22 RX ORDER — ONDANSETRON 2 MG/ML
4 INJECTION INTRAMUSCULAR; INTRAVENOUS EVERY 30 MIN PRN
Status: DISCONTINUED | OUTPATIENT
Start: 2025-04-22 | End: 2025-04-22 | Stop reason: HOSPADM

## 2025-04-22 RX ORDER — ONDANSETRON 2 MG/ML
INJECTION INTRAMUSCULAR; INTRAVENOUS PRN
Status: DISCONTINUED | OUTPATIENT
Start: 2025-04-22 | End: 2025-04-22

## 2025-04-22 RX ORDER — NALOXONE HYDROCHLORIDE 0.4 MG/ML
0.1 INJECTION, SOLUTION INTRAMUSCULAR; INTRAVENOUS; SUBCUTANEOUS
Status: DISCONTINUED | OUTPATIENT
Start: 2025-04-22 | End: 2025-04-22 | Stop reason: HOSPADM

## 2025-04-22 RX ORDER — HEPARIN SODIUM,PORCINE 10 UNIT/ML
5-10 VIAL (ML) INTRAVENOUS EVERY 24 HOURS
Status: DISCONTINUED | OUTPATIENT
Start: 2025-04-22 | End: 2025-04-22 | Stop reason: HOSPADM

## 2025-04-22 RX ORDER — HYDROMORPHONE HCL IN WATER/PF 6 MG/30 ML
0.4 PATIENT CONTROLLED ANALGESIA SYRINGE INTRAVENOUS EVERY 5 MIN PRN
Status: DISCONTINUED | OUTPATIENT
Start: 2025-04-22 | End: 2025-04-22 | Stop reason: HOSPADM

## 2025-04-22 RX ORDER — DEXAMETHASONE SODIUM PHOSPHATE 4 MG/ML
INJECTION, SOLUTION INTRA-ARTICULAR; INTRALESIONAL; INTRAMUSCULAR; INTRAVENOUS; SOFT TISSUE PRN
Status: DISCONTINUED | OUTPATIENT
Start: 2025-04-22 | End: 2025-04-22

## 2025-04-22 RX ORDER — DEXAMETHASONE SODIUM PHOSPHATE 4 MG/ML
4 INJECTION, SOLUTION INTRA-ARTICULAR; INTRALESIONAL; INTRAMUSCULAR; INTRAVENOUS; SOFT TISSUE
Status: DISCONTINUED | OUTPATIENT
Start: 2025-04-22 | End: 2025-04-22 | Stop reason: HOSPADM

## 2025-04-22 RX ORDER — SODIUM CHLORIDE, SODIUM LACTATE, POTASSIUM CHLORIDE, CALCIUM CHLORIDE 600; 310; 30; 20 MG/100ML; MG/100ML; MG/100ML; MG/100ML
INJECTION, SOLUTION INTRAVENOUS CONTINUOUS PRN
Status: DISCONTINUED | OUTPATIENT
Start: 2025-04-22 | End: 2025-04-22

## 2025-04-22 RX ORDER — ACETAMINOPHEN 325 MG/1
975 TABLET ORAL ONCE
Status: COMPLETED | OUTPATIENT
Start: 2025-04-22 | End: 2025-04-22

## 2025-04-22 RX ORDER — OXYCODONE HYDROCHLORIDE 5 MG/1
5 TABLET ORAL
Status: DISCONTINUED | OUTPATIENT
Start: 2025-04-22 | End: 2025-04-22 | Stop reason: HOSPADM

## 2025-04-22 RX ORDER — ACETAMINOPHEN 325 MG/1
650 TABLET ORAL EVERY 6 HOURS PRN
COMMUNITY
Start: 2025-04-22

## 2025-04-22 RX ORDER — HYDROMORPHONE HCL IN WATER/PF 6 MG/30 ML
0.2 PATIENT CONTROLLED ANALGESIA SYRINGE INTRAVENOUS EVERY 5 MIN PRN
Status: DISCONTINUED | OUTPATIENT
Start: 2025-04-22 | End: 2025-04-22 | Stop reason: HOSPADM

## 2025-04-22 RX ADMIN — HYDROMORPHONE HYDROCHLORIDE 0.2 MG: 0.2 INJECTION, SOLUTION INTRAMUSCULAR; INTRAVENOUS; SUBCUTANEOUS at 12:51

## 2025-04-22 RX ADMIN — HYDROMORPHONE HYDROCHLORIDE 0.4 MG: 0.2 INJECTION, SOLUTION INTRAMUSCULAR; INTRAVENOUS; SUBCUTANEOUS at 12:41

## 2025-04-22 RX ADMIN — SODIUM CHLORIDE, SODIUM LACTATE, POTASSIUM CHLORIDE, AND CALCIUM CHLORIDE: .6; .31; .03; .02 INJECTION, SOLUTION INTRAVENOUS at 08:02

## 2025-04-22 RX ADMIN — PHENYLEPHRINE HYDROCHLORIDE 100 MCG: 10 INJECTION INTRAVENOUS at 08:15

## 2025-04-22 RX ADMIN — FENTANYL CITRATE 50 MCG: 50 INJECTION, SOLUTION INTRAMUSCULAR; INTRAVENOUS at 11:56

## 2025-04-22 RX ADMIN — SODIUM CHLORIDE, SODIUM LACTATE, POTASSIUM CHLORIDE, AND CALCIUM CHLORIDE: .6; .31; .03; .02 INJECTION, SOLUTION INTRAVENOUS at 10:45

## 2025-04-22 RX ADMIN — FENTANYL CITRATE 50 MCG: 50 INJECTION, SOLUTION INTRAMUSCULAR; INTRAVENOUS at 11:49

## 2025-04-22 RX ADMIN — FENTANYL CITRATE 50 MCG: 50 INJECTION INTRAMUSCULAR; INTRAVENOUS at 09:14

## 2025-04-22 RX ADMIN — FENTANYL CITRATE 50 MCG: 50 INJECTION INTRAMUSCULAR; INTRAVENOUS at 08:05

## 2025-04-22 RX ADMIN — ACETAMINOPHEN 975 MG: 325 TABLET, FILM COATED ORAL at 12:52

## 2025-04-22 RX ADMIN — PROPOFOL 150 MG: 10 INJECTION, EMULSION INTRAVENOUS at 08:14

## 2025-04-22 RX ADMIN — HYDROMORPHONE HYDROCHLORIDE 0.2 MG: 0.2 INJECTION, SOLUTION INTRAMUSCULAR; INTRAVENOUS; SUBCUTANEOUS at 12:35

## 2025-04-22 RX ADMIN — MIDAZOLAM 2 MG: 1 INJECTION INTRAMUSCULAR; INTRAVENOUS at 07:58

## 2025-04-22 RX ADMIN — PROPOFOL 50 MG: 10 INJECTION, EMULSION INTRAVENOUS at 09:17

## 2025-04-22 RX ADMIN — HYDROMORPHONE HYDROCHLORIDE 0.4 MG: 0.2 INJECTION, SOLUTION INTRAMUSCULAR; INTRAVENOUS; SUBCUTANEOUS at 12:27

## 2025-04-22 RX ADMIN — FENTANYL CITRATE 50 MCG: 50 INJECTION INTRAMUSCULAR; INTRAVENOUS at 08:10

## 2025-04-22 RX ADMIN — ACETAMINOPHEN 975 MG: 325 TABLET, FILM COATED ORAL at 06:41

## 2025-04-22 RX ADMIN — FENTANYL CITRATE 50 MCG: 50 INJECTION INTRAMUSCULAR; INTRAVENOUS at 07:58

## 2025-04-22 RX ADMIN — PHENYLEPHRINE HYDROCHLORIDE 100 MCG: 10 INJECTION INTRAVENOUS at 08:18

## 2025-04-22 RX ADMIN — PHENYLEPHRINE HYDROCHLORIDE 50 MCG: 10 INJECTION INTRAVENOUS at 08:27

## 2025-04-22 RX ADMIN — HYDROMORPHONE HYDROCHLORIDE 0.3 MG: 1 INJECTION, SOLUTION INTRAMUSCULAR; INTRAVENOUS; SUBCUTANEOUS at 09:38

## 2025-04-22 RX ADMIN — PHENYLEPHRINE HYDROCHLORIDE 150 MCG: 10 INJECTION INTRAVENOUS at 08:34

## 2025-04-22 RX ADMIN — ONDANSETRON 4 MG: 2 INJECTION INTRAMUSCULAR; INTRAVENOUS at 11:05

## 2025-04-22 RX ADMIN — HEPARIN 5 ML: 100 SYRINGE at 14:44

## 2025-04-22 RX ADMIN — DEXAMETHASONE SODIUM PHOSPHATE 6 MG: 4 INJECTION, SOLUTION INTRA-ARTICULAR; INTRALESIONAL; INTRAMUSCULAR; INTRAVENOUS; SOFT TISSUE at 08:20

## 2025-04-22 RX ADMIN — HYDROMORPHONE HYDROCHLORIDE 0.2 MG: 1 INJECTION, SOLUTION INTRAMUSCULAR; INTRAVENOUS; SUBCUTANEOUS at 09:46

## 2025-04-22 ASSESSMENT — ACTIVITIES OF DAILY LIVING (ADL)
ADLS_ACUITY_SCORE: 51

## 2025-04-22 NOTE — OP NOTE
[facility] Colorectal Surgery Operative Report  April 22, 2025     PREOPERATIVE DIAGNOSIS:  1. Primary peritoneal carcinomatosis arising in endometriosis  2. Rectovaginal fistula     POSTOPERATIVE DIAGNOSIS:   1. Primary peritoneal carcinomatosis arising in endometriosis  2. Rectovaginal fistula     PROCEDURE:  1. Fecal disimpaction  2. Colonoscopy  Modifier 22: This case was extremely difficult due to the extent of fecal impaction that made exposure and clearance very difficult difficult. This case was at least 50% more difficult and took 1.5 hours longer than typical for a similar case.      ANESTHESIA: MAC     SURGEONS:  Gerson Carpenter M.D; Jessenia Sosa M.D.     INDICATIONS FOR PROCEDURE  Ms. Granados is a 39 yo F with primary peritoneal carcinomatosis s/p debulking complicated by a rectovaginal fistula in the setting of diverting loop ileostomy. We previously performed a decompressive colonoscopy and she did very well. Her symptoms and distention have recurred with CT scan findings consistent with inspissated stool in the diverted colon. She has been once again having significant tenesmus likely from fecal impaction. She now presents for EUA with disimpaction. I thoroughly discussed the risks, benefits, and alternatives of operative treatment with the patient and she agreed to proceed. Risks of the colonoscopy were reviewed with the patient.     OPERATIVE PROCEDURE:    The patient was brought to the operating room where she was gently sedated and intubated under general anesthesia. The patient was positioned in lithotomy position with appropriate padding. A time out was performed. A digital exam was performed which revealed a  3-4 mm rectovaginal fistula from the colorectal anastomosis to the vagina. A colonoscope was passed into the anus, advanced into the rectum. We then proceeded with the disimpaction. Dr Sosa placed a cysto catheter with 3L with betadine in the efferent limb of the ileostomy while I proceeded  proximal. There was once again major fecal burden, which altogether took 2 hours to completely disimpact the entirety of the colon.. I reached the cecum, and then moved distal ensuring that the remainder of the colon consisted of liquid stool with no more solid fecal burden. The scope was then removed and the patient was awoken doing well. Postoperatively, the patient was transferred to the recovery room in stable condition.           Gerson Carpenter MD  Division of Colon and Rectal Surgery  [facility]  [phone]
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
## 2025-04-22 NOTE — PROGRESS NOTES
Gynecologic Oncology   Postoperative Progress Note  4/22/2025    S: Patient is doing well postoperatively, feeling sleepy. Pain is somewhat controlled, still experiencing moderate rectal pain. Says she has dilaudid at home, which works well for her. Ambulating without dizziness. She had a straight catheterization at the end of the case. Tolerating PO without nausea or vomiting. Denies chest pain, shortness of breath, dizziness, or other concerns at this time.     O:  Vitals:    04/22/25 1315 04/22/25 1330 04/22/25 1348 04/22/25 1420   BP: 104/71 99/70 101/67 97/68   BP Location:   Left arm    Cuff Size:       Pulse: 53 55 55 64   Resp: 13 12  14   Temp:    97.4  F (36.3  C)   TempSrc:    Oral   SpO2: 100% 99% 100%    Weight:       Height:         Gen: no acute distress  Cardio: well-perfused, regular rate and rhythm  Resp: breathing comfortably on room air, normal work of breathing, lung sounds clear with slightly decreased breath sounds on the left  Abdomen: soft, non-tender, non-distended    A/P: 40 year old POD#0 s/p pelvic exam under anesthesia, colonoscopy with disimpaction of stool burden. Doing well in the early post-operative period.     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) > XL with radical hysterectomy, BSO, radical en bloc rectosigmoid colon resection with posterior vaginectomy, total supracolic omentectomy, diverting loop ileostomy (9/2024) > Posterior vaginal fistula s/p transgluteal pelvic drain (9/2024) > Pelvic EUA, sigmoidoscopy with fecal disimpaction (10/2024) > 5C carbo/taxol (1/24/25) > CT GAURAV (3/31/25)   FEN/GI: Tolerating PO without nausea/vomiting; advance as tolerated  Pain: Adequately controlled with scheduled Tylenol, PRN dilaudid (per patient, she has some at home)  : Timed straight catheterization at home    Dispo: To home    Kristen Watkins MD  Obstetrics & Gynecology, PGY-1  2:48 PM 04/22/2025

## 2025-04-22 NOTE — ANESTHESIA POSTPROCEDURE EVALUATION
Patient: Marizol Granados    Procedure: Procedure(s):  EXAM UNDER ANESTHESIA, PELVIS  COLONOSCOPY, WITH DISIMPACTION       Anesthesia Type:  General    Note:  Disposition: Outpatient   Postop Pain Control: Uneventful            Sign Out: Well controlled pain   PONV: No   Neuro/Psych: Uneventful            Sign Out: Acceptable/Baseline neuro status   Airway/Respiratory: Uneventful            Sign Out: Acceptable/Baseline resp. status   CV/Hemodynamics: Uneventful            Sign Out: Acceptable CV status; No obvious hypovolemia; No obvious fluid overload   Other NRE: NONE   DID A NON-ROUTINE EVENT OCCUR? No           Last vitals:  Vitals Value Taken Time   /70 04/22/25 1245   Temp 36.1  C (97  F) 04/22/25 1145   Pulse 57 04/22/25 1249   Resp 14 04/22/25 1249   SpO2 99 % 04/22/25 1249   Vitals shown include unfiled device data.    Electronically Signed By: Catherine Hadley MD  April 22, 2025  12:49 PM

## 2025-04-22 NOTE — DISCHARGE INSTRUCTIONS
Contacting your Doctor -   To contact a doctor, call Dr. Jessenia Sosa at [phone]  or:  [phone] and ask for the resident on call for Gynecology (answered 24 hours a day)   Emergency Department:  [facility]: [phone]  [facility]: [phone] 911 if you are in need of immediate or emergent help

## 2025-04-22 NOTE — ANESTHESIA PROCEDURE NOTES
Airway       Patient location during procedure: OR  Staff -        Anesthesiologist:  Gianna De La Rosa MD       CRNA: Juanita Mcelroy APRN CRNA       Performed By: CRNA  Consent for Airway        Urgency: elective  Indications and Patient Condition       Indications for airway management: freedom-procedural       Induction type:intravenous       Mask difficulty assessment: 0 - not attempted (LMA inserted)    Final Airway Details       Final airway type: supraglottic airway    Supraglottic Airway Details        Type: LMA       Brand: I-Gel       LMA size: 4    Post intubation assessment        Placement verified by: capnometry, equal breath sounds and chest rise        Number of attempts at approach: 1       Number of other approaches attempted: 0       Secured with: tape       Ease of procedure: easy       Dentition: Intact and Unchanged

## 2025-04-22 NOTE — OP NOTE
Procedure Date: 4/22/2025      PREOPERATIVE DIAGNOSES:    1.  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 Rectovaginal fistula  3. Severe constipation and obstipation with fecal impaction  4. Neurogenic bladder     POSTOPERATIVE DIAGNOSES:    1.  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 Rectovaginal fistula  3. Severe constipation and obstipation with fecal impaction  4. Neurogenic bladder    PROCEDURES PERFORMED:      1.  Exam under anesthesia  2. Colonoscopy      CO-SURGEONs:    Jessenia Sosa MD (GYN ONC)  Gerson Carpenter MD  (colorectal surgery)       ESTIMATED BLOOD LOSS:  0 mL     COMPLICATIONS:  None.     SPECIMENS REMOVED:    None     INDICATIONS FOR PROCEDURE:  The patient is a 40 year-old patient with a history of low grade serous ovarian cancer.  As above, she has severe pelvic floor dysfunction and a neurogenic bladder (she self-catheterizes) and bowlel dysfunction (unable to pass any mucous, stool, or gas per rectum). She does have a diverting loop ileostomy. She presented to clinic with worsening anal pain. CT scan shows large stool burden.     FINDINGS:  On bimanual exam, she had normal external genitalia. Cuff in tact. 3mm posterior vaginal fistula palpated and visualized, this is about 8cm from the introitus.     DESCRIPTION OF PROCEDURE:  After informed consent, the patient was brought to the operating room where general anesthesia was administered.  She was draped in the dorsal lithotomy position.   We put SCDs on her lower extremities.     I did a pelvic exam with the above findings noted.     Dr Carpenter then started the colonoscopy. I Placed a total of 6 L of sterile water with betadine through a catheter into the distal limb of her loop ileostomy to facilitate movement of stool through the colon. Please see Dr Carpenter' note for details of the procedure.     After the length colonoscopy, we placed a new stoma bag. We then performed straight catheterization  of her urethra. She was brought to recovery in a stable condition.        Jessenia Sosa MD  Gynecologic Oncology  Pager [phone]     18

## 2025-04-22 NOTE — ANESTHESIA CARE TRANSFER NOTE
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    Anesthesia Type:   General     Note:    Oropharynx: oropharynx clear of all foreign objects and spontaneously breathing  Level of Consciousness: drowsy  Oxygen Supplementation: nasal cannula  Level of Supplemental Oxygen (L/min / FiO2): 2  Independent Airway: airway patency satisfactory and stable    Vital Signs Stable: post-procedure vital signs reviewed and stable  Report to RN Given: handoff report given  Patient transferred to: PACU    Handoff Report: Identifed the Patient, Identified the Reponsible Provider, Reviewed the pertinent medical history, Discussed the surgical course, Reviewed Intra-OP anesthesia mangement and issues during anesthesia, Set expectations for post-procedure period and Allowed opportunity for questions and acknowledgement of understanding      Vitals:  Vitals Value Taken Time   /77 04/22/25 1133   Temp     Pulse 58 04/22/25 1138   Resp 9 04/22/25 1138   SpO2 100 % 04/22/25 1138   Vitals shown include unfiled device data.    Electronically Signed By: GERALDO Blair CRNA  April 22, 2025  11:39 AM

## 2025-04-24 ENCOUNTER — VIRTUAL VISIT (OUTPATIENT)
Dept: PALLIATIVE CARE | Facility: CLINIC | Age: 40
End: 2025-04-24
Attending: FAMILY MEDICINE
Payer: COMMERCIAL

## 2025-04-24 VITALS — BODY MASS INDEX: 23.32 KG/M2 | HEIGHT: 65 IN | WEIGHT: 140 LBS

## 2025-04-24 DIAGNOSIS — Z51.5 ENCOUNTER FOR PALLIATIVE CARE: Primary | ICD-10-CM

## 2025-04-24 DIAGNOSIS — T45.1X5A NEUROPATHY DUE TO CHEMOTHERAPEUTIC DRUG: ICD-10-CM

## 2025-04-24 DIAGNOSIS — F33.2 SEVERE EPISODE OF RECURRENT MAJOR DEPRESSIVE DISORDER, WITHOUT PSYCHOTIC FEATURES (H): ICD-10-CM

## 2025-04-24 DIAGNOSIS — G89.3 CANCER ASSOCIATED PAIN: ICD-10-CM

## 2025-04-24 DIAGNOSIS — G62.0 NEUROPATHY DUE TO CHEMOTHERAPEUTIC DRUG: ICD-10-CM

## 2025-04-24 DIAGNOSIS — C56.9 PRIMARY LOW GRADE SEROUS ADENOCARCINOMA OF OVARY (H): ICD-10-CM

## 2025-04-24 DIAGNOSIS — R53.0 NEOPLASTIC MALIGNANT RELATED FATIGUE: ICD-10-CM

## 2025-04-24 DIAGNOSIS — C48.1 PRIMARY MALIGNANT NEOPLASM OF PELVIC PERITONEUM (H): ICD-10-CM

## 2025-04-24 RX ORDER — GABAPENTIN 100 MG/1
200 CAPSULE ORAL AT BEDTIME
Qty: 60 CAPSULE | Refills: 3 | Status: SHIPPED | OUTPATIENT
Start: 2025-04-24

## 2025-04-24 RX ORDER — HYDROMORPHONE HYDROCHLORIDE 4 MG/1
2-4 TABLET ORAL EVERY 4 HOURS PRN
Qty: 60 TABLET | Refills: 0 | Status: SHIPPED | OUTPATIENT
Start: 2025-04-24

## 2025-04-24 RX ORDER — METHYLPHENIDATE HYDROCHLORIDE 5 MG/1
TABLET ORAL
Qty: 150 TABLET | Refills: 0 | Status: SHIPPED | OUTPATIENT
Start: 2025-04-24

## 2025-04-24 ASSESSMENT — PAIN SCALES - GENERAL: PAINLEVEL_OUTOF10: SEVERE PAIN (8)

## 2025-04-24 NOTE — PROGRESS NOTES
Is the patient currently in the state of [place]? YES    Visit mode: VIDEO    If the visit is dropped, the patient can be reconnected by:VIDEO VISIT: Send to e-mail at: [email or web address]    Will anyone else be joining the visit? NO  (If patient encounters technical issues they should call [phone]    Are changes needed to the allergy or medication list? No    Are refills needed on medications prescribed by this physician? NO    Rooming Documentation:  Questionnaire(s) completed    Reason for visit: Video Visit (Follow Up)    Ying CISNEROS
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
## 2025-04-24 NOTE — PATIENT INSTRUCTIONS
It was good to see you today, Marizol..    Here are the things we talked about:  I refilled the dilaudid pain medicine. Try to use as little of that as you can because it can worsen your constipation.  I'm sure you'll feel better just from  your body's own ability to heal.    Keep using the Pain patch and change it weekly  I sent a new prescription for the higher dose of Ritalin so now you should use 15 mg (3 pills) in the morning and 2 (10 mg) at noon.  You should also increase your gabapentin to 2 capsules (200mg) at bedtime to see if that helps your neuropathy a bit more.  Someone from the team will reach out to schedule a follow up appointment in 4-6 weeks and Christina will check in on Monday to see how you're feeling..       How to get a hold of us:  For non-urgent matters, MyChart works best.    For more urgent matters, or if you prefer not to use MyChart, call our clinic nurse coordinator Christina Flower RN at [phone]    We have an on-call number for evenings and weekends. Please call this only if you are having uncontrolled symptoms or serious side effects from your medicines: [phone].     For refills, please give us a week (5 working days) notice. We don't always have providers available everyday to do refills. If you call the day you run out of your medicine, we may not be able to refill it in time, so call 5 days in advance!    Arik Norton MD MS FAAFP CAQHPM  [facility]  Office [phone]  Fax [phone]
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
## 2025-04-28 DIAGNOSIS — C48.2 PRIMARY PERITONEAL ADENOCARCINOMA (H): Primary | ICD-10-CM

## 2025-04-29 ENCOUNTER — PATIENT OUTREACH (OUTPATIENT)
Dept: CARE COORDINATION | Facility: CLINIC | Age: 40
End: 2025-04-29
Payer: COMMERCIAL
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
## 2025-04-29 NOTE — PROGRESS NOTES
Social Work - Telephone/MyChart message  [facility]  Data:  Pt is 40 year old female with diagnosis of peritoneal cancer.  Pt resides in [place], [place], and is a single parent to a 10 and 11 year old.  Pt has a diagnosis of depression and anxiety though has declined oncology counseling.  Pt has supportive mother though not a lot of additional support.       Patient Name: Marizol Granados  Goes By: Marizol YOUSSEF/Age: 1985 (40 year old)      Referral Source: Jessenia Sosa MD, Oncology     Reason for Referral: financial assistance, food    Intervention: Left voice message for patient on 2025 .   Plan:  will await patient's return phone call/message and provide assistance at that time.      Emmanuel Vogel, Casual , for Kimberly Rosario   [facility]  [phone]

## 2025-05-01 ENCOUNTER — VIRTUAL VISIT (OUTPATIENT)
Dept: SURGERY | Facility: CLINIC | Age: 40
End: 2025-05-01
Payer: COMMERCIAL

## 2025-05-01 VITALS — BODY MASS INDEX: 23.82 KG/M2 | HEIGHT: 65 IN | WEIGHT: 143 LBS

## 2025-05-01 DIAGNOSIS — N82.3 RECTOVAGINAL FISTULA: Primary | ICD-10-CM

## 2025-05-01 DIAGNOSIS — C48.1 PRIMARY MALIGNANT NEOPLASM OF PELVIC PERITONEUM (H): ICD-10-CM

## 2025-05-01 DIAGNOSIS — C56.9 PRIMARY LOW GRADE SEROUS ADENOCARCINOMA OF OVARY (H): ICD-10-CM

## 2025-05-01 NOTE — NURSING NOTE
"Chief Complaint   Patient presents with    Follow Up       Vitals:    05/01/25 0924   Weight: 143 lb   Height: 5' 5\"       Body mass index is 23.8 kg/m .    Carin Mobley CMA    "
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
## 2025-05-04 ENCOUNTER — MYC MEDICAL ADVICE (OUTPATIENT)
Dept: SURGERY | Facility: CLINIC | Age: 40
End: 2025-05-04
Payer: COMMERCIAL

## 2025-05-04 DIAGNOSIS — N82.3 RECTOVAGINAL FISTULA: Primary | ICD-10-CM

## 2025-05-05 ENCOUNTER — TELEPHONE (OUTPATIENT)
Dept: HEMATOLOGY | Facility: CLINIC | Age: 40
End: 2025-05-05
Payer: COMMERCIAL

## 2025-05-05 DIAGNOSIS — G89.3 CANCER ASSOCIATED PAIN: ICD-10-CM

## 2025-05-05 DIAGNOSIS — R10.2 PELVIC PAIN: Primary | ICD-10-CM

## 2025-05-05 DIAGNOSIS — Z51.5 ENCOUNTER FOR PALLIATIVE CARE: ICD-10-CM

## 2025-05-05 DIAGNOSIS — C56.9 PRIMARY LOW GRADE SEROUS ADENOCARCINOMA OF OVARY (H): ICD-10-CM

## 2025-05-05 DIAGNOSIS — C48.1 PRIMARY MALIGNANT NEOPLASM OF PELVIC PERITONEUM (H): ICD-10-CM

## 2025-05-05 RX ORDER — HYDROMORPHONE HYDROCHLORIDE 4 MG/1
4-6 TABLET ORAL EVERY 4 HOURS PRN
Qty: 90 TABLET | Refills: 0 | Status: SHIPPED | OUTPATIENT
Start: 2025-05-05

## 2025-05-05 RX ORDER — METRONIDAZOLE 500 MG/1
500 TABLET ORAL 2 TIMES DAILY
Qty: 14 TABLET | Refills: 0 | Status: SHIPPED | OUTPATIENT
Start: 2025-05-05 | End: 2025-05-12

## 2025-05-05 RX ORDER — METHOCARBAMOL 500 MG/1
500 TABLET, FILM COATED ORAL 4 TIMES DAILY PRN
Qty: 20 TABLET | Refills: 0 | Status: SHIPPED | OUTPATIENT
Start: 2025-05-05

## 2025-05-05 RX ORDER — BUPRENORPHINE 20 UG/H
1 PATCH TRANSDERMAL
Qty: 4 PATCH | Refills: 3 | Status: SHIPPED | OUTPATIENT
Start: 2025-05-05

## 2025-05-09 ENCOUNTER — ANCILLARY PROCEDURE (OUTPATIENT)
Dept: MRI IMAGING | Facility: CLINIC | Age: 40
End: 2025-05-09
Attending: OBSTETRICS & GYNECOLOGY
Payer: COMMERCIAL

## 2025-05-09 ENCOUNTER — APPOINTMENT (OUTPATIENT)
Dept: LAB | Facility: CLINIC | Age: 40
End: 2025-05-09
Attending: OBSTETRICS & GYNECOLOGY
Payer: COMMERCIAL

## 2025-05-09 ENCOUNTER — RESULTS FOLLOW-UP (OUTPATIENT)
Dept: ONCOLOGY | Facility: CLINIC | Age: 40
End: 2025-05-09

## 2025-05-09 DIAGNOSIS — R10.2 PELVIC PAIN: ICD-10-CM

## 2025-05-09 DIAGNOSIS — C56.9 PRIMARY LOW GRADE SEROUS ADENOCARCINOMA OF OVARY (H): ICD-10-CM

## 2025-05-09 PROCEDURE — 255N000002 HC RX 255 OP 636: Performed by: OBSTETRICS & GYNECOLOGY

## 2025-05-09 PROCEDURE — A9585 GADOBUTROL INJECTION: HCPCS | Performed by: OBSTETRICS & GYNECOLOGY

## 2025-05-09 PROCEDURE — 72197 MRI PELVIS W/O & W/DYE: CPT

## 2025-05-09 RX ORDER — HEPARIN SODIUM (PORCINE) LOCK FLUSH IV SOLN 100 UNIT/ML 100 UNIT/ML
5 SOLUTION INTRAVENOUS EVERY 8 HOURS
Status: ACTIVE | OUTPATIENT
Start: 2025-05-09

## 2025-05-09 RX ORDER — GADOBUTROL 604.72 MG/ML
6.5 INJECTION INTRAVENOUS ONCE
Status: COMPLETED | OUTPATIENT
Start: 2025-05-09 | End: 2025-05-09

## 2025-05-09 RX ADMIN — GADOBUTROL 6.5 ML: 604.72 INJECTION INTRAVENOUS at 12:51

## 2025-05-09 RX ADMIN — HEPARIN SODIUM (PORCINE) LOCK FLUSH IV SOLN 100 UNIT/ML 5 ML: 100 SOLUTION at 12:51

## 2025-05-13 ENCOUNTER — TELEPHONE (OUTPATIENT)
Dept: PALLIATIVE CARE | Facility: CLINIC | Age: 40
End: 2025-05-13
Payer: COMMERCIAL

## 2025-05-13 NOTE — TELEPHONE ENCOUNTER
Retail Pharmacy Prior Authorization Team   Phone: [phone]    PA Initiation    Medication: METHYLPHENIDATE HCL 5 MG PO TABS  Insurance Company: [employer] [place] - Phone [phone] Fax [phone]  Pharmacy Filling the Rx: [id number] - [place], [place] - [address] [facility] AT [place] [address] & [facility]  Filling Pharmacy Phone: [phone]  Filling Pharmacy Fax:    Start Date: 5/13/2025      [place]EE ([place]: [id number])

## 2025-05-13 NOTE — TELEPHONE ENCOUNTER
Prior Authorization Approval    Medication: METHYLPHENIDATE HCL 5 MG PO TABS  Authorization Effective Date: 2/12/2025  Authorization Expiration Date: 5/13/2026  Approved Dose/Quantity: 150 TABS PER 30 DAYS  Insurance Company: [employer] [place] - Phone [phone] Fax [phone]  Which Pharmacy is filling the prescription: [id number] [address] [facility] AT [place] [address] & [facility]  Pharmacy Notified: YES  Patient Notified: YES (faxed approval letter to pharmacy and notified patient via [email or web address] message)

## 2025-05-13 NOTE — TELEPHONE ENCOUNTER
Prior Authorization Retail Medication Request    Medication/Dose: Methylphenidate  Diagnosis and ICD code (if different than what is on RX):     New/renewal/insurance change PA/secondary ins. PA:  Previously Tried and Failed:     Rationale:       Insurance   Primary:    Insurance ID:       Secondary (if applicable):   Insurance ID:       Pharmacy Information (if different than what is on RX)  Name:     Phone:     Fax:     Clinic Information  Preferred routing pool for dept communication: Oly Flower RN

## 2025-05-18 ENCOUNTER — APPOINTMENT (OUTPATIENT)
Dept: CT IMAGING | Facility: CLINIC | Age: 40
End: 2025-05-18
Attending: EMERGENCY MEDICINE
Payer: COMMERCIAL

## 2025-05-18 ENCOUNTER — HOSPITAL ENCOUNTER (INPATIENT)
Facility: CLINIC | Age: 40
End: 2025-05-18
Attending: EMERGENCY MEDICINE | Admitting: OBSTETRICS & GYNECOLOGY
Payer: COMMERCIAL

## 2025-05-18 DIAGNOSIS — C48.1 PRIMARY MALIGNANT NEOPLASM OF PELVIC PERITONEUM (H): ICD-10-CM

## 2025-05-18 DIAGNOSIS — Z51.5 ENCOUNTER FOR PALLIATIVE CARE: ICD-10-CM

## 2025-05-18 DIAGNOSIS — Z86.711 PERSONAL HISTORY OF PULMONARY EMBOLISM: ICD-10-CM

## 2025-05-18 DIAGNOSIS — F41.1 GENERALIZED ANXIETY DISORDER: ICD-10-CM

## 2025-05-18 DIAGNOSIS — F40.10 SOCIAL PHOBIA: ICD-10-CM

## 2025-05-18 DIAGNOSIS — R53.82 CHRONIC FATIGUE: ICD-10-CM

## 2025-05-18 DIAGNOSIS — C56.9 PRIMARY LOW GRADE SEROUS ADENOCARCINOMA OF OVARY (H): ICD-10-CM

## 2025-05-18 DIAGNOSIS — R10.9 ABDOMINAL PAIN, UNSPECIFIED ABDOMINAL LOCATION: ICD-10-CM

## 2025-05-18 DIAGNOSIS — G89.3 CANCER ASSOCIATED PAIN: ICD-10-CM

## 2025-05-18 DIAGNOSIS — Z71.89 OTHER SPECIFIED COUNSELING: Chronic | ICD-10-CM

## 2025-05-18 DIAGNOSIS — N82.3 RECTOVAGINAL FISTULA: ICD-10-CM

## 2025-05-18 DIAGNOSIS — Z93.2 STATUS POST ILEOSTOMY (H): Primary | ICD-10-CM

## 2025-05-18 DIAGNOSIS — Z93.3 S/P COLOSTOMY (H): ICD-10-CM

## 2025-05-18 LAB
ALBUMIN SERPL BCG-MCNC: 4.1 G/DL (ref 3.5–5.2)
ALP SERPL-CCNC: 92 U/L (ref 40–150)
ALT SERPL W P-5'-P-CCNC: 14 U/L (ref 0–50)
ANION GAP SERPL CALCULATED.3IONS-SCNC: 11 MMOL/L (ref 7–15)
AST SERPL W P-5'-P-CCNC: 18 U/L (ref 0–45)
BASOPHILS # BLD AUTO: 0 10E3/UL (ref 0–0.2)
BASOPHILS NFR BLD AUTO: 0 %
BILIRUB SERPL-MCNC: 0.2 MG/DL
BUN SERPL-MCNC: 13 MG/DL (ref 6–20)
CALCIUM SERPL-MCNC: 9.9 MG/DL (ref 8.8–10.4)
CHLORIDE SERPL-SCNC: 102 MMOL/L (ref 98–107)
CREAT SERPL-MCNC: 1.02 MG/DL (ref 0.51–0.95)
EGFRCR SERPLBLD CKD-EPI 2021: 72 ML/MIN/1.73M2
EOSINOPHIL # BLD AUTO: 0.1 10E3/UL (ref 0–0.7)
EOSINOPHIL NFR BLD AUTO: 1 %
ERYTHROCYTE [DISTWIDTH] IN BLOOD BY AUTOMATED COUNT: 12.5 % (ref 10–15)
GLUCOSE SERPL-MCNC: 113 MG/DL (ref 70–99)
HCO3 SERPL-SCNC: 24 MMOL/L (ref 22–29)
HCT VFR BLD AUTO: 33.6 % (ref 35–47)
HGB BLD-MCNC: 11.1 G/DL (ref 11.7–15.7)
IMM GRANULOCYTES # BLD: 0 10E3/UL
IMM GRANULOCYTES NFR BLD: 0 %
LACTATE SERPL-SCNC: 0.7 MMOL/L (ref 0.7–2)
LIPASE SERPL-CCNC: 50 U/L (ref 13–60)
LYMPHOCYTES # BLD AUTO: 2.1 10E3/UL (ref 0.8–5.3)
LYMPHOCYTES NFR BLD AUTO: 32 %
MCH RBC QN AUTO: 31.6 PG (ref 26.5–33)
MCHC RBC AUTO-ENTMCNC: 33 G/DL (ref 31.5–36.5)
MCV RBC AUTO: 96 FL (ref 78–100)
MONOCYTES # BLD AUTO: 0.6 10E3/UL (ref 0–1.3)
MONOCYTES NFR BLD AUTO: 9 %
NEUTROPHILS # BLD AUTO: 3.8 10E3/UL (ref 1.6–8.3)
NEUTROPHILS NFR BLD AUTO: 58 %
NRBC # BLD AUTO: 0 10E3/UL
NRBC BLD AUTO-RTO: 0 /100
PLATELET # BLD AUTO: 188 10E3/UL (ref 150–450)
POTASSIUM SERPL-SCNC: 4.2 MMOL/L (ref 3.4–5.3)
PROT SERPL-MCNC: 7.4 G/DL (ref 6.4–8.3)
RBC # BLD AUTO: 3.51 10E6/UL (ref 3.8–5.2)
SODIUM SERPL-SCNC: 137 MMOL/L (ref 135–145)
WBC # BLD AUTO: 6.6 10E3/UL (ref 4–11)

## 2025-05-18 PROCEDURE — 258N000003 HC RX IP 258 OP 636: Performed by: EMERGENCY MEDICINE

## 2025-05-18 PROCEDURE — 83690 ASSAY OF LIPASE: CPT | Performed by: EMERGENCY MEDICINE

## 2025-05-18 PROCEDURE — G0378 HOSPITAL OBSERVATION PER HR: HCPCS

## 2025-05-18 PROCEDURE — 99222 1ST HOSP IP/OBS MODERATE 55: CPT | Mod: AI | Performed by: OBSTETRICS & GYNECOLOGY

## 2025-05-18 PROCEDURE — 99285 EMERGENCY DEPT VISIT HI MDM: CPT | Mod: 25 | Performed by: EMERGENCY MEDICINE

## 2025-05-18 PROCEDURE — 74177 CT ABD & PELVIS W/CONTRAST: CPT | Mod: 26 | Performed by: STUDENT IN AN ORGANIZED HEALTH CARE EDUCATION/TRAINING PROGRAM

## 2025-05-18 PROCEDURE — 250N000013 HC RX MED GY IP 250 OP 250 PS 637: Performed by: OBSTETRICS & GYNECOLOGY

## 2025-05-18 PROCEDURE — 80053 COMPREHEN METABOLIC PANEL: CPT | Performed by: EMERGENCY MEDICINE

## 2025-05-18 PROCEDURE — 258N000003 HC RX IP 258 OP 636: Performed by: OBSTETRICS & GYNECOLOGY

## 2025-05-18 PROCEDURE — 250N000011 HC RX IP 250 OP 636: Performed by: EMERGENCY MEDICINE

## 2025-05-18 PROCEDURE — 250N000011 HC RX IP 250 OP 636: Mod: JZ | Performed by: EMERGENCY MEDICINE

## 2025-05-18 PROCEDURE — 96375 TX/PRO/DX INJ NEW DRUG ADDON: CPT | Performed by: EMERGENCY MEDICINE

## 2025-05-18 PROCEDURE — 85025 COMPLETE CBC W/AUTO DIFF WBC: CPT | Performed by: EMERGENCY MEDICINE

## 2025-05-18 PROCEDURE — 83605 ASSAY OF LACTIC ACID: CPT | Performed by: EMERGENCY MEDICINE

## 2025-05-18 PROCEDURE — 96376 TX/PRO/DX INJ SAME DRUG ADON: CPT | Performed by: EMERGENCY MEDICINE

## 2025-05-18 PROCEDURE — 36415 COLL VENOUS BLD VENIPUNCTURE: CPT | Performed by: EMERGENCY MEDICINE

## 2025-05-18 PROCEDURE — 99285 EMERGENCY DEPT VISIT HI MDM: CPT | Performed by: EMERGENCY MEDICINE

## 2025-05-18 PROCEDURE — 96365 THER/PROPH/DIAG IV INF INIT: CPT | Mod: 59 | Performed by: EMERGENCY MEDICINE

## 2025-05-18 PROCEDURE — 74177 CT ABD & PELVIS W/CONTRAST: CPT

## 2025-05-18 RX ORDER — DESVENLAFAXINE 50 MG/1
150 TABLET, FILM COATED, EXTENDED RELEASE ORAL DAILY
Status: DISCONTINUED | OUTPATIENT
Start: 2025-05-19 | End: 2025-05-28

## 2025-05-18 RX ORDER — SODIUM CHLORIDE 9 MG/ML
INJECTION, SOLUTION INTRAVENOUS CONTINUOUS
Status: DISCONTINUED | OUTPATIENT
Start: 2025-05-18 | End: 2025-05-22

## 2025-05-18 RX ORDER — HYDROMORPHONE HYDROCHLORIDE 1 MG/ML
0.5 INJECTION, SOLUTION INTRAMUSCULAR; INTRAVENOUS; SUBCUTANEOUS ONCE
Refills: 0 | Status: COMPLETED | OUTPATIENT
Start: 2025-05-18 | End: 2025-05-18

## 2025-05-18 RX ORDER — ONDANSETRON 8 MG/1
8 TABLET, FILM COATED ORAL EVERY 8 HOURS PRN
Status: DISCONTINUED | OUTPATIENT
Start: 2025-05-18 | End: 2025-05-28

## 2025-05-18 RX ORDER — DESVENLAFAXINE 50 MG/1
50 TABLET, FILM COATED, EXTENDED RELEASE ORAL DAILY
Status: DISCONTINUED | OUTPATIENT
Start: 2025-05-19 | End: 2025-05-18

## 2025-05-18 RX ORDER — TOPIRAMATE 50 MG/1
50 TABLET, FILM COATED ORAL 2 TIMES DAILY
Status: DISCONTINUED | OUTPATIENT
Start: 2025-05-18 | End: 2025-05-28

## 2025-05-18 RX ORDER — PANTOPRAZOLE SODIUM 40 MG/1
40 TABLET, DELAYED RELEASE ORAL 2 TIMES DAILY
Status: DISCONTINUED | OUTPATIENT
Start: 2025-05-19 | End: 2025-05-28

## 2025-05-18 RX ORDER — METHOCARBAMOL 500 MG/1
500 TABLET, FILM COATED ORAL 4 TIMES DAILY PRN
Status: DISCONTINUED | OUTPATIENT
Start: 2025-05-18 | End: 2025-05-28

## 2025-05-18 RX ORDER — BISACODYL 10 MG
10 SUPPOSITORY, RECTAL RECTAL DAILY PRN
Status: DISCONTINUED | OUTPATIENT
Start: 2025-05-18 | End: 2025-05-28

## 2025-05-18 RX ORDER — AMOXICILLIN 250 MG
1 CAPSULE ORAL 2 TIMES DAILY PRN
Status: DISCONTINUED | OUTPATIENT
Start: 2025-05-18 | End: 2025-05-28

## 2025-05-18 RX ORDER — LORAZEPAM 1 MG/1
1 TABLET ORAL AT BEDTIME
Status: DISCONTINUED | OUTPATIENT
Start: 2025-05-18 | End: 2025-05-22

## 2025-05-18 RX ORDER — LORAZEPAM 0.5 MG/1
1 TABLET ORAL EVERY MORNING
Status: DISCONTINUED | OUTPATIENT
Start: 2025-05-19 | End: 2025-05-28

## 2025-05-18 RX ORDER — HYDROMORPHONE HYDROCHLORIDE 2 MG/1
4-6 TABLET ORAL EVERY 4 HOURS PRN
Status: DISCONTINUED | OUTPATIENT
Start: 2025-05-18 | End: 2025-05-19

## 2025-05-18 RX ORDER — PANTOPRAZOLE SODIUM 40 MG/1
40 TABLET, DELAYED RELEASE ORAL EVERY MORNING
Status: DISCONTINUED | OUTPATIENT
Start: 2025-05-19 | End: 2025-05-18

## 2025-05-18 RX ORDER — CEFTRIAXONE 2 G/1
2 INJECTION, POWDER, FOR SOLUTION INTRAMUSCULAR; INTRAVENOUS ONCE
Status: COMPLETED | OUTPATIENT
Start: 2025-05-18 | End: 2025-05-18

## 2025-05-18 RX ORDER — GABAPENTIN 100 MG/1
200 CAPSULE ORAL AT BEDTIME
Status: DISCONTINUED | OUTPATIENT
Start: 2025-05-18 | End: 2025-05-19

## 2025-05-18 RX ORDER — TRAZODONE HYDROCHLORIDE 150 MG/1
300 TABLET ORAL
Status: DISCONTINUED | OUTPATIENT
Start: 2025-05-18 | End: 2025-05-25

## 2025-05-18 RX ORDER — BUPRENORPHINE 20 UG/H
1 PATCH TRANSDERMAL WEEKLY
Status: DISCONTINUED | OUTPATIENT
Start: 2025-05-25 | End: 2025-05-19

## 2025-05-18 RX ORDER — PROCHLORPERAZINE MALEATE 5 MG/1
10 TABLET ORAL EVERY 6 HOURS PRN
Status: DISCONTINUED | OUTPATIENT
Start: 2025-05-18 | End: 2025-05-28

## 2025-05-18 RX ORDER — METHYLPHENIDATE HYDROCHLORIDE 5 MG/1
15 TABLET ORAL EVERY MORNING
Status: DISCONTINUED | OUTPATIENT
Start: 2025-05-19 | End: 2025-05-28

## 2025-05-18 RX ORDER — BUPRENORPHINE 20 UG/H
1 PATCH TRANSDERMAL ONCE
Status: DISCONTINUED | OUTPATIENT
Start: 2025-05-18 | End: 2025-05-19

## 2025-05-18 RX ORDER — PROPRANOLOL HYDROCHLORIDE 10 MG/1
10 TABLET ORAL 2 TIMES DAILY
Status: DISCONTINUED | OUTPATIENT
Start: 2025-05-18 | End: 2025-05-28

## 2025-05-18 RX ORDER — DICYCLOMINE HYDROCHLORIDE 10 MG/1
10 CAPSULE ORAL 3 TIMES DAILY PRN
Status: DISCONTINUED | OUTPATIENT
Start: 2025-05-18 | End: 2025-05-28

## 2025-05-18 RX ORDER — AMOXICILLIN 250 MG
2 CAPSULE ORAL 2 TIMES DAILY PRN
Status: DISCONTINUED | OUTPATIENT
Start: 2025-05-18 | End: 2025-05-28

## 2025-05-18 RX ORDER — ACETAMINOPHEN 325 MG/1
650 TABLET ORAL EVERY 6 HOURS PRN
Status: DISCONTINUED | OUTPATIENT
Start: 2025-05-18 | End: 2025-05-20

## 2025-05-18 RX ORDER — BUSPIRONE HYDROCHLORIDE 15 MG/1
30 TABLET ORAL 2 TIMES DAILY
Status: DISCONTINUED | OUTPATIENT
Start: 2025-05-18 | End: 2025-05-28

## 2025-05-18 RX ORDER — PHENTERMINE HYDROCHLORIDE 37.5 MG/1
37.5 TABLET ORAL
Status: DISCONTINUED | OUTPATIENT
Start: 2025-05-19 | End: 2025-05-18

## 2025-05-18 RX ORDER — QUETIAPINE FUMARATE 200 MG/1
200 TABLET, FILM COATED ORAL AT BEDTIME
Status: DISCONTINUED | OUTPATIENT
Start: 2025-05-18 | End: 2025-05-22

## 2025-05-18 RX ORDER — ZOLPIDEM TARTRATE 5 MG/1
5 TABLET ORAL
Status: DISCONTINUED | OUTPATIENT
Start: 2025-05-18 | End: 2025-05-22

## 2025-05-18 RX ORDER — IOPAMIDOL 755 MG/ML
81 INJECTION, SOLUTION INTRAVASCULAR ONCE
Status: COMPLETED | OUTPATIENT
Start: 2025-05-18 | End: 2025-05-18

## 2025-05-18 RX ORDER — RAMELTEON 8 MG/1
8 TABLET ORAL AT BEDTIME
Status: DISCONTINUED | OUTPATIENT
Start: 2025-05-18 | End: 2025-05-22

## 2025-05-18 RX ORDER — SIMETHICONE 80 MG
80 TABLET,CHEWABLE ORAL EVERY 6 HOURS PRN
Status: DISCONTINUED | OUTPATIENT
Start: 2025-05-18 | End: 2025-05-28

## 2025-05-18 RX ORDER — METHYLPHENIDATE HYDROCHLORIDE 5 MG/1
10 TABLET ORAL
Status: DISCONTINUED | OUTPATIENT
Start: 2025-05-19 | End: 2025-05-28

## 2025-05-18 RX ADMIN — SODIUM CHLORIDE: 0.9 INJECTION, SOLUTION INTRAVENOUS at 23:45

## 2025-05-18 RX ADMIN — HYDROMORPHONE HYDROCHLORIDE 0.5 MG: 1 INJECTION, SOLUTION INTRAMUSCULAR; INTRAVENOUS; SUBCUTANEOUS at 18:49

## 2025-05-18 RX ADMIN — HYDROMORPHONE HYDROCHLORIDE 1 MG: 1 INJECTION, SOLUTION INTRAMUSCULAR; INTRAVENOUS; SUBCUTANEOUS at 21:17

## 2025-05-18 RX ADMIN — CEFTRIAXONE SODIUM 2 G: 2 INJECTION, POWDER, FOR SOLUTION INTRAMUSCULAR; INTRAVENOUS at 21:14

## 2025-05-18 RX ADMIN — LORAZEPAM 1 MG: 1 TABLET ORAL at 23:47

## 2025-05-18 RX ADMIN — SODIUM CHLORIDE 500 ML: 0.9 INJECTION, SOLUTION INTRAVENOUS at 18:49

## 2025-05-18 RX ADMIN — HYDROMORPHONE HYDROCHLORIDE 1 MG: 1 INJECTION, SOLUTION INTRAMUSCULAR; INTRAVENOUS; SUBCUTANEOUS at 20:03

## 2025-05-18 RX ADMIN — IOPAMIDOL 81 ML: 755 INJECTION, SOLUTION INTRAVENOUS at 19:36

## 2025-05-18 ASSESSMENT — ACTIVITIES OF DAILY LIVING (ADL)
ADLS_ACUITY_SCORE: 57
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
## 2025-05-18 NOTE — ED TRIAGE NOTES
Patient report abdominal / stoma site pain, started 3 days ago. Unable to eat due to abdominal pain. Denied nausea vomiting, diarrhea. History of ovarian cancer. Very emotional and tearful. Took off her stoma appliance due to pain     Triage Assessment (Adult)       Row Name 05/18/25 1716          Triage Assessment    Airway WDL WDL        Respiratory WDL    Respiratory WDL WDL        Skin Circulation/Temperature WDL    Skin Circulation/Temperature WDL X   redness at stoma site        Cardiac WDL    Cardiac WDL WDL        Peripheral/Neurovascular WDL    Capillary Refill, General less than/equal to 3 secs        Turner Coma Scale    Best Eye Response 4-->(E4) spontaneous     Best Motor Response 6-->(M6) obeys commands     Best Verbal Response 5-->(V5) oriented     Leia Coma Scale Score 15

## 2025-05-19 ENCOUNTER — TELEPHONE (OUTPATIENT)
Dept: SURGERY | Facility: CLINIC | Age: 40
End: 2025-05-19
Payer: COMMERCIAL

## 2025-05-19 LAB
ALBUMIN UR-MCNC: 30 MG/DL
ANION GAP SERPL CALCULATED.3IONS-SCNC: 10 MMOL/L (ref 7–15)
APPEARANCE UR: ABNORMAL
BILIRUB UR QL STRIP: NEGATIVE
BUN SERPL-MCNC: 10.5 MG/DL (ref 6–20)
CALCIUM SERPL-MCNC: 9.1 MG/DL (ref 8.8–10.4)
CHLORIDE SERPL-SCNC: 105 MMOL/L (ref 98–107)
COLOR UR AUTO: ABNORMAL
CREAT SERPL-MCNC: 0.87 MG/DL (ref 0.51–0.95)
EGFRCR SERPLBLD CKD-EPI 2021: 87 ML/MIN/1.73M2
GLUCOSE SERPL-MCNC: 89 MG/DL (ref 70–99)
GLUCOSE UR STRIP-MCNC: NEGATIVE MG/DL
HCO3 SERPL-SCNC: 22 MMOL/L (ref 22–29)
HGB UR QL STRIP: ABNORMAL
KETONES UR STRIP-MCNC: NEGATIVE MG/DL
LEUKOCYTE ESTERASE UR QL STRIP: ABNORMAL
MUCOUS THREADS #/AREA URNS LPF: PRESENT /LPF
NITRATE UR QL: POSITIVE
PH UR STRIP: 5.5 [PH] (ref 5–7)
POTASSIUM SERPL-SCNC: 3.4 MMOL/L (ref 3.4–5.3)
RBC URINE: 111 /HPF
SODIUM SERPL-SCNC: 137 MMOL/L (ref 135–145)
SP GR UR STRIP: 1.01 (ref 1–1.03)
UROBILINOGEN UR STRIP-MCNC: NORMAL MG/DL
WBC CLUMPS #/AREA URNS HPF: PRESENT /HPF
WBC URINE: >182 /HPF

## 2025-05-19 PROCEDURE — G0378 HOSPITAL OBSERVATION PER HR: HCPCS

## 2025-05-19 PROCEDURE — G0463 HOSPITAL OUTPT CLINIC VISIT: HCPCS

## 2025-05-19 PROCEDURE — 96361 HYDRATE IV INFUSION ADD-ON: CPT

## 2025-05-19 PROCEDURE — 80048 BASIC METABOLIC PNL TOTAL CA: CPT | Performed by: OBSTETRICS & GYNECOLOGY

## 2025-05-19 PROCEDURE — 99244 OFF/OP CNSLTJ NEW/EST MOD 40: CPT

## 2025-05-19 PROCEDURE — 250N000013 HC RX MED GY IP 250 OP 250 PS 637: Performed by: NURSE PRACTITIONER

## 2025-05-19 PROCEDURE — 250N000013 HC RX MED GY IP 250 OP 250 PS 637

## 2025-05-19 PROCEDURE — 96376 TX/PRO/DX INJ SAME DRUG ADON: CPT

## 2025-05-19 PROCEDURE — 250N000011 HC RX IP 250 OP 636: Mod: JZ

## 2025-05-19 PROCEDURE — 80051 ELECTROLYTE PANEL: CPT | Performed by: OBSTETRICS & GYNECOLOGY

## 2025-05-19 PROCEDURE — 36415 COLL VENOUS BLD VENIPUNCTURE: CPT | Performed by: OBSTETRICS & GYNECOLOGY

## 2025-05-19 PROCEDURE — 250N000013 HC RX MED GY IP 250 OP 250 PS 637: Performed by: OBSTETRICS & GYNECOLOGY

## 2025-05-19 PROCEDURE — 99231 SBSQ HOSP IP/OBS SF/LOW 25: CPT | Mod: GC | Performed by: OBSTETRICS & GYNECOLOGY

## 2025-05-19 PROCEDURE — 250N000011 HC RX IP 250 OP 636: Performed by: NURSE PRACTITIONER

## 2025-05-19 PROCEDURE — 87186 SC STD MICRODIL/AGAR DIL: CPT | Performed by: EMERGENCY MEDICINE

## 2025-05-19 PROCEDURE — 999N000127 HC STATISTIC PERIPHERAL IV START W US GUIDANCE

## 2025-05-19 PROCEDURE — 258N000003 HC RX IP 258 OP 636: Performed by: OBSTETRICS & GYNECOLOGY

## 2025-05-19 PROCEDURE — 87086 URINE CULTURE/COLONY COUNT: CPT | Performed by: EMERGENCY MEDICINE

## 2025-05-19 PROCEDURE — 81003 URINALYSIS AUTO W/O SCOPE: CPT | Performed by: EMERGENCY MEDICINE

## 2025-05-19 RX ORDER — NALOXONE HYDROCHLORIDE 0.4 MG/ML
0.2 INJECTION, SOLUTION INTRAMUSCULAR; INTRAVENOUS; SUBCUTANEOUS
Status: DISCONTINUED | OUTPATIENT
Start: 2025-05-19 | End: 2025-05-28

## 2025-05-19 RX ORDER — HYDROMORPHONE HYDROCHLORIDE 1 MG/ML
0.5 INJECTION, SOLUTION INTRAMUSCULAR; INTRAVENOUS; SUBCUTANEOUS ONCE
Status: COMPLETED | OUTPATIENT
Start: 2025-05-19 | End: 2025-05-19

## 2025-05-19 RX ORDER — HYDROMORPHONE HYDROCHLORIDE 2 MG/1
6 TABLET ORAL EVERY 4 HOURS PRN
Refills: 0 | Status: DISCONTINUED | OUTPATIENT
Start: 2025-05-19 | End: 2025-05-28

## 2025-05-19 RX ORDER — NALOXONE HYDROCHLORIDE 0.4 MG/ML
0.4 INJECTION, SOLUTION INTRAMUSCULAR; INTRAVENOUS; SUBCUTANEOUS
Status: DISCONTINUED | OUTPATIENT
Start: 2025-05-19 | End: 2025-05-28

## 2025-05-19 RX ORDER — ACETAMINOPHEN 325 MG/1
975 TABLET ORAL 3 TIMES DAILY
Status: DISCONTINUED | OUTPATIENT
Start: 2025-05-19 | End: 2025-05-22

## 2025-05-19 RX ORDER — PHENTERMINE HYDROCHLORIDE 37.5 MG/1
37.5 TABLET ORAL
Status: ON HOLD | COMMUNITY
Start: 2024-08-30 | End: 2025-06-06

## 2025-05-19 RX ORDER — HYDROMORPHONE HYDROCHLORIDE 2 MG/1
8 TABLET ORAL EVERY 4 HOURS PRN
Refills: 0 | Status: DISCONTINUED | OUTPATIENT
Start: 2025-05-19 | End: 2025-05-28

## 2025-05-19 RX ORDER — LORAZEPAM 1 MG/1
1 TABLET ORAL 2 TIMES DAILY
COMMUNITY
Start: 2024-05-05

## 2025-05-19 RX ORDER — LORAZEPAM 1 MG/1
1 TABLET ORAL ONCE
Status: COMPLETED | OUTPATIENT
Start: 2025-05-19 | End: 2025-05-19

## 2025-05-19 RX ORDER — GABAPENTIN 300 MG/1
300 CAPSULE ORAL AT BEDTIME
Status: DISCONTINUED | OUTPATIENT
Start: 2025-05-19 | End: 2025-05-22

## 2025-05-19 RX ORDER — CETIRIZINE HYDROCHLORIDE 5 MG/1
10 TABLET ORAL DAILY PRN
Status: DISCONTINUED | OUTPATIENT
Start: 2025-05-19 | End: 2025-05-28

## 2025-05-19 RX ORDER — CEFTRIAXONE 1 G/1
1 INJECTION, POWDER, FOR SOLUTION INTRAMUSCULAR; INTRAVENOUS EVERY 24 HOURS
Status: DISCONTINUED | OUTPATIENT
Start: 2025-05-19 | End: 2025-05-22

## 2025-05-19 RX ORDER — LIDOCAINE 4 G/G
2 PATCH TOPICAL
Status: DISCONTINUED | OUTPATIENT
Start: 2025-05-19 | End: 2025-05-28

## 2025-05-19 RX ORDER — BUPRENORPHINE 20 UG/H
1 PATCH TRANSDERMAL WEEKLY
Status: DISCONTINUED | OUTPATIENT
Start: 2025-05-19 | End: 2025-05-20

## 2025-05-19 RX ADMIN — RAMELTEON 8 MG: 8 TABLET, FILM COATED ORAL at 21:21

## 2025-05-19 RX ADMIN — DESVENLAFAXINE 150 MG: 50 TABLET, FILM COATED, EXTENDED RELEASE ORAL at 21:17

## 2025-05-19 RX ADMIN — ACETAMINOPHEN 650 MG: 325 TABLET ORAL at 14:25

## 2025-05-19 RX ADMIN — HYDROMORPHONE HYDROCHLORIDE 6 MG: 2 TABLET ORAL at 14:51

## 2025-05-19 RX ADMIN — HYDROMORPHONE HYDROCHLORIDE 8 MG: 4 TABLET ORAL at 20:49

## 2025-05-19 RX ADMIN — BUSPIRONE HYDROCHLORIDE 30 MG: 30 TABLET ORAL at 21:18

## 2025-05-19 RX ADMIN — ZOLPIDEM TARTRATE 5 MG: 5 TABLET, FILM COATED ORAL at 02:11

## 2025-05-19 RX ADMIN — PANTOPRAZOLE SODIUM 40 MG: 40 TABLET, DELAYED RELEASE ORAL at 08:26

## 2025-05-19 RX ADMIN — LORAZEPAM 1 MG: 1 TABLET ORAL at 14:25

## 2025-05-19 RX ADMIN — HYDROMORPHONE HYDROCHLORIDE 6 MG: 2 TABLET ORAL at 10:29

## 2025-05-19 RX ADMIN — APIXABAN 5 MG: 5 TABLET, FILM COATED ORAL at 08:25

## 2025-05-19 RX ADMIN — ACETAMINOPHEN 975 MG: 325 TABLET ORAL at 20:48

## 2025-05-19 RX ADMIN — ACETAMINOPHEN 650 MG: 325 TABLET ORAL at 10:29

## 2025-05-19 RX ADMIN — BUSPIRONE HYDROCHLORIDE 30 MG: 30 TABLET ORAL at 08:26

## 2025-05-19 RX ADMIN — LORAZEPAM 1 MG: 1 TABLET ORAL at 08:25

## 2025-05-19 RX ADMIN — METHYLPHENIDATE HYDROCHLORIDE 15 MG: 5 TABLET ORAL at 08:26

## 2025-05-19 RX ADMIN — APIXABAN 5 MG: 5 TABLET, FILM COATED ORAL at 21:18

## 2025-05-19 RX ADMIN — BUSPIRONE HYDROCHLORIDE 30 MG: 30 TABLET ORAL at 01:25

## 2025-05-19 RX ADMIN — TRAZODONE HYDROCHLORIDE 300 MG: 150 TABLET ORAL at 21:27

## 2025-05-19 RX ADMIN — CEFTRIAXONE SODIUM 1 G: 1 INJECTION, POWDER, FOR SOLUTION INTRAMUSCULAR; INTRAVENOUS at 21:22

## 2025-05-19 RX ADMIN — APIXABAN 5 MG: 5 TABLET, FILM COATED ORAL at 01:21

## 2025-05-19 RX ADMIN — BUPRENORPHINE 1 PATCH: 20 PATCH, EXTENDED RELEASE TRANSDERMAL at 01:26

## 2025-05-19 RX ADMIN — PROPRANOLOL HYDROCHLORIDE 10 MG: 10 TABLET ORAL at 21:18

## 2025-05-19 RX ADMIN — TOPIRAMATE 50 MG: 50 TABLET, FILM COATED ORAL at 21:18

## 2025-05-19 RX ADMIN — ZOLPIDEM TARTRATE 5 MG: 5 TABLET, FILM COATED ORAL at 21:27

## 2025-05-19 RX ADMIN — MICONAZOLE NITRATE: 2 POWDER TOPICAL at 01:27

## 2025-05-19 RX ADMIN — GABAPENTIN 300 MG: 300 CAPSULE ORAL at 21:16

## 2025-05-19 RX ADMIN — TOPIRAMATE 50 MG: 50 TABLET, FILM COATED ORAL at 08:26

## 2025-05-19 RX ADMIN — SODIUM CHLORIDE: 0.9 INJECTION, SOLUTION INTRAVENOUS at 13:15

## 2025-05-19 RX ADMIN — METHYLPHENIDATE HYDROCHLORIDE 10 MG: 5 TABLET ORAL at 13:06

## 2025-05-19 RX ADMIN — TOPIRAMATE 50 MG: 50 TABLET, FILM COATED ORAL at 01:20

## 2025-05-19 RX ADMIN — LIDOCAINE 4% 2 PATCH: 40 PATCH TOPICAL at 20:52

## 2025-05-19 RX ADMIN — QUETIAPINE FUMARATE 200 MG: 200 TABLET ORAL at 01:21

## 2025-05-19 RX ADMIN — PANTOPRAZOLE SODIUM 40 MG: 40 TABLET, DELAYED RELEASE ORAL at 21:18

## 2025-05-19 RX ADMIN — LORAZEPAM 1 MG: 1 TABLET ORAL at 21:16

## 2025-05-19 RX ADMIN — GABAPENTIN 200 MG: 100 CAPSULE ORAL at 01:22

## 2025-05-19 RX ADMIN — PROPRANOLOL HYDROCHLORIDE 10 MG: 10 TABLET ORAL at 01:23

## 2025-05-19 RX ADMIN — HYDROMORPHONE HYDROCHLORIDE 0.5 MG: 1 INJECTION, SOLUTION INTRAMUSCULAR; INTRAVENOUS; SUBCUTANEOUS at 16:27

## 2025-05-19 RX ADMIN — RAMELTEON 8 MG: 8 TABLET, FILM COATED ORAL at 01:24

## 2025-05-19 RX ADMIN — QUETIAPINE FUMARATE 200 MG: 200 TABLET ORAL at 21:16

## 2025-05-19 ASSESSMENT — ACTIVITIES OF DAILY LIVING (ADL)
ADLS_ACUITY_SCORE: 57
ADLS_ACUITY_SCORE: 45
ADLS_ACUITY_SCORE: 46
ADLS_ACUITY_SCORE: 45
ADLS_ACUITY_SCORE: 46
ADLS_ACUITY_SCORE: 44
ADLS_ACUITY_SCORE: 45
ADLS_ACUITY_SCORE: 46
ADLS_ACUITY_SCORE: 45
ADLS_ACUITY_SCORE: 46
ADLS_ACUITY_SCORE: 45
ADLS_ACUITY_SCORE: 46
ADLS_ACUITY_SCORE: 46
ADLS_ACUITY_SCORE: 42
ADLS_ACUITY_SCORE: 44
ADLS_ACUITY_SCORE: 57
ADLS_ACUITY_SCORE: 46

## 2025-05-19 NOTE — CONSULTS
Palliative Care Consultation Note  [facility]    Patient: Marizol Granados  Date of Admission:  5/18/2025    Requesting Clinician / Team: Gyn onc  Reason for consult: Pain management, support      Recommendations & Counseling     MEDICAL MANAGEMENT:   #Pain,acute on chronic   Hx rectovaginal fistula, ileostomy. Increased pain around ileostomy in the past week, suspected peristomal skin infection. Stoma is where she reports severe pain to me today. WOC consulted and provided a smaller bag today as her ostomy has decreased and the bag she was using previously likely too large. Patient states after bag replacement, pain seemed to improve. However she tried to eat a meal and 30 minutes after, had drainage into her ostomy with severe pain in the ostomy as well as the skin surrounding it.   Increase Hydromorphone (Dilaudid) 4-6 mg --> 6-8 mg q4h prn (done for you)  Continue butrans patch 20 mcg/hr q7d   Start Acetaminophen (Tylenol) 975mg TID scheduled (done for you)  Will discuss plan of care further with patient's outpatient provider and re-assess tomorrow.   Other ideas we discussed today:   Worried that her pain is not responsive to opioids, stoma pain seems more inflammatory.   Switching from butrans to belbuca - to allow higher doses of buprenorphrine as the patch increase has not seemed to help. Patient hesitant about use of sublingual films, worried it will cause nausea. Declined trying these today.   We also discussed trial of NSAIDs if okay with her gyn-onc team as I feel this would be most helpful if inflammatory. Patient has concerns taking NSAIDs given her GI history, has always been told to avoid these. Thus we will hold off at this time.   Discussed trial of toradol IV to assess response to NSAIDs. Celebrex is a safer NSAID that could also be used - would do 100 mg BID.   The other plan I considered and discussed with patient was rotating to a full opioid  agonist, such as MS contin. However given significant polypharmacy/risk for over-sedation and worries that this pain is not opioid responsive, hesitant to do this.      #Anxiety  #Insomnia   #Risk for polypharmacy  Anxiety managed outpatient by psychiatry.   Increased gabapentin bedtime dose from 200 mg --> 300 mg (done for you)  Continue buspar 30mg BID  Continue Pristiq 150 mg daily  Continue ativan 1mg PO BID scheduled  Propanolol 10mg BID  Seroquel 200mg bedtime  Ramelteon 8mg bedtime  Topamax 50mg BID  Trazadone 300 mg prn bedtime  Ambien 5mg prn bedtime  Will have PCSW visit patient tomorrow to assist with adjustment to illness, coping, anxiety.     #Fatigue  Using Ritalin PTA, started this in March for fatigue. Increased dose in April. Discussed today with patient my concerns it could be making her anxiety worse. Patient states she would like to keep it for now, has not felt it has made her anxiety worse. Unsure if it has helped her fatigue.   Continue ritalin 15mg AM, 10MG afternoon. Consider de-prescribing if not helping    Palliative care will continue to follow patient. If any urgent needs arise please reach out to our team via [facility].     These recommendations have been discussed with gyn-onc Carmella, bedside RN.    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 Most recent imaging in March 2025 showed no evidence of disease. She is being followed by CRS for rectovaginal fistula and coordinating surgery for takedown of loop ileostomy and creation of end colostomy. Follows palliative care outpatient for pain. Our team was consulted to help with pain and anxiety.     Today, the patient was  seen for:  Palliative care encounter  Support  Pain, acute on chronic  Anxiety     History of Present Illness   Met with patient at bedside.   Introduced the role of palliative care as an interdisciplinary team that cares for patients with serious illness to help support symptom management, communication, coping for patients and their families as well as support with medical decision making.    Coping, Meaning, & Spirituality:   Mood, coping, and/or meaning in the context of serious illness were addressed today: Yes    Medications:  Reviewed this patient's medication profile and medications from this hospitalization.     Notable medications:   APAP 650mg prn x2  Eliquis  Butrans 20 mcg/hr patch   Buspar 30mg BID  Pristiq 150mg daily  Gabapentin 200mg bedtime  Dilaudid 1mg IV q1h prn x2, 0.5mg x1 (last dose last evening)  Dilaudud 4-6mg PO prn --6mg x2   Ativan 1mg prn x1 (2pm)  Ativan 1mg PO BID scheduled   Ritalin 15mg +10mg  Protonix 40mg BID  Propanolol 10mg BID  Seroquel 200mg bedtime  Ramelteon 8mg bedtime  Topamax 50mg BID  Ambien 5mg bedtime    [place] Board of Pharmacy Data Base Reviewed: Yes:   reviewed - controlled substances prescribed by other outside provider(s)..multiple controlled substances prescribed from different providers outpatient.     Besides above, ROS was reviewed and is unremarkable     Physical Exam   Vital Signs with Ranges  Temp:  [97.5  F (36.4  C)-98.3  F (36.8  C)] 98.2  F (36.8  C)  Pulse:  [52-74] 70  Resp:  [16-18] 16  BP: ()/(57-78) 111/71  SpO2:  [97 %-99 %] 98 %  Wt Readings from Last 10 Encounters:   05/19/25 61.3 kg (135 lb 2.3 oz)   05/09/25 64.5 kg (142 lb 1.6 oz)   05/01/25 64.9 kg (143 lb)   04/24/25 63.5 kg (140 lb)   04/22/25 65.7 kg (144 lb 13.5 oz)   04/16/25 65.4 kg (144 lb 3.2 oz)   04/10/25 67.1 kg (148 lb)   04/04/25 67.6 kg (149 lb)   03/20/25 60.3 kg (133 lb)   02/14/25 62.3 kg (137 lb 6.4 oz)     135 lbs 2.27 oz  General: NAD, lying in bed.     Pulmonary: Unlabored. Speaking in full sentences.  Neuro: Speech fluent. Alert and oriented x4.  Mental status/Psych: Asks/answers questions appropriately. Tearful throughout visit.     Data reviewed:    Lab data independently reviewed today. Pertinent findings from my perspective are noted below:  GFR 87, creatinine 0.87  Electrolytes WDL  WBC 6.6, hgb 11.1, plt 188    Imaging independently reviewed today. Pertinent findings from my perspective are noted below:  CT abd/pelvis 5/18/2025 showing concerns for cystitis.     Medical Decision Making       MANAGEMENT DISCUSSED with the following over the past 24 hours: gyn-onc, bedside RN   NOTE(S)/MEDICAL RECORDS REVIEWED over the past 24 hours: nursing, onc, outpatient palliative  Tests REVIEWED in the past 24 hours:  - See lab/imaging results included in the data section of the note  Medical complexity over the past 24 hours:  - Intensive monitoring for MEDICATION TOXICITY  - Prescription DRUG MANAGEMENT performed      Chart documentation was completed using Dragon voice-recognition software. Even though reviewed, some grammatical, spelling, and word errors may remain.

## 2025-05-19 NOTE — CONSULTS
"[facility]  WO Nurse Inpatient Assessment     Consulted for: ileostomy    WOC nurse follow-up plan: daily    Patient History (according to provider note(s):      Marizol Granados is a 40 year old female with stage IIIB low grade peritoneal carcinoma admitted to observation for suspected peristomal skin infection.     Assessment:      Areas visualized during today's visit: Focused: and Abdomen    Assessment of Established loop Ileostomy:  How long has patient had ostomy: 9/2024  Stoma: pink-red, round, moist, good turgor, and flat  Mucutaneous junction: intact  Peristomal skin: Moisture-Associated Skin Damage (MASD) due to leakage   Output: liquid      Is patient independent with ostomy care?: Yes  Home pouching system: Coloplast 1 piece deep convex pre-cut with barrier ring  Pouching issues and/or educational needs:Evaluate leakage issue, Adjustment of pouching plan, and Peristomal skin care  Interventions completed today: Pouching system assessment , Evaluate leakage issue, Refitting of appliance , Adjustment of pouching plan, and Peristomal skin care  Pouching system in use while hospitalized:  [place] one piece, cut to fit, convex, barrier ring, ostomy powder, and skin prep  Supplies location: at bedside        Pt currently wears a 1 piece pre-cut pouch. The hole in the wafer is too large for Pt's stoma and she is having skin breakdown and leakage. This is very distressing for the Pt. Switched to a cut to fit 1 piece pouch with Marie barrier ring, stoma powder and Cavilon advanced.    Treatment Plan:     RLQ Ileostomy pouching plan:   Pouching system: ostomy supplies pouches: Jose 38 Cera Plus Soft Convex FECAL (363977)   Accessories used: [facility] ostomy accessories: 2\" Marie Ring (550491), Powder (638221), and Cavilon no sting barrier film (838116)   Frequency of pouch changes: PRN leakage and Three times a week  WO follow up plan: Daily Monday-Friday (as " able)  Bedside RN interventions: Change pouch PRN if leaking using the supplies above, Empty pouch when 1/3 to 1/2 full, ensure to clean pouch outlet after emptying to prevent odor, Notify WOC for ongoing pouch leakage, Document stoma appearance and output volume, color, and consistency every shift, and Encourage patient to empty pouch with assist     Orders: Written    RECOMMEND PRIMARY TEAM ORDER: None, at this time  Education provided: plan of care  Discussed plan of care with: Patient and Nurse Practitioner  Notify WOC if wound(s) deteriorate.  Nursing to notify the Provider(s) and re-consult the WOC Nurse if new skin concern.    DATA:     Current support surface: Standard  Standard gel mattress (Isoflex)  Containment of urine/stool: Incontinent pad in bed and Ileostomy pouch  BMI: Body mass index is 22.49 kg/m .   Active diet order: Orders Placed This Encounter      Regular Diet Adult     Output: I/O last 3 completed shifts:  In: -   Out: 300 [Urine:300]     Labs:   Recent Labs   Lab 05/18/25  1840   ALBUMIN 4.1   HGB 11.1*   WBC 6.6     Pressure injury risk assessment:   Sensory Perception: 4-->no impairment  Moisture: 4-->rarely moist  Activity: 4-->walks frequently  Mobility: 4-->no limitation  Nutrition: 2-->probably inadequate  Friction and Shear: 3-->no apparent problem  Terry Score: 21    Jalen Hart, RN, CWOCN  Pager no longer in use, please contact through Event[employer] group: [facility] Nurse [place]    Dept. Office Number: [phone]        [facility]     Name: Marizol Granados  Date: 5/19/25    To order your ostomy supplies    The ostomy Supplier needs this supply list  to process your order. You will need to fax/deliver this list, along with your Insurance information. Your home care nurse can assist with this process.    List of Ostomy Distributors      [facility]  Ph. [phone] Fax # [phone]  [facility]   Ph. [phone] ext-  [phone]   [facility]   Ph. [phone]  [facility]   Ph. [phone]  Or Call your insurance provider for their preferred supplier    Your Medical Supplier will need your surgeon's name, phone and fax number    Clinic:                     Phone                            Fax  Gynecology Oncology:             [phone]      Verbal Order for ostomy supplies for 1 Month per:              Jalen Hart RN, CWOCN                                                                  Authorizing MD: Dr. Destiny Rubio    Quantity of pouches:     20/mo.    Request the following supplies:      Jose                              1 piece soft convex with filter #1358    OR                    Coloplast      1 piece deep convex pouch cut to fit 1-5/16  with filter #[id number]          Accessories       2  Marie ring #[id number]    Adapt powder #7906    No sting film barrier # 3231                                     Change your pouch 2 times a week, more often if leaking.   If you are cutting a hole in the wafer of your pouch, recheck stoma size and adjust pouch opening as needed every week    . Call the Ostomy Nurse at [facility]       [address] [place] [address], [place] : [phone]   Schedule a follow-up visit in 2 to 4 weeks after your surgery, sooner if having problems Bring a complete set of pouch-changing supplies to this visit

## 2025-05-19 NOTE — DISCHARGE INSTRUCTIONS
[facility]     Name: Marizol Granados  Date: 5/19/25    To order your ostomy supplies    The ostomy Supplier needs this supply list  to process your order. You will need to fax/deliver this list, along with your Insurance information. Your home care nurse can assist with this process.    List of Ostomy Distributors      [facility]  Ph. [phone] [phone] Fax # [phone]  [facility]   Ph. [phone]  [facility]   Ph. [phone]  [facility]   Ph. [phone] [phone]  Or Call your insurance provider for their preferred supplier    Your Medical Supplier will need your surgeon's name, phone and fax number    Clinic:                     Phone                            Fax  Gynecology Oncology:             [phone]      Verbal Order for ostomy supplies for 1 Month per:              Jalen Hart RN, CWOCN                                                                  Authorizing MD: Dr. Destiny Rubio    Quantity of pouches:     20/mo.    Request the following supplies:      Jose                              1 piece soft convex with filter #2958    OR                    Coloplast      1 piece deep convex pouch cut to fit 1-5/16  with filter #[id number]          Accessories       2  Marie ring #[id number]    Adapt powder #7906    No sting film barrier # 2368                                     Change your pouch 2 times a week, more often if leaking.   If you are cutting a hole in the wafer of your pouch, recheck stoma size and adjust pouch opening as needed every week    . Call the Ostomy Nurse at [facility]       [address] [place] [address] : [phone]   Schedule a follow-up visit in 2 to 4 weeks after your surgery, sooner if having problems Bring a complete set of pouch-changing supplies to this visit                 [phone]  [email or web address]       General Flash: (one time only)      [employer] [place] can be used for rent/mortgage payment, home/cell phone bill, gas, electric or water bill, cub food gift cards, gas gift cards.      To apply, you need to create a portal for yourself. If you do not have an email address/need help with the portal, please contact [facility]'s Savannah [email or web address], or oncology social work.            Road to Recovery, free rides to cancer related medical appointments: It can take several business days to coordinate your ride, so please call us at [phone] well in advance of your appointment date.           Financial Cancer Care: Financial Planning Program through [facility] helps find assistance for managing expenses due to treatment.  With no obligation to participate you can attend an event to hear about the program either in person or over the phone. By going to listen to the program you can receive a $250 gift card for food or gas. If you would like to take advantage of the program, you will receive 2-3 financial advisement meetings for free.                   Cancer Care: [phone] https://www.cancercare.org/financial_assistance      Cancer care has a general flash, a co pay assistance program and a pet assistance program. Call to to apply.                  The [place] [employer]O.  currently accepting applications until Nov 29th      https://www.[facility] Hospitaleo.org/[place]-peo-home-fund.html      One time flash assist with cost of living expenses like rent, utilities.                  [facility] https://www.cyl41-szxhzqhaencbii.org/qqmrksv-e-jgqkh.html      One time flash assist with cost of living expenses like rent, utilities.                From Leukemia and Lymphoma society --     -The Medical Debt Case Management Program provides one-on-one, in-depth personalized support to empower patients to address their medical debt:  "[phone]     - https://www.lls.org/support-resources/financial-support/av-yrp-wxvgqozgsl-program to find current open grants     - https://www.lls.org/support-resources/financial-support/rgxop-njvqlxomb-lciuotdpqf-program-lfa to find current open local grants                     Cancer Caregiver Resources:      To Connect:       [facility] Caregiver Support Group: https://[facility]/treatments/social-work-services-for-cancer      We recognize that family members, friends and caregivers of our patients need support throughout the cancer journey. This group is an opportunity to share experiences, connect with others and take time for yourself.     WHEN: Our caregiver support group meets virtually on the second Tuesday of the month from 11am-12pm.     Please contact Rosie Gerard [email or web address] or call her at [phone] to receive the Microsoft Teams link to join the meeting.             [facility] [facility]ivers: https://www.margretaregiverco.org/      Local non-profit whose mission is to support male caregivers. Offerings include: podcast, Caregiver Klatch, Kubl-sl-Azmx,  Help! I'm a Caregiver!?  program for new caregivers, [facility] only- virtual community, Care Navigation,  to assist caregivers in need, full & half day Caregiver Conference and multiple offerings for Sequel Makers (people living with loss).            [employer]kha's Club: www.gildasclubtwincities.org      [employer] [facility] is a 501(c)3 nonprofit and the local affiliate of the Cancer Support Community, a network of more than 54 supportive, free and welcoming \"clubhouses\" where everyone living with cancer can come for social, emotional and psychological support. Clubs are healing environments where individuals learn from each other with guidance from licensed professionals.           The Negative Space: https://www.thenegaSocialMaticapace.life/hua/      A local 501(c)3 organization started by a caregiver to better " support people on the caregiving journey. Offerings include: podcast, online support community, caregiver gifts, 1:1 support, writings on caregiving             [facility]: https://[facility].org/services/caregiver-support/      [facility] strives to help people age independently. This organization has several offerings for Caregiver Support including in-person/virtual support groups, caregiver coaching, caregiver consultation, family meeting facilitation. [place] and [facility] served.            To Read:     Stand By Me: A Guide to Navigating Modern, Meaningful Caregiving.           American Cancer Society's Caregiver Resource Guide: https://www.cancer.org/content/dam/cancer-org/cancer-control/en/booklets-flyers/american-cancer-society-caregiver-resource-guide.pdf            To Listen:      In Sickness: A Podcast About Caregiving https://insickness.care/            Real Men: Podcast Featuring Jose Polo from [employer] https://podcasts.Tinkercad/us/podcast/real-men/xh2375916393            Additional Systems of Support:     [facility]: https://Eclector.Beintoo/caregivers/      Emotional and practical support for family caregivers. This jarad allows you to create a  team  of support from your phone contacts and supports easy communication around needs (meals, rides, respite care, childcare, pet care, house errands etc). Many caregivers find asking for support can be the hardest part, and this removes the barrier of asking directly and allows those in your community to sign up for needs you express.            Meal [place]: https://www.mealQSecurein.Beintoo      This website allows your community to easily coordinate meal delivery to your home. One can easily select dates, times, meal preferences, and specify delivery instructions (ex: A cooler will be out on our porch to receive meal, at this time we are not accepting visitors but appreciate the meal support more than  you know).            Angry Citizen: https://www.caringGlooko.org/      Non-profit that allows you to readily communicate with your community of support online in a free and protected way. This platform allows you to update many people at a single time, and get the support that you need.

## 2025-05-19 NOTE — PROGRESS NOTES
"Observation Goals:     vital signs normal or at patient baseline. Met /64 (BP Location: Left arm)   Pulse 62   Temp 97.9  F (36.6  C) (Oral)   Resp 16   Ht 1.651 m (5' 5\")   Wt 61.3 kg (135 lb 2.3 oz)   LMP  (LMP Unknown)   SpO2 97%   BMI 22.49 kg/m       -infection is improving. In process     -safe disposition plan has been identified. Met  "

## 2025-05-19 NOTE — PROGRESS NOTES
Brief Progress Note    In to assess patient due to persistent pain. She is tearful on examination. Reports feeling weak because she has not eaten or drank for the past 3 days. She reports significant anxiety around eating and drinking due to pain from stoma site. She was just seen and evaluated by WOC- waiting for final recs. She reports persistent anorectal pain not changed since admission. She is being followed by CRS for rectovaginal fistula and coordinating surgery for takedown of loop ileostomy and creation of end colostomy with Dr. Carpenter in coordination with Dr. Moseley. She would like to see Dr. Carpenter to discuss further. Explained that she would need to follow up on an outpatient basis to discuss and coordinate surgery. Patient re-iterated that she would like to speak with Dr. Carpenter or the CRS team because anorectal pain in combination with new onset stomal pain have made life unbearable and now she is in a very dark place. Plan to discuss with fellow regarding consult to CRS.      Isamar Woodruff MD  Obstetrics, Gynecology & Women's Health   Resident, PGY-1  05/19/2025 12:00 PM

## 2025-05-19 NOTE — PROGRESS NOTES
Gynecologic Oncology Progress Note  5/19/2025    RN paged to say patient is having a HA and ear fullness not relieved by acetaminophen.     Presented to room. Patient upset and crying.    S: Patient reports she is feeling frustrated and doesn't feel like anyone is listening to her. She has persistent vaginal/rectal pain and new pain around stoma site. She has not been eating or drinking much because it burns when she has stool output around her stoma. She has been delaying colostomy surgery until the end of june in order to be present in her children's lives and attend certain events. However she is now very weak and having difficulty caring for herself and kids. She would like to have a surgical plan. She feels cancer has ruined her life. She has constant pelvic pain and leakage. She is not able to have fun with her kids because she doesn't feel well or have energy.    O:  Vitals:    05/19/25 0610 05/19/25 0826 05/19/25 1007 05/19/25 1422   BP: 97/57 93/63 99/67 111/71   BP Location: Right arm  Right arm Right arm   Pulse: 55 60 52 70   Resp: 16  16 16   Temp: 97.8  F (36.6  C)  97.5  F (36.4  C) 98.2  F (36.8  C)   TempSrc: Oral  Oral Oral   SpO2: 99%  99% 98%   Weight:       Height:         Gen: Anxious. Crying. Nasal congestion  Ears: External auditory canal is clear, skin intact. No tenderness. Tympanic membrane is intact, pearly grey/translucent. No bulging. Cone of light is present. Slight redness thought to be secondary to crying.    A/P:  Marizol Granados is a 40 year old female with stage IIIB low grade peritoneal carcinoma admitted to observation for suspected peristomal skin infection. VSS. Patient feeling frustrated and overwhelmed. Acknowledged her feelings and discussed a plan moving forward.     - Lorazepam 1 mg PO x 1 now  - Zyrtec daily PRN allergies  - WOC RN saw patient and discussed new pouching plan. Her old pouch did not fit appropriately and was leaking around the stoma on to the skin. Plan for  new pouch. WOC will revisit tomorrow. Discussed that it will take some time for the skin to heal.  - Lidocaine patches around pouch   - Ice pack over pouch PRN  - Encouraged PO intake. Added Ensure shakes  - Palliative care consult to help with pain and anxiety management. She follows with them as an outpatient  - Dr Ssoa will see her tomorrow and discuss plan for surgery    Carmella Demarco, APRN, DNP, CNP  5/19/2025 5:59 PM

## 2025-05-19 NOTE — PLAN OF CARE
"Goal Outcome Evaluation:    BP 97/57 (BP Location: Right arm)   Pulse 55   Temp 97.8  F (36.6  C) (Oral)   Resp 16   Ht 1.651 m (5' 5\")   Wt 61.3 kg (135 lb 2.3 oz)   LMP  (LMP Unknown)   SpO2 99%   BMI 22.49 kg/m      -vital signs normal or at patient baseline- MET   -infection is improving - Pending labs  -safe disposition plan has been identified - MET   - Pain control - moderately well controlled with po, states nothing makes it feel better  "

## 2025-05-19 NOTE — DISCHARGE SUMMARY
Gynecologic Oncology Discharge Summary    Marizol Granados  [id number]    Admit Date: 5/18/2025  Discharge Date: ***  Admitting Provider: Destiny Rubio MD   Discharge Provider: ***    Admission Dx:   - Stoma site pain  - Suspected peristomal skin infection   - Stage IIIB low grade peritoneal carcinoma   - Rectovaginal fistula   - Urinary retention   - Hx UTIs   - FAHAD   - Hx DVT/PE   - MDD  - SHIVAM   - Insomnia  - RLS   - Hx migraines   - Anorectal pain   - Cancer related fatigue   - Severe malnutrition in the context of chronic illness     Discharge Dx:  - Stoma site pain  - Stage IIIB low grade peritoneal carcinoma   - Rectovaginal fistula   - Urinary retention   - Hx UTIs   - FAHAD, resolved   - Hx DVT/PE   - MDD  - SHIVAM   - Insomnia  - RLS   - Hx migraines   - Anorectal pain   - Cancer related fatigue   - Severe malnutrition in the context of chronic illness   ***     Patient Active Problem List   Diagnosis    Therapeutic drug monitoring    Social phobia    S/P gastric bypass    Recurrent major depressive disorder    Other B-complex deficiencies    Moderate persistent asthma    Intractable migraine without aura and with status migrainosus    Insomnia    Hypercholesterolemia    History of thromboembolism    Generalized anxiety disorder    Frequent UTI    Chronic fatigue    Allergic rhinitis    Genital herpes simplex    Postoperative intestinal malabsorption    GERD (gastroesophageal reflux disease)    Low back pain    Ptosis of both breasts    Leak of anastomosis between gastrointestinal structures    Rectovaginal fistula    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 Dose increase since last fill 4 patch 3 5/18/2025    busPIRone HCl (BUSPAR) 30 MG tablet Take 30 mg by mouth 2 times daily.   5/18/2025    cholecalciferol 25 MCG (1000 UT) TABS Take 2,000 Units by mouth daily   5/17/2025 Evening    cyanocobalamin (VITAMIN B-12) 1000 MCG tablet Take 1,000 mcg by mouth.   5/17/2025    desvenlafaxine (PRISTIQ) 100 MG 24 hr tablet Take 100 mg by mouth daily   5/17/2025    desvenlafaxine (PRISTIQ) 50 MG 24 hr tablet TAKE 1 TABLET BY MOUTH ONCE DAILY. TAKE WITH 100MG TABLET FOR TOTAL OF 150MG DAILY   5/17/2025    dicyclomine (BENTYL) 10 MG capsule Take 1 capsule (10 mg) by mouth 3 times daily as needed (cramping). 90 capsule 0 Unknown    gabapentin (NEURONTIN) 100 MG capsule Take 2 capsules (200 mg) by mouth at bedtime. 60 capsule 3 5/17/2025    HYDROmorphone (DILAUDID) 4 MG tablet Take 1-1.5 tablets (4-6 mg) by mouth every 4 hours as needed for breakthrough pain. 90 tablet 0 5/17/2025    loperamide (IMODIUM) 2 MG capsule Take 2 mg by mouth.   Unknown    LORazepam (ATIVAN) 1 MG tablet Take 1 mg by mouth 2 times daily.   5/18/2025 Morning    methocarbamol (ROBAXIN) 500 MG tablet Take 1 tablet (500 mg) by mouth 4 times daily as needed for muscle spasms. 20 tablet 0 5/17/2025    methylphenidate (RITALIN) 5 MG tablet Take 3 tablets (15 mg) by mouth every morning AND 2 tablets (10 mg) daily (with lunch). Take lunch dose if needed for late afternoon or evening fatigue. 150 tablet 0 5/18/2025    multivitamin w/minerals (MULTI-VITAMIN) tablet Take 1 tablet by mouth daily.   5/18/2025    naloxone (NARCAN) 4 MG/0.1ML nasal spray Spray 1 spray (4 mg) into one nostril alternating nostrils as needed for opioid reversal. every 2-3 minutes until assistance arrives 2 each 0 Unknown    omeprazole (PRILOSEC) 40 MG DR capsule TAKE  1 CAPSULE(40 MG) BY MOUTH DAILY 90 capsule 3 5/18/2025 Morning    ondansetron (ZOFRAN) 8 MG tablet Take 1 tablet (8 mg) by mouth every 8 hours as needed for nausea. 30 tablet 2 Unknown    Ostomy Supplies MISC 1 each every other day. 20 each 11 Taking    Ostomy Supplies MISC 1 each every other day. 20 each 11 Taking    Ostomy Supplies MISC 1 each every other day. 20 each 11 Taking    Ostomy Supplies MISC 1 each every other day. 20 each 11 Taking    pantoprazole (PROTONIX) 40 MG EC tablet Take 1 tablet by mouth every morning.   Unknown    phentermine (ADIPEX-P) 37.5 MG tablet Take 37.5 mg by mouth every morning (before breakfast).   5/18/2025 Morning    PROBIOTIC PRODUCT PO Take 1 tablet by mouth At Bedtime   5/17/2025    promethazine (PHENERGAN) 25 MG tablet Take 1 tablet by mouth daily.   Unknown    propranolol (INDERAL) 10 MG tablet Take 1 tablet (10 mg) by mouth 2 times daily. Hold for HR <60 and SBP <90   5/18/2025 Morning    QUEtiapine (SEROQUEL) 200 MG tablet Take 200 mg by mouth at bedtime.   5/17/2025 Evening    ramelteon (ROZEREM) 8 MG tablet Take 8 mg by mouth At Bedtime   5/17/2025 Evening    simethicone (MYLICON) 80 MG chewable tablet Take 1 tablet (80 mg) by mouth every 6 hours as needed for flatulence or cramping. 30 tablet 1 5/17/2025 Evening    Suvorexant (BELSOMRA) 20 MG tablet Take 20 mg by mouth nightly as needed.   5/17/2025 Evening    topiramate (TOPAMAX) 50 MG tablet TAKE 1 TABLET(50 MG) BY MOUTH TWICE DAILY 180 tablet 3 5/18/2025 Morning    traZODone (DESYREL) 150 MG tablet Take 2 tablets by mouth at bedtime.   5/17/2025 Evening    valACYclovir (VALTREX) 500 MG tablet Take 1 tablet (500 mg) by mouth 2 times daily. 6 tablet 3 Unknown    zolpidem (AMBIEN) 5 MG tablet Take 5 mg by mouth at bedtime.   5/17/2025 Evening    Fezolinetant 45 MG TABS Take 45 mg by mouth daily. (Patient not taking: Reported on 5/19/2025) 30 tablet 5 Not Taking    letrozole (FEMARA) 2.5 MG tablet Take 1 tablet (2.5 mg) by  mouth daily. (Patient not taking: Reported on 5/19/2025) 84 tablet 0 Not Taking    pegfilgrastim (NEULASTA) 6 MG/0.6ML injection Inject 0.6 mLs (6 mg) subcutaneously once for 1 dose.       prochlorperazine (COMPAZINE) 10 MG tablet Take 1 tablet (10 mg) by mouth every 6 hours as needed for nausea or vomiting. 30 tablet 2 Unknown       Discharge Medications:     Review of your medicines        UNREVIEWED medicines. Ask your doctor about these medicines        Dose / Directions   apixaban ANTICOAGULANT 5 MG tablet  Commonly known as: ELIQUIS  Used for: Personal history of pulmonary embolism      Dose: 5 mg  Take 1 tablet (5 mg) by mouth 2 times daily  Quantity: 60 tablet  Refills: 11     Belsomra 20 MG tablet  Generic drug: Suvorexant      Dose: 20 mg  Take 20 mg by mouth nightly as needed.  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 Dose increase since last fill  Quantity: 4 patch  Refills: 3     busPIRone HCl 30 MG tablet  Commonly known as: BUSPAR      Dose: 30 mg  Take 30 mg by mouth 2 times daily.  Refills: 0     cyanocobalamin 1000 MCG tablet  Commonly known as: VITAMIN B-12      Dose: 1,000 mcg  Take 1,000 mcg by mouth.  Refills: 0     * desvenlafaxine 100 MG 24 hr tablet  Commonly known as: PRISTIQ      Dose: 100 mg  Take 100 mg by mouth daily  Refills: 0     * desvenlafaxine 50 MG 24 hr tablet  Commonly known as: PRISTIQ      TAKE 1 TABLET BY MOUTH ONCE DAILY. TAKE WITH 100MG TABLET FOR TOTAL OF 150MG DAILY  Refills: 0     dicyclomine 10 MG capsule  Commonly known as: BENTYL  Used for: Leak of anastomosis between gastrointestinal structures      Dose: 10 mg  Take 1 capsule (10 mg) by mouth 3 times daily as needed (cramping).  Quantity: 90 capsule  Refills: 0      Fezolinetant 45 MG Tabs  Used for: Hot flashes due to surgical menopause      Dose: 45 mg  Take 45 mg by mouth daily.  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  Quantity: 90 tablet  Refills: 0     letrozole 2.5 MG tablet  Commonly known as: FEMARA  Used for: Primary malignant neoplasm of pelvic peritoneum (H), Encounter for antineoplastic chemotherapy      Dose: 2.5 mg  Take 1 tablet (2.5 mg) by mouth daily.  Quantity: 84 tablet  Refills: 0     loperamide 2 MG capsule  Commonly known as: IMODIUM      Dose: 2 mg  Take 2 mg by mouth.  Refills: 0     LORazepam 1 MG tablet  Commonly known as: ATIVAN  Ask about: Which instructions should I use?      Dose: 1 mg  Take 1 mg by mouth 2 times daily.  Refills: 0     methocarbamol 500 MG tablet  Commonly known as: ROBAXIN  Used for: Rectovaginal fistula      Dose: 500 mg  Take 1 tablet (500 mg) by mouth 4 times daily as needed for muscle spasms.  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 Take  lunch dose if needed for late afternoon or evening fatigue.  Quantity: 150 tablet  Refills: 0     Multi-vitamin per tablet  Generic drug: multivitamin w/minerals      Dose: 1 tablet  Take 1 tablet by mouth daily.  Refills: 0     naloxone 4 MG/0.1ML nasal spray  Commonly known as: NARCAN  Used for: Leak of anastomosis between gastrointestinal structures      Dose: 4 mg  Spray 1 spray (4 mg) into one nostril alternating nostrils as needed for opioid reversal. every 2-3 minutes until assistance arrives  Quantity: 2 each  Refills: 0     omeprazole 40 MG DR capsule  Commonly known as: PriLOSEC  Used for: Epigastric discomfort, S/P gastric bypass      Dose: 40 mg  TAKE 1 CAPSULE(40 MG) BY MOUTH DAILY  Quantity: 90 capsule  Refills: 3     ondansetron 8 MG tablet  Commonly known as: ZOFRAN  Used for: Primary malignant neoplasm of pelvic peritoneum (H), Encounter for antineoplastic chemotherapy      Dose: 8 mg  Take 1 tablet (8 mg) by mouth every 8 hours as needed for nausea.  Quantity: 30 tablet  Refills: 2     pantoprazole 40 MG EC tablet  Commonly known as: PROTONIX      Dose: 1 tablet  Take 1 tablet by mouth every morning.  Refills: 0     pegfilgrastim 6 MG/0.6ML injection  Commonly known as: NEULASTA  Used for: Primary malignant neoplasm of pelvic peritoneum (H), Encounter for antineoplastic chemotherapy      Dose: 6 mg  Inject 0.6 mLs (6 mg) subcutaneously once for 1 dose.  Refills: 0     phentermine 37.5 MG tablet  Commonly known as: ADIPEX-P  Ask about: Which instructions should I use?      Dose: 37.5 mg  Take 37.5 mg by mouth every morning (before breakfast).  Refills: 0     PROBIOTIC PRODUCT PO      Dose: 1 tablet  Take 1 tablet by mouth At Bedtime  Refills: 0     prochlorperazine 10 MG tablet  Commonly known as: COMPAZINE  Used for: Primary malignant neoplasm of pelvic peritoneum (H), Encounter for antineoplastic chemotherapy  Ask about: Which instructions should I use?      Dose: 10 mg  Take 1 tablet (10 mg) by  mouth every 6 hours as needed for nausea or vomiting.  Quantity: 30 tablet  Refills: 2     promethazine 25 MG tablet  Commonly known as: PHENERGAN      Dose: 1 tablet  Take 1 tablet by mouth daily.  Refills: 0     propranolol 10 MG tablet  Commonly known as: INDERAL      Dose: 10 mg  Take 1 tablet (10 mg) by mouth 2 times daily. Hold for HR <60 and SBP <90  Refills: 0     QUEtiapine 200 MG tablet  Commonly known as: SEROquel      Dose: 200 mg  Take 200 mg by mouth at bedtime.  Refills: 0     ramelteon 8 MG tablet  Commonly known as: ROZEREM      Dose: 8 mg  Take 8 mg by mouth At Bedtime  Refills: 0     simethicone 80 MG chewable tablet  Commonly known as: MYLICON  Used for: Constipation, unspecified constipation type      Dose: 80 mg  Take 1 tablet (80 mg) by mouth every 6 hours as needed for flatulence or cramping.  Quantity: 30 tablet  Refills: 1     topiramate 50 MG tablet  Commonly known as: TOPAMAX  Used for: Intractable migraine without aura and with status migrainosus      TAKE 1 TABLET(50 MG) BY MOUTH TWICE DAILY  Quantity: 180 tablet  Refills: 3     traZODone 150 MG tablet  Commonly known as: DESYREL      Dose: 2 tablet  Take 2 tablets by mouth at bedtime.  Refills: 0     valACYclovir 500 MG tablet  Commonly known as: VALTREX  Used for: Genital herpes simplex, unspecified site  Ask about: Which instructions should I use?      Dose: 500 mg  Take 1 tablet (500 mg) by mouth 2 times daily.  Quantity: 6 tablet  Refills: 3     Vitamin D3 25 mcg (1000 units) tablet  Commonly known as: CHOLECALCIFEROL      Dose: 2,000 Units  Take 2,000 Units by mouth daily  Refills: 0     zolpidem 5 MG tablet  Commonly known as: AMBIEN      Dose: 5 mg  Take 5 mg by mouth at bedtime.  Refills: 0           * This list has 2 medication(s) that are the same as other medications prescribed for you. Read the directions carefully, and ask your doctor or other care provider to review them with you.                CONTINUE these medicines  which have NOT CHANGED        Dose / Directions   * Ostomy Supplies Misc  Used for: Status post ileostomy (H)      Dose: 1 each  1 each every other day.  Quantity: 20 each  Refills: 11     * Ostomy Supplies Misc  Used for: Status post ileostomy (H), Irritant contact dermatitis due to ileostomy (H)      Dose: 1 each  1 each every other day.  Quantity: 20 each  Refills: 11     * Ostomy Supplies Misc  Used for: Status post ileostomy (H)      Dose: 1 each  1 each every other day.  Quantity: 20 each  Refills: 11     * Ostomy Supplies Misc  Used for: Status post ileostomy (H)      Dose: 1 each  1 each every other day.  Quantity: 20 each  Refills: 11           * This list has 4 medication(s) that are the same as other medications prescribed for you. Read the directions carefully, and ask your doctor or other care provider to review them with you.                  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      She reports that the pain started out of the blue three days ago where she started to have a burning type pain on the skin around her stoma. Hurts all the time constantly, but especially when she has output come through her stoma. She has been self restricting all food and drink for the past 3 days to avoid the pain, and as a result feels quite weak and dizzy. Scared to eat or drink anything now because of the pain. Had to take off her appliance when she got to the ED due to pain and has been using towels to cover and wipe off the output that is coming out which is also quite painful.      Reports no changes to her supplies or routine with her stoma, denies any recent trauma impacting the stoma either. Tried using bacitracin on it which made her feel worse. Has been taking her pain regimen including Dilaudid and feels  like it is not helping her pain at all and has required 2.5 mg of IV Dilaudid in the ED. No nausea/vomiting. No fevers/chills. No other abdominal or suprapubic pain.     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 XL, rad hyst/BSO, rectosigmoid resection w/ post vaginectomy, omentectomy, diverting loop ileostomy (9/24). S/p 5C Carbo/Taxol (1/25). S/p Letrozole (4/25). CT 3/21/25 GAURAV.   FEN:   - ***She was maintained on IVF until she was tolerating some PO intake and transitioned to TPN. Diet advancement complicated by patient self-limiting intake due to persistent stoma pain and anxiety that intake would exacerbate this.  By discharge, she was to goal on TPN and tolerating some PO intake.  Pain:   - Her pain was initially controlled on IV pain medications in the ED. She was resumed on her PO pain medications.   - Palliative care consulted in the setting of poorly controlled pain. Increased dilaudid to 6-8 mg q4h PRN, butrans patch 20 mcg/hr q7d, acetaminophen 975 mg TID scheduled, increased gabapentin to 300 mg  CV:   - She has no history of CV issues.  Her vital signs were stable while in house and she had no acute CV issues.  PULM:   - History of asthma, no PTA meds. No pulmonary issues while in house.   HEME:   - Hx DVT/PE, was maintained on home Eliquis dose while in house.   - No other hematologic issues while in house.   GI:  - Hx diverting loop ileostomy complicated this admission by suspected peristomal skin infection, see below.   - Hx rectovaginal fistula, chronic constipation with plans to ultimately have end colostomy.   - Hx GERD, maintained on Protonix while in house.   - Stoma pain worsened due to poor fitting bag. WOC was engaged for refitting to improve seal  - Patient will follow up on 5/28/24 for surgery with CRS.  ***   :    - Hx urinary retention postoperatively complicated by frequent UTIs. CT imaging with cystitis but no current concerns for UTI on admission.  "UA showed orange cloudy urine positive for nitrite with large leukocyte esterase, +WBC, + mucus, + RBC, +WBC. She received a dose of ceftriaxone in the ED, which was continued due to concerns for UTI. Urine culture growing 50,000-100,000 CFU/mL Klebsiella pneumoniae ***    ID:   - Peristomal skin infection treated with miconazole powder with improvement *** in infection at the time of discharge. Afebrile while in house, WBC wnl.   ENDO:   - No issues***  PSYCH/NEURO:   - Hx MDD/SHIVAM/insomnia/RLS in addition to migraines. She was maintained on PTA Buspar, Pristiq, gabapentin, Ativan, Seroquel, ramelteon while in house. No other acute psych issues.   PPX:    - She received SCDs for VTE ppx in addition to her home Eliquis.       Discharge Instructions and Follow up:  Ms. Marizol Granados was discharged from the hospital with follow up for ***.    Discharge Diet: { :8893931::\"Regular\"}  Discharge Activity: Lifting restricted to 15 pounds, no driving while on narcotics, nothing per vagina for 6 weeks.***  Discharge Follow up: ***    Discharge Disposition:  { :[id number]::\"Discharged to home\"}    Discharge Staff: ***      ***   "

## 2025-05-19 NOTE — PLAN OF CARE
"Goal Outcome Evaluation:    BP 99/67 (BP Location: Right arm)   Pulse 52   Temp 97.5  F (36.4  C) (Oral)   Resp 16   Ht 1.651 m (5' 5\")   Wt 61.3 kg (135 lb 2.3 oz)   LMP  (LMP Unknown)   SpO2 99%   BMI 22.49 kg/m      -vital signs normal or at patient baseline- MET   -infection is improving - Pending labs  -safe disposition plan has been identified - MET   - Pain control - moderately well controlled with po, states nothing makes it feel better.    WOC Nurse came to evaluate and put in orders for ostomy.   "

## 2025-05-19 NOTE — MEDICATION SCRIBE - ADMISSION MEDICATION HISTORY
Medication Scribe Admission Medication History    Admission medication history is complete. The information provided in this note is only as accurate as the sources available at the time of the update.    Information Source(s): Patient, Patient's pharmacy, and [facility] via in-person    Pertinent Information: Pt and I went through pta med-list. I removed medications not taking and duplicates and updated any incorrect orders. She did stated pharmacist was in her room earlier asking about medications. Lorazepam was changed 5/25/24 to one tablet po bid, Phentermine was changed to one tablet po every day as of 8/30/24. Pt stated not taking Fezolinetant as medication was not covered by insurance and is very expensive. Pt states she is not taking Letrozole as she was getting really bad hot flashes. Pt also stated no longer taking dexamethasone as she is no longer on chemo. Pt stated she changes butrans patches on Sunday.     Changes made to PTA medication list:  Added: Lorazepam. Phentermine   Deleted: prochlorperazine, miralax, valtrex, macrobid, multi-vitamin minerals, acetaminophen dulcolax, decadron, lorazepam, phentermine   Changed: None    Allergies reviewed with patient and updates made in EHR: yes    Medication History Completed By: Connie Orta 5/19/2025 12:29 AM    PTA Med List   Medication Sig Note Last Dose/Taking    apixaban ANTICOAGULANT (ELIQUIS) 5 MG tablet Take 1 tablet (5 mg) by mouth 2 times daily  5/18/2025    buprenorphine (BUTRANS) 20 MCG/HR WK patch Place 1 patch over 168 hours onto the skin every 7 days. Dose increase since last fill 5/19/2025: Change on sundays 5/18/2025    busPIRone HCl (BUSPAR) 30 MG tablet Take 30 mg by mouth 2 times daily.  5/18/2025    cholecalciferol 25 MCG (1000 UT) TABS Take 2,000 Units by mouth daily  5/17/2025 Evening    cyanocobalamin (VITAMIN B-12) 1000 MCG tablet Take 1,000 mcg by mouth.  5/17/2025    desvenlafaxine (PRISTIQ) 100 MG 24 hr tablet Take  100 mg by mouth daily  5/17/2025    desvenlafaxine (PRISTIQ) 50 MG 24 hr tablet TAKE 1 TABLET BY MOUTH ONCE DAILY. TAKE WITH 100MG TABLET FOR TOTAL OF 150MG DAILY  5/17/2025    dicyclomine (BENTYL) 10 MG capsule Take 1 capsule (10 mg) by mouth 3 times daily as needed (cramping).  Unknown    gabapentin (NEURONTIN) 100 MG capsule Take 2 capsules (200 mg) by mouth at bedtime.  5/17/2025    HYDROmorphone (DILAUDID) 4 MG tablet Take 1-1.5 tablets (4-6 mg) by mouth every 4 hours as needed for breakthrough pain.  5/17/2025    loperamide (IMODIUM) 2 MG capsule Take 2 mg by mouth.  Unknown    LORazepam (ATIVAN) 1 MG tablet Take 1 mg by mouth 2 times daily.  5/18/2025 Morning    methocarbamol (ROBAXIN) 500 MG tablet Take 1 tablet (500 mg) by mouth 4 times daily as needed for muscle spasms.  5/17/2025    methylphenidate (RITALIN) 5 MG tablet Take 3 tablets (15 mg) by mouth every morning AND 2 tablets (10 mg) daily (with lunch). Take lunch dose if needed for late afternoon or evening fatigue.  5/18/2025    multivitamin w/minerals (MULTI-VITAMIN) tablet Take 1 tablet by mouth daily.  5/18/2025    naloxone (NARCAN) 4 MG/0.1ML nasal spray Spray 1 spray (4 mg) into one nostril alternating nostrils as needed for opioid reversal. every 2-3 minutes until assistance arrives  Unknown    omeprazole (PRILOSEC) 40 MG DR capsule TAKE 1 CAPSULE(40 MG) BY MOUTH DAILY  5/18/2025 Morning    ondansetron (ZOFRAN) 8 MG tablet Take 1 tablet (8 mg) by mouth every 8 hours as needed for nausea.  Unknown    Ostomy Supplies MISC 1 each every other day.  Taking    Ostomy Supplies MISC 1 each every other day.  Taking    Ostomy Supplies MISC 1 each every other day.  Taking    Ostomy Supplies MISC 1 each every other day.  Taking    pantoprazole (PROTONIX) 40 MG EC tablet Take 1 tablet by mouth every morning.  Unknown    phentermine (ADIPEX-P) 37.5 MG tablet Take 37.5 mg by mouth every morning (before breakfast).  5/18/2025 Morning    PROBIOTIC PRODUCT PO  Take 1 tablet by mouth At Bedtime  5/17/2025    promethazine (PHENERGAN) 25 MG tablet Take 1 tablet by mouth daily.  Unknown    propranolol (INDERAL) 10 MG tablet Take 1 tablet (10 mg) by mouth 2 times daily. Hold for HR <60 and SBP <90  5/18/2025 Morning    QUEtiapine (SEROQUEL) 200 MG tablet Take 200 mg by mouth at bedtime.  5/17/2025 Evening    ramelteon (ROZEREM) 8 MG tablet Take 8 mg by mouth At Bedtime  5/17/2025 Evening    simethicone (MYLICON) 80 MG chewable tablet Take 1 tablet (80 mg) by mouth every 6 hours as needed for flatulence or cramping.  5/17/2025 Evening    Suvorexant (BELSOMRA) 20 MG tablet Take 20 mg by mouth nightly as needed.  5/17/2025 Evening    topiramate (TOPAMAX) 50 MG tablet TAKE 1 TABLET(50 MG) BY MOUTH TWICE DAILY  5/18/2025 Morning    traZODone (DESYREL) 150 MG tablet Take 2 tablets by mouth at bedtime.  5/17/2025 Evening    valACYclovir (VALTREX) 500 MG tablet Take 1 tablet (500 mg) by mouth 2 times daily.  Unknown    zolpidem (AMBIEN) 5 MG tablet Take 5 mg by mouth at bedtime.  5/17/2025 Evening

## 2025-05-19 NOTE — PROGRESS NOTES
Pt arrived on the Unit via cart from [facility] and was settled in her room.

## 2025-05-19 NOTE — PLAN OF CARE
"Goal Outcome Evaluation:      Plan of Care Reviewed With: patient    Overall Patient Progress: no changeOverall Patient Progress: no change     Shift: ***  VS: ***  Pain: ***  Neuro: ***  Cardiac: ***  Respiratory: ***   Diet/Appetite:  ***  /GI: ***  LDA's: ***  Skin: ***  Activity: ***  Procedures: ***  Pertinent Labs/: ***     Plan: ***  Problem: Adult Inpatient Plan of Care  Goal: Plan of Care Review  Description: The Plan of Care Review/Shift note should be completed every shift.  The Outcome Evaluation is a brief statement about your assessment that the patient is improving, declining, or no change.  This information will be displayed automatically on your shiftnote.  Outcome: Progressing  Flowsheets (Taken 5/19/2025 0155)  Plan of Care Reviewed With: patient  Overall Patient Progress: no change  Goal: Patient-Specific Goal (Individualized)  Description: You can add care plan individualizations to a care plan. Examples of Individualization might be:  \"Parent requests to be called daily at 9am for status\", \"I have a hard time hearing out of my right ear\", or \"Do not touch me to wake me up as it startlesme\".  Outcome: Progressing  Goal: Absence of Hospital-Acquired Illness or Injury  Outcome: Progressing  Goal: Optimal Comfort and Wellbeing  Outcome: Progressing  Goal: Readiness for Transition of Care  Outcome: Progressing     Problem: Infection  Goal: Absence of Infection Signs and Symptoms  Outcome: Progressing             "

## 2025-05-19 NOTE — H&P
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 assessment/plan above.    Destiny Rubio MD, MS, FACOG, FACS  5/19/2025  7:15 AM

## 2025-05-19 NOTE — PLAN OF CARE
Goal Outcome Evaluation:  -vital signs normal or at patient baseline - met   -infection is improving - progressing   -safe disposition plan has been identified - not met   Nurse to notify provider when observation goals have been met and patient is ready for discharge.     One time IV dilaudid given.

## 2025-05-19 NOTE — PROGRESS NOTES
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 Comfortable overnight, was able to rest. Pain around stoma somewhat improved. Tolerated sips of water with meds, no other PO intake. No other concerns.     Objective:   Vitals:    05/18/25 1715 05/19/25 0115 05/19/25 0123   BP: 118/78 101/64    BP Location:  Left arm    Pulse: 74 52 62   Resp: 18 16    Temp: 98.3  F (36.8  C) 97.9  F (36.6  C)    TempSrc: Oral Oral    SpO2: 99% 97%    Weight: 60.7 kg (133 lb 12.8 oz) 61.3 kg (135 lb 2.3 oz)    Height: 1.651 m (5' 5\")         General: NAD, appears comfortable in bed  CV: Well-perfused  Resp: Breathing comfortably on room air  Abdomen: Soft, nontender, nondistended. Ostomy with green output, nontender along stoma and around surrounding skin when palpated through bag.   Extremities: warm, well-perfused, nontender, no edema    Intake/Output (24 Hrs // Since MN)    UOP 300mL // 0mL    New labs/imaging-   Latest Reference Range & Units 05/18/25 18:40 05/19/25 03:09 05/19/25 06:00   Sodium 135 - 145 mmol/L 137  137   Potassium 3.4 - 5.3 mmol/L 4.2  3.4   Chloride 98 - 107 mmol/L 102  105   Carbon Dioxide (CO2) 22 - 29 mmol/L 24  22   Urea Nitrogen 6.0 - 20.0 mg/dL 13.0  10.5   Creatinine 0.51 - 0.95 mg/dL 1.02 (H)  0.87   GFR Estimate >60 mL/min/1.73m2 72  87   Calcium 8.8 - 10.4 mg/dL 9.9  9.1   Anion Gap 7 - 15 mmol/L 11  10   Albumin 3.5 - 5.2 g/dL 4.1     Protein Total 6.4 - 8.3 g/dL 7.4     Alkaline Phosphatase 40 - 150 U/L 92     ALT 0 - 50 U/L 14     AST 0 - 45 U/L 18     Bilirubin Total <=1.2 mg/dL 0.2     Glucose 70 - 99 mg/dL 113 (H)  89   Lactic Acid 0.7 - 2.0 mmol/L 0.7     Lipase 13 - 60 U/L 50     WBC 4.0 - 11.0 10e3/uL 6.6     Hemoglobin 11.7 - 15.7 g/dL " 11.1 (L)     Hematocrit 35.0 - 47.0 % 33.6 (L)     Platelet Count 150 - 450 10e3/uL 188     RBC Count 3.80 - 5.20 10e6/uL 3.51 (L)     MCV 78 - 100 fL 96     MCH 26.5 - 33.0 pg 31.6     MCHC 31.5 - 36.5 g/dL 33.0     RDW 10.0 - 15.0 % 12.5     % Neutrophils % 58     % Lymphocytes % 32     % Monocytes % 9     % Eosinophils % 1     % Basophils % 0     % Immature Granulocytes % 0     NRBC/W <1 /100 0     Absolute Neutrophil 1.6 - 8.3 10e3/uL 3.8     Absolute Lymphocytes 0.8 - 5.3 10e3/uL 2.1     Absolute Monocytes 0.0 - 1.3 10e3/uL 0.6     Absolute Eosinophils 0.0 - 0.7 10e3/uL 0.1     Absolute Basophils 0.0 - 0.2 10e3/uL 0.0     Absolute Immature Granulocytes <=0.4 10e3/uL 0.0     Absolute NRBCs 10e3/uL 0.0     Color Urine Colorless, Straw, Light Yellow, Yellow   Orange !    Appearance Urine Clear   Cloudy !    Glucose Urine Negative mg/dL  Negative    Bilirubin Urine Negative   Negative    Ketones Urine Negative mg/dL  Negative    Specific Gravity Urine 1.003 - 1.035   1.015    pH Urine 5.0 - 7.0   5.5    Protein Albumin Urine Negative mg/dL  30 !    Urobilinogen mg/dL Normal mg/dL  Normal    Nitrite Urine Negative   Positive !    Blood Urine Negative   Small !    Leukocyte Esterase Urine Negative   Large !    WBC Urine <=5 /HPF  >182 (H)    RBC Urine <=2 /HPF  111 (H)    WBC Clumps None Seen /HPF  Present !    Mucus Urine None Seen /LPF  Present !    (H): Data is abnormally high  (L): Data is abnormally low  !: Data is abnormal    Assessment: Marizol Granados is a 40 year old female with stage IIIB low grade peritoneal carcinoma admitted to observation for suspected peristomal skin infection.     # Stoma site pain   # Suspected peristomal skin infection   - D#2 miconazole BID   - Some improvement in symptoms overnight, was able to sleep comfortably and keep appliance in place   - Tolerating minimal PO, had sips of water with meds, encourage increased PO intake today    - on mIVF @ 75cc, likely plan to stop this AM  pending PO tolerance   - Plan WOC consult, SW consult to arrange home health cares     # Urinary retention   # Hx frequent UTIs   # Cystitis on CT  - CT with evidence of cystitis, redemonstrated from 3/21 CT   - Has hx frequent UTIs, manages retention with CIC due to inability to void   - Last treated for UTI 2/25 with cipro, culture resulted with pansensitive staph aureus   - Followed by Dr. Moseley in urology, last seen in April for cysto with no structural cause to explain UTIs, consideration of gentamicin irrigations with plan to follow up on 6/5  - c/f colonization in the setting of frequent self catheterization, asymptomatic on presentation though limited sensation post surgery and does not feel sx of UTI per patient   - s/p 1d CTX in the ED   - UA as above with +LE, +nitrites collected post CTX; urine culture pending    - Continue CIC     # FAHAD, resolved   - Cr slightly elevated compared to previous, on arrival to ED 1.02   - Improved to baseline this AM at 0.87     # Hx DVT/PE  - PTA Eliquis     # MDD/SHIVAM  # Insomnia   # RLS   - PTA scheduled Buspar, Pristiq, gabapentin, Ativan, Seroquel, ramelteon   - PTA prn Trazodone, Ambien      # Hx migraines  .cbc  - PTA Topamax, propranolol     # Anorectal pain  # Cancer related fatigue   - Followed by palliative Dr. Norton, last seen 4/24   - PTA Ritalin for fatigue, pain management as described above     Dispo: discharge to home pending WOC, SW evaluation       Bharti Rich DO  [facility]  Gynecology Oncology Resident, PGY-2  05/19/2025 5:59 AM    To reach the GYNECOLOGY ONCOLOGY team for this patient, please page [phone] or search Gynecologic Oncology Resident Group on Vocera.        I saw and evaluated the patient with the resident.  I edited and reviewed the above note. I have reviewed all pertinent imaging and labs on this patient.  Plan for WOC to see patient, suspect fungal infection and leaking around ostomy site. Examined  perineum, no skin break down noted. Work on pain management today.    Luz Maria Ag MD  Professor  Department of Ob/Gyn and Women's Health  Division of Gynecologic Oncology  [facility]  [phone]

## 2025-05-19 NOTE — PROGRESS NOTES
Pt urine collected but this writer was unable to print the label due to printer malfunction.  Lab was called and instructed this writer to use a patient label and write the date, time, and means of collection on the label and send it through the tube system.

## 2025-05-20 ENCOUNTER — PREP FOR PROCEDURE (OUTPATIENT)
Dept: ONCOLOGY | Facility: CLINIC | Age: 40
End: 2025-05-20
Payer: COMMERCIAL

## 2025-05-20 ENCOUNTER — PREP FOR PROCEDURE (OUTPATIENT)
Dept: UROLOGY | Facility: CLINIC | Age: 40
End: 2025-05-20
Payer: COMMERCIAL

## 2025-05-20 PROBLEM — Z93.2 STATUS POST ILEOSTOMY (H): Status: ACTIVE | Noted: 2025-05-20

## 2025-05-20 LAB
ANION GAP SERPL CALCULATED.3IONS-SCNC: 9 MMOL/L (ref 7–15)
BUN SERPL-MCNC: 7.2 MG/DL (ref 6–20)
CALCIUM SERPL-MCNC: 9 MG/DL (ref 8.8–10.4)
CHLORIDE SERPL-SCNC: 108 MMOL/L (ref 98–107)
CREAT SERPL-MCNC: 0.81 MG/DL (ref 0.51–0.95)
EGFRCR SERPLBLD CKD-EPI 2021: >90 ML/MIN/1.73M2
ERYTHROCYTE [DISTWIDTH] IN BLOOD BY AUTOMATED COUNT: 12.4 % (ref 10–15)
GLUCOSE BLDC GLUCOMTR-MCNC: 109 MG/DL (ref 70–99)
GLUCOSE SERPL-MCNC: 88 MG/DL (ref 70–99)
HCO3 SERPL-SCNC: 22 MMOL/L (ref 22–29)
HCT VFR BLD AUTO: 27.6 % (ref 35–47)
HGB BLD-MCNC: 9.2 G/DL (ref 11.7–15.7)
MAGNESIUM SERPL-MCNC: 1.5 MG/DL (ref 1.7–2.3)
MAGNESIUM SERPL-MCNC: 2.4 MG/DL (ref 1.7–2.3)
MCH RBC QN AUTO: 31.6 PG (ref 26.5–33)
MCHC RBC AUTO-ENTMCNC: 33.3 G/DL (ref 31.5–36.5)
MCV RBC AUTO: 95 FL (ref 78–100)
PHOSPHATE SERPL-MCNC: 4.4 MG/DL (ref 2.5–4.5)
PLATELET # BLD AUTO: 147 10E3/UL (ref 150–450)
POTASSIUM SERPL-SCNC: 3.6 MMOL/L (ref 3.4–5.3)
RBC # BLD AUTO: 2.91 10E6/UL (ref 3.8–5.2)
SODIUM SERPL-SCNC: 139 MMOL/L (ref 135–145)
TRIGL SERPL-MCNC: 97 MG/DL
WBC # BLD AUTO: 4.4 10E3/UL (ref 4–11)

## 2025-05-20 PROCEDURE — 250N000013 HC RX MED GY IP 250 OP 250 PS 637

## 2025-05-20 PROCEDURE — 96375 TX/PRO/DX INJ NEW DRUG ADDON: CPT

## 2025-05-20 PROCEDURE — G0378 HOSPITAL OBSERVATION PER HR: HCPCS

## 2025-05-20 PROCEDURE — 85027 COMPLETE CBC AUTOMATED: CPT | Performed by: STUDENT IN AN ORGANIZED HEALTH CARE EDUCATION/TRAINING PROGRAM

## 2025-05-20 PROCEDURE — 258N000003 HC RX IP 258 OP 636: Performed by: OBSTETRICS & GYNECOLOGY

## 2025-05-20 PROCEDURE — G0463 HOSPITAL OUTPT CLINIC VISIT: HCPCS

## 2025-05-20 PROCEDURE — 84478 ASSAY OF TRIGLYCERIDES: CPT | Performed by: OBSTETRICS & GYNECOLOGY

## 2025-05-20 PROCEDURE — 96361 HYDRATE IV INFUSION ADD-ON: CPT

## 2025-05-20 PROCEDURE — 80048 BASIC METABOLIC PNL TOTAL CA: CPT | Performed by: STUDENT IN AN ORGANIZED HEALTH CARE EDUCATION/TRAINING PROGRAM

## 2025-05-20 PROCEDURE — 83735 ASSAY OF MAGNESIUM: CPT | Performed by: OBSTETRICS & GYNECOLOGY

## 2025-05-20 PROCEDURE — 250N000013 HC RX MED GY IP 250 OP 250 PS 637: Performed by: OBSTETRICS & GYNECOLOGY

## 2025-05-20 PROCEDURE — 250N000013 HC RX MED GY IP 250 OP 250 PS 637: Performed by: NURSE PRACTITIONER

## 2025-05-20 PROCEDURE — B4185 PARENTERAL SOL 10 GM LIPIDS: HCPCS | Performed by: OBSTETRICS & GYNECOLOGY

## 2025-05-20 PROCEDURE — 84100 ASSAY OF PHOSPHORUS: CPT | Performed by: STUDENT IN AN ORGANIZED HEALTH CARE EDUCATION/TRAINING PROGRAM

## 2025-05-20 PROCEDURE — 250N000011 HC RX IP 250 OP 636: Performed by: OBSTETRICS & GYNECOLOGY

## 2025-05-20 PROCEDURE — 36415 COLL VENOUS BLD VENIPUNCTURE: CPT | Performed by: STUDENT IN AN ORGANIZED HEALTH CARE EDUCATION/TRAINING PROGRAM

## 2025-05-20 PROCEDURE — 999N000248 HC STATISTIC IV INSERT WITH US BY RN

## 2025-05-20 PROCEDURE — 250N000011 HC RX IP 250 OP 636: Performed by: NURSE PRACTITIONER

## 2025-05-20 PROCEDURE — 120N000002 HC R&B MED SURG/OB UMMC

## 2025-05-20 PROCEDURE — 250N000009 HC RX 250: Performed by: OBSTETRICS & GYNECOLOGY

## 2025-05-20 PROCEDURE — 36415 COLL VENOUS BLD VENIPUNCTURE: CPT | Performed by: OBSTETRICS & GYNECOLOGY

## 2025-05-20 PROCEDURE — 99233 SBSQ HOSP IP/OBS HIGH 50: CPT

## 2025-05-20 PROCEDURE — 99207 PR NO BILLABLE SERVICE THIS VISIT: CPT | Performed by: OBSTETRICS & GYNECOLOGY

## 2025-05-20 PROCEDURE — 83735 ASSAY OF MAGNESIUM: CPT | Performed by: STUDENT IN AN ORGANIZED HEALTH CARE EDUCATION/TRAINING PROGRAM

## 2025-05-20 PROCEDURE — 80051 ELECTROLYTE PANEL: CPT | Performed by: STUDENT IN AN ORGANIZED HEALTH CARE EDUCATION/TRAINING PROGRAM

## 2025-05-20 PROCEDURE — 99232 SBSQ HOSP IP/OBS MODERATE 35: CPT | Mod: GC | Performed by: OBSTETRICS & GYNECOLOGY

## 2025-05-20 RX ORDER — HEPARIN SODIUM,PORCINE 10 UNIT/ML
5-10 VIAL (ML) INTRAVENOUS EVERY 24 HOURS
Status: DISCONTINUED | OUTPATIENT
Start: 2025-05-20 | End: 2025-05-28

## 2025-05-20 RX ORDER — HEPARIN SODIUM,PORCINE 10 UNIT/ML
5-10 VIAL (ML) INTRAVENOUS
Status: DISCONTINUED | OUTPATIENT
Start: 2025-05-20 | End: 2025-05-28

## 2025-05-20 RX ORDER — DEXTROSE MONOHYDRATE 100 MG/ML
INJECTION, SOLUTION INTRAVENOUS CONTINUOUS PRN
Status: DISCONTINUED | OUTPATIENT
Start: 2025-05-20 | End: 2025-05-28

## 2025-05-20 RX ORDER — HEPARIN SODIUM (PORCINE) LOCK FLUSH IV SOLN 100 UNIT/ML 100 UNIT/ML
5-10 SOLUTION INTRAVENOUS
Status: DISCONTINUED | OUTPATIENT
Start: 2025-05-20 | End: 2025-05-28

## 2025-05-20 RX ORDER — FLUTICASONE PROPIONATE 50 MCG
1 SPRAY, SUSPENSION (ML) NASAL
Status: DISCONTINUED | OUTPATIENT
Start: 2025-05-20 | End: 2025-05-28

## 2025-05-20 RX ORDER — DIPHENHYDRAMINE HYDROCHLORIDE, ZINC ACETATE 2; .1 G/100G; G/100G
CREAM TOPICAL 3 TIMES DAILY PRN
Status: DISCONTINUED | OUTPATIENT
Start: 2025-05-20 | End: 2025-05-28

## 2025-05-20 RX ORDER — BUPRENORPHINE 20 UG/H
1 PATCH TRANSDERMAL WEEKLY
Status: DISCONTINUED | OUTPATIENT
Start: 2025-05-20 | End: 2025-05-22

## 2025-05-20 RX ORDER — ACETAMINOPHEN 325 MG/1
650 TABLET ORAL DAILY PRN
Status: DISCONTINUED | OUTPATIENT
Start: 2025-05-20 | End: 2025-05-22

## 2025-05-20 RX ORDER — MAGNESIUM SULFATE HEPTAHYDRATE 40 MG/ML
4 INJECTION, SOLUTION INTRAVENOUS ONCE
Status: COMPLETED | OUTPATIENT
Start: 2025-05-20 | End: 2025-05-20

## 2025-05-20 RX ADMIN — SODIUM CHLORIDE: 0.9 INJECTION, SOLUTION INTRAVENOUS at 06:35

## 2025-05-20 RX ADMIN — MAGNESIUM SULFATE HEPTAHYDRATE 4 G: 40 INJECTION, SOLUTION INTRAVENOUS at 09:27

## 2025-05-20 RX ADMIN — HYDROMORPHONE HYDROCHLORIDE 8 MG: 4 TABLET ORAL at 09:35

## 2025-05-20 RX ADMIN — BUPRENORPHINE 1 PATCH: 20 PATCH, EXTENDED RELEASE TRANSDERMAL at 15:44

## 2025-05-20 RX ADMIN — PROPRANOLOL HYDROCHLORIDE 10 MG: 10 TABLET ORAL at 22:07

## 2025-05-20 RX ADMIN — METHYLPHENIDATE HYDROCHLORIDE 10 MG: 5 TABLET ORAL at 12:12

## 2025-05-20 RX ADMIN — HYDROMORPHONE HYDROCHLORIDE 8 MG: 4 TABLET ORAL at 17:49

## 2025-05-20 RX ADMIN — CEFTRIAXONE SODIUM 1 G: 1 INJECTION, POWDER, FOR SOLUTION INTRAMUSCULAR; INTRAVENOUS at 22:05

## 2025-05-20 RX ADMIN — OLIVE OIL AND SOYBEAN OIL 250 ML: 16; 4 INJECTION, EMULSION INTRAVENOUS at 21:55

## 2025-05-20 RX ADMIN — APIXABAN 5 MG: 5 TABLET, FILM COATED ORAL at 09:18

## 2025-05-20 RX ADMIN — ACETAMINOPHEN 650 MG: 325 TABLET ORAL at 05:24

## 2025-05-20 RX ADMIN — Medication 5 ML: at 17:00

## 2025-05-20 RX ADMIN — LORAZEPAM 1 MG: 1 TABLET ORAL at 21:49

## 2025-05-20 RX ADMIN — TOPIRAMATE 50 MG: 50 TABLET, FILM COATED ORAL at 21:50

## 2025-05-20 RX ADMIN — BUSPIRONE HYDROCHLORIDE 30 MG: 30 TABLET ORAL at 21:49

## 2025-05-20 RX ADMIN — PANTOPRAZOLE SODIUM 40 MG: 40 TABLET, DELAYED RELEASE ORAL at 09:19

## 2025-05-20 RX ADMIN — HYDROMORPHONE HYDROCHLORIDE 8 MG: 4 TABLET ORAL at 13:37

## 2025-05-20 RX ADMIN — HYDROMORPHONE HYDROCHLORIDE 8 MG: 4 TABLET ORAL at 05:24

## 2025-05-20 RX ADMIN — APIXABAN 5 MG: 5 TABLET, FILM COATED ORAL at 21:50

## 2025-05-20 RX ADMIN — DIPHENHYDRAMINE HYDROCHLORIDE, ZINC ACETATE: 2; .1 CREAM TOPICAL at 12:14

## 2025-05-20 RX ADMIN — FLUTICASONE PROPIONATE 1 SPRAY: 50 SPRAY, METERED NASAL at 15:44

## 2025-05-20 RX ADMIN — ACETAMINOPHEN 975 MG: 325 TABLET ORAL at 09:19

## 2025-05-20 RX ADMIN — BUSPIRONE HYDROCHLORIDE 30 MG: 30 TABLET ORAL at 09:19

## 2025-05-20 RX ADMIN — TOPIRAMATE 50 MG: 50 TABLET, FILM COATED ORAL at 09:19

## 2025-05-20 RX ADMIN — LORAZEPAM 1 MG: 1 TABLET ORAL at 09:19

## 2025-05-20 RX ADMIN — GABAPENTIN 300 MG: 300 CAPSULE ORAL at 21:49

## 2025-05-20 RX ADMIN — TRAZODONE HYDROCHLORIDE 300 MG: 150 TABLET ORAL at 21:49

## 2025-05-20 RX ADMIN — ACETAMINOPHEN 975 MG: 325 TABLET ORAL at 21:50

## 2025-05-20 RX ADMIN — METHYLPHENIDATE HYDROCHLORIDE 15 MG: 5 TABLET ORAL at 09:19

## 2025-05-20 RX ADMIN — DESVENLAFAXINE 150 MG: 50 TABLET, FILM COATED, EXTENDED RELEASE ORAL at 09:20

## 2025-05-20 RX ADMIN — MAGNESIUM SULFATE HEPTAHYDRATE: 500 INJECTION, SOLUTION INTRAMUSCULAR; INTRAVENOUS at 21:55

## 2025-05-20 RX ADMIN — QUETIAPINE FUMARATE 200 MG: 200 TABLET ORAL at 21:49

## 2025-05-20 RX ADMIN — PANTOPRAZOLE SODIUM 40 MG: 40 TABLET, DELAYED RELEASE ORAL at 21:50

## 2025-05-20 RX ADMIN — ACETAMINOPHEN 975 MG: 325 TABLET ORAL at 15:44

## 2025-05-20 RX ADMIN — RAMELTEON 8 MG: 8 TABLET, FILM COATED ORAL at 22:07

## 2025-05-20 RX ADMIN — ZOLPIDEM TARTRATE 5 MG: 5 TABLET, FILM COATED ORAL at 21:50

## 2025-05-20 ASSESSMENT — ACTIVITIES OF DAILY LIVING (ADL)
ADLS_ACUITY_SCORE: 43
ADLS_ACUITY_SCORE: 43
ADLS_ACUITY_SCORE: 44
ADLS_ACUITY_SCORE: 44
ADLS_ACUITY_SCORE: 43
ADLS_ACUITY_SCORE: 44
ADLS_ACUITY_SCORE: 43
ADLS_ACUITY_SCORE: 44
ADLS_ACUITY_SCORE: 43
ADLS_ACUITY_SCORE: 44
ADLS_ACUITY_SCORE: 43
ADLS_ACUITY_SCORE: 44
ADLS_ACUITY_SCORE: 43
DEPENDENT_IADLS:: INDEPENDENT
ADLS_ACUITY_SCORE: 43

## 2025-05-20 NOTE — PROVIDER NOTIFICATION
"Provider text paged    \"Current orders are for ensure enlive supplements but pt is requesting boost supplements. Can this please be adjusted? Thanks.\"  "

## 2025-05-20 NOTE — PROGRESS NOTES
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 Has a headache, just received Tylenol and thinks it might be helping. Continues to have anorectal pain, does not feel like it is well controlled with current regimen. Tried a bite of pudding yesterday evening and had burning when it passed through her stoma. Otherwise has had small amounts of water with meds. Denies dizziness. Denies SI.     Objective:   Vitals:    05/20/25 0129 05/20/25 0200 05/20/25 0300 05/20/25 0400   BP: (!) 86/56 (!) 87/55 (!) 87/53 (!) 86/49   BP Location: Right arm      Pulse:       Resp:       Temp:       TempSrc:       SpO2:       Weight:       Height:           General: NAD, appears comfortable in bed  CV: Well-perfused  Resp: Breathing comfortably on room air  Abdomen: Soft, nontender. Stoma pink with brown output, tender to palpation along skin edges and appliance       Assessment: Marizol Granados is a 40 year old female with stage IIIB low grade peritoneal carcinoma admitted to observation for suspected peristomal skin infection.      # Stoma site pain   # Suspected peristomal skin infection   - D#3 miconazole BID   - s/p WOC consult, appreciate recs; was refitted for stoma yesterday to improve seal   - Tolerating minimal PO, had sips of water with meds and a few bites of pudding but otherwise not wanting to eat due to fear of pain  - on mIVF @ 75cc, likely plan to stop this AM pending PO tolerance     # Stage IIIB low grade peritoneal carcinoma  # Rectovaginal fistula  # Neurogenic constipation   - s/p 5C Carbo/Taxol, trial of letrozole; CT 3/21/25 with no evidence of  "disease  - Postoperative course complicated by rectovaginal fistula with neurogenic constipation requiring extensive disimpaction in the OR   - Follows with CRS, plans for end colostomy with intersphincteric proctectomy, loop takedown   - Patient with concerns regarding current planned surgical intervention, plan to discuss today with primary gyn-onc Dr. Sosa, coordinate with CRS prn      # Urinary retention   # Hx frequent UTIs   # Cystitis on CT  - CT with evidence of cystitis, redemonstrated from 3/21 CT   - Has hx frequent UTIs, manages retention with CIC due to inability to void   - Last treated for UTI 2/25 with cipro, culture resulted with pansensitive staph aureus   - Followed by Dr. Moseley in urology, last seen in April for cysto with no structural cause to explain UTIs, consideration of gentamicin irrigations with plan to follow up on 6/5  - c/f colonization in the setting of frequent self catheterization, asymptomatic on presentation though limited sensation post surgery and does not feel sx of UTI per patient  - D#3 ceftriaxone; abx continued d/t concern for UTI exacerbating current level of pain, discontinue prn   - UA as above with +LE, +nitrites collected post CTX; urine culture pending    - Afebrile overnight   - Continue CIC      # FAHAD, resolved   - Cr slightly elevated compared to previous, on arrival to ED 1.02   - Improved to baseline 5/19 at 0.87      # Hx DVT/PE  - PTA Eliquis      # MDD/SHIVAM  # Insomnia   # RLS   - PTA scheduled Buspar, Pristiq, gabapentin, Ativan, Seroquel, ramelteon   - PTA prn Trazodone, Ambien   - Intermittently during admission with comments such as \"I don't want to be here anymore\" and stating she is \"in a dark place\", denies SI this AM   - Continue to monitor mood closely      # Hx migraines  - PTA Topamax, propranolol  - Consider holding propranolol 2/2 soft blood pressures overnight likely exacerbated by relative hypotension      # Anorectal pain  # Cancer related " fatigue   - Followed by palliative outpatient with Dr. Norton, last seen 4/24   - PTA Ritalin for fatigue, pain management as described above   - PTA Dilaudid, Butrans patch, gabapentin  - Seen by inpatient palliative team, increased Dilaudid PO to 6-8 mg q4hr prn and started scheduled Tylenol     Bharti Rich DO  [facility]  Gynecology Oncology Resident, PGY-2  05/20/2025 6:18 AM    To reach the GYNECOLOGY ONCOLOGY team for this patient, please page [phone] or search Gynecologic Oncology Resident Group on Vocera.     I saw and evaluated the patient with the resident.  I edited and reviewed the above note. I have reviewed all pertinent imaging and labs on this patient. Planning to the OR on 5/28, TPN, and WOC RN to help with the stoma.     Luz Maria Ag MD  Professor  Department of Ob/Gyn and Women's Health  Division of Gynecologic Oncology  [facility]  [phone]

## 2025-05-20 NOTE — PLAN OF CARE
"[id number]    Reason for admission: abdominal pain r/t suspected peristomal skin infection   Vitals:   BP 93/59   Pulse 59   Temp 97.6  F (36.4  C) (Oral)   Resp 16   Ht 1.651 m (5' 5\")   Wt 61.3 kg (135 lb 2.3 oz)   LMP  (LMP Unknown)   SpO2 98%   BMI 22.49 kg/m    Activity: SBA  Pain: 6/10 managed w/ PRN PO dilaudid  Neuro: Calls appopriately. A&Ox4. Flat/sad affect  Cardiac: WDL. Soft bps - held morning dose of propranolol. Per pt soft bps are her baseine  Respiratory: No report of SOB. RA  GI/: ostomy and self caths   Diet: regular; pt to start TPN   Lines: R PIV infusing NS @ 60 ml/hr; R chest port accessed 5/20/25  Wounds/Incisions: ileostomy on abdomen; rectovaginal fistula   Labs/imaging/Consults: palliative consult; nutrition consult; WOC consult   Discharge Plan: pt to transfer to different unit. Potential surgery next week.    Problem: Adult Inpatient Plan of Care  Goal: Absence of Hospital-Acquired Illness or Injury  Intervention: Prevent Infection  Recent Flowsheet Documentation  Taken 5/20/2025 0900 by Blossom Olvera RN  Infection Prevention:   cohorting utilized   hand hygiene promoted   rest/sleep promoted   single patient room provided  "

## 2025-05-20 NOTE — PROGRESS NOTES
"CLINICAL NUTRITION SERVICES - ASSESSMENT NOTE    RECOMMENDATIONS FOR MDs/PROVIDERS TO ORDER:  Fluids per team     Registered Dietitian Interventions:  Dosing weight:  61 kg  Access: Central     Initial parameters (per day)  Volume: 746 mL  Dextrose: 130 g  AA: 90 g  Lipids: 250 mL 20%, 6 days per week     Dextrose titration:   Monitor lytes and if within acceptable parameters (Mg++ > or = 1.5, K+ is > or = 3, and PO4 > or = 1.9), increase dextrose by 45 g/day to goal of 220 g dextrose.    Additives: Infuvite/per PharmD    Goal PN provides 220 g dextrose, 90 g AA, and 250 mL 20% lipids 6 days per week for total provision of 1536 Kcals (25 Kcals/kg), 1.5 g/kg protein, GIR 2.5 mg/kg/minute, and 28% fat kcals on average daily.     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     INFORMATION OBTAINED  Assessed patient in room.    NUTRITION HISTORY  Pt has not had much of an appetite since her cancer diagnosis and has lost a significant amount of weight. Over the past three days she has not eaten anything due to pain around stoma.     CURRENT NUTRITION ORDERS  Diet: Regular  Snacks/Supplements: Boost Soothe TID      CURRENT INTAKE/TOLERANCE  Poor/ no appetite per flowsheet. No % intake documented.   Pt has been self-restricting all food and drink for 3 days PTA to avoid pain.     LABS  Nutrition-relevant labs: Reviewed Mg 1.5, K+ 3.6, Phos 4.4, BG 88, Hbg 9.2 , hematocrit 27.6    MEDICATIONS  Nutrition-relevant medications: Reviewed Protonix     ANTHROPOMETRICS  Height: 165.1 cm (5' 5\")  Admission Weight: 60.7 kg (133 lb 12.8 oz) (05/18/25 1715)   Most Recent Weight: 61.3 kg (135 lb 2.3 oz) (05/19/25 0115)  IBW: 56.8 kg  BMI: Body mass index is 22.49 kg/m .   Weight History:   Wt Readings " from Last Encounters:   05/19/25 61.3 kg (135 lb 2.3 oz)   05/09/25 64.5 kg (142 lb 1.6 oz)   05/01/25 64.9 kg (143 lb)   04/24/25 63.5 kg (140 lb)   04/22/25 65.7 kg (144 lb 13.5 oz)   04/16/25 65.4 kg (144 lb 3.2 oz)   04/10/25 67.1 kg (148 lb)   04/04/25 67.6 kg (149 lb)   03/20/25 60.3 kg (133 lb)   02/14/25 62.3 kg (137 lb 6.4 oz)   01/24/25 62.3 kg (137 lb 4.8 oz)   01/03/25 62.6 kg (138 lb)   12/30/24 59.9 kg (132 lb)   12/13/24 63.3 kg (139 lb 8 oz)   11/26/24 63.9 kg (140 lb 14.4 oz)   11/22/24 64.4 kg (142 lb)   11/01/24 65.3 kg (144 lb)   10/31/24 65.3 kg (144 lb)   10/29/24 65.8 kg (145 lb)   10/11/24 68.5 kg (151 lb 1.6 oz)   10/10/24 67.1 kg (148 lb)   09/25/24 70.9 kg (156 lb 4.9 oz)   09/25/24 70.2 kg (154 lb 11.2 oz)   09/20/24 70.5 kg (155 lb 6.4 oz)   09/12/24 72.8 kg (160 lb 7.9 oz)   09/09/24 74.8 kg (165 lb)   09/09/24 75 kg (165 lb 4.8 oz)   08/30/24 78.9 kg (173 lb 15.1 oz)   08/30/24 75.8 kg (167 lb)   07/11/24 83.5 kg (184 lb)   06/26/24 83.5 kg (184 lb 1.6 oz)   06/14/24 80.9 kg (178 lb 4.8 oz)   05/20/24 84.2 kg (185 lb 10 oz)   27.2% wt loss in 1 year (severe)    Dosing Weight: 61 kg, based on actual wt    ASSESSED NUTRITION NEEDS  Estimated Energy Needs:[id number] kcals/day (25-30 - 35 kcals/kg)  Justification: Repletion *25 kcal/kg for PN, 30-35 kcal/kg for PO intake   Estimated Protein Needs: 73-92 grams protein/day (1.2 - 1.5 grams of pro/kg)  Justification: Increased needs  Estimated Fluid Needs: 1 mL/kcal  Justification: Maintenance and Per provider pending fluid status    SYSTEM AND PHYSICAL FINDINGS    GI symptoms: Reviewed, denied N/V/D  Skin/wounds: Reviewed suspected skin infection (improving), skin breakdown and leakage around stoma     MALNUTRITION  % Intake: Decreased intake does not meet criteria  % Weight Loss: > 20% in 1 year (severe)   Subcutaneous Fat Loss: Orbital: Mild, Buccal: Moderate, and Triceps: Moderate  Muscle Loss: Temples (temporalis muscle): Moderate,  Clavicles (pectoralis and deltoids): Moderate, and Calf (gastrocnemius): Mild  Fluid Accumulation/Edema: None noted  Malnutrition Diagnosis: Severe malnutrition in the context of chronic illness  Malnutrition Present on Admission: Yes    NUTRITION DIAGNOSIS  Inadequate oral intake related to abdominal pain as evidenced by patient report and requiring TF to meet estimated needs.     INTERVENTIONS  Enteral nutrition management- initiate     GOALS  Total avg nutritional intake to meet a minimum of 25 kcal/kg and 1.2 g PRO/kg daily (per dosing wt 61 kg).     MONITORING/EVALUATION  Progress toward goals will be monitored and evaluated per policy.    Gianna López PhD, RD, LD  Clinical Dietitian II  Available by name via [employer]  Weekend/Holiday Vocera: Weekend Holiday Clinical Dietitian [Multi Site Groups]

## 2025-05-20 NOTE — PROGRESS NOTES
"Brief Progress Note    Presented to bedside for PM check in. On arrival, Marizol is laying on her side with a pillow across her abdomen with the lights off. She stated \"I don't want to talk to you and I don't want anything\". I asked if there was anything I could get for her and she said no. I informed her that both me and her nurse would be available overnight if she had any questions or concerns and exited the room.     Discussed with bedside RN; continues to avoid PO intake. Earlier in the day made plan with team to try protein shakes but when RN brought certain brand/flavor patient expressed frustration and requested a different one that is not currently in stock. Has not been very interactive since.     Plan tomorrow to have primary gyn-onc visit with patient and continue to work on ostomy plan. Continue current POC.     Bharti Rich DO  [facility]  Gynecology Oncology Resident, PGY-2  05/19/2025 8:43 PM    To reach the GYNECOLOGY ONCOLOGY team for this patient, please page [phone] or search Gynecologic Oncology Resident Group on Vocera.     "

## 2025-05-20 NOTE — PROVIDER NOTIFICATION
"Provider text paged    \"Do you think when she said she didn't want to be here she meant the hospital or do you think she meant in general like life? She was too upset in the moment for really any further clarification/questions. She is much calmer now and pretty sleepy and I tried now to clarify what she meant by that and she just stated \"I'm fine\".    Provider replied, \"I think that's a good thing to keep in mind, we can certainly screen her for SI risk tomorrow to be sure. I'm glad she is at least feeling a little better for now, hopefully some rest will help too.\"  "

## 2025-05-20 NOTE — PROGRESS NOTES
Provider aware.    Pt removed buprenorphine patch on L shoulder d/t itching. Applied Benadryl cream.

## 2025-05-20 NOTE — PROGRESS NOTES
PALLIATIVE CARE PROGRESS NOTE  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   Hx rectovaginal fistula, ileostomy. Increased pain around ileostomy in the past week, suspected peristomal skin infection.   Continue Dilaudid 6-8mg q4h prn   Continue butrans patch 20 mcg/hr q7d   Patch causing itching - will try flonase topically prior to placing patch today and re-assess tomorrow. If continues to be an issue may need to look into other options. (done for you)  Continue Acetaminophen (Tylenol) 975mg TID scheduled   Other options to consider  Switching from butrans to belbuca - to allow higher doses of buprenorphrine as the patch increase has not seemed to help. Patient hesitant about use of sublingual films, worried it will cause nausea.     #Anxiety  #Insomnia   #Risk for polypharmacy  Anxiety managed outpatient by psychiatry.   Continue Gabapentin 300 mg bedtime  Continue buspar 30mg BID  Continue Pristiq 150 mg daily  Continue ativan 1mg PO BID scheduled  Propanolol 10mg BID  Seroquel 200mg bedtime  Ramelteon 8mg bedtime  Topamax 50mg BID  Trazadone 300 mg prn bedtime  Ambien 5mg prn bedtime  Will have PCSW visit patient tomorrow to assist with adjustment to illness, coping, anxiety.      #Fatigue  Using Ritalin PTA, started this in March for fatigue. Increased dose in April. Discussed today with patient my concerns it could be making her anxiety worse. Patient states she would like to keep it for now, has not felt it has made her anxiety worse. Unsure if it has helped her fatigue.   Continue ritalin 15mg AM, 10MG afternoon. Consider de-prescribing if not helping    #Malnutrition  #Concern for pills not absorbing  today  Patient reports pills entire pills noted in her ostomy today - this has happened in the past. RN took photos, sent to gyn-onc.  If concerns about absorption, could consider rotating dilaudid pills to suspension. Will leave for now as the increased dose yesterday seem to be helping more.   Will defer work-up to gyn-onc - consider colo-rectal consult as well   May explain her increased anxiety and mental health symptoms, increased pain if she is not absorbing things fully.   Planning to start TPN/L to bridge until her sugery next week.     Palliative care will continue to follow patient. If any urgent needs arise please reach out to our team via INPHI.     These recommendations have been discussed with gyn-onc, bedside RN, PCSW.    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 Most recent imaging in March 2025 showed no evidence of disease. She is being followed by CRS for rectovaginal fistula and coordinating surgery for takedown of loop ileostomy and creation of end colostomy. Follows palliative care outpatient for pain. Our team was consulted to help with pain and anxiety.      Today, the patient was seen for:  Palliative care encounter  Support  Pain, acute on chronic  Anxiety       Interval History:     Multidisciplinary collaboration:  Chart reviewed. Discussed with gyn-onc. Able to move her surgery up to 5/28, they are hopeful this will improve both her rectal pain and allow her stoma to heal. Starting TPN in the interim to also allow more healing to stoma.     Notable medications:  APAP prn and scheduled - received 3900mg past 24 hours  Eliquis  Butrans 20mcg  Buspar 30  BID  Pristiq  Gabapentin 300mg bedtime  Dilaudid 6mg prn x2, 8mg x3  IV dilaudid 0.5mg x1  Ativan 1mg BID scheduled, prn x1  Ritalin 15mg AM, 10mg afternoon  Protonix 40mg BID  Propanolol   Rametleon  Topamax  Ambien     Patient/family narrative  Examined at bedside. Patient reports feeling better today now that we have a plan in placed. Stoma feels better. Continues to have rectal pain but feels the increased dilaudid dose yesterday has helped. Finding pills in her ostomy.     Review of Systems:     Besides above, ROS was reviewed and is unremarkable.      Physical Exam:   Temp:  [97.4  F (36.3  C)-97.6  F (36.4  C)] 97.6  F (36.4  C)  Pulse:  [56-80] 80  Resp:  [16-18] 16  BP: ()/(49-77) 100/73  SpO2:  [95 %-100 %] 95 %  135 lbs 2.27 oz    General: NAD, lying in bed. Appears comfortable.   Pulmonary: Unlabored. Speaking in full sentences.  Neuro: Speech fluent. Alert and oriented x4.  Mental status/Psych: Asks/answers questions appropriately. Affect is bright       Data Reviewed:     Lab data independently reviewed today. Pertinent findings from my perspective are noted below:  WBC 4.4, Hgb 9.2, plt 147  GFR<90, creatinine 0.81    Imaging independently reviewed today. Pertinent findings from my perspective are noted below:  N/a    Medical Decision Making       MANAGEMENT DISCUSSED with the following over the past 24 hours: gyn-onc, PCSW, bedside RN   NOTE(S)/MEDICAL RECORDS REVIEWED over the past 24 hours: onc, bedside RN  Tests REVIEWED in the past 24 hours:  - See lab/imaging results included in the data section of the note  60 MINUTES SPENT BY ME on the date of service doing chart review, history, exam, documentation & further activities per the note.        Chart documentation was completed using Dragon voice-recognition software. Even though reviewed, some grammatical, spelling, and word errors may remain.

## 2025-05-20 NOTE — PLAN OF CARE
"-vital signs normal or at patient baseline - progressing, per pt this is her normal BP   BP 93/59   Pulse 59   Temp 97.6  F (36.4  C) (Oral)   Resp 16   Ht 1.651 m (5' 5\")   Wt 61.3 kg (135 lb 2.3 oz)   LMP  (LMP Unknown)   SpO2 98%   BMI 22.49 kg/m    -infection is improving - progressing   -safe disposition plan has been identified - not met     PRIMARY DIAGNOSIS: \"GENERIC\" NURSING  OUTPATIENT/OBSERVATION GOALS TO BE MET BEFORE DISCHARGE:  ADLs back to baseline: Yes    Activity and level of assistance: Ambulating independently.    Pain status: Improved-controlled with oral pain medications.    Return to near baseline physical activity: Yes     Discharge Planner Nurse   Safe discharge environment identified: Yes  Barriers to discharge: Yes       Entered by: Blossom Olvera RN 05/20/2025 10:17 AM     Please review provider order for any additional goals.   Nurse to notify provider when observation goals have been met and patient is ready for discharge.    "

## 2025-05-20 NOTE — PROGRESS NOTES
"[facility]  WO Nurse Inpatient Assessment     Consulted for: ileostomy    WO nurse follow-up plan: daily    Patient History (according to provider note(s):      Marizol Granados is a 40 year old female with stage IIIB low grade peritoneal carcinoma admitted to observation for suspected peristomal skin infection.     Assessment:      Areas visualized during today's visit: Focused: and Abdomen    Assessment of Established loop Ileostomy:  How long has patient had ostomy: 9/2024  Stoma: pink-red, round, moist, good turgor, and flat  Mucutaneous junction: intact  Peristomal skin: Moisture-Associated Skin Damage (MASD) due to leakage   Output: liquid      Is patient independent with ostomy care?: Yes  Home pouching system: Coloplast 1 piece deep convex pre-cut with barrier ring  Pouching issues and/or educational needs:Evaluate leakage issue, Adjustment of pouching plan, and Peristomal skin care  Interventions completed today: Pouching system assessment , Evaluate leakage issue, Refitting of appliance , Adjustment of pouching plan, and Peristomal skin care  Pouching system in use while hospitalized:  [place] one piece, cut to fit, convex, barrier ring, ostomy powder, and skin prep  Supplies location: at bedside        5/19: Pt currently wears a 1 piece pre-cut pouch. The hole in the wafer is too large for Pt's stoma and she is having skin breakdown and leakage. This is very distressing for the Pt. Switched to a cut to fit 1 piece pouch with Marie barrier ring, stoma powder and Cavilon advanced.  5/20: Pt's freedom-stomal skin much improved since yesterday. Will continue the new pouching system for now.     Treatment Plan:     RLQ Ileostomy pouching plan:   Pouching system: ostomy supplies pouches: Jose 38 Cera Plus Soft Convex FECAL (708327)   Accessories used: [facility] ostomy accessories: 2\" Marie Ring (073426), Powder (281116), and Cavilon no sting barrier film (659512) "   Frequency of pouch changes: PRN leakage and Three times a week  WOC follow up plan: Daily Monday-Friday (as able)  Bedside RN interventions: Change pouch PRN if leaking using the supplies above, Empty pouch when 1/3 to 1/2 full, ensure to clean pouch outlet after emptying to prevent odor, Notify WOC for ongoing pouch leakage, Document stoma appearance and output volume, color, and consistency every shift, and Encourage patient to empty pouch with assist     Orders: Reviewed    RECOMMEND PRIMARY TEAM ORDER: None, at this time  Education provided: plan of care  Discussed plan of care with: Patient and Nurse Practitioner  Notify WOC if wound(s) deteriorate.  Nursing to notify the Provider(s) and re-consult the WOC Nurse if new skin concern.    DATA:     Current support surface: Standard  Standard gel mattress (Isoflex)  Containment of urine/stool: Incontinent pad in bed and Ileostomy pouch  BMI: Body mass index is 22.49 kg/m .   Active diet order: Orders Placed This Encounter      Regular Diet Adult     Output: No intake/output data recorded.     Labs:   Recent Labs   Lab 05/20/25  0706 05/18/25  1840   ALBUMIN  --  4.1   HGB 9.2* 11.1*   WBC 4.4 6.6     Pressure injury risk assessment:   Sensory Perception: 4-->no impairment  Moisture: 4-->rarely moist  Activity: 3-->walks occasionally  Mobility: 4-->no limitation  Nutrition: 2-->probably inadequate  Friction and Shear: 3-->no apparent problem  Terry Score: 20    Jalen Hart RN, CWOCN  Pager no longer in use, please contact through [employer] group: WO Nurse [place]    Dept. Office Number: [phone]        [facility]     Name: Marizol Granados  Date: 5/19/25    To order your ostomy supplies    The ostomy Supplier needs this supply list  to process your order. You will need to fax/deliver this list, along with your Insurance information. Your home care nurse can assist with this process.    List of Ostomy Distributors      [employer]  Ph. [phone] Fax # [phone]  [facility]   Ph. [phone] [phone]  [employer] [facility]   Ph. [phone]  [facility]   Ph. [phone]  Or Call your insurance provider for their preferred supplier    Your Medical Supplier will need your surgeon's name, phone and fax number    Clinic:                     Phone                            Fax  Gynecology Oncology:             [phone]      Verbal Order for ostomy supplies for 1 Month per:              Jalen Hart RN, CWOCN                                                                  Authorizing MD: Dr. Destiny Rubio    Quantity of pouches:     20/mo.    Request the following supplies:      [place]                              1 piece soft convex with filter #[id number]    OR                    Coloplast      1 piece deep convex pouch cut to fit 1-5/16  with filter #[id number]          Accessories       2  Marie ring #[id number]    Adapt powder #[id number]    No sting film barrier # 7250                                     Change your pouch 2 times a week, more often if leaking.   If you are cutting a hole in the wafer of your pouch, recheck stoma size and adjust pouch opening as needed every week    . Call the Ostomy Nurse at [facility]       [address] [place] [address] [place] : [phone]   Schedule a follow-up visit in 2 to 4 weeks after your surgery, sooner if having problems Bring a complete set of pouch-changing supplies to this visit

## 2025-05-20 NOTE — PROVIDER NOTIFICATION
"Provider text paged via [employer]    \"FYI BP is 81/54. Pt is not lightheaded or dizzy just states she is tired. Pulse is 57\"    Provider replied, \"We can continue to monitor, if she becomes symptomatic please let me know.\"  "

## 2025-05-20 NOTE — PROGRESS NOTES
Brief Progress Note:    Patient seen for PM rounds. Lying down in bed and notably anxious. Tried to eat a banana today, but 30-45 minutes later, had onset of pain around her stoma. Water also results in similar pain, though less intense. Has been self limiting food as a result.     States that since her ostomy was created, she has noticed undigested pills come out in her ostomy bag. Had a few pills earlier today come out. She is concerned this means that she has not been getting the full effects of her medications, of which she is most concerned about her seroquel and her pain regimen. Otherwise, endorsing similar pain in her rectum.    Preferred to have additional visit later today, as she is feeling overwhelmed due to port flush that was about to start.    Will discuss alternative formulations for medication regimen (e.g., crushed, syrup, liquid) and continue pain regimen per palliative care. Nutrition consulted. Plan to admit with current goal prior to discharge as getting TPN started and to goal, given her limited PO intake. Continuing IV fluids in the interim. Has exploratory laparotomy, intersphincteric proctectomy, takedown of loop ileostomy, creation of end colostomy with Dr. Sosa and Dr. Carpenter tentatively planned for 5/28. Continuing ceftriaxone given preliminary urine culture growing gram negative bacilli, will narrow based on susceptibility profile.     Breana Child, MS4   5/20/25, 4:55 PM       ATTESTATION: Discussed the plan for patient care and reviewed the documentation with student Dr. Child. Discussed POC with Palliative care team. Patient reported some itching around butrans patch- will try flonase premedication. Could consider crushing medications to decrease likelihood of output from ostomy bag. Plan to follow up with WOC to aleah site for colostomy tomorrow. TPN initiated today.     Isamar Woodruff MD  Obstetrics, Gynecology & Women's Health   Resident, PGY-1  05/20/2025 7:22 PM

## 2025-05-20 NOTE — CONSULTS
Care Management Initial Consult    General Information  Assessment completed with: Marizol Espinosa  Type of CM/SW Visit: Initial Assessment    Primary Care Provider verified and updated as needed: Yes   Readmission within the last 30 days: no previous admission in last 30 days      Reason for Consult: discharge planning  Advance Care Planning: Advance Care Planning Reviewed: no concerns identified       Communication Assessment  Patient's communication style: spoken language (English or Bilingual)    Hearing Difficulty or Deaf: no   Wear Glasses or Blind: yes    Cognitive  Cognitive/Neuro/Behavioral: WDL                      Living Environment:   People in home: child(enrico), dependent     Current living Arrangements: house      Able to return to prior arrangements: yes    Family/Social Support:  Care provided by: self  Provides care for: child(enrico)  Marital Status: Single  Support system: Friend, Parent(s)          Description of Support System: Supportive    Support Assessment: Adequate family and caregiver support, Adequate social supports    Current Resources:   Patient receiving home care services: No    Community Resources: None  Equipment currently used at home: none  Supplies currently used at home: Incontinence Supplies, Other (Ostomy supplies)    Employment/Financial:  Employment Status: disabled        Financial Concerns: unable to afford rent/mortgage, unemployed   Referral to Financial Worker: No    Does the patient's insurance plan have a 3 day qualifying hospital stay waiver?  No    Lifestyle & Psychosocial Needs:  Social Drivers of Health     Food Insecurity: Low Risk  (5/19/2025)    Food Insecurity     Within the past 12 months, did you worry that your food would run out before you got money to buy more?: No     Within the past 12 months, did the food you bought just not last and you didn t have money to get more?: No   Depression: Risk of Self Harm (5/4/2025)    Received from [facility] and  Affiliates    Depression (PHQ-9)     Last PHQ-9 Score: 27     Thoughts of self harm: Nearly every day   Housing Stability: High Risk (5/19/2025)    Housing Stability     Do you have housing? : No     Are you worried about losing your housing?: No   Tobacco Use: Low Risk  (5/1/2025)    Patient History     Smoking Tobacco Use: Never     Smokeless Tobacco Use: Never     Passive Exposure: Not on file   Financial Resource Strain: Low Risk  (5/19/2025)    Financial Resource Strain     Within the past 12 months, have you or your family members you live with been unable to get utilities (heat, electricity) when it was really needed?: No   Alcohol Use: Not At Risk (5/13/2024)    AUDIT-C     Frequency of Alcohol Consumption: Monthly or less     Average Number of Drinks: 1 or 2     Frequency of Binge Drinking: Never   Transportation Needs: Low Risk  (5/19/2025)    Transportation Needs     Within the past 12 months, has lack of transportation kept you from medical appointments, getting your medicines, non-medical meetings or appointments, work, or from getting things that you need?: No   Physical Activity: Sufficiently Active (5/13/2024)    Exercise Vital Sign     Days of Exercise per Week: 4 days     Minutes of Exercise per Session: 60 min   Interpersonal Safety: Low Risk  (5/19/2025)    Interpersonal Safety     Do you feel physically and emotionally safe where you currently live?: Yes     Within the past 12 months, have you been hit, slapped, kicked or otherwise physically hurt by someone?: No     Within the past 12 months, have you been humiliated or emotionally abused in other ways by your partner or ex-partner?: No   Stress: Stress Concern Present (5/13/2024)    [place] [employer] - Occupational Stress Questionnaire     Feeling of Stress : Very much   Social Connections: Moderately Isolated (5/13/2024)    Social Connection and Isolation Panel [NHANES]     Frequency of Communication with Friends and  Family: Three times a week     Frequency of Social Gatherings with Friends and Family: Once a week     Attends Sikh Services: Never     Active Member of Clubs or Organizations: No     Attends Club or Organization Meetings: Never     Marital Status: Living with partner   Health Literacy: Not on file     Functional Status:  Prior to admission patient needed assistance:   Dependent ADLs:: Independent  Dependent IADLs:: Independent    Mental Health Status:  Mental Health Status: Current Concern  Mental Health Management: Other (see comment)    Chemical Dependency Status:  Chemical Dependency Status: No Current Concerns          Values/Beliefs:  Spiritual, Cultural Beliefs, Sikh Practices, Values that affect care: no             Discussed  Partnership in Safe Discharge Planning  document with patient/family: No    Additional Information:  SW received consult for discharge planning for stoma care. MALDONADO met with pt at bedside and introduced self,role, and explained purpose of assessment. SW verified PCP,address, and insurance. Pt reported she lives at home with her two children ( ages 10,11) and is independent with I/ADLs. Pt reported she does not work and has been approved for social security disability which she applied 6 months ago.Pt reported concerns with the amount she will receive ( $1,911) and $400 for her children which is not enough. Pt stated she is not getting any new income and only received EBT when she applied for assistance. Pt reported she was told she will be getting her disability due to her having ovarian cancer and she had to wait 6 months and shared she is hold for 6 hours when she calls social security disability. MALDONADO informed pt she will provide SW with disability hub number for them to answer pt's questions regarding the amount she gets and what she qualifies for. MALDONADO discussed home care referral for stoma care and shared she will include SW who can help pt.     MALDONADO sent home care referral to  [facility] and pt has been accepted by [facility] for RN and SW. SW met with pt at bedside and provided with contacts for [facility] and the number for disability hub. SW gave pt information how social security disability is determined and common questions people have.     Next Steps:   -Follow for discharge needs and send home care orders to [facility].    FLORIAN Slaughter, JEREMIAS  OBS/ED   Phone: [phone]  Fax: [phone]  Vocera: 1A Obs MALDONADO

## 2025-05-20 NOTE — PLAN OF CARE
Outpatient/Observation goals to be met before discharge home:    PRIMARY DIAGNOSIS: ACUTE PAIN  OUTPATIENT/OBSERVATION GOALS TO BE MET BEFORE DISCHARGE:  1. Pain Status: Improved-controlled with oral pain medications.    2. Return to near baseline physical activity: in process    3. Cleared for discharge by consultants (if involved): No    Discharge Planner Nurse   Safe discharge environment identified: No  Barriers to discharge: Yes       Entered by: Renetta Castillo RN 05/19/2025 11:42 PM     Please review provider order for any additional goals.     -vital signs normal or at patient baseline: met  -infection is improving: in process  -safe disposition plan has been identified: not met    Nurse to notify provider when observation goals have been met and patient is ready for discharge.

## 2025-05-20 NOTE — PROVIDER NOTIFICATION
"Provider text paged    \"Pt is wondering what she is getting the rochephin abx for? She is very upset in general. States she is going to try ensure drink. States she \"is done, and doesn't want to be here anymore\"\"  "

## 2025-05-20 NOTE — PLAN OF CARE
"Outpatient/Observation goals to be met before discharge home:    PRIMARY DIAGNOSIS: ACUTE PAIN  OUTPATIENT/OBSERVATION GOALS TO BE MET BEFORE DISCHARGE:  1. Pain Status: Improved-controlled with oral pain medications.    2. Return to near baseline physical activity: in process    3. Cleared for discharge by consultants (if involved): No    Discharge Planner Nurse   Safe discharge environment identified: No  Barriers to discharge: Yes       Entered by: Renetta Castillo RN 05/19/2025 11:42 PM     Please review provider order for any additional goals.     -vital signs normal or at patient baseline: met    /77 (BP Location: Right arm)   Pulse 74   Temp 97.6  F (36.4  C) (Oral)   Resp 18   Ht 1.651 m (5' 5\")   Wt 61.3 kg (135 lb 2.3 oz)   LMP  (LMP Unknown)   SpO2 100%   BMI 22.49 kg/m      -infection is improving: in process  -safe disposition plan has been identified: not met    Nurse to notify provider when observation goals have been met and patient is ready for discharge.    "

## 2025-05-20 NOTE — PLAN OF CARE
"Outpatient/Observation goals to be met before discharge home:    PRIMARY DIAGNOSIS: ACUTE PAIN  OUTPATIENT/OBSERVATION GOALS TO BE MET BEFORE DISCHARGE:  1. Pain Status: Improved-controlled with oral pain medications.    2. Return to near baseline physical activity: in process    3. Cleared for discharge by consultants (if involved): No    Discharge Planner Nurse   Safe discharge environment identified: No  Barriers to discharge: Yes       Entered by: Renetta Castillo RN 05/19/2025 11:42 PM     Please review provider order for any additional goals.     -vital signs normal or at patient baseline: in process, low BP and pulse, provider aware    BP (!) 86/49   Pulse 58   Temp 97.4  F (36.3  C) (Oral)   Resp 16   Ht 1.651 m (5' 5\")   Wt 61.3 kg (135 lb 2.3 oz)   LMP  (LMP Unknown)   SpO2 97%   BMI 22.49 kg/m      -infection is improving: in process  -safe disposition plan has been identified: not met    Nurse to notify provider when observation goals have been met and patient is ready for discharge.    "

## 2025-05-21 LAB
ALBUMIN SERPL BCG-MCNC: 3.3 G/DL (ref 3.5–5.2)
ALP SERPL-CCNC: 70 U/L (ref 40–150)
ALT SERPL W P-5'-P-CCNC: 12 U/L (ref 0–50)
ANION GAP SERPL CALCULATED.3IONS-SCNC: 8 MMOL/L (ref 7–15)
AST SERPL W P-5'-P-CCNC: 20 U/L (ref 0–45)
BILIRUB SERPL-MCNC: <0.2 MG/DL
BILIRUBIN DIRECT (ROCHE PRO & PURE): <0.08 MG/DL (ref 0–0.45)
BUN SERPL-MCNC: 7.8 MG/DL (ref 6–20)
CALCIUM SERPL-MCNC: 9.1 MG/DL (ref 8.8–10.4)
CHLORIDE SERPL-SCNC: 110 MMOL/L (ref 98–107)
CREAT SERPL-MCNC: 0.73 MG/DL (ref 0.51–0.95)
EGFRCR SERPLBLD CKD-EPI 2021: >90 ML/MIN/1.73M2
ERYTHROCYTE [DISTWIDTH] IN BLOOD BY AUTOMATED COUNT: 12.2 % (ref 10–15)
GLUCOSE BLDC GLUCOMTR-MCNC: 109 MG/DL (ref 70–99)
GLUCOSE BLDC GLUCOMTR-MCNC: 113 MG/DL (ref 70–99)
GLUCOSE BLDC GLUCOMTR-MCNC: 120 MG/DL (ref 70–99)
GLUCOSE BLDC GLUCOMTR-MCNC: 128 MG/DL (ref 70–99)
GLUCOSE SERPL-MCNC: 113 MG/DL (ref 70–99)
HCO3 SERPL-SCNC: 25 MMOL/L (ref 22–29)
HCT VFR BLD AUTO: 28.8 % (ref 35–47)
HGB BLD-MCNC: 9.5 G/DL (ref 11.7–15.7)
INR PPP: 1.37 (ref 0.85–1.15)
MAGNESIUM SERPL-MCNC: 1.8 MG/DL (ref 1.7–2.3)
MCH RBC QN AUTO: 31.4 PG (ref 26.5–33)
MCHC RBC AUTO-ENTMCNC: 33 G/DL (ref 31.5–36.5)
MCV RBC AUTO: 95 FL (ref 78–100)
PHOSPHATE SERPL-MCNC: 4 MG/DL (ref 2.5–4.5)
PLATELET # BLD AUTO: 163 10E3/UL (ref 150–450)
POTASSIUM SERPL-SCNC: 3.7 MMOL/L (ref 3.4–5.3)
PREALB SERPL-MCNC: 15.3 MG/DL (ref 20–40)
PROT SERPL-MCNC: 6 G/DL (ref 6.4–8.3)
PROTHROMBIN TIME: 17.2 SECONDS (ref 11.8–14.8)
RBC # BLD AUTO: 3.03 10E6/UL (ref 3.8–5.2)
SODIUM SERPL-SCNC: 143 MMOL/L (ref 135–145)
WBC # BLD AUTO: 3.7 10E3/UL (ref 4–11)

## 2025-05-21 PROCEDURE — 85027 COMPLETE CBC AUTOMATED: CPT | Performed by: OBSTETRICS & GYNECOLOGY

## 2025-05-21 PROCEDURE — 36591 DRAW BLOOD OFF VENOUS DEVICE: CPT | Performed by: OBSTETRICS & GYNECOLOGY

## 2025-05-21 PROCEDURE — 84134 ASSAY OF PREALBUMIN: CPT | Performed by: OBSTETRICS & GYNECOLOGY

## 2025-05-21 PROCEDURE — 258N000003 HC RX IP 258 OP 636: Performed by: OBSTETRICS & GYNECOLOGY

## 2025-05-21 PROCEDURE — 96361 HYDRATE IV INFUSION ADD-ON: CPT

## 2025-05-21 PROCEDURE — 99232 SBSQ HOSP IP/OBS MODERATE 35: CPT | Mod: GC | Performed by: OBSTETRICS & GYNECOLOGY

## 2025-05-21 PROCEDURE — 82248 BILIRUBIN DIRECT: CPT | Performed by: OBSTETRICS & GYNECOLOGY

## 2025-05-21 PROCEDURE — 85610 PROTHROMBIN TIME: CPT | Performed by: OBSTETRICS & GYNECOLOGY

## 2025-05-21 PROCEDURE — 99232 SBSQ HOSP IP/OBS MODERATE 35: CPT

## 2025-05-21 PROCEDURE — 84100 ASSAY OF PHOSPHORUS: CPT | Performed by: OBSTETRICS & GYNECOLOGY

## 2025-05-21 PROCEDURE — 96375 TX/PRO/DX INJ NEW DRUG ADDON: CPT

## 2025-05-21 PROCEDURE — 120N000002 HC R&B MED SURG/OB UMMC

## 2025-05-21 PROCEDURE — 250N000013 HC RX MED GY IP 250 OP 250 PS 637: Performed by: OBSTETRICS & GYNECOLOGY

## 2025-05-21 PROCEDURE — 250N000011 HC RX IP 250 OP 636: Performed by: OBSTETRICS & GYNECOLOGY

## 2025-05-21 PROCEDURE — 250N000013 HC RX MED GY IP 250 OP 250 PS 637

## 2025-05-21 PROCEDURE — 83735 ASSAY OF MAGNESIUM: CPT | Performed by: OBSTETRICS & GYNECOLOGY

## 2025-05-21 PROCEDURE — 84155 ASSAY OF PROTEIN SERUM: CPT | Performed by: OBSTETRICS & GYNECOLOGY

## 2025-05-21 PROCEDURE — 250N000011 HC RX IP 250 OP 636: Mod: JZ

## 2025-05-21 PROCEDURE — 82947 ASSAY GLUCOSE BLOOD QUANT: CPT | Performed by: OBSTETRICS & GYNECOLOGY

## 2025-05-21 PROCEDURE — G0463 HOSPITAL OUTPT CLINIC VISIT: HCPCS

## 2025-05-21 RX ORDER — HYDROMORPHONE HYDROCHLORIDE 1 MG/ML
0.5 INJECTION, SOLUTION INTRAMUSCULAR; INTRAVENOUS; SUBCUTANEOUS
Status: DISPENSED | OUTPATIENT
Start: 2025-05-21 | End: 2025-05-21

## 2025-05-21 RX ORDER — HYDROMORPHONE HYDROCHLORIDE 1 MG/ML
0.5 INJECTION, SOLUTION INTRAMUSCULAR; INTRAVENOUS; SUBCUTANEOUS
Status: DISCONTINUED | OUTPATIENT
Start: 2025-05-21 | End: 2025-05-21

## 2025-05-21 RX ADMIN — TOPIRAMATE 50 MG: 50 TABLET, FILM COATED ORAL at 21:18

## 2025-05-21 RX ADMIN — HYDROMORPHONE HYDROCHLORIDE 8 MG: 4 TABLET ORAL at 14:50

## 2025-05-21 RX ADMIN — RAMELTEON 8 MG: 8 TABLET, FILM COATED ORAL at 21:28

## 2025-05-21 RX ADMIN — LORAZEPAM 1 MG: 1 TABLET ORAL at 08:58

## 2025-05-21 RX ADMIN — PANTOPRAZOLE SODIUM 40 MG: 40 TABLET, DELAYED RELEASE ORAL at 08:59

## 2025-05-21 RX ADMIN — HYDROMORPHONE HYDROCHLORIDE 8 MG: 4 TABLET ORAL at 19:24

## 2025-05-21 RX ADMIN — DESVENLAFAXINE 150 MG: 50 TABLET, FILM COATED, EXTENDED RELEASE ORAL at 21:27

## 2025-05-21 RX ADMIN — ACETAMINOPHEN 975 MG: 325 TABLET ORAL at 08:59

## 2025-05-21 RX ADMIN — PROPRANOLOL HYDROCHLORIDE 10 MG: 10 TABLET ORAL at 09:00

## 2025-05-21 RX ADMIN — HYDROMORPHONE HYDROCHLORIDE 8 MG: 4 TABLET ORAL at 09:05

## 2025-05-21 RX ADMIN — BUPRENORPHINE 1 PATCH: 20 PATCH, EXTENDED RELEASE TRANSDERMAL at 17:57

## 2025-05-21 RX ADMIN — ACETAMINOPHEN 975 MG: 325 TABLET ORAL at 21:17

## 2025-05-21 RX ADMIN — APIXABAN 5 MG: 5 TABLET, FILM COATED ORAL at 21:18

## 2025-05-21 RX ADMIN — GABAPENTIN 300 MG: 300 CAPSULE ORAL at 21:22

## 2025-05-21 RX ADMIN — LORAZEPAM 1 MG: 1 TABLET ORAL at 21:22

## 2025-05-21 RX ADMIN — CEFTRIAXONE SODIUM 1 G: 1 INJECTION, POWDER, FOR SOLUTION INTRAMUSCULAR; INTRAVENOUS at 23:14

## 2025-05-21 RX ADMIN — ZOLPIDEM TARTRATE 5 MG: 5 TABLET, FILM COATED ORAL at 21:22

## 2025-05-21 RX ADMIN — SODIUM CHLORIDE: 0.9 INJECTION, SOLUTION INTRAVENOUS at 04:34

## 2025-05-21 RX ADMIN — TOPIRAMATE 50 MG: 50 TABLET, FILM COATED ORAL at 08:59

## 2025-05-21 RX ADMIN — HYDROMORPHONE HYDROCHLORIDE 0.5 MG: 1 INJECTION, SOLUTION INTRAMUSCULAR; INTRAVENOUS; SUBCUTANEOUS at 10:54

## 2025-05-21 RX ADMIN — PANTOPRAZOLE SODIUM 40 MG: 40 TABLET, DELAYED RELEASE ORAL at 21:18

## 2025-05-21 RX ADMIN — BUSPIRONE HYDROCHLORIDE 30 MG: 30 TABLET ORAL at 21:17

## 2025-05-21 RX ADMIN — METHYLPHENIDATE HYDROCHLORIDE 15 MG: 5 TABLET ORAL at 08:58

## 2025-05-21 RX ADMIN — METHYLPHENIDATE HYDROCHLORIDE 10 MG: 5 TABLET ORAL at 12:30

## 2025-05-21 RX ADMIN — BUSPIRONE HYDROCHLORIDE 30 MG: 30 TABLET ORAL at 08:58

## 2025-05-21 RX ADMIN — PROPRANOLOL HYDROCHLORIDE 10 MG: 10 TABLET ORAL at 21:18

## 2025-05-21 RX ADMIN — QUETIAPINE FUMARATE 200 MG: 200 TABLET ORAL at 21:22

## 2025-05-21 RX ADMIN — APIXABAN 5 MG: 5 TABLET, FILM COATED ORAL at 08:58

## 2025-05-21 RX ADMIN — TRAZODONE HYDROCHLORIDE 300 MG: 150 TABLET ORAL at 21:22

## 2025-05-21 ASSESSMENT — ACTIVITIES OF DAILY LIVING (ADL)
ADLS_ACUITY_SCORE: 43

## 2025-05-21 NOTE — PROGRESS NOTES
Presented to patient room with update that patient had removed her ostomy bag.    Marizol states that the placement today was not done well and caused her to have increased burning at her stoma site. Discussed with Marizol what has worked best for application and reduction of irritation. Placed stoma bag with trimming size of opening, placing barrier powder followed by barrier solution, Ostomy bag placed with barrier ring. Marizol was satisfied with bag placement.    Con Dennis MD  GynONC Resident PGY-4  Pager: [phone]  5/21/2025 6:02 PM

## 2025-05-21 NOTE — PROGRESS NOTES
Palliative Care Note    Assessed patient this AM around 10:30, patient was very tearful and reporting severe rectal and vaginal pain that was sharp. Was unable to tolerate wound care, changing her chux pad, or catheterizing herself. Received PO dilaudid 8mg >1 hor prior with minimal improvement. Very anxious as well which is likely not helping pain. Patient unsure what triggered this.     PLAN:   - Will do Dilaudid 0.5mg IV x1 now and re-assess  - If still anxious and reporting pain, we can consider 1x dose of IV ativan  - Encouraged patient to self cath and reposition on her side once we get symptoms better managed.   - Will re-assess this afternoon.     30 MINUTES SPENT BY ME on the date of service doing chart review, history, exam, documentation & further activities per the note.     GERALDO Frausto CNP  Securely message with [employer] (more info)  Text page via [facility] Paging/Directory

## 2025-05-21 NOTE — PROGRESS NOTES
May 20, 2025    I was made aware of patient's admission and plan for total proctectomy next week.  I came by to discuss with her the question of suprapubic tube that had come up given her urinary retention that is currently managed with ISC.    We spent some time discussing what was important to her about managing her bladder.  She does not mind ISC during the day and does not want to return to having a full time bag.  Her issue about her bladder at night is related to the large amount of urine leakage she has at night using multiple briefs and still wetting the bed, the night time leakage is causing her significant stress.    We discussed that a suprapubic tube is something that we would place at the time of her open surgery.  Discussed that this would allow her to have a way to drain her bladder as she recovers and is unable to do ISC.  We further discussed that as she is doing okay during the day it would be reasonable to have her cap the tube during the day but then use a bag at night to help to keep her dry. I was clear that this does not have to be permanent and she could go back to the ISC.      At this time given the chance to be dry at night and to avoid a bag during the day she wishes to undergo an open suprapubic tube at the time of her combined surgery with Dr Carpenter and Dr Sosa.  Given that I am not in the OR that day I have asked my colleague Dr Young to do this as well as place the ureteral stents that have been requested.    Julia Moseley MD MPH  (she/her/hers)   of Urology  [facility]

## 2025-05-21 NOTE — PROGRESS NOTES
Brief Progress Note    To bedside for evening check-in.  Marizol is doing okay.  She continues to have burning pain around her stoma as stool comes out.  She was going to try using a supplement today, but reports that the strawberry kiwi flavor of boost is disgusting, and if this is the only boost flavor available then she will not be able to drink it.  She does request chocolate or vanilla boost if available.  Wants to know when we will start TPN.  Also has concerns regarding her digestion of medications; reports that today had a pill that was undigested in her ostomy bag, and because of that she is concerned that she is not absorbing her medications.  Would like to know if any of these could be crushed or given an alternate formulations to avoid this issue.  She has no other specific concerns.    Discussion with pharmacy, plan to start TPN tonight.  Was delivered around 2200.  Plan to increase rate to goal over the next couple of days anticipation of undergoing end colostomy formation on May 28.  With regards to supplements, spoke with the bedside nurse who reports that there may be more palatable flavors of boost available and he will try to find them for her.  With regards to her medication formulation, brief conversation with pharmacy reveals that most of her medications can be crushed prior to administration; specifically would avoid crushing Pristiq, in addition to Protonix but this is available as a liquid if needed.  The remainder of her psychiatric medications are able to be crushed.  Will plan to facilitate this as needed to promote patient comfort and lessen anxiety.  Otherwise continue plan of care.  Ordered for labs in the morning.    Bharti Rich DO  [facility]  Gynecology Oncology Resident, PGY-2  05/20/2025 10:37 PM    To reach the GYNECOLOGY ONCOLOGY team for this patient, please page [phone] or search Gynecologic Oncology Resident Group on Vocera.

## 2025-05-21 NOTE — PROGRESS NOTES
"PALLIATIVE CARE SOCIAL WORK Progress Note   Location: [facility]      Support Visit     PCSW follow up visit with Marizol this afternoon. Pt awake, alert and oriented and welcoming of visit.    Reviewed and provided patient with items discussed yesterday including:   - [employer] and [place]  for disability assistance and family/child support assistance  - [facility] for bereavement support following Marizol's fathers death 10/2024  - Notebook for journaling, organizing thoughts    Marizol tearful this afternoon as she talked about her stress level. Talked about her conversation with her mom earlier and noted \"She makes me feel so guilty\" referring to how she will need to depend on her mom to help with her kids when she has surgery. Marizol also expressed frustration and fatigue of being \"in the middle\" of her children's father and her mother as they argue. Supportive presence, active listening and validation provided which patient responded well to.    Visit kept brief as [facility] nurse arrived for wound cares, Marizol ready to \"get this done.\" Reporting better pain control this afternoon than noted in chart from this morning.    Plan: PCSW will continue to follow while palliative care team remains involved; continued assessment of coping, use of non-pharm techniques for anxiety; emotional support needs    Clinical Social Work Interventions:   Assessment of palliative specific issues    Facilitation of processing of thoughts/feelings  Re-framing   Resource referral - [employer] [place]  for [place] assistance and family/child support assistance; [facility] for Marizol and her kids as they grieve her father's death last fall; Notebook for journaling    FLORIAN Knight, [facility]  [facility], Palliative Care  Securely message with the Vocera Web Console (learn more here) or  Text page via [employer] Paging/Directory       "

## 2025-05-21 NOTE — PLAN OF CARE
Goal Outcome Evaluation:    Shift 7215-2503    Pt VSS, afebrile, A&Ox4. Frustrated, anxious yet calm, prefers cares clustered, wants all meds given at the same time if possible. No concerns swallowing, takes pills whole.   LDA's: Port-a-cath single lumen, 1 PIV. Started TPN & Lipids (5/20): TPN continuous 40 mL/hr, Lipids 20.8 mL/hr.  Cardiac: soft pressures 90 to 102 systolic.  Resp: SPO2>97% RA.  PRN's: none given.      Problem: Adult Inpatient Plan of Care  Goal: Absence of Hospital-Acquired Illness or Injury  Outcome: Progressing  Intervention: Identify and Manage Fall Risk  Recent Flowsheet Documentation  Taken 5/20/2025 2324 by Derrell Spears RN  Safety Promotion/Fall Prevention: safety round/check completed  Taken 5/20/2025 2211 by Derrell Spears RN  Safety Promotion/Fall Prevention: safety round/check completed  Intervention: Prevent Skin Injury  Recent Flowsheet Documentation  Taken 5/20/2025 2211 by Derrell Spears RN  Body Position: position changed independently  Intervention: Prevent and Manage VTE (Venous Thromboembolism) Risk  Recent Flowsheet Documentation  Taken 5/20/2025 2211 by Derrell Spears RN  VTE Prevention/Management: SCDs off (sequential compression devices)  Intervention: Prevent Infection  Recent Flowsheet Documentation  Taken 5/20/2025 2324 by Derrell Spears RN  Infection Prevention:   cohorting utilized   hand hygiene promoted   rest/sleep promoted   single patient room provided  Taken 5/20/2025 2211 by Derrell Spears RN  Infection Prevention:   cohorting utilized   hand hygiene promoted   rest/sleep promoted   single patient room provided     Problem: Infection  Goal: Absence of Infection Signs and Symptoms  Outcome: Progressing

## 2025-05-21 NOTE — PROGRESS NOTES
Palliative Care Note    Revisited with patient this evening. Patient tearful, complaining of burning around her ostomy and frustrations with ostomy care. PO dilaudid was not helpful. Asked about butrans patch, if that is less itchy today. Patient states it has been shelter falling off all day.     PLAN:   - Discussed options of transitioning off butrans to methadone given pruritus and difficulty with patch staying on. Discussed I would recommend a liquid solution (would be a small amount) for better absorption, patient declined. Offered tablet form as well - Marizol declined this.   - Hesitant to increase dilaudid as it does not seem her stoma pain she is currently having has been responsive to her PO dilaudid (seems more inflammatory/burning around the skin). I also think there is a degree of her anxiety/coping that is making her pain worse.    - Someone from our team will follow-up with patient tomorrow.   - Gyn-onc team at bedside towards end of our visit - will defer to them for recommendations on stoma site care and if they feel it is appropriate to give more IV medications such as ativan to patient given degree of distress she is currently in.     Will document complete note at later time- defer to that for full details and billing today.     This is a non-billable note.     GERALDO Frausto CNP  Securely message with [employer] (more info)  Text page via [facility] Paging/Directory

## 2025-05-21 NOTE — PROGRESS NOTES
[facility] Nurse Inpatient Assessment     Consulted for: ileostomy  5/21: please aleah for end colostomy scheduled surgery 5/28    [facility] nurse follow-up plan: daily    Patient History (according to provider note(s):      Marizol Granados is a 40 year old female with stage IIIB low grade peritoneal carcinoma admitted to observation for suspected peristomal skin infection.     Assessment:      Areas visualized during today's visit: Focused: and Abdomen    Assessment of Established loop Ileostomy:  How long has patient had ostomy: 9/2024  Stoma: pink-red, round, moist, good turgor, and flat  Mucutaneous junction: intact  Peristomal skin: Moisture-Associated Skin Damage (MASD) due to leakage   Output: liquid      Is patient independent with ostomy care?: Yes  Home pouching system: Coloplast 1 piece deep convex pre-cut with barrier ring  Pouching issues and/or educational needs:Evaluate leakage issue, Adjustment of pouching plan, and Peristomal skin care  Interventions completed today: Pouching system assessment , Evaluate leakage issue, Refitting of appliance , Adjustment of pouching plan, and Peristomal skin care  Pouching system in use while hospitalized:  Jose one piece, cut to fit, convex, barrier ring, ostomy powder, and skin prep  Supplies location: at bedside        5/19: Pt currently wears a 1 piece pre-cut pouch. The hole in the wafer is too large for Pt's stoma and she is having skin breakdown and leakage. This is very distressing for the Pt. Switched to a cut to fit 1 piece pouch with Marie barrier ring, stoma powder and Cavilon advanced.  5/20: Pt's freedom-stomal skin much improved since yesterday. Will continue the new pouching system for now.   5/21: Marked Left abdomen in a spot mirroring current ileostomy for surgery 5/28 to create an end colostomy. Per pt this spot works well with clothing. Used skin marker and covered with Tegaderm. Pt had c/o severe  "burning pain around ileostomy so we removed the pouch placed 5/20 and skin looked the same as that day's photo. Will continue this plan since it seems to be working well. Pt now has extra supplies in her room. We will continue to see daily for now.     Treatment Plan:     RLQ Ileostomy pouching plan:   Pouching system: ostomy supplies pouches: Jose 38 Cera Plus Soft Convex FECAL ([id number])   Accessories used: WOC ostomy accessories: 2\" Marie Ring ([id number]), Powder ([id number]), and Cavilon no sting barrier film ([id number])   Frequency of pouch changes: PRN leakage and Three times a week  WOC follow up plan: Daily Monday-Friday (as able)  Bedside RN interventions: Change pouch PRN if leaking using the supplies above, Empty pouch when 1/3 to 1/2 full, ensure to clean pouch outlet after emptying to prevent odor, Notify WOC for ongoing pouch leakage, Document stoma appearance and output volume, color, and consistency every shift, and Encourage patient to empty pouch with assist     Orders: Reviewed    RECOMMEND PRIMARY TEAM ORDER: None, at this time  Education provided: plan of care  Discussed plan of care with: Patient and Nurse Practitioner  Notify WOC if wound(s) deteriorate.  Nursing to notify the Provider(s) and re-consult the WOC Nurse if new skin concern.    DATA:     Current support surface: Standard  Standard gel mattress (Isoflex)  Containment of urine/stool: Incontinent pad in bed and Ileostomy pouch  BMI: Body mass index is 22.49 kg/m .   Active diet order: Orders Placed This Encounter      Regular Diet Adult     Output: I/O last 3 completed shifts:  In: 1906.4 [P.O.:720; I.V.:600; IV Piggyback:100]  Out: -      Labs:   Recent Labs   Lab 05/21/25  0547   ALBUMIN 3.3*   PREALB 15.3*   HGB 9.5*   INR 1.37*   WBC 3.7*     Pressure injury risk assessment:   Sensory Perception: 4-->no impairment  Moisture: 4-->rarely moist  Activity: 3-->walks occasionally  Mobility: 4-->no limitation  Nutrition: 2-->probably " inadequate  Friction and Shear: 3-->no apparent problem  Terry Score: 20      Pager no longer in use, please contact through Status Overload group: [facility] Nurse [place]    Dept. Office Number: [phone]        [facility]     Name: Marizol Granados  Date: 5/19/25    To order your ostomy supplies    The ostomy Supplier needs this supply list  to process your order. You will need to fax/deliver this list, along with your Insurance information. Your home care nurse can assist with this process.    List of Ostomy Distributors      [employer]  Ph. [phone] [phone] Fax # [phone]  [facility] [employer]   Ph. [phone] [phone]  [employer]   Ph. [phone]  [facility]   Ph. [phone] [phone]  Or Call your insurance provider for their preferred supplier    Your Medical Supplier will need your surgeon's name, phone and fax number    Clinic:                     Phone                            Fax  Gynecology Oncology:             [phone]      Verbal Order for ostomy supplies for 1 Month per:              Jalen Hart RN, CWOCN                                                                  Authorizing MD: Dr. Destiny Rubio    Quantity of pouches:     20/mo.    Request the following supplies:      Jose                              1 piece soft convex with filter #[id number]    OR                    Coloplast      1 piece deep convex pouch cut to fit 1-5/16  with filter #[id number]          Accessories       2  Marie ring #[id number]    Adapt powder #[id number]    No sting film barrier # [id number]                                     Change your pouch 2 times a week, more often if leaking.   If you are cutting a hole in the wafer of your pouch, recheck stoma size and adjust pouch opening as needed every week    . Call the Ostomy Nurse at [facility]       [address] [place] [address] [place] : [phone]   Schedule a follow-up  visit in 2 to 4 weeks after your surgery, sooner if having problems Bring a complete set of pouch-changing supplies to this visit

## 2025-05-21 NOTE — PROGRESS NOTES
"Gynecology Oncology Progress Note  05/21/2025    HD#4 suspected peristomal skin infection      Disease: stage IIIB low grade peritoneal carcinoma     24 hour events:   - plan for OR 5/28 for end colostomy formation   - Started on TPN to bridge to colostomy     Subjective: Marizol is doing okay this morning, still sleeping but arouses to voice. No issues with TPN overnight besides feeling like the infusion burns really bad. Unable to trial Boost shake as it was not available in \"normal\" flavors so was not appetizing to her. Anorectal and abdominal pain unchanged. No other concerns.     Objective:   Vitals:    05/20/25 1841 05/20/25 2211 05/21/25 0034 05/21/25 0435   BP: 105/74 102/58 91/61 99/64   BP Location:  Right arm Right arm Right arm   Pulse: 74 68 54 58   Resp: 16 16 16 16   Temp: 97.8  F (36.6  C) 97.2  F (36.2  C) 97.4  F (36.3  C) 97.6  F (36.4  C)   TempSrc: Oral Oral Oral Oral   SpO2: 99% 96% 97% 97%   Weight:       Height:           General: NAD, appears comfortable in bed  CV: Well-perfused  Resp: Breathing comfortably on room air  Abdomen: Soft, nontender, nondistended. Stoma with brown output, nontender     I/O last 3 completed shifts:  In: 1100.8 [P.O.:720; I.V.:180; IV Piggyback:100]  Out: -     Labs pending this AM at the time of note    Assessment: Marizol Granados is a 40 year old female with stage IIIB low grade peritoneal carcinoma admitted to observation for suspected peristomal skin infection and decreased PO intake.       # Stoma site pain   # Suspected peristomal skin infection   - D#4 miconazole BID   - s/p WOC consult, appreciate recs; was refitted for stoma yesterday to improve seal   - Tolerating minimal PO > initiated TPN in anticipation upcoming end colostomy creation   - Supplemental shakes as tolerated by patient      # Stage IIIB low grade peritoneal carcinoma  # Rectovaginal fistula  # Neurogenic constipation   - s/p 5C Carbo/Taxol, trial of letrozole; CT 3/21/25 with no evidence of " disease  - Postoperative course complicated by rectovaginal fistula with neurogenic constipation requiring extensive disimpaction in the OR   - Follows with CRS, plans for end colostomy with intersphincteric proctectomy, loop takedown; scheduled for 5/28   - TPN initiated for nutrition as mentioned above; currently cycling continuously      # Urinary retention   # Hx frequent UTIs   # Cystitis on CT  - CT with evidence of cystitis, redemonstrated from 3/21 CT   - Has hx frequent UTIs, manages retention with CIC due to inability to void   - Last treated for UTI 2/25 with cipro, culture resulted with pansensitive staph aureus   - Followed by Dr. Moseley in urology, last seen in April for cysto with no structural cause to explain UTIs, consideration of gentamicin irrigations with plan to follow up on 6/5  - c/f colonization in the setting of frequent self catheterization, asymptomatic on presentation though limited sensation post surgery and does not feel sx of UTI per patient  - D#4 ceftriaxone; abx continued d/t concern for UTI exacerbating current level of pain, discontinue prn   - UA as above with +LE, +nitrites collected post CTX; urine culture with GNB, awaiting speciation to narrow abx   - Afebrile overnight   - Continue CIC      # FAHAD, resolved   - Cr slightly elevated compared to previous, on arrival to ED 1.02   - Improved to baseline 5/19 at 0.87      # Hx DVT/PE  - PTA Eliquis      # MDD/SHIVAM  # Insomnia   # RLS   - PTA scheduled Buspar, Pristiq, gabapentin, Ativan, Seroquel, ramelteon   - PTA prn Trazodone, Ambien   - Continue to monitor mood closely      # Hx migraines  - PTA Topamax, propranolol  - Consider holding propranolol 2/2 soft blood pressures overnight likely exacerbated by relative hypotension      # Anorectal pain  # Cancer related fatigue   - Followed by palliative outpatient with Dr. oNrton, last seen 4/24; inpatient team consulted, appreciate recs   - PTA Ritalin for fatigue, pain management as  described above   - PTA Dilaudid, Butrans patch, gabapentin  - Seen by inpatient palliative team, increased Dilaudid PO to 6-8 mg q4hr prn and started scheduled Tylenol     Bharti Rich DO  [facility]  Gynecology Oncology Resident, PGY-2  05/21/2025 6:01 AM    To reach the GYNECOLOGY ONCOLOGY team for this patient, please page [phone] or search Gynecologic Oncology Resident Group on Vocera.     I saw and evaluated the patient with the resident.  I edited and reviewed the above note. I have reviewed all pertinent imaging and labs on this patient.  TPN started plan to the OR 5/28.     CBC RESULTS:   Recent Labs   Lab Test 05/21/25  0547   WBC 3.7*   RBC 3.03*   HGB 9.5*   HCT 28.8*   MCV 95   MCH 31.4   MCHC 33.0   RDW 12.2         Last Comprehensive Metabolic Panel:  Lab Results   Component Value Date     05/21/2025    POTASSIUM 3.7 05/21/2025    CHLORIDE 110 (H) 05/21/2025    CO2 25 05/21/2025    ANIONGAP 8 05/21/2025     (H) 05/21/2025    BUN 7.8 05/21/2025    CR 0.73 05/21/2025    GFRESTIMATED >90 05/21/2025    LORY 9.1 05/21/2025         Luz Maria Ag MD  Professor  Department of Ob/Gyn and Women's Health  Division of Gynecologic Oncology  [facility]  [phone]

## 2025-05-21 NOTE — CONSULTS
Consult for elevated risk score acknowledged.  Initial consult completed with pt on 5/20/2025.     SW following for discharge needs.    FLORIAN Slaughter, JEREMIAS  OBS/ED   Phone: [phone]  Fax: [phone]  Vocera: 1A Obs MALDONADO

## 2025-05-21 NOTE — PLAN OF CARE
"RN [id number]:    Vital signs: /78   Pulse 63   Temp 97.6  F (36.4  C) (Oral)   Resp 16   Ht 1.651 m (5' 5\")   Wt 61.3 kg (135 lb 2.3 oz)   LMP  (LMP Unknown)   SpO2 97%   BMI 22.49 kg/m      Status: admitted 5/21 to observation for suspected peristomal skin infection and decreased PO intake   Neuro: AOx4  Pain/Nausea: one time dose of IV Dilaudid. Oral Dilaudid given PRN. Denies nausea  Cardiac: WNL  Respiratory: sating adequately on room air  Mobility: independent  Diet: regular  Labs: reviewed  LDAs: Port and PIV, ostomy  Skin/incisions: no new deficits found  GI/: self caths and ostomy with adequate output  Plan: continue with plan of care  "

## 2025-05-22 VITALS
OXYGEN SATURATION: 99 % | DIASTOLIC BLOOD PRESSURE: 71 MMHG | WEIGHT: 135.14 LBS | TEMPERATURE: 97.6 F | HEART RATE: 69 BPM | BODY MASS INDEX: 22.52 KG/M2 | HEIGHT: 65 IN | RESPIRATION RATE: 18 BRPM | SYSTOLIC BLOOD PRESSURE: 93 MMHG

## 2025-05-22 LAB
ANION GAP SERPL CALCULATED.3IONS-SCNC: 10 MMOL/L (ref 7–15)
BACTERIA UR CULT: ABNORMAL
BACTERIA UR CULT: ABNORMAL
BUN SERPL-MCNC: 13.7 MG/DL (ref 6–20)
CALCIUM SERPL-MCNC: 9.2 MG/DL (ref 8.8–10.4)
CHLORIDE SERPL-SCNC: 108 MMOL/L (ref 98–107)
CREAT SERPL-MCNC: 0.72 MG/DL (ref 0.51–0.95)
EGFRCR SERPLBLD CKD-EPI 2021: >90 ML/MIN/1.73M2
ERYTHROCYTE [DISTWIDTH] IN BLOOD BY AUTOMATED COUNT: 12 % (ref 10–15)
GLUCOSE BLDC GLUCOMTR-MCNC: 102 MG/DL (ref 70–99)
GLUCOSE BLDC GLUCOMTR-MCNC: 128 MG/DL (ref 70–99)
GLUCOSE BLDC GLUCOMTR-MCNC: 84 MG/DL (ref 70–99)
GLUCOSE SERPL-MCNC: 89 MG/DL (ref 70–99)
HCO3 SERPL-SCNC: 22 MMOL/L (ref 22–29)
HCT VFR BLD AUTO: 28.3 % (ref 35–47)
HGB BLD-MCNC: 9.5 G/DL (ref 11.7–15.7)
MAGNESIUM SERPL-MCNC: 1.5 MG/DL (ref 1.7–2.3)
MAGNESIUM SERPL-MCNC: 2.3 MG/DL (ref 1.7–2.3)
MCH RBC QN AUTO: 32 PG (ref 26.5–33)
MCHC RBC AUTO-ENTMCNC: 33.6 G/DL (ref 31.5–36.5)
MCV RBC AUTO: 95 FL (ref 78–100)
PHOSPHATE SERPL-MCNC: 4 MG/DL (ref 2.5–4.5)
PLATELET # BLD AUTO: 147 10E3/UL (ref 150–450)
POTASSIUM SERPL-SCNC: 3.7 MMOL/L (ref 3.4–5.3)
RBC # BLD AUTO: 2.97 10E6/UL (ref 3.8–5.2)
SODIUM SERPL-SCNC: 140 MMOL/L (ref 135–145)
WBC # BLD AUTO: 3.9 10E3/UL (ref 4–11)

## 2025-05-22 PROCEDURE — 250N000009 HC RX 250: Performed by: OBSTETRICS & GYNECOLOGY

## 2025-05-22 PROCEDURE — 85014 HEMATOCRIT: CPT | Performed by: OBSTETRICS & GYNECOLOGY

## 2025-05-22 PROCEDURE — 250N000013 HC RX MED GY IP 250 OP 250 PS 637: Performed by: OBSTETRICS & GYNECOLOGY

## 2025-05-22 PROCEDURE — 99231 SBSQ HOSP IP/OBS SF/LOW 25: CPT | Mod: GC | Performed by: OBSTETRICS & GYNECOLOGY

## 2025-05-22 PROCEDURE — B4185 PARENTERAL SOL 10 GM LIPIDS: HCPCS | Performed by: OBSTETRICS & GYNECOLOGY

## 2025-05-22 PROCEDURE — 250N000011 HC RX IP 250 OP 636: Performed by: STUDENT IN AN ORGANIZED HEALTH CARE EDUCATION/TRAINING PROGRAM

## 2025-05-22 PROCEDURE — 80048 BASIC METABOLIC PNL TOTAL CA: CPT | Performed by: OBSTETRICS & GYNECOLOGY

## 2025-05-22 PROCEDURE — 83735 ASSAY OF MAGNESIUM: CPT | Performed by: STUDENT IN AN ORGANIZED HEALTH CARE EDUCATION/TRAINING PROGRAM

## 2025-05-22 PROCEDURE — 120N000002 HC R&B MED SURG/OB UMMC

## 2025-05-22 PROCEDURE — 82310 ASSAY OF CALCIUM: CPT | Performed by: OBSTETRICS & GYNECOLOGY

## 2025-05-22 PROCEDURE — 99233 SBSQ HOSP IP/OBS HIGH 50: CPT | Performed by: PHYSICIAN ASSISTANT

## 2025-05-22 PROCEDURE — 36591 DRAW BLOOD OFF VENOUS DEVICE: CPT | Performed by: OBSTETRICS & GYNECOLOGY

## 2025-05-22 PROCEDURE — 999N000248 HC STATISTIC IV INSERT WITH US BY RN

## 2025-05-22 PROCEDURE — 36415 COLL VENOUS BLD VENIPUNCTURE: CPT | Performed by: STUDENT IN AN ORGANIZED HEALTH CARE EDUCATION/TRAINING PROGRAM

## 2025-05-22 PROCEDURE — 84100 ASSAY OF PHOSPHORUS: CPT | Performed by: OBSTETRICS & GYNECOLOGY

## 2025-05-22 PROCEDURE — 83735 ASSAY OF MAGNESIUM: CPT | Performed by: OBSTETRICS & GYNECOLOGY

## 2025-05-22 PROCEDURE — 999N000197 HC STATISTIC WOC PT EDUCATION, 0-15 MIN

## 2025-05-22 PROCEDURE — 250N000013 HC RX MED GY IP 250 OP 250 PS 637: Performed by: PHYSICIAN ASSISTANT

## 2025-05-22 RX ORDER — ACETAMINOPHEN 325 MG/1
650 TABLET ORAL EVERY 6 HOURS PRN
Status: DISCONTINUED | OUTPATIENT
Start: 2025-05-22 | End: 2025-05-28

## 2025-05-22 RX ORDER — METHADONE HYDROCHLORIDE 5 MG/5ML
5 SOLUTION ORAL 2 TIMES DAILY
Refills: 0 | Status: DISCONTINUED | OUTPATIENT
Start: 2025-05-22 | End: 2025-05-22

## 2025-05-22 RX ORDER — LORAZEPAM 0.5 MG/1
1 TABLET ORAL DAILY PRN
Status: DISCONTINUED | OUTPATIENT
Start: 2025-05-22 | End: 2025-05-28

## 2025-05-22 RX ORDER — GABAPENTIN 300 MG/1
300 CAPSULE ORAL EVERY EVENING
Status: DISCONTINUED | OUTPATIENT
Start: 2025-05-22 | End: 2025-05-28

## 2025-05-22 RX ORDER — CEFTRIAXONE 1 G/1
1 INJECTION, POWDER, FOR SOLUTION INTRAMUSCULAR; INTRAVENOUS EVERY 24 HOURS
Status: DISCONTINUED | OUTPATIENT
Start: 2025-05-22 | End: 2025-05-24

## 2025-05-22 RX ORDER — MAGNESIUM SULFATE HEPTAHYDRATE 40 MG/ML
4 INJECTION, SOLUTION INTRAVENOUS ONCE
Status: COMPLETED | OUTPATIENT
Start: 2025-05-22 | End: 2025-05-22

## 2025-05-22 RX ORDER — LORAZEPAM 1 MG/1
1 TABLET ORAL EVERY EVENING
Status: DISCONTINUED | OUTPATIENT
Start: 2025-05-22 | End: 2025-05-22

## 2025-05-22 RX ORDER — ZOLPIDEM TARTRATE 5 MG/1
5 TABLET ORAL
Status: DISCONTINUED | OUTPATIENT
Start: 2025-05-22 | End: 2025-05-25

## 2025-05-22 RX ORDER — HYDROXYZINE HYDROCHLORIDE 25 MG/1
25 TABLET, FILM COATED ORAL EVERY 6 HOURS PRN
Status: DISCONTINUED | OUTPATIENT
Start: 2025-05-22 | End: 2025-05-28

## 2025-05-22 RX ORDER — METHADONE HYDROCHLORIDE 5 MG/1
5 TABLET ORAL 2 TIMES DAILY
Refills: 0 | Status: DISCONTINUED | OUTPATIENT
Start: 2025-05-22 | End: 2025-05-28

## 2025-05-22 RX ORDER — LORAZEPAM 0.5 MG/1
1 TABLET ORAL EVERY EVENING
Status: DISCONTINUED | OUTPATIENT
Start: 2025-05-22 | End: 2025-05-28

## 2025-05-22 RX ORDER — RAMELTEON 8 MG/1
8 TABLET ORAL EVERY EVENING
Status: DISCONTINUED | OUTPATIENT
Start: 2025-05-22 | End: 2025-05-28

## 2025-05-22 RX ORDER — CIPROFLOXACIN 250 MG/1
250 TABLET, FILM COATED ORAL EVERY 12 HOURS SCHEDULED
Status: DISCONTINUED | OUTPATIENT
Start: 2025-05-22 | End: 2025-05-22

## 2025-05-22 RX ORDER — QUETIAPINE FUMARATE 200 MG/1
200 TABLET, FILM COATED ORAL EVERY EVENING
Status: DISCONTINUED | OUTPATIENT
Start: 2025-05-22 | End: 2025-05-28

## 2025-05-22 RX ORDER — HYDROXYZINE HYDROCHLORIDE 50 MG/1
50 TABLET, FILM COATED ORAL EVERY 6 HOURS PRN
Status: DISCONTINUED | OUTPATIENT
Start: 2025-05-22 | End: 2025-05-28

## 2025-05-22 RX ADMIN — TOPIRAMATE 50 MG: 50 TABLET, FILM COATED ORAL at 22:38

## 2025-05-22 RX ADMIN — ZOLPIDEM TARTRATE 5 MG: 5 TABLET ORAL at 22:37

## 2025-05-22 RX ADMIN — QUETIAPINE FUMARATE 200 MG: 200 TABLET ORAL at 22:36

## 2025-05-22 RX ADMIN — CEFTRIAXONE SODIUM 1 G: 1 INJECTION, POWDER, FOR SOLUTION INTRAMUSCULAR; INTRAVENOUS at 22:35

## 2025-05-22 RX ADMIN — LORAZEPAM 1 MG: 1 TABLET ORAL at 13:55

## 2025-05-22 RX ADMIN — RAMELTEON 8 MG: 8 TABLET ORAL at 22:39

## 2025-05-22 RX ADMIN — LORAZEPAM 1 MG: 0.5 TABLET ORAL at 22:36

## 2025-05-22 RX ADMIN — HYDROMORPHONE HYDROCHLORIDE 8 MG: 4 TABLET ORAL at 16:07

## 2025-05-22 RX ADMIN — APIXABAN 5 MG: 5 TABLET, FILM COATED ORAL at 22:38

## 2025-05-22 RX ADMIN — LORAZEPAM 1 MG: 1 TABLET ORAL at 16:00

## 2025-05-22 RX ADMIN — BUSPIRONE HYDROCHLORIDE 30 MG: 30 TABLET ORAL at 13:55

## 2025-05-22 RX ADMIN — DESVENLAFAXINE 150 MG: 50 TABLET, FILM COATED, EXTENDED RELEASE ORAL at 20:58

## 2025-05-22 RX ADMIN — TRAZODONE HYDROCHLORIDE 300 MG: 150 TABLET ORAL at 22:40

## 2025-05-22 RX ADMIN — MAGNESIUM SULFATE HEPTAHYDRATE: 500 INJECTION, SOLUTION INTRAMUSCULAR; INTRAVENOUS at 06:57

## 2025-05-22 RX ADMIN — METHYLPHENIDATE HYDROCHLORIDE 10 MG: 5 TABLET ORAL at 13:54

## 2025-05-22 RX ADMIN — BUSPIRONE HYDROCHLORIDE 30 MG: 30 TABLET ORAL at 22:39

## 2025-05-22 RX ADMIN — OLIVE OIL AND SOYBEAN OIL 250 ML: 16; 4 INJECTION, EMULSION INTRAVENOUS at 06:57

## 2025-05-22 RX ADMIN — PANTOPRAZOLE SODIUM 40 MG: 40 TABLET, DELAYED RELEASE ORAL at 13:54

## 2025-05-22 RX ADMIN — METHADONE HYDROCHLORIDE 5 MG: 5 TABLET ORAL at 20:58

## 2025-05-22 RX ADMIN — PANTOPRAZOLE SODIUM 40 MG: 40 TABLET, DELAYED RELEASE ORAL at 22:37

## 2025-05-22 RX ADMIN — MAGNESIUM SULFATE HEPTAHYDRATE 4 G: 40 INJECTION, SOLUTION INTRAVENOUS at 18:03

## 2025-05-22 RX ADMIN — GABAPENTIN 300 MG: 300 CAPSULE ORAL at 22:37

## 2025-05-22 RX ADMIN — APIXABAN 5 MG: 5 TABLET, FILM COATED ORAL at 13:55

## 2025-05-22 RX ADMIN — TOPIRAMATE 50 MG: 50 TABLET, FILM COATED ORAL at 13:54

## 2025-05-22 ASSESSMENT — ACTIVITIES OF DAILY LIVING (ADL)
ADLS_ACUITY_SCORE: 43

## 2025-05-22 NOTE — PROGRESS NOTES
PALLIATIVE CARE PROGRESS NOTE  [facility]     Patient Name: Marizol Granados  Date of Admission: 5/18/2025   Today the patient was seen for: pain     Recommendations & Counseling     MEDICAL MANAGEMENT:   #Pain,acute on chronic - acute on chronic rectal pain due to rectovaginal fistula and stoma pain that seems more inflammatory and acute.   Hx rectovaginal fistula, ileostomy. Increased pain around ileostomy in the past week, suspected peristomal skin infection. Surgery moved up to next week, expecting this will help her pain in the long run.   Continue Dilaudid 6-8mg q4h prn   Provided Dilaudid IV 0.5mg x1 this morning given significant anal-rectal pain. Patient did feel this was helpful for that pain.   Continue butrans patch 20 mcg/hr q7d   Patch causing itching - tried flonase topically prior to placing patch yesterday, itching improved but not partially falling off.   Recommended patient consider transition to Methadone PO solution (for absorption), patient declined solution. I also offered tablets, however patient was very frustrated with our discussion and distressed with ostomy site pain - thus would recommend further conversations.   Would recommend EKG prior to starting methadone to monitor qtc (has not had one recently). May also need to thoroughly check medication interactions given polypharmacy.   Continue Acetaminophen (Tylenol) 975mg TID scheduled     #Anxiety  #Insomnia   #Risk for polypharmacy  Anxiety managed outpatient by psychiatry through [facility].    Continue Gabapentin 300 mg bedtime  Continue buspar 30mg BID  Continue Pristiq 150 mg daily  Continue ativan 1mg PO BID scheduled  Propanolol 10mg BID  Seroquel 200mg bedtime  Ramelteon 8mg bedtime  Topamax 50mg BID  Trazadone 300 mg prn bedtime  Ambien 5mg prn bedtime  Will have PCSW visit patient tomorrow to assist with adjustment to illness, coping, anxiety.   Hesitant to make adjustments to  psychiatric medications, including ativan given significant polypharmacy.   Consider consult to psychiatry for assistance with medications if needed.      #Fatigue  Using Ritalin PTA, started this in March for fatigue. Increased dose in April. Discussed today with patient my concerns it could be making her anxiety worse. Patient states she would like to keep it for now, has not felt it has made her anxiety worse. Unsure if it has helped her fatigue.   Continue ritalin 15mg AM, 10MG afternoon. Consider de-prescribing if not helping    #Malnutrition  #Concern for pills not absorbing today  Patient reports pills entire pills noted in her ostomy today - this has happened in the past. RN took photos, sent to gyn-onc.  If concerns about absorption, could consider rotating dilaudid pills to suspension.  Planning to start TPN/L to bridge until her sugery next week.     Palliative care will continue to follow patient. If any urgent needs arise please reach out to our team via [employer].     These recommendations have been discussed with gyn-onc, bedside RN, PCSW.    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 Most recent imaging in March 2025 showed no evidence of disease. She is being followed by CRS for rectovaginal fistula and coordinating surgery for takedown of loop ileostomy and creation of end colostomy. Follows palliative care outpatient for pain. Our team was consulted to help with pain and anxiety.      Today, the patient was seen for:  Palliative care encounter  Support  Pain, acute on chronic  Anxiety       Interval History:     Multidisciplinary collaboration:  Chart reviewed. Discussed  with gyn-onc and bedside RN. Started TPN, reporting pain at port site with TPN.     Notable medications:  APAP prn and scheduled   Eliquis  Butrans 20mcg  Buspar 30 BID  Pristiq  Gabapentin 300mg bedtime  Ativan 1mg BID scheduled, prn x1  Ritalin 15mg AM, 10mg afternoon  Protonix 40mg BID  Propanolol   Rametleon  Topamax  Ambien   Dilaudid 8mg PO prn x3    Patient/family narrative  Examined at bedside multiple times throughout the day today (see other notes). Frustrated she is still in the same room and has not been moved upstairs. Feels the [facility] today did something different than yesterday when changing her dressing, is burning and uncomfortable and she is frustrated with this. Also mentions a headache and pressure.     Review of Systems:     Besides above, ROS was reviewed and is unremarkable.      Physical Exam:   Temp:  [97.2  F (36.2  C)-97.6  F (36.4  C)] 97.6  F (36.4  C)  Pulse:  [54-68] 63  Resp:  [16] 16  BP: ()/(58-78) 108/78  SpO2:  [96 %-97 %] 97 %  135 lbs 2.27 oz    General: Tearful, intermittently crying throughout encounter. Affect labile, at times unable to speak due to emotional distress. Appears overwhelmed but able to engage intermittently when supported.       Data Reviewed:     Lab data independently reviewed today. Pertinent findings from my perspective are noted below:  WBC 3.7, hgb 9.5, plt 163  GFR<90, creatinine 0.73    Medical Decision Making       MANAGEMENT DISCUSSED with the following over the past 24 hours: gyn-onc, PCSW, bedside RN   NOTE(S)/MEDICAL RECORDS REVIEWED over the past 24 hours: onc, bedside RN  Tests REVIEWED in the past 24 hours:  - See lab/imaging results included in the data section of the note  60 MINUTES SPENT BY ME on the date of service doing chart review, history, exam, documentation & further activities per the note.      Chart documentation was completed using Dragon voice-recognition software. Even though reviewed, some grammatical, spelling, and word  errors may remain.

## 2025-05-22 NOTE — PLAN OF CARE
"Shift: 7495-5282  VS: Blood pressure 98/65, pulse 64, temperature 98  F (36.7  C), temperature source Oral, resp. rate 16, height 1.651 m (5' 5\"), weight 61.3 kg (135 lb 2.3 oz), SpO2 97%.  Pain: reported 9/10 pain due to headache, rectum pain IV dilaudid and IV Ativan given, effective pt reported 6/10 pain.   Neuro: A x O 4. PERRLA. Calls appropriately and makes needs known  Cardiac: WDL. No cardiac related chest pain   Respiratory: WDL. Denies SOB. O2 sat > 94% on RA  GI/Diet/Appetite:  Denies N/V. Regular diet. Poor appetite, on TPN/ Lipids   : neurogenic bladder, pt self caths. Pt refused to use hospital supplied catheters and stated she'll just use briefs and let her \"bladder bust\". Orders for purewick overnight due to leaking  LDA's: Right chest port with tpn and lipids infusing, RPIV with magnesium infusing   Skin: stoma   Activity: up ad jennifer  Tests/Procedures: n/A  Pertinent Labs/Lab Collection: routine lab check, BG Q6 hours x 72 hours due to TPN/lipids      Plan:  No significant changes this shift, continue POC.                             "

## 2025-05-22 NOTE — PROGRESS NOTES
Three pills that was not absorb from previous medications administration was found in pt ostomy bag. Provider LOCOI.

## 2025-05-22 NOTE — PROGRESS NOTES
Px alert and oriented x4, able to make needs known. C/o pain on ostomy area rated 8-9/10 on pain scale. PRN dilaudid given. Px was in so much pain earlier after ostomy change by WOC, px removed dressing wanted WOC to redo it but unit is already closed for the day. Palliative provider came to see px, can not get any sensible answers from px so referred back to gyne onc. Palliative will not order any pain nor anxiety meds for px as per telephone conversation, but said to give buprenorphine patch because the previous one was peeling off already, done. Gyne onc providers came in and change ostomy, px feeling a little better. Px wanted anxiety meds, gyne onc team informed, waiting for response. Continue with POC.

## 2025-05-22 NOTE — PROGRESS NOTES
Brief Progress Note    In to assess patient for PM check. She is talking to her daughter. She reports a continual headache with ongoing anxiety. Discussed plan for headache with palliative care- headache is likely attributed to stress. Palliative care recommended PRN ativan in addition to home scheduled ativan.  Continue with PRN dilaudid. She continues to have leakage of soft, pill like substance from ostomy. She notices this in the morning. Plan to crush PM medications- per pharmacy do not crush Pristiq (desvenlafaxine). Discussed ISC with patient- provided additional supplies for ISC including catheter and lubrication. She expressed some frustration with the new catheter system. Plan for continued ISC with Ajay in PM.     Isamar Woodruff MD  Obstetrics, Gynecology & Women's Health   Resident, PGY-1  05/22/2025 4:35 PM

## 2025-05-22 NOTE — PROGRESS NOTES
[facility] Nurse Inpatient Assessment     Consulted for: ileostomy  5/21: please aleah for end colostomy scheduled surgery 5/28    [facility] nurse follow-up plan: daily    Patient History (according to provider note(s):      Marizol Granados is a 40 year old female with stage IIIB low grade peritoneal carcinoma admitted to observation for suspected peristomal skin infection.     Assessment:      Areas visualized during today's visit: Focused: and Abdomen    Assessment of Established loop Ileostomy:  How long has patient had ostomy: 9/2024  Stoma: pink-red, round, moist, good turgor, and flat  Mucutaneous junction: intact  Peristomal skin: Moisture-Associated Skin Damage (MASD) due to leakage   Output: liquid      Is patient independent with ostomy care?: Yes  Home pouching system: Coloplast 1 piece deep convex pre-cut with barrier ring  Pouching issues and/or educational needs:Evaluate leakage issue, Adjustment of pouching plan, and Peristomal skin care  Interventions completed today: Pouching system assessment , Evaluate leakage issue, Refitting of appliance , Adjustment of pouching plan, and Peristomal skin care  Pouching system in use while hospitalized:  Jose one piece, cut to fit, convex, barrier ring, ostomy powder, and skin prep  Supplies location: at bedside        5/19: Pt currently wears a 1 piece pre-cut pouch. The hole in the wafer is too large for Pt's stoma and she is having skin breakdown and leakage. This is very distressing for the Pt. Switched to a cut to fit 1 piece pouch with Marie barrier ring, stoma powder and Cavilon advanced.  5/20: Pt's freedom-stomal skin much improved since yesterday. Will continue the new pouching system for now.   5/21: Marked Left abdomen in a spot mirroring current ileostomy for surgery 5/28 to create an end colostomy. Per pt this spot works well with clothing. Used skin marker and covered with Tegaderm. Pt had c/o severe  "burning pain around ileostomy so we removed the pouch placed 5/20 and skin looked the same as that day's photo. Will continue this plan since it seems to be working well. Pt now has extra supplies in her room. We will continue to see daily for now.   5/22: Pt reported pain around stoma site yesterday. Pt and Gyn/onc provider replaced pouch overnight and pain was much better today. Pouch in tact and not changed today. Plan for surgery next week. WOC will continue to follow.    Treatment Plan:     RLQ Ileostomy pouching plan:   Pouching system: ostomy supplies pouches: Hyrum 38 Cera Plus Soft Convex FECAL ([id number])   Accessories used: [facility] ostomy accessories: 2\" Marie Ring (069898), Powder (847553), and Cavilon no sting barrier film (598090)   Frequency of pouch changes: PRN leakage and Three times a week  WOC follow up plan: Daily Monday-Friday (as able)  Bedside RN interventions: Change pouch PRN if leaking using the supplies above, Empty pouch when 1/3 to 1/2 full, ensure to clean pouch outlet after emptying to prevent odor, Notify WOC for ongoing pouch leakage, Document stoma appearance and output volume, color, and consistency every shift, and Encourage patient to empty pouch with assist     Orders: Reviewed    RECOMMEND PRIMARY TEAM ORDER: None, at this time  Education provided: plan of care  Discussed plan of care with: Patient and Nurse Practitioner  Notify WOC if wound(s) deteriorate.  Nursing to notify the Provider(s) and re-consult the WOC Nurse if new skin concern.    DATA:     Current support surface: Standard  Standard gel mattress (Isoflex)  Containment of urine/stool: Incontinent pad in bed and Ileostomy pouch  BMI: Body mass index is 22.49 kg/m .   Active diet order: Orders Placed This Encounter      Regular Diet Adult     Output: No intake/output data recorded.     Labs:   Recent Labs   Lab 05/22/25  0553 05/21/25  0547   ALBUMIN  --  3.3*   PREALB  --  15.3*   HGB 9.5* 9.5*   INR  --  1.37*   WBC " 3.9* 3.7*     Pressure injury risk assessment:   Sensory Perception: 4-->no impairment  Moisture: 4-->rarely moist  Activity: 3-->walks occasionally  Mobility: 4-->no limitation  Nutrition: 2-->probably inadequate  Friction and Shear: 3-->no apparent problem  Terry Score: 20      Pager no longer in use, please contact through [employer] group: [facility] Nurse [place]    Dept. Office Number: [phone]        [facility]     Name: Marizol Granados  Date: 5/19/25    To order your ostomy supplies    The ostomy Supplier needs this supply list  to process your order. You will need to fax/deliver this list, along with your Insurance information. Your home care nurse can assist with this process.    List of Ostomy Distributors      [facility]  Ph. [phone] Fax # [phone]  [employer]   Ph. [phone] [phone]  [employer] [facility]   Ph. [phone]  [facility]   Ph. [phone] [phone]  Or Call your insurance provider for their preferred supplier    Your Medical Supplier will need your surgeon's name, phone and fax number    Clinic:                     Phone                            Fax  Gynecology Oncology:             [phone]      Verbal Order for ostomy supplies for 1 Month per:              Jalen Hart RN, CWOCN                                                                  Authorizing MD: Dr. Destiny Rubio    Quantity of pouches:     20/mo.    Request the following supplies:      Jose                              1 piece soft convex with filter #[id number]    OR                    Coloplast      1 piece deep convex pouch cut to fit 1-5/16  with filter #[id number]          Accessories       2  Marie ring #[id number]    Adapt powder #5021    No sting film barrier # [id number]                                     Change your pouch 2 times a week, more often if leaking.   If you are cutting a hole in the wafer of your  pouch, recheck stoma size and adjust pouch opening as needed every week    . Call the Ostomy Nurse at [facility]       [address] [place] [address] RODNEY WILL : [phone]   Schedule a follow-up visit in 2 to 4 weeks after your surgery, sooner if having problems Bring a complete set of pouch-changing supplies to this visit

## 2025-05-22 NOTE — PROGRESS NOTES
Pt offered two different sizes of self cath. Refused both, Says the 14 fr is long and the 10 fr does not work. Was upset and frustrated. Did not want to try what we have at this time.

## 2025-05-22 NOTE — PROGRESS NOTES
PALLIATIVE CARE SOCIAL WORK Progress Note   Location: [facility]      Support Visit    Check in with Marizol this afternoon; pt continues to be in observation.    Marizol immediately tearful upon PCSW entry into room as she began sharing.     Marizol shared she is struggling with feeling isolated, feeling like she has no one (from personal life) to be here with her, to help advocate for her, does not consistently feel heard, feels confusion around plan of care, anxious, low mood and at times lack of dignity.     Supportive presence and reflective listening provided while also offering re-framing. Validated Marizol's frustrations and concerns while challenging her to think of things that bring her hope.    Reviewed with pt the option of potentially getting outside 5/23, weather permitting and if tolerated medically and encouraged Marizol to consider that as an option for PCSW visit 5/23. Acknowledged with Marizol that while a change of scenery won't fix everything, noted that her feelings of isolation are likely being exacerbated by her current setting and normalized that it feels like her world is shrinking.   Pall SW will check in with Marizol in the morning to assess interest and will coordinate with unit if pt agreeable.     Patient requests as visit concluded:  - PRN anxiety med (spoke with RN)  - clarification on pain medication changes (spoke with RN)  - better fitting briefs to wear overnight    Plan: PCSW will continue to follow while palliative care team remains involved; continued assessment of pt coping; continue to encourage use of non-pharm techniques for anxiety to supplement medications; ongoing emotional support; consider outside visit tomorrow 5/23 pending pt comfort level and medical appropriateness    Clinical Social Work Interventions:   Assessment of palliative specific issues    Behavioral interventions for symptom management  Facilitation of processing of thoughts/feelings  Re-framing   Resource  referral coordination of care with bedside RN    FLORIAN Knight, [facility]  [facility], Palliative Care  Securely message with the [employer] Web Console (learn more here) or  Text page via [facility] Paging/Directory

## 2025-05-22 NOTE — PLAN OF CARE
Neuro: A&Ox4. Afebrile. Pt frustrated and tearful this shift. PRN trazodone and ambien given per pt req for help with sleep, states that she usually takes these every night before bed at home.  Cardiac: VSS.  Respiratory: Sating 90s on RA.  GI/: Self caths, ostomy emptying adequately per pt.   Diet/appetite: Poor appetite. Pt refused overnight TPN/lipids d/t anxiety around urine output overnight, provider came to bedside, see note. TPN/Lipids started at 0645. BG overnight: 84.  Activity: Independent   Pain: 6/10 stoma pain reported, declined PRN medications overnight.   Skin: No new deficits noted.    Plan: continue with POC, notify team with changes.

## 2025-05-22 NOTE — PROGRESS NOTES
Gynecology Oncology Progress Note  05/22/2025    HD#5 suspected peristomal skin infection     Disease: stage IIIB low grade peritoneal carcinoma     24 hour events:   - end colostomy marked by WOC   - pain around ostomy appliance > replaced s/p WOC consult   - declined TPN overnight 2/2 concerns for leaking urine in her briefs while TPN running     Subjective: Doing okay this morning. Was able to rest. Pain is the same, no changes. Amenable to running TPN this morning.      Objective:   Vitals:    05/21/25 0435 05/21/25 0857 05/21/25 2205 05/22/25 0653   BP: 99/64 108/78 105/79 90/61   BP Location: Right arm  Right arm Right arm   Pulse: 58 63 63 50   Resp: 16  16 16   Temp: 97.6  F (36.4  C)  97.8  F (36.6  C) 97.5  F (36.4  C)   TempSrc: Oral  Oral Oral   SpO2: 97%  100% 97%   Weight:       Height:           General: NAD, appears comfortable in bed  CV: Well-perfused  Resp: Breathing comfortably on room air  Abdomen: Soft, nontender, nondistended; ileostomy with brown output, nontender     I/O last 3 completed shifts:  In: 805.6 [I.V.:420]  Out: -     New labs/imaging-   Latest Reference Range & Units 05/22/25 05:52 05/22/25 05:53   Sodium 135 - 145 mmol/L 140    Potassium 3.4 - 5.3 mmol/L 3.7    Chloride 98 - 107 mmol/L 108 (H)    Carbon Dioxide (CO2) 22 - 29 mmol/L 22    Urea Nitrogen 6.0 - 20.0 mg/dL 13.7    Creatinine 0.51 - 0.95 mg/dL 0.72    GFR Estimate >60 mL/min/1.73m2 >90    Calcium 8.8 - 10.4 mg/dL 9.2    Anion Gap 7 - 15 mmol/L 10    Magnesium 1.7 - 2.3 mg/dL 1.5 (L)    Phosphorus 2.5 - 4.5 mg/dL 4.0    Glucose 70 - 99 mg/dL 89    WBC 4.0 - 11.0 10e3/uL  3.9 (L)   Hemoglobin 11.7 - 15.7 g/dL  9.5 (L)   Hematocrit 35.0 - 47.0 %  28.3 (L)   Platelet Count 150 - 450 10e3/uL  147 (L)   RBC Count 3.80 - 5.20 10e6/uL  2.97 (L)   MCV 78 - 100 fL  95   MCH 26.5 - 33.0 pg  32.0   MCHC 31.5 - 36.5 g/dL  33.6   RDW 10.0 - 15.0 %  12.0   (H): Data is abnormally high  (L): Data is abnormally low    Assessment:  Marizol Granados is a 40 year old female with stage IIIB low grade peritoneal carcinoma admitted to observation for suspected peristomal skin infection and decreased PO intake.       # Stoma site pain   # Suspected peristomal skin infection   - D#5 miconazole BID   - s/p WOC consult, appreciate recs; was refitted for stoma yesterday to improve seal   - Tolerating minimal PO > initiated TPN in anticipation upcoming end colostomy creation   - Supplemental shakes as tolerated by patient      # Stage IIIB low grade peritoneal carcinoma  # Rectovaginal fistula  # Neurogenic constipation   - s/p 5C Carbo/Taxol, trial of letrozole; CT 3/21/25 with no evidence of disease  - Postoperative course complicated by rectovaginal fistula with neurogenic constipation requiring extensive disimpaction in the OR   - Follows with CRS, plans for end colostomy with intersphincteric proctectomy, loop takedown; scheduled for 5/28   - TPN initiated for nutrition as mentioned above; declined TPN overnight due to concern for fluid intake causing leakage of urine, amenable to starting this morning        # Urinary retention   # Hx frequent UTIs   # Cystitis on CT  - CT with evidence of cystitis, redemonstrated from 3/21 CT   - Has hx frequent UTIs, manages retention with CIC due to inability to void   - Last treated PTA for UTI 2/25 with cipro, culture resulted with pansensitive staph aureus   - Followed by Dr. Moseley in urology, last seen in April for cysto with no structural cause to explain UTIs, consideration of gentamicin irrigations with plan to follow up on 6/5  - c/f colonization in the setting of frequent self catheterization, asymptomatic on presentation though limited sensation post surgery and does not feel sx of UTI per patient  - UA with +LE, +nitrites collected post CTX; urine culture with GNB, awaiting speciation to narrow abx   - D#5 ceftriaxone  - Afebrile overnight   - Continue CIC; having leakage of urine overnight resulting in  patient declining TPN, may need to straight cath more frequently      # FAHAD, resolved   - Cr slightly elevated compared to previous, on arrival to ED 1.02   - Improved to baseline 5/19 at 0.87      # Hx DVT/PE  - PTA Eliquis      # MDD/SHIVAM  # Insomnia   # RLS   - PTA scheduled Buspar, Pristiq, gabapentin, Ativan, Seroquel, ramelteon   - PTA prn Trazodone, Ambien   - Continue to monitor mood closely      # Hx migraines  - PTA Topamax, propranolol     # Anorectal pain  # Cancer related fatigue   - Followed by palliative outpatient with Dr. Norton, last seen 4/24; inpatient team consulted, appreciate recs   - PTA Ritalin for fatigue, pain management as described above   - PTA Dilaudid, Butrans patch, gabapentin  - Seen by inpatient palliative team, appreciate recs, previously increased Dilaudid PO to 6-8 mg q4hr prn and started scheduled Tylenol, patient declining transition from butrans patch to methadone vs oral soln at this time     Bharti Rich DO  [facility]  Gynecology Oncology Resident, PGY-2  05/22/2025 7:08 AM     To reach the GYNECOLOGY ONCOLOGY team for this patient, please page [phone] or search Gynecologic Oncology Resident Group on [employer].      I saw and evaluated the patient with the resident.  I edited and reviewed the above note. I have reviewed all pertinent imaging and labs on this patient. TPN started last night pain management     Luz Maria Ag MD  Professor  Department of Ob/Gyn and Women's Health  Division of Gynecologic Oncology  [facility]  [phone]

## 2025-05-22 NOTE — PROGRESS NOTES
PALLIATIVE CARE PROGRESS NOTE  [facility]     Patient Name: Marizol Granados  Date of Admission: 5/18/2025   Today the patient was seen for: symptom management     Recommendations & Counseling     MEDICAL MANAGEMENT:   #Acute pain at ileostomy stoma - improving  Increased burning pain around ileostomy in the past week, exacerbated with stool output. Suspected peristomal skin infection/irritation.  - Appreciate ongoing WOC recommendations including smaller ostomy bag.   - Plan for TPN to bridge to colorectal surgery next week.  - This pain (unlike chronic rectal pain) does not appear to be responsive to opioids.  - Provided education and reassurance that we anticipate this pain will resolve in coming days-weeks.  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  Surgery moved up to next week, expecting this will help her pain in the long run.  - Marizol notes rectal pain is similar to what she has been struggling with in months prior, though currently not well controlled. Rectal pain has generally responded better to opioids than pain around stoma.  - Concern poor absorption of oral medications (reportedly had entire pills in ostomy output). Unfortunately Marizol has not tolerated transdermal Butrans patch (significant itching/redness despite Flonase) and does not tolerate sublingual films or oral solutions due to nausea. She understands she is at risk for reduced absorption from tablets.  Dilaudid 6-8mg q4h prn (increased from PTA 4-6mg q4h PRN) - unclear how much she is absorbing  Stopped Butrans patch 20 mcg/hr q7d (due to itching, per patient request)  Started methadone 5mg BID. Qtc 393. Explained  it will take time for methadone to reach steady state and we will continue to adjust in coming days-weeks.  Changed tylenol to PRN  Unable to take NSAIDs due to history of gastric bypass  PTA gabapentin 300mg at bedtime     #Headaches  New persistent headaches since admission, known migraine history. Headaches are diffuse, bothersome but not severe. Possibly tension headache vs central sensitization. Triggers include stress and dehydration in setting of poor oral intake.  Discussed potential causes and reinforced not using PRN opioids for headache pain  Plan to treat anxiety as below  Unable to take NSAIDs due to history of gastric bypass  Changed tylenol to PRN - ok to use for headaches  Could consider acetaminophen-caffeine if needed    #Anxiety, acute on chronic  #Insomnia   #Polypharmacy  Anxiety managed outpatient by psychiatry through [facility]. Concern for polypharmacy given multiple sedating medications (ativan, seroquel, trazodone and ambien). Appears Marizol has been on this regimen for some time and has no signs of oversedation.  - Increased anxiety and distress this admission in setting of ostomy pain and being in small observation room for several days  - Discussed decreasing or discontinuing PTA Ritalin (started for cancer fatigue) as this may be contributing to anxiety. Patient states she would like to keep it for now, has not felt it has made her anxiety worse  Appreciate Palliative social work support  Appreciate ongoing efforts from nursing and primary team to transfer out of observation unit upstairs  Continue to encourage ambulation and getting outside  Declined Psych consult 5/22  Started PO hydroxyzine 25-50mg q6h PRN anxiety or pain adjuvant  Started PO ativan 1mg daily PRN severe anxiety (only for increased anxiety inpatient, do not recommend prescribing upon discharge)  PTA medications:  Ritalin 15mg AM, 10MG afternoon (per outpatient Palliative)  Gabapentin 300 mg bedtime (per outpatient  Palliative)  Buspar 30mg BID  Pristiq 150 mg daily  Ativan 1mg PO BID scheduled  Propanolol 10mg BID  Seroquel 200mg bedtime  Ramelteon 8mg bedtime  Topamax 50mg BID  Trazadone 300 mg prn bedtime  Ambien 5mg prn bedtime     PSYCHOSOCIAL/SPIRITUAL:  Family - supported by  dtr Ana (11) son Donovan (10); mom Atif, father recently passed in 2024  Friends   [place] community: [facility]     Palliative Care will continue to follow.    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 Most recent imaging in March 2025 showed no evidence of disease. She is being followed by CRS for rectovaginal fistula and coordinating surgery for takedown of loop ileostomy and creation of end colostomy. Follows palliative care outpatient for pain. Our team was consulted to help with pain and anxiety.      Interval History:     Multidisciplinary collaboration:  Discussed with Gyn Onc and Colorectal surgery by phone  Reviewed nursing notes from overnight - declined PRN pain medications for 6/10 stoma pain    Notable medications:  Tylenol 975mg TID  Gabapentin 300mg at bedtime  Butrans 20mcg  Dilaudid 8mg PO prn x3 past 24h  IV dilaudid 0.5mg x1 past 24h  Total OME past 24h = 127mg in addition to Butrans    Patient/family narrative  Seen this morning. Marizol had all the lights off but was awake, laying in bed. Had just asked to have Butrans patch removed due to ongoing itching/redness on her arm. Reports she continues to feel very anxious being stuck in this small room without lights. Does not feel her concerns are being heard. Struggles with anxiety at baseline but this is much worse. Follows closely with outpatient  "Psychiatrist.    Pain at stoma site is much better today, they are \"leaving it alone\" for now to avoid further irritation. Rectal pain is \"8/10.\" Feels it is somewhat better with PO dialudid. Rectal pain is consistent with pain she experienced in recent weeks-months. Pain is \"inside\" and constant though fluctuates in intensity. Nothing really helps or makes it worse. No stool passing through.    Headaches since admission, diffuse. Says she has not been taking dilaudid for headache pain. History of migraines but not recently, different than this.     No bloating, has ostomy output, does not feel constipated. Slept ok last night.    Physical Exam:   Temp:  [97.5  F (36.4  C)-97.8  F (36.6  C)] 97.7  F (36.5  C)  Pulse:  [50-65] 65  Resp:  [16] 16  BP: ()/(60-79) 91/60  SpO2:  [95 %-100 %] 95 %  135 lbs 2.27 oz    Physical Exam  General: laying in bed, NAD  Skin: +mild erythema over right upper arm at Butrans patch site  HEENT: normocephalic/atraumatic  Lungs: non labored  Abdomen: +ostomy bag in place, unable to visualize stoma or surrounding skin or bag contents due to opaque material  Neuro: alert and oriented x4, fluent speech, moving all extremities  Psych: +tearful, emotionally labile        Data Reviewed:     CT A/P 5/18  IMPRESSION:  1. Circumferential bladder wall thickening and hyperenhancement  suggests cystitis/ infection.  2. Surgical changes of rectosigmoid colectomy. Right lower quadrant  ileostomy without soft tissue or bowel inflammatory changes.    Labs unremarkable    Medical Decision Making       MANAGEMENT DISCUSSED with the following over the past 24 hours: Gyn/Onc, CRC, RN   NOTE(S)/MEDICAL RECORDS REVIEWED over the past 24 hours: gyn/onc  Medical complexity over the past 24 hours:  - Prescription DRUG MANAGEMENT performed  60 MINUTES SPENT BY ME on the date of service doing chart review, history, exam, documentation & further activities per the note.        "

## 2025-05-22 NOTE — PROGRESS NOTES
"Brief Progress Note    Patient was initially seen and evaluated around 2030 this evening.  At that time, she had had her ostomy appliance replaced recently by our team.  When asked about how she was doing, she reported that she was \"same \".  She had some frustration regarding the appropriateness of her previously ordered nutritional supplement after a comment made by a separate service today.  No other specific concerns.    Was paged by bedside RN around 2130 regarding Marizol refusing TPN tonight.  Returned to the bedside for further discussion.  Marizol reports that she would prefer to have her TPN cycling in the daytime as when it was running overnight last night she woke up with her briefs covered in urine.  She is frustrated because the hospital does not have briefs that are small enough to fit her.  Feels that if her TPN was cycling during the day that she would avoid some anxiety related to waking up in her own urine.  When asked if she would be amenable at all to having TPN running tonight, she reported that she needs to be able to sleep overnight and declined.  I explained that our recommendation would be to have the TPN running, as this was started to ensure that she had appropriate nutritional status leading up to her surgery, and she continues to have very limited p.o. intake due to her pain around her stoma.  She expressed understanding.    She additionally expressed the desire to speak to the rest of our team regarding disposition planning.  She strongly desires discharge, she feels that staying in a hospital bed in a small room with no windows is not therapeutic for her.  She reports that she is happy to deal with the pain by herself at home if it means that she can be discharged.  I explained that I would be happy to relay these concerns to the rest of the team. No other changes to POC.     Bharti Rich DO  [facility]  Gynecology Oncology Resident, PGY-2  05/21/2025 11:28 " PM    To reach the GYNECOLOGY ONCOLOGY team for this patient, please page [phone] or search Gynecologic Oncology Resident Group on Vocera.

## 2025-05-23 ENCOUNTER — APPOINTMENT (OUTPATIENT)
Dept: GENERAL RADIOLOGY | Facility: CLINIC | Age: 40
End: 2025-05-23
Attending: OBSTETRICS & GYNECOLOGY
Payer: COMMERCIAL

## 2025-05-23 LAB
ANION GAP SERPL CALCULATED.3IONS-SCNC: 10 MMOL/L (ref 7–15)
BUN SERPL-MCNC: 20.5 MG/DL (ref 6–20)
CALCIUM SERPL-MCNC: 9.6 MG/DL (ref 8.8–10.4)
CHLORIDE SERPL-SCNC: 107 MMOL/L (ref 98–107)
CREAT SERPL-MCNC: 0.72 MG/DL (ref 0.51–0.95)
EGFRCR SERPLBLD CKD-EPI 2021: >90 ML/MIN/1.73M2
ERYTHROCYTE [DISTWIDTH] IN BLOOD BY AUTOMATED COUNT: 12 % (ref 10–15)
GLUCOSE BLDC GLUCOMTR-MCNC: 113 MG/DL (ref 70–99)
GLUCOSE BLDC GLUCOMTR-MCNC: 124 MG/DL (ref 70–99)
GLUCOSE BLDC GLUCOMTR-MCNC: 125 MG/DL (ref 70–99)
GLUCOSE SERPL-MCNC: 107 MG/DL (ref 70–99)
HCO3 SERPL-SCNC: 23 MMOL/L (ref 22–29)
HCT VFR BLD AUTO: 31 % (ref 35–47)
HGB BLD-MCNC: 10.3 G/DL (ref 11.7–15.7)
MAGNESIUM SERPL-MCNC: 2 MG/DL (ref 1.7–2.3)
MCH RBC QN AUTO: 30.9 PG (ref 26.5–33)
MCHC RBC AUTO-ENTMCNC: 33.2 G/DL (ref 31.5–36.5)
MCV RBC AUTO: 93 FL (ref 78–100)
PHOSPHATE SERPL-MCNC: 4.3 MG/DL (ref 2.5–4.5)
PLATELET # BLD AUTO: 161 10E3/UL (ref 150–450)
POTASSIUM SERPL-SCNC: 4 MMOL/L (ref 3.4–5.3)
RBC # BLD AUTO: 3.33 10E6/UL (ref 3.8–5.2)
SODIUM SERPL-SCNC: 140 MMOL/L (ref 135–145)
TROPONIN T SERPL HS-MCNC: <6 NG/L
WBC # BLD AUTO: 5 10E3/UL (ref 4–11)

## 2025-05-23 PROCEDURE — 250N000009 HC RX 250: Performed by: OBSTETRICS & GYNECOLOGY

## 2025-05-23 PROCEDURE — 250N000013 HC RX MED GY IP 250 OP 250 PS 637: Performed by: OBSTETRICS & GYNECOLOGY

## 2025-05-23 PROCEDURE — 36591 DRAW BLOOD OFF VENOUS DEVICE: CPT | Performed by: OBSTETRICS & GYNECOLOGY

## 2025-05-23 PROCEDURE — 84484 ASSAY OF TROPONIN QUANT: CPT | Performed by: OBSTETRICS & GYNECOLOGY

## 2025-05-23 PROCEDURE — G0463 HOSPITAL OUTPT CLINIC VISIT: HCPCS

## 2025-05-23 PROCEDURE — B4185 PARENTERAL SOL 10 GM LIPIDS: HCPCS | Performed by: OBSTETRICS & GYNECOLOGY

## 2025-05-23 PROCEDURE — 83735 ASSAY OF MAGNESIUM: CPT | Performed by: OBSTETRICS & GYNECOLOGY

## 2025-05-23 PROCEDURE — 99231 SBSQ HOSP IP/OBS SF/LOW 25: CPT | Mod: GC | Performed by: OBSTETRICS & GYNECOLOGY

## 2025-05-23 PROCEDURE — 250N000013 HC RX MED GY IP 250 OP 250 PS 637: Performed by: PHYSICIAN ASSISTANT

## 2025-05-23 PROCEDURE — 85027 COMPLETE CBC AUTOMATED: CPT | Performed by: OBSTETRICS & GYNECOLOGY

## 2025-05-23 PROCEDURE — 120N000002 HC R&B MED SURG/OB UMMC

## 2025-05-23 PROCEDURE — 82310 ASSAY OF CALCIUM: CPT | Performed by: OBSTETRICS & GYNECOLOGY

## 2025-05-23 PROCEDURE — 250N000011 HC RX IP 250 OP 636: Performed by: STUDENT IN AN ORGANIZED HEALTH CARE EDUCATION/TRAINING PROGRAM

## 2025-05-23 PROCEDURE — 71045 X-RAY EXAM CHEST 1 VIEW: CPT | Mod: 26 | Performed by: RADIOLOGY

## 2025-05-23 PROCEDURE — 250N000009 HC RX 250

## 2025-05-23 PROCEDURE — 99232 SBSQ HOSP IP/OBS MODERATE 35: CPT | Performed by: PHYSICIAN ASSISTANT

## 2025-05-23 PROCEDURE — 85014 HEMATOCRIT: CPT | Performed by: OBSTETRICS & GYNECOLOGY

## 2025-05-23 PROCEDURE — 71045 X-RAY EXAM CHEST 1 VIEW: CPT

## 2025-05-23 PROCEDURE — 84100 ASSAY OF PHOSPHORUS: CPT | Performed by: OBSTETRICS & GYNECOLOGY

## 2025-05-23 PROCEDURE — 93005 ELECTROCARDIOGRAM TRACING: CPT

## 2025-05-23 PROCEDURE — 93010 ELECTROCARDIOGRAM REPORT: CPT | Performed by: INTERNAL MEDICINE

## 2025-05-23 PROCEDURE — 80048 BASIC METABOLIC PNL TOTAL CA: CPT | Performed by: OBSTETRICS & GYNECOLOGY

## 2025-05-23 RX ADMIN — HYDROMORPHONE HYDROCHLORIDE 6 MG: 4 TABLET ORAL at 17:12

## 2025-05-23 RX ADMIN — ACETAMINOPHEN 650 MG: 325 TABLET, FILM COATED ORAL at 20:11

## 2025-05-23 RX ADMIN — HYDROMORPHONE HYDROCHLORIDE 6 MG: 4 TABLET ORAL at 12:59

## 2025-05-23 RX ADMIN — GABAPENTIN 300 MG: 300 CAPSULE ORAL at 22:38

## 2025-05-23 RX ADMIN — METHYLPHENIDATE HYDROCHLORIDE 15 MG: 5 TABLET ORAL at 10:35

## 2025-05-23 RX ADMIN — QUETIAPINE FUMARATE 200 MG: 200 TABLET ORAL at 22:38

## 2025-05-23 RX ADMIN — DESVENLAFAXINE 150 MG: 50 TABLET, FILM COATED, EXTENDED RELEASE ORAL at 22:38

## 2025-05-23 RX ADMIN — APIXABAN 5 MG: 5 TABLET, FILM COATED ORAL at 22:37

## 2025-05-23 RX ADMIN — METHADONE HYDROCHLORIDE 5 MG: 5 TABLET ORAL at 22:39

## 2025-05-23 RX ADMIN — ACETAMINOPHEN 650 MG: 325 TABLET, FILM COATED ORAL at 10:36

## 2025-05-23 RX ADMIN — MAGNESIUM SULFATE HEPTAHYDRATE: 500 INJECTION, SOLUTION INTRAMUSCULAR; INTRAVENOUS at 12:01

## 2025-05-23 RX ADMIN — HYDROXYZINE HYDROCHLORIDE 25 MG: 25 TABLET, FILM COATED ORAL at 15:04

## 2025-05-23 RX ADMIN — METHADONE HYDROCHLORIDE 5 MG: 5 TABLET ORAL at 10:35

## 2025-05-23 RX ADMIN — PROPRANOLOL HYDROCHLORIDE 10 MG: 10 TABLET ORAL at 10:36

## 2025-05-23 RX ADMIN — PANTOPRAZOLE SODIUM 40 MG: 40 TABLET, DELAYED RELEASE ORAL at 22:39

## 2025-05-23 RX ADMIN — LORAZEPAM 1 MG: 1 TABLET ORAL at 10:35

## 2025-05-23 RX ADMIN — TOPIRAMATE 50 MG: 50 TABLET, FILM COATED ORAL at 22:39

## 2025-05-23 RX ADMIN — LORAZEPAM 1 MG: 0.5 TABLET ORAL at 20:11

## 2025-05-23 RX ADMIN — APIXABAN 5 MG: 5 TABLET, FILM COATED ORAL at 10:43

## 2025-05-23 RX ADMIN — CEFTRIAXONE SODIUM 1 G: 1 INJECTION, POWDER, FOR SOLUTION INTRAMUSCULAR; INTRAVENOUS at 22:48

## 2025-05-23 RX ADMIN — TOPIRAMATE 50 MG: 50 TABLET, FILM COATED ORAL at 10:43

## 2025-05-23 RX ADMIN — ZOLPIDEM TARTRATE 5 MG: 5 TABLET ORAL at 22:38

## 2025-05-23 RX ADMIN — TRAZODONE HYDROCHLORIDE 300 MG: 150 TABLET ORAL at 22:38

## 2025-05-23 RX ADMIN — HYDROXYZINE HYDROCHLORIDE 50 MG: 50 TABLET ORAL at 22:44

## 2025-05-23 RX ADMIN — BUSPIRONE HYDROCHLORIDE 30 MG: 30 TABLET ORAL at 10:43

## 2025-05-23 RX ADMIN — RAMELTEON 8 MG: 8 TABLET ORAL at 22:38

## 2025-05-23 RX ADMIN — BUSPIRONE HYDROCHLORIDE 30 MG: 30 TABLET ORAL at 22:37

## 2025-05-23 RX ADMIN — PANTOPRAZOLE SODIUM 40 MG: 40 TABLET, DELAYED RELEASE ORAL at 10:44

## 2025-05-23 RX ADMIN — OLIVE OIL AND SOYBEAN OIL 250 ML: 16; 4 INJECTION, EMULSION INTRAVENOUS at 12:07

## 2025-05-23 ASSESSMENT — ACTIVITIES OF DAILY LIVING (ADL)
ADLS_ACUITY_SCORE: 43

## 2025-05-23 NOTE — PROGRESS NOTES
"PALLIATIVE CARE SOCIAL WORK Progress Note   Location: [facility]      Support Visit    PCSW checked in with Marizol this morning and revisited option of trying to get outside this afternoon; reviewed plan that this writer will return early afternoon.    Upon return this afternoon, Marizol noted she was not feeling up to trying to get outside. Mood and affect more flat today though maintained eye contact during visit and engaged. Assured patient we did not need to attempt to get outside, instead visited in her room.    Patient able to acknowledge that \"being in this box\" is attributing to her low mood. Lack of interaction with others outside of hospital staff is also not helping her to focus on things other than her medical situation, discomfort and upcoming surgery. Marizol did share she has a fellow \"dance mom\" visiting this afternoon and is looking forward to her visit.    Re-visited use of notebook/journal that writer provided earlier in week and encouraged use for writing down questions, drawing/scribbling, journaling, writing to do lists and noted that sometimes the act of just writing down a questions, task or worry allows you to set it aside if even for a short time.    Validated for Marizol that she is notably doing a better job today of being able to identify things that are outside of her control or being managed by others; surgery, management of medications pre and post surgery, TPN.    Marizol verbalized concerns about unabsorbed pills being found in her stoma pouch and not knowing which medication she is not effectively getting or what symptom may not be managed because of it. Palliative LESTER also present for visit helped re-frame for Marizol medication concerns and reviewed alternate options she will look into.    Marizol continues to demonstrate moderate distress, physical and emotional. Appears to benefit from active and reflective listening and re-framing. Anticipate palliative care will continue to follow " post-op.    Plan: PCSW not available over weekend or Monday 5/26 (holiday); if Marizol remains hospitalized until surgery will follow up again 5/27.    Clinical Social Work Interventions:   Assessment of palliative specific issues    Facilitation of processing of thoughts/feelings  Re-framing    FLORIAN Knight, [facility]  [facility], Palliative Care  Securely message with the [employer] Web Console (learn more here) or  Text page via [facility] Paging/Directory

## 2025-05-23 NOTE — PLAN OF CARE
Neuro: A&Ox4. Afebrile. Pt frustrated and crying intermittently this shift, stating that she wants to go home, emotional support provided. Scheduled ativan given. PRN trazodone and ambien given per pt req for help with sleep.  Cardiac: VSS.  Respiratory: Sating 90s on RA.  GI/: Neurogenic bladder, pt self caths. Did not want pure wick overnight. Illeostomy with output per pt. Concern for improper digestion of medications d/t pill contents in ostomy; pt refused liquid or crushed medications, provider aware and meds were given in tablet form.  Diet/appetite: Poor appetite, TPN running overnight. No nausea reported.  Activity: Independent   Pain: Headache, rectal and stoma pain reported, declined PRN medications overnight.   Skin: No new deficits noted.     Plan: continue with POC, notify team with changes.

## 2025-05-23 NOTE — PROGRESS NOTES
"Brief Progress Note    S: In to assess patient after discussion with palliative care team. Patient reported chest pain while talking to team, which started suddenly 45 minutes ago. Appeared out of nowhere. Has not had similar episodes of pain. Occurs in the middle of her chest with no radiation. Describes pain as dull rated 7/10. Pain with inspiration and some new onset mild SOB. No fevers/chills/ worsening abdominal pain.    O: vitals pending. Touched base with RN for new set of vitals during assessment.   Most recent AM vitals  BP 96/59 (BP Location: Right arm)   Pulse 72   Temp 97.7  F (36.5  C) (Oral)   Resp 16   Ht 1.651 m (5' 5\")   Wt 61.3 kg (135 lb 2.3 oz)   LMP  (LMP Unknown)   SpO2 96%   BMI 22.49 kg/m    CV: RRR  Pulm: CTAB, normal inspiratory effort  Ext: nontender- no edema, no erythema.       A/P: Marizol Granados is a 40 year old female with stage IIIB low grade peritoneal carcinoma admitted to observation for suspected peristomal skin infection and decreased PO intake being evaluated for new onset chest pain. Etiology is multifactorial. Differential includes GERD, anxiety, ACS, PE, pleural effusion. Plan for CXR, EKG, troponin, and close interval follow up.    Isamar Woodruff MD  Obstetrics, Gynecology & Women's Health   Resident, PGY-1  05/23/2025 2:36 PM    "

## 2025-05-23 NOTE — PROGRESS NOTES
[facility] Nurse Inpatient Assessment     Consulted for: ileostomy  5/21: please aleah for end colostomy scheduled surgery 5/28    [facility] nurse follow-up plan: daily    Patient History (according to provider note(s):      Marizol Granados is a 40 year old female with stage IIIB low grade peritoneal carcinoma admitted to observation for suspected peristomal skin infection.     Assessment:      Areas visualized during today's visit: Focused: and Abdomen    Assessment of Established loop Ileostomy:  How long has patient had ostomy: 9/2024  Stoma: pink-red, round, moist, good turgor, and flat  Mucutaneous junction: intact  Peristomal skin: Moisture-Associated Skin Damage (MASD) due to leakage   Output: liquid      Is patient independent with ostomy care?: Yes  Home pouching system: Coloplast 1 piece deep convex pre-cut with barrier ring  Pouching issues and/or educational needs:Evaluate leakage issue, Adjustment of pouching plan, and Peristomal skin care  Interventions completed today: Pouching system assessment , Evaluate leakage issue, Refitting of appliance , Adjustment of pouching plan, and Peristomal skin care  Pouching system in use while hospitalized:  Jose one piece, cut to fit, convex, barrier ring, ostomy powder, and skin prep  Supplies location: at bedside        5/19: Pt currently wears a 1 piece pre-cut pouch. The hole in the wafer is too large for Pt's stoma and she is having skin breakdown and leakage. This is very distressing for the Pt. Switched to a cut to fit 1 piece pouch with Marie barrier ring, stoma powder and Cavilon advanced.  5/20: Pt's freedom-stomal skin much improved since yesterday. Will continue the new pouching system for now.   5/21: Marked Left abdomen in a spot mirroring current ileostomy for surgery 5/28 to create an end colostomy. Per pt this spot works well with clothing. Used skin marker and covered with Tegaderm. Pt had c/o severe  "burning pain around ileostomy so we removed the pouch placed 5/20 and skin looked the same as that day's photo. Will continue this plan since it seems to be working well. Pt now has extra supplies in her room. We will continue to see daily for now.   5/22: Pt reported pain around stoma site yesterday. Pt and Gyn/onc provider replaced pouch overnight and pain was much better today. Pouch in tact and not changed today. Plan for surgery next week. WOC will continue to follow.  5/23: Pouch was intact at start of visit and she was starting to feel c/o burning so WOC changed the pouch. Checked in on her later and she was still c/o burning so we did change it again but used her Coloplast ring but she still had c/o burning pain when WOC left.     Treatment Plan:     RLQ Ileostomy pouching plan:   Pouching system: ostomy supplies pouches: Jose 38 Cera Plus Soft Convex FECAL (211037)   Accessories used: [facility] ostomy accessories: 2\" Marie Ring ([id number]), Powder ([id number]), and Cavilon no sting barrier film ([id number])   Frequency of pouch changes: PRN leakage and Three times a week  WOC follow up plan: Daily Monday-Friday (as able)  Bedside RN interventions: Change pouch PRN if leaking using the supplies above, Empty pouch when 1/3 to 1/2 full, ensure to clean pouch outlet after emptying to prevent odor, Notify WOC for ongoing pouch leakage, Document stoma appearance and output volume, color, and consistency every shift, and Encourage patient to empty pouch with assist     Orders: Reviewed    RECOMMEND PRIMARY TEAM ORDER: None, at this time  Education provided: plan of care  Discussed plan of care with: Patient and Nurse Practitioner  Notify WOC if wound(s) deteriorate.  Nursing to notify the Provider(s) and re-consult the WOC Nurse if new skin concern.    DATA:     Current support surface: Standard  Standard gel mattress (Isoflex)  Containment of urine/stool: Incontinent pad in bed and Ileostomy pouch  BMI: Body mass index is " 22.49 kg/m .   Active diet order: Orders Placed This Encounter      Regular Diet Adult     Output: I/O last 3 completed shifts:  In: 1000 [P.O.:220; IV Piggyback:100]  Out: -      Labs:   Recent Labs   Lab 05/23/25  0554 05/22/25  0553 05/21/25  0547   ALBUMIN  --   --  3.3*   PREALB  --   --  15.3*   HGB 10.3*   < > 9.5*   INR  --   --  1.37*   WBC 5.0   < > 3.7*    < > = values in this interval not displayed.     Pressure injury risk assessment:   Sensory Perception: 4-->no impairment  Moisture: 3-->occasionally moist  Activity: 3-->walks occasionally  Mobility: 4-->no limitation  Nutrition: 2-->probably inadequate  Friction and Shear: 3-->no apparent problem  Terry Score: 19      Pager no longer in use, please contact through [employer] group: [facility] Nurse [place]    Dept. Office Number: [phone]        [facility]     Name: Marizol Granados  Date: 5/19/25    To order your ostomy supplies    The ostomy Supplier needs this supply list  to process your order. You will need to fax/deliver this list, along with your Insurance information. Your home care nurse can assist with this process.    List of Ostomy Distributors      [facility]  Ph. [phone] Fax # [phone]  [facility].   Ph. [phone] [phone]  [employer] [facility]   Ph. [phone]  [facility]   Ph. [phone] [phone]  Or Call your insurance provider for their preferred supplier    Your Medical Supplier will need your surgeon's name, phone and fax number    Clinic:                     Phone                            Fax  Gynecology Oncology:             [phone]      Verbal Order for ostomy supplies for 1 Month per:              Jalen Hart RN, CWOCN                                                                  Authorizing MD: Dr. Destiny Rubio    Quantity of pouches:     20/mo.    Request the following supplies:      Jose                               1 piece soft convex with filter #[id number]    OR                    Coloplast      1 piece deep convex pouch cut to fit 1-5/16  with filter #[id number]          Accessories       2  Marie ring #[id number]    Adapt powder #[id number]    No sting film barrier # [id number]                                     Change your pouch 2 times a week, more often if leaking.   If you are cutting a hole in the wafer of your pouch, recheck stoma size and adjust pouch opening as needed every week    . Call the Ostomy Nurse at [facility]       [address] [place] [address], [place] : [phone]   Schedule a follow-up visit in 2 to 4 weeks after your surgery, sooner if having problems Bring a complete set of pouch-changing supplies to this visit

## 2025-05-23 NOTE — PROGRESS NOTES
"The Pt care assumed at 1130 AM to 1500. Pt is alert and oriented x 4 and independent with activity.Ambulated the unit. Palliative care was here. Pt c/o cp non pressure and non radiating. Blood sugar 125.Provider came to see Pt. BP 98/63   Pulse 72   Temp 97.1  F (36.2  C) (Oral)   Resp 16   Ht 1.651 m (5' 5\")   Wt 61.3 kg (135 lb 2.3 oz)   LMP  (LMP Unknown)   SpO2 96%   BMI 22.49 kg/m    CXR  and Trop wnl. TF infusing at 56 ml/hr. TF to run for 16 hours. At 0700 TF to decrease to 28 ml/hr for 1 hour and then stop. Continues with Lipids at 20.8 ml/hr. Hydroxyzine 25 mg administered for anxiety and appears effective. Pt resting in bed. Visitor at bedside and encouraging Pt take a walk outside. Tolerated Ensure supplement. Call light  within reach. Will continue to monitor and update as needed.    "

## 2025-05-23 NOTE — PROGRESS NOTES
Care Management Follow Up    Length of Stay (days): 3    Expected Discharge Date: 05/24/2025     Concerns to be Addressed:       Patient plan of care discussed at interdisciplinary rounds: Yes    Anticipated Discharge Disposition:  Discharge Planning     Anticipated Discharge Services:  TPN   Anticipated Discharge DME:  None    Patient/family educated on Medicare website which has current facility and service quality ratings:  No  Education Provided on the Discharge Plan:  Yes  Patient/Family in Agreement with the Plan:  Yes    Referrals Placed by CM/SW:  Home infusion   Private pay costs discussed: Not applicable    Discussed  Partnership in Safe Discharge Planning  document with patient/family: No     Handoff Completed: No, handoff not indicated or clinically appropriate    Additional Information:  MALDONADO received a Vocera from Carmella Demarco that pt needs home infusion for TPN. Carmella Sosa is the ordering provider for the TPN. MALDONADO met with pt at bedside and discussed [facility] home infusion referral for TPN and the process of sending referral and the liaison teaching the pt. Pt is agreeable to referral but shared she does not why she needs after discharge and stated providers did not talk to her about needing the TPN. MALDONADO received a message from pt's provider Maribel Garcia regarding and SW updated her. MALDONADO sent home infusion referral for TPN to [facility] home infusion.    Next Steps:   -Follow up with [facility] home infusion referral and send home care orders [facility].    FLORIAN Slaughter, SARAYSW  OBS/ED   Phone: [phone]  Fax: [phone]  Vocera: 1A Obs MALDONADO

## 2025-05-23 NOTE — PROGRESS NOTES
"Brief Progress Note    To bedside for evening check-in.  Marizol reports that she is doing \"fine \"and has no new concerns.  She reports that her pain is at her baseline.  Seen by the palliative care team today and had been transitioned to methadone.  Continues to have concerns about not digesting some of her medications due to seeing pill content in her ostomy, but per nursing, is reticent to have her methadone switched to an oral solution to appease this concern.  Due to her distal ileostomy, it is most likely that she is absorbing the vast majority of her medications, and it would be appropriate for her to receive her pain medication including methadone orally in a tablet form.  Additionally had concerns last night leakage of urine and some concern regarding the size of her briefs that the hospital was able to provide.  Day team had considered the option of managing this better with a pure wick overnight versus increased frequency of straight catheterization; per bedside RN, the patient is declining to use a pure wick overnight and as such we will continue to recommend intermittent catheterization as patient allows. Plan to continue with TPN in the morning due to poor nutritional status and limited p.o. intake.  No other changes to plan of care at this time.    Bharti Rich DO  [facility]  Gynecology Oncology Resident, PGY-2  05/22/2025 9:08 PM    To reach the GYNECOLOGY ONCOLOGY team for this patient, please page [phone] or search Gynecologic Oncology Resident Group on Vocera.     "

## 2025-05-23 NOTE — PLAN OF CARE
Goal Outcome Evaluation:    Pt verbalized feeling anxious and seemed frustrated with her whole situation. Pt initially wanted to go outside but changed her mind. Refused assessment of ostomy. Pt stayed in bed most of the shift. Pt is able to make needs known. R chest port infusing TPN. Pt does self catheterization but also has episode of incontinence. Pt  is UAL. VSS with soft BP, on room air with no SOB.

## 2025-05-23 NOTE — PROGRESS NOTES
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  Surgery moved up to next week, expecting this will help her pain in the long run.  - Marizol notes rectal pain is similar to what she has been struggling with in months prior, though currently not well controlled. Rectal pain has generally responded better to opioids than pain around stoma.  Dilaudid 6-8mg q4h prn (increased from PTA 4-6mg q4h PRN) - unclear how much she is absorbing  Methadone 5mg BID (started 5/22). Qtc 393. Explained it will take time for methadone to reach steady state and we will continue to adjust in coming days-weeks.  Tylenol PRN  Unable to take NSAIDs due to history of gastric bypass  PTA gabapentin 300mg at bedtime  If Marizol is discharged home and re-admitted for surgery next week, can re-consult Palliative to assist with methadone management in setting of periop pain.    #Acute pain at ileostomy stoma - improving  Increased burning pain around ileostomy in the past week, exacerbated with stool output. Suspected peristomal skin infection/irritation.  - Appreciate ongoing WOC recommendations including smaller ostomy bag.   - Plan for TPN to bridge to colorectal surgery next week.  - This pain (unlike chronic rectal pain) does not appear to be responsive to opioids.  - Provided education and reassurance that we anticipate this pain will resolve in coming days-weeks.  - Pain is improving as of 5/22 and 5/23  Changed tylenol to PRN  Unable to take NSAIDs due to history of gastric bypass  Lidocaine patches  Monitor for constipation as this could cause increased pressure at stoma     #Headaches  New persistent  headaches since admission, known migraine history. Headaches are diffuse, bothersome but not severe. Possibly tension headache vs central sensitization. Triggers include stress and dehydration in setting of poor oral intake.  Discussed potential causes and reinforced not using PRN opioids for headache pain  Plan to treat anxiety as below  Unable to take NSAIDs due to history of gastric bypass  Changed tylenol to PRN - ok to use for headaches  Could consider acetaminophen-caffeine if needed    #Anxiety, acute on chronic  #Insomnia   #Polypharmacy  Anxiety managed outpatient by psychiatry through [facility]. Concern for polypharmacy given multiple sedating medications (ativan, seroquel, trazodone and ambien). Appears Marizol has been on this regimen for some time and has no signs of oversedation.  - Increased anxiety and distress this admission in setting of ostomy pain and being in small observation room for several days  - Discussed decreasing or discontinuing PTA Ritalin (started for cancer fatigue) as this may be contributing to anxiety. Patient states she would like to keep it for now, has not felt it has made her anxiety worse  - Possibly discharging prior to re-admission for surgery next week, this would likely be very helpful for her anxiety  Appreciate Palliative social work support  Continue to encourage ambulation and getting outside  Declined Psych consult 5/22  PO hydroxyzine 25-50mg q6h PRN anxiety or pain adjuvant  PO ativan 1mg daily PRN severe anxiety (only for increased anxiety inpatient, do not recommend prescribing upon discharge)  PTA medications:  Ritalin 15mg AM, 10MG afternoon (per outpatient Palliative)  Gabapentin 300 mg bedtime (per outpatient Palliative)  Buspar 30mg BID  Pristiq 150 mg daily  Ativan 1mg PO BID scheduled  Propanolol 10mg BID  Seroquel 200mg bedtime  Ramelteon 8mg bedtime  Topamax 50mg BID  Trazadone 300 mg prn bedtime  Ambien 5mg prn bedtime    "  PSYCHOSOCIAL/SPIRITUAL:  Family - supported by  dtr Ana (11) son Donovan (10); mom Atif, father recently passed in 2024  Friends   [place] community: [facility]     Palliative Care will continue to follow.    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 Most recent imaging in March 2025 showed no evidence of disease. She is being followed by CRS for rectovaginal fistula and coordinating surgery for takedown of loop ileostomy and creation of end colostomy. Follows palliative care outpatient for pain. Our team was consulted to help with pain and anxiety.      Interval History:     Multidisciplinary collaboration:  Reviewed notes from Gyn/onc, nursing    Notable medications:  Tylenol 650mg q6h PRN x 1  Methadone 5mg BID (started 5/22 PM)  Gabapentin 300mg at bedtime  Dilaudid 8mg PO prn x1 past 24h  Atarax 25-50mg q6h PRN x 1 past 24h  Lorazepam 1mg BID scheduled and daily PRN (x1 past 24h)    Patient/family narrative  Seen with Palliative SW at bedside. Marizol reports new chest pain for the past 45 minutes as well as new cough. Pain is worse with deep breaths. Will relay to primary team.    Marizol's rectal pain is somewhat better, though she also notes she has more sensation in her bladder which she did not have before. She wonders if this is from UTI. Stoma pain is ok, a bit more burning overnight but not right now. Plan to change ostomy bag with WOC today.    Anxiety persistent but less tearful today. Very concerned she might not be absorbing all her psych meds as there have been several \"pills\" coming out of ostomy. \"Pills\" in specimen container at bedside, appear to be partially dissolved/digested (not " whole pills). Provided reassurance and relayed to primary team.    Physical Exam:   Temp:  [97.4  F (36.3  C)-98  F (36.7  C)] 97.7  F (36.5  C)  Pulse:  [64-72] 72  Resp:  [16-18] 16  BP: (82-98)/(50-71) 96/59  SpO2:  [96 %-99 %] 96 %  135 lbs 2.27 oz    Physical Exam  General: laying in bed, NAD  HEENT: normocephalic/atraumatic  Lungs: non labored  Neuro: alert and oriented x4, fluent speech, moving all extremities  Psych: anxious though no longer tearful, somewhat flat affect        Data Reviewed:     Trop checked this afternoon <6  CXR without acute findings  Repeat EKG with Qtc 414    Medical Decision Making       MANAGEMENT DISCUSSED with the following over the past 24 hours: Gyn/Onc   NOTE(S)/MEDICAL RECORDS REVIEWED over the past 24 hours: Gyn/Onc  35 MINUTES SPENT BY ME on the date of service doing chart review, history, exam, documentation & further activities per the note.

## 2025-05-23 NOTE — PROGRESS NOTES
Patient refused liquid methadone, she was informed that methadone can't be crushed. She agreed to take the tablet. Patient also to have overnight pure wick placed she refused  and said the noise from the suction was too loud. Provider aware/notified

## 2025-05-23 NOTE — PROGRESS NOTES
Neuro: A&Ox4.   Cardiac: Not on tele. VSS.   Respiratory: Sating above 92 on RA.  GI/: Adequate urine output. BM X1  Diet/appetite: Tolerating * diet. Eating well.  Activity:  Assist of *, up to chair and in halls.  Pain: At acceptable level on current regimen.   Skin: No new deficits noted.  LDA's: Port with TPN/lipid infusing. PIV saline locked    Plan: Continue with POC. Notify primary team with changes.  Patient was sleeping through the morning. I did not want to take her morning medication until 1030. Cycled TPN started at noon.

## 2025-05-23 NOTE — PROGRESS NOTES
Gynecology Oncology Progress Note  05/22/2025    HD#6 suspected peristomal skin infection      Disease: stage IIIB low grade peritoneal carcinoma      24 hour events:   - TPN in the AM   - palliative: added Ativan prn, Atarax prn     Subjective: ***    Objective:   Vitals:    05/22/25 1005 05/22/25 1355 05/22/25 1756 05/22/25 2146   BP: 91/60 97/69 98/65 93/71   BP Location: Right arm  Right arm Right arm   Pulse: 65  64 69   Resp:   16 18   Temp: 97.7  F (36.5  C)  98  F (36.7  C) 97.6  F (36.4  C)   TempSrc: Oral  Oral Oral   SpO2: 95%  97% 99%   Weight:       Height:           General: NAD, appears comfortable in bed  CV: ***Well-perfused  Resp: ***Breathing comfortably on room air  Abdomen: Soft, ***tender, ***distended  Extremities: warm, well-perfused, nontender, *** edema, ***SCDs in place    No intake/output data recorded.    Intake/Output (24 Hrs // Since MN)  PO *** mL // *** mL    UOP ***mL // ***mL    New labs/imaging-  ***    Assessment: Marizol Granados is a 40 year old female with stage IIIB low grade peritoneal carcinoma admitted to observation for suspected peristomal skin infection and decreased PO intake.       # Stoma site pain   # Suspected peristomal skin infection   - D#6 miconazole BID   - s/p WOC consult, appreciate recs   - Tolerating minimal PO > initiated TPN in anticipation upcoming end colostomy creation   - Supplemental shakes as tolerated by patient      # Stage IIIB low grade peritoneal carcinoma  # Rectovaginal fistula  # Neurogenic constipation   - s/p 5C Carbo/Taxol, trial of letrozole; CT 3/21/25 with no evidence of disease  - Postoperative course complicated by rectovaginal fistula with neurogenic constipation requiring extensive disimpaction in the OR   - Follows with CRS, plans for end colostomy with intersphincteric proctectomy, loop takedown; scheduled for 5/28   - TPN initiated for nutrition as mentioned above***     # Urinary retention   # Hx frequent UTIs   # Cystitis on  CT  - CT with evidence of cystitis, redemonstrated from 3/21 CT   - Has hx frequent UTIs, manages retention with CIC due to inability to void   - Last treated PTA for UTI 2/25 with cipro, culture resulted with pansensitive staph aureus   - Followed by Dr. Moseley in urology, last seen in April for cysto with no structural cause to explain UTIs, consideration of gentamicin irrigations with plan to follow up on 6/5  - c/f colonization in the setting of frequent self catheterization, asymptomatic on presentation though limited sensation post surgery and does not feel sx of UTI per patient  - UA with +LE, +nitrites collected post CTX; urine culture with GNB, awaiting speciation to narrow abx   - D#6 ceftriaxone, anticipate transition to ciprofloxacin today ***   - Afebrile overnight   - Continue CIC      # FAHAD, resolved   - Cr slightly elevated compared to previous, on arrival to ED 1.02   - Improved to baseline 5/19 at 0.87      # Hx DVT/PE  - PTA Eliquis      # MDD/SHIVAM  # Insomnia   # RLS   - PTA scheduled Buspar, Pristiq, gabapentin, Ativan, Seroquel, ramelteon   - PTA prn Trazodone, Ambien   - Continue to monitor mood closely      # Hx migraines  - PTA Topamax, propranolol     # Anorectal pain  # Cancer related fatigue   - Followed by palliative outpatient with Dr. Norton, last seen 4/24; inpatient team consulted, appreciate recs   - PTA Ritalin for fatigue, pain management as described above   - PTA Dilaudid, Butrans patch, gabapentin  - Seen by inpatient palliative team, appreciate recs, previously increased Dilaudid PO to 6-8 mg q4hr prn and started scheduled Tylenol, now transitioned off of butrans patch > methadone ***     Bharti Rich DO  [facility]  Gynecology Oncology Resident, PGY-2  05/22/2025 10:24 PM ***    To reach the GYNECOLOGY ONCOLOGY team for this patient, please page [phone] or search Gynecologic Oncology Resident Group on Vocera.        Errol, last seen 4/24; inpatient team consulted, appreciate recs   - PTA Ritalin for fatigue, pain management as described above   - PTA Dilaudid, Butrans patch, gabapentin  - Seen by inpatient palliative team, appreciate recs, previously increased Dilaudid PO to 6-8 mg q4hr prn and started scheduled Tylenol, now transitioned off of butrans patch > methadone      Bharti Rich DO  [facility]  Gynecology Oncology Resident, PGY-2  05/23/2025 6:42 AM     To reach the GYNECOLOGY ONCOLOGY team for this patient, please page [phone] or search Gynecologic Oncology Resident Group on Vocera.     I saw and evaluated the patient with the resident.  I edited and reviewed the above note. I have reviewed all pertinent imaging and labs on this patient.  Concern re patient's mental status down in observation with no windows. Would liek to try to get home prior to surgery. Need to discuss when patient will be up to goal with TPN. Pain better controlled. I would recommend we try to get her out of hospital prior to surgery on 5/28.    Luz Maria Ag MD  Professor  Department of Ob/Gyn and Women's Health  Division of Gynecologic Oncology  [facility]  [phone]

## 2025-05-24 LAB
ANION GAP SERPL CALCULATED.3IONS-SCNC: 10 MMOL/L (ref 7–15)
BACTERIAL VAGINOSIS VAG-IMP: NEGATIVE
BUN SERPL-MCNC: 29 MG/DL (ref 6–20)
CALCIUM SERPL-MCNC: 9.5 MG/DL (ref 8.8–10.4)
CANDIDA DNA VAG QL NAA+PROBE: DETECTED
CANDIDA GLABRATA / CANDIDA KRUSEI DNA: NOT DETECTED
CHLORIDE SERPL-SCNC: 106 MMOL/L (ref 98–107)
CREAT SERPL-MCNC: 0.74 MG/DL (ref 0.51–0.95)
EGFRCR SERPLBLD CKD-EPI 2021: >90 ML/MIN/1.73M2
ERYTHROCYTE [DISTWIDTH] IN BLOOD BY AUTOMATED COUNT: 11.9 % (ref 10–15)
GLUCOSE BLDC GLUCOMTR-MCNC: 106 MG/DL (ref 70–99)
GLUCOSE BLDC GLUCOMTR-MCNC: 114 MG/DL (ref 70–99)
GLUCOSE BLDC GLUCOMTR-MCNC: 116 MG/DL (ref 70–99)
GLUCOSE SERPL-MCNC: 112 MG/DL (ref 70–99)
HCO3 SERPL-SCNC: 25 MMOL/L (ref 22–29)
HCT VFR BLD AUTO: 31.1 % (ref 35–47)
HGB BLD-MCNC: 10.3 G/DL (ref 11.7–15.7)
MAGNESIUM SERPL-MCNC: 1.9 MG/DL (ref 1.7–2.3)
MCH RBC QN AUTO: 31.4 PG (ref 26.5–33)
MCHC RBC AUTO-ENTMCNC: 33.1 G/DL (ref 31.5–36.5)
MCV RBC AUTO: 95 FL (ref 78–100)
PHOSPHATE SERPL-MCNC: 4.6 MG/DL (ref 2.5–4.5)
PLATELET # BLD AUTO: 166 10E3/UL (ref 150–450)
POTASSIUM SERPL-SCNC: 4.1 MMOL/L (ref 3.4–5.3)
RBC # BLD AUTO: 3.28 10E6/UL (ref 3.8–5.2)
SODIUM SERPL-SCNC: 141 MMOL/L (ref 135–145)
T VAGINALIS DNA VAG QL NAA+PROBE: NOT DETECTED
WBC # BLD AUTO: 4.7 10E3/UL (ref 4–11)

## 2025-05-24 PROCEDURE — 84100 ASSAY OF PHOSPHORUS: CPT | Performed by: OBSTETRICS & GYNECOLOGY

## 2025-05-24 PROCEDURE — 85027 COMPLETE CBC AUTOMATED: CPT | Performed by: OBSTETRICS & GYNECOLOGY

## 2025-05-24 PROCEDURE — B4185 PARENTERAL SOL 10 GM LIPIDS: HCPCS | Performed by: OBSTETRICS & GYNECOLOGY

## 2025-05-24 PROCEDURE — 82374 ASSAY BLOOD CARBON DIOXIDE: CPT | Performed by: OBSTETRICS & GYNECOLOGY

## 2025-05-24 PROCEDURE — 83735 ASSAY OF MAGNESIUM: CPT

## 2025-05-24 PROCEDURE — 250N000013 HC RX MED GY IP 250 OP 250 PS 637

## 2025-05-24 PROCEDURE — 250N000013 HC RX MED GY IP 250 OP 250 PS 637: Performed by: OBSTETRICS & GYNECOLOGY

## 2025-05-24 PROCEDURE — 120N000002 HC R&B MED SURG/OB UMMC

## 2025-05-24 PROCEDURE — 36415 COLL VENOUS BLD VENIPUNCTURE: CPT | Performed by: OBSTETRICS & GYNECOLOGY

## 2025-05-24 PROCEDURE — 250N000009 HC RX 250

## 2025-05-24 PROCEDURE — 250N000013 HC RX MED GY IP 250 OP 250 PS 637: Performed by: PHYSICIAN ASSISTANT

## 2025-05-24 PROCEDURE — 250N000009 HC RX 250: Performed by: OBSTETRICS & GYNECOLOGY

## 2025-05-24 PROCEDURE — 999N000147 HC STATISTIC PT IP EVAL DEFER

## 2025-05-24 PROCEDURE — 80048 BASIC METABOLIC PNL TOTAL CA: CPT | Performed by: OBSTETRICS & GYNECOLOGY

## 2025-05-24 PROCEDURE — 99231 SBSQ HOSP IP/OBS SF/LOW 25: CPT | Mod: GC | Performed by: OBSTETRICS & GYNECOLOGY

## 2025-05-24 PROCEDURE — 81515 NFCT DS BV&VAGINITIS DNA ALG: CPT | Performed by: OBSTETRICS & GYNECOLOGY

## 2025-05-24 RX ORDER — FLUCONAZOLE 150 MG/1
150 TABLET ORAL ONCE
Status: COMPLETED | OUTPATIENT
Start: 2025-05-24 | End: 2025-05-24

## 2025-05-24 RX ORDER — DIPHENHYDRAMINE HYDROCHLORIDE 50 MG/ML
25 INJECTION, SOLUTION INTRAMUSCULAR; INTRAVENOUS EVERY 6 HOURS PRN
Status: DISCONTINUED | OUTPATIENT
Start: 2025-05-24 | End: 2025-05-28

## 2025-05-24 RX ORDER — DIPHENHYDRAMINE HCL 25 MG
25 CAPSULE ORAL EVERY 6 HOURS PRN
Status: DISCONTINUED | OUTPATIENT
Start: 2025-05-24 | End: 2025-05-28

## 2025-05-24 RX ADMIN — PANTOPRAZOLE SODIUM 40 MG: 40 TABLET, DELAYED RELEASE ORAL at 12:49

## 2025-05-24 RX ADMIN — PANTOPRAZOLE SODIUM 40 MG: 40 TABLET, DELAYED RELEASE ORAL at 21:26

## 2025-05-24 RX ADMIN — OLIVE OIL AND SOYBEAN OIL 250 ML: 16; 4 INJECTION, EMULSION INTRAVENOUS at 12:46

## 2025-05-24 RX ADMIN — METHADONE HYDROCHLORIDE 5 MG: 5 TABLET ORAL at 12:46

## 2025-05-24 RX ADMIN — ZOLPIDEM TARTRATE 5 MG: 5 TABLET ORAL at 21:26

## 2025-05-24 RX ADMIN — HYDROXYZINE HYDROCHLORIDE 50 MG: 50 TABLET ORAL at 18:05

## 2025-05-24 RX ADMIN — METHADONE HYDROCHLORIDE 5 MG: 5 TABLET ORAL at 21:24

## 2025-05-24 RX ADMIN — TRAZODONE HYDROCHLORIDE 300 MG: 150 TABLET ORAL at 22:33

## 2025-05-24 RX ADMIN — LORAZEPAM 1 MG: 1 TABLET ORAL at 12:44

## 2025-05-24 RX ADMIN — GABAPENTIN 300 MG: 300 CAPSULE ORAL at 21:24

## 2025-05-24 RX ADMIN — APIXABAN 5 MG: 5 TABLET, FILM COATED ORAL at 12:53

## 2025-05-24 RX ADMIN — QUETIAPINE FUMARATE 200 MG: 200 TABLET ORAL at 21:26

## 2025-05-24 RX ADMIN — FLUCONAZOLE 150 MG: 150 TABLET ORAL at 15:23

## 2025-05-24 RX ADMIN — MAGNESIUM SULFATE HEPTAHYDRATE: 500 INJECTION, SOLUTION INTRAMUSCULAR; INTRAVENOUS at 12:19

## 2025-05-24 RX ADMIN — BUSPIRONE HYDROCHLORIDE 30 MG: 30 TABLET ORAL at 21:26

## 2025-05-24 RX ADMIN — HYDROMORPHONE HYDROCHLORIDE 8 MG: 4 TABLET ORAL at 22:33

## 2025-05-24 RX ADMIN — TOPIRAMATE 50 MG: 50 TABLET, FILM COATED ORAL at 21:26

## 2025-05-24 RX ADMIN — DIPHENHYDRAMINE HYDROCHLORIDE 25 MG: 25 CAPSULE ORAL at 18:30

## 2025-05-24 RX ADMIN — LORAZEPAM 1 MG: 0.5 TABLET ORAL at 21:26

## 2025-05-24 RX ADMIN — BUSPIRONE HYDROCHLORIDE 30 MG: 30 TABLET ORAL at 12:48

## 2025-05-24 RX ADMIN — APIXABAN 5 MG: 5 TABLET, FILM COATED ORAL at 21:27

## 2025-05-24 RX ADMIN — RAMELTEON 8 MG: 8 TABLET ORAL at 21:26

## 2025-05-24 RX ADMIN — TOPIRAMATE 50 MG: 50 TABLET, FILM COATED ORAL at 12:51

## 2025-05-24 RX ADMIN — DESVENLAFAXINE 150 MG: 50 TABLET, FILM COATED, EXTENDED RELEASE ORAL at 21:23

## 2025-05-24 RX ADMIN — ACETAMINOPHEN 650 MG: 325 TABLET, FILM COATED ORAL at 21:26

## 2025-05-24 ASSESSMENT — ACTIVITIES OF DAILY LIVING (ADL)
ADLS_ACUITY_SCORE: 43
ADLS_ACUITY_SCORE: 46
ADLS_ACUITY_SCORE: 43
ADLS_ACUITY_SCORE: 46
ADLS_ACUITY_SCORE: 43
ADLS_ACUITY_SCORE: 46
ADLS_ACUITY_SCORE: 43
ADLS_ACUITY_SCORE: 43
ADLS_ACUITY_SCORE: 46
ADLS_ACUITY_SCORE: 43
ADLS_ACUITY_SCORE: 46
ADLS_ACUITY_SCORE: 46
ADLS_ACUITY_SCORE: 43
ADLS_ACUITY_SCORE: 46
ADLS_ACUITY_SCORE: 46
ADLS_ACUITY_SCORE: 43
ADLS_ACUITY_SCORE: 43
ADLS_ACUITY_SCORE: 46

## 2025-05-24 NOTE — PROGRESS NOTES
"The Pt has agreed to take her medications, and start her TF and Lipids.Meds administered and TF  and Lipids infusing. Blood sugar 106.Pt self cath at 1230.Vital signs completed. BP 91/62   Pulse 74   Temp 97.9  F (36.6  C) (Oral)   Resp 16   Ht 1.651 m (5' 5\")   Wt 61.3 kg (135 lb 2.3 oz)   LMP  (LMP Unknown)   SpO2 100%   BMI 22.49 kg/m     Pt has order to transfer to . Report called. Transport placed. Pt aware of transfer.  "

## 2025-05-24 NOTE — PROGRESS NOTES
Gynecology Oncology Progress Note  05/24/2025    HD#7 suspected peristomal skin infection      Disease: stage IIIB low grade peritoneal carcinoma      24 hour events:   - mild new chest pain, negative work-up  - burning/itching w/ straight cath & green vaginal discharge    Subjective: Sleeping on arrival, reports that she is tired. Having some vaginal itching/burning and green discharge, worried that she has a yeast infection. Drank 1.5 ensure yesterday. No nausea but minimal appetite. No chest pain or SOB. No other concerns this morning.    Objective:   Vitals:    05/23/25 1500 05/23/25 1801 05/23/25 2144 05/24/25 0127   BP: 98/63 91/58 96/71 (!) 88/54   BP Location:  Right arm Right arm Right arm   Pulse:  70 91 78   Resp:  16 18 16   Temp: 97.1  F (36.2  C) 98.2  F (36.8  C) 98.3  F (36.8  C) 97.7  F (36.5  C)   TempSrc: Oral Oral Oral Oral   SpO2:   98% 97%   Weight:       Height:           General: NAD, appears comfortable in bed  CV: Well-perfused  Resp: Breathing comfortably on room air  Abdomen: soft, nontender, nondistended, ileostomy with moderate output in bag      Results for orders placed or performed during the hospital encounter of 05/18/25 (from the past 24 hours)   EKG 12-lead, complete   Result Value Ref Range    Systolic Blood Pressure  mmHg    Diastolic Blood Pressure  mmHg    Ventricular Rate 78 BPM    Atrial Rate 78 BPM    WY Interval 136 ms    QRS Duration 78 ms     ms    QTc 414 ms    P Axis 75 degrees    R AXIS 72 degrees    T Axis 69 degrees    Interpretation ECG       Sinus rhythm  Right atrial enlargement  Borderline ECG  When compared with ECG of 20-May-2024 14:39,  No significant change was found     Troponin T, High Sensitivity   Result Value Ref Range    Troponin T, High Sensitivity <6 <=14 ng/L   XR Chest Port 1 View    Narrative    Portable chest    INDICATION: New onset chest pain    COMPARISON: Chest CT 3/21/2025  Heart size and shape normal. Right IJ catheter tip in the  SVC. No  pneumothorax. Lungs, pulmonary vasculature and bony structures normal.      Impression    IMPRESSION: Catheter otherwise negative    JANICE FARMER MD         SYSTEM ID:  [id number]   Glucose by meter   Result Value Ref Range    GLUCOSE BY METER POCT 125 (H) 70 - 99 mg/dL   Glucose by meter   Result Value Ref Range    GLUCOSE BY METER POCT 124 (H) 70 - 99 mg/dL   Glucose by meter   Result Value Ref Range    GLUCOSE BY METER POCT 113 (H) 70 - 99 mg/dL   Glucose by meter   Result Value Ref Range    GLUCOSE BY METER POCT 114 (H) 70 - 99 mg/dL   CBC with platelets   Result Value Ref Range    WBC Count 4.7 4.0 - 11.0 10e3/uL    RBC Count 3.28 (L) 3.80 - 5.20 10e6/uL    Hemoglobin 10.3 (L) 11.7 - 15.7 g/dL    Hematocrit 31.1 (L) 35.0 - 47.0 %    MCV 95 78 - 100 fL    MCH 31.4 26.5 - 33.0 pg    MCHC 33.1 31.5 - 36.5 g/dL    RDW 11.9 10.0 - 15.0 %    Platelet Count 166 150 - 450 10e3/uL         Assessment: Marizol Granados is a 40 year old female with stage IIIB low grade peritoneal carcinoma admitted to observation for suspected peristomal skin infection and decreased PO intake, planning surgery for end colostomy and loop ileostomy takedown next week.     # Stoma site pain   # Suspected peristomal skin infection, resolved  - s/p WOC consult, appreciate recs   - Tolerating minimal PO, currently on TPN in anticipation upcoming end colostomy creation. Currently working on cycling, expect to be at goal today  - Supplemental shakes as tolerated by patient      # Stage IIIB low grade peritoneal carcinoma  # Rectovaginal fistula  # Neurogenic constipation  # Vaginal discharge   - s/p 5C Carbo/Taxol, trial of letrozole; CT 3/21/25 with no evidence of disease  - Postoperative course complicated by rectovaginal fistula with neurogenic constipation requiring extensive disimpaction in the OR   - Follows with CRS, plans for end colostomy with intersphincteric proctectomy, loop takedown; scheduled for 5/28   - TPN initiated for  nutrition as mentioned above  - New vaginal discharge noted overnight, vaginitis panel pending.     # Urinary retention   # Hx frequent UTIs   # Cystitis on CT  - CT with evidence of cystitis, redemonstrated from 3/21 CT   - Has hx frequent UTIs, manages retention with CIC due to inability to void   - Last treated PTA for UTI 2/25 with cipro, culture resulted with pansensitive staph aureus   - Followed by Dr. Moseley in urology, last seen in April for cysto with no structural cause to explain UTIs, consideration of gentamicin irrigations with plan to follow up on 6/5  - c/f colonization in the setting of frequent self catheterization, asymptomatic on presentation though limited sensation post surgery and does not feel sx of UTI per patient  - UA with +LE, +nitrites collected post CTX; urine culture klebsiella.  - D#7 ceftriaxone  - Afebrile overnight   - Continue CIC      # FAHAD, resolved   - Cr slightly elevated compared to previous, on arrival to ED 1.02   - Improved to baseline 5/19 at 0.87      # Hx DVT/PE  - PTA Eliquis      # MDD/SHIVAM  # Insomnia   # RLS   - PTA scheduled Buspar, Pristiq, gabapentin, Seroquel, ramelteon   - PTA prn Trazodone, Ambien   - Currently on hydroxyzine and ativan prn per palliative   - Continue to monitor mood closely      # Hx migraines  - PTA Topamax, propranolol     # Anorectal pain  # Cancer related fatigue   - Followed by palliative outpatient with Dr. Norton, last seen 4/24; inpatient team consulted, appreciate recs   - PTA Ritalin for fatigue, pain management as described above   - Was on Dilaudid, Butrans patch, gabapentin PTA  - Seen by inpatient palliative team, appreciate recs, currently on po dilaudid, methadone BID, gabapentin, and tylenol prn    Hansa Torres MD  OB/GYN Resident, PGY-4  05/24/2025 6:17 AM     I saw and evaluated the patient with the resident.  I edited and reviewed the above note. I have reviewed all pertinent imaging and labs on this patient.  Patient  lying in dark in bed, sleeping. Says tired. TPN to be cycled. Plan is for patient to remain in hospital until planned surgery on 5/28 due to pain.    Luz Maria Ag MD  Professor  Department of Ob/Gyn and Women's Health  Division of Gynecologic Oncology  [facility]  [phone]

## 2025-05-24 NOTE — PLAN OF CARE
"Goal Outcome Evaluation:    Goals reviewed with patient.    [id number]. VSS on room air; baseline soft BP. A&Ox4; tearful/anxious/sad about current situation. UAL in room, steady. Regular diet but very poor oral intake (d/t stoma pain/abdominal discomfort); Pt on TPN + lipids for nutritional support; BG Q6H, next due at 0200 5/25. Pain reported in perineum and stoma but declined PRN pharmacologic interventions during this shift. Denies N/V. Ileostomy, loose brown output, bag/site CDI; pain in stoma especially after PO intake (\"burning\"). Pt self-catheterizes during day d/t neurogenic bladder; PureWick on overnight; vaginal discharge, brief worn/changed to support adequate hygiene. PRN PO Benadryl given for itching in groin. Port, infusing TPN + lipids, dressing CDI. PIV, SL, dressing CDI. WOC following d/t stoma infection; other skin WNL. No electrolyte replacements or blood products administered today.  "

## 2025-05-24 NOTE — PROGRESS NOTES
Controlled Medications returned  to Pyxis. Pt has refused all medications including TF. Per Pt she will call when she's ready for TF. Emotional support given. Pt appears not to have interest in anything. Ensure at bedside, per Pt not ready to drink it yet.

## 2025-05-24 NOTE — PROGRESS NOTES
The Pt is alert and awake in bed.Pt did not want to take morning medication yet. Will call when she is ready. Pt also refused pain  med. Will re approach again. Call light within reach.

## 2025-05-24 NOTE — PROVIDER NOTIFICATION
Obs 1A  M.D 1504  Gyn/Onc  Pt states they feel like they have a uti complaining of burning and itching with light green discharge    Ruth JACOBO   [phone]

## 2025-05-24 NOTE — PROGRESS NOTES
Brief Progress Note    Paged by RN due to patient reporting light green vaginal discharge as well as itching/burning with straight cath tonight. Patient is worried she has a UTI.    Discussed with RN, patient currently on D#6 IV ceftriaxone for Klebsiella UTI. Given new symptoms and high risk given neurogenic bladder and hx frequent UTIs, will repeat UA with reflex to culture with next straight cath. Also at risk for yeast infection given IV antibiotics, vaginal multiplex panel ordered. No other concerns per RN at this time.    Hansa Torres MD  OB/GYN Resident, PGY-4

## 2025-05-24 NOTE — PROGRESS NOTES
Nursing Focus: Admission    D: Patient admitted/transferred from Observation ED via Patient Transport in hospital bed for continued care.      I: Upon arrival to the unit patient was oriented to room, unit, and call light. Patient s height, weight, and vital signs were obtained. Allergies reviewed and allergy band applied. MD notified of patient s arrival on the unit. Adult AVS completed by bedside RN. Head to toe assessment completed. Education assessment completed. Care plan initiated.     A: Vital signs stable upon admission. Patient rates pain at 6/10 with goal (baseline) of 4/10. Two RN skin assessment completed. Second RN was Odessa LORENZO Significant Skin Findings include Port, PIV, ileostomy. WOC is following d/t stoma infection.    P: Continue to monitor patient s pain oral intake and intervene as needed. Continue with plan of care. Notify MD with any concerns or changes in patient status.

## 2025-05-24 NOTE — PROGRESS NOTES
"Brief GYN Oncology Note    To bedside for PM check in.     When asked how she is doing today, Marizol li. Happy to now be in a room with a window, out of the basement. Reports she tried some Kinyarwanda toast earlier today but had significant burning from her ostomy with eating this, so has not tried any solids since. Noticed some vaginal itching in the past day or 2. She has ongoing perineal discomfort. When asked if we can do anything to make her more comfortable she shakes her head. She declines abdominal exam this evening, states stoma is covered by the bag and not easy to visualize right now.     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 Given minimal PO intake related to peristomal pain after eating, TPN was initiated during this hospitalization and is at goal today per TPN pharmacist. Have been unable to contact RD managing her TPN today. Plan is to return to OR on 5/28 with CRS and urology for XL, intersphincteric protectomy, takedown of loop ileostomy and creation of end colostomy. Goal had been for discharge prior to procedure, however still working on establishing homecare and home infusions (now that pt on TPN) - if not completed over the weekend, pt likely to be admitted until the procedure. Today vaginal multiplex shows yeast vaginitis, treated with diflucan x1. No other changes to plan of care.    Maribel Garcia MD  OB/GYN PGY-3  5/24/2025 5:24 PM  "

## 2025-05-24 NOTE — PROGRESS NOTES
Gynecology Oncology Progress Note  05/25/2025    HD# suspected peristomal skin infection      Disease: stage IIIB low grade peritoneal carcinoma      24 hour events:   - per pharm, TPN at goal today   - moved out of OBS unit   - increased agitation/anxiety 5/24 PM, improved after scheduled + PRN medications    Subjective: Marizol sleeping on my arrival this morning but awakens to voice. Reports she got some sleep last night. No changes to her baseline pain this morning. No other new symptoms. Declines abdominal exam this morning, would like to go back to sleep.     Objective:   Vitals:    05/24/25 1354 05/24/25 1542 05/24/25 2116 05/25/25 0438   BP: 95/64 93/64 107/67 (!) 83/54   BP Location: Right arm Right arm Right arm Right arm   Pulse:  84 93    Resp: 16 18 20 16   Temp: 97.8  F (36.6  C) 98.5  F (36.9  C) 98  F (36.7  C) 97.8  F (36.6  C)   TempSrc: Oral Oral Oral Oral   SpO2: 98% 98% 98% 95%   Weight:       Height:         General: NAD, appears comfortable in bed  CV: Well-perfused  Resp: Breathing comfortably on room air  Abdomen: deferred per pt request     Results for orders placed or performed during the hospital encounter of 05/18/25 (from the past 24 hours)   Multiplex Vaginal Panel by PCR    Specimen: Vagina; Swab   Result Value Ref Range    Bacterial Vaginosis Organism DNA Negative Negative    Candida Group DNA Detected (A) Not Detected    Candida glabrata / Saumya krusei DNA Not Detected Not Detected    Trichomonas vaginalis DNA Not Detected Not Detected    Narrative    The Xpert  Xpress MVP test, performed on the [employer], is an automated, qualitative in vitro diagnostic test for the detection of DNA targets from anaerobic bacteria associated with bacterial vaginosis, Candida species associated with vulvovaginal candidiasis, and Trichomonas vaginalis. The assay uses clinician-collected and self-collected vaginal swabs from patients who are symptomatic for vaginitis/ vaginosis.  The Xpert  Xpress MVP test utilizes real-time polymerase chain reaction (PCR) for the amplification of specific DNA targets and utilizes fluorogenic target-specific hybridization probes to detect and differentiate DNA. It is intended to aid in the diagnosis of vaginal infections in women with a clinical presentation consistent with bacterial vaginosis, vulvovaginal candidiasis, or trichomoniasis.   The assay targets three anaerobic microorgansims that are associated with bacterial vaginosis (BV). Other organisms that are not detected by the Xpert  Xpress MVP test have also been reported to be associated with BV. The BV organism and Candida species targets of the Xpert  Xpress MVP test can be commensal in women; positive results must be considered in conjunction with other clinical and patient information to determine the disease status.   Glucose by meter   Result Value Ref Range    GLUCOSE BY METER POCT 106 (H) 70 - 99 mg/dL   Glucose by meter   Result Value Ref Range    GLUCOSE BY METER POCT 116 (H) 70 - 99 mg/dL   CBC with platelets   Result Value Ref Range    WBC Count 5.9 4.0 - 11.0 10e3/uL    RBC Count 3.23 (L) 3.80 - 5.20 10e6/uL    Hemoglobin 10.1 (L) 11.7 - 15.7 g/dL    Hematocrit 30.7 (L) 35.0 - 47.0 %    MCV 95 78 - 100 fL    MCH 31.3 26.5 - 33.0 pg    MCHC 32.9 31.5 - 36.5 g/dL    RDW 12.0 10.0 - 15.0 %    Platelet Count 160 150 - 450 10e3/uL   Basic metabolic panel   Result Value Ref Range    Sodium 140 135 - 145 mmol/L    Potassium 4.2 3.4 - 5.3 mmol/L    Chloride 104 98 - 107 mmol/L    Carbon Dioxide (CO2) 26 22 - 29 mmol/L    Anion Gap 10 7 - 15 mmol/L    Urea Nitrogen 33.0 (H) 6.0 - 20.0 mg/dL    Creatinine 0.80 0.51 - 0.95 mg/dL    GFR Estimate >90 >60 mL/min/1.73m2    Calcium 9.9 8.8 - 10.4 mg/dL    Glucose 95 70 - 99 mg/dL   Magnesium   Result Value Ref Range    Magnesium 1.9 1.7 - 2.3 mg/dL         Assessment: Marizol Granados is a 40 year old female with stage IIIB low grade peritoneal carcinoma  admitted to observation for suspected peristomal skin infection and decreased PO intake, planning surgery for end colostomy and loop ileostomy takedown on 5/28.      # Stoma site pain   # Suspected peristomal skin infection, resolved  - s/p WOC consult, appreciate recs   - Tolerating minimal PO, currently on TPN in anticipation upcoming end colostomy creation  - TPN now at goal, working on cycling   - Supplemental shakes as tolerated by patient      # Stage IIIB low grade peritoneal carcinoma  # Rectovaginal fistula  # Neurogenic constipation  # Yeast vaginitis  - s/p 5C Carbo/Taxol, trial of letrozole; CT 3/21/25 with no evidence of disease  - Postoperative course complicated by rectovaginal fistula with neurogenic constipation requiring extensive disimpaction in the OR   - Follows with CRS, plans for end colostomy with intersphincteric proctectomy, loop takedown; scheduled for 5/28   - TPN initiated for nutrition as mentioned above given minimal PO in preparation for surgery   - Pt reporting new vaginal itching, multiplex positive for yeast, now s/p diflucan x1     # Urinary retention   # Hx frequent UTIs   # Cystitis on CT  - CT with evidence of cystitis, redemonstrated from 3/21 CT   - Has hx frequent UTIs, manages retention with CIC due to inability to void   - Last treated PTA for UTI 2/25 with cipro, culture resulted with pansensitive staph aureus   - Followed by Dr. Moseley in urology, last seen in April for cysto with no structural cause to explain UTIs, consideration of gentamicin irrigations with plan to follow up on 6/5  - c/f colonization in the setting of frequent self catheterization, asymptomatic on presentation though limited sensation post surgery and does not feel sx of UTI per patient  - UA with +LE, +nitrites collected post CTX; urine culture klebsiella.  - s/p 6d ceftriaxone, discontinued yesterday given no ongoing evidence of infection   - Afebrile overnight, continue to monitor  - Continue CIC       # FAHAD, resolved   - Cr slightly elevated compared to previous, on arrival to ED at 1.02   - Now resolved, Cr most recently 0.74     # Hx DVT/PE  - PTA Eliquis      # MDD/SHIVAM  # Insomnia   # RLS   - PTA scheduled Buspar, Pristiq, gabapentin, Seroquel, ramelteon   - PTA prn Trazodone, Ambien   - Currently on hydroxyzine and ativan prn per palliative   - Continue to monitor mood closely      # Hx migraines  - PTA Topamax, propranolol     # Anorectal pain  # Cancer related fatigue   - Followed by palliative outpatient with Dr. Norton, last seen 4/24; inpatient team consulted, appreciate recs   - PTA Ritalin for fatigue, pain management as described above   - Was on Dilaudid, Butrans patch, gabapentin PTA  - Seen by inpatient palliative team, appreciate recs, currently on po dilaudid, methadone BID, gabapentin, and tylenol prn    Maribel Garcia MD  OB/GYN Resident, PGY-3  05/25/2025 6:57 AM

## 2025-05-24 NOTE — PLAN OF CARE
"Shift:5274-6387      VS:BP (!) 88/54 (BP Location: Right arm)   Pulse 78   Temp 97.7  F (36.5  C) (Oral)   Resp 16   Ht 1.651 m (5' 5\")   Wt 61.3 kg (135 lb 2.3 oz)   LMP  (LMP Unknown)   SpO2 97% RA  BMI 22.49 kg/m    Pt refused morning vitals   Pain: Pain managed w/ scheduled meds  Neuro: Frustrated/tearful. AOx4   Cardiac:WDL  Respiratory: on RA  Diet/Appetite: Regular/TPN  GI/: Ileostomy and pt self caths   LDA's: R Port infusing TPN at  , R PIV SL  Skin:Unable to assess skin patient refused  Activity:Independent  Test/Procedures: UA and vaginal panel needs to be collected,. Am labs  "

## 2025-05-24 NOTE — PLAN OF CARE
Physical Therapy: Orders received. Chart reviewed and discussed with patient, RN and provider. Patient has been up independently including ambulation in halls. Pt reports no concerns with mobility needed to navigate home environment. Per provider, pt likely to stay admitted until procedure (scheduled for 5/28) but has possibility to discharge home prior and return for procedure. Educated patient & pt's RN on recommendation for pt to ambulate in halls 3-4x/day while hospitalized leading up to procedure & plan for PT to check in with patient post-op & initiate PT if indicated. Provider confirms they will place new PT orders post op.? Physical Therapy not indicated currently due to patient is up independently, no current PT needs identified.? Do not anticipate any mobility barriers to discharge. Defer discharge recommendations to medical team.? Will complete orders & plan to initiate with new orders post op if PT needs identified at that time.

## 2025-05-25 LAB
ANION GAP SERPL CALCULATED.3IONS-SCNC: 10 MMOL/L (ref 7–15)
BUN SERPL-MCNC: 33 MG/DL (ref 6–20)
CALCIUM SERPL-MCNC: 9.9 MG/DL (ref 8.8–10.4)
CHLORIDE SERPL-SCNC: 104 MMOL/L (ref 98–107)
CREAT SERPL-MCNC: 0.8 MG/DL (ref 0.51–0.95)
EGFRCR SERPLBLD CKD-EPI 2021: >90 ML/MIN/1.73M2
ERYTHROCYTE [DISTWIDTH] IN BLOOD BY AUTOMATED COUNT: 12 % (ref 10–15)
GLUCOSE BLDC GLUCOMTR-MCNC: 140 MG/DL (ref 70–99)
GLUCOSE SERPL-MCNC: 95 MG/DL (ref 70–99)
HCO3 SERPL-SCNC: 26 MMOL/L (ref 22–29)
HCT VFR BLD AUTO: 30.7 % (ref 35–47)
HGB BLD-MCNC: 10.1 G/DL (ref 11.7–15.7)
MAGNESIUM SERPL-MCNC: 1.9 MG/DL (ref 1.7–2.3)
MCH RBC QN AUTO: 31.3 PG (ref 26.5–33)
MCHC RBC AUTO-ENTMCNC: 32.9 G/DL (ref 31.5–36.5)
MCV RBC AUTO: 95 FL (ref 78–100)
PLATELET # BLD AUTO: 160 10E3/UL (ref 150–450)
POTASSIUM SERPL-SCNC: 4.2 MMOL/L (ref 3.4–5.3)
RBC # BLD AUTO: 3.23 10E6/UL (ref 3.8–5.2)
SODIUM SERPL-SCNC: 140 MMOL/L (ref 135–145)
WBC # BLD AUTO: 5.9 10E3/UL (ref 4–11)

## 2025-05-25 PROCEDURE — 120N000002 HC R&B MED SURG/OB UMMC

## 2025-05-25 PROCEDURE — 250N000013 HC RX MED GY IP 250 OP 250 PS 637

## 2025-05-25 PROCEDURE — 83735 ASSAY OF MAGNESIUM: CPT | Performed by: OBSTETRICS & GYNECOLOGY

## 2025-05-25 PROCEDURE — 250N000013 HC RX MED GY IP 250 OP 250 PS 637: Performed by: OBSTETRICS & GYNECOLOGY

## 2025-05-25 PROCEDURE — 80048 BASIC METABOLIC PNL TOTAL CA: CPT

## 2025-05-25 PROCEDURE — 85041 AUTOMATED RBC COUNT: CPT

## 2025-05-25 PROCEDURE — 85014 HEMATOCRIT: CPT

## 2025-05-25 PROCEDURE — 250N000009 HC RX 250: Performed by: OBSTETRICS & GYNECOLOGY

## 2025-05-25 PROCEDURE — 82310 ASSAY OF CALCIUM: CPT

## 2025-05-25 PROCEDURE — 250N000011 HC RX IP 250 OP 636: Performed by: OBSTETRICS & GYNECOLOGY

## 2025-05-25 PROCEDURE — 250N000013 HC RX MED GY IP 250 OP 250 PS 637: Performed by: PHYSICIAN ASSISTANT

## 2025-05-25 RX ORDER — LIDOCAINE HYDROCHLORIDE 20 MG/ML
JELLY TOPICAL EVERY 4 HOURS PRN
Status: DISCONTINUED | OUTPATIENT
Start: 2025-05-25 | End: 2025-05-25

## 2025-05-25 RX ORDER — ZOLPIDEM TARTRATE 5 MG/1
5 TABLET ORAL AT BEDTIME
Status: DISCONTINUED | OUTPATIENT
Start: 2025-05-25 | End: 2025-05-28

## 2025-05-25 RX ORDER — TRAZODONE HYDROCHLORIDE 150 MG/1
300 TABLET ORAL AT BEDTIME
Status: DISCONTINUED | OUTPATIENT
Start: 2025-05-25 | End: 2025-05-28

## 2025-05-25 RX ORDER — MICONAZOLE NITRATE 2 %
1 CREAM WITH APPLICATOR VAGINAL DAILY
Status: DISCONTINUED | OUTPATIENT
Start: 2025-05-26 | End: 2025-05-28

## 2025-05-25 RX ADMIN — Medication 5 ML: at 23:46

## 2025-05-25 RX ADMIN — APIXABAN 5 MG: 5 TABLET, FILM COATED ORAL at 09:33

## 2025-05-25 RX ADMIN — METHYLPHENIDATE HYDROCHLORIDE 15 MG: 5 TABLET ORAL at 09:32

## 2025-05-25 RX ADMIN — APIXABAN 5 MG: 5 TABLET, FILM COATED ORAL at 22:03

## 2025-05-25 RX ADMIN — HYDROXYZINE HYDROCHLORIDE 50 MG: 50 TABLET ORAL at 12:03

## 2025-05-25 RX ADMIN — TRAZODONE HYDROCHLORIDE 300 MG: 150 TABLET ORAL at 22:05

## 2025-05-25 RX ADMIN — ZOLPIDEM TARTRATE 5 MG: 5 TABLET, FILM COATED ORAL at 22:05

## 2025-05-25 RX ADMIN — DESVENLAFAXINE 150 MG: 50 TABLET, FILM COATED, EXTENDED RELEASE ORAL at 22:04

## 2025-05-25 RX ADMIN — HYDROMORPHONE HYDROCHLORIDE 8 MG: 4 TABLET ORAL at 15:54

## 2025-05-25 RX ADMIN — METHYLPHENIDATE HYDROCHLORIDE 10 MG: 5 TABLET ORAL at 12:02

## 2025-05-25 RX ADMIN — PROPRANOLOL HYDROCHLORIDE 10 MG: 10 TABLET ORAL at 09:33

## 2025-05-25 RX ADMIN — RAMELTEON 8 MG: 8 TABLET ORAL at 22:05

## 2025-05-25 RX ADMIN — BUSPIRONE HYDROCHLORIDE 30 MG: 30 TABLET ORAL at 22:03

## 2025-05-25 RX ADMIN — LORAZEPAM 1 MG: 1 TABLET ORAL at 09:32

## 2025-05-25 RX ADMIN — TOPIRAMATE 50 MG: 50 TABLET, FILM COATED ORAL at 09:33

## 2025-05-25 RX ADMIN — BUSPIRONE HYDROCHLORIDE 30 MG: 30 TABLET ORAL at 09:33

## 2025-05-25 RX ADMIN — HYDROMORPHONE HYDROCHLORIDE 8 MG: 4 TABLET ORAL at 23:41

## 2025-05-25 RX ADMIN — GABAPENTIN 300 MG: 300 CAPSULE ORAL at 22:04

## 2025-05-25 RX ADMIN — METHADONE HYDROCHLORIDE 5 MG: 5 TABLET ORAL at 22:04

## 2025-05-25 RX ADMIN — Medication: at 18:35

## 2025-05-25 RX ADMIN — MAGNESIUM SULFATE HEPTAHYDRATE: 500 INJECTION, SOLUTION INTRAMUSCULAR; INTRAVENOUS at 09:54

## 2025-05-25 RX ADMIN — METHADONE HYDROCHLORIDE 5 MG: 5 TABLET ORAL at 09:33

## 2025-05-25 RX ADMIN — PANTOPRAZOLE SODIUM 40 MG: 40 TABLET, DELAYED RELEASE ORAL at 22:04

## 2025-05-25 RX ADMIN — DIPHENHYDRAMINE HYDROCHLORIDE, ZINC ACETATE: 2; .1 CREAM TOPICAL at 15:56

## 2025-05-25 RX ADMIN — DIPHENHYDRAMINE HYDROCHLORIDE 25 MG: 25 CAPSULE ORAL at 12:02

## 2025-05-25 RX ADMIN — PANTOPRAZOLE SODIUM 40 MG: 40 TABLET, DELAYED RELEASE ORAL at 09:33

## 2025-05-25 RX ADMIN — TOPIRAMATE 50 MG: 50 TABLET, FILM COATED ORAL at 22:05

## 2025-05-25 RX ADMIN — QUETIAPINE FUMARATE 200 MG: 200 TABLET ORAL at 22:05

## 2025-05-25 RX ADMIN — Medication 5 ML: at 05:00

## 2025-05-25 RX ADMIN — LORAZEPAM 1 MG: 0.5 TABLET ORAL at 22:04

## 2025-05-25 ASSESSMENT — ACTIVITIES OF DAILY LIVING (ADL)
ADLS_ACUITY_SCORE: 45
ADLS_ACUITY_SCORE: 46
ADLS_ACUITY_SCORE: 45
ADLS_ACUITY_SCORE: 46
ADLS_ACUITY_SCORE: 45
ADLS_ACUITY_SCORE: 46
ADLS_ACUITY_SCORE: 45

## 2025-05-25 NOTE — PROVIDER NOTIFICATION
Paged gyn onc resident at [phone] to assess pt at bedside. Pt is very restless and anxious; unable to sit still and getting wrapped in IV lines. Pt declined going on a walk with bedside RN.

## 2025-05-25 NOTE — PROGRESS NOTES
Brief GYN Onc Note    To bedside for assessment after informed by RN that patient extremely restless, agitated, getting wrapped in cords. On my arrival, pt sleeping soundly; did not awaken. Touched base w RN; in the past 90 minutes patient received her PRN trazodone, ambien, ativan. Last received vistaril after 1800. Given number of sedating medications she has received in short period of time will defer adding further medication at this time, especially since patient now sleeping soundly. Should agitation recur, next dose of hydroxyzine available after 0000. Appreciate RN assistance working with patient, offering to go for a walk, etc. Will continue to monitor.     Maribel Garcia MD  OB/GYN PGY-3  5/24/2025 10:57 PM

## 2025-05-25 NOTE — PLAN OF CARE
Goal Outcome Evaluation:    [id number]. VSS on room air; baseline soft BP. A&Ox4; tearful/anxious/sad about hospitalization and childcare ([place]. for her surgery scheduled for 5/28. UAL in room, steady. Regular diet but very low oral intake (only juice d/t stoma pain/abdominal discomfort); Pt on TPN + lipids for nutritional support; BG Q6H, next due at 0000 5/26. Pain reported in perineum and stoma, managed with PO Dilaudid and Atarax. Denies N/V. Ileostomy, loose brown output, bag changed this morning and now CDI. Pt self-catheterizes d/t neurogenic bladder; thick/green vaginal discharge, brief worn/changed to support adequate hygiene. PRN PO Benadryl given for itching in groin; topical Benadryl cream tried but caused severe burning for Pt, Desitin cream used this evening. Port, infusing TPN, dressing CDI. WOC following d/t stoma infection; other skin WNL. No electrolyte replacements or blood products administered today.

## 2025-05-25 NOTE — PLAN OF CARE
"Goal Outcome Evaluation: 1900-0730    Aox4. VSS on RA, softer BPs but typical for pt, provider notified. Rated stoma site pain 7-8/10, managed with PRN dilaudid and tylenol with relief. Denies N/V, SOB. Neurogenic bladder, pt self catheterizes, ordered 14Fr SIC, at bedside. Declined purewick NOC. BM per ileostomy. Pt declined measuring output. Regular diet + TPN + Lipids, poor appetite, no PO intake. PRN trazodone and ambien given for sleep. Affect was anxious, tearful and sad. At 2130 pt became very restless, fidgety movements, thrashing in bed, getting wrapped in IV lines, visibly distressed, stated \"I just can't sit still.\" Presentation very different from start of shift. Therapeutic listening and de-escalation provided, offered to walk with pt but declined. Notified provider, pt sleeping at time of arrival. At 0430 pt was feeling much better and back to baseline. No further PRNs given. Recommendation to frequently assess anxiety and manage with PRN options in addition to scheduled meds.           "

## 2025-05-26 LAB
ALBUMIN SERPL BCG-MCNC: 3.8 G/DL (ref 3.5–5.2)
ALP SERPL-CCNC: 67 U/L (ref 40–150)
ALT SERPL W P-5'-P-CCNC: 35 U/L (ref 0–50)
ANION GAP SERPL CALCULATED.3IONS-SCNC: 10 MMOL/L (ref 7–15)
AST SERPL W P-5'-P-CCNC: 36 U/L (ref 0–45)
BILIRUB SERPL-MCNC: 0.2 MG/DL
BUN SERPL-MCNC: 34.8 MG/DL (ref 6–20)
CALCIUM SERPL-MCNC: 10 MG/DL (ref 8.8–10.4)
CHLORIDE SERPL-SCNC: 104 MMOL/L (ref 98–107)
CREAT SERPL-MCNC: 0.86 MG/DL (ref 0.51–0.95)
EGFRCR SERPLBLD CKD-EPI 2021: 88 ML/MIN/1.73M2
ERYTHROCYTE [DISTWIDTH] IN BLOOD BY AUTOMATED COUNT: 12.1 % (ref 10–15)
GLUCOSE SERPL-MCNC: 97 MG/DL (ref 70–99)
HCO3 SERPL-SCNC: 26 MMOL/L (ref 22–29)
HCT VFR BLD AUTO: 30.8 % (ref 35–47)
HGB BLD-MCNC: 9.8 G/DL (ref 11.7–15.7)
INR PPP: 1.29 (ref 0.85–1.15)
MAGNESIUM SERPL-MCNC: 1.8 MG/DL (ref 1.7–2.3)
MCH RBC QN AUTO: 30.6 PG (ref 26.5–33)
MCHC RBC AUTO-ENTMCNC: 31.8 G/DL (ref 31.5–36.5)
MCV RBC AUTO: 96 FL (ref 78–100)
PHOSPHATE SERPL-MCNC: 5.2 MG/DL (ref 2.5–4.5)
PLATELET # BLD AUTO: 162 10E3/UL (ref 150–450)
POTASSIUM SERPL-SCNC: 4.5 MMOL/L (ref 3.4–5.3)
PREALB SERPL-MCNC: 25.7 MG/DL (ref 20–40)
PROT SERPL-MCNC: 6.5 G/DL (ref 6.4–8.3)
PROTHROMBIN TIME: 16 SECONDS (ref 11.8–14.8)
RBC # BLD AUTO: 3.2 10E6/UL (ref 3.8–5.2)
SODIUM SERPL-SCNC: 140 MMOL/L (ref 135–145)
WBC # BLD AUTO: 5.4 10E3/UL (ref 4–11)

## 2025-05-26 PROCEDURE — 250N000013 HC RX MED GY IP 250 OP 250 PS 637: Performed by: PHYSICIAN ASSISTANT

## 2025-05-26 PROCEDURE — 250N000013 HC RX MED GY IP 250 OP 250 PS 637: Performed by: OBSTETRICS & GYNECOLOGY

## 2025-05-26 PROCEDURE — 82310 ASSAY OF CALCIUM: CPT | Performed by: OBSTETRICS & GYNECOLOGY

## 2025-05-26 PROCEDURE — 120N000002 HC R&B MED SURG/OB UMMC

## 2025-05-26 PROCEDURE — 250N000009 HC RX 250: Performed by: OBSTETRICS & GYNECOLOGY

## 2025-05-26 PROCEDURE — 85014 HEMATOCRIT: CPT

## 2025-05-26 PROCEDURE — 84134 ASSAY OF PREALBUMIN: CPT | Performed by: OBSTETRICS & GYNECOLOGY

## 2025-05-26 PROCEDURE — 83735 ASSAY OF MAGNESIUM: CPT | Performed by: OBSTETRICS & GYNECOLOGY

## 2025-05-26 PROCEDURE — 84100 ASSAY OF PHOSPHORUS: CPT | Performed by: OBSTETRICS & GYNECOLOGY

## 2025-05-26 PROCEDURE — 99231 SBSQ HOSP IP/OBS SF/LOW 25: CPT | Mod: GC | Performed by: OBSTETRICS & GYNECOLOGY

## 2025-05-26 PROCEDURE — 85610 PROTHROMBIN TIME: CPT | Performed by: OBSTETRICS & GYNECOLOGY

## 2025-05-26 PROCEDURE — 84075 ASSAY ALKALINE PHOSPHATASE: CPT | Performed by: OBSTETRICS & GYNECOLOGY

## 2025-05-26 RX ADMIN — GABAPENTIN 300 MG: 300 CAPSULE ORAL at 21:28

## 2025-05-26 RX ADMIN — TOPIRAMATE 50 MG: 50 TABLET, FILM COATED ORAL at 08:28

## 2025-05-26 RX ADMIN — QUETIAPINE FUMARATE 200 MG: 200 TABLET ORAL at 21:27

## 2025-05-26 RX ADMIN — METHYLPHENIDATE HYDROCHLORIDE 10 MG: 5 TABLET ORAL at 11:51

## 2025-05-26 RX ADMIN — ACETAMINOPHEN 650 MG: 325 TABLET, FILM COATED ORAL at 16:14

## 2025-05-26 RX ADMIN — MAGNESIUM SULFATE HEPTAHYDRATE: 500 INJECTION, SOLUTION INTRAMUSCULAR; INTRAVENOUS at 21:19

## 2025-05-26 RX ADMIN — BUSPIRONE HYDROCHLORIDE 30 MG: 30 TABLET ORAL at 21:27

## 2025-05-26 RX ADMIN — MICONAZOLE NITRATE 1 APPLICATOR: 20 CREAM VAGINAL at 08:49

## 2025-05-26 RX ADMIN — BUSPIRONE HYDROCHLORIDE 30 MG: 30 TABLET ORAL at 08:28

## 2025-05-26 RX ADMIN — PROPRANOLOL HYDROCHLORIDE 10 MG: 10 TABLET ORAL at 08:28

## 2025-05-26 RX ADMIN — METHADONE HYDROCHLORIDE 5 MG: 5 TABLET ORAL at 08:28

## 2025-05-26 RX ADMIN — TRAZODONE HYDROCHLORIDE 300 MG: 150 TABLET ORAL at 21:35

## 2025-05-26 RX ADMIN — HYDROMORPHONE HYDROCHLORIDE 8 MG: 4 TABLET ORAL at 04:16

## 2025-05-26 RX ADMIN — METHYLPHENIDATE HYDROCHLORIDE 15 MG: 5 TABLET ORAL at 08:28

## 2025-05-26 RX ADMIN — PANTOPRAZOLE SODIUM 40 MG: 40 TABLET, DELAYED RELEASE ORAL at 21:28

## 2025-05-26 RX ADMIN — Medication: at 16:14

## 2025-05-26 RX ADMIN — ZOLPIDEM TARTRATE 5 MG: 5 TABLET, FILM COATED ORAL at 21:27

## 2025-05-26 RX ADMIN — PANTOPRAZOLE SODIUM 40 MG: 40 TABLET, DELAYED RELEASE ORAL at 08:28

## 2025-05-26 RX ADMIN — HYDROMORPHONE HYDROCHLORIDE 8 MG: 4 TABLET ORAL at 11:50

## 2025-05-26 RX ADMIN — HYDROXYZINE HYDROCHLORIDE 50 MG: 50 TABLET ORAL at 18:52

## 2025-05-26 RX ADMIN — LORAZEPAM 1 MG: 0.5 TABLET ORAL at 21:26

## 2025-05-26 RX ADMIN — METHADONE HYDROCHLORIDE 5 MG: 5 TABLET ORAL at 21:26

## 2025-05-26 RX ADMIN — Medication: at 08:49

## 2025-05-26 RX ADMIN — LORAZEPAM 1 MG: 1 TABLET ORAL at 08:28

## 2025-05-26 RX ADMIN — TOPIRAMATE 50 MG: 50 TABLET, FILM COATED ORAL at 21:28

## 2025-05-26 RX ADMIN — DESVENLAFAXINE 150 MG: 50 TABLET, FILM COATED, EXTENDED RELEASE ORAL at 21:26

## 2025-05-26 ASSESSMENT — ACTIVITIES OF DAILY LIVING (ADL)
ADLS_ACUITY_SCORE: 45

## 2025-05-26 NOTE — PROVIDER NOTIFICATION
Provider Maribel Garcia MD paged via [employer] @ [phone] regarding increase in vaginal discharge and change in color from green to light brown. Scant amount of blood on TP.    Plan: Provider notified, message read. No new orders at this time.

## 2025-05-26 NOTE — PROGRESS NOTES
"Brief Progress Note    To bedside for evening check-in.  Marizol reports that she is doing \"fine\".  She has a antiboredom box with her right now with various activities inside it that she plans on using to pass the time when she is in the hospital.  From medical perspective, she reports that she continues to have vulvar irritation related to her recent yeast infection.  Reports that her vulva is \"weeping\", and does not feel like it has improved significantly after the fluconazole.  Additionally reports some frustration regarding her ongoing feeling concerns for malabsorption specifically of her Pristiq medication.  She reports that she has been able to identify that this is the medication that she is not absorbing and she is finding in her ostomy bag after she takes it.  She would like to find a suitable alternative or a different route for this medication as she feels that this is contributing significantly to treating her depression and anxiety, and would like to expedite this as soon as possible because she is concerned that she is not getting the full treatment effect of her current psychiatric regimen.  Also reports some frustration regarding the straight cath supplies; ran out of her home cath supplies a couple of days ago, and feels like the length of the catheters that she is being provided with by the hospital are not suitable for her.    Discussed POC with fellow; given persistent sx >36hr after Diflucan will Rx Monistat. Also discussed with pharmacy regarding Pristiq and alternatives for this d/t c/f malabsorption through the ileostomy; according to brief curbside could theoretically transition to Effexor IR which could be crushed but also recommended psychiatry input due to complex med regimen. Earlier Marizol had mentioned reaching out to her outpatient psychiatrist in [facility] which may also give good insight and guidance to find an IR med or one that comes in a liquid solution. Will continue to work on " these things to optimize care for this patient in anticipation of surgery later this week.     Bharti Rich DO  [facility]  Gynecology Oncology Resident, PGY-2  05/25/2025 9:46 PM    To reach the GYNECOLOGY ONCOLOGY team for this patient, please page [phone] or search Gynecologic Oncology Resident Group on Vocera.

## 2025-05-26 NOTE — PROGRESS NOTES
Gynecology Oncology Progress Note  05/25/2025    HD#9 suspected peristomal skin infection      Disease: stage IIIB low grade peritoneal carcinoma      24 hour events:   - vulvar irritation iso vaginal candidiasis > added desitin + Monistat cream   - c/f malabsorption of Pristiq specifically     Subjective: Doing okay this morning. Slept well overnight. Reports that her whole body feels sore this morning. Looking forward to have kids visit today. No other concerns this morning.     Objective:   Vitals:    05/25/25 1935 05/25/25 2200 05/26/25 0040 05/26/25 0409   BP: 92/70 (!) 88/60 91/54 94/60   BP Location:   Right arm Right arm   Pulse:   83 74   Resp:   16 16   Temp: 98  F (36.7  C) 98.1  F (36.7  C) 98.2  F (36.8  C) 98  F (36.7  C)   TempSrc: Oral Oral Oral Oral   SpO2: 99% 98% 96% 94%   Weight:       Height:           General: NAD, appears comfortable in bed  CV: Well-perfused  Resp: Breathing comfortably on room air  Abdomen: Soft, nontender, nondistended. Ostomy nontender with gas and stool output.     I/O last 3 completed shifts:  In: 1753.8 [P.O.:1140; I.V.:10]  Out: -     Intake/Output (24 Hrs // Since MN)  PO 1080 mL // NR  .65 mL // NR   IVF 10mL // NR      New labs/imaging-   Latest Reference Range & Units 05/26/25 04:21   Sodium 135 - 145 mmol/L 140   Potassium 3.4 - 5.3 mmol/L 4.5   Chloride 98 - 107 mmol/L 104   Carbon Dioxide (CO2) 22 - 29 mmol/L 26   Urea Nitrogen 6.0 - 20.0 mg/dL 34.8 (H)   Creatinine 0.51 - 0.95 mg/dL 0.86   GFR Estimate >60 mL/min/1.73m2 88   Calcium 8.8 - 10.4 mg/dL 10.0   Anion Gap 7 - 15 mmol/L 10   Magnesium 1.7 - 2.3 mg/dL 1.8   Phosphorus 2.5 - 4.5 mg/dL 5.2 (H)   Albumin 3.5 - 5.2 g/dL 3.8   Protein Total 6.4 - 8.3 g/dL 6.5   Alkaline Phosphatase 40 - 150 U/L 67   ALT 0 - 50 U/L 35   AST 0 - 45 U/L 36   Bilirubin Total <=1.2 mg/dL 0.2   Glucose 70 - 99 mg/dL 97   WBC 4.0 - 11.0 10e3/uL 5.4   Hemoglobin 11.7 - 15.7 g/dL 9.8 (L)   Hematocrit 35.0 - 47.0 % 30.8 (L)    Platelet Count 150 - 450 10e3/uL 162   RBC Count 3.80 - 5.20 10e6/uL 3.20 (L)   MCV 78 - 100 fL 96   MCH 26.5 - 33.0 pg 30.6   MCHC 31.5 - 36.5 g/dL 31.8   RDW 10.0 - 15.0 % 12.1   INR 0.85 - 1.15  1.29 (H)   PT 11.8 - 14.8 Seconds 16.0 (H)   (H): Data is abnormally high  (L): Data is abnormally low    Assessment: Marizol Granados is a 40 year old female with stage IIIB low grade peritoneal carcinoma admitted to observation for suspected peristomal skin infection and decreased PO intake, planning surgery for end colostomy and loop ileostomy takedown on 5/28.      # Stoma site pain   # Suspected peristomal skin infection, resolved  - s/p WOC consult, appreciate recs   - Tolerating minimal PO, currently on TPN in anticipation upcoming end colostomy creation  - TPN now at goal, working on cycling   - Supplemental shakes, other PO intake as tolerated by patient      # Stage IIIB low grade peritoneal carcinoma  # Rectovaginal fistula  # Neurogenic constipation  # Yeast vaginitis  - s/p 5C Carbo/Taxol, trial of letrozole; CT 3/21/25 with no evidence of disease  - Postoperative course complicated by rectovaginal fistula with neurogenic constipation requiring extensive disimpaction in the OR   - Follows with CRS, plans for end colostomy with intersphincteric proctectomy, loop takedown; scheduled for 5/28   - TPN initiated for nutrition as mentioned above given minimal PO in preparation for surgery   - Pt reporting new vaginal itching, multiplex positive for yeast, now s/p diflucan x1 > ongoing vulvar symptoms, added Monistat, will continue evaluating sx prn      # Urinary retention   # Hx frequent UTIs   # Cystitis on CT  - CT with evidence of cystitis, redemonstrated from 3/21 CT   - Has hx frequent UTIs, manages retention with CIC due to inability to void   - Last treated PTA for UTI 2/25 with cipro, culture resulted with pansensitive staph aureus   - Followed by Dr. Moseley in urology, last seen in April for cysto with no  structural cause to explain UTIs, consideration of gentamicin irrigations with plan to follow up on 6/5  - c/f colonization in the setting of frequent self catheterization, asymptomatic on presentation though limited sensation post surgery and does not feel sx of UTI per patient  - UA with +LE, +nitrites collected post CTX; urine culture klebsiella.  - s/p 6d ceftriaxone, discontinued yesterday given no ongoing evidence of infection   - Afebrile overnight, continue to monitor  - Continue CIC      # FAHAD, resolved   - Cr slightly elevated compared to previous, on arrival to ED at 1.02   - Now resolved, Cr most recently 0.86     # Hx DVT/PE  - PTA Eliquis      # MDD/SHIVAM  # Insomnia   # RLS   - PTA scheduled Buspar, Pristiq, gabapentin, Seroquel, ramelteon   - PTA prn Trazodone, Ambien   - Currently on hydroxyzine and ativan prn per palliative   - Continue to monitor mood closely   - Patient reported concern for malabsorption of Pristiq, consider transitioning to medication with IR form available, per patient plan for discussion of med changes with outpatient psychiatrist and will defer additional med changes at this time      # Hx migraines  - PTA Topamax, propranolol     # Anorectal pain  # Cancer related fatigue   - Followed by palliative outpatient with Dr. Norton, last seen 4/24; inpatient team consulted, appreciate recs   - PTA Ritalin for fatigue, pain management as described above   - Was on Dilaudid, Butrans patch, gabapentin PTA  - Seen by inpatient palliative team, appreciate recs, currently on po dilaudid, methadone BID, Robaxin, gabapentin, and tylenol prn    Bharti Rich DO  [facility]  Gynecology Oncology Resident, PGY-2  05/26/2025 7:14 AM     To reach the GYNECOLOGY ONCOLOGY team for this patient, please page [phone] or search Gynecologic Oncology Resident Group on Vocera.     I saw and evaluated the patient with the resident.  I edited and reviewed the above note. I  have reviewed all pertinent imaging and labs on this patient.  Discussed again plan for surgery Wed. Patient asking about her car that is still in  parking. We will discuss with SW. Examined perineum at bedside. Needs monistat cream, had diflucan x 1, Shlomo barrier cream at bedside.    Luz Maria Ag MD  Professor  Department of Ob/Gyn and Women's Health  Division of Gynecologic Oncology  [facility]  [phone]

## 2025-05-26 NOTE — PLAN OF CARE
RN [id number]    Vitals: Afebrile. VSS on RA  Neuro: A&Ox4     Mobility: Pt ambulates independently.  Pain/Nausea: Pain managed w/ PRN Dilaudid x1 via PO, PRN Tylenol x1.  Denies nausea.   Diet: Tolerating regular diet   Labs: Reviewed. Electrolyte protocols run.  LDAs: Single Lumen Port HL  Skin/incisions: WNL; intact.  Respiratory: No acute changes this shift.   Cardiac: No acute changes this shift.  /GI: Neurogenic bladder- pt self caths. Ileostomy intact pt manages independently.            NEW CHANGES: No acute changes this shift. Anticipate starting TPN this evening.     Continue to monitor patient and intervene as needed. Continue to implement Plan of Care. Notify MD with any concerns or changes in patient status.     Shyla Albarado RN

## 2025-05-26 NOTE — PLAN OF CARE
Ptnt expressing many concerns this shift surrounding surgery timing and recovery; concern surrounding planning care for her children while she is hospitalized; expresses frustration with POC. Ptnt also expressed that they have seen their Pristiq pills excreted whole in their ileostomy pouch. Pharmacy contacted and states there is no other formulation besides the ER, would need to involve Psych to find another option, ptnt declined.     RN used therapeutic listening and strategized with ptnt regarding POC-- specifically getting sleeping medications scheduled so there would no longer be issues ordering PRN meds from pharmacy, home catheter supplies, resources for changing selective serotonin reuptake inhibitor because of absorption issues.

## 2025-05-26 NOTE — PLAN OF CARE
Goal Outcome Evaluation:    [id number]    Pain present at stoma site up to 7 overnight, managed w PRN PO Dilaudid given x2. Denies nausea and SOB. A/Ox4. VSS on RA, afebrile. Patient has neurogenic bladder - self caths. Ileostomy intact w output and gas, pt also empties this independently.        TPN finished in evening. Port hep locked. Daily BG checks w AM labs. Plan to start monistat this AM.

## 2025-05-27 ENCOUNTER — ANESTHESIA EVENT (OUTPATIENT)
Dept: SURGERY | Facility: CLINIC | Age: 40
End: 2025-05-27
Payer: COMMERCIAL

## 2025-05-27 LAB
ANION GAP SERPL CALCULATED.3IONS-SCNC: 9 MMOL/L (ref 7–15)
BUN SERPL-MCNC: 27.5 MG/DL (ref 6–20)
CALCIUM SERPL-MCNC: 9.8 MG/DL (ref 8.8–10.4)
CHLORIDE SERPL-SCNC: 107 MMOL/L (ref 98–107)
CREAT SERPL-MCNC: 0.81 MG/DL (ref 0.51–0.95)
EGFRCR SERPLBLD CKD-EPI 2021: >90 ML/MIN/1.73M2
ERYTHROCYTE [DISTWIDTH] IN BLOOD BY AUTOMATED COUNT: 12.1 % (ref 10–15)
GLUCOSE BLDC GLUCOMTR-MCNC: 148 MG/DL (ref 70–99)
GLUCOSE SERPL-MCNC: 135 MG/DL (ref 70–99)
HCO3 SERPL-SCNC: 26 MMOL/L (ref 22–29)
HCT VFR BLD AUTO: 28.6 % (ref 35–47)
HGB BLD-MCNC: 9.4 G/DL (ref 11.7–15.7)
MAGNESIUM SERPL-MCNC: 1.9 MG/DL (ref 1.7–2.3)
MCH RBC QN AUTO: 31.5 PG (ref 26.5–33)
MCHC RBC AUTO-ENTMCNC: 32.9 G/DL (ref 31.5–36.5)
MCV RBC AUTO: 96 FL (ref 78–100)
PHOSPHATE SERPL-MCNC: 5 MG/DL (ref 2.5–4.5)
PLATELET # BLD AUTO: 159 10E3/UL (ref 150–450)
POTASSIUM SERPL-SCNC: 4.3 MMOL/L (ref 3.4–5.3)
RBC # BLD AUTO: 2.98 10E6/UL (ref 3.8–5.2)
SODIUM SERPL-SCNC: 142 MMOL/L (ref 135–145)
TRIGL SERPL-MCNC: 64 MG/DL
WBC # BLD AUTO: 4.3 10E3/UL (ref 4–11)

## 2025-05-27 PROCEDURE — 250N000013 HC RX MED GY IP 250 OP 250 PS 637: Performed by: PHYSICIAN ASSISTANT

## 2025-05-27 PROCEDURE — 250N000013 HC RX MED GY IP 250 OP 250 PS 637: Performed by: OBSTETRICS & GYNECOLOGY

## 2025-05-27 PROCEDURE — 83735 ASSAY OF MAGNESIUM: CPT | Performed by: OBSTETRICS & GYNECOLOGY

## 2025-05-27 PROCEDURE — 250N000011 HC RX IP 250 OP 636: Performed by: OBSTETRICS & GYNECOLOGY

## 2025-05-27 PROCEDURE — 250N000011 HC RX IP 250 OP 636: Performed by: PHYSICIAN ASSISTANT

## 2025-05-27 PROCEDURE — 80048 BASIC METABOLIC PNL TOTAL CA: CPT | Performed by: OBSTETRICS & GYNECOLOGY

## 2025-05-27 PROCEDURE — 120N000002 HC R&B MED SURG/OB UMMC

## 2025-05-27 PROCEDURE — 84100 ASSAY OF PHOSPHORUS: CPT | Performed by: OBSTETRICS & GYNECOLOGY

## 2025-05-27 PROCEDURE — 99232 SBSQ HOSP IP/OBS MODERATE 35: CPT | Mod: GC | Performed by: OBSTETRICS & GYNECOLOGY

## 2025-05-27 PROCEDURE — 250N000013 HC RX MED GY IP 250 OP 250 PS 637

## 2025-05-27 PROCEDURE — 85027 COMPLETE CBC AUTOMATED: CPT | Performed by: OBSTETRICS & GYNECOLOGY

## 2025-05-27 PROCEDURE — 250N000009 HC RX 250: Performed by: OBSTETRICS & GYNECOLOGY

## 2025-05-27 PROCEDURE — 84478 ASSAY OF TRIGLYCERIDES: CPT | Performed by: OBSTETRICS & GYNECOLOGY

## 2025-05-27 PROCEDURE — B4185 PARENTERAL SOL 10 GM LIPIDS: HCPCS | Performed by: OBSTETRICS & GYNECOLOGY

## 2025-05-27 PROCEDURE — 82310 ASSAY OF CALCIUM: CPT | Performed by: OBSTETRICS & GYNECOLOGY

## 2025-05-27 RX ORDER — METRONIDAZOLE 500 MG/1
500 TABLET ORAL 3 TIMES DAILY
Status: DISCONTINUED | OUTPATIENT
Start: 2025-05-27 | End: 2025-05-28

## 2025-05-27 RX ORDER — ONDANSETRON 2 MG/ML
4 INJECTION INTRAMUSCULAR; INTRAVENOUS 3 TIMES DAILY
Status: COMPLETED | OUTPATIENT
Start: 2025-05-27 | End: 2025-05-28

## 2025-05-27 RX ORDER — NEOMYCIN SULFATE 500 MG/1
1000 TABLET ORAL EVERY 8 HOURS SCHEDULED
Status: COMPLETED | OUTPATIENT
Start: 2025-05-27 | End: 2025-05-28

## 2025-05-27 RX ADMIN — MAGNESIUM SULFATE HEPTAHYDRATE: 500 INJECTION, SOLUTION INTRAMUSCULAR; INTRAVENOUS at 20:44

## 2025-05-27 RX ADMIN — METHYLPHENIDATE HYDROCHLORIDE 10 MG: 5 TABLET ORAL at 12:05

## 2025-05-27 RX ADMIN — GABAPENTIN 300 MG: 300 CAPSULE ORAL at 20:49

## 2025-05-27 RX ADMIN — ZOLPIDEM TARTRATE 5 MG: 5 TABLET, FILM COATED ORAL at 20:59

## 2025-05-27 RX ADMIN — TOPIRAMATE 50 MG: 50 TABLET, FILM COATED ORAL at 08:59

## 2025-05-27 RX ADMIN — DIPHENHYDRAMINE HYDROCHLORIDE 25 MG: 25 CAPSULE ORAL at 17:49

## 2025-05-27 RX ADMIN — DESVENLAFAXINE 150 MG: 50 TABLET, FILM COATED, EXTENDED RELEASE ORAL at 20:48

## 2025-05-27 RX ADMIN — LORAZEPAM 1 MG: 1 TABLET ORAL at 08:59

## 2025-05-27 RX ADMIN — TRAZODONE HYDROCHLORIDE 300 MG: 150 TABLET ORAL at 20:51

## 2025-05-27 RX ADMIN — ACETAMINOPHEN 650 MG: 325 TABLET, FILM COATED ORAL at 16:31

## 2025-05-27 RX ADMIN — PROPRANOLOL HYDROCHLORIDE 10 MG: 10 TABLET ORAL at 08:59

## 2025-05-27 RX ADMIN — BUSPIRONE HYDROCHLORIDE 30 MG: 30 TABLET ORAL at 08:59

## 2025-05-27 RX ADMIN — Medication 5 ML: at 17:46

## 2025-05-27 RX ADMIN — METRONIDAZOLE 500 MG: 500 TABLET ORAL at 14:05

## 2025-05-27 RX ADMIN — ONDANSETRON 4 MG: 2 INJECTION, SOLUTION INTRAMUSCULAR; INTRAVENOUS at 14:04

## 2025-05-27 RX ADMIN — PANTOPRAZOLE SODIUM 40 MG: 40 TABLET, DELAYED RELEASE ORAL at 08:59

## 2025-05-27 RX ADMIN — LORAZEPAM 1 MG: 0.5 TABLET ORAL at 20:47

## 2025-05-27 RX ADMIN — METHADONE HYDROCHLORIDE 5 MG: 5 TABLET ORAL at 20:48

## 2025-05-27 RX ADMIN — BUSPIRONE HYDROCHLORIDE 30 MG: 30 TABLET ORAL at 20:50

## 2025-05-27 RX ADMIN — PANTOPRAZOLE SODIUM 40 MG: 40 TABLET, DELAYED RELEASE ORAL at 20:47

## 2025-05-27 RX ADMIN — QUETIAPINE FUMARATE 200 MG: 200 TABLET ORAL at 20:50

## 2025-05-27 RX ADMIN — NEOMYCIN SULFATE 1000 MG: 500 TABLET ORAL at 14:04

## 2025-05-27 RX ADMIN — ONDANSETRON 4 MG: 2 INJECTION, SOLUTION INTRAMUSCULAR; INTRAVENOUS at 20:59

## 2025-05-27 RX ADMIN — METHADONE HYDROCHLORIDE 5 MG: 5 TABLET ORAL at 08:59

## 2025-05-27 RX ADMIN — TOPIRAMATE 50 MG: 50 TABLET, FILM COATED ORAL at 20:49

## 2025-05-27 RX ADMIN — NEOMYCIN SULFATE 1000 MG: 500 TABLET ORAL at 20:51

## 2025-05-27 RX ADMIN — OLIVE OIL AND SOYBEAN OIL 250 ML: 16; 4 INJECTION, EMULSION INTRAVENOUS at 20:44

## 2025-05-27 RX ADMIN — METRONIDAZOLE 500 MG: 500 TABLET ORAL at 20:49

## 2025-05-27 RX ADMIN — HYDROMORPHONE HYDROCHLORIDE 8 MG: 4 TABLET ORAL at 10:39

## 2025-05-27 RX ADMIN — METHYLPHENIDATE HYDROCHLORIDE 15 MG: 5 TABLET ORAL at 08:59

## 2025-05-27 RX ADMIN — RAMELTEON 8 MG: 8 TABLET ORAL at 20:51

## 2025-05-27 RX ADMIN — MICONAZOLE NITRATE 1 APPLICATOR: 20 CREAM VAGINAL at 09:03

## 2025-05-27 ASSESSMENT — ACTIVITIES OF DAILY LIVING (ADL)
ADLS_ACUITY_SCORE: 45
ADLS_ACUITY_SCORE: 45
ADLS_ACUITY_SCORE: 43
ADLS_ACUITY_SCORE: 43
ADLS_ACUITY_SCORE: 45
ADLS_ACUITY_SCORE: 43
ADLS_ACUITY_SCORE: 45
ADLS_ACUITY_SCORE: 43
ADLS_ACUITY_SCORE: 43
ADLS_ACUITY_SCORE: 45
ADLS_ACUITY_SCORE: 43
ADLS_ACUITY_SCORE: 45

## 2025-05-27 NOTE — PROGRESS NOTES
Brief Progress Note    Presented to bedside for evening rounds.  Marizol reports that her children were able to visit earlier, which was nice.  She vocalizes some frustration regarding the timing of starting her TPN as well as her eating medications, but otherwise has no specific complaints.  I reiterated that our team is working behind the scenes on making sure that she is ready for surgery including stopping her anticoagulation and ensuring that she has the right preoperative orders anticipation of her combined surgery with colorectal on Wednesday.  No other specific concerns, continue current plan of care.    Bharti Rich DO  [facility]  Gynecology Oncology Resident, PGY-2  05/26/2025 9:32 PM    To reach the GYNECOLOGY ONCOLOGY team for this patient, please page [phone] or search Gynecologic Oncology Resident Group on Vocera.

## 2025-05-27 NOTE — PLAN OF CARE
"Goal Outcome Evaluation:     [id number]     BP 91/61 (BP Location: Right arm)   Pulse 71   Temp 98.4  F (36.9  C) (Oral)   Resp 16   Ht 1.651 m (5' 5\")   Wt 62.1 kg (136 lb 12.8 oz)   LMP  (LMP Unknown)   SpO2 96%   BMI 22.76 kg/m       Reason for admission: 5/18 suspected peristomal skin infection   Activity: UAL  Pain: Perineal pain Managed by scheduled pain meds  Neuro: AOX4  Cardiac: Hypotensive within parameters  Respiratory: RA, no s/s of distress  GI/: Ileostomy self managed by pt. Self- caths for neurogenic bladder. Denies nausea  Diet: Regular, cycled tpn  Lines: Port infusing tpn  Wounds: No new skin issues  Labs/imaging: Reviewed.       No acute changes this shift.      Plan: End colostomy with intersphincteric proctectomy, loop takedown 5/28      Continue to monitor and follow POC        "

## 2025-05-27 NOTE — PROGRESS NOTES
"Care Management Follow Up    Length of Stay (days): 7    Expected Discharge Date: 06/02/2025     Concerns to be Addressed:       Patient plan of care discussed at interdisciplinary rounds: Yes    Anticipated Discharge Disposition:                Anticipated Discharge Services:    Anticipated Discharge DME:      Patient/family educated on Medicare website which has current facility and service quality ratings:    Education Provided on the Discharge Plan:    Patient/Family in Agreement with the Plan:      Referrals Placed by CM/SW:    Private pay costs discussed: Not applicable    Discussed  Partnership in Safe Discharge Planning  document with patient/family: No     Handoff Completed: No, handoff not indicated or clinically appropriate    Additional Information:  RNCC met with Pt at the bedside to discuss financial concerns.     Pt reports she has been approved SSDI however she is not scheduled to receive payments till June. She is requesting help with her rent this month. RNCC provided Pt with printed materials and resources on financial grants she can apply for. Pt was thankful and stated \"I'm just sitting here so I can fill out whatever I need to.\"    RNCC also placed resources in Pt's discharge instructions.    Pt is anticipated to have surgery on 5/28 and will discharge with a new suprapubic and ostomy drain. RNCC reviewed home care with Pt and updated home care agency. TPN plan at discharge not yet determined.     Accepted Home Care  [facility]    Skilled Nursing  SW     MABLE 5 days pending post op progress. Care management will continue to follow and support safe discharge planning as needed.     Next Steps:   [] Update HC as needed, confirm orders  [] Confirm TPN needs closer DC  [] Follow for dispo needs     ANKUR Santiago  Care Management Department  Covering 5A: 4499-3330 & 5C: 3943-2700 (non-BMT)   Phone: [phone]  Teams  Vocera: weekdays 8:00 am - 4:30 pm.   See Vocera Care " Team for off-day coverage

## 2025-05-27 NOTE — PROGRESS NOTES
CLINICAL NUTRITION SERVICES - REASSESSMENT NOTE     RECOMMENDATIONS FOR MDs/PROVIDERS TO ORDER:  Please alert RD or pharmacist if pt requires adjustments to PN fluid volume    Registered Dietitian Interventions:  Asked pharmacist to change IV lipids timing to nightly (pt request per provider)  TG level (lab add-on)    Future/Additional Recommendations:  Monitor TPN tolerance, wt trends, PO intakes; inpatient RD will continue to follow per protocol     INFORMATION OBTAINED  Patient not available for interview due to pt with other cares during attempts to visit    CURRENT NUTRITION ORDERS  Diet: Regular    Snacks/Supplements: Ensure Max between meals      Nutrition Support: TPN, 960 mL/day x 12 hrs with 220 g dextrose, 90 g AA, and 250 mL 20% lipids 6 days per week for total provision of 1536 Kcals (25 Kcals/kg), 1.5 g/kg protein, GIR 5.45 mg/kg/minute @ peak infusion, and 28% fat kcals on average daily.   -- Infuvite and trace minerals daily    CURRENT INTAKE/TOLERANCE  PN Intake:  started 5/20 with initial dextrose 130 g/day, pt refused TPN 5/21 (per provider note 5/22 AM, pt declined TPN overnight 2/2 concerns for leaking urine in her briefs while TPN was running), but then TPN restarted 5/22 with 175 g dextrose, and then goal dextrose reached 5/23 and began 18 hr TPN cycle (daytime per pt request).  Transitioned to 12 hr TPN cycle 5/24. Started 5/26 pt requested cycled TPN in evening (lipids not updated to run in evenings until this morning, and pt not given lipids yesterday during day)    PO Intake: Tolerating minimal PO intake per provider note today     NEW FINDINGS  GI symptoms: Reviewed    - Plan for  End colostomy with intersphincteric proctectomy, loop takedown 5/28 per RN note this morning    Skin/wounds: Reviewed      Nutrition-relevant labs: Reviewed  - K+ and Mg++ WNL  - Phos 5 (H)  - BGs stable  - TG WNL today  - (5/26): Alk Phos, ALT, AST, and Tbili WNL    Nutrition-relevant medications:  Reviewed    Weight: stable/up slightly since admit    MALNUTRITION  % Intake: Decreased intake does not meet criteria  % Weight Loss: > 20% in 1 year (severe)   Subcutaneous Fat Loss: Orbital: Mild, Buccal: Moderate, and Triceps: Moderate per RD note on 5/20  Muscle Loss: Temples (temporalis muscle): Moderate, Clavicles (pectoralis and deltoids): Moderate, and Calf (gastrocnemius): Mild per RD note on 5/20  Fluid Accumulation/Edema: None noted per chart review today  Malnutrition Diagnosis: Severe malnutrition in the context of chronic illness  Malnutrition Present on Admission: Yes    EVALUATION OF THE PROGRESS TOWARD GOALS   Previous Goals  Total avg nutritional intake to meet a minimum of 25 kcal/kg and 1.2 g PRO/kg daily (per dosing wt 61 kg)   Evaluation: Progressing    Previous Nutrition Diagnosis  Inadequate oral intake related to abdominal pain as evidenced by patient report and requiring TF to meet estimated needs   Evaluation: No change, updated    NUTRITION DIAGNOSIS  Inadequate oral intake related to not tolerating adequate PO intake as evidenced by chart review and requiring TPN/lipids for nutrition support    INTERVENTIONS  Collaboration by nutrition professional with other providers - see above re: IV lipid timing  Ordered TG level    GOALS  Total avg nutritional intake to meet a minimum of 25 kcal/kg and 1.2 g PRO/kg daily (per dosing wt 61 kg).     MONITORING/EVALUATION  Progress toward goals will be monitored and evaluated per policy.     Do Siegel, RD, , LD  Weekday Units covered: 5A (non-Heme Onc pts) and 7B ([id number])  Available by 5A or 7B Clinical Dietjoel Serrato  Weekend Coverage: Weekend Brad Serrato    Inpatient Clinical Dietitians no longer available via paging

## 2025-05-27 NOTE — PROGRESS NOTES
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 No new concerns this morning, no updates, would like to go back to sleep.      Objective:   Vitals:    05/26/25 2031 05/26/25 2146 05/27/25 0009 05/27/25 0321   BP: 99/63 91/54 91/61 95/63   BP Location: Right arm  Right arm Right arm   Pulse: 87  71 78   Resp: 16  16 16   Temp: 98  F (36.7  C)  98.4  F (36.9  C) 98.2  F (36.8  C)   TempSrc: Oral  Oral Oral   SpO2: 98%  96% 97%   Weight:       Height:           General: NAD, appears comfortable in bed  CV: Well-perfused  Resp: Breathing comfortably on room air  Abdomen: deferred per patient     I/O last 3 completed shifts:  In: 700 [P.O.:700]  Out: -     Intake/Output (24 Hrs // Since MN)   mL // NR  IVF/TPN 709mL // 709mL    New labs/imaging-   Latest Reference Range & Units 05/27/25 04:34   Sodium 135 - 145 mmol/L 142   Potassium 3.4 - 5.3 mmol/L 4.3   Chloride 98 - 107 mmol/L 107   Carbon Dioxide (CO2) 22 - 29 mmol/L 26   Urea Nitrogen 6.0 - 20.0 mg/dL 27.5 (H)   Creatinine 0.51 - 0.95 mg/dL 0.81   GFR Estimate >60 mL/min/1.73m2 >90   Calcium 8.8 - 10.4 mg/dL 9.8   Anion Gap 7 - 15 mmol/L 9   Magnesium 1.7 - 2.3 mg/dL 1.9   Phosphorus 2.5 - 4.5 mg/dL 5.0 (H)   Glucose 70 - 99 mg/dL 135 (H)   WBC 4.0 - 11.0 10e3/uL 4.3   Hemoglobin 11.7 - 15.7 g/dL 9.4 (L)   Hematocrit 35.0 - 47.0 % 28.6 (L)   Platelet Count 150 - 450 10e3/uL 159   RBC Count 3.80 - 5.20 10e6/uL 2.98 (L)   MCV 78 - 100 fL 96   MCH 26.5 - 33.0 pg 31.5   MCHC 31.5 - 36.5 g/dL 32.9   RDW 10.0 - 15.0 % 12.1   (H): Data is abnormally high  (L): Data is abnormally low    Assessment: Marizol Granados is a 40 year old female with stage IIIB low grade peritoneal carcinoma admitted to observation for suspected peristomal skin infection and decreased PO  intake, planning surgery for end colostomy and loop ileostomy takedown on 5/28.      # Stoma site pain   # Suspected peristomal skin infection, resolved  - s/p WOC consult, appreciate recs   - Tolerating minimal PO, currently on TPN in anticipation upcoming end colostomy creation  - TPN at goal  - Supplemental shakes, other PO intake as tolerated by patient      # Stage IIIB low grade peritoneal carcinoma  # Rectovaginal fistula  # Neurogenic constipation  # Yeast vaginitis  - s/p 5C Carbo/Taxol, trial of letrozole; CT 3/21/25 with no evidence of disease  - Postoperative course complicated by rectovaginal fistula with neurogenic constipation requiring extensive disimpaction in the OR   - Follows with CRS, plans for end colostomy with intersphincteric proctectomy, loop takedown; scheduled for 5/28   - TPN initiated for nutrition as mentioned above given minimal PO in preparation for surgery   - Pt reporting new vaginal itching, multiplex positive for yeast, now s/p diflucan x1 > ongoing vulvar symptoms, added Monistat, will continue evaluating sx prn      # Urinary retention   # Hx frequent UTIs   # Cystitis on CT  - CT with evidence of cystitis, redemonstrated from 3/21 CT   - Has hx frequent UTIs, manages retention with CIC due to inability to void   - Last treated PTA for UTI 2/25 with cipro, culture resulted with pansensitive staph aureus   - Followed by Dr. Moseley in urology, last seen in April for cysto with no structural cause to explain UTIs, consideration of gentamicin irrigations with plan to follow up on 6/5  - c/f colonization in the setting of frequent self catheterization, asymptomatic on presentation though limited sensation post surgery and does not feel sx of UTI per patient  - UA with +LE, +nitrites collected post CTX; urine culture klebsiella.  - s/p 6d ceftriaxone, discontinued yesterday given no ongoing evidence of infection   - Afebrile overnight, continue to monitor  - Continue CIC      # FAHAD,  resolved   - Cr slightly elevated compared to previous, on arrival to ED at 1.02   - Now resolved, Cr most recently 0.81     # Hx DVT/PE - chronic anticoagulation  - PTA Eliquis- held in anticipation of OR trip 5/28, will resume postoperatively   - Plan for indefinite anti-coagulation given long history of clotting. See Hematology note from 6/14/2024.      # MDD/SHIVAM  # Insomnia   # RLS   - PTA scheduled Buspar, Pristiq, gabapentin, Seroquel, ramelteon   - PTA prn Trazodone, Ambien   - Currently on hydroxyzine and ativan prn per palliative   - Continue to monitor mood closely   - Patient reported concern for malabsorption of Pristiq, consider transitioning to medication with IR form available, per patient plan for discussion of med changes with outpatient psychiatrist and will defer additional med changes at this time      # Hx migraines  - PTA Topamax, propranolol     # Anorectal pain  # Cancer related fatigue   - Followed by palliative outpatient with Dr. Norton, last seen 4/24; inpatient team consulted, appreciate recs   - PTA Ritalin for fatigue, pain management as described above   - Was on Dilaudid, Butrans patch, gabapentin PTA  - Seen by inpatient palliative team, appreciate recs, currently on po dilaudid, methadone BID, Robaxin, gabapentin, and tylenol prn    Bharti Rich DO  [facility]  Gynecology Oncology Resident, PGY-2  05/27/2025 8:00 AM    To reach the GYNECOLOGY ONCOLOGY team for this patient, please page [phone] or search Gynecologic Oncology Resident Group on Vocera.     Gynecologic Oncology Attending Attestation  I have seen the patient on rounds with the team. She was resting comfortably this morning. No significant changes to above.  I have discussed the patient and plan of care with the resident as documented in the note above, which I have edited as necessary.    Lee Samuel MD    Gynecologic Oncology

## 2025-05-27 NOTE — PROGRESS NOTES
Brief Progress Note    Patient is in room accompanied by mother. She is doing okay- pain is at baseline. She is wondering if medications can be timed to arrive at similar time to be in room in the evening. Otherwise, no questions regarding procedure tomorrow. Spoke to CRS resident- T&S ordered, no bowel prep indicated, preoperative abx ordered (neomycin 100 TID, flagyl 100 TID, pre-abx anti-emetics). Continue POC.    Isamar Woodruff MD  Obstetrics, Gynecology & Women's Health   Resident, PGY-1  05/27/2025 1:46 PM

## 2025-05-28 ENCOUNTER — APPOINTMENT (OUTPATIENT)
Dept: GENERAL RADIOLOGY | Facility: CLINIC | Age: 40
End: 2025-05-28
Attending: OBSTETRICS & GYNECOLOGY
Payer: COMMERCIAL

## 2025-05-28 ENCOUNTER — ANESTHESIA (OUTPATIENT)
Dept: SURGERY | Facility: CLINIC | Age: 40
End: 2025-05-28
Payer: COMMERCIAL

## 2025-05-28 PROBLEM — C56.9: Status: ACTIVE | Noted: 2024-09-20

## 2025-05-28 PROBLEM — N82.3 RECTOVAGINAL FISTULA: Status: ACTIVE | Noted: 2024-09-18

## 2025-05-28 PROBLEM — C48.1: Status: ACTIVE | Noted: 2024-09-27

## 2025-05-28 LAB
ABO + RH BLD: NORMAL
ANION GAP SERPL CALCULATED.3IONS-SCNC: 10 MMOL/L (ref 7–15)
ATRIAL RATE - MUSE: 78 BPM
BASE EXCESS BLDV CALC-SCNC: -0.9 MMOL/L (ref -3–3)
BASE EXCESS BLDV CALC-SCNC: -3.3 MMOL/L (ref -3–3)
BASE EXCESS BLDV CALC-SCNC: -5.2 MMOL/L (ref -3–3)
BLD GP AB SCN SERPL QL: NEGATIVE
BUN SERPL-MCNC: 28.3 MG/DL (ref 6–20)
CA-I BLD-MCNC: 5.1 MG/DL (ref 4.4–5.2)
CA-I BLD-MCNC: 5.2 MG/DL (ref 4.4–5.2)
CA-I BLD-MCNC: 5.5 MG/DL (ref 4.4–5.2)
CALCIUM SERPL-MCNC: 9.5 MG/DL (ref 8.8–10.4)
CHLORIDE SERPL-SCNC: 105 MMOL/L (ref 98–107)
CREAT SERPL-MCNC: 0.8 MG/DL (ref 0.51–0.95)
DIASTOLIC BLOOD PRESSURE - MUSE: NORMAL MMHG
EGFRCR SERPLBLD CKD-EPI 2021: >90 ML/MIN/1.73M2
GLUCOSE BLD-MCNC: 120 MG/DL (ref 70–99)
GLUCOSE BLD-MCNC: 160 MG/DL (ref 70–99)
GLUCOSE BLD-MCNC: 94 MG/DL (ref 70–99)
GLUCOSE BLDC GLUCOMTR-MCNC: 146 MG/DL (ref 70–99)
GLUCOSE BLDC GLUCOMTR-MCNC: 89 MG/DL (ref 70–99)
GLUCOSE SERPL-MCNC: 105 MG/DL (ref 70–99)
HCO3 BLDV-SCNC: 21 MMOL/L (ref 21–28)
HCO3 BLDV-SCNC: 22 MMOL/L (ref 21–28)
HCO3 BLDV-SCNC: 24 MMOL/L (ref 21–28)
HCO3 SERPL-SCNC: 25 MMOL/L (ref 22–29)
HGB BLD-MCNC: 8.5 G/DL (ref 11.7–15.7)
HGB BLD-MCNC: 8.6 G/DL (ref 11.7–15.7)
HGB BLD-MCNC: 9.2 G/DL (ref 11.7–15.7)
HGB BLD-MCNC: 9.6 G/DL (ref 11.7–15.7)
INTERPRETATION ECG - MUSE: NORMAL
LACTATE BLD-SCNC: 1.5 MMOL/L (ref 0.7–2)
LACTATE BLD-SCNC: 2.9 MMOL/L (ref 0.7–2)
LACTATE BLD-SCNC: 3.5 MMOL/L (ref 0.7–2)
MAGNESIUM SERPL-MCNC: 1.9 MG/DL (ref 1.7–2.3)
MCV RBC AUTO: 94 FL (ref 78–100)
O2/TOTAL GAS SETTING VFR VENT: 60 %
OXYHGB MFR BLDV: 72 % (ref 70–75)
OXYHGB MFR BLDV: 76 % (ref 70–75)
OXYHGB MFR BLDV: 84 % (ref 70–75)
P AXIS - MUSE: 75 DEGREES
PCO2 BLDV: 41 MM HG (ref 40–50)
PCO2 BLDV: 41 MM HG (ref 40–50)
PCO2 BLDV: 45 MM HG (ref 40–50)
PH BLDV: 7.28 [PH] (ref 7.32–7.43)
PH BLDV: 7.35 [PH] (ref 7.32–7.43)
PH BLDV: 7.38 [PH] (ref 7.32–7.43)
PHOSPHATE SERPL-MCNC: 4.2 MG/DL (ref 2.5–4.5)
PO2 BLDV: 42 MM HG (ref 25–47)
PO2 BLDV: 48 MM HG (ref 25–47)
PO2 BLDV: 51 MM HG (ref 25–47)
POTASSIUM BLD-SCNC: 4.1 MMOL/L (ref 3.4–5.3)
POTASSIUM BLD-SCNC: 4.3 MMOL/L (ref 3.4–5.3)
POTASSIUM BLD-SCNC: 4.3 MMOL/L (ref 3.4–5.3)
POTASSIUM SERPL-SCNC: 4 MMOL/L (ref 3.4–5.3)
PR INTERVAL - MUSE: 136 MS
QRS DURATION - MUSE: 78 MS
QT - MUSE: 364 MS
QTC - MUSE: 414 MS
R AXIS - MUSE: 72 DEGREES
SAO2 % BLDV: 74 % (ref 70–75)
SAO2 % BLDV: 78 % (ref 70–75)
SAO2 % BLDV: 86 % (ref 70–75)
SODIUM BLD-SCNC: 138 MMOL/L (ref 135–145)
SODIUM BLD-SCNC: 141 MMOL/L (ref 135–145)
SODIUM BLD-SCNC: 145 MMOL/L (ref 135–145)
SODIUM SERPL-SCNC: 140 MMOL/L (ref 135–145)
SPECIMEN EXP DATE BLD: NORMAL
SYSTOLIC BLOOD PRESSURE - MUSE: NORMAL MMHG
T AXIS - MUSE: 69 DEGREES
VENTRICULAR RATE- MUSE: 78 BPM

## 2025-05-28 PROCEDURE — 250N000013 HC RX MED GY IP 250 OP 250 PS 637: Performed by: PHYSICIAN ASSISTANT

## 2025-05-28 PROCEDURE — 258N000003 HC RX IP 258 OP 636: Performed by: ANESTHESIOLOGY

## 2025-05-28 PROCEDURE — 250N000011 HC RX IP 250 OP 636

## 2025-05-28 PROCEDURE — 250N000013 HC RX MED GY IP 250 OP 250 PS 637: Performed by: SURGERY

## 2025-05-28 PROCEDURE — 258N000003 HC RX IP 258 OP 636

## 2025-05-28 PROCEDURE — 999N000063 XR ABDOMEN PORT 1 VIEW

## 2025-05-28 PROCEDURE — 250N000009 HC RX 250

## 2025-05-28 PROCEDURE — 360N000085 HC SURGERY LEVEL 5 W/ FLUORO, PER MIN: Performed by: UROLOGY

## 2025-05-28 PROCEDURE — 250N000013 HC RX MED GY IP 250 OP 250 PS 637: Performed by: OBSTETRICS & GYNECOLOGY

## 2025-05-28 PROCEDURE — 83735 ASSAY OF MAGNESIUM: CPT | Performed by: OBSTETRICS & GYNECOLOGY

## 2025-05-28 PROCEDURE — 258N000003 HC RX IP 258 OP 636: Performed by: SURGERY

## 2025-05-28 PROCEDURE — 250N000011 HC RX IP 250 OP 636: Mod: JZ

## 2025-05-28 PROCEDURE — 250N000013 HC RX MED GY IP 250 OP 250 PS 637

## 2025-05-28 PROCEDURE — 370N000017 HC ANESTHESIA TECHNICAL FEE, PER MIN: Performed by: UROLOGY

## 2025-05-28 PROCEDURE — 86850 RBC ANTIBODY SCREEN: CPT | Performed by: OBSTETRICS & GYNECOLOGY

## 2025-05-28 PROCEDURE — 80048 BASIC METABOLIC PNL TOTAL CA: CPT | Performed by: OBSTETRICS & GYNECOLOGY

## 2025-05-28 PROCEDURE — 250N000011 HC RX IP 250 OP 636: Performed by: PHYSICIAN ASSISTANT

## 2025-05-28 PROCEDURE — C1758 CATHETER, URETERAL: HCPCS | Performed by: UROLOGY

## 2025-05-28 PROCEDURE — 88341 IMHCHEM/IMCYTCHM EA ADD ANTB: CPT | Mod: 26 | Performed by: PATHOLOGY

## 2025-05-28 PROCEDURE — C1769 GUIDE WIRE: HCPCS | Performed by: UROLOGY

## 2025-05-28 PROCEDURE — 0D1M0Z4 BYPASS DESCENDING COLON TO CUTANEOUS, OPEN APPROACH: ICD-10-PCS | Performed by: SURGERY

## 2025-05-28 PROCEDURE — 999N000141 HC STATISTIC PRE-PROCEDURE NURSING ASSESSMENT: Performed by: UROLOGY

## 2025-05-28 PROCEDURE — 82805 BLOOD GASES W/O2 SATURATION: CPT

## 2025-05-28 PROCEDURE — 82947 ASSAY GLUCOSE BLOOD QUANT: CPT

## 2025-05-28 PROCEDURE — 0UQGXZZ REPAIR VAGINA, EXTERNAL APPROACH: ICD-10-PCS | Performed by: SURGERY

## 2025-05-28 PROCEDURE — C2627 CATH, SUPRAPUBIC/CYSTOSCOPIC: HCPCS | Performed by: UROLOGY

## 2025-05-28 PROCEDURE — 88342 IMHCHEM/IMCYTCHM 1ST ANTB: CPT | Mod: 26 | Performed by: PATHOLOGY

## 2025-05-28 PROCEDURE — 710N000010 HC RECOVERY PHASE 1, LEVEL 2, PER MIN: Performed by: UROLOGY

## 2025-05-28 PROCEDURE — 85018 HEMOGLOBIN: CPT

## 2025-05-28 PROCEDURE — 0DTP0ZZ RESECTION OF RECTUM, OPEN APPROACH: ICD-10-PCS | Performed by: SURGERY

## 2025-05-28 PROCEDURE — 250N000011 HC RX IP 250 OP 636: Mod: JZ | Performed by: ANESTHESIOLOGY

## 2025-05-28 PROCEDURE — 0T788DZ DILATION OF BILATERAL URETERS WITH INTRALUMINAL DEVICE, VIA NATURAL OR ARTIFICIAL OPENING ENDOSCOPIC: ICD-10-PCS | Performed by: SURGERY

## 2025-05-28 PROCEDURE — 250N000011 HC RX IP 250 OP 636: Performed by: SURGERY

## 2025-05-28 PROCEDURE — 86901 BLOOD TYPING SEROLOGIC RH(D): CPT | Performed by: OBSTETRICS & GYNECOLOGY

## 2025-05-28 PROCEDURE — 52005 CYSTO W/URTRL CATHJ: CPT | Mod: GC | Performed by: UROLOGY

## 2025-05-28 PROCEDURE — 86923 COMPATIBILITY TEST ELECTRIC: CPT

## 2025-05-28 PROCEDURE — 84100 ASSAY OF PHOSPHORUS: CPT | Performed by: OBSTETRICS & GYNECOLOGY

## 2025-05-28 PROCEDURE — 88302 TISSUE EXAM BY PATHOLOGIST: CPT | Mod: TC | Performed by: UROLOGY

## 2025-05-28 PROCEDURE — 74018 RADEX ABDOMEN 1 VIEW: CPT | Mod: 26 | Performed by: STUDENT IN AN ORGANIZED HEALTH CARE EDUCATION/TRAINING PROGRAM

## 2025-05-28 PROCEDURE — 272N000001 HC OR GENERAL SUPPLY STERILE: Performed by: UROLOGY

## 2025-05-28 PROCEDURE — 88307 TISSUE EXAM BY PATHOLOGIST: CPT | Mod: 26 | Performed by: PATHOLOGY

## 2025-05-28 PROCEDURE — 0D1E0Z4 BYPASS LARGE INTESTINE TO CUTANEOUS, OPEN APPROACH: ICD-10-PCS | Performed by: SURGERY

## 2025-05-28 PROCEDURE — 0DTE0ZZ RESECTION OF LARGE INTESTINE, OPEN APPROACH: ICD-10-PCS | Performed by: SURGERY

## 2025-05-28 PROCEDURE — 88302 TISSUE EXAM BY PATHOLOGIST: CPT | Mod: 26 | Performed by: PATHOLOGY

## 2025-05-28 PROCEDURE — 250N000011 HC RX IP 250 OP 636: Performed by: ANESTHESIOLOGY

## 2025-05-28 PROCEDURE — 99207 PR NO BILLABLE SERVICE THIS VISIT: CPT | Performed by: OBSTETRICS & GYNECOLOGY

## 2025-05-28 PROCEDURE — 120N000002 HC R&B MED SURG/OB UMMC

## 2025-05-28 PROCEDURE — 250N000025 HC SEVOFLURANE, PER MIN: Performed by: UROLOGY

## 2025-05-28 PROCEDURE — 99207 PR NO CHARGE LOS: CPT | Performed by: OBSTETRICS & GYNECOLOGY

## 2025-05-28 PROCEDURE — 88341 IMHCHEM/IMCYTCHM EA ADD ANTB: CPT | Mod: TC | Performed by: UROLOGY

## 2025-05-28 PROCEDURE — 0DTJ0ZZ RESECTION OF APPENDIX, OPEN APPROACH: ICD-10-PCS | Performed by: SURGERY

## 2025-05-28 RX ORDER — NALOXONE HYDROCHLORIDE 0.4 MG/ML
0.2 INJECTION, SOLUTION INTRAMUSCULAR; INTRAVENOUS; SUBCUTANEOUS
Status: DISCONTINUED | OUTPATIENT
Start: 2025-05-28 | End: 2025-06-06 | Stop reason: HOSPADM

## 2025-05-28 RX ORDER — LABETALOL HYDROCHLORIDE 5 MG/ML
10 INJECTION, SOLUTION INTRAVENOUS
Status: DISCONTINUED | OUTPATIENT
Start: 2025-05-28 | End: 2025-05-28 | Stop reason: HOSPADM

## 2025-05-28 RX ORDER — ONDANSETRON 2 MG/ML
4 INJECTION INTRAMUSCULAR; INTRAVENOUS EVERY 6 HOURS PRN
Status: DISCONTINUED | OUTPATIENT
Start: 2025-05-28 | End: 2025-06-06 | Stop reason: HOSPADM

## 2025-05-28 RX ORDER — PROCHLORPERAZINE MALEATE 5 MG/1
10 TABLET ORAL EVERY 6 HOURS PRN
Status: DISCONTINUED | OUTPATIENT
Start: 2025-05-28 | End: 2025-06-06 | Stop reason: HOSPADM

## 2025-05-28 RX ORDER — FENTANYL CITRATE 50 UG/ML
25-50 INJECTION, SOLUTION INTRAMUSCULAR; INTRAVENOUS
Refills: 0 | Status: DISCONTINUED | OUTPATIENT
Start: 2025-05-28 | End: 2025-05-28 | Stop reason: HOSPADM

## 2025-05-28 RX ORDER — ACETAMINOPHEN 500 MG
1000 TABLET ORAL EVERY 6 HOURS
Status: DISCONTINUED | OUTPATIENT
Start: 2025-05-28 | End: 2025-06-06 | Stop reason: HOSPADM

## 2025-05-28 RX ORDER — PROPOFOL 10 MG/ML
INJECTION, EMULSION INTRAVENOUS CONTINUOUS PRN
Status: DISCONTINUED | OUTPATIENT
Start: 2025-05-28 | End: 2025-05-28

## 2025-05-28 RX ORDER — ERTAPENEM 1 G/1
1 INJECTION, POWDER, LYOPHILIZED, FOR SOLUTION INTRAMUSCULAR; INTRAVENOUS
Status: COMPLETED | OUTPATIENT
Start: 2025-05-28 | End: 2025-05-28

## 2025-05-28 RX ORDER — NALOXONE HYDROCHLORIDE 0.4 MG/ML
0.4 INJECTION, SOLUTION INTRAMUSCULAR; INTRAVENOUS; SUBCUTANEOUS
Status: DISCONTINUED | OUTPATIENT
Start: 2025-05-28 | End: 2025-06-06 | Stop reason: HOSPADM

## 2025-05-28 RX ORDER — FENTANYL CITRATE 50 UG/ML
50 INJECTION, SOLUTION INTRAMUSCULAR; INTRAVENOUS EVERY 5 MIN PRN
Status: DISCONTINUED | OUTPATIENT
Start: 2025-05-28 | End: 2025-05-28 | Stop reason: HOSPADM

## 2025-05-28 RX ORDER — LIDOCAINE 40 MG/G
CREAM TOPICAL
Status: DISCONTINUED | OUTPATIENT
Start: 2025-05-28 | End: 2025-05-28 | Stop reason: HOSPADM

## 2025-05-28 RX ORDER — ONDANSETRON 2 MG/ML
4 INJECTION INTRAMUSCULAR; INTRAVENOUS ONCE
Status: DISCONTINUED | OUTPATIENT
Start: 2025-05-28 | End: 2025-05-28 | Stop reason: HOSPADM

## 2025-05-28 RX ORDER — ENOXAPARIN SODIUM 100 MG/ML
40 INJECTION SUBCUTANEOUS EVERY 24 HOURS
Status: DISCONTINUED | OUTPATIENT
Start: 2025-05-29 | End: 2025-06-01

## 2025-05-28 RX ORDER — SODIUM CHLORIDE, SODIUM LACTATE, POTASSIUM CHLORIDE, CALCIUM CHLORIDE 600; 310; 30; 20 MG/100ML; MG/100ML; MG/100ML; MG/100ML
INJECTION, SOLUTION INTRAVENOUS CONTINUOUS
Status: DISCONTINUED | OUTPATIENT
Start: 2025-05-28 | End: 2025-05-29

## 2025-05-28 RX ORDER — PROPOFOL 10 MG/ML
INJECTION, EMULSION INTRAVENOUS PRN
Status: DISCONTINUED | OUTPATIENT
Start: 2025-05-28 | End: 2025-05-28

## 2025-05-28 RX ORDER — LIDOCAINE 40 MG/G
CREAM TOPICAL
Status: DISCONTINUED | OUTPATIENT
Start: 2025-05-28 | End: 2025-06-06 | Stop reason: HOSPADM

## 2025-05-28 RX ORDER — HYDROMORPHONE HYDROCHLORIDE 1 MG/ML
2 SOLUTION ORAL ONCE
Status: COMPLETED | OUTPATIENT
Start: 2025-05-28 | End: 2025-05-28

## 2025-05-28 RX ORDER — ONDANSETRON 2 MG/ML
INJECTION INTRAMUSCULAR; INTRAVENOUS PRN
Status: DISCONTINUED | OUTPATIENT
Start: 2025-05-28 | End: 2025-05-28

## 2025-05-28 RX ORDER — ONDANSETRON 4 MG/1
4 TABLET, ORALLY DISINTEGRATING ORAL EVERY 30 MIN PRN
Status: DISCONTINUED | OUTPATIENT
Start: 2025-05-28 | End: 2025-05-28 | Stop reason: HOSPADM

## 2025-05-28 RX ORDER — NALOXONE HYDROCHLORIDE 0.4 MG/ML
0.2 INJECTION, SOLUTION INTRAMUSCULAR; INTRAVENOUS; SUBCUTANEOUS
Status: DISCONTINUED | OUTPATIENT
Start: 2025-05-28 | End: 2025-05-28 | Stop reason: HOSPADM

## 2025-05-28 RX ORDER — NALOXONE HYDROCHLORIDE 0.4 MG/ML
0.4 INJECTION, SOLUTION INTRAMUSCULAR; INTRAVENOUS; SUBCUTANEOUS
Status: DISCONTINUED | OUTPATIENT
Start: 2025-05-28 | End: 2025-05-28 | Stop reason: HOSPADM

## 2025-05-28 RX ORDER — METHOCARBAMOL 500 MG/1
1000 TABLET, FILM COATED ORAL 4 TIMES DAILY
Status: DISCONTINUED | OUTPATIENT
Start: 2025-05-28 | End: 2025-05-29

## 2025-05-28 RX ORDER — FENTANYL CITRATE 50 UG/ML
INJECTION, SOLUTION INTRAMUSCULAR; INTRAVENOUS PRN
Status: DISCONTINUED | OUTPATIENT
Start: 2025-05-28 | End: 2025-05-28

## 2025-05-28 RX ORDER — HYDROMORPHONE HCL IN WATER/PF 6 MG/30 ML
0.2 PATIENT CONTROLLED ANALGESIA SYRINGE INTRAVENOUS EVERY 5 MIN PRN
Status: DISCONTINUED | OUTPATIENT
Start: 2025-05-28 | End: 2025-05-28 | Stop reason: HOSPADM

## 2025-05-28 RX ORDER — FENTANYL CITRATE 50 UG/ML
25 INJECTION, SOLUTION INTRAMUSCULAR; INTRAVENOUS EVERY 5 MIN PRN
Status: DISCONTINUED | OUTPATIENT
Start: 2025-05-28 | End: 2025-05-28 | Stop reason: HOSPADM

## 2025-05-28 RX ORDER — DEXAMETHASONE SODIUM PHOSPHATE 4 MG/ML
INJECTION, SOLUTION INTRA-ARTICULAR; INTRALESIONAL; INTRAMUSCULAR; INTRAVENOUS; SOFT TISSUE PRN
Status: DISCONTINUED | OUTPATIENT
Start: 2025-05-28 | End: 2025-05-28

## 2025-05-28 RX ORDER — BUPIVACAINE HYDROCHLORIDE 2.5 MG/ML
INJECTION, SOLUTION EPIDURAL; INFILTRATION; INTRACAUDAL; PERINEURAL
Status: COMPLETED | OUTPATIENT
Start: 2025-05-28 | End: 2025-05-28

## 2025-05-28 RX ORDER — SODIUM CHLORIDE, SODIUM LACTATE, POTASSIUM CHLORIDE, CALCIUM CHLORIDE 600; 310; 30; 20 MG/100ML; MG/100ML; MG/100ML; MG/100ML
INJECTION, SOLUTION INTRAVENOUS CONTINUOUS PRN
Status: DISCONTINUED | OUTPATIENT
Start: 2025-05-28 | End: 2025-05-28

## 2025-05-28 RX ORDER — ONDANSETRON 2 MG/ML
4 INJECTION INTRAMUSCULAR; INTRAVENOUS EVERY 30 MIN PRN
Status: DISCONTINUED | OUTPATIENT
Start: 2025-05-28 | End: 2025-05-28 | Stop reason: HOSPADM

## 2025-05-28 RX ORDER — HYDROMORPHONE HCL IN WATER/PF 6 MG/30 ML
0.4 PATIENT CONTROLLED ANALGESIA SYRINGE INTRAVENOUS EVERY 5 MIN PRN
Status: DISCONTINUED | OUTPATIENT
Start: 2025-05-28 | End: 2025-05-28 | Stop reason: HOSPADM

## 2025-05-28 RX ORDER — ACETAMINOPHEN 325 MG/1
975 TABLET ORAL ONCE
Status: COMPLETED | OUTPATIENT
Start: 2025-05-28 | End: 2025-05-28

## 2025-05-28 RX ORDER — KETAMINE HYDROCHLORIDE 10 MG/ML
INJECTION INTRAMUSCULAR; INTRAVENOUS PRN
Status: DISCONTINUED | OUTPATIENT
Start: 2025-05-28 | End: 2025-05-28

## 2025-05-28 RX ORDER — SODIUM CHLORIDE, SODIUM LACTATE, POTASSIUM CHLORIDE, CALCIUM CHLORIDE 600; 310; 30; 20 MG/100ML; MG/100ML; MG/100ML; MG/100ML
INJECTION, SOLUTION INTRAVENOUS CONTINUOUS
Status: DISCONTINUED | OUTPATIENT
Start: 2025-05-28 | End: 2025-05-28 | Stop reason: HOSPADM

## 2025-05-28 RX ORDER — HYDRALAZINE HYDROCHLORIDE 20 MG/ML
2.5-5 INJECTION INTRAMUSCULAR; INTRAVENOUS EVERY 10 MIN PRN
Status: DISCONTINUED | OUTPATIENT
Start: 2025-05-28 | End: 2025-05-28 | Stop reason: HOSPADM

## 2025-05-28 RX ORDER — DEXAMETHASONE SODIUM PHOSPHATE 4 MG/ML
4 INJECTION, SOLUTION INTRA-ARTICULAR; INTRALESIONAL; INTRAMUSCULAR; INTRAVENOUS; SOFT TISSUE
Status: DISCONTINUED | OUTPATIENT
Start: 2025-05-28 | End: 2025-05-28 | Stop reason: HOSPADM

## 2025-05-28 RX ORDER — ENOXAPARIN SODIUM 100 MG/ML
40 INJECTION SUBCUTANEOUS
Status: COMPLETED | OUTPATIENT
Start: 2025-05-28 | End: 2025-05-28

## 2025-05-28 RX ORDER — GABAPENTIN 100 MG/1
100 CAPSULE ORAL 3 TIMES DAILY
Status: DISCONTINUED | OUTPATIENT
Start: 2025-05-28 | End: 2025-05-29

## 2025-05-28 RX ORDER — FLUMAZENIL 0.1 MG/ML
0.2 INJECTION, SOLUTION INTRAVENOUS
Status: DISCONTINUED | OUTPATIENT
Start: 2025-05-28 | End: 2025-05-28 | Stop reason: HOSPADM

## 2025-05-28 RX ORDER — ONDANSETRON 4 MG/1
4 TABLET, ORALLY DISINTEGRATING ORAL EVERY 6 HOURS PRN
Status: DISCONTINUED | OUTPATIENT
Start: 2025-05-28 | End: 2025-06-06 | Stop reason: HOSPADM

## 2025-05-28 RX ORDER — SODIUM CHLORIDE, SODIUM LACTATE, POTASSIUM CHLORIDE, CALCIUM CHLORIDE 600; 310; 30; 20 MG/100ML; MG/100ML; MG/100ML; MG/100ML
INJECTION, SOLUTION INTRAVENOUS CONTINUOUS
Status: DISCONTINUED | OUTPATIENT
Start: 2025-05-28 | End: 2025-05-28

## 2025-05-28 RX ORDER — MEPERIDINE HYDROCHLORIDE 25 MG/ML
12.5 INJECTION INTRAMUSCULAR; INTRAVENOUS; SUBCUTANEOUS EVERY 5 MIN PRN
Status: DISCONTINUED | OUTPATIENT
Start: 2025-05-28 | End: 2025-05-28 | Stop reason: HOSPADM

## 2025-05-28 RX ORDER — HYDROMORPHONE HYDROCHLORIDE 1 MG/ML
4 SOLUTION ORAL ONCE
Status: DISCONTINUED | OUTPATIENT
Start: 2025-05-28 | End: 2025-05-28

## 2025-05-28 RX ORDER — NALOXONE HYDROCHLORIDE 0.4 MG/ML
0.1 INJECTION, SOLUTION INTRAMUSCULAR; INTRAVENOUS; SUBCUTANEOUS
Status: DISCONTINUED | OUTPATIENT
Start: 2025-05-28 | End: 2025-05-28 | Stop reason: HOSPADM

## 2025-05-28 RX ORDER — DEXMEDETOMIDINE HYDROCHLORIDE 4 UG/ML
.1-1.2 INJECTION, SOLUTION INTRAVENOUS CONTINUOUS
Status: DISCONTINUED | OUTPATIENT
Start: 2025-05-28 | End: 2025-05-28

## 2025-05-28 RX ADMIN — Medication 10 MG: at 16:29

## 2025-05-28 RX ADMIN — MIDAZOLAM 1.5 MG: 1 INJECTION INTRAMUSCULAR; INTRAVENOUS at 10:46

## 2025-05-28 RX ADMIN — ONDANSETRON 4 MG: 2 INJECTION, SOLUTION INTRAMUSCULAR; INTRAVENOUS at 02:35

## 2025-05-28 RX ADMIN — SODIUM CHLORIDE, SODIUM LACTATE, POTASSIUM CHLORIDE, AND CALCIUM CHLORIDE: .6; .31; .03; .02 INJECTION, SOLUTION INTRAVENOUS at 16:45

## 2025-05-28 RX ADMIN — BUPIVACAINE HYDROCHLORIDE 40 ML: 2.5 INJECTION, SOLUTION EPIDURAL; INFILTRATION; INTRACAUDAL; PERINEURAL at 10:50

## 2025-05-28 RX ADMIN — ACETAMINOPHEN 1000 MG: 500 TABLET ORAL at 22:32

## 2025-05-28 RX ADMIN — BUPIVACAINE 20 ML: 13.3 INJECTION, SUSPENSION, LIPOSOMAL INFILTRATION at 10:50

## 2025-05-28 RX ADMIN — SODIUM CHLORIDE, SODIUM LACTATE, POTASSIUM CHLORIDE, AND CALCIUM CHLORIDE 500 ML: .6; .31; .03; .02 INJECTION, SOLUTION INTRAVENOUS at 19:26

## 2025-05-28 RX ADMIN — PHENYLEPHRINE HYDROCHLORIDE 200 MCG: 10 INJECTION INTRAVENOUS at 11:43

## 2025-05-28 RX ADMIN — Medication 20 MG: at 12:19

## 2025-05-28 RX ADMIN — METHADONE HYDROCHLORIDE 5 MG: 5 TABLET ORAL at 08:03

## 2025-05-28 RX ADMIN — ERTAPENEM SODIUM 1 G: 1 INJECTION, POWDER, LYOPHILIZED, FOR SOLUTION INTRAMUSCULAR; INTRAVENOUS at 10:47

## 2025-05-28 RX ADMIN — HYDROMORPHONE HYDROCHLORIDE 0.5 MG: 1 INJECTION, SOLUTION INTRAMUSCULAR; INTRAVENOUS; SUBCUTANEOUS at 17:58

## 2025-05-28 RX ADMIN — Medication 10 MG: at 14:26

## 2025-05-28 RX ADMIN — NEOMYCIN SULFATE 1000 MG: 500 TABLET ORAL at 02:36

## 2025-05-28 RX ADMIN — SODIUM CHLORIDE, SODIUM LACTATE, POTASSIUM CHLORIDE, AND CALCIUM CHLORIDE 500 ML: .6; .31; .03; .02 INJECTION, SOLUTION INTRAVENOUS at 20:49

## 2025-05-28 RX ADMIN — Medication 50 MG: at 13:15

## 2025-05-28 RX ADMIN — DEXMEDETOMIDINE HYDROCHLORIDE 0.2 MCG/KG/HR: 100 INJECTION, SOLUTION INTRAVENOUS at 12:00

## 2025-05-28 RX ADMIN — SODIUM CHLORIDE, SODIUM LACTATE, POTASSIUM CHLORIDE, AND CALCIUM CHLORIDE: .6; .31; .03; .02 INJECTION, SOLUTION INTRAVENOUS at 15:05

## 2025-05-28 RX ADMIN — SODIUM CHLORIDE, SODIUM LACTATE, POTASSIUM CHLORIDE, AND CALCIUM CHLORIDE: .6; .31; .03; .02 INJECTION, SOLUTION INTRAVENOUS at 15:46

## 2025-05-28 RX ADMIN — SODIUM CHLORIDE, SODIUM LACTATE, POTASSIUM CHLORIDE, AND CALCIUM CHLORIDE: .6; .31; .03; .02 INJECTION, SOLUTION INTRAVENOUS at 15:28

## 2025-05-28 RX ADMIN — ROCURONIUM BROMIDE 10 MCG/KG/MIN: 10 INJECTION INTRAVENOUS at 13:52

## 2025-05-28 RX ADMIN — Medication 100 MG: at 17:51

## 2025-05-28 RX ADMIN — METHYLPHENIDATE HYDROCHLORIDE 15 MG: 5 TABLET ORAL at 08:03

## 2025-05-28 RX ADMIN — Medication 50 MG: at 11:38

## 2025-05-28 RX ADMIN — SODIUM CHLORIDE, SODIUM LACTATE, POTASSIUM CHLORIDE, AND CALCIUM CHLORIDE: .6; .31; .03; .02 INJECTION, SOLUTION INTRAVENOUS at 11:45

## 2025-05-28 RX ADMIN — LORAZEPAM 1 MG: 1 TABLET ORAL at 08:03

## 2025-05-28 RX ADMIN — PANTOPRAZOLE SODIUM 40 MG: 40 TABLET, DELAYED RELEASE ORAL at 08:03

## 2025-05-28 RX ADMIN — METHOCARBAMOL 1000 MG: 500 TABLET ORAL at 22:32

## 2025-05-28 RX ADMIN — Medication 20 MG: at 13:47

## 2025-05-28 RX ADMIN — BUSPIRONE HYDROCHLORIDE 30 MG: 30 TABLET ORAL at 08:03

## 2025-05-28 RX ADMIN — Medication 10 MG: at 13:06

## 2025-05-28 RX ADMIN — Medication: at 20:23

## 2025-05-28 RX ADMIN — METRONIDAZOLE 500 MG: 500 TABLET ORAL at 02:35

## 2025-05-28 RX ADMIN — HYDROMORPHONE HYDROCHLORIDE 0.5 MG: 1 INJECTION, SOLUTION INTRAMUSCULAR; INTRAVENOUS; SUBCUTANEOUS at 16:40

## 2025-05-28 RX ADMIN — DEXAMETHASONE SODIUM PHOSPHATE 4 MG: 4 INJECTION, SOLUTION INTRAMUSCULAR; INTRAVENOUS at 11:36

## 2025-05-28 RX ADMIN — Medication 10 MG: at 14:20

## 2025-05-28 RX ADMIN — FENTANYL CITRATE 50 MCG: 50 INJECTION, SOLUTION INTRAMUSCULAR; INTRAVENOUS at 20:33

## 2025-05-28 RX ADMIN — GABAPENTIN 100 MG: 100 CAPSULE ORAL at 22:32

## 2025-05-28 RX ADMIN — PROPOFOL 150 MCG/KG/MIN: 10 INJECTION, EMULSION INTRAVENOUS at 11:45

## 2025-05-28 RX ADMIN — FENTANYL CITRATE 50 MCG: 50 INJECTION INTRAMUSCULAR; INTRAVENOUS at 13:35

## 2025-05-28 RX ADMIN — FENTANYL CITRATE 50 MCG: 50 INJECTION, SOLUTION INTRAMUSCULAR; INTRAVENOUS at 10:46

## 2025-05-28 RX ADMIN — FENTANYL CITRATE 50 MCG: 50 INJECTION INTRAMUSCULAR; INTRAVENOUS at 12:22

## 2025-05-28 RX ADMIN — FENTANYL CITRATE 50 MCG: 50 INJECTION, SOLUTION INTRAMUSCULAR; INTRAVENOUS at 20:57

## 2025-05-28 RX ADMIN — PROPOFOL 90 MG: 10 INJECTION, EMULSION INTRAVENOUS at 11:37

## 2025-05-28 RX ADMIN — SODIUM CHLORIDE, SODIUM LACTATE, POTASSIUM CHLORIDE, AND CALCIUM CHLORIDE: .6; .31; .03; .02 INJECTION, SOLUTION INTRAVENOUS at 18:55

## 2025-05-28 RX ADMIN — PROPRANOLOL HYDROCHLORIDE 10 MG: 10 TABLET ORAL at 08:03

## 2025-05-28 RX ADMIN — Medication 30 MG: at 12:49

## 2025-05-28 RX ADMIN — Medication 10 MG: at 15:26

## 2025-05-28 RX ADMIN — Medication 30 MG: at 12:33

## 2025-05-28 RX ADMIN — ACETAMINOPHEN 975 MG: 325 TABLET ORAL at 10:59

## 2025-05-28 RX ADMIN — ENOXAPARIN SODIUM 40 MG: 40 INJECTION SUBCUTANEOUS at 10:49

## 2025-05-28 RX ADMIN — FENTANYL CITRATE 100 MCG: 50 INJECTION INTRAMUSCULAR; INTRAVENOUS at 11:36

## 2025-05-28 RX ADMIN — SODIUM CHLORIDE, SODIUM LACTATE, POTASSIUM CHLORIDE, AND CALCIUM CHLORIDE: .6; .31; .03; .02 INJECTION, SOLUTION INTRAVENOUS at 14:15

## 2025-05-28 RX ADMIN — SODIUM CHLORIDE, SODIUM LACTATE, POTASSIUM CHLORIDE, AND CALCIUM CHLORIDE: .6; .31; .03; .02 INJECTION, SOLUTION INTRAVENOUS at 11:19

## 2025-05-28 RX ADMIN — Medication 20 MG: at 12:00

## 2025-05-28 RX ADMIN — Medication 10 MG: at 12:00

## 2025-05-28 RX ADMIN — TOPIRAMATE 50 MG: 50 TABLET, FILM COATED ORAL at 08:03

## 2025-05-28 RX ADMIN — ONDANSETRON 4 MG: 2 INJECTION INTRAMUSCULAR; INTRAVENOUS at 17:45

## 2025-05-28 RX ADMIN — SODIUM CHLORIDE, SODIUM LACTATE, POTASSIUM CHLORIDE, AND CALCIUM CHLORIDE: .6; .31; .03; .02 INJECTION, SOLUTION INTRAVENOUS at 22:22

## 2025-05-28 ASSESSMENT — ACTIVITIES OF DAILY LIVING (ADL)
ADLS_ACUITY_SCORE: 50
ADLS_ACUITY_SCORE: 49
ADLS_ACUITY_SCORE: 43
ADLS_ACUITY_SCORE: 50
ADLS_ACUITY_SCORE: 49
ADLS_ACUITY_SCORE: 43
ADLS_ACUITY_SCORE: 49
ADLS_ACUITY_SCORE: 50
ADLS_ACUITY_SCORE: 52
ADLS_ACUITY_SCORE: 43
ADLS_ACUITY_SCORE: 50
ADLS_ACUITY_SCORE: 49
ADLS_ACUITY_SCORE: 43
ADLS_ACUITY_SCORE: 43

## 2025-05-28 NOTE — OR NURSING
Bilateral  block performed without complications.  VSS.  Pt tolerated well.  Will continue to monitor. Lyn Martinez RN on 5/28/2025 at 11:11 AM

## 2025-05-28 NOTE — PROVIDER NOTIFICATION
A.MD Sonja via [employer] at 0630 aware bowel prep not done but not concerned, pt to proceed with surgery.

## 2025-05-28 NOTE — PROGRESS NOTES
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 Pain adequately controlled. No new concerns.     Objective:   Vitals:    05/27/25 0907 05/27/25 1158 05/27/25 1705 05/27/25 1958   BP: 98/64 91/65 98/70 111/68   BP Location: Right arm Right arm Right arm Right arm   Pulse:  87 75 77   Resp: 16 16 16 16   Temp: 98  F (36.7  C) 98.2  F (36.8  C) 98  F (36.7  C) 97.9  F (36.6  C)   TempSrc: Oral Oral Oral Oral   SpO2: 100% 98% 98% 98%   Weight:       Height:           General: NAD, appears comfortable in bed  CV: Well-perfused  Resp: Breathing comfortably on room air  Abdomen: Soft, nontender, nondistended; ostomy nontender with brown partially formed stool output     I/O last 3 completed shifts:  In: 747 [P.O.:720; I.V.:25; IV Piggyback:2]  Out: -     Intake/Output (24 Hrs // Since MN)   mL // NR    New labs/imaging-   Latest Reference Range & Units 05/28/25 02:36   Sodium 135 - 145 mmol/L 140   Potassium 3.4 - 5.3 mmol/L 4.0   Chloride 98 - 107 mmol/L 105   Carbon Dioxide (CO2) 22 - 29 mmol/L 25   Urea Nitrogen 6.0 - 20.0 mg/dL 28.3 (H)   Creatinine 0.51 - 0.95 mg/dL 0.80   GFR Estimate >60 mL/min/1.73m2 >90   Calcium 8.8 - 10.4 mg/dL 9.5   Anion Gap 7 - 15 mmol/L 10   Magnesium 1.7 - 2.3 mg/dL 1.9   Phosphorus 2.5 - 4.5 mg/dL 4.2   Glucose 70 - 99 mg/dL 105 (H)   (H): Data is abnormally high    Assessment: Marizol Granados is a 40 year old female with stage IIIB low grade peritoneal carcinoma admitted to observation for suspected peristomal skin infection and decreased PO intake, planning surgery for end colostomy and loop ileostomy takedown today on 5/28.      # Stoma site pain   # Suspected peristomal skin infection, resolved  - s/p WOC consult, appreciate recs   - Tolerating minimal PO,  currently on TPN in anticipation upcoming end colostomy creation  - TPN at goal  - Supplemental shakes, other PO intake as tolerated by patient      # Stage IIIB low grade peritoneal carcinoma  # Rectovaginal fistula  # Neurogenic constipation  - s/p 5C Carbo/Taxol, trial of letrozole; CT 3/21/25 with no evidence of disease  - Postoperative course complicated by rectovaginal fistula with neurogenic constipation requiring extensive disimpaction in the OR   - TPN initiated for nutrition as mentioned above given minimal PO in preparation for surgery   - Follows with CRS, plans for end colostomy with intersphincteric proctectomy, loop takedown  - plan for OR for the above today     # Yeast vaginitis  - Pt reporting new vaginal itching, multiplex positive for yeast, now s/p diflucan x1 > ongoing vulvar symptoms, added Monistat, will continue evaluating sx prn      # Urinary retention   # Hx frequent UTIs   # Cystitis on CT  - CT with evidence of cystitis, redemonstrated from 3/21 CT   - Has hx frequent UTIs, manages retention with CIC due to inability to void   - Last treated PTA for UTI 2/25 with cipro, culture resulted with pansensitive staph aureus   - Followed by Dr. Moseley in urology, last seen in April for cysto with no structural cause to explain UTIs, consideration of gentamicin irrigations with plan to follow up on 6/5  - c/f colonization in the setting of frequent self catheterization, asymptomatic on presentation though limited sensation post surgery and does not feel sx of UTI per patient  - UA with +LE, +nitrites collected post CTX; urine culture klebsiella.  - s/p 6d ceftriaxone, discontinued yesterday given no ongoing evidence of infection   - Afebrile overnight, continue to monitor  - Continue CIC      # FAHAD, resolved   - Cr slightly elevated compared to previous, on arrival to ED at 1.02   - Now resolved, Cr most recently 0.8     # Hx DVT/PE - chronic anticoagulation  - PTA Eliquis- held in anticipation of  OR trip 5/28, will resume postoperatively   - Plan for indefinite anti-coagulation given long history of clotting. See Hematology note from 6/14/2024.      # MDD/SHIVAM  # Insomnia   # RLS   - PTA scheduled Buspar, Pristiq, gabapentin, Seroquel, ramelteon   - PTA prn Trazodone, Ambien   - Currently on hydroxyzine and ativan prn per palliative   - Continue to monitor mood closely   - Patient reported concern for malabsorption of Pristiq, consider transitioning to medication with IR form available, per patient plan for discussion of med changes with outpatient psychiatrist and will defer additional med changes at this time      # Hx migraines  - PTA Topamax, propranolol     # Anorectal pain  # Cancer related fatigue   - Followed by palliative outpatient with Dr. Norton, last seen 4/24; inpatient team consulted, appreciate recs   - PTA Ritalin for fatigue, pain management as described above   - Was on Dilaudid, Butrans patch, gabapentin PTA  - Seen by inpatient palliative team, appreciate recs, currently on po dilaudid, methadone BID, Robaxin, gabapentin, and tylenol prn    Bharti Rich DO  [facility]  Gynecology Oncology Resident, PGY-2  05/28/2025 5:37 AM     To reach the GYNECOLOGY ONCOLOGY team for this patient, please page [phone] or search Gynecologic Oncology Resident Group on Vocera.     To OR this morning with Dr. Maciel for combined procedure.   Lee Samuel MD

## 2025-05-28 NOTE — ANESTHESIA PROCEDURE NOTES
Airway       Patient location during procedure: OR       Procedure Start/Stop Times: 5/28/2025 11:41 AM  Staff -        CRNA: Morgan Gee APRN CRNA       Performed By: CRNA  Consent for Airway        Urgency: elective  Indications and Patient Condition       Indications for airway management: freedom-procedural       Induction type:intravenous       Mask difficulty assessment: 2 - vent by mask + OA or adjuvant +/- NMBA    Final Airway Details       Final airway type: endotracheal airway       Successful airway: ETT - single and Oral  Endotracheal Airway Details        ETT size (mm): 7.0       Cuffed: yes       Successful intubation technique: direct laryngoscopy       DL Blade Type: Meza 2       Grade View of Cords: 1       Adjucts: stylet       Position: Right       Measured from: lips       Secured at (cm): 21       Bite block used: None    Post intubation assessment        Placement verified by: capnometry and equal breath sounds        Number of attempts at approach: 1       Number of other approaches attempted: 0       Secured with: tape       Ease of procedure: easy       Dentition: Intact and Unchanged    Medication(s) Administered   Medication Administration Time: 5/28/2025 11:41 AM
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
## 2025-05-28 NOTE — PROGRESS NOTES
Care Management Follow Up    Length of Stay (days): 8    Expected Discharge Date: 06/02/2025     Concerns to be Addressed:       Patient plan of care discussed at interdisciplinary rounds: Yes    Anticipated Discharge Disposition:  Home     Anticipated Discharge Services:  Skilled nursing   Anticipated Discharge DME:  TBD     Patient/family educated on Medicare website which has current facility and service quality ratings:  yes   Education Provided on the Discharge Plan:  pending   Patient/Family in Agreement with the Plan:  PATRICA    Referrals Placed by CM/SW:    Private pay costs discussed: parking     Discussed  Partnership in Safe Discharge Planning  document with patient/family: No     Handoff Completed: No, handoff not indicated or clinically appropriate    Additional Information:  RNCC able to provide Pt with a 7 day parking pass and advised Pt to have her car removed from [facility] to avoid incurring additional expenses. RNCC explained Pt was miss informed as the hospital does not pay for patient parking or transportation.     Pt was very upset and stated she is not able to pay for parking. Pt's mother at bedside, also very upset, and stated they are not able to get the car out of [facility] till Friday. RNCC explained 7 days is the most the hospital can comp for them.    Pt also completed a [place] [employer] application, requested help submitting application. Pt requires a letter of recommendation. RNCC will primary MD for assistance with letter of recommendation.     Care management will continue to follow and support safe discharge planning as needed.     Next Steps:   [] HC confirmed, orders placed.  [] Confirm letter of recommendation is completed   [] Follow for dispo needs    ANKUR Santiago  Care Management Department  Covering 5A: [id number] & 5C: [phone] (non-BMT)   Phone: [phone]  Teams  Vocera: weekdays 8:00 am - 4:30 pm.   See Vocera Care Team for off-day coverage

## 2025-05-28 NOTE — PLAN OF CARE
Goal Outcome Evaluation:  Marizol A&Ox4 this am.  C/O buttock pain with rating of 4.  UAL in room.  Am meds given, TPN completed then to OR.  Declined skin/ostomy assessment this am.  Pt nervous about up coming surgeries. Mother here this am  Having several procedures done:         Procedure : Procedure(s):  bilateral ureteral stent insertion  Cystostomy, insert tube suprapubic, combined  exploratory laparotomy, intersphincteric proctectomy, takedown of loop ileostomy, creation of end colostomy

## 2025-05-28 NOTE — ANESTHESIA CARE TRANSFER NOTE
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    Anesthesia Type:   General     Note:    Oropharynx: oropharynx clear of all foreign objects and spontaneously breathing  Level of Consciousness: awake and drowsy  Oxygen Supplementation: nasal cannula  Level of Supplemental Oxygen (L/min / FiO2): 2  Independent Airway: airway patency satisfactory and stable  Dentition: dentition unchanged  Vital Signs Stable: post-procedure vital signs reviewed and stable  Report to RN Given: handoff report given  Patient transferred to: PACU    Handoff Report: Identifed the Patient, Identified the Reponsible Provider, Reviewed the pertinent medical history, Discussed the surgical course, Reviewed Intra-OP anesthesia mangement and issues during anesthesia, Set expectations for post-procedure period and Allowed opportunity for questions and acknowledgement of understanding  Vitals:  Vitals Value Taken Time   BP 90/53 05/28/25 18:15   Temp     Pulse 85 05/28/25 18:15   Resp 10 05/28/25 18:15   SpO2 100 % 05/28/25 18:15   Vitals shown include unfiled device data.    Electronically Signed By: GERALDO Aguiar CRNA  May 28, 2025  6:16 PM

## 2025-05-28 NOTE — PLAN OF CARE
"Goal Outcome Evaluation:          [id number]     /68 (BP Location: Right arm)   Pulse 77   Temp 97.9  F (36.6  C) (Oral)   Resp 16   Ht 1.651 m (5' 5\")   Wt 62.1 kg (136 lb 12.8 oz)   LMP  (LMP Unknown)   SpO2 98%   BMI 22.76 kg/m          Reason for admission: 5/18 suspected peristomal skin infection   Activity: UAL  Pain: Perineal pain Managed by scheduled pain meds  Neuro: AOX4  Cardiac: Hypotensive within parameters  Respiratory: RA, no s/s of distress  GI/: Ileostomy self managed by pt. Self- caths for neurogenic bladder. Denies nausea  Diet: NPO at 0000 , cycled tpn, lipids  Lines: Port infusing   Wounds: No new skin issues  Labs/imaging: Reviewed.  Consults: WOC  No acute changes this shift.     Plan: End colostomy with intersphincteric proctectomy, loop takedown today      Continue to monitor and follow POC                     "

## 2025-05-28 NOTE — PROGRESS NOTES
Care Management Follow Up    Length of Stay (days): 8    Expected Discharge Date: 06/02/2025     Concerns to be Addressed:     letter for funds  Patient plan of care discussed at interdisciplinary rounds: Yes    Additional Information:  MALDONADO was requested to assist pt with a letter for a mikey for rent assistance. MALDONADO reviewed chart and application; MALDONADO completed letter request and emailed entire application, per directions, to [email or web address].    MALDONADO placed original copy of application and letter in pt's room; pt was not there as she was in OR. MALDONADO placed copy of application in pt's paper chart.    Next Steps: No other SW needs identified at this time.         Mariella Mojica, [facility] Bay Medical CenterSW  Acute Care - Float Coverage  Available via [employer]
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
## 2025-05-28 NOTE — BRIEF OP NOTE
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]  Post-operative diagnosis Same as pre-operative diagnosis    Procedure: Cystoscopy with Bilateral Ureteral Stent Insertion, Bilateral - Urethra  Exploratory Laparotomy, Intersphincteric Proctectomy, Takedown of Loop Ileostomy, Creation of End Colostomy, Takedown of Rectovaginal Fistula, Cystoscopy, Appendectomy, N/A - Abdomen    Surgeon: Surgeons and Role:  Panel 1:     * Horace Young MD - Primary     * Gilberto Melchor MD - Resident - Assisting     * Eliud Angelo MD - Fellow - Assisting  Panel 2:     * Gerson Carpenter MD - Primary     * Jessenia Sosa MD - Assisting     * Con Dennis MD - Resident - Assisting     * Mary Kay Banuelos MD - Resident - Assisting     * Zuleyma Hercules MD - Resident - Assisting     * Giacomo Baron MD - Fellow - Assisting     * Foster Grider MD - Fellow - Assisting  Anesthesia: General   Estimated Blood Loss: 200 mL  IVF 5L  UOP: 150 mL    Drains: Charly-Fleming x1 in pelvis  Specimens:   ID Type Source Tests Collected by Time Destination   1 : Appendix Tissue Appendix SURGICAL PATHOLOGY EXAM Horace Young MD 5/28/2025  3:13 PM    2 : Ileostomy Tissue Other SURGICAL PATHOLOGY EXAM (Canceled) Gerson Carpenter MD 5/28/2025  3:26 PM    3 : Rectum, Anus, and Posterior Vagina Tissue Other SURGICAL PATHOLOGY EXAM (Canceled) Gerson Carpenter MD 5/28/2025  3:33 PM      Findings:   No metastatic disease. Significant scarring and adhesions in pelvis as expected. Completion intersphincteric proctectomy with takedown of rectovaginal fistula, primary repair of posterior vagina in two layers. Takedown of loop ileostomy with side to side functional end to end anastomosis. End descending colostomy matured  in left upper quadrant at marked site. AQUILINO drain x1 in pelvis.  Complications: None.  Implants:  None      Gerson Carpenter MD  Division of Colon and Rectal Surgery  [facility]  [phone]

## 2025-05-28 NOTE — OP NOTE
OPERATIVE REPORT  May 28, 2025    PREOPERATIVE DIAGNOSIS:   Rectovaginal fistula  Loop ileostomy  Neurogenic bladder    POSTOPERATIVE DIAGNOSIS:  Rectovaginal fistula  Loop ileostomy  Neurogenic bladder    PROCEDURES PERFORMED:   Cystourethroscopy  Placement of bilateral ureteral stents (5F open-ended catheters)    STAFF SURGEON: Horace Young MD  FELLOW: Eliud Angelo MD  RESIDENT(S): Gilberto Melchor MD    ANESTHESIA: General  ESTIMATED BLOOD LOSS: Minimal for urologic portion  COMPLICATIONS: None  SPECIMEN: None    SIGNIFICANT FINDINGS:   Unremarkable urethra and bladder  Bilateral ureteral stents (5F open-ended catheters) placed without incident    BRIEF OPERATIVE INDICATIONS: Marizol Granados is a(n) 40 year old female planned for surgery today with the colorectal surgery and gynecologic oncology services. She has a history of urinary retention following an extensive pelvic surgery for a gynecologic malignancy, which has since been managed with intermittent catheterization. Prior to surgery, we discussed with the patient in the preoperative area the possibility of having a suprapubic catheter placed as a reversible but less cumbersome option for bladder management. She expressed a strong desire to avoid an indwelling catheter, therefore we opted to forgo suprapubic tube placement. We did discuss placing bilateral ureteral stents, which had been requested by her surgical services to aid in ureteral identification. All risks, benefits, and alternatives were discussed with the patient. All questions were answered and informed consent was obtained.    DESCRIPTION OF PROCEDURE:  The patient was transported to the operating room and placed supine on the table. After adequate anesthesia was induced, the patient was placed in lithotomy and prepped and draped in the usual sterile fashion. A timeout was taken to confirm correct patient, procedure and laterality. Pre-operative IV antibiotics were administered.    A 22-Yi  rigid cystoscope was inserted into a well-lubricated urethra. The urethra was unremarkable, without stricture. A cystoscopic evaluation demonstration demonstrated no abnormal findings. The bilateral ureteral orifices were in orthotopic position.    The left ureteral orifice was cannulated with a 0.038-inch hydrophilic tip sensor wire, which was advanced up to the kidney with tactile feedback. Over the wire, a 5-Tajik open-ended ureteral catheter was advanced to 20-25 cm. There was no resistance with passage of the wire or catheter.    The same procedure was repeated on the right side. There was again no resistance with passage of the wire or catheter. The Sensor wire was removed.    The cystoscope was disassembled, emptying the bladder while leaving the bilateral ureteral catheters in place. The sheath was removed over the catheters via Seldinger technique.    A 16-Tajik Cat catheter was placed into the bladder and the balloon was filled with 10 mL of sterile water. Using 14-gauge angiocatheters, the two ureteral catheters were internalized into the Cat catheter. This was connected to gravity drainage. The ureteral catheters were secured to the Cat catheter using 2-0 Silk ties.    The patient tolerated the urologic portion of the procedure well and the case was turned over to the primary surgical service. They will inform us of any concern for ureteral involvement, otherwise the ureteral catheters can be removed at their discretion.    Eliud Angelo MD  Genitourinary Reconstructive Surgery Fellow

## 2025-05-28 NOTE — ANESTHESIA PROCEDURE NOTES
"TAP Procedure Note    Pre-Procedure   Staff -        Anesthesiologist:  José Miguel Martell MD       Resident/Fellow: Aryan Blue MD       Performed By: resident       Location: pre-op       Pre-Anesthestic Checklist: patient identified, IV checked, site marked, risks and benefits discussed, informed consent, monitors and equipment checked, pre-op evaluation, at physician/surgeon's request and post-op pain management  Timeout:       Correct Patient: Yes        Correct Procedure: Yes        Correct Site: Yes        Correct Position: Yes        Correct Laterality: Yes        Site Marked: Yes  Procedure Documentation  Procedure: TAP         Diagnosis: POST OP PAIN CONTROL       Laterality: bilateral       Patient Position: supine       Skin prep: Chloraprep       Needle Type: insulated       Needle Gauge: 21.        Needle Length (Inches): 3.13        Ultrasound guided       1. Ultrasound was used to identify targeted nerve, plexus, vascular marker, or fascial plane and place a needle adjacent to it in real-time.       2. Ultrasound was used to visualize the spread of anesthetic in close proximity to the above referenced structure.       3. A permanent image is entered into the patient's record.       4. The visualized anatomic structures appeared normal.       5. There were no apparent abnormal pathologic findings.    Assessment/Narrative         The placement was negative for: blood aspirated, painful injection and site bleeding       Paresthesias: No.       Insertion/Infusion Method: Single Shot       Complications: none    Medication(s) Administered   Bupivacaine 0.25% PF (Infiltration) - Infiltration   40 mL - 5/28/2025 10:50:00 AM  Bupivacaine liposome (Exparel) 1.3% LA inj susp (Infiltration) - Infiltration   20 mL - 5/28/2025 10:50:00 AM    FOR [facility] ([facility] ONLY:   Pain Team Contact information: please page the Pain Team Via [employer]. Search \"Pain\". During daytime hours, please page the attending " first. At night please page the resident first.

## 2025-05-28 NOTE — OP NOTE
[facility] Colorectal Surgery Operative Report  May 28, 2025    PREOPERATIVE DIAGNOSIS:  1. Rectovaginal fistula    2. Primary low grade serous adenocarcinoma of ovary   3. History of DVT on anticoagulation  4. Anxiety  5. Depression  6. Restless Leg Syndrome  7. Malnutrition on TPN    POSTOPERATIVE DIAGNOSIS:   1. Rectovaginal fistula    2. Primary low grade serous adenocarcinoma of ovary   3. History of DVT on anticoagulation  4. Anxiety  5. Depression  6. Restless Leg Syndrome  7. Malnutrition on TPN    PROCEDURE:  1. Exploratory laparotomy  2. Intersphincteric proctectomy  3. Takedown of rectovaginal fistula  4. Takedown of loop ileostomy  5. Endo colostomy creation  6. Appendectomy  7. Cystoscopy (Dr. Sosa's op note)  8. Modifier 22: This case was extremely difficult due to extensive pelvic scar tissue and prior surgical history that made retraction, exposure, and identification of appropriate tissue plains difficult. This case was at least 50% more difficult and took 2 hours longer than typical for a similar case.       ANESTHESIA: General endotracheal anesthesia plus local anesthesia.    SURGEONS:  Gerson Carpenter M.D.; Jessenia Sosa MD    ASSISTANT(S): Foster Grider, CRS Fellow; Giacomo Baron Gyn Onc Fellow: Mary Kay Banuelos, PGY3    INDICATIONS FOR PROCEDURE  Marizol Granados is a 40 year old female who presently presented emergently with a large ovarian serous cancer. She underwent a large pelvic operation with a proctectomy, with a colorectal anastomosis and a diverting loop ileostomy, complicated by a persistent anastomotic-vaginal fistula. She also has severe neurogenic symptoms with mucus accumulation and neurogenic bladder. Thus, I recommended a completion proctectomy with end colostomy and diverting loop ileostomy. I thoroughly discussed the risks, benefits, and alternatives of operative treatment with the patient and she agreed to proceed.    General risks related to abdominal surgery were  "reviewed with the patient. These include, but are not limited to, death, myocardial infarction, pneumonia, urinary tract infection, deep venous thrombosis with or without pulmonary embolus, abdominal infection from bowel injury or abscess, fistula, anastomotic leak that may require reoperation and a stoma, ureteral injury, bowel obstruction, wound infection, and bleeding.    OPERATIVE PROCEDURE:  After obtaining informed consent, the patient was brought to the operating room and placed in the lithotomy position. Appropriate preoperative mechanical and chemical deep venous thrombosis prophylaxis, as well as preoperative prophylactic parenteral antibiotics were given. General endotracheal anesthesia was gently induced. Bilateral lower extremity pneumatic compression devices were applied and all pressure points were cushioned. A Cat catheter was inserted without difficulty. The abdomen was then preped and draped in the standard sterile fashion. After a \"time-out\" was performed, Dr. Linares performed cystoscopy with bilateral ureteral stent placement. Please see his operative report. The peritoneal cavity was entered through a vertical midline incision .    The abdomen was completely inspected, without evidence of metastatic disease. We ran the small bowel in its entirety. The loop ileostomy was takedown with electrocautery at the mucocutaneous junction, and the enteral defect was temporarily closed with running 20 silk. The Large Jonas was placed as was the speedtrack device and small bowel packed into the left upper quadrant.    We began with a lateral to medical dissection along the Line of Toldt to mobilize the sigmoid colon. Notably, both ureteral stents with ureters were identified and protected the entire case. We then completed a medial to lateral dissection entering the TME plane over the sacral promontory. We identified a location at the distal descending colon suitable for a colostomy. A mesenteric window " was made and the bowel stapled with the MELISSA 75 mm blue load. The corresponding mesentery down to the TME place was then ligated with the impact ligasure. Our dissection then proceeded down along the TME plane. Notably, the plane was quite distorted and scarred from prior surgery. We carefully proceeded circumferentially down to the colorectal anastomosis-vaginal fistula complex. Anteriorly, we took great care not to injure the bladder and the remainder of the vagina. A cystoscopy at the end of the case confirmed the safety of the bladder.     We continued our dissection down to the pelvic floor from above, and then proceeded with the perineal dissection. A circular incision was made at the intersphincteric groove, and the lone star device was placed for retraction. We then proceeded superiorly along the intersphincteric plane meeting our abdominal dissection posteriorly, then carefully carrying this along the sides to the anterior where it was carefully dissection free from the vagina. Eventually, the specimen was freed and removed from the perineum and sent to pathology for permanent evaluation. The vagina was then repaired in two layers, 3-0 vicryl at the mucosa, and 3-0 PDS at the serosa satisfactorily. The perineal wound was then irrigated and closed in multiple layers with 2-0 vicryl, then 3-0 vicryl, then 3-0 Monocryl at the skin.    From above, we then performed an appendectomy to avoid any future diagnostic confusion. A window was made at the base of the appendix, and the appendix was stapled with the MELISSA 75 mm blue load, corresponding mesentery ligated with the impact ligasure. A 19F Andrés drain was positioned in the pelvis.      The terminal ileum ends were aligned, making sure to not incorporate mesentery or adjacent tissues, and a stapled side-to-side functional end-to-end ileo-ileal anastomosis was made with a MELISSA 75 surgical stapler. The anastomosis was evaluated through the common enterotomy and no  bleeding was visualized. After this, a TA 90 surgical stapler was used to transect the remaining colon and terminal ileum, making sure the MELISSA staple lines were not aligned. The specimen, including the terminal ileum, appendix, ascending colon and proximal transverse colon were sent to pathology. The anastomosis appeared to be well vascularized, widely patent, and without tension or torsion. Areas of bleeding at the anastomosis were reinforced with 3-0 PDS.    Peritoneal lavage was performed using 2 L of water. The abdomen was inspected for hemostasis. The fascia was closed using 0 PDS and the subcutaneous tissue was irrigated with water.  The skin was closed with skin staples. The wound was then covered. The ostomy was then matured in standard Brooked fashion with 3-0 vicryl.     Sterile dressings were applied as well as an ostomy appliance.  The patient tolerated the procedure well. I was scrubbed for all critical components of the operation. All sponge and needle counts were correct x 2 at the end of the procedure. An X ray at the end of the case confirmed no retained foreign bodies.    COMPLICATIONS: none.    ESTIMATED BLOOD LOSS: 200 mL.    REPLACEMENT:     - Crystalloid: 5000 mL.     - Colloid: 0mL.     - Blood products: 0.    URINE OUTPUT: 159 mL    SPECIMEN(S): terminal ileum, appendix, rectum, anus, posterior vagina.    OPERATIVE COUNT: Complete.    OPERATIVE FINDINGS:   No metastatic disease.   Significant scarring and adhesions in pelvis as expected.   Completion intersphincteric proctectomy with takedown of rectovaginal fistula, primary repair of posterior vagina in two layers.   Takedown of loop ileostomy with side to side functional end to end anastomosis.   End descending colostomy matured in left upper quadrant at marked site.   AQUILINO drain x1 in pelvis.     Gerson Carpenter MD  Division of Colon and Rectal Surgery  [facility]  [phone]         quadrant at marked site.   AQUILINO drain x1 in pelvis.     Gerson Carpenter MD  Division of Colon and Rectal Surgery  [facility]  [phone]

## 2025-05-28 NOTE — PROGRESS NOTES
Brief Progress Note    Presented to bedside for evening check-in.  Marizol reports that her day overall went fine.  She reports a multitude of emotions that it relates to her upcoming surgery tomorrow.  Feels like she has a good surgical team taking care of her, but does report some frustration due to reported possible miscommunication with Dr. Moseley in urology.  Is a little apprehensive about the recovery ahead of her, but is using her daughter's dance competition in late June to motivate her to recover as quickly as safely as possible.  Pain is the same, and she has no other physical complaints.  Made n.p.o. at midnight anticipation of surgery. Continue current plan of care.      Bharti Rich DO  [facility]  Gynecology Oncology Resident, PGY-2  05/27/2025 9:19 PM    To reach the GYNECOLOGY ONCOLOGY team for this patient, please page [phone] or search Gynecologic Oncology Resident Group on Vocera.

## 2025-05-28 NOTE — PLAN OF CARE
Goal Outcome Evaluation:      [id number]. VSS on room air; baseline soft BP. A&Ox4; tearful/anxious/sad about hospitalization and childcare. UAL in room, steady. Regular diet, low oral intake; Pt on TPN + lipids for nutritional support. Pain reported in perineum/buttocks/rectum and stoma and headache, managed with PO Dilaudid and Tylenol. Denies N/V. Ileostomy, loose brown output, independent in cares. Pt self-catheterizes d/t neurogenic bladder; thick/green vaginal discharge, brief worn/changed to support adequate hygiene. PRN PO Benadryl given for itching; Desitin and Micitin cream used. Port, HL, needle/dressing changed and now CDI. WOC following d/t stoma infection; other skin WNL. No electrolyte replacements or blood products administered today.

## 2025-05-29 ENCOUNTER — APPOINTMENT (OUTPATIENT)
Dept: PHYSICAL THERAPY | Facility: CLINIC | Age: 40
End: 2025-05-29
Attending: SURGERY
Payer: COMMERCIAL

## 2025-05-29 ENCOUNTER — APPOINTMENT (OUTPATIENT)
Dept: OCCUPATIONAL THERAPY | Facility: CLINIC | Age: 40
End: 2025-05-29
Attending: SURGERY
Payer: COMMERCIAL

## 2025-05-29 VITALS
RESPIRATION RATE: 20 BRPM | TEMPERATURE: 98.3 F | HEIGHT: 65 IN | OXYGEN SATURATION: 100 % | BODY MASS INDEX: 22.81 KG/M2 | SYSTOLIC BLOOD PRESSURE: 112 MMHG | WEIGHT: 136.9 LBS | DIASTOLIC BLOOD PRESSURE: 70 MMHG | HEART RATE: 106 BPM

## 2025-05-29 LAB
ANION GAP SERPL CALCULATED.3IONS-SCNC: 10 MMOL/L (ref 7–15)
BUN SERPL-MCNC: 17.8 MG/DL (ref 6–20)
CALCIUM SERPL-MCNC: 9.1 MG/DL (ref 8.8–10.4)
CHLORIDE SERPL-SCNC: 105 MMOL/L (ref 98–107)
CREAT SERPL-MCNC: 0.76 MG/DL (ref 0.51–0.95)
EGFRCR SERPLBLD CKD-EPI 2021: >90 ML/MIN/1.73M2
ERYTHROCYTE [DISTWIDTH] IN BLOOD BY AUTOMATED COUNT: 12.4 % (ref 10–15)
GLUCOSE SERPL-MCNC: 126 MG/DL (ref 70–99)
HCO3 SERPL-SCNC: 24 MMOL/L (ref 22–29)
HCT VFR BLD AUTO: 26.3 % (ref 35–47)
HGB BLD-MCNC: 8.7 G/DL (ref 11.7–15.7)
LACTATE SERPL-SCNC: 1.3 MMOL/L (ref 0.7–2)
MAGNESIUM SERPL-MCNC: 1.1 MG/DL (ref 1.7–2.3)
MAGNESIUM SERPL-MCNC: 2 MG/DL (ref 1.7–2.3)
MCH RBC QN AUTO: 31.6 PG (ref 26.5–33)
MCHC RBC AUTO-ENTMCNC: 33.1 G/DL (ref 31.5–36.5)
MCV RBC AUTO: 96 FL (ref 78–100)
PHOSPHATE SERPL-MCNC: 5.1 MG/DL (ref 2.5–4.5)
PLATELET # BLD AUTO: 157 10E3/UL (ref 150–450)
POTASSIUM SERPL-SCNC: 4 MMOL/L (ref 3.4–5.3)
RBC # BLD AUTO: 2.75 10E6/UL (ref 3.8–5.2)
SODIUM SERPL-SCNC: 139 MMOL/L (ref 135–145)
WBC # BLD AUTO: 12.1 10E3/UL (ref 4–11)

## 2025-05-29 PROCEDURE — 97530 THERAPEUTIC ACTIVITIES: CPT | Mod: GP

## 2025-05-29 PROCEDURE — 83735 ASSAY OF MAGNESIUM: CPT

## 2025-05-29 PROCEDURE — 250N000013 HC RX MED GY IP 250 OP 250 PS 637: Performed by: PHYSICIAN ASSISTANT

## 2025-05-29 PROCEDURE — 250N000009 HC RX 250: Performed by: SURGERY

## 2025-05-29 PROCEDURE — 97162 PT EVAL MOD COMPLEX 30 MIN: CPT | Mod: GP

## 2025-05-29 PROCEDURE — 250N000013 HC RX MED GY IP 250 OP 250 PS 637: Performed by: SURGERY

## 2025-05-29 PROCEDURE — 83605 ASSAY OF LACTIC ACID: CPT

## 2025-05-29 PROCEDURE — 84100 ASSAY OF PHOSPHORUS: CPT | Performed by: SURGERY

## 2025-05-29 PROCEDURE — 83735 ASSAY OF MAGNESIUM: CPT | Performed by: SURGERY

## 2025-05-29 PROCEDURE — 97165 OT EVAL LOW COMPLEX 30 MIN: CPT | Mod: GO

## 2025-05-29 PROCEDURE — 80048 BASIC METABOLIC PNL TOTAL CA: CPT | Performed by: SURGERY

## 2025-05-29 PROCEDURE — 120N000002 HC R&B MED SURG/OB UMMC

## 2025-05-29 PROCEDURE — 258N000003 HC RX IP 258 OP 636: Performed by: PHYSICIAN ASSISTANT

## 2025-05-29 PROCEDURE — 99233 SBSQ HOSP IP/OBS HIGH 50: CPT | Performed by: STUDENT IN AN ORGANIZED HEALTH CARE EDUCATION/TRAINING PROGRAM

## 2025-05-29 PROCEDURE — 250N000013 HC RX MED GY IP 250 OP 250 PS 637

## 2025-05-29 PROCEDURE — 97530 THERAPEUTIC ACTIVITIES: CPT | Mod: GO

## 2025-05-29 PROCEDURE — 250N000013 HC RX MED GY IP 250 OP 250 PS 637: Performed by: STUDENT IN AN ORGANIZED HEALTH CARE EDUCATION/TRAINING PROGRAM

## 2025-05-29 PROCEDURE — 99024 POSTOP FOLLOW-UP VISIT: CPT | Performed by: OBSTETRICS & GYNECOLOGY

## 2025-05-29 PROCEDURE — 250N000011 HC RX IP 250 OP 636: Performed by: STUDENT IN AN ORGANIZED HEALTH CARE EDUCATION/TRAINING PROGRAM

## 2025-05-29 PROCEDURE — B4185 PARENTERAL SOL 10 GM LIPIDS: HCPCS | Performed by: SURGERY

## 2025-05-29 PROCEDURE — 250N000011 HC RX IP 250 OP 636

## 2025-05-29 PROCEDURE — 258N000003 HC RX IP 258 OP 636: Performed by: SURGERY

## 2025-05-29 PROCEDURE — 250N000011 HC RX IP 250 OP 636: Performed by: SURGERY

## 2025-05-29 PROCEDURE — 82310 ASSAY OF CALCIUM: CPT | Performed by: SURGERY

## 2025-05-29 PROCEDURE — 99418 PROLNG IP/OBS E/M EA 15 MIN: CPT | Performed by: STUDENT IN AN ORGANIZED HEALTH CARE EDUCATION/TRAINING PROGRAM

## 2025-05-29 PROCEDURE — 85014 HEMATOCRIT: CPT | Performed by: SURGERY

## 2025-05-29 PROCEDURE — G0463 HOSPITAL OUTPT CLINIC VISIT: HCPCS

## 2025-05-29 PROCEDURE — 85018 HEMOGLOBIN: CPT | Performed by: SURGERY

## 2025-05-29 RX ORDER — METHYLPHENIDATE HYDROCHLORIDE 5 MG/1
15 TABLET ORAL EVERY MORNING
Refills: 0 | Status: DISCONTINUED | OUTPATIENT
Start: 2025-05-29 | End: 2025-06-06 | Stop reason: HOSPADM

## 2025-05-29 RX ORDER — MAGNESIUM SULFATE HEPTAHYDRATE 40 MG/ML
4 INJECTION, SOLUTION INTRAVENOUS ONCE
Status: COMPLETED | OUTPATIENT
Start: 2025-05-29 | End: 2025-05-29

## 2025-05-29 RX ORDER — ZOLPIDEM TARTRATE 5 MG/1
5 TABLET ORAL AT BEDTIME
Status: DISCONTINUED | OUTPATIENT
Start: 2025-05-29 | End: 2025-05-31

## 2025-05-29 RX ORDER — AMOXICILLIN 250 MG
2 CAPSULE ORAL 2 TIMES DAILY
Status: DISCONTINUED | OUTPATIENT
Start: 2025-05-29 | End: 2025-05-29

## 2025-05-29 RX ORDER — BUSPIRONE HYDROCHLORIDE 15 MG/1
30 TABLET ORAL 2 TIMES DAILY
Status: DISCONTINUED | OUTPATIENT
Start: 2025-05-29 | End: 2025-06-06 | Stop reason: HOSPADM

## 2025-05-29 RX ORDER — DEXTROSE MONOHYDRATE 100 MG/ML
INJECTION, SOLUTION INTRAVENOUS CONTINUOUS PRN
Status: DISCONTINUED | OUTPATIENT
Start: 2025-05-29 | End: 2025-06-06 | Stop reason: HOSPADM

## 2025-05-29 RX ORDER — QUETIAPINE FUMARATE 200 MG/1
200 TABLET, FILM COATED ORAL AT BEDTIME
Status: DISCONTINUED | OUTPATIENT
Start: 2025-05-29 | End: 2025-05-31

## 2025-05-29 RX ORDER — PROPRANOLOL HYDROCHLORIDE 10 MG/1
10 TABLET ORAL 2 TIMES DAILY
Status: DISCONTINUED | OUTPATIENT
Start: 2025-05-29 | End: 2025-06-06 | Stop reason: HOSPADM

## 2025-05-29 RX ORDER — HYDROXYZINE HYDROCHLORIDE 50 MG/1
50 TABLET, FILM COATED ORAL EVERY 6 HOURS PRN
Status: DISCONTINUED | OUTPATIENT
Start: 2025-05-29 | End: 2025-06-06 | Stop reason: HOSPADM

## 2025-05-29 RX ORDER — POLYETHYLENE GLYCOL 3350 17 G/17G
17 POWDER, FOR SOLUTION ORAL DAILY
Status: DISCONTINUED | OUTPATIENT
Start: 2025-05-29 | End: 2025-05-29

## 2025-05-29 RX ORDER — RAMELTEON 8 MG/1
8 TABLET ORAL AT BEDTIME
Status: DISCONTINUED | OUTPATIENT
Start: 2025-05-29 | End: 2025-05-31

## 2025-05-29 RX ORDER — DESVENLAFAXINE 100 MG/1
100 TABLET, EXTENDED RELEASE ORAL DAILY
Status: DISCONTINUED | OUTPATIENT
Start: 2025-05-29 | End: 2025-05-29

## 2025-05-29 RX ORDER — LORAZEPAM 0.5 MG/1
1 TABLET ORAL 2 TIMES DAILY
Status: DISCONTINUED | OUTPATIENT
Start: 2025-05-29 | End: 2025-06-06 | Stop reason: HOSPADM

## 2025-05-29 RX ORDER — TOPIRAMATE 50 MG/1
50 TABLET, FILM COATED ORAL 2 TIMES DAILY
Status: DISCONTINUED | OUTPATIENT
Start: 2025-05-29 | End: 2025-06-06 | Stop reason: HOSPADM

## 2025-05-29 RX ORDER — TOPIRAMATE 25 MG/1
25 TABLET, FILM COATED ORAL 2 TIMES DAILY
Status: DISCONTINUED | OUTPATIENT
Start: 2025-05-29 | End: 2025-05-29

## 2025-05-29 RX ORDER — METHYLPHENIDATE HYDROCHLORIDE 5 MG/1
10 TABLET ORAL
Refills: 0 | Status: DISCONTINUED | OUTPATIENT
Start: 2025-05-29 | End: 2025-06-06 | Stop reason: HOSPADM

## 2025-05-29 RX ORDER — GABAPENTIN 300 MG/1
300 CAPSULE ORAL AT BEDTIME
Status: DISCONTINUED | OUTPATIENT
Start: 2025-05-29 | End: 2025-05-30

## 2025-05-29 RX ORDER — AMOXICILLIN 250 MG
1 CAPSULE ORAL 2 TIMES DAILY
Status: DISCONTINUED | OUTPATIENT
Start: 2025-05-29 | End: 2025-05-29

## 2025-05-29 RX ORDER — METHADONE HYDROCHLORIDE 5 MG/5ML
5 SOLUTION ORAL 2 TIMES DAILY
Status: DISCONTINUED | OUTPATIENT
Start: 2025-05-29 | End: 2025-06-06 | Stop reason: HOSPADM

## 2025-05-29 RX ORDER — HYDROXYZINE HYDROCHLORIDE 25 MG/1
25 TABLET, FILM COATED ORAL EVERY 6 HOURS PRN
Status: DISCONTINUED | OUTPATIENT
Start: 2025-05-29 | End: 2025-06-06 | Stop reason: HOSPADM

## 2025-05-29 RX ORDER — PANTOPRAZOLE SODIUM 40 MG/1
40 TABLET, DELAYED RELEASE ORAL 2 TIMES DAILY
Status: DISCONTINUED | OUTPATIENT
Start: 2025-05-29 | End: 2025-06-06 | Stop reason: HOSPADM

## 2025-05-29 RX ORDER — TRAZODONE HYDROCHLORIDE 150 MG/1
300 TABLET ORAL AT BEDTIME
Status: DISCONTINUED | OUTPATIENT
Start: 2025-05-29 | End: 2025-05-31

## 2025-05-29 RX ORDER — SODIUM CHLORIDE, SODIUM LACTATE, POTASSIUM CHLORIDE, CALCIUM CHLORIDE 600; 310; 30; 20 MG/100ML; MG/100ML; MG/100ML; MG/100ML
INJECTION, SOLUTION INTRAVENOUS CONTINUOUS
Status: ACTIVE | OUTPATIENT
Start: 2025-05-29 | End: 2025-05-29

## 2025-05-29 RX ADMIN — METHADONE HYDROCHLORIDE 5 MG: 5 SOLUTION ORAL at 20:04

## 2025-05-29 RX ADMIN — BUSPIRONE HYDROCHLORIDE 30 MG: 15 TABLET ORAL at 20:04

## 2025-05-29 RX ADMIN — LORAZEPAM 1 MG: 0.5 TABLET ORAL at 11:09

## 2025-05-29 RX ADMIN — HYDROXYZINE HYDROCHLORIDE 25 MG: 25 TABLET, FILM COATED ORAL at 17:58

## 2025-05-29 RX ADMIN — ENOXAPARIN SODIUM 40 MG: 40 INJECTION SUBCUTANEOUS at 21:33

## 2025-05-29 RX ADMIN — GABAPENTIN 300 MG: 300 CAPSULE ORAL at 21:31

## 2025-05-29 RX ADMIN — METHYLPHENIDATE HYDROCHLORIDE 15 MG: 5 TABLET ORAL at 09:38

## 2025-05-29 RX ADMIN — DESVENLAFAXINE 150 MG: 50 TABLET, FILM COATED, EXTENDED RELEASE ORAL at 21:37

## 2025-05-29 RX ADMIN — SODIUM CHLORIDE, SODIUM LACTATE, POTASSIUM CHLORIDE, AND CALCIUM CHLORIDE: .6; .31; .03; .02 INJECTION, SOLUTION INTRAVENOUS at 17:58

## 2025-05-29 RX ADMIN — SODIUM CHLORIDE, SODIUM LACTATE, POTASSIUM CHLORIDE, AND CALCIUM CHLORIDE: .6; .31; .03; .02 INJECTION, SOLUTION INTRAVENOUS at 12:49

## 2025-05-29 RX ADMIN — TRAZODONE HYDROCHLORIDE 300 MG: 150 TABLET ORAL at 21:31

## 2025-05-29 RX ADMIN — METHOCARBAMOL 1000 MG: 500 TABLET ORAL at 08:22

## 2025-05-29 RX ADMIN — LORAZEPAM 1 MG: 0.5 TABLET ORAL at 20:04

## 2025-05-29 RX ADMIN — MAGNESIUM SULFATE HEPTAHYDRATE 4 G: 40 INJECTION, SOLUTION INTRAVENOUS at 16:19

## 2025-05-29 RX ADMIN — TOPIRAMATE 50 MG: 50 TABLET, FILM COATED ORAL at 11:09

## 2025-05-29 RX ADMIN — Medication: at 14:26

## 2025-05-29 RX ADMIN — METHADONE HYDROCHLORIDE 5 MG: 5 SOLUTION ORAL at 12:45

## 2025-05-29 RX ADMIN — PROPRANOLOL HYDROCHLORIDE 10 MG: 10 TABLET ORAL at 10:06

## 2025-05-29 RX ADMIN — ACETAMINOPHEN 1000 MG: 500 TABLET ORAL at 09:38

## 2025-05-29 RX ADMIN — ZOLPIDEM TARTRATE 5 MG: 5 TABLET ORAL at 21:31

## 2025-05-29 RX ADMIN — GABAPENTIN 100 MG: 100 CAPSULE ORAL at 08:22

## 2025-05-29 RX ADMIN — SODIUM CHLORIDE, SODIUM LACTATE, POTASSIUM CHLORIDE, AND CALCIUM CHLORIDE: .6; .31; .03; .02 INJECTION, SOLUTION INTRAVENOUS at 03:19

## 2025-05-29 RX ADMIN — PROPRANOLOL HYDROCHLORIDE 10 MG: 10 TABLET ORAL at 21:31

## 2025-05-29 RX ADMIN — ACETAMINOPHEN 1000 MG: 500 TABLET ORAL at 16:19

## 2025-05-29 RX ADMIN — OLIVE OIL AND SOYBEAN OIL 250 ML: 16; 4 INJECTION, EMULSION INTRAVENOUS at 22:00

## 2025-05-29 RX ADMIN — METHYLPHENIDATE HYDROCHLORIDE 10 MG: 5 TABLET ORAL at 14:31

## 2025-05-29 RX ADMIN — BUSPIRONE HYDROCHLORIDE 30 MG: 15 TABLET ORAL at 09:38

## 2025-05-29 RX ADMIN — ACETAMINOPHEN 1000 MG: 500 TABLET ORAL at 03:20

## 2025-05-29 RX ADMIN — TOPIRAMATE 50 MG: 50 TABLET, FILM COATED ORAL at 21:31

## 2025-05-29 RX ADMIN — PANTOPRAZOLE SODIUM 40 MG: 40 TABLET, DELAYED RELEASE ORAL at 21:30

## 2025-05-29 RX ADMIN — MAGNESIUM SULFATE HEPTAHYDRATE: 500 INJECTION, SOLUTION INTRAMUSCULAR; INTRAVENOUS at 21:42

## 2025-05-29 RX ADMIN — ONDANSETRON 4 MG: 2 INJECTION, SOLUTION INTRAMUSCULAR; INTRAVENOUS at 20:05

## 2025-05-29 RX ADMIN — PANTOPRAZOLE SODIUM 40 MG: 40 TABLET, DELAYED RELEASE ORAL at 11:09

## 2025-05-29 RX ADMIN — QUETIAPINE FUMARATE 200 MG: 200 TABLET ORAL at 21:31

## 2025-05-29 RX ADMIN — ACETAMINOPHEN 1000 MG: 500 TABLET ORAL at 21:31

## 2025-05-29 RX ADMIN — RAMELTEON 8 MG: 8 TABLET ORAL at 21:31

## 2025-05-29 ASSESSMENT — ACTIVITIES OF DAILY LIVING (ADL)
ADLS_ACUITY_SCORE: 52

## 2025-05-29 NOTE — PROGRESS NOTES
PALLIATIVE CARE SOCIAL WORK Progress Note   Location: [facility]      Support Visit    PCSW attempted follow up support visit this afternoon, pt working with other staff, possibly PT/OT    Pt's mother in room though appeared to be trying to rest on couch while patient busy with cares/therapies    Plan: PCSW will follow up again 5/30 for emotional support, continued assessment of patient coping and pt integration of non-pharm techniques for stress and anxiety management    Clinical Social Work Interventions:   Assessment of palliative specific issues      LFORIAN Knight, [facility]  [facility], Palliative Care  Securely message with the Vocera Web Console (learn more here) or  Text page via [employer] Paging/Directory

## 2025-05-29 NOTE — PLAN OF CARE
"Goal Outcome Evaluation:  /55 (BP Location: Right arm)   Pulse 104   Temp 98.8  F (37.1  C) (Oral)   Resp 18   Ht 1.651 m (5' 5\")   Wt 62.1 kg (136 lb 14.4 oz)   LMP  (LMP Unknown)   SpO2 (!) 88%   BMI 22.78 kg/m      POD-1  Activity: Assist x2, not oob this shift, d/t pain  Neuros:  A&OX4   Respiratory:on  R/A, no SOB or resp distress noted.   GI/: voiding via waite, patent and draining well. No bm noted, hasn,t pass gas yet.   Diet: clear liquid   Skin: midline incision intact with island dressing, urethral meatus surgical site intact with dressing and mesh panties, abd surgical site intact. AQUILINO drain intact.  Pain/nausea: not controlled with PCA dilaudid, schedule tylenol administer, MD updated.   Plan: pain mgmt, cont POC.                      "

## 2025-05-29 NOTE — PROGRESS NOTES
Brief Progress Note    Brief visit this evening to see Marizol in PACU.  She is drowsy, but reports that she is having some increased pain overall.  Vitally stable and no other acute concerns.  Appreciate colorectal service as primary team in the postoperative period, but Gyn onc team will continue to follow closely, and are available overnight with questions and concerns.  Will plan to see Marizol again in the morning.    Bharti Rich DO  [facility]  Gynecology Oncology Resident, PGY-2  05/28/2025 10:51 PM    To reach the GYNECOLOGY ONCOLOGY team for this patient, please page [phone] or search Gynecologic Oncology Resident Group on Vocera.

## 2025-05-29 NOTE — PROGRESS NOTES
"  PALLIATIVE CARE PROGRESS NOTE  [facility]     Patient Name: Marizol Granados  Date of Admission: 5/18/2025   Today the patient was seen for: symptom management     Recommendations & Counseling     MEDICAL MANAGEMENT: all med recs done for you  #Pain, acute on chronic cancer-related pain and post-surgical (5/28)  #Rectal pain due to rectovaginal fistula  -Hx rectovaginal fistula, ileostomy.  S/p XL-interspincteric proctectomy, loop ileostomy takedown and creation of end colostomy, takedown and repair of rectovaginal fistula, appendectomy, cystoscopy with bilateral ureteral stent insertion and removal on 5/28/2025.  -Noting a lot of pain generally speaking in her abdomen after the surgery. Concerned about moving around and getting up due to being told by the surgeon to \"lay down for 2 if not 3 days\". Also noting Dilaudid PCA is not helping and that a lot of her psych and sleep meds and other meds have not been restarted after the surgery which seem to be contributing to symptoms.   -A lot of her pain is likely non-opioid responsive but in the post-surgical setting it is reasonable to use higher doses initially then wean her down. A large reason for her to undergo the surgery was with the hope hoping that it would help with her pain long-term  -Spoke with and messaged CRC who is primary now. Changes/additions as below  Dilaudid PCA from 0.1 mg/hr continuous + 0.3 mg q10min prn with 1.9 mg/hr lock out --> STOP continuous rate, INCREASE bolus dose form 0.3 to 0.6 mg q10 min prn with 3.6 mg/hr lock out  Marizol has elements of a \"total pain\" patient. She may require a more prolonged off the PCA with a lot of emotional reassurance during the entire process.   There is also a concern for poorer absorption of medication so when transitioning from IV to orals, will need to consider this.  RESTART Methadone 5 mg BID  Agree with APAP 1000 mg po q6h earnest  STOP Robaxin 1000 mg QID " "earnest  Does not actually relax muscles, just sedates patients and makes them not care about pain as much. Due to many other psychotropic medications, this is the least helpful thing for her pain  Unable to take NSAIDs due to history of gastric bypass  PTA gabapentin 300mg at bedtime    #Anxiety, acute on chronic  #Insomnia   #Polypharmacy  -Anxiety managed outpatient by psychiatry through [facility]. Concern for polypharmacy given multiple sedating medications (ativan, seroquel, trazodone and ambien). Appears Marizol has been on this regimen for some time and has no signs of oversedation.  -Discussed decreasing or discontinuing PTA Ritalin (started for cancer fatigue) as this may be contributing to anxiety. Patient states she would like to keep it for now, has not felt it has made her anxiety worse  Appreciate Palliative social work support  Continue to encourage ambulation and getting outside  Declined Psych consult 5/22  RESTART PO hydroxyzine 25-50mg q6h PRN anxiety or pain adjuvant  Agree with STOPPING/not reordering Ativan 1 mg po daily prn  PTA medications:  Ritalin 15mg AM, 10MG afternoon (per outpatient Palliative)  Gabapentin 300 mg bedtime (per outpatient Palliative)  Buspar 30mg BID  Pristiq 150 mg daily  Ativan 1mg PO BID scheduled  Propanolol 10mg BID  Seroquel 200mg bedtime  Ramelteon 8mg bedtime  Topamax 50mg BID  Trazadone 300 mg prn bedtime  Ambien 5mg bedtime earnest (ordered for prn but she takes it scheduled)     PSYCHOSOCIAL/SPIRITUAL:  Family - supported by  dtr Ana (11) son Donovan (10); mom Atif, father recently passed in 2024 (very close with her father, may have viewpoints about her mother having firmer boundaries which she may view as \"conflict\")  Friends   [place] community: [facility]     Palliative Care will continue to follow.    Total time spent was 80 minutes regarding support and symptom control on the date of the encounter. These recommendations were given to the primary team via this note/via " [employer].    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 Most recent imaging in March 2025 showed no evidence of disease. She is being followed by CRS for rectovaginal fistula and coordinating surgery for takedown of loop ileostomy and creation of end colostomy. Follows palliative care outpatient for pain. Our team was consulted to help with pain and anxiety.      Interval History:     Multidisciplinary collaboration:  Reviewed notes from Gyn/onc, CRC, nursing    Notable medications:  APAP 975 mg q6h earnest   Methadone 5mg BID (started 5/22 PM, stopped 5/28, restarted 5/29)  Gabapentin 300mg at bedtime  Dilaudid PCA 0.3 mg q10 min prn --> 7.5 mg over 8 hours    Patient/family narrative  Seen and examined at bedside. Mother Atif present. Spent a lot of time coordinating cares between her and CRC team. Restarted a lot of psych meds which were not restarted post-op and also Protonix BID.     Marizol had questions about when to restart Eliquis. Deferred to CRC.    Marizol also notes that nursing staff today keep wanting her to get up but her post-op pain is intolerable and she dislikes how forceful they are being. Spent a lot of time helping her process this.     She also notes that the staff surgeon told her that she should lay down for 2-3 days after the surgery. Again, deferred to CRC.         Physical Exam:   Temp:  [98  F (36.7  C)-98.8  F (37.1  C)] 98.4  F (36.9  C)  Pulse:  [] 118  Resp:  [0-20] 18  BP: ()/(53-84) 102/62  SpO2:  [88 %-100 %] 92 %  136 lbs 14.4 oz    Physical Exam  General: laying in bed, NAD but tearful at times which resolves quickly  HEENT: normocephalic/atraumatic  Lungs:  non labored  GI: reported post-op incisions and ostomy and drain  : Cat in place  Neuro: alert and oriented x4, fluent speech, moving all extremities  Psych: anxious, normal affect      Data Reviewed:     Imaging  XR ABDOMEN PORT 1 VIEW 5/28/2025 5:48 PM  - IMPRESSION:   No radiopaque foreign bodies identified. Gastric tube sidehole projects over the gastroesophageal junction. Rounded opacity in the left mid abdomen that is suspected to represent dense contents within the bowel. CT may be useful for further characterization of the abdomen.    Labs  CMP  Recent Labs   Lab 05/29/25  0548 05/28/25 2007 05/28/25  1658 05/28/25  1521 05/28/25  1230 05/28/25  0737 05/28/25  0236 05/27/25  0434 05/27/25  0317 05/26/25  0421     --  138 145 141  --  140 142  --  140   POTASSIUM 4.0  --  4.1 4.3 4.3  --  4.0 4.3  --  4.5   CHLORIDE 105  --   --   --   --   --  105 107  --  104   CO2 24  --   --   --   --   --  25 26  --  26   ANIONGAP 10  --   --   --   --   --  10 9  --  10   * 146* 160* 120* 94   < > 105* 135*   < > 97   BUN 17.8  --   --   --   --   --  28.3* 27.5*  --  34.8*   CR 0.76  --   --   --   --   --  0.80 0.81  --  0.86   GFRESTIMATED >90  --   --   --   --   --  >90 >90  --  88   LORY 9.1  --   --   --   --   --  9.5 9.8  --  10.0   MAG 1.1*  --   --   --   --   --  1.9 1.9  --  1.8   PHOS 5.1*  --   --   --   --   --  4.2 5.0*  --  5.2*   PROTTOTAL  --   --   --   --   --   --   --   --   --  6.5   ALBUMIN  --   --   --   --   --   --   --   --   --  3.8   BILITOTAL  --   --   --   --   --   --   --   --   --  0.2   ALKPHOS  --   --   --   --   --   --   --   --   --  67   AST  --   --   --   --   --   --   --   --   --  36   ALT  --   --   --   --   --   --   --   --   --  35    < > = values in this interval not displayed.     CBC  Recent Labs   Lab 05/29/25  0548 05/28/25  2035 05/28/25  1658 05/28/25  1521 05/28/25  1230 05/27/25  0434 05/26/25  0421 05/25/25  0459   WBC 12.1*  --   --   --    --  4.3 5.4 5.9   RBC 2.75*  --   --   --   --  2.98* 3.20* 3.23*   HGB 8.7* 8.6* 8.5* 9.2*   < > 9.4* 9.8* 10.1*   HCT 26.3*  --   --   --   --  28.6* 30.8* 30.7*   MCV 96 94  --   --   --  96 96 95   MCH 31.6  --   --   --   --  31.5 30.6 31.3   MCHC 33.1  --   --   --   --  32.9 31.8 32.9   RDW 12.4  --   --   --   --  12.1 12.1 12.0     --   --   --   --  159 162 160    < > = values in this interval not displayed.     INR  Recent Labs   Lab 05/26/25  0421   INR 1.29*     Arterial Blood Gas  Recent Labs   Lab 05/28/25  1658 05/28/25  1521 05/28/25  1230   O2PER 60.0 60.0 60.0

## 2025-05-29 NOTE — PROGRESS NOTES
CRS BRIEF NOTE    Seen in PACU d/t concerns of low UOP, soft Bps, and significant pain. On arrival, 97/59, per chart had been as low as 87/53. HR 70s. mIVF LR running at 100 mL/h    She complains of significant tenderness to abd present, expected with surgery.  Her abdomen is soft, nondistended, quite tender to light palpation.  Colostomy is pink and healthy appearing.  No peripheral edema.  No tenting of skin on hand.  Awake and alert, eyes open, coherent responses but visibly in pain.  Waite in place with small amounts of pink/red urine.  Per chart review, she has had 320 of urine since the OR (case started at 12).  AQUILINO is draining red serosanguineous fluid, 243 since arriving in PACU approximately 1 hour ago. Unable to bladder scan for bladder retention d/t pain, but no mass on exam and waite has had consistent output.     She is only about 1 hour out of OR and I expect that her clinical picture will improve with supportive cares. However, I suspect that she may be a little under resuscitated based off of low UOP and soft BPs.  Additionally, with an ex lap, insensible losses would be expected to be about 1 L/h.  I do not have concern for active bleeding given her lack of tachycardia and benign abdominal exam aside from pain.  However, given the red serosanguineous output from her AQUILINO and large surgery, would be prudent to recheck hemoglobin.    - recheck hgb q8h x2  - 500 mL LR bolus, will uptitrate add'l boluses to UOP   - add 2 mg PO dilaudid catch-up when able to take PO meds (per MAR, was taking 8 mg dilaudid every day, as well as home prescription for her metastatic cancer, and may have some degree of opioid tolerance) - pt refused   - PCA 0.3 q10 (max 1.8 mg/h) as previously ordered -- just set up      11 pm update:   Clinically appearing more comfortable, resting on arrival but awakens and reports a lot of pain still. PCA in place, 2 doses total given so far. Worried about meds that were discontinued but  "unclear which ones, she repeats \"all of my meds.\" The system automatically unheld her floor meds after procedure within the last 20 minutes, so perhaps this explains the situation. She should be able to receive them per routine now. Abd soft, nondistended, tender to light palpation. I c/d/I without strikethrough. AQUILINO serosang, output has slowed. Hgb stable         Kristen Pineda MD   P: [phone]  Please refer to [facility] for point of contact. Page is preferred.     "

## 2025-05-29 NOTE — PROGRESS NOTES
"   05/29/25 1516   Appointment Info   Signing Clinician's Name / Credentials (OT) Esthela Arriola OTR/L   Rehab Comments (OT) abd precautions, perineal precautions (verbally in room no sitting for prolonged periods of time, ok for transitioning supine>stand)   Living Environment   People in Home child(enrico), dependent   Current Living Arrangements house  ([facility])   Home Accessibility stairs within home   Number of Stairs, Within Home, Primary greater than 10 stairs   Stair Railings, Within Home, Primary railings on both sides of stairs   Transportation Anticipated family or friend will provide   Living Environment Comments Pt lives with dtr and son, 10 and 12 yo. Pt Mom in [facility] to assist with childcare during hospitalization. Has 6+6 stairs with landing to get up to bedroom. Walk-in shower w/ DME   Self-Care   Usual Activity Tolerance good   Current Activity Tolerance fair   Regular Exercise No   Equipment Currently Used at Home none   Fall history within last six months no   Activity/Exercise/Self-Care Comment IND in ADLs PTA, takes care of 2 children prior to surgery.   Instrumental Activities of Daily Living (IADL)   IADL Comments IND in IADLs PTA, does not work d/t medical status   General Information   Onset of Illness/Injury or Date of Surgery 05/18/25   Referring Physician Gerson Carpenter MD   Patient/Family Therapy Goal Statement (OT) Reduce pain and return to home   Additional Occupational Profile Info/Pertinent History of Current Problem Per EMR, \"40 year old female with history of left ovarian cancer and peritoneal carcinomatosis s/p debulking and diverting loop ileostomy, rectovaginal fistula s/p open intersphincteric proctectomy, takedown of DLI, appendectomy, end colostomy on 5/28/25.\"   Existing Precautions/Restrictions fall;abdominal;other (see comments)  (no sitting)   Left Upper Extremity (Weight-bearing Status) partial weight-bearing (PWB)  (<10 lbs)   Right Upper Extremity (Weight-bearing " Status) partial weight-bearing (PWB)  (<10 lbs)   General Observations and Info Activity: ambulate with assist   Cognitive Status Examination   Orientation Status orientation to person, place and time   Visual Perception   Visual Impairment/Limitations WFL;corrective lenses full-time   Sensory   Sensory Quick Adds sensation intact   Pain Assessment   Patient Currently in Pain Yes, see Vital Sign flowsheet   Posture   Posture forward head position;protracted shoulders   Range of Motion Comprehensive   General Range of Motion bilateral upper extremity ROM WFL   Comment, General Range of Motion limited by pain this date   Strength Comprehensive (MMT)   Comment, General Manual Muscle Testing (MMT) Assessment general weakness though WFL, limited by pain and precautions   Coordination   Coordination Comments not assessed   Bed Mobility   Bed Mobility supine-sit   Supine-Sit Toyah (Bed Mobility) moderate assist (50% patient effort);2 person assist   Transfers   Transfer Comments unable to complete STS d/t pain   Balance   Balance Assessment sitting static balance   Sitting Balance: Static supervision;contact guard   Activities of Daily Living   BADL Assessment/Intervention bathing;lower body dressing;grooming;toileting   Bathing Assessment/Intervention   Toyah Level (Bathing) maximum assist (25% patient effort)   Comment, (Bathing) per clinical judgement d/t precautions   Lower Body Dressing Assessment/Training   Toyah Level (Lower Body Dressing) maximum assist (25% patient effort)   Comment, (Lower Body Dressing) per clinical judgement d/t precautions   Grooming Assessment/Training   Toyah Level (Grooming) supervision;set up   Comment, (Grooming) per clinical judgement   Toileting   Toyah Level (Toileting) maximum assist (25% patient effort)   Comment, (Toileting) per clinical judgement w/ ostomy   Clinical Impression   Criteria for Skilled Therapeutic Interventions Met (OT) Yes,  treatment indicated   OT Diagnosis Dec IND in I/ADLs   OT Problem List-Impairments impacting ADL problems related to;activity tolerance impaired;pain;strength   Assessment of Occupational Performance 3-5 Performance Deficits   Identified Performance Deficits dressing, bathing, toileting, functional transfers and mobility   Planned Therapy Interventions (OT) ADL retraining;IADL retraining;strengthening;ROM;transfer training;home program guidelines;progressive activity/exercise   Clinical Decision Making Complexity (OT) problem focused assessment/low complexity   Risk & Benefits of therapy have been explained care plan/treatment goals reviewed;evaluation/treatment results reviewed;risks/benefits reviewed;current/potential barriers reviewed;participants voiced agreement with care plan;participants included;patient;mother   Clinical Impression Comments Pt below functional baseline. Would benefit from continued OT services to promote strength and IND in I/ADLs   OT Total Evaluation Time   OT Eval, Low Complexity Minutes (37989) 8   OT Goals   Therapy Frequency (OT) 6 times/week   OT Predicted Duration/Target Date for Goal Attainment 06/12/25   OT Goals Hygiene/Grooming;Upper Body Dressing;Lower Body Dressing;Lower Body Bathing;Toilet Transfer/Toileting;Cognition;Aerobic Activity;OT Goal 1   OT: Hygiene/Grooming modified independent;within precautions   OT: Upper Body Dressing Modified independent;within precautions   OT: Lower Body Dressing Modified independent;within precautions   OT: Lower Body Bathing Modified independent;with precautions   OT: Toilet Transfer/Toileting Modified independent;within precautions   OT: Cognitive Patient/caregiver will verbalize understanding of cognitive assessment results/recommendations as needed for safe discharge planning   OT: Perform aerobic activity with stable cardiovascular response continuous activity;20 minutes;ambulation   OT: Goal 1 Pt will adhere to sitting and abd  precautions 100% of the time to ensure safety and IND in I/ADLs   OT Discharge Planning   OT Plan review precautions, attempt supine>standing ok to perch briefly EOB Ax2, monitor cog   OT Discharge Recommendation (DC Rec) home with assist;home with home care occupational therapy   OT Rationale for DC Rec Pt appears below functional baseline. Limited by pain and post op precautions this date. Anticipate w/ improvement in pain, pt will progress quickly and be safe for return home with assist and HHOT. Will continue to monitor and update recs as appropriate   OT Brief overview of current status ModAx2 bed mobility

## 2025-05-29 NOTE — PROGRESS NOTES
"Gynecology Oncology Progress Note      HD#11 peristomal skin infection, decreased PO intake, POD#1 s/p XL-interspincteric proctectomy, loop ileostomy takedown and creation of end colostomy, takedown and repair of rectovaginal fistula, appendectomy, cystoscopy with bilateral ureteral stent insertion and removal     Disease:  stage IIIb low grade peritoneal carcinoma     24 hour events:   - surgery as above, trasnferred to colorectal service post-operatively    Subjective: Marizol had a tough night, she has been in a lot of pain. Describes diffuse, 10/10 abdominal pain. Unalleviated by dilaudid PCA. No nausea/vomiting. Not OOB due to pain.      Objective:   BP 91/62 (BP Location: Right arm)   Pulse 112   Temp 97.8  F (36.6  C) (Oral)   Resp 18   Ht 1.651 m (5' 5\")   Wt 62.1 kg (136 lb 14.4 oz)   LMP  (LMP Unknown)   SpO2 (!) 88%   BMI 22.78 kg/m     General: NAD, appears comfortable in bed  Abdomen: Soft, diffusely tender in all quadrants to light palpation, nondistended. RLQ drain in place with serosanguinous output, edematous stoma with no output in bag. Dressing over ileostomy site, not removed. Midline indressing clean and dry    Assessment: Marizol Granados is a 40 year old female with stage IIIB low grade peritoneal carcinoma now s/p ileostomy takedown, creation of end colostomy with colorectal surgery, and repair of rectovaginal fistula. Currently colorectal service primary.     # Stage IIIB low grade peritoneal carcinoma  # Rectovaginal fistula, repaired   # Neurogenic constipation  # Postoperative state   - No current evidence of malignancy. Not on any cancer directed therapy.  - Patient is on colorectal service, all care as per their team especially post-operative instructions.   - We will continue to follow along.   - History of oversedation after previous XL requiring ICU admission in part secondary to medications but also does have a degree of opioid tolerance; thus, we would recommend considering " resuming prior to OR meds as able to achieve better pain control, see below   - VTE ppx per primary team     # Anorectal pain  # Cancer related fatigue   # Postoperative pain   - Followed by palliative outpatient with Dr. Norton, last seen 4/24; inpatient team consulted, appreciate recs   - PTA Ritalin for fatigue, pain management as described above   - Was on Dilaudid, Butrans patch, gabapentin PTA  - Prior to OR pain regimen as follows: dilaudid 6-8 mg q4hr prn, methadone 5 mg BID, Tylenol prn, gabapentin 300 mg at bedtime    - Continue dPCA, other adjuncts per primary team      # Urinary retention, neurogenic from prior surgery with urinary retention   # Hx frequent UTIs   # Cystitis on CT  - managed with Intermittent straight cath    - History of recurrent UTI last 2/25 with cipro, culture resulted with pansensitive staph aureus   - Followed by Dr. Moseley in urology, last seen in April for cysto with no structural cause to explain UTIs, consideration of gentamicin irrigations with plan to follow up on 6/5  - c/f colonization in the setting of frequent self catheterization, asymptomatic on presentation though limited sensation post surgery and does not feel sx of UTI per patient  - UA with +LE, +nitrites collected post CTX; urine culture klebsiella.  - s/p 6d ceftriaxone, discontinued prior to OR given no ongoing evidence of infection   - Consider indwelling catheter until able to perform straight cath    # Hx DVT/PE - chronic anticoagulation  - PTA Eliquis- currently held; Lovenox ppx per primary team   - Plan for indefinite anti-coagulation given long history of clotting. See Hematology note from 6/14/2024.      # MDD/SHIVAM  # Insomnia   # RLS   - PTA scheduled Buspar, Pristiq, gabapentin, Seroquel, ramelteon   - PTA prn Trazodone, Ambien   - Currently on hydroxyzine and ativan prn per palliative   - Continue to monitor mood closely     # Hx migraines  - PTA Topamax, propranolol    Bharti Rich DO  [facility]  [facility]  Gynecology Oncology Resident, PGY-2  05/29/2025 7:13 AM     To reach the GYNECOLOGY ONCOLOGY team for this patient, please page [phone] or search Gynecologic Oncology Resident Group on Vocera.     Gynecologic Oncology Attending Attestation  I have seen the patient on rounds with the team. CRS primary. Above background for your information. Feel free to reach out with questions. We will continue to follow along.     Lee Samuel MD    Gynecologic Oncology

## 2025-05-29 NOTE — PLAN OF CARE
Goal Outcome Evaluation:  Marizol's PCA was increased and home meds re-ordered/given.  Did allow nursing to turn her side to side and remove Damon Mat, do pericares, waite care and skin assessment.  Pt said the 0.6mg Dilaudid bolus was helpful vs the 0.3mg

## 2025-05-29 NOTE — ANESTHESIA POSTPROCEDURE EVALUATION
Patient: Marizol Granados    Procedure: Procedure(s):  Cystoscopy with Bilateral Ureteral Stent Insertion  Exploratory Laparotomy, Intersphincteric Proctectomy, Takedown of Loop Ileostomy, Creation of End Colostomy, Takedown of Rectovaginal Fistula, Cystoscopy, Appendectomy       Anesthesia Type:  General    Note:  Disposition: Inpatient   Postop Pain Control: Uneventful            Sign Out: Well controlled pain   PONV: No   Neuro/Psych: Uneventful            Sign Out: Acceptable/Baseline neuro status   Airway/Respiratory: Uneventful            Sign Out: Acceptable/Baseline resp. status   CV/Hemodynamics: Uneventful            Sign Out: Acceptable CV status; No obvious hypovolemia; No obvious fluid overload   Other NRE: NONE   DID A NON-ROUTINE EVENT OCCUR? No           Last vitals:  Vitals Value Taken Time   BP 99/60 05/28/25 21:15   Temp 36.7  C (98  F) 05/28/25 18:15   Pulse 78 05/28/25 21:19   Resp 2 05/28/25 21:19   SpO2 100 % 05/28/25 21:19   Vitals shown include unfiled device data.    Electronically Signed By: Andrea Zhang MD  May 28, 2025  9:20 PM

## 2025-05-29 NOTE — PLAN OF CARE
Goal Outcome Evaluation:  Pt with low O2 sats.  PCA at 0mg/hr - 0.6mg every 10 minutes and was frequently pushing for bolus  JEANNA GUILLEN made aware of this.  Still rating pain at 9.  1L O2 per NC applied    1430 - Marizol was assessed by the team and per Dr Zuleyma Hercules - Marizol was communicating with MD clearly and is Opoid tolerant so will continue current dose of PCA.      Marizol has been A&Ox4.  Abd incision with scant drainage and drsg intact.  No gas or stool  from ostomy and stoma pink.  AQUILINO with serossang output .  Pt declined to allow nurse to listen for BS.  No drainage from rectum and coccyx skin intact with Mepilex.  LS clear.    Waite with star urine and waite cares done.  Periarea skin not reddened. Using IS as instructed.      Activated Lactic acid protocol - blood sent to lab  Orders received for Mag supplement IV.  Plan to restart TPN tonight      LActic 1.3

## 2025-05-29 NOTE — PLAN OF CARE
Provider notified (name): Kristen CALDERA   Reason for notification: c/o abd pain, rating pain 10/10, is currently on PCA dilaudid.   Recommendation/request given to provider:  Response from provider: educate patient on the use of PCA.

## 2025-05-29 NOTE — CONSULTS
[facility] Nurse Inpatient Assessment     Consulted for: ileostomy  5/21: please aleah for end colostomy scheduled surgery 5/28 5/29: New consult for colostomy    [facility] nurse follow-up plan: daily    Patient History (according to provider note(s):      Mrs. Granados is a 40 year old female with history of left ovarian cancer and peritoneal carcinomatosis s/p debulking and diverting loop ileostomy, rectovaginal fistula s/p open intersphincteric proctectomy, takedown of DLI, appendectomy, end colostomy on 5/28/25.     Assessment:      Areas visualized during today's visit: Focused: and Abdomen    Assessment of new end Colostomy:  Diagnosis Pertinent to Stoma: ovarian cancer      Surgery Date: 5/28  Surgeon:Gerson Carpenter       [facility]: [facility]  Pouching system in place on assessment today: Jose two piece and cut to fit - OR pouch  Pouch barrier status: intact and Minimal melting  Pouch last changed/wear time: 1 day  Reason for pouch change today: initial post-op assessment  Effectiveness of current pouching/ supply plan: Attempting new system, will re-evaluate next assessment  Change made with ostomy management today: Yes  Pouching system placed today: Coloplast two piece, cut to fit, and barrier ring   Supplies: at bedside    Last photo: 5/29  Stoma location: LUQ  Stoma size: 1-1/2 inches  Stoma appearance: pink-red, round, moist, good turgor, and protruberant  Mucocutaneous junction:  intact  Peristomal complication(s): none   Output: none  Output volume emptied during visit: 0 ml  Abdominal assessment: Soft and Distended  Surgical site(s): dressing dry and intact  NG still in place? No  Pain: Cramping and Sharp  Is patient still on a PCA? Yes    Ostomy education assessment:  Participant of teaching session today: patient   Education completed today: Initial fitting, Stoma assessment, Pouching system assessment , Refitting of appliance , and Pouch change  "demonstration  Educational materials/methods: Verbal and Demonstration  Education still needed: Pouch change return demonstration, When to seek medical attention, Low fiber diet , Odor/flatus management , Infection prevention/hygiene , Hernia prevention, Lifestyle adjustments , and Discharge instructions  Learning Comprehension:   Psychosocial assessment: Attentive. Pt had a loop ileostomy prior to this surgery and was independent with care.  Patient readiness for education today: observing and attentive  Following today's visit: patient  is able to demonstrate;         1. How to empty their pouch? Yes        2. How to change their pouch? Yes        3. How to read and record intake and output correctly? Yes  Preparation for discharge completed: Discussed how to order supplies after discharge  and Ordered samples from  after gaining consent from patient/caregiver  Preparation for discharge still needed: Placed prescription recommendations in discharge navigator for MD to sign, Ensured patient has extra supplies for discharge, Discussed how and when to make an outpatient WO nurse appointment after discharge, Prepared for discharge home with home care, and Discuss signs/symptoms of when to seek medical attention  Pt support system on discharge: Pt lives alone  WOC recommend home care? No  Face to face time: 45 minutes         Treatment Plan:     LUQ Colostomy pouching plan:   Pouching system: ostomy supplies pouches: Jose 57 FECAL ([id number]) ostomy supplies barrier: [place] 57mm FLAT ([id number])  Accessories used: WO ostomy accessories: 2\" Cera Barrier Ring ([id number])   Frequency of pouch changes: Twice weekly and PRN leakage  WOC follow up plan: Daily Monday-Friday (as able)  Bedside RN interventions: Change pouch PRN if leaking using the supplies above, Empty pouch when 1/3 to 1/2 full, ensure to clean pouch outlet after emptying to prevent odor, Notify WOC for ongoing pouch leakage, Document stoma " appearance and output volume, color, and consistency every shift, and Encourage patient to empty pouch with assist     Orders: Updated    RECOMMEND PRIMARY TEAM ORDER: None, at this time  Education provided: plan of care  Discussed plan of care with: Patient and Nurse Practitioner  Notify [facility] if wound(s) deteriorate.  Nursing to notify the Provider(s) and re-consult the [facility] Nurse if new skin concern.    DATA:     Current support surface: Standard  Standard gel mattress (Isoflex)  Containment of urine/stool: Incontinent pad in bed and Ileostomy pouch  BMI: Body mass index is 22.78 kg/m .   Active diet order: Orders Placed This Encounter      Clear Liquid Diet     Output: I/O last 3 completed shifts:  In: 7139.9 [I.V.:6330.3; IV Piggyback:550]  Out: 2123 [Urine:1580; Drains:343; Blood:200]     Labs:   Recent Labs   Lab 05/29/25  0548 05/27/25  0434 05/26/25  0421   ALBUMIN  --   --  3.8   PREALB  --   --  25.7   HGB 8.7*   < > 9.8*   INR  --   --  1.29*   WBC 12.1*   < > 5.4    < > = values in this interval not displayed.     Pressure injury risk assessment:   Sensory Perception: 4-->no impairment  Moisture: 3-->occasionally moist  Activity: 3-->walks occasionally  Mobility: 3-->slightly limited  Nutrition: 2-->probably inadequate  Friction and Shear: 3-->no apparent problem  Terry Score: 18      Pager no longer in use, please contact through [facility] group: [facility] Nurse [place]    Dept. Office Number: [phone]

## 2025-05-29 NOTE — PLAN OF CARE
Goal Outcome Evaluation:  Marizol with pain 10/10 in abd.  MD made aware of pain and request for more pain meds and to have pts home meds be ordered.  Pt declines to move   Damon Mat still under pt from retuning from OR -  MD also made aware of this.  PT and OT ordered.

## 2025-05-29 NOTE — PROGRESS NOTES
Summary Type: 5A Post Op Report   POD: #0 - 5/28  Complications:    Pain:  Not controlled pca dilaudid, tylenol, roabxin.   Neuro:WDL aox4.   CV:WDL  Resp:WDL  GI:.WDL except, GI symptoms ostomy intact  :.WDL except, female symptoms, voiding ability/characteristics waite intact, pink drainage, providers are aware.   Skin: .WDL except, integrity midline incision intact with island dressing, urethral meatus surgical site intact with dressing and mesh panties, abd surgical site intact, adela drain intact.   Activity Assistance: assistance, 2 people  Safety/Falls Risk: Level of Risk: Moderate Risk  Note: Got to 5A at 1015. Providers were messaged about discontinued meds, no new orders. Continue with POC.

## 2025-05-29 NOTE — PROGRESS NOTES
"   05/29/25 6989   Appointment Info   Signing Clinician's Name / Credentials (PT) Mary Grace Garrido PT, DPT   Rehab Comments (PT) abd precautions, perineal precautions (verbally in room no sitting for prolonged periods of time, requested clarification)   Living Environment   People in Home child(enrico), dependent   Current Living Arrangements house  ([facility])   Home Accessibility stairs within home   Number of Stairs, Within Home, Primary greater than 10 stairs   Stair Railings, Within Home, Primary railings on both sides of stairs   Transportation Anticipated family or friend will provide   Living Environment Comments Pt lives with dtr and son, 10 and 12 yo. Pt Mom in [facility] to assist with childcare during hospitalization. has 6+6 stairs with landing to get up to bedroom.   Self-Care   Usual Activity Tolerance good   Current Activity Tolerance fair   Regular Exercise No   Equipment Currently Used at Home none   Fall history within last six months no   Activity/Exercise/Self-Care Comment Pt reports IND with mobility and ADLs at baseline. primary caregiver to dep children. Denies falls. Unable to work due to medical status   General Information   Onset of Illness/Injury or Date of Surgery 05/18/25   Referring Physician Gerson Carpenter MD   Patient/Family Therapy Goals Statement (PT) return home, reduce pain   Pertinent History of Current Problem (include personal factors and/or comorbidities that impact the POC) per EMR \"Mrs. Granados is a 40 year old female with history of left ovarian cancer and peritoneal carcinomatosis s/p debulking and diverting loop ileostomy, rectovaginal fistula s/p open intersphincteric proctectomy, takedown of DLI, appendectomy, end colostomy on 5/28/25.\"   Existing Precautions/Restrictions fall;abdominal  (no sitting)   General Observations activity: up with assist, perineal precautions   Cognition   Affect/Mental Status (Cognition) WFL;anxious   Cognitive Status Comments lethargic with pain " meds   Pain Assessment   Patient Currently in Pain Yes, see Vital Sign flowsheet   Integumentary/Edema   Integumentary/Edema Comments new stoma, perineal incision not observed, rectal packing present   Posture    Posture Protracted shoulders;Forward head position   Range of Motion (ROM)   Range of Motion ROM deficits secondary to surgical procedure   Strength (Manual Muscle Testing)   Strength Comments limited assessment 2/2 pain, functional per mobility screen   Bed Mobility   Comment, (Bed Mobility) mod A x2   Transfers   Comment, (Transfers) limited by pain   Gait/Stairs (Locomotion)   Comment, (Gait/Stairs) impaired, limited by pain   Balance   Balance Comments impaired   Sensory Examination   Sensory Perception patient reports no sensory changes   Clinical Impression   Criteria for Skilled Therapeutic Intervention Yes, treatment indicated   PT Diagnosis (PT) impaired functional mobility   Influenced by the following impairments pain, impaired act tolerance, impaired strength, post op precautions   Functional limitations due to impairments bed mobility, transfers, gait, stairs   Clinical Presentation (PT Evaluation Complexity) evolving   Clinical Presentation Rationale clinical judgement   Clinical Decision Making (Complexity) moderate complexity   Planned Therapy Interventions (PT) balance training;bed mobility training;gait training;home exercise program;motor coordination training;neuromuscular re-education;patient/family education;ROM (range of motion);strengthening;stair training;stretching;transfer training;progressive activity/exercise;risk factor education;home program guidelines   Risk & Benefits of therapy have been explained evaluation/treatment results reviewed;care plan/treatment goals reviewed;risks/benefits reviewed;current/potential barriers reviewed;participants voiced agreement with care plan;patient;participants included   PT Total Evaluation Time   PT Eval, Moderate Complexity Minutes (89410)  9   Physical Therapy Goals   PT Frequency 6x/week   PT Predicted Duration/Target Date for Goal Attainment 07/12/25   PT Goals Bed Mobility;Transfers;Gait;Stairs   PT: Bed Mobility Independent;Within precautions   PT: Transfers Modified independent;Sit to/from stand;Bed to/from chair;Assistive device;Within precautions   PT: Gait Modified independent;Greater than 200 feet;Within precautions   PT: Stairs Modified independent;Greater than 10 stairs;Rail on both sides;Within precautions   PT Discharge Planning   PT Plan progress upright tolerance, standing > gait, stairs when able   PT Discharge Recommendation (DC Rec) home with assist;home with home care physical therapy   PT Rationale for DC Rec Pt significantly limited by pain and post op precautions. Unable to tolerate OOB. Anticipate with improved pain management and progress with IP PT will progress to safe dc home, Barriers to d/c home including stairs, sitting precautions, and caring for dep children, unclear length of time pt's mom is able to assist   PT Brief overview of current status Ax1-2 OOB, limited by pain. no sitting.   PT Total Distance Amb During Session (feet) 0   Physical Therapy Time and Intention   Total Session Time (sum of timed and untimed services) 9

## 2025-05-29 NOTE — PROGRESS NOTES
"CLINICAL NUTRITION SERVICES - BRIEF NOTE     RECOMMENDATIONS FOR MDs/PROVIDERS TO ORDER:  Please alert RD or pharmacist if pt requires adjustments to PN fluid volume   Adjust IV fluids per provider discretion when TPN resumes tonight -- messaged primary team with this rec  Replace/recheck low electrolytes per provider discretion    Registered Dietitian Interventions:  Discussed with PharmD, resume TPN:     -- TPN, 960 mL/day x 12 hrs with 220 g dextrose, 90 g AA, and 250 mL 20% lipids 6 days per week for total provision of 1536 Kcals (25 Kcals/kg), 1.5 g/kg protein, GIR 5.45 mg/kg/minute @ peak infusion, and 28% fat kcals on average daily.   -- Infuvite and trace minerals daily       *See RD note on 5/27 for last nutrition reassessment  *Received consult: Pharmacy/Nutrition to start and manage TPN (Comments: \"Please resume prior to surgery TPN \")    CURRENT NUTRITION ORDERS  Diet: Clear Liquid    Nutrition Support: Previous TPN discontinued last night from OR, had been on 12 hr cycled TPN regimen since 5/24      NEW FINDINGS  Nutrition-relevant labs:   - K+ WNL  - Mg++ 1.1 (L) --> messaged with primary team, primary team plans to order replacement  - Phos 5.1 (H)      INTERVENTIONS  See above       MONITORING/EVALUATION  Progress toward goals will be monitored and evaluated per policy.     Do Siegel, RD, , LD  Weekday Units covered: 5A (non-Heme Onc pts) and 7B ([id number])  Available by 5A or 7B Clinical Dietitiemily Serrato  Weekend Coverage: Weekend Clinical Dietjoel Serrato    Inpatient Clinical Dietitians no longer available via paging   "

## 2025-05-29 NOTE — PROGRESS NOTES
Brief Progress Note    In to assess patient for PM check in. Pain is improved since this morning. She is happy that psych meds will be re-ordered. Talking to family at this time. Continue POC. Appreciate recommendations from palliative care team. Continue management per CRS.    Isamar Woodruff MD PGY-1  [facility] Gynecology Oncology   Gyn Onc Pager: [phone]  05/29/25 5:25 PM

## 2025-05-29 NOTE — PROGRESS NOTES
[facility]    Progress Note - Colorectal Surgery Service       Date of Admission:  5/18/2025    Assessment & Plan: Surgery   Mrs. Granados is a 40 year old female with history of left ovarian cancer and peritoneal carcinomatosis s/p debulking and diverting loop ileostomy, rectovaginal fistula s/p open intersphincteric proctectomy, takedown of DLI, appendectomy, end colostomy on 5/28/25.     Post-operative course remarkable for uncontrolled pain due to opioid tolerance.     - Increased PCA to 0.6mg q10min   - Started PTA methadone 5mg BID   - Continue tylenol and hydroxyzine   - Clear liquid diet and TPN  - Continue prn antiemetics   - Continue waite per gyn onc   - Started PTA buspar, desvenlafaxine, gabapentin, methylphenidate, ramelteon, topiramate, trazodone, zolpidem   - Continue pantoprazole  - Encourage ambulation QID, incentive spirometer use        Diet: Clear Liquid Diet    DVT Prophylaxis: Enoxaparin (Lovenox) SQ  Waite Catheter: PRESENT, indication: Surgical procedure  Lines: PRESENT      Port a Cath 10/31/24 Single Lumen Right Chest wall-Site Assessment: WDL      Drains: PRESENT         - 1 Closed/Suction Drain(s)    - 1 Open Drain(s)   Cardiac Monitoring: ACTIVE order. Indication: Procedural area  Code Status: Full Code      Clinically Significant Risk Factors            # Hypercalcemia: Highest iCa = 5.5 mg/dL in last 2 days, will monitor as appropriate  # Hypomagnesemia: Lowest Mg = 1.1 mg/dL in last 2 days, will replace as needed   # Hypoalbuminemia: Lowest albumin = 3.3 g/dL at 5/21/2025  5:47 AM, will monitor as appropriate                 # Severe Malnutrition: based on nutrition assessment and treatment provided per dietitian's recommendations.    # Financial/Environmental Concerns: unable to afford rent/mortgage;unemployed  # Asthma: noted on problem list        Social Drivers of Health   Depression: Risk of Self Harm (5/4/2025)    Received from  [facility]    Depression (PHQ-9)     Last PHQ-9 Score: 27     Thoughts of self harm: Nearly every day   Housing Stability: High Risk (5/19/2025)    Housing Stability     Do you have housing? : No     Are you worried about losing your housing?: No   Stress: Stress Concern Present (5/13/2024)    [employer] - Occupational Stress Questionnaire     Feeling of Stress : Very much   Social Connections: Moderately Isolated (5/13/2024)    Social Connection and Isolation Panel [NHANES]     Frequency of Communication with Friends and Family: Three times a week     Frequency of Social Gatherings with Friends and Family: Once a week     Attends [employer] Services: Never     Active Member of Clubs or Organizations: No     Attends Club or Organization Meetings: Never     Marital Status: Living with partner         Disposition Plan          The patient's care was discussed with the Attending Physician, Dr. Carpenter and Chief Resident/Fellow, Dr. Grider.    Zuleyma Hercules MD  General Surgery [address]  Non-urgent messages: Securely message with [employer] (more info)  Text page via Takes Paging/Directory     ______________________________________________________________________    Interval History   Overnight, she was given 500ml bolus for BP 87/53 HR70s, improvement of BP to 100/55. She reports significant 10/10 abdominal pain that is currently not well managed. PO intake consists of sips with medications. No ostomy output, voiding via waite.     Physical Exam   Vital Signs: Temp: 98.4  F (36.9  C) Temp src: Oral BP: 102/62 Pulse: 118   Resp: 18 SpO2: 92 % O2 Device: None (Room air) Oxygen Delivery: 2 LPM  Weight: 136 lbs 14.4 oz  Intake/Output Summary (Last 24 hours) at 5/29/2025 0940  Last data filed at 5/29/2025 0907  Gross per 24 hour   Intake 6880.3 ml   Output 2323 ml   Net 4557.3 ml     General Appearance: In acute distress  due to pain  Respiratory: nonlabored breathing on room air   Cardiovascular: tachycardic to 110s when pain elevated, 70s when pain improved  GI: soft, tender to palpation throughout, non distended, pink and well perfused ostomy with bowel sweat in bag, no stool or gas output, dressings clean/dry/intact, AQUILINO drain with serosanguinous output, declined movement to assess perineal incision   Skin: no rashes      Data     I have personally reviewed the following data over the past 24 hrs:    12.1 (H)  \   8.7 (L)   / 157     139 105 17.8 /  126 (H)   4.0 24 0.76 \     Procal: N/A CRP: N/A Lactic Acid: 2.9 (H)         Imaging results reviewed over the past 24 hrs:   Recent Results (from the past 24 hours)   POC US Guidance Needle Placement    Narrative    Bilateral Transversus Abdominus Plane Block    XR Abdomen Port 1 View    Narrative    XR ABDOMEN PORT 1 VIEW  5/28/2025 5:48 PM     HISTORY:  Incomplete Count     COMPARISON:  CT abdomen and pelvis 5/18/2025.    TECHNIQUE: Supine abdominal radiograph(s).    FINDINGS:   No metallic radiopaque foreign bodies identified. Ostomy in the left  lower quadrant.  Partially visualized gastric tube terminates in the stomach with the  sidehole projecting over the gastroesophageal junction. Surgical drain  in the pelvis. Rounded opacity in the left mid abdomen may represent  contrast within bowel.    Nonobstructive bowel gas pattern. No pneumatosis or portal venous gas.  No abnormal calcifications.       Impression    IMPRESSION:   No radiopaque foreign bodies identified. Gastric tube sidehole  projects over the gastroesophageal junction. Rounded opacity in the  left mid abdomen that is suspected to represent dense contents within  the bowel. CT may be useful for further characterization of the  abdomen.      I have personally reviewed the examination and initial interpretation  and I agree with the findings.    JUAN MANUEL CELIS DO         SYSTEM ID:  [id number]
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
## 2025-05-29 NOTE — OP NOTE
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), completed primary debulking and adjuvant chemotherapy, No evidence of disease   Persistent rectovaginal fistula  Neurogenic bladder  Neurogenic colon (rectum)   Chronic pelvic pain  Malnutrition, on total parenteral nutrition      PROCEDURE PERFORMED:     Cystoscopy with Bilateral Ureteral Stent Insertion (Urology, Dr Linares)    Exploratory Laparotomy, Intersphincteric Proctectomy, Takedown of Loop Ileostomy, Creation of End Colostomy, Takedown of repair of rectovaginal Fistula, Cystoscopy, Appendectomy (Colorectal surgery and Gynecologic oncology)        CO-SURGEON: Jessenia Sosa MD (Gyn Onc)  CO-SURGEON: Gerson Carpenter MD (CRS)    ASSISTANTS:   Giacomo Baron MD (Gyn Oncology Fellow)  Foster Grider MD (CRS Fellow)  Mary Kay Banuelos MD (CRS Resident)    ANESTHESIA: General with pre-operative TAP block    SPECIMENS REMOVED:   Appendix  Ileostomy  Rectum, anus, and portion of posterior vagina    COMPLICATIONS: None    EBL: 200mL, no blood products given    UOP: 150mL    INDICATIONS: Ms Granados is a 40 year old with a complex surgical and medical history. In September of 2024, she underwent a radical hysterectomy/BSO and debulking surgery including a posterior partial vaginecotmy with rectosigmoid resection, reanastomosis and diverting loop ileostomy. This was done for a partial colon obstruction and a new diagnosis of low grade serous primary peritoneal carcinoma arising in rectovaginal endometriosis. Her  surgery was complicated by a postoperative anastomotic breakdown with pelvic abscess, and ultimately she developed a rectovaginal fistula. She completed primary adjuvant chemotherapy. She has had persistent inability to void due to neurogenic bladder from radical surgery, and performs frequent self catheterization. She had a persistent RV fistula and continued to have impaction of fecal material with inability to pass anything per rectum. She underwent two colonoscopies with fecal disimpaction. Despite this, she continued to have pelvic pain. She was admitted May 18, 2025 to [facility] for worsening pelvic pain, and skin breakdown around her ileostomy. Ultimately we made the decision to bring her to the OR for definitive end colostomy for management of her fistula, neurogenic colon, chronic pelvic pain. She had previously been counseled extensively by our team and colorectal surgery regarding the need for this definitive procedure.     FINDINGS: No gross evidence of recurrent/persistent cancer , normal diaphragm surfaces and smooth liver edge. Spleen palpated and normal. Evidence of previous debulking surgery (absent uterus/tubes/ovaries/omentum). Appendix normal appearing but adherent to the right pelvic sidewall. Some adhesions of small bowel to anterior abdominal wall. Dense adhesions of rectum to vagina, consistent with previous pelvic infection and known fistula. Bowel run in its entirety with no abnormalities noted (previous bypass surgery evident and loop ileostomy normal appearing).     DESCRIPTION OF PROCEDURE:     After informed consent the patient was brought to the operating room where general anesthesia was administered. She was prepped and draped in the dorsal lithotomy position. A surgical timeout was performed. SCDs were placed on her lower extremities.     Urology performed a cystoscopy with ureteral stent placement, please see their note for details.     We then performed a vertical midline incision over  her previous scar from her pubic symphysis to above her umbilicus. This was carried down tot he fascia which was incised sharply. The rectus muscles were  and ultimately we found a clear window to enter the peritoneal cavity sharply. The abdomen and pelvis were inspected with the above findings noted.     Please see the colorectal surgery not for more detail but briefly we placed an Jonas retractor and took down the loop ileostomy and sutured the defect closed and then packed the bowels into the upper abdomen with moist laps. We identified bilateral ureters and then transected the sigmoid colon at the level of the pelvic brim. We performed a radical pelvic dissection in order to separate the rectum from the surrounding structures including the ureters and vaginal cuff. A perineal phase was performed by the colorectal team in order to achieve an intersphincteric proctectomy. The fistula tract was identified and excised during this portion of the procedure.  The specimen was removed through the perineum and sent for permanent pathology.     We then closed the fistula in two layers. Through the defect from the proctectomy, we were able to  imbricated the subvaginal tissue with 2-0 PDS suture. We then approached the defect from the vagina and closed the vaginal mucosa with a 3-0 Vicryl suture in a running fashion. There was also a very small defect at the vaginal cuff from the dissection and this was closed vaginally with a 2-0 Vicryl suture in a figure of 8 fashion.     We then performed a repeat cystoscopy. We first removed the Cat catheter and the urethral stents previously placed by urology. We used a 30 degree cystoscopy and distended the bladder with sterile water. The bladder mucosa was thickened with distinct trabeculations but otherwise was without abnormalities, sutures, or foreign bodies. Bilateral ureteral orifices were noted and both effluxed clear urine. I drained the bladder and then removed  the cystoscopy. The urethra was normal appearing . I replaced the Cat catheter.     Please see the colorectal surgery note for details on the appendectomy and the side to side reanastomosis (functional end-to-end) of the ileum at the site of the previous loop ileostomy. The abdomen and pelvis were copiously irrigated and excellent hemostasis was noted. Please see the CRS note regarding closure of the perineal defect as well as pelvic drain placement, fascial closure,  as well as the final creation and maturation of the colostomy and closure of the ileostomy site.     The patient was extubated and brought to PACU in a stable condition.       ileum. The abdomen and pelvis were copiously irrigated and excellent hemostasis was noted.     The abdomen and pelvis were copiously irrigated. We placed a pelvic drain. The fascia was closed with 0 PDS. The subcutaneous tissue was irrigated. The skin was closed with staples.  The wound was then covered. The ostomy was then matured in standard Brooked fashion with 3-0 vicryl.      I was scrubbed for all critical components of the operation.     The patient was extubated and brought to PACU in a stable condition.
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
## 2025-05-30 ENCOUNTER — APPOINTMENT (OUTPATIENT)
Dept: PHYSICAL THERAPY | Facility: CLINIC | Age: 40
End: 2025-05-30
Payer: COMMERCIAL

## 2025-05-30 LAB
ABO + RH BLD: NORMAL
ANION GAP SERPL CALCULATED.3IONS-SCNC: 5 MMOL/L (ref 7–15)
BLD GP AB SCN SERPL QL: NEGATIVE
BLD PROD TYP BPU: NORMAL
BLOOD COMPONENT TYPE: NORMAL
BUN SERPL-MCNC: 16.3 MG/DL (ref 6–20)
CALCIUM SERPL-MCNC: 7.9 MG/DL (ref 8.8–10.4)
CHLORIDE SERPL-SCNC: 107 MMOL/L (ref 98–107)
CODING SYSTEM: NORMAL
CREAT SERPL-MCNC: 0.76 MG/DL (ref 0.51–0.95)
CROSSMATCH: NORMAL
EGFRCR SERPLBLD CKD-EPI 2021: >90 ML/MIN/1.73M2
ERYTHROCYTE [DISTWIDTH] IN BLOOD BY AUTOMATED COUNT: 13 % (ref 10–15)
ERYTHROCYTE [DISTWIDTH] IN BLOOD BY AUTOMATED COUNT: 13 % (ref 10–15)
GLUCOSE BLDC GLUCOMTR-MCNC: 130 MG/DL (ref 70–99)
GLUCOSE SERPL-MCNC: 189 MG/DL (ref 70–99)
HCO3 SERPL-SCNC: 27 MMOL/L (ref 22–29)
HCT VFR BLD AUTO: 19.6 % (ref 35–47)
HCT VFR BLD AUTO: 23.8 % (ref 35–47)
HGB BLD-MCNC: 6.4 G/DL (ref 11.7–15.7)
HGB BLD-MCNC: 7.8 G/DL (ref 11.7–15.7)
ISSUE DATE AND TIME: NORMAL
LACTATE SERPL-SCNC: 0.7 MMOL/L (ref 0.7–2)
MAGNESIUM SERPL-MCNC: 1.6 MG/DL (ref 1.7–2.3)
MCH RBC QN AUTO: 31.5 PG (ref 26.5–33)
MCH RBC QN AUTO: 31.5 PG (ref 26.5–33)
MCHC RBC AUTO-ENTMCNC: 32.7 G/DL (ref 31.5–36.5)
MCHC RBC AUTO-ENTMCNC: 32.8 G/DL (ref 31.5–36.5)
MCV RBC AUTO: 96 FL (ref 78–100)
MCV RBC AUTO: 97 FL (ref 78–100)
PHOSPHATE SERPL-MCNC: 1.1 MG/DL (ref 2.5–4.5)
PHOSPHATE SERPL-MCNC: 1.8 MG/DL (ref 2.5–4.5)
PLATELET # BLD AUTO: 110 10E3/UL (ref 150–450)
PLATELET # BLD AUTO: 121 10E3/UL (ref 150–450)
POTASSIUM SERPL-SCNC: 3.5 MMOL/L (ref 3.4–5.3)
RBC # BLD AUTO: 2.03 10E6/UL (ref 3.8–5.2)
RBC # BLD AUTO: 2.48 10E6/UL (ref 3.8–5.2)
SODIUM SERPL-SCNC: 139 MMOL/L (ref 135–145)
SPECIMEN EXP DATE BLD: NORMAL
UNIT ABO/RH: NORMAL
UNIT NUMBER: NORMAL
UNIT STATUS: NORMAL
UNIT TYPE ISBT: 6200
WBC # BLD AUTO: 6.7 10E3/UL (ref 4–11)
WBC # BLD AUTO: 8.8 10E3/UL (ref 4–11)

## 2025-05-30 PROCEDURE — 250N000013 HC RX MED GY IP 250 OP 250 PS 637: Performed by: PHYSICIAN ASSISTANT

## 2025-05-30 PROCEDURE — 99233 SBSQ HOSP IP/OBS HIGH 50: CPT | Performed by: STUDENT IN AN ORGANIZED HEALTH CARE EDUCATION/TRAINING PROGRAM

## 2025-05-30 PROCEDURE — 258N000003 HC RX IP 258 OP 636

## 2025-05-30 PROCEDURE — 85018 HEMOGLOBIN: CPT | Performed by: SURGERY

## 2025-05-30 PROCEDURE — 85041 AUTOMATED RBC COUNT: CPT

## 2025-05-30 PROCEDURE — 250N000013 HC RX MED GY IP 250 OP 250 PS 637: Performed by: SURGERY

## 2025-05-30 PROCEDURE — 86901 BLOOD TYPING SEROLOGIC RH(D): CPT

## 2025-05-30 PROCEDURE — 250N000011 HC RX IP 250 OP 636: Performed by: SURGERY

## 2025-05-30 PROCEDURE — 85014 HEMATOCRIT: CPT

## 2025-05-30 PROCEDURE — 250N000013 HC RX MED GY IP 250 OP 250 PS 637

## 2025-05-30 PROCEDURE — P9016 RBC LEUKOCYTES REDUCED: HCPCS

## 2025-05-30 PROCEDURE — 120N000002 HC R&B MED SURG/OB UMMC

## 2025-05-30 PROCEDURE — 97530 THERAPEUTIC ACTIVITIES: CPT | Mod: GP

## 2025-05-30 PROCEDURE — 84100 ASSAY OF PHOSPHORUS: CPT | Performed by: PHYSICIAN ASSISTANT

## 2025-05-30 PROCEDURE — 250N000011 HC RX IP 250 OP 636

## 2025-05-30 PROCEDURE — 84100 ASSAY OF PHOSPHORUS: CPT | Performed by: SURGERY

## 2025-05-30 PROCEDURE — 250N000013 HC RX MED GY IP 250 OP 250 PS 637: Performed by: STUDENT IN AN ORGANIZED HEALTH CARE EDUCATION/TRAINING PROGRAM

## 2025-05-30 PROCEDURE — 250N000011 HC RX IP 250 OP 636: Mod: JZ | Performed by: STUDENT IN AN ORGANIZED HEALTH CARE EDUCATION/TRAINING PROGRAM

## 2025-05-30 PROCEDURE — 83735 ASSAY OF MAGNESIUM: CPT

## 2025-05-30 PROCEDURE — 83605 ASSAY OF LACTIC ACID: CPT | Performed by: SURGERY

## 2025-05-30 PROCEDURE — 82310 ASSAY OF CALCIUM: CPT | Performed by: SURGERY

## 2025-05-30 PROCEDURE — 82565 ASSAY OF CREATININE: CPT | Performed by: SURGERY

## 2025-05-30 PROCEDURE — 999N000197 HC STATISTIC WOC PT EDUCATION, 0-15 MIN

## 2025-05-30 RX ORDER — LIDOCAINE HYDROCHLORIDE ANHYDROUS AND DEXTROSE MONOHYDRATE .8; 5 G/100ML; G/100ML
0.5 INJECTION, SOLUTION INTRAVENOUS CONTINUOUS
Status: DISPENSED | OUTPATIENT
Start: 2025-05-30 | End: 2025-06-01

## 2025-05-30 RX ADMIN — PROCHLORPERAZINE MALEATE 10 MG: 5 TABLET ORAL at 13:36

## 2025-05-30 RX ADMIN — NALOXONE HYDROCHLORIDE 0.2 MG: 0.4 INJECTION, SOLUTION INTRAMUSCULAR; INTRAVENOUS; SUBCUTANEOUS at 02:27

## 2025-05-30 RX ADMIN — POTASSIUM & SODIUM PHOSPHATES POWDER PACK 280-160-250 MG 2 PACKET: 280-160-250 PACK at 12:50

## 2025-05-30 RX ADMIN — ACETAMINOPHEN 1000 MG: 500 TABLET ORAL at 04:55

## 2025-05-30 RX ADMIN — METHADONE HYDROCHLORIDE 5 MG: 5 SOLUTION ORAL at 21:12

## 2025-05-30 RX ADMIN — BUSPIRONE HYDROCHLORIDE 30 MG: 15 TABLET ORAL at 09:17

## 2025-05-30 RX ADMIN — BUSPIRONE HYDROCHLORIDE 30 MG: 15 TABLET ORAL at 21:12

## 2025-05-30 RX ADMIN — ACETAMINOPHEN 1000 MG: 500 TABLET ORAL at 10:57

## 2025-05-30 RX ADMIN — LIDOCAINE HYDROCHLORIDE 0.5 MG/KG/HR: 8 INJECTION, SOLUTION INTRAVENOUS at 14:51

## 2025-05-30 RX ADMIN — POTASSIUM & SODIUM PHOSPHATES POWDER PACK 280-160-250 MG 2 PACKET: 280-160-250 PACK at 21:10

## 2025-05-30 RX ADMIN — PROPRANOLOL HYDROCHLORIDE 10 MG: 10 TABLET ORAL at 09:17

## 2025-05-30 RX ADMIN — PANTOPRAZOLE SODIUM 40 MG: 40 TABLET, DELAYED RELEASE ORAL at 21:10

## 2025-05-30 RX ADMIN — METHADONE HYDROCHLORIDE 5 MG: 5 SOLUTION ORAL at 09:17

## 2025-05-30 RX ADMIN — LORAZEPAM 1 MG: 0.5 TABLET ORAL at 10:56

## 2025-05-30 RX ADMIN — PANTOPRAZOLE SODIUM 40 MG: 40 TABLET, DELAYED RELEASE ORAL at 09:17

## 2025-05-30 RX ADMIN — POTASSIUM & SODIUM PHOSPHATES POWDER PACK 280-160-250 MG 2 PACKET: 280-160-250 PACK at 16:52

## 2025-05-30 RX ADMIN — Medication: at 23:17

## 2025-05-30 RX ADMIN — Medication: at 09:11

## 2025-05-30 RX ADMIN — TOPIRAMATE 50 MG: 50 TABLET, FILM COATED ORAL at 09:17

## 2025-05-30 RX ADMIN — PROPRANOLOL HYDROCHLORIDE 10 MG: 10 TABLET ORAL at 21:10

## 2025-05-30 RX ADMIN — METHYLPHENIDATE HYDROCHLORIDE 10 MG: 5 TABLET ORAL at 12:50

## 2025-05-30 RX ADMIN — NALOXONE HYDROCHLORIDE 0.2 MG: 0.4 INJECTION, SOLUTION INTRAMUSCULAR; INTRAVENOUS; SUBCUTANEOUS at 01:22

## 2025-05-30 RX ADMIN — LORAZEPAM 1 MG: 0.5 TABLET ORAL at 21:10

## 2025-05-30 RX ADMIN — SODIUM CHLORIDE, SODIUM LACTATE, POTASSIUM CHLORIDE, AND CALCIUM CHLORIDE 500 ML: .6; .31; .03; .02 INJECTION, SOLUTION INTRAVENOUS at 03:37

## 2025-05-30 RX ADMIN — METHYLPHENIDATE HYDROCHLORIDE 15 MG: 5 TABLET ORAL at 09:17

## 2025-05-30 RX ADMIN — ACETAMINOPHEN 1000 MG: 500 TABLET ORAL at 16:52

## 2025-05-30 RX ADMIN — TOPIRAMATE 50 MG: 50 TABLET, FILM COATED ORAL at 21:12

## 2025-05-30 RX ADMIN — SODIUM CHLORIDE, SODIUM LACTATE, POTASSIUM CHLORIDE, AND CALCIUM CHLORIDE 500 ML: .6; .31; .03; .02 INJECTION, SOLUTION INTRAVENOUS at 21:26

## 2025-05-30 ASSESSMENT — ACTIVITIES OF DAILY LIVING (ADL)
ADLS_ACUITY_SCORE: 52
ADLS_ACUITY_SCORE: 53
ADLS_ACUITY_SCORE: 52
ADLS_ACUITY_SCORE: 53
ADLS_ACUITY_SCORE: 52
ADLS_ACUITY_SCORE: 53
ADLS_ACUITY_SCORE: 52

## 2025-05-30 NOTE — PROGRESS NOTES
CRS NOTE    Pt had soft Bps with MAPs to 59 and RR to 7, grogginess and poorly responsive. Naloxone 0.2 mg given with improvement. 0.2 mg additional given. Improved response in mental status and RR. Now having pain, but sleeping. Says she hasn't drank much all day. Low intake but good uop. Abd soft, nondistended, tender. Ostomy pink and soft.    - encouraged her to use pca for pain  - ctm respirations  - 500 mL for soft bps      Kristen Pineda MD   P: [phone]  Please refer to [facility] for point of contact. Page is preferred.

## 2025-05-30 NOTE — PLAN OF CARE
Goal Outcome Evaluation: [id number]      Plan of Care Reviewed With: patient    Overall Patient Progress: declining    Significant 24 hour events: Hgb 6.4, asymptomatic hypotension w/ MAPs below 65, on 1L O2 per NC w/ SpO2 above 92%. 500 mL fluid bolus given, PCA demand doses reduced, and blood transfusion started.    Level of Care: Acute    Summary Type: 5A Post Op Report   POD: #2 = 5/30   Pain:  Not controlled per pt, even with PCA Dilaudid. Rates pain level 9/10 consistently.  Neuro:WDL. A&O x 4. Arouses spontaneously, talks to staff (when she wants to).   CV:.WDL except, rhythm - intermittently tachycardic to HR of 105-110.  Resp:WDL except, rhythm/pattern - on 1L O2 per NC, Narcan given twice overnight for low respirations/elevated EtCO2 with good response.  GI:.WDL except, GI symptoms - abdominal discomfort. Colostomy in place with no OP yet tonight.  :.WDL except, voiding ability/characteristics Cat intact with adequate UOP. Yellow UOP observed.  Skin: .WDL except, integrity - midline incision, intact with island dressing, urethral meatus surgical site intact, with dressing and mesh panties, abd surgical site intact, AQUILINO drain intact.   Activity Assistance: assistance, 2 people  Safety/Falls Risk: Level of Risk: Moderate Risk

## 2025-05-30 NOTE — PLAN OF CARE
Goal Outcome Evaluation:      Plan of Care Reviewed With: patient    Overall Patient Progress: no changeOverall Patient Progress: no change    Outcome Evaluation: [id number]    Summary Type: 5A Post Op Report   POD: #1 - 5/29  Complications:    Pain:  Not controlled pca dilaudid.   Neuro:WDL. aox4.   CV:WDL  Resp:WDL  GI:.WDL except, GI symptoms ostomy intact.   :.WDL except, female symptoms, voiding ability/characteristics waite intact.  Skin: .WDL except, integrity midline incision intact with island dressing, urethral meatus surgical site intact with dressing and mesh panties, abd surgical site intact, adela drain intact.   Activity Assistance: assistance, 2 people  Safety/Falls Risk: Level of Risk: Moderate Risk  Note: TPN infusing cycled, lipids infusing at 20.8 mL/hr through port. Piv infusing LR tko and pca dilaudid. Continue with POC.

## 2025-05-30 NOTE — PROGRESS NOTES
"  PALLIATIVE CARE PROGRESS NOTE  [facility]     Patient Name: Marizol Granados  Date of Admission: 5/18/2025   Today the patient was seen for: symptom management     Recommendations & Counseling     MEDICAL MANAGEMENT: all med recs done for you  #Pain, acute on chronic cancer-related pain and post-surgical (5/28)  #Rectal pain due to rectovaginal fistula  -Hx rectovaginal fistula, ileostomy.  S/p XL-interspincteric proctectomy, loop ileostomy takedown and creation of end colostomy, takedown and repair of rectovaginal fistula, appendectomy, cystoscopy with bilateral ureteral stent insertion and removal on 5/28/2025.  -Some of her pain is likely non-opioid responsive but in the post-surgical setting it is reasonable to use higher doses of opioids initially then wean her down from IV to orals. A large reason for her to undergo the surgery was with the hope hoping that it would help with her pain long-term  -Still noting a lot of pain generally speaking in her abdomen after the surgery. States that the Dilaudid PCA at 0.6 mg did help and she was able to sit up at the edge of the bed but the 0.4 mg boluses do not help much and/or at all.   -Discussed Ketamine and Lidocaine drips with her. Given her robust psychiatric med list and her mood, would favor trialing Lidocaine drip first and ketamine drip second. She was amenable to either and okay with not increasing Dilaudid PCA due to trialing these meds.  -Okay with stopping gabapentin  Dilaudid PCA 0.4 mg q10 min prn, no continuous rate or bolus dose, and 2.4 mg/hr lock out  Marizol has elements of a \"total pain\" patient. She may require a more prolonged off the PCA with a lot of emotional reassurance during the entire process.   There is also a concern for poorer absorption of medication so when transitioning from IV to orals, will need to consider this but likely less of an issue as she had the ileostomy takedown and now has a " longer bowel track for absorption  Methadone 5 mg BID  APAP 1000 mg po q6h earnest  STOPPED Robaxin 1000 mg QID [facility] on 5/29  Would avoid muscle relaxers  They do not help much with muscle spasm (different than spasticity) and just make patients and makes them not care about pain as much. In addition to other psychotropic medications, this is the least helpful thing for her pain  Unable to take NSAIDs due to history of gastric bypass  STOP gabapentin 300mg at bedtime  Started in palliative clinic for CIPN (?) and/or rectal nerve pain. This was before methadone initiation.   Could be contributing to more nighttime somnolence and she does not feel like it is helping much    #Anxiety, acute on chronic  #Insomnia   #Polypharmacy  -Anxiety managed outpatient by psychiatry through [facility]. Concern for polypharmacy given multiple sedating medications (Ativan, Seroquel, trazodone and Ambien). Appears Marizol has been on this regimen for some time and has no signs of oversedation.  -Discussed decreasing or discontinuing PTA Ritalin (started for cancer fatigue) as this may be contributing to anxiety. Patient states she would like to keep it for now, has not felt it has made her anxiety worse but is uncertain if it making her fatigue better  -Marizol continues to decline health psych/psych consults as she notes her outpatient psychiatrist has worked really hard with her to find a regimen that helps her sleep and mood. She is reluctant to engage in further discussions without consulting her outpatient psychiatrist.  Appreciate Palliative social work support  Continue to encourage ambulation and getting outside  Declined Psych consult 5/22  PO hydroxyzine 25-50mg q6h PRN anxiety or pain adjuvant  Would not re/order PRN Ativan   PTA medications:  Ritalin 15mg AM, 10MG afternoon (per outpatient Palliative)  Gabapentin 300 mg bedtime (per outpatient Palliative) - stopped 5/30  Buspar 30mg BID  Pristiq 150 mg daily  Ativan 1mg PO BID  "scheduled  Propanolol 10mg BID  Seroquel 200mg bedtime  Ramelteon 8mg bedtime  Topamax 50mg BID  Trazadone 300 mg prn bedtime  Ambien 5mg bedtime      PSYCHOSOCIAL/SPIRITUAL:  Family - supported by  dtr Ana (11) son Donovan (10); mom Atif, father recently passed in 2024 (very close with her father, may have viewpoints about her mother having firmer boundaries which she may view as \"conflict\")  Friends   [place] community: [facility]     Palliative Care will continue to follow.    Total time spent was 60 minutes regarding support and symptom control on the date of the encounter. These recommendations were given to the primary team via this note/via [employer]/via phone call.    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 Most recent imaging in March 2025 showed no evidence of disease. She is being followed by CRS for rectovaginal fistula and coordinating surgery for takedown of loop ileostomy and creation of end colostomy. Follows palliative care outpatient for pain. Our team was consulted to help with pain and anxiety.      Interval History:     Multidisciplinary collaboration:  Reviewed notes from Gyn/onc, CRC, nursing    \"Pt had soft Bps with MAPs to 59 and RR to 7, grogginess and poorly responsive. Naloxone 0.2 mg given with improvement. 0.2 mg additional given. Improved response in mental status and RR. Now having pain, but sleeping. Says she hasn't drank much all day. Low intake but good uop. Abd soft, nondistended, tender. Ostomy pink and soft.\"    CRC response   - Encouraged her to use pca for pain  - CTM respirations  - 500 mL for soft bps    Also got 1 unit pRBC    Medications managed by " "palliative  APAP 975 mg q6h earnest   Methadone 5mg BID (started 5/22 PM, stopped 5/28, restarted 5/29)  Gabapentin 300mg at bedtime - stopped 5/30 after discussion  Dilaudid PCA 0.3 to 0.6 mg q10 min prn and now 0.4 mg--> 7.5 mg over 8 hours  Decreasing PCA from 0.6 to 0.4mg without continuous rate - total Dilaudid from 0600 to 2330 = ~ 17.4 mg which equals ~1 mg/hr of IV Dilaudid  Zofran 4 mg IV x1  Atarax 25 mg po x1  Narcan 0.2 mg IV x2    Patient/family narrative  Seen and examined at bedside. Mother Atif and bedside RN present.    Marizol voiced a lot of frustration with receiving Narcan x2 overnight. She says that being groggy while waking up is normal for her and her BP normally runs 80-90s/50-60s at home so she doesn't even take it knowing it will be low. She says the Narcan didn't make her feel any different e.g. didn't help her grogginess and didn't make the pain worse by reversing opioids in her system.     She is also annoyed that the Dilaudid PCA was decreased from 0.6 to 0.4 mg as the 0.6 mg dose was enough to relieve the pain so she could turn by herself and even sit at the edge of the bed.     She again reiterated that people keep telling her to get OOB but the surgery team wants her to lay flat and not move for 3 days post-op (which is consistent with the treatment team sticky note from 5/29). She also is voicing that she \"doesn't feel heard\" by people but acknowledged that she felt heard during my visit with her yesterday and today.         Physical Exam:   Temp:  [97.8  F (36.6  C)-100.4  F (38  C)] 98.5  F (36.9  C)  Pulse:  [] 105  Resp:  [7-20] 12  BP: ()/(42-70) 90/53  SpO2:  [88 %-100 %] 97 %  136 lbs 14.4 oz    Physical Exam  General: laying in bed, NAD but tearful at times which resolves quickly  HEENT: normocephalic/atraumatic  Lungs: non labored  GI: reported post-op incisions and ostomy and drain  : Cat in place  Neuro: alert and oriented x4, fluent speech, moving all " extremities  Psych: anxious, normal affect      Data Reviewed:     Imaging  No new imaging    Labs  CMP  Recent Labs   Lab 05/30/25  0810 05/30/25  0442 05/29/25  2129 05/29/25  0548 05/28/25 2007 05/28/25  1658 05/28/25  1521 05/28/25  0737 05/28/25  0236 05/27/25  0434 05/27/25  0317 05/26/25  0421   NA  --  139  --  139  --  138 145   < > 140 142  --  140   POTASSIUM  --  3.5  --  4.0  --  4.1 4.3   < > 4.0 4.3  --  4.5   CHLORIDE  --  107  --  105  --   --   --   --  105 107  --  104   CO2  --  27  --  24  --   --   --   --  25 26  --  26   ANIONGAP  --  5*  --  10  --   --   --   --  10 9  --  10   * 189*  --  126* 146* 160* 120*   < > 105* 135*   < > 97   BUN  --  16.3  --  17.8  --   --   --   --  28.3* 27.5*  --  34.8*   CR  --  0.76  --  0.76  --   --   --   --  0.80 0.81  --  0.86   GFRESTIMATED  --  >90  --  >90  --   --   --   --  >90 >90  --  88   LORY  --  7.9*  --  9.1  --   --   --   --  9.5 9.8  --  10.0   MAG  --  1.6* 2.0 1.1*  --   --   --   --  1.9 1.9  --  1.8   PHOS  --  1.1*  --  5.1*  --   --   --   --  4.2 5.0*  --  5.2*   PROTTOTAL  --   --   --   --   --   --   --   --   --   --   --  6.5   ALBUMIN  --   --   --   --   --   --   --   --   --   --   --  3.8   BILITOTAL  --   --   --   --   --   --   --   --   --   --   --  0.2   ALKPHOS  --   --   --   --   --   --   --   --   --   --   --  67   AST  --   --   --   --   --   --   --   --   --   --   --  36   ALT  --   --   --   --   --   --   --   --   --   --   --  35    < > = values in this interval not displayed.     CBC  Recent Labs   Lab 05/30/25  0442 05/29/25  0548 05/28/25  2035 05/28/25  1658 05/28/25  1230 05/27/25  0434 05/26/25  0421   WBC 6.7 12.1*  --   --   --  4.3 5.4   RBC 2.03* 2.75*  --   --   --  2.98* 3.20*   HGB 6.4* 8.7* 8.6* 8.5*   < > 9.4* 9.8*   HCT 19.6* 26.3*  --   --   --  28.6* 30.8*   MCV 97 96 94  --   --  96 96   MCH 31.5 31.6  --   --   --  31.5 30.6   MCHC 32.7 33.1  --   --   --  32.9 31.8   RDW  13.0 12.4  --   --   --  12.1 12.1   * 157  --   --   --  159 162    < > = values in this interval not displayed.     INR  Recent Labs   Lab 05/26/25  0421   INR 1.29*     Arterial Blood Gas  Recent Labs   Lab 05/28/25  1658 05/28/25  1521 05/28/25  1230   O2PER 60.0 60.0 60.0

## 2025-05-30 NOTE — PROGRESS NOTES
Gynecology Oncology Progress Note      HD#12 peristomal skin infection, decreased PO intake, POD#2 s/p XL-interspincteric proctectomy, loop ileostomy takedown and creation of end colostomy, takedown and repair of rectovaginal fistula, appendectomy, cystoscopy with bilateral ureteral stent insertion and removal      Disease:  stage IIIb low grade peritoneal carcinoma      24 hour events:   - NPO > CLD   - Started on lovenox ppx   - Hypotension, decreased RR > Narcan x2 > decreased dPCA     Subjective: Doing okay this morning, pain is slightly more controlled. No dizziness while in bed, was able to sleep. Had half a water bottle overnight and tolerated that well without nausea/vomiting. No other acute concerns.     Objective:   Vitals:    05/30/25 0239 05/30/25 0319 05/30/25 0450 05/30/25 0545   BP: (!) 85/49 (!) 84/45 (!) 82/47 94/47   BP Location:   Right arm Right arm   Pulse:   108 106   Resp: 16 11 11 13   Temp:   100.4  F (38  C) 98.7  F (37.1  C)   TempSrc:   Oral Oral   SpO2: 98% 98% 97% 98%   Weight:       Height:           General: NAD, appears mildly uncomfortable in bed   CV: Well-perfused  Resp: Breathing comfortably on room air  Abdomen: Soft, diffusely tender R > L, nondistended. VML incision appears c/d/I. Prior ileostomy site mildly saturated, not redressed. RLQ drain with serosanguinous output.     I/O last 3 completed shifts:  In: 882 [I.V.:882]  Out: 2625 [Urine:2450; Drains:175]    Intake/Output (24 Hrs // Since MN)   mL // NR  TPN NR    UOP 2450mL // 1000mL  RLQ drain 175 ml/80 ml     New labs/imaging-   Latest Reference Range & Units 05/30/25 04:42   Sodium 135 - 145 mmol/L 139   Potassium 3.4 - 5.3 mmol/L 3.5   Chloride 98 - 107 mmol/L 107   Carbon Dioxide (CO2) 22 - 29 mmol/L 27   Urea Nitrogen 6.0 - 20.0 mg/dL 16.3   Creatinine 0.51 - 0.95 mg/dL 0.76   GFR Estimate >60 mL/min/1.73m2 >90   Calcium 8.8 - 10.4 mg/dL 7.9 (L)   Anion Gap 7 - 15 mmol/L 5 (L)   Magnesium 1.7 - 2.3 mg/dL 1.6  (L)   Glucose 70 - 99 mg/dL 189 (H)   WBC 4.0 - 11.0 10e3/uL 6.7   Hemoglobin 11.7 - 15.7 g/dL 6.4 (LL)   Hematocrit 35.0 - 47.0 % 19.6 (L)   Platelet Count 150 - 450 10e3/uL 110 (L)   RBC Count 3.80 - 5.20 10e6/uL 2.03 (L)   MCV 78 - 100 fL 97   MCH 26.5 - 33.0 pg 31.5   MCHC 31.5 - 36.5 g/dL 32.7   RDW 10.0 - 15.0 % 13.0   (LL): Data is critically low  (L): Data is abnormally low  (H): Data is abnormally high    Assessment: Marizol Granados is a 40 year old female with stage IIIB low grade peritoneal carcinoma now s/p ileostomy takedown, creation of end colostomy with colorectal surgery, and repair of rectovaginal fistula. Currently colorectal service primary.      # Stage IIIB low grade peritoneal carcinoma  # Rectovaginal fistula, repaired   # Neurogenic constipation  # Postoperative state   - No current evidence of malignancy. Not on any cancer directed therapy.  - Patient is on colorectal service, all care as per their team especially post-operative instructions.   - We will continue to follow along.   - History of oversedation after previous XL requiring ICU admission in part secondary to medications but also does have a degree of opioid tolerance; pain control now improving with resumption of some PO medications and dPCA, required Narcan x2 overnight 2/2 hypotension and decreased RR to good effect   - Hgb 8.7 > 6.4 today   - VTE ppx per primary team, currently on lovenox      # Anorectal pain  # Cancer related fatigue   # Postoperative pain   - Followed by palliative outpatient with Dr. Norton, last seen 4/24; inpatient team consulted, appreciate recs   - PTA Ritalin for fatigue, pain management as described above   - Was on Dilaudid, Butrans patch, gabapentin PTA  - Prior to OR pain regimen as follows: dilaudid 6-8 mg q4hr prn, methadone 5 mg BID, Tylenol prn, gabapentin 300 mg at bedtime    - Continue dPCA, other adjuncts per primary team      # Urinary retention, neurogenic from prior surgery with urinary  retention   # Hx frequent UTIs   # Cystitis on CT  - managed with Intermittent straight cath  - History of recurrent UTI last 2/25 with cipro, culture resulted with pansensitive staph aureus   - UA on admission with +LE, +nitrites collected post CTX; urine culture klebsiella on admission   - s/p 6d ceftriaxone, discontinued prior to OR given no ongoing evidence of infection   - Consider indwelling catheter until able to perform straight cath     # Hx DVT/PE - chronic anticoagulation  - PTA Eliquis- currently held; Lovenox ppx per primary team   - Ultimately plan for indefinite anti-coagulation given long history of clotting. See Hematology note from 6/14/2024.      # MDD/SHIVAM  # Insomnia   # RLS   - PTA scheduled Buspar, Pristiq, gabapentin, Seroquel, ramelteon   - PTA prn Trazodone, Ambien      # Hx migraines  - PTA Topamax, propranolol    Bharti Rich DO  [facility]  Gynecology Oncology Resident, PGY-2  05/30/2025 6:55 AM    To reach the GYNECOLOGY ONCOLOGY team for this patient, please page [phone] or search Gynecologic Oncology Resident Group on Vocera.

## 2025-05-30 NOTE — PROVIDER NOTIFICATION
05/30/25 0520   Significant Event   Significant Event Critical result/value   Critical Test Results/Notification   Critical Lab Result (Lab Name and Value) Hgb 6.4   What Time Did The Lab Notify You? 0517   Provider Notified yes   Date of Provider Notification 05/30/25   Time of Provider Notification 0519   Mechanism of Provider notification page   What Provider Did You Notify? Dr Kristen Pineda   Response aware;orders obtained

## 2025-05-30 NOTE — PROVIDER NOTIFICATION
Provider notification;    Doctor [name unknown/ on-call colorectal surgery provider] notified at approximately 0045 by the circulating RN Via [employer].    Reason for notification: Low blood pressures with MAPs below 65. Pt declines to answer whether she notices any symptoms, but is able to talk at other times when she would like to speak. Pt arouses spontaneously.    Plan: Give Narcan 0.2 mg and continue to monitor.

## 2025-05-30 NOTE — PROGRESS NOTES
[facility] Nurse Inpatient Assessment     Consulted for: ileostomy  5/21: please aleah for end colostomy scheduled surgery 5/28 5/29: New consult for colostomy    [facility] nurse follow-up plan: daily    Patient History (according to provider note(s):      Mrs. Granados is a 40 year old female with history of left ovarian cancer and peritoneal carcinomatosis s/p debulking and diverting loop ileostomy, rectovaginal fistula s/p open intersphincteric proctectomy, takedown of DLI, appendectomy, end colostomy on 5/28/25.     Assessment:      Areas visualized during today's visit: Focused: and Abdomen    Assessment of new end Colostomy:  Diagnosis Pertinent to Stoma: ovarian cancer      Surgery Date: 5/28  Surgeon:Gerson Carpenter       [facility]: [facility]  Pouching system in place on assessment today: Coloplast two piece and cut to fit  Pouch barrier status: intact  Pouch last changed/wear time: 1 day  Reason for pouch change today: pouch not changed today  Effectiveness of current pouching/ supply plan: Effective  Change made with ostomy management today: No  Pouching system placed today: Coloplast two piece, cut to fit, and barrier ring   Supplies: at bedside    Last photo: 5/29  Stoma location: LUQ  Stoma size: 1-1/2 inches  Stoma appearance: pink-red, round, moist, good turgor, and protruberant  Mucocutaneous junction:  intact  Peristomal complication(s): none   Output: bowel sweat  Output volume emptied during visit: 0 ml  Abdominal assessment: Soft and Distended  Surgical site(s): dressing dry and intact  NG still in place? No  Pain: Cramping and Sharp  Is patient still on a PCA? Yes    Ostomy education assessment:  Participant of teaching session today: patient   Education completed today: Pouching system assessment   Educational materials/methods: Verbal  Education still needed: Pouch change return demonstration, When to seek medical attention, Low fiber diet , Odor/flatus  management , Infection prevention/hygiene , Hernia prevention, Lifestyle adjustments , and Discharge instructions  Learning Comprehension:   Psychosocial assessment: Attentive. Pt had a loop ileostomy prior to this surgery and was independent with care.  Patient readiness for education today: observing and attentive  Following today's visit: patient  is able to demonstrate;         1. How to empty their pouch? Yes        2. How to change their pouch? Yes        3. How to read and record intake and output correctly? Yes  Preparation for discharge completed: Discussed how to order supplies after discharge  and Ordered samples from  after gaining consent from patient/caregiver  Preparation for discharge still needed: Placed prescription recommendations in discharge navigator for MD to sign, Ensured patient has extra supplies for discharge, Discussed how and when to make an outpatient WOC nurse appointment after discharge, Prepared for discharge home with home care, and Discuss signs/symptoms of when to seek medical attention  Pt support system on discharge: Pt lives alone  WOC recommend home care? No  Face to face time: 15 minutes         Treatment Plan:     LUQ Colostomy pouching plan:   Pouching system: ostomy supplies pouches: Coloplast Sensura Click ostomy supplies barrier: Coloplast Sensura Click flat  Accessories used: WOC ostomy accessories: Coloplast barrier ring   Frequency of pouch changes: Twice weekly  WOC follow up plan: Daily Monday-Friday (as able)  Bedside RN interventions: Change pouch PRN if leaking using the supplies above, Empty pouch when 1/3 to 1/2 full, ensure to clean pouch outlet after emptying to prevent odor, Notify WOC for ongoing pouch leakage, Document stoma appearance and output volume, color, and consistency every shift, and Encourage patient to empty pouch with assist     Orders: Updated    RECOMMEND PRIMARY TEAM ORDER: None, at this time  Education provided: plan of  care  Discussed plan of care with: Patient and Nurse Practitioner  Notify [facility] if wound(s) deteriorate.  Nursing to notify the Provider(s) and re-consult the [facility] Nurse if new skin concern.    DATA:     Current support surface: Standard  Standard gel mattress (Isoflex)  Containment of urine/stool: Incontinent pad in bed and Ileostomy pouch  BMI: Body mass index is 22.78 kg/m .   Active diet order: Orders Placed This Encounter      Clear Liquid Diet     Output: I/O last 3 completed shifts:  In: 2296.1 [I.V.:1414.7]  Out: 2635 [Urine:2450; Drains:185]     Labs:   Recent Labs   Lab 05/30/25  0442 05/27/25  0434 05/26/25  0421   ALBUMIN  --   --  3.8   PREALB  --   --  25.7   HGB 6.4*   < > 9.8*   INR  --   --  1.29*   WBC 6.7   < > 5.4    < > = values in this interval not displayed.     Pressure injury risk assessment:   Sensory Perception: 4-->no impairment  Moisture: 3-->occasionally moist  Activity: 2-->chairfast  Mobility: 3-->slightly limited  Nutrition: 2-->probably inadequate  Friction and Shear: 3-->no apparent problem  Terry Score: 17      Pager no longer in use, please contact through [place] group: [facility] Nurse [place]    Dept. Office Number: [phone]

## 2025-05-30 NOTE — PROGRESS NOTES
Brief Progress Note    To bedside for evening check-in.  Marizol is doing better this evening.  She reports that she was very relieved to see the palliative care team today and have her medications restarted.  When asked how she is doing, she reports that she is so-so.  She was able to dangle at the edge of the bed 3 times today, and was really proud of her cell for that.  History of a more strained relationship with her mother but is happy to have her around and feels like she is supportive at this time.  No other specific concerns. On clear liquid diet as per colorectal service and tolerating that okay.  Cat still in place with good output.     Appreciate excellent postoperative cares per primary service.  Our team continues to be available and plan to follow along closely.    Bharti Rich DO  [facility]  Gynecology Oncology Resident, PGY-2  05/29/2025 9:41 PM    To reach the GYNECOLOGY ONCOLOGY team for this patient, please page [phone] or search Gynecologic Oncology Resident Group on Vocera.

## 2025-05-30 NOTE — PROGRESS NOTES
Care Management Follow Up    Length of Stay (days): 10    Expected Discharge Date: 06/02/2025     Concerns to be Addressed:       Patient plan of care discussed at interdisciplinary rounds: Yes    Anticipated Discharge Disposition: Home, Home Care  Anticipated Discharge Services: Home Care  Anticipated Discharge DME: None    Patient/family educated on Medicare website which has current facility and service quality ratings: yes  Education Provided on the Discharge Plan: No  Patient/Family in Agreement with the Plan:      Referrals Placed by CM/SW: Internal Clinic Care Coordination, Homecare  Private pay costs discussed: not at this encounter    Discussed  Partnership in Safe Discharge Planning  document with patient/family: No     Handoff Completed: Yes, [facility] PCP: Internal handoff referral completed    Additional Information:  Mrs. Granados is a 40 year old female with history of left ovarian cancer and peritoneal carcinomatosis s/p debulking and diverting loop ileostomy, rectovaginal fistula s/p open intersphincteric proctectomy, takedown of DLI, appendectomy, end colostomy on 5/28/25.     Patient had low Hgb 6.4 and received unit PRBC and had Narcan x2 overnight. Having issues with acute pain management. Palliative following and met with patient today.   Anticipate MABLE Monday.     Patient was accepted by [facility]. RNCC called and spoke with Esthela who confirmed they are following and aware of MABLE.    [facility] SN[facility]  [address] [place] [address]  [place] [address]   P:[phone]   F: [phone]    Per primary RNCC (see consult 5/28) patient also completed a [place] [employer] application, requested help submitting application. Pt requires a letter of recommendation. Primary RNCC will ask primary MD for assistance with letter of recommendation.      Next Steps:   [] HC confirmed, orders placed.  [] Confirm letter of recommendation is completed   [] Follow for dispo  needs    Enma Umanzor RNCC Float  5/30/2025  Nurse Coordinator    Covering for 5A  Phone [phone]    Social Work and Care Management Department   SEARCHABLE in AMCOM - search CARE COORDINATOR   [facility] [phone]) Saturday & Sunday; [phone]) [facility] Recognized Holidays   Units: 5A Onc 5201 - 5219 RNCC,  5A Onc 5220 thru 5240 RNCC, 5C OFFSERVICE [phone] RNCC & 5C OFF SERVICE [phone] RNCC   Units: 6B Vocera, 6C Card 6401 thru 6420 RNCC, 6C Card 6502 thru 6514 RNCC & 6C Card 6515 thru 6519 RNCC    Units: 7A SOT RNCC Vocera, 7B Med Surg Vocera, 7C Med Surg 7401 thru 7418 RNCC & 7C Med Surg 7502 thru 7521 RNCC   Units: 6A Vocera & 4A CVICU Vocera, 4C MICU Vocera, and 4E SICU Vocera     Units: 5 Ortho Vocera & 5 Med Surg Vocera    Units: 6 Med Surg Vocera & 8 Med Surg Vocera

## 2025-05-30 NOTE — PROGRESS NOTES
[facility]    Progress Note - Colorectal Surgery Service       Date of Admission:  5/18/2025    Assessment & Plan: Surgery   Mrs. Granados is a 40 year old female with history of left ovarian cancer and peritoneal carcinomatosis s/p debulking and diverting loop ileostomy, rectovaginal fistula s/p open intersphincteric proctectomy, takedown of DLI, appendectomy, end colostomy on 5/28/25.     Post-operative course remarkable for uncontrolled pain due to opioid tolerance.     - Continue PCA to 0.4mg q10min and PTA methadone 5mg BID, palliative care consulted, appreciate recommendations  - Continue tylenol and hydroxyzine   - Clear liquid diet and TPN  - Continue prn antiemetics   - Continue waite per gyn onc   - Continue AQUILINO drain   - Continue PTA buspar, desvenlafaxine, gabapentin, methylphenidate, ramelteon, topiramate, trazodone, zolpidem   - Continue pantoprazole  - Encourage ambulation QID, incentive spirometer use        Diet: Clear Liquid Diet  parenteral nutrition - ADULT compounded formula CYCLE    DVT Prophylaxis: Enoxaparin (Lovenox) SQ  Waite Catheter: PRESENT, indication: Surgical procedure  Lines: PRESENT      Port a Cath 10/31/24 Single Lumen Right Chest wall-Site Assessment: WDL      Drains: PRESENT         - 1 Closed/Suction Drain(s)    - 1 Open Drain(s)   Cardiac Monitoring: None  Code Status: Full Code      Clinically Significant Risk Factors            # Hypercalcemia: Highest iCa = 5.5 mg/dL in last 2 days, will monitor as appropriate  # Hypomagnesemia: Lowest Mg = 1.1 mg/dL in last 2 days, will replace as needed   # Hypoalbuminemia: Lowest albumin = 3.3 g/dL at 5/21/2025  5:47 AM, will monitor as appropriate   # Thrombocytopenia: Lowest platelets = 110 in last 2 days, will monitor for bleeding               # Severe Malnutrition: based on nutrition assessment and treatment provided per dietitian's recommendations.    # Financial/Environmental Concerns:  unable to afford rent/mortgage;unemployed  # Asthma: noted on problem list        Social Drivers of Health   Depression: Risk of Self Harm (5/4/2025)    Received from [facility]    Depression (PHQ-9)     Last PHQ-9 Score: 27     Thoughts of self harm: Nearly every day   Housing Stability: High Risk (5/19/2025)    Housing Stability     Do you have housing? : No     Are you worried about losing your housing?: No   Stress: Stress Concern Present (5/13/2024)    [employer] - Occupational Stress Questionnaire     Feeling of Stress : Very much   Social Connections: Moderately Isolated (5/13/2024)    Social Connection and Isolation Panel [NHANES]     Frequency of Communication with Friends and Family: Three times a week     Frequency of Social Gatherings with Friends and Family: Once a week     Attends Baptism Services: Never     Active Member of Clubs or Organizations: No     Attends Club or Organization Meetings: Never     Marital Status: Living with partner         Disposition Plan          The patient's care was discussed with the Attending Physician, Dr. Velasquez and Chief Resident/Fellow, Dr. Grider.    Zuleyma Hercules MD  General Surgery PGY1  [facility]  Non-urgent messages: Securely message with [employer] (more info)  Text page via [employer] Paging/Directory     ______________________________________________________________________    Interval History   Overnight, she was hypotensive to 84/45 (MAP 59) HR  and RR 7 groggy and slow to respond, improvement in mental status and RR with naloxone 0.2mg x2 and decreasing PCA from 0.6 to 0.4mg without continuous rate, minimal improvement in BP to 94/57 after 500ml bolus. Hgb 6.4 this AM, 1u pRBC started.    This AM she reports diffuse abdominal pain. No nausea/vomiting, no gas or stool via ostomy.     Physical Exam   Vital Signs: Temp: 98.7  F (37.1  C) Temp src: Oral BP:  94/47 Pulse: 106   Resp: 13 SpO2: 98 % O2 Device: Nasal cannula Oxygen Delivery: 1 LPM  Weight: 136 lbs 14.4 oz  Intake/Output Summary (Last 24 hours) at 5/29/2025 0940  Last data filed at 5/29/2025 0907  Gross per 24 hour   Intake 6880.3 ml   Output 2323 ml   Net 4557.3 ml     General Appearance: In acute distress due to pain  Respiratory: nonlabored breathing on room air   Cardiovascular: tachycardic to 106   GI: soft, tender to palpation throughout, non distended, pink and well perfused ostomy with bowel sweat in bag, no stool or gas output, dressings clean/dry/intact, AQUILINO drain with serosanguinous output 175ml/24 hrs  Skin: no rashes      Data     I have personally reviewed the following data over the past 24 hrs:    6.7  \   6.4 (LL)   / 110 (L)     139 107 16.3 /  189 (H)   3.5 27 0.76 \     Procal: N/A CRP: N/A Lactic Acid: 1.3         Imaging results reviewed over the past 24 hrs:   No results found for this or any previous visit (from the past 24 hours).

## 2025-05-31 LAB
CREAT SERPL-MCNC: 0.68 MG/DL (ref 0.51–0.95)
EGFRCR SERPLBLD CKD-EPI 2021: >90 ML/MIN/1.73M2
ERYTHROCYTE [DISTWIDTH] IN BLOOD BY AUTOMATED COUNT: 13.5 % (ref 10–15)
HCT VFR BLD AUTO: 24.3 % (ref 35–47)
HGB BLD-MCNC: 8 G/DL (ref 11.7–15.7)
MAGNESIUM SERPL-MCNC: 1.3 MG/DL (ref 1.7–2.3)
MCH RBC QN AUTO: 32.3 PG (ref 26.5–33)
MCHC RBC AUTO-ENTMCNC: 32.9 G/DL (ref 31.5–36.5)
MCV RBC AUTO: 95 FL (ref 78–100)
MCV RBC AUTO: 98 FL (ref 78–100)
PHOSPHATE SERPL-MCNC: 2.8 MG/DL (ref 2.5–4.5)
PLATELET # BLD AUTO: 119 10E3/UL (ref 150–450)
PLATELET # BLD AUTO: 120 10E3/UL (ref 150–450)
RBC # BLD AUTO: 2.48 10E6/UL (ref 3.8–5.2)
WBC # BLD AUTO: 7.5 10E3/UL (ref 4–11)

## 2025-05-31 PROCEDURE — 84100 ASSAY OF PHOSPHORUS: CPT | Performed by: SURGERY

## 2025-05-31 PROCEDURE — 250N000011 HC RX IP 250 OP 636: Performed by: OBSTETRICS & GYNECOLOGY

## 2025-05-31 PROCEDURE — 250N000013 HC RX MED GY IP 250 OP 250 PS 637

## 2025-05-31 PROCEDURE — 85014 HEMATOCRIT: CPT

## 2025-05-31 PROCEDURE — 250N000009 HC RX 250: Performed by: SURGERY

## 2025-05-31 PROCEDURE — 85049 AUTOMATED PLATELET COUNT: CPT | Performed by: SURGERY

## 2025-05-31 PROCEDURE — 250N000013 HC RX MED GY IP 250 OP 250 PS 637: Performed by: PHYSICIAN ASSISTANT

## 2025-05-31 PROCEDURE — 250N000013 HC RX MED GY IP 250 OP 250 PS 637: Performed by: SURGERY

## 2025-05-31 PROCEDURE — 250N000011 HC RX IP 250 OP 636

## 2025-05-31 PROCEDURE — 250N000013 HC RX MED GY IP 250 OP 250 PS 637: Performed by: STUDENT IN AN ORGANIZED HEALTH CARE EDUCATION/TRAINING PROGRAM

## 2025-05-31 PROCEDURE — 99232 SBSQ HOSP IP/OBS MODERATE 35: CPT | Mod: GC | Performed by: OBSTETRICS & GYNECOLOGY

## 2025-05-31 PROCEDURE — 83735 ASSAY OF MAGNESIUM: CPT | Performed by: SURGERY

## 2025-05-31 PROCEDURE — B4185 PARENTERAL SOL 10 GM LIPIDS: HCPCS | Performed by: SURGERY

## 2025-05-31 PROCEDURE — 82565 ASSAY OF CREATININE: CPT | Performed by: SURGERY

## 2025-05-31 PROCEDURE — 85027 COMPLETE CBC AUTOMATED: CPT | Performed by: SURGERY

## 2025-05-31 PROCEDURE — 120N000002 HC R&B MED SURG/OB UMMC

## 2025-05-31 RX ORDER — ZOLPIDEM TARTRATE 5 MG/1
5 TABLET ORAL EVERY 24 HOURS
Status: DISCONTINUED | OUTPATIENT
Start: 2025-05-31 | End: 2025-05-31

## 2025-05-31 RX ORDER — QUETIAPINE FUMARATE 200 MG/1
200 TABLET, FILM COATED ORAL EVERY 24 HOURS
Status: DISCONTINUED | OUTPATIENT
Start: 2025-05-31 | End: 2025-06-06 | Stop reason: HOSPADM

## 2025-05-31 RX ORDER — HEPARIN SODIUM (PORCINE) LOCK FLUSH IV SOLN 100 UNIT/ML 100 UNIT/ML
5-10 SOLUTION INTRAVENOUS
Status: DISCONTINUED | OUTPATIENT
Start: 2025-05-31 | End: 2025-06-06 | Stop reason: HOSPADM

## 2025-05-31 RX ORDER — MAGNESIUM SULFATE HEPTAHYDRATE 40 MG/ML
2 INJECTION, SOLUTION INTRAVENOUS ONCE
Status: COMPLETED | OUTPATIENT
Start: 2025-05-31 | End: 2025-05-31

## 2025-05-31 RX ORDER — RAMELTEON 8 MG/1
8 TABLET ORAL EVERY 24 HOURS
Status: DISCONTINUED | OUTPATIENT
Start: 2025-05-31 | End: 2025-06-06 | Stop reason: HOSPADM

## 2025-05-31 RX ORDER — HEPARIN SODIUM,PORCINE 10 UNIT/ML
5-10 VIAL (ML) INTRAVENOUS
Status: DISCONTINUED | OUTPATIENT
Start: 2025-05-31 | End: 2025-06-06 | Stop reason: HOSPADM

## 2025-05-31 RX ORDER — TRAZODONE HYDROCHLORIDE 150 MG/1
300 TABLET ORAL
Status: DISCONTINUED | OUTPATIENT
Start: 2025-05-31 | End: 2025-06-06 | Stop reason: HOSPADM

## 2025-05-31 RX ORDER — MAGNESIUM SULFATE HEPTAHYDRATE 40 MG/ML
4 INJECTION, SOLUTION INTRAVENOUS ONCE
Status: DISCONTINUED | OUTPATIENT
Start: 2025-05-31 | End: 2025-05-31

## 2025-05-31 RX ORDER — TRAZODONE HYDROCHLORIDE 150 MG/1
300 TABLET ORAL EVERY 24 HOURS
Status: DISCONTINUED | OUTPATIENT
Start: 2025-05-31 | End: 2025-05-31

## 2025-05-31 RX ORDER — HEPARIN SODIUM,PORCINE 10 UNIT/ML
5-10 VIAL (ML) INTRAVENOUS EVERY 24 HOURS
Status: DISCONTINUED | OUTPATIENT
Start: 2025-05-31 | End: 2025-06-06 | Stop reason: HOSPADM

## 2025-05-31 RX ORDER — ZOLPIDEM TARTRATE 5 MG/1
5 TABLET ORAL
Status: DISCONTINUED | OUTPATIENT
Start: 2025-05-31 | End: 2025-06-06 | Stop reason: HOSPADM

## 2025-05-31 RX ADMIN — Medication 5 ML: at 13:43

## 2025-05-31 RX ADMIN — MAGNESIUM SULFATE HEPTAHYDRATE 2 G: 2 INJECTION, SOLUTION INTRAVENOUS at 10:31

## 2025-05-31 RX ADMIN — ENOXAPARIN SODIUM 40 MG: 40 INJECTION SUBCUTANEOUS at 20:39

## 2025-05-31 RX ADMIN — LORAZEPAM 1 MG: 0.5 TABLET ORAL at 08:26

## 2025-05-31 RX ADMIN — ACETAMINOPHEN 1000 MG: 500 TABLET ORAL at 17:15

## 2025-05-31 RX ADMIN — PANTOPRAZOLE SODIUM 40 MG: 40 TABLET, DELAYED RELEASE ORAL at 08:26

## 2025-05-31 RX ADMIN — PROCHLORPERAZINE MALEATE 10 MG: 5 TABLET ORAL at 06:21

## 2025-05-31 RX ADMIN — BUSPIRONE HYDROCHLORIDE 30 MG: 15 TABLET ORAL at 08:26

## 2025-05-31 RX ADMIN — TOPIRAMATE 50 MG: 50 TABLET, FILM COATED ORAL at 20:39

## 2025-05-31 RX ADMIN — ACETAMINOPHEN 1000 MG: 500 TABLET ORAL at 10:31

## 2025-05-31 RX ADMIN — ZOLPIDEM TARTRATE 5 MG: 5 TABLET, FILM COATED ORAL at 21:30

## 2025-05-31 RX ADMIN — MAGNESIUM SULFATE HEPTAHYDRATE: 500 INJECTION, SOLUTION INTRAMUSCULAR; INTRAVENOUS at 20:41

## 2025-05-31 RX ADMIN — MAGNESIUM SULFATE HEPTAHYDRATE: 500 INJECTION, SOLUTION INTRAMUSCULAR; INTRAVENOUS at 00:29

## 2025-05-31 RX ADMIN — QUETIAPINE FUMARATE 200 MG: 200 TABLET ORAL at 21:31

## 2025-05-31 RX ADMIN — METHADONE HYDROCHLORIDE 5 MG: 5 SOLUTION ORAL at 08:30

## 2025-05-31 RX ADMIN — ACETAMINOPHEN 1000 MG: 500 TABLET ORAL at 04:05

## 2025-05-31 RX ADMIN — PANTOPRAZOLE SODIUM 40 MG: 40 TABLET, DELAYED RELEASE ORAL at 20:38

## 2025-05-31 RX ADMIN — PROPRANOLOL HYDROCHLORIDE 10 MG: 10 TABLET ORAL at 20:38

## 2025-05-31 RX ADMIN — BUSPIRONE HYDROCHLORIDE 30 MG: 15 TABLET ORAL at 20:38

## 2025-05-31 RX ADMIN — TOPIRAMATE 50 MG: 50 TABLET, FILM COATED ORAL at 08:26

## 2025-05-31 RX ADMIN — LORAZEPAM 1 MG: 0.5 TABLET ORAL at 21:30

## 2025-05-31 RX ADMIN — METHYLPHENIDATE HYDROCHLORIDE 15 MG: 5 TABLET ORAL at 08:26

## 2025-05-31 RX ADMIN — PROPRANOLOL HYDROCHLORIDE 10 MG: 10 TABLET ORAL at 08:26

## 2025-05-31 RX ADMIN — OLIVE OIL AND SOYBEAN OIL 250 ML: 16; 4 INJECTION, EMULSION INTRAVENOUS at 00:29

## 2025-05-31 RX ADMIN — TRAZODONE HYDROCHLORIDE 300 MG: 150 TABLET ORAL at 21:30

## 2025-05-31 RX ADMIN — PROCHLORPERAZINE MALEATE 10 MG: 5 TABLET ORAL at 21:35

## 2025-05-31 RX ADMIN — OLIVE OIL AND SOYBEAN OIL 250 ML: 16; 4 INJECTION, EMULSION INTRAVENOUS at 20:40

## 2025-05-31 RX ADMIN — DESVENLAFAXINE 150 MG: 50 TABLET, FILM COATED, EXTENDED RELEASE ORAL at 20:39

## 2025-05-31 RX ADMIN — RAMELTEON 8 MG: 8 TABLET ORAL at 20:40

## 2025-05-31 RX ADMIN — ACETAMINOPHEN 1000 MG: 500 TABLET ORAL at 21:30

## 2025-05-31 RX ADMIN — Medication: at 13:35

## 2025-05-31 RX ADMIN — METHADONE HYDROCHLORIDE 5 MG: 5 SOLUTION ORAL at 20:39

## 2025-05-31 ASSESSMENT — ACTIVITIES OF DAILY LIVING (ADL)
ADLS_ACUITY_SCORE: 53

## 2025-05-31 NOTE — PROGRESS NOTES
Gynecology Oncology Progress Note      HD#13 peristomal skin infection, decreased PO intake, POD#3 s/p XL-interspincteric proctectomy, loop ileostomy takedown and creation of end colostomy, takedown and repair of rectovaginal fistula, appendectomy, cystoscopy with bilateral ureteral stent insertion and removal      Disease:  stage IIIb low grade peritoneal carcinoma      24 hour events:   - Lovenox held by primary team  - Decreased respiratory rate > sedating medicine held, Dilaudid PCA decreased 1.8mg q1hr     Subjective: Doing ok  this morning, pain remains biggest challenge. Patient is worried PCA pump is not dosing appropriately as she has been timing the intervals between lockout periods. No episodes of nausea/vomiting, dizziness/lightheadedness, dyspnea, chest pain, or shortness of breath. Tolerating CLD without nausea or vomiting, continues on TPN.      A lot of concerns about inconsistent communication between the teams and frequent changes to her medications which is resulting in uncontrolled pain. Frequent interruption overnight so she isn't sleeping well between that and the pain.     Objective:   Vitals:    05/31/25 0000 05/31/25 0408 05/31/25 0745 05/31/25 1209   BP: 102/60 102/61 92/51 108/67   BP Location:  Right arm Right arm Right arm   Cuff Size:  Adult Regular     Pulse: 84 106 95 96   Resp: (!) 6 (!) 7 (!) 8 (!) 7   Temp:  98.2  F (36.8  C) 98.2  F (36.8  C) 97.7  F (36.5  C)   TempSrc:  Oral Oral Oral   SpO2: 98% 97% 100% 100%   Weight:       Height:         General: NAD, appears uncomfortable intermittently  CV: Well-perfused  Resp: Breathing comfortably on room air  Abdomen: Soft, diffusely tender R > L, nondistended. VML incision appears c/d/I. Prior ileostomy site mildly saturated with serosanguinous drainage on bandage, currently packed. RLQ drain with serosanguinous output. Ostomy slightly edematous but pink and everted. Bowel sweat within ostomy bag, no output.     I/O last 3 completed  shifts:  In: 788.5 [P.O.:120; I.V.:668.5]  Out: 2110 [Urine:1975; Drains:135]    Intake/Output (24 Hrs // Since [place])  TPN NR      Intake/Output Summary (Last 24 hours) at 5/31/2025 1440  Last data filed at 5/31/2025 1418  Gross per 24 hour   Intake 1275 ml   Output 2635 ml   Net -1360 ml     Results for orders placed or performed during the hospital encounter of 05/18/25 (from the past 24 hours)   Creatinine   Result Value Ref Range    Creatinine 0.68 0.51 - 0.95 mg/dL    GFR Estimate >90 >60 mL/min/1.73m2   Platelet count   Result Value Ref Range    Platelet Count 120 (L) 150 - 450 10e3/uL    MCV 95 78 - 100 fL   Phosphorus   Result Value Ref Range    Phosphorus 2.8 2.5 - 4.5 mg/dL   Magnesium   Result Value Ref Range    Magnesium 1.3 (L) 1.7 - 2.3 mg/dL   CBC with platelets   Result Value Ref Range    WBC Count 7.5 4.0 - 11.0 10e3/uL    RBC Count 2.48 (L) 3.80 - 5.20 10e6/uL    Hemoglobin 8.0 (L) 11.7 - 15.7 g/dL    Hematocrit 24.3 (L) 35.0 - 47.0 %    MCV 98 78 - 100 fL    MCH 32.3 26.5 - 33.0 pg    MCHC 32.9 31.5 - 36.5 g/dL    RDW 13.5 10.0 - 15.0 %    Platelet Count 119 (L) 150 - 450 10e3/uL        Assessment: Marizol Granados is a 40 year old female with stage IIIB low grade peritoneal carcinoma now s/p ileostomy takedown, creation of end colostomy with colorectal surgery, and repair of rectovaginal fistula admitted for post-op care awaiting return of bowel function and struggling with pain management.     # Stage IIIB low grade peritoneal carcinoma  # Rectovaginal fistula, repaired   # Neurogenic constipation  # Postoperative state   - Diet: As per Colorectal surgery recommendations, currently regular diet with TPN until tolerating sufficient calories  - Activity: as per Colorectal recommendations: Perineal precautions: No direct pressure on perineum, ok for patient to lay on her side, limit sitting up on chair for prolonged periods of time, ambulate at least 4x daily. Reports she was told to use seat cushion  when sitting, we will get her one of those.   Pain management: as per palliative care, see their daily note, also below  - Bladder: has urinary retention and performs intermittent straight catheterization. Maintain indwelling waite catheter until nearing discharge. If want to remove sooner, needs to be discussed with patient as prior plan was until discharge.    - Frustrations with multiple providers and mixed messages. We will contact Colorectal service to offer to take over as primary. Please communicate to Gyn Onc team any new recommendations, so we can all be on the same page with the patient.    - VTE on Lovenox, was held with drop in hemoglobin, resume as soon as stable given history of VTE     # Anorectal pain  # Cancer related fatigue   # Postoperative pain   - Pain management per Palliative Care  - Followed by palliative outpatient with Dr. Norton, last seen 4/24; inpatient team consulted, see their note given frequent changes. Currently on Methadone, gabapentin, Dilaudid PCA, Tylenol.   - Palliatiive managing. Please discuss all changes directly with palliative care   - PTA Ritalin for fatigue, pain management as described   - Prior to admission: Dilaudid, Butrans patch, gabapentin    # Urinary retention, neurogenic from prior surgery with urinary retention   - managed with Intermittent straight cath, plan is to continue indwelling catheter until discharge unless discussed with patient prior  - History of recurrent UTI last 2/25 with cipro, culture resulted with pansensitive staph aureus   - UA on admission with +LE, +nitrites collected post CTX; urine culture klebsiella on admission   - s/p 6d ceftriaxone, discontinued prior to OR given no ongoing evidence of infection   - Consider indwelling catheter until able to perform straight cath     # Hx DVT/PE - chronic anticoagulation  - PTA Eliquis- currently held; Lovenox ppx held per primary team, restart as able due to hx PE  - Ultimately plan for indefinite  anti-coagulation on home Eliquis given long history of clotting. See Hematology note from 6/14/2024.      # MDD/SHIVAM  # Insomnia   # RLS   - PTA scheduled Buspar, Pristiq, gabapentin, Seroquel, ramelteon   - PTA prn Trazodone, Ambien   - Avoid changing or holding any of these medications as much as possible     # Hx migraines  - PTA Topamax, propranolol    Micah Pisano MD   [employer] [facility] [employer]   Gynecologic Oncology, PGY-2  Gyn Onc Pager: [phone]    Gynecologic Oncology Attending Attestation  I have seen the patient on rounds with the team. I spent >30 min in discussion with her and another 25min in chart review and documentation. Challenges with multiple providers, she also will at times report different history from what is shared directly with us so we will work to streamline communication to be sure we are all consistent. Avoid stopping medications for somnolence unless somnolent or unarousable with associated hypoxia (to differentiate from just being sound asleep due to sleep deprivation). Avoid interruptions at night.   I have discussed the patient and plan of care with the resident as documented in the note above, which I have edited as necessary.    Lee Samuel MD    Gynecologic Oncology

## 2025-05-31 NOTE — PROGRESS NOTES
"[place] Note   05/30/25  9:02 PM     Paged given patient with low BP and low MAPs <60. Presented at bedside. Patient was resting in bed. Patient reports her BP is low at baseline. Denies N/V. Continues to report abdominal pain despite pain meds. Appropriate response to questions. Of note patient required narcan x2 last night.     BP (!) 86/47   Pulse 107   Temp 98.3  F (36.8  C) (Oral)   Resp 18   Ht 1.651 m (5' 5\")   Wt 62.1 kg (136 lb 14.4 oz)   LMP  (LMP Unknown)   SpO2 95%   BMI 22.78 kg/m      General Appearance:  NAD, alert and oriented   Respiratory: NLB on 1L  Cardiovascular: Intermittent tachycardia   GI: soft, tender to palpation throughout, non distended, dressings clean/dry/intact, AQUILINO drain with serosanguinous output    Plan   Recommend smaller BP cuff for more accurate pressures (changed while I was in room and BP improved)   500cc bolus   Consider changing parameters to MAP >60, will discuss with day team   Will check on chart during night given patient required narcan last night     Keily Chao DO, MS   General Surgery, PGY 1    "

## 2025-05-31 NOTE — PLAN OF CARE
Goal Outcome Evaluation:      Plan of Care Reviewed With: patient    Overall Patient Progress: no changeOverall Patient Progress: no change    Outcome Evaluation: [id number]    POD: #2 = 5/30  Complications:   Pain:  Not controlled - On PCA Dilaudid. Started IV Lidocaine this shift, with frequent VS and cardiac monitoring initiated. Denied LAST symptoms. Pain improved from 9/10 to 8/10 after starting Lidocaine.   Neuro:WDL. A&O x 4. Arouses spontaneously however frequently drowsy due to meds. Stated that she's hard of hearing and sometimes doesn't know when staff is talking to her.   CV:.WDL except, rhythm - intermittently tachycardic   Resp:WDL except, rhythm/pattern - on 1L O2 per NC, Narcan given twice overnight for low respirations/elevated EtCO2.   GI:.WDL except, GI symptoms - abdominal discomfort. Colostomy in place with no stool yet.   :.WDL except, voiding ability/characteristics Cat intact with adequate UOP. Yellow UOP observed.  Skin: .WDL except, integrity - midline incision, intact with island dressing, urethral meatus surgical site intact, with dressing and mesh panties, abd surgical site intact, AQUILINO drain intact.   Activity Assistance: assistance, 2 people  Safety/Falls Risk: Level of Risk: Moderate Risk    New this shift: started IV Lidocaine. Hemoglobin 6.4 - one unit PRBC given without complication, recheck 7.8. Phosphorus replacement initiated.

## 2025-05-31 NOTE — PLAN OF CARE
Goal Outcome Evaluation:      Plan of Care Reviewed With: patient    Overall Patient Progress: no change    Outcome Evaluation: [id number]    Level of Care: Acute     Summary Type: 5A Post Op Report   POD: #3 = 5/31  Pain:  Not controlled - On PCA Dilaudid with 0.4 mg dose, pain averaging 8-9/10  Neuro:WDL. A&O x 4.   CV:.WDL except, rhythm - intermittently tachycardic   Resp:WDL except, rhythm/pattern   GI:.WDL except, GI symptoms - abdominal discomfort. Colostomy in place with no stool yet. Pain from incision and AQUILINO drain along with muscle spasms in her abd.   :.WDL except, voiding ability/characteristics. Cat intact with adequate UOP. Yellow UOP observed.  Skin: .WDL except, integrity - midline incision, intact with island dressing, urethral meatus surgical site intact, with dressing and mesh panties, abd surgical site intact, AQUILINO drain intact.   Activity Assistance: assistance, 2 people- not out of bed but was encouraged to walk many times today  Diet: regular and tolerating well.    Would care complete for perianal incision and take down ostomy site. Xeroform changed for perianal incision. AQUILINO dressing replaced and left not secured per pt's preference. Obtained heidi cushion and pt is more comfortable with it as she lays in bed. Turned and repositioned multiple times during the shift. Respiratory rate still dips low to 6/7 at times but then corrects. Still in pain and is frustrated about her pain management. Continue with POC.

## 2025-05-31 NOTE — PROGRESS NOTES
"     Crosscover Note   05/30/25  10:54 PM     Paged given patient with increasing lethargy and decreased RR (5-8). Presented at bedside. Patient was responsive with appropriate answers. Contacted palliative medicine.      BP (!) 86/46   Pulse 81   Temp 98.3  F (36.8  C) (Oral)   Resp 18   Ht 1.651 m (5' 5\")   Wt 62.1 kg (136 lb 14.4 oz)   LMP  (LMP Unknown)   SpO2 99%   BMI 22.78 kg/m        General Appearance:  NAD, alert and oriented   Respiratory: NLB on 2L NC   Cardiovascular: Intermittent tachycardia   GI: soft, tender to palpation throughout, non distended, dressings clean/dry/intact, AQUILINO drain with serosanguinous output     Plan   Will hold all meds due now pristiq, Seroquel, rozerem, trazodone, and zolpidem   Will decrease PCA dose to 1.8mg q1hr   Given patient states she does not want narcan, will continue to monitor symptoms for now  Will check on patient in 1 hour, if resp are still low will give one time dose of narcan   Plan discussed with palliative medicine      Keily Chao DO, MS   General Surgery, PGY 1        "

## 2025-05-31 NOTE — PROGRESS NOTES
PALLIATIVE CARE PROGRESS NOTE  [facility]     Patient Name: Marizol Granados  Date of Admission: 5/18/2025   Today the patient was seen for: symptom management     Recommendations & Counseling   Recs/Discussion New for today:  There was an episode last night where RR was 7, MAPA dropped into the 50s, and she was somewhat somnolent as RN tried to awaken her for assessment.  We note however, by chart review, O2 sats were around 95% at that time.  She was given Narcan 0.2 mg x 2; surgery resident notes that she seemed to wake up better.  Patient tells me she did not feel any effect from this.  We sometimes see RR this low when the patient is catching up on sleep etc.; I am reassured by the fact that her sats were 95%.  All things considered, I recommend increasing her Dilaudid PCA dose from 0.3 mg every 10 minutes back up to 0.4 mg every 10 minutes.  (We note that it had been at 0.6 mg every 10 minutes several days ago).  Her IV lidocaine was discontinued last night during this episode.  She cannot tell me if it helped her at all.  She continues to rate her postop pain as 9/10, yet she has made some progress in that she is able to dangle at bedside, and is drinking an Ensure.  For these reasons, I do not recommend restarting IV lidocaine today.  She takes a number of her psychiatric medications at at bedtime; these were held last night.  Reviewed with Gyn Onc team and they are restarting several of these; management of Marizol's psychiatric issues are an important part of her care.  Elian Fisher MD  Palliative Medicine Consult Team  Text me via [employer]  TT: 41 minutes, with > 50% spent in C/C/E patient/family/care teams re: GOC, POC, Sx management. 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 Most recent  "imaging in March 2025 showed no evidence of disease. She is being followed by CRS for rectovaginal fistula and coordinating surgery for takedown of loop ileostomy and creation of end colostomy. Follows palliative care outpatient for pain. Our team was consulted to help with pain and anxiety.   Seen today for: GYN cancer, anxiety, postop pain   Interval History:   Clinical course as outlined above.  It is my understanding the postop care will transfer to [facility] team today.    Medications managed by palliative  APAP 975 mg q6h earnest   Methadone 5mg BID (started 5/22 PM, stopped 5/28, restarted 5/29)  Gabapentin 300mg at bedtime - stopped 5/30 after discussion  Dilaudid PCA 0.3 to 0.6 mg q10 min prn and now 0.4 mg--> 7.5 mg over 8 hours  Decreasing PCA from 0.6 to 0.4mg without continuous rate - total Dilaudid from 0600 to 2330 = ~ 17.4 mg which equals ~1 mg/hr of IV Dilaudid  Zofran 4 mg IV x1  Atarax 25 mg po x1  Narcan 0.2 mg IV x2    Patient/family narrative  Seen and examined at bedside.         Physical Exam:   Blood pressure 92/51, pulse 95, temperature 98.2  F (36.8  C), temperature source Oral, resp. rate (!) 8, height 1.651 m (5' 5\"), weight 62.1 kg (136 lb 14.4 oz), SpO2 100%.     Physical Exam  General: laying in bed, NAD   HEENT: normocephalic/atraumatic  Lungs: non labored  GI: reported post-op incisions and ostomy and drain  : Cat in place  Neuro: alert and oriented x4, fluent speech, moving all extremities  Psych: anxious, normal affect      Data Reviewed:     Imaging  No new imaging    Labs  CMP  Recent Labs   Lab 05/31/25  0040 05/30/25  1113 05/30/25  0810 05/30/25  0442 05/29/25  2129 05/29/25  0548 05/28/25 2007 05/28/25  1658 05/28/25  1521 05/28/25  0737 05/28/25  0236 05/27/25  0434 05/27/25  0317 05/26/25  0421   NA  --   --   --  139  --  139  --  138 145   < > 140 142  --  140   POTASSIUM  --   --   --  3.5  --  4.0  --  4.1 4.3   < > 4.0 4.3  --  4.5   CHLORIDE  --   --   --  107  --  " 105  --   --   --   --  105 107  --  104   CO2  --   --   --  27  --  24  --   --   --   --  25 26  --  26   ANIONGAP  --   --   --  5*  --  10  --   --   --   --  10 9  --  10   GLC  --   --  130* 189*  --  126* 146* 160* 120*   < > 105* 135*   < > 97   BUN  --   --   --  16.3  --  17.8  --   --   --   --  28.3* 27.5*  --  34.8*   CR 0.68  --   --  0.76  --  0.76  --   --   --   --  0.80 0.81  --  0.86   GFRESTIMATED >90  --   --  >90  --  >90  --   --   --   --  >90 >90  --  88   LORY  --   --   --  7.9*  --  9.1  --   --   --   --  9.5 9.8  --  10.0   MAG 1.3*  --   --  1.6* 2.0 1.1*  --   --   --   --  1.9 1.9  --  1.8   PHOS 2.8 1.8*  --  1.1*  --  5.1*  --   --   --   --  4.2 5.0*  --  5.2*   PROTTOTAL  --   --   --   --   --   --   --   --   --   --   --   --   --  6.5   ALBUMIN  --   --   --   --   --   --   --   --   --   --   --   --   --  3.8   BILITOTAL  --   --   --   --   --   --   --   --   --   --   --   --   --  0.2   ALKPHOS  --   --   --   --   --   --   --   --   --   --   --   --   --  67   AST  --   --   --   --   --   --   --   --   --   --   --   --   --  36   ALT  --   --   --   --   --   --   --   --   --   --   --   --   --  35    < > = values in this interval not displayed.     CBC  Recent Labs   Lab 05/31/25  0040 05/30/25  1236 05/30/25  0442 05/29/25  0548   WBC 7.5 8.8 6.7 12.1*   RBC 2.48* 2.48* 2.03* 2.75*   HGB 8.0* 7.8* 6.4* 8.7*   HCT 24.3* 23.8* 19.6* 26.3*   MCV 98  95 96 97 96   MCH 32.3 31.5 31.5 31.6   MCHC 32.9 32.8 32.7 33.1   RDW 13.5 13.0 13.0 12.4   *  120* 121* 110* 157     INR  Recent Labs   Lab 05/26/25  0421   INR 1.29*     Arterial Blood Gas  Recent Labs   Lab 05/28/25  1658 05/28/25  1521 05/28/25  1230   O2PER 60.0 60.0 60.0

## 2025-05-31 NOTE — PROVIDER NOTIFICATION
Provider Notification:    Action: Notified Keily Chao via [employer]     @2035: Maps continuously below 60, systolic BP not meeting lidocaine drip parameters     Response: provider rounded, LR bolus ordered, okay to gave 2000 meds, plan to reassess @2200    @2210: No change in MAPs, RR 6, pt more lethargic and slight change in mentation, lidocaine drip stopped due to LAST signs, pt is still responsive to stimuli    Response: provider rounded, pristiq, seroquel, rozerem, trazodone, & ambien held, not resuming lidocaine overnight, PCA dose decrease to 1.3 mg bumps, palliative notified, team requested holding narcan and continuing to monitor pt

## 2025-05-31 NOTE — PROGRESS NOTES
Interval progress note  3:21 PM    Presented to bedside at the request of patient.    S: Marizol has noted burning irritation and itching around her AQUILINO drain.   She also notes increased pain around the outside of her anus. It feels different than her usual pain and would like it to be looked at.  She is also worried about her pain medication regimen and that it will not be enough. She was under the impression she would get Ketamine in addition to restarting her home medications and increasing her PCA dilaudid.    O:  Patient Vitals for the past 24 hrs:   BP Temp Temp src Pulse Resp SpO2   05/31/25 1616 107/73 98.4  F (36.9  C) Oral -- 10 --   05/31/25 1209 108/67 97.7  F (36.5  C) Oral 96 (!) 7 100 %   05/31/25 0745 92/51 98.2  F (36.8  C) Oral 95 (!) 8 100 %   05/31/25 0408 102/61 98.2  F (36.8  C) Oral 106 (!) 7 97 %   05/31/25 0000 102/60 -- -- 84 (!) 6 98 %   05/30/25 2245 (!) 86/46 -- -- 81 -- --   05/30/25 2223 92/53 -- -- 95 -- 99 %   05/30/25 2200 (!) 84/48 -- -- 84 (!) 5 95 %   05/30/25 2100 (!) 82/43 -- -- 92 -- --   05/30/25 2035 (!) 86/47 -- -- -- -- --   05/30/25 2000 -- -- -- 107 -- --   05/30/25 1952 (!) 84/40 98.3  F (36.8  C) Oral 94 18 --   05/30/25 1900 (!) 77/44 -- -- 88 -- --   05/30/25 1830 (!) 67/39 -- -- 82 -- 95 %   05/30/25 1800 (!) 77/45 -- -- 94 -- 98 %   05/30/25 1745 89/58 -- -- -- -- (!) 88 %   05/30/25 1730 92/72 -- -- -- -- 98 %   05/30/25 1715 (!) 65/52 -- -- -- -- 96 %   05/30/25 1700 (!) 81/43 -- -- -- -- 99 %     Exam:  Abdomen: VML incision covered with steri strips  AQUILINO drain site: clean, intact, slightly erythematous, no induration no drainage outside of tubing. AQUILINO drain bulb with red serosanguinous fluid.  Anus: xeroform gauze in place, replaced. Skin is clean, intact, granulation tissue at the center. No drainage, induration or pus visualized.    A/P: HD#15 peristomal skin infection, POD#3 XL, Sarah, Takedown of Ileostomy, Formation of End Colostomy, appendectomy, Repair of  Rectovaginal fistula, Protectomy, Cystoscopy, Stent placement and removal    Discussed with patient that she will return to be Gyn onc primary today, and that we will continue to collaborate with colorectal surgery for postsurgical cares and palliative care for pain management.   - With regards to AQUILINO drain, site does not appear to be infected, will continue to observe for changes. Patient preemptively declines antibiotic treatment.  - With regards to anus, appears to be healing well. Updated CRS. Continue daily xeroform gauze changes.  - Discussed with patient reasoning behind pain plan and why we must slowly adjust her pain medications in the setting of her decreased respiratory rate and low blood pressures and balance her safety with pain control. Confirmed with palliative pain plan: increase dilaudid PCA to 0.4mg /10 min (max 2.4 mg/hr), restart PTA trazadone, ramelteon, seroquel, pristiq and discontinue lidocaine drip. There is no plan to start Ketamine at this time.  - Encouraged PO intake, improved MAPs and RR    Nelida Julio MD  Obstetrics and Gynecology, PGY2  05/31/2025, 4:28 PM

## 2025-05-31 NOTE — PROGRESS NOTES
Gynecology Oncology Progress Note      HD#13 peristomal skin infection, decreased PO intake, POD#4 s/p XL-interspincteric proctectomy, loop ileostomy takedown and creation of end colostomy, takedown and repair of rectovaginal fistula, appendectomy, cystoscopy with bilateral ureteral stent insertion and removal      Disease:  stage IIIb low grade peritoneal carcinoma      24 hour events:   - Gynecologic oncology primary  - Lovenox ppx restarted  - s/p palliative> restarted psych meds, dilaudid 0.4/10min    Subjective: Patient was sleeping soundly on prerounds this morning. Given prior difficulty with sleep, did not arouse. Per RN patient slept through the nights and through cares. Last used PCA at 10pm.  Toleratie    Objective:   Vitals:    05/31/25 2200 05/31/25 2300 06/01/25 0042 06/01/25 0445   BP:   96/57 104/58   BP Location:   Right arm Right arm   Cuff Size:       Pulse: 79 69 75 97   Resp: (!) 8 (!) 9 10 13   Temp:    98.9  F (37.2  C)   TempSrc:    Axillary   SpO2: 99% 99% 100% 96%   Weight:       Height:         General: NAD, sleeping soundly  CV: Well-perfused  Resp: Breathing comfortably on 2L NS  Abdomen: deferred    I/O last 3 completed shifts:  In: 1204 [P.O.:550; I.V.:654]  Out: 3505 [Urine:3375; Drains:130]    Intake/Output (24 Hrs // Since MN)  PO: 550//NR  TPN: - //963  UOP: 4325//2000  Drain: 90//10  Stool: 0/0    Results for orders placed or performed during the hospital encounter of 05/18/25 (from the past 24 hours)   CBC with platelets   Result Value Ref Range    WBC Count 4.8 4.0 - 11.0 10e3/uL    RBC Count 2.55 (L) 3.80 - 5.20 10e6/uL    Hemoglobin 8.1 (L) 11.7 - 15.7 g/dL    Hematocrit 24.1 (L) 35.0 - 47.0 %    MCV 95 78 - 100 fL    MCH 31.8 26.5 - 33.0 pg    MCHC 33.6 31.5 - 36.5 g/dL    RDW 13.2 10.0 - 15.0 %    Platelet Count 126 (L) 150 - 450 10e3/uL   Basic metabolic panel   Result Value Ref Range    Sodium 142 135 - 145 mmol/L    Potassium 3.7 3.4 - 5.3 mmol/L    Chloride 110 (H) 98 -  107 mmol/L    Carbon Dioxide (CO2) 25 22 - 29 mmol/L    Anion Gap 7 7 - 15 mmol/L    Urea Nitrogen 19.0 6.0 - 20.0 mg/dL    Creatinine 0.63 0.51 - 0.95 mg/dL    GFR Estimate >90 >60 mL/min/1.73m2    Calcium 8.2 (L) 8.8 - 10.4 mg/dL    Glucose 127 (H) 70 - 99 mg/dL   Magnesium   Result Value Ref Range    Magnesium 1.7 1.7 - 2.3 mg/dL   Phosphorus   Result Value Ref Range    Phosphorus 4.1 2.5 - 4.5 mg/dL        Assessment: Marizol Granados is a 40 year old female with stage IIIB low grade peritoneal carcinoma now s/p ileostomy takedown, creation of end colostomy with colorectal surgery, and repair of rectovaginal fistula admitted for post-op care awaiting return of bowel function and struggling with pain management. Slept through the night.     # Stage IIIB low grade peritoneal carcinoma  # Rectovaginal fistula, repaired   # Neurogenic constipation  # Postoperative state   - Diet: As per Colorectal surgery recommendations, currently regular diet with TPN until tolerating sufficient calories  - Activity: as per Colorectal recommendations: Perineal precautions: No direct pressure on perineum, ok for patient to lay on her side, limit sitting up on chair for prolonged periods of time, ambulate at least 4x daily. Reports she was told to use seat cushion when sitting, we will get her one of those.   - Wound care: Per CRS - Daily xeroform changes on rectum, daily packing changes on ostomy take down  - AQUILINO drain: Per CRS - maintain through hospitalization, check AQUILINO creatinine level prior to removal when nearing discharge  Pain management: as per palliative care, see their daily note, also below  - Bladder: has urinary retention and performs intermittent straight catheterization. Maintain indwelling waite catheter until nearing discharge.    # Anorectal pain  # Cancer related fatigue   # Postoperative pain   - Pain management per Palliative Care, appreciate recs  - Followed by palliative outpatient with Dr. Norton, last seen  4/24; inpatient team consulted, see their note given frequent changes. Currently on Methadone, Dilaudid PCA (0.4/10min), Tylenol. Gabapentin and robaxin discontinued.  - PTA Ritalin for fatigue, pain management as described   - Prior to admission: Dilaudid, Butrans patch, gabapentin    # Urinary retention, neurogenic from prior surgery with urinary retention   - managed with Intermittent straight cath, plan is to continue indwelling catheter until discharge unless discussed with patient prior  - History of recurrent UTI last 2/25 with cipro, culture resulted with pansensitive staph aureus   - UA on admission with +LE, +nitrites collected post CTX; urine culture klebsiella on admission   - s/p 6d ceftriaxone, discontinued prior to OR given no ongoing evidence of infection      # Hx DVT/PE - chronic anticoagulation  - PTA Eliquis- currently held; Lovenox ppx held 5/31 due to drop in hgb, restarted with improvement in counts.  - Ultimately plan for indefinite anti-coagulation on home Eliquis given long history of clotting. See Hematology note from 6/14/2024.   - Plan to restart therapeutic dose lovenox today     # MDD/SHIVAM  # Insomnia   # RLS   - PTA scheduled Buspar, Pristiq, gabapentin[held], Seroquel, ramelteon continued  - PTA prn Trazodone, Ambien continued  - Avoid changing or holding any of these medications as much as possible     # Hx migraines  - PTA Topamax, propranolol    Nelida Julio MD  [facility]  Gynecologic Oncology, PGY2  06/01/2025 6:30 AM    Team pager: [phone]

## 2025-05-31 NOTE — PLAN OF CARE
Goal Outcome Evaluation:      Plan of Care Reviewed With: patient    Overall Patient Progress: no changeOverall Patient Progress: no change    Outcome Evaluation: Bradypnea & hypotensive overnight, no narcan given      Significant 24 hour events: Hypotension w/ continuous MAPs below 65, on 2L O2 per NC, capno on, 500 mL LR bolus given, RR 5-7, pt was lethargic with slight change in mentation, lidocaine infusion stopped, team requested to hold off on narcan, PCA demand dose reduced, multiple 2200 meds held, one tele    Level of Care: Acute    Summary Type: 5A Post Op Report   POD: #2 = 5/30  Complications:   Pain:  Not controlled - On PCA Dilaudid with 0.3 mg dose, IV Lidocaine stopped this shift, pain averaging 8-9/10  Neuro:WDL. A&O x 4. Stated that she's hard of hearing and sometimes doesn't know when staff is talking to her.   CV:.WDL except, rhythm - intermittently tachycardic   Resp:WDL except, rhythm/pattern - on 2L O2 per NC, RR averaging 5-7 overnight  GI:.WDL except, GI symptoms - abdominal discomfort. Colostomy in place with no stool yet.   :.WDL except, voiding ability/characteristics Cat intact with adequate UOP. Yellow UOP observed.  Skin: .WDL except, integrity - midline incision, intact with island dressing, urethral meatus surgical site intact, with dressing and mesh panties, abd surgical site intact, AQUILINO drain intact.   Activity Assistance: assistance, 2 people  Safety/Falls Risk: Level of Risk: Moderate Risk  Consults:   Procedures/Imaging:   Precautions: Bleeding Precautions

## 2025-05-31 NOTE — PROGRESS NOTES
[facility]    Progress Note - Colorectal Surgery Service       Date of Admission:  5/18/2025    Assessment & Plan: Surgery   Mrs. Granados is a 40 year old female with history of left ovarian cancer and peritoneal carcinomatosis s/p debulking and diverting loop ileostomy, rectovaginal fistula s/p open intersphincteric proctectomy, takedown of DLI, appendectomy, end colostomy on 5/28/25.     Post-operative course remarkable for uncontrolled pain due to opioid tolerance.     - Palliative care consulted, appreciate recommendations  - Continue tylenol and hydroxyzine   - Advance diet as toleated  - Continue prn antiemetics   - Continue waite per gyn onc   - Continue AQUILINO drain   - Continue PTA buspar, desvenlafaxine, gabapentin, methylphenidate, ramelteon, topiramate, trazodone, zolpidem   - Continue pantoprazole  - Encourage ambulation QID, incentive spirometer use        Diet: parenteral nutrition - ADULT compounded formula CYCLE  Regular Diet Adult    DVT Prophylaxis: Enoxaparin (Lovenox) SQ  Waite Catheter: PRESENT, indication: Surgical procedure  Lines: PRESENT      Port a Cath 10/31/24 Single Lumen Right Chest wall-Site Assessment: WDL      Drains: PRESENT         - 1 Closed/Suction Drain(s)    - 1 Open Drain(s)   Cardiac Monitoring: ACTIVE order. Indication: Drug overdose (24 hours)  Code Status: Full Code      Clinically Significant Risk Factors             # Hypomagnesemia: Lowest Mg = 1.3 mg/dL in last 2 days, will replace as needed   # Hypoalbuminemia: Lowest albumin = 3.3 g/dL at 5/21/2025  5:47 AM, will monitor as appropriate   # Thrombocytopenia: Lowest platelets = 110 in last 2 days, will monitor for bleeding               # Severe Malnutrition: based on nutrition assessment and treatment provided per dietitian's recommendations.    # Financial/Environmental Concerns: unable to afford rent/mortgage;unemployed  # Asthma: noted on problem list        Social  Drivers of Health   Depression: Risk of Self Harm (5/4/2025)    Received from [facility] and [facility]    Depression (PHQ-9)     Last PHQ-9 Score: 27     Thoughts of self harm: Nearly every day   Housing Stability: High Risk (5/19/2025)    Housing Stability     Do you have housing? : No     Are you worried about losing your housing?: No   Stress: Stress Concern Present (5/13/2024)    [employer] [place] [employer] - Occupational Stress Questionnaire     Feeling of Stress : Very much   Social Connections: Moderately Isolated (5/13/2024)    Social Connection and Isolation Panel [NHANES]     Frequency of Communication with Friends and Family: Three times a week     Frequency of Social Gatherings with Friends and Family: Once a week     Attends [employer] Services: Never     Active Member of Clubs or Organizations: No     Attends Club or Organization Meetings: Never     Marital Status: Living with partner         Disposition Plan       Cain Velasquez MD, FACS, FASCRS, FSSO    Division of Colon & Rectal Surgery  Department of Surgery  [facility]  [phone]    ______________________________________________________________________    Interval History   Still in severe pain. Tearful.    Physical Exam   Vital Signs: Temp: 98.2  F (36.8  C) Temp src: Oral BP: 92/51 Pulse: 95   Resp: (!) 8 SpO2: 100 % O2 Device: Nasal cannula Oxygen Delivery: 2 LPM  Weight: 136 lbs 14.4 oz  Intake/Output Summary (Last 24 hours) at 5/29/2025 0940  Last data filed at 5/29/2025 0907  Gross per 24 hour   Intake 6880.3 ml   Output 2323 ml   Net 4557.3 ml     General Appearance: In acute distress due to pain  Respiratory: nonlabored breathing on room air   Cardiovascular: tachycardic to 106   GI: soft, tender to palpation throughout, non distended, pink and well perfused ostomy with bowel sweat in bag, no stool or gas output, dressings clean/dry/intact, AQUILINO drain with serosanguinous output  175ml/24 hrs  Skin: no rashes      Data     I have personally reviewed the following data over the past 24 hrs:    7.5  \   8.0 (L)   / 120 (L); 119 (L)     N/A N/A N/A /  N/A   N/A N/A 0.68 \     Procal: N/A CRP: N/A Lactic Acid: 0.7         Imaging results reviewed over the past 24 hrs:   No results found for this or any previous visit (from the past 24 hours).

## 2025-06-01 ENCOUNTER — APPOINTMENT (OUTPATIENT)
Dept: OCCUPATIONAL THERAPY | Facility: CLINIC | Age: 40
End: 2025-06-01
Payer: COMMERCIAL

## 2025-06-01 ENCOUNTER — APPOINTMENT (OUTPATIENT)
Dept: PHYSICAL THERAPY | Facility: CLINIC | Age: 40
End: 2025-06-01
Payer: COMMERCIAL

## 2025-06-01 LAB
ANION GAP SERPL CALCULATED.3IONS-SCNC: 7 MMOL/L (ref 7–15)
BUN SERPL-MCNC: 19 MG/DL (ref 6–20)
CALCIUM SERPL-MCNC: 8.2 MG/DL (ref 8.8–10.4)
CHLORIDE SERPL-SCNC: 110 MMOL/L (ref 98–107)
CREAT SERPL-MCNC: 0.63 MG/DL (ref 0.51–0.95)
EGFRCR SERPLBLD CKD-EPI 2021: >90 ML/MIN/1.73M2
ERYTHROCYTE [DISTWIDTH] IN BLOOD BY AUTOMATED COUNT: 13.2 % (ref 10–15)
GLUCOSE SERPL-MCNC: 127 MG/DL (ref 70–99)
HCO3 SERPL-SCNC: 25 MMOL/L (ref 22–29)
HCT VFR BLD AUTO: 24.1 % (ref 35–47)
HGB BLD-MCNC: 8.1 G/DL (ref 11.7–15.7)
MAGNESIUM SERPL-MCNC: 1.7 MG/DL (ref 1.7–2.3)
MCH RBC QN AUTO: 31.8 PG (ref 26.5–33)
MCHC RBC AUTO-ENTMCNC: 33.6 G/DL (ref 31.5–36.5)
MCV RBC AUTO: 95 FL (ref 78–100)
PHOSPHATE SERPL-MCNC: 4.1 MG/DL (ref 2.5–4.5)
PLATELET # BLD AUTO: 126 10E3/UL (ref 150–450)
POTASSIUM SERPL-SCNC: 3.7 MMOL/L (ref 3.4–5.3)
RBC # BLD AUTO: 2.55 10E6/UL (ref 3.8–5.2)
SODIUM SERPL-SCNC: 142 MMOL/L (ref 135–145)
WBC # BLD AUTO: 4.8 10E3/UL (ref 4–11)

## 2025-06-01 PROCEDURE — 85014 HEMATOCRIT: CPT

## 2025-06-01 PROCEDURE — 97116 GAIT TRAINING THERAPY: CPT | Mod: GP

## 2025-06-01 PROCEDURE — 250N000013 HC RX MED GY IP 250 OP 250 PS 637

## 2025-06-01 PROCEDURE — 120N000002 HC R&B MED SURG/OB UMMC

## 2025-06-01 PROCEDURE — 250N000013 HC RX MED GY IP 250 OP 250 PS 637: Performed by: SURGERY

## 2025-06-01 PROCEDURE — 250N000009 HC RX 250: Performed by: OBSTETRICS & GYNECOLOGY

## 2025-06-01 PROCEDURE — 97535 SELF CARE MNGMENT TRAINING: CPT | Mod: GO | Performed by: OCCUPATIONAL THERAPIST

## 2025-06-01 PROCEDURE — 99232 SBSQ HOSP IP/OBS MODERATE 35: CPT | Performed by: FAMILY MEDICINE

## 2025-06-01 PROCEDURE — 85041 AUTOMATED RBC COUNT: CPT

## 2025-06-01 PROCEDURE — 250N000013 HC RX MED GY IP 250 OP 250 PS 637: Performed by: PHYSICIAN ASSISTANT

## 2025-06-01 PROCEDURE — 80048 BASIC METABOLIC PNL TOTAL CA: CPT

## 2025-06-01 PROCEDURE — 250N000011 HC RX IP 250 OP 636: Performed by: OBSTETRICS & GYNECOLOGY

## 2025-06-01 PROCEDURE — 97530 THERAPEUTIC ACTIVITIES: CPT | Mod: GP

## 2025-06-01 PROCEDURE — 250N000011 HC RX IP 250 OP 636: Performed by: FAMILY MEDICINE

## 2025-06-01 PROCEDURE — 83735 ASSAY OF MAGNESIUM: CPT

## 2025-06-01 PROCEDURE — 97530 THERAPEUTIC ACTIVITIES: CPT | Mod: GO | Performed by: OCCUPATIONAL THERAPIST

## 2025-06-01 PROCEDURE — 84100 ASSAY OF PHOSPHORUS: CPT

## 2025-06-01 PROCEDURE — 250N000013 HC RX MED GY IP 250 OP 250 PS 637: Performed by: STUDENT IN AN ORGANIZED HEALTH CARE EDUCATION/TRAINING PROGRAM

## 2025-06-01 RX ORDER — ENOXAPARIN SODIUM 100 MG/ML
40 INJECTION SUBCUTANEOUS EVERY 12 HOURS
Status: DISCONTINUED | OUTPATIENT
Start: 2025-06-01 | End: 2025-06-01

## 2025-06-01 RX ORDER — ENOXAPARIN SODIUM 100 MG/ML
1 INJECTION SUBCUTANEOUS EVERY 12 HOURS
Status: DISCONTINUED | OUTPATIENT
Start: 2025-06-01 | End: 2025-06-06 | Stop reason: HOSPADM

## 2025-06-01 RX ORDER — POLYETHYLENE GLYCOL 3350 17 G/17G
17 POWDER, FOR SOLUTION ORAL DAILY
Status: DISCONTINUED | OUTPATIENT
Start: 2025-06-01 | End: 2025-06-04

## 2025-06-01 RX ORDER — MINERAL OIL/HYDROPHIL PETROLAT
OINTMENT (GRAM) TOPICAL
Status: DISCONTINUED | OUTPATIENT
Start: 2025-06-01 | End: 2025-06-06 | Stop reason: HOSPADM

## 2025-06-01 RX ADMIN — TRAZODONE HYDROCHLORIDE 300 MG: 150 TABLET ORAL at 21:12

## 2025-06-01 RX ADMIN — POLYETHYLENE GLYCOL 3350 17 G: 17 POWDER, FOR SOLUTION ORAL at 16:34

## 2025-06-01 RX ADMIN — METHADONE HYDROCHLORIDE 5 MG: 5 SOLUTION ORAL at 09:58

## 2025-06-01 RX ADMIN — Medication: at 10:37

## 2025-06-01 RX ADMIN — ENOXAPARIN SODIUM 60 MG: 60 INJECTION SUBCUTANEOUS at 11:55

## 2025-06-01 RX ADMIN — Medication: at 12:52

## 2025-06-01 RX ADMIN — QUETIAPINE FUMARATE 200 MG: 200 TABLET ORAL at 21:05

## 2025-06-01 RX ADMIN — DESVENLAFAXINE 150 MG: 50 TABLET, FILM COATED, EXTENDED RELEASE ORAL at 21:07

## 2025-06-01 RX ADMIN — RAMELTEON 8 MG: 8 TABLET ORAL at 21:06

## 2025-06-01 RX ADMIN — METHYLPHENIDATE HYDROCHLORIDE 15 MG: 5 TABLET ORAL at 09:59

## 2025-06-01 RX ADMIN — ACETAMINOPHEN 1000 MG: 500 TABLET ORAL at 09:59

## 2025-06-01 RX ADMIN — LORAZEPAM 1 MG: 0.5 TABLET ORAL at 09:59

## 2025-06-01 RX ADMIN — BUSPIRONE HYDROCHLORIDE 30 MG: 15 TABLET ORAL at 20:29

## 2025-06-01 RX ADMIN — LORAZEPAM 1 MG: 0.5 TABLET ORAL at 21:05

## 2025-06-01 RX ADMIN — METHADONE HYDROCHLORIDE 5 MG: 5 SOLUTION ORAL at 20:30

## 2025-06-01 RX ADMIN — METHYLPHENIDATE HYDROCHLORIDE 10 MG: 5 TABLET ORAL at 11:55

## 2025-06-01 RX ADMIN — PANTOPRAZOLE SODIUM 40 MG: 40 TABLET, DELAYED RELEASE ORAL at 20:30

## 2025-06-01 RX ADMIN — ACETAMINOPHEN 1000 MG: 500 TABLET ORAL at 16:34

## 2025-06-01 RX ADMIN — TOPIRAMATE 50 MG: 50 TABLET, FILM COATED ORAL at 09:59

## 2025-06-01 RX ADMIN — PROPRANOLOL HYDROCHLORIDE 10 MG: 10 TABLET ORAL at 21:06

## 2025-06-01 RX ADMIN — MAGNESIUM SULFATE HEPTAHYDRATE: 500 INJECTION, SOLUTION INTRAMUSCULAR; INTRAVENOUS at 20:32

## 2025-06-01 RX ADMIN — ENOXAPARIN SODIUM 60 MG: 60 INJECTION SUBCUTANEOUS at 21:06

## 2025-06-01 RX ADMIN — ACETAMINOPHEN 1000 MG: 500 TABLET ORAL at 21:06

## 2025-06-01 RX ADMIN — Medication: at 21:47

## 2025-06-01 RX ADMIN — Medication 5 ML: at 12:00

## 2025-06-01 RX ADMIN — BUSPIRONE HYDROCHLORIDE 30 MG: 15 TABLET ORAL at 09:59

## 2025-06-01 RX ADMIN — TOPIRAMATE 50 MG: 50 TABLET, FILM COATED ORAL at 20:30

## 2025-06-01 RX ADMIN — PROPRANOLOL HYDROCHLORIDE 10 MG: 10 TABLET ORAL at 09:58

## 2025-06-01 RX ADMIN — PANTOPRAZOLE SODIUM 40 MG: 40 TABLET, DELAYED RELEASE ORAL at 09:59

## 2025-06-01 ASSESSMENT — ACTIVITIES OF DAILY LIVING (ADL)
ADLS_ACUITY_SCORE: 53
ADLS_ACUITY_SCORE: 52
ADLS_ACUITY_SCORE: 53
ADLS_ACUITY_SCORE: 53
ADLS_ACUITY_SCORE: 52
ADLS_ACUITY_SCORE: 52
ADLS_ACUITY_SCORE: 53
ADLS_ACUITY_SCORE: 52
ADLS_ACUITY_SCORE: 53

## 2025-06-01 NOTE — PROVIDER NOTIFICATION
Time of notification: 8:00 PM  Provider notified: Nelida Julio  Patient status: Pt frustrated regarding sedative medication plan. Pt requesting all sedative meds (including meds scheduled at other times and PRNs) to be given at the same time. RN expressed concern and provided education to pt d/t low respiration rates and decreased BP that RN was not comfortable giving all of the meds at the same time. Of note pt had been given narcan recently d/t sedation and low respiratory drive with less sedative medications than what pt was requesting at this time.     Temp:  [97.7  F (36.5  C)-98.4  F (36.9  C)] 98.1  F (36.7  C)  Pulse:  [] 102  Resp:  [5-11] 11  BP: ()/(46-73) 119/73  SpO2:  [95 %-100 %] 98 %    Orders received:  Provider came to bedside and participated in a conversation with pt and writer. Per provider OK to give all scheduled sedatives at the same time and space out prior to giving PRNs that pt was requesting. Pt apprehensive of plan with multiple questions. Questions answered per ability and plan proceeded. Pt agreeable and accepting of plan. MD ordered MAP goals.

## 2025-06-01 NOTE — PROGRESS NOTES
[facility]    Progress Note - Colorectal Surgery Service       Date of Admission:  5/18/2025    Assessment & Plan: Surgery   Mrs. Granados is a 40 year old female with history of left ovarian cancer and peritoneal carcinomatosis s/p debulking and diverting loop ileostomy, rectovaginal fistula s/p open intersphincteric proctectomy, takedown of DLI, appendectomy, end colostomy on 5/28/25.     Post-operative course remarkable for uncontrolled pain due to opioid tolerance.     - Palliative care consulted, appreciate recommendations, pain regimen per palliative and primary team, gyn onc  - Continue tylenol and hydroxyzine   - Regular diet and wean TPN  - Continue prn antiemetics   - Continue waite per gyn onc   - Continue AQUILINO drain, will check AQUILINO Creatinine prior to considering removal   - Continue PTA buspar, desvenlafaxine, gabapentin, methylphenidate, ramelteon, topiramate, trazodone, zolpidem   - Continue pantoprazole  - Encourage ambulation QID, incentive spirometer use        Diet: Regular Diet Adult  parenteral nutrition - ADULT compounded formula CYCLE    DVT Prophylaxis: Enoxaparin (Lovenox) SQ  Waite Catheter: PRESENT, indication: Surgical procedure  Lines: PRESENT      Port a Cath 10/31/24 Single Lumen Right Chest wall-Site Assessment: WDL      Drains: PRESENT         - 1 Closed/Suction Drain(s)    - 1 Open Drain(s)   Cardiac Monitoring: ACTIVE order. Indication: Drug overdose (24 hours)  Code Status: Full Code      Clinically Significant Risk Factors          # Hyperchloremia: Highest Cl = 110 mmol/L in last 2 days, will monitor as appropriate        # Hypomagnesemia: Lowest Mg = 1.3 mg/dL in last 2 days, will replace as needed   # Hypoalbuminemia: Lowest albumin = 3.3 g/dL at 5/21/2025  5:47 AM, will monitor as appropriate   # Thrombocytopenia: Lowest platelets = 119 in last 2 days, will monitor for bleeding               # Severe Malnutrition: based on nutrition  assessment and treatment provided per dietitian's recommendations.    # Financial/Environmental Concerns: unable to afford rent/mortgage;unemployed  # Asthma: noted on problem list        Social Drivers of Health   Depression: Risk of Self Harm (5/4/2025)    Received from [facility]    Depression (PHQ-9)     Last PHQ-9 Score: 27     Thoughts of self harm: Nearly every day   Housing Stability: High Risk (5/19/2025)    Housing Stability     Do you have housing? : No     Are you worried about losing your housing?: No   Stress: Stress Concern Present (5/13/2024)    [employer] [place] [employer] - Occupational Stress Questionnaire     Feeling of Stress : Very much   Social Connections: Moderately Isolated (5/13/2024)    Social Connection and Isolation Panel [NHANES]     Frequency of Communication with Friends and Family: Three times a week     Frequency of Social Gatherings with Friends and Family: Once a week     Attends Faith Services: Never     Active Member of Clubs or Organizations: No     Attends Club or Organization Meetings: Never     Marital Status: Living with partner         Disposition Plan       Patient seen with colorectal fellow, Dr. Grider, who discussed with colorectal surgeon, Dr. Velasquez.     Zuleyma Hercules MD  General Surgery PGY-1    **For on call schedules and contact information, please refer to [facility]**      ______________________________________________________________________    Interval History   She states her pain was controlled well enough for her to sleep last night, still reports significant post-operative pain. She has not had ostomy output yet, voiding via waite. Has not ambulated.     Physical Exam   Vital Signs: Temp: 98.9  F (37.2  C) Temp src: Axillary BP: 104/58 Pulse: 97   Resp: 13 SpO2: 96 % O2 Device: Nasal cannula Oxygen Delivery: 2 LPM  Weight: 136 lbs 14.4 oz  Intake/Output Summary (Last 24 hours) at 5/29/2025 0962  Last data filed at  5/29/2025 0907  Gross per 24 hour   Intake 6880.3 ml   Output 2323 ml   Net 4557.3 ml     General Appearance: In acute distress due to pain  Respiratory: nonlabored breathing on room air   Cardiovascular: tachycardic to 106   GI: soft, tender to palpation throughout, non distended, pink and well perfused ostomy with bowel sweat in bag, no stool or gas output, dressings clean/dry/intact, AQUILINO drain with serosanguinous output 90ml/24 hrs  Skin: no rashes      Data     I have personally reviewed the following data over the past 24 hrs:    4.8  \   8.1 (L)   / 126 (L)     142 110 (H) 19.0 /  127 (H)   3.7 25 0.63 \       Imaging results reviewed over the past 24 hrs:   No results found for this or any previous visit (from the past 24 hours).

## 2025-06-01 NOTE — PROGRESS NOTES
"Brief progress note    S: Marizol is frustrated that her pain medications \"have been taken away\" She wants to make sure that her nighttime sedatives are available as she needs them for sleep. She is tearful as she fears she won't be able to sleep tonight either. She does not want Narcan to be given to her.      Patient Vitals for the past 24 hrs:   BP Temp Temp src Pulse Resp SpO2   05/31/25 2016 119/73 98.1  F (36.7  C) Oral -- -- --   05/31/25 1616 107/73 98.4  F (36.9  C) Oral 88 10 99 %   05/31/25 1209 108/67 97.7  F (36.5  C) Oral 96 (!) 7 100 %   05/31/25 0745 92/51 98.2  F (36.8  C) Oral 95 (!) 8 100 %   05/31/25 0408 102/61 98.2  F (36.8  C) Oral 106 (!) 7 97 %   05/31/25 0000 102/60 -- -- 84 (!) 6 98 %   05/30/25 2245 (!) 86/46 -- -- 81 -- --   05/30/25 2223 92/53 -- -- 95 -- 99 %   05/30/25 2200 (!) 84/48 -- -- 84 (!) 5 95 %   05/30/25 2100 (!) 82/43 -- -- 92 -- --     Discussed with patient available and scheduled medications. Reiterated that plan is to provide all her PTA meds she uses to help her sleep. Per RN request and with shared decision making with patient plan to give trazodone and Ambien 45 minutes after the rest of her scheduled medications. Discussed with patient that Narcan is for emergencies and will be used if she becomes unresponsive, thus it will remain in her orders. Per her request she will be asked if she needs narcan and she can decline if able to. Reaffirmed this with RN who is at bedside. Confirmed with RN to have continuous monitoring, and plan to have cares clustered as much as possible to reduce interruptions overnight. MAP goals >55. Will plan to be more tolerant of low respiratory rate as long as oxygenation remains normal.    Nelida Julio MD  [facility]  Gynecologic Oncology, PGY2  05/31/2025 8:58 PM    Team pager: [phone]      "

## 2025-06-01 NOTE — PROGRESS NOTES
PALLIATIVE CARE PROGRESS NOTE  [facility]     Patient Name: Marizol Granados  Date of Admission: 5/18/2025   Today the patient was seen for: symptom management     Recommendations & Counseling   Recs/Discussion New for today:  Increase Dilaudid PCA by 50% from 0.4 mg up to every 10 minutes as needed to 0.6 mg.  She has tolerated this dose as of several days ago, and it is one of the few things that she feels has improved her pain enough to facilitate participation in getting up in a chair, ambulating, participating in postoperative care.  From a practical standpoint, it does take a bit for Marizol to wake up when she is sleeping heavily.  I do not believe this indicates problematic sedation.  We note that her RR sometimes decreases as low as 7-8 when she is sleeping; nonetheless her O2 sats remain mid to high 90s.  We note that she is in effect receiving long-acting opioid contribution to her pain management by virtue of her methadone dosing 5 mg twice daily.  This was started on 5/22, and although it was stopped briefly for a day on 5/28 then resumed on 5/29, I believe it is at full equilibrium at this time.  No changes to this today.  A number of her at bedtime psychiatric medications were restarted yesterday which I believe helped her sleep last night.  Will continue to observe carefully for any issues related to problematic sedation.  Our recommendations were discussed with gynecologic oncology team, and with bedside RN.    Elian Fisher MD  Palliative Medicine Consult Team  Text me via [employer]  TT: 42 minutes, with > 50% spent in C/C/E patient/family/care teams re: GOC, POC, Sx management. 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 Most recent imaging in March 2025 showed no evidence of disease. She  "is being followed by CRS for rectovaginal fistula and coordinating surgery for takedown of loop ileostomy and creation of end colostomy. Follows palliative care outpatient for pain. Our team was consulted to help with pain and anxiety.   Seen today for: GYN cancer, anxiety, postop pain   Interval History:   Reviewed events and clinical course over the last 24 hours with gynecologic oncology resident and with bedside nurse.  Marizol slept better last night, which I think in part is due to having restarted her at bedtime psych meds..  No issues with soft MAPS.  She has been out of bed.  Continues to rate her pain at 9/10.  See today's recommendations above.    On overnight shift last night she used 0 bolus doses of hydromorphone, on the shift before that she used 12 mg total, and on the shift before that 11.3 mg total    Medications managed by palliative  APAP 975 mg q6h earnest   Methadone 5mg BID (started 5/22 PM, stopped 5/28, restarted 5/29)  Dilaudid PCA increased today to 0.6 mg q10 min prn from 0.4 mg    Other medications not managed by our team  BuSpar 30 mg twice daily  Does venlafaxine 150 mg p.o. x 1 daily  Lorazepam 1 mg twice daily  Methylphenidate 15 mg p.o. every morning  Seroquel 200 mg at bedtime  Ramelteon 8 mg at bedtime  Trazodone 300 mg at bedtime  Topiramate  Zofran 4 mg IV x1  Atarax 25 mg po x1  Narcan 0.2 mg IV x2    Patient/family narrative  Seen and examined at bedside.         Physical Exam:   Blood pressure 104/58, pulse 97, temperature 98.9  F (37.2  C), temperature source Axillary, resp. rate 13, height 1.651 m (5' 5\"), weight 62.1 kg (136 lb 14.4 oz), SpO2 96%.     Physical Exam  General: laying in bed, NAD, awakens to voice.  HEENT:   Lungs: non labored  GI: reported post-op incisions and ostomy and drain  Neuro: alert and oriented x4, fluent speech, moving all extremities  Psych: anxious, normal affect      Data Reviewed:     ROUTINE ICU LABS (Last four results)  CMP  Recent Labs   Lab " 06/01/25  0441 05/31/25  0040 05/30/25  1113 05/30/25  0810 05/30/25  0442 05/29/25  2129 05/29/25  0548 05/28/25 2007 05/28/25  1658 05/28/25  0737 05/28/25  0236 05/27/25 0317 05/26/25  0421     --   --   --  139  --  139  --  138   < > 140   < > 140   POTASSIUM 3.7  --   --   --  3.5  --  4.0  --  4.1   < > 4.0   < > 4.5   CHLORIDE 110*  --   --   --  107  --  105  --   --   --  105   < > 104   CO2 25  --   --   --  27  --  24  --   --   --  25   < > 26   ANIONGAP 7  --   --   --  5*  --  10  --   --   --  10   < > 10   *  --   --  130* 189*  --  126*   < > 160*   < > 105*   < > 97   BUN 19.0  --   --   --  16.3  --  17.8  --   --   --  28.3*   < > 34.8*   CR 0.63 0.68  --   --  0.76  --  0.76  --   --   --  0.80   < > 0.86   GFRESTIMATED >90 >90  --   --  >90  --  >90  --   --   --  >90   < > 88   LORY 8.2*  --   --   --  7.9*  --  9.1  --   --   --  9.5   < > 10.0   MAG 1.7 1.3*  --   --  1.6* 2.0 1.1*  --   --   --  1.9   < > 1.8   PHOS 4.1 2.8 1.8*  --  1.1*  --  5.1*  --   --   --  4.2   < > 5.2*   PROTTOTAL  --   --   --   --   --   --   --   --   --   --   --   --  6.5   ALBUMIN  --   --   --   --   --   --   --   --   --   --   --   --  3.8   BILITOTAL  --   --   --   --   --   --   --   --   --   --   --   --  0.2   ALKPHOS  --   --   --   --   --   --   --   --   --   --   --   --  67   AST  --   --   --   --   --   --   --   --   --   --   --   --  36   ALT  --   --   --   --   --   --   --   --   --   --   --   --  35    < > = values in this interval not displayed.     CBC  Recent Labs   Lab 06/01/25  0441 05/31/25  0040 05/30/25  1236 05/30/25  0442   WBC 4.8 7.5 8.8 6.7   RBC 2.55* 2.48* 2.48* 2.03*   HGB 8.1* 8.0* 7.8* 6.4*   HCT 24.1* 24.3* 23.8* 19.6*   MCV 95 98  95 96 97   MCH 31.8 32.3 31.5 31.5   MCHC 33.6 32.9 32.8 32.7   RDW 13.2 13.5 13.0 13.0   * 119*  120* 121* 110*     INR  Recent Labs   Lab 05/26/25  0421   INR 1.29*     Arterial Blood Gas  Recent Labs   Lab  05/28/25  1658 05/28/25  1521 05/28/25  1230   O2PER 60.0 60.0 60.0
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
## 2025-06-01 NOTE — PLAN OF CARE
Hours of care [phone]  Cares clustered and awakenings limited per orders and request to promote sleep.    Neuro: A&Ox4.   Cardiac: SR. VSS. Slightly hypotensive within parameters.   Respiratory: Sating >95% on 2L NC. Capno on. RR 8-15 overnight.  GI/: Adequate urine output via waite. No output from ostomy. Compazine PRN x1 for nausea.  Diet/appetite: Reg diet. Cyclic TPN and lipids.  Activity:  Not OOB this shift. Side lying and independent in bed.  Pain: PCA pump Dilaudid 0.4mg bumps available q10 mins. Of note pt did not utilize PCA overnight as pt slept majority of this shift (2200-end of shift).  Skin: See flowsheets for assessment.  LDA's: Port: TPN and Lipids. L PIV: PCA.    Plan: Continue with POC. Notify primary team with changes.

## 2025-06-01 NOTE — PLAN OF CARE
"Goal Outcome Evaluation:      Plan of Care Reviewed With: patient    Overall Patient Progress: improving    Outcome Evaluation: [id number]    Significant 24 hour events: Pain better today. Tele discontinued and VSS stable. Ambulated x1. New rash    Level of Care: Acute    Summary Type: 5A Post Op Report   POD: #4 = 6/1  Pain:  Not controlled - On PCA Dilaudid with 0.6 mg dose, pain averaging 8-9/10  Neuro:WDL. A&O x 4. Stated that she's hard of hearing.  CV:WDL - intermittently tachycardic   Resp:.WDL except, rhythm/pattern except, rhythm/pattern - on RA- uses NC at night,  GI:.WDL except, GI symptoms - abdominal discomfort. Colostomy in place with no stool yet.   :.WDL except, voiding ability/characteristics- Cat intact with adequate UOP. Yellow UOP observed.  Skin: .WDL except, integrity - midline incision, intact with island dressing, urethral meatus surgical site intact, with dressing and mesh panties, abd surgical site intact, AQUILINO drain intact.   Activity Assistance: assistance, 1 person  Diet: regular with fair appetite.    A/Ox4. VSS on RA- RR and BP's have been good today. Denies nausea. Pain is managed better today but she reports to still be in pain. Aquaphor applied to rash. Ate cream of wheat x2 and a banana. AQUILINO drain still has some pain and feels like it is \"burning at times.\" AQUILINO site does have leaking and dressing changed as needed. Continue with POC.           "

## 2025-06-02 ENCOUNTER — APPOINTMENT (OUTPATIENT)
Dept: OCCUPATIONAL THERAPY | Facility: CLINIC | Age: 40
End: 2025-06-02
Payer: COMMERCIAL

## 2025-06-02 LAB
ALBUMIN SERPL BCG-MCNC: 2.5 G/DL (ref 3.5–5.2)
ALP SERPL-CCNC: 56 U/L (ref 40–150)
ALT SERPL W P-5'-P-CCNC: 17 U/L (ref 0–50)
ANION GAP SERPL CALCULATED.3IONS-SCNC: 10 MMOL/L (ref 7–15)
AST SERPL W P-5'-P-CCNC: 22 U/L (ref 0–45)
BILIRUB SERPL-MCNC: 0.2 MG/DL
BILIRUBIN DIRECT (ROCHE PRO & PURE): 0.1 MG/DL (ref 0–0.45)
BUN SERPL-MCNC: 23.6 MG/DL (ref 6–20)
CALCIUM SERPL-MCNC: 8.6 MG/DL (ref 8.8–10.4)
CHLORIDE SERPL-SCNC: 108 MMOL/L (ref 98–107)
CREAT SERPL-MCNC: 0.65 MG/DL (ref 0.51–0.95)
EGFRCR SERPLBLD CKD-EPI 2021: >90 ML/MIN/1.73M2
ERYTHROCYTE [DISTWIDTH] IN BLOOD BY AUTOMATED COUNT: 12.9 % (ref 10–15)
GLUCOSE SERPL-MCNC: 97 MG/DL (ref 70–99)
HCO3 SERPL-SCNC: 23 MMOL/L (ref 22–29)
HCT VFR BLD AUTO: 26.6 % (ref 35–47)
HGB BLD-MCNC: 8.7 G/DL (ref 11.7–15.7)
MAGNESIUM SERPL-MCNC: 1.8 MG/DL (ref 1.7–2.3)
MCH RBC QN AUTO: 31.3 PG (ref 26.5–33)
MCHC RBC AUTO-ENTMCNC: 32.7 G/DL (ref 31.5–36.5)
MCV RBC AUTO: 96 FL (ref 78–100)
PHOSPHATE SERPL-MCNC: 4.7 MG/DL (ref 2.5–4.5)
PLATELET # BLD AUTO: 142 10E3/UL (ref 150–450)
POTASSIUM SERPL-SCNC: 4 MMOL/L (ref 3.4–5.3)
PROT SERPL-MCNC: 5.1 G/DL (ref 6.4–8.3)
RBC # BLD AUTO: 2.78 10E6/UL (ref 3.8–5.2)
SODIUM SERPL-SCNC: 141 MMOL/L (ref 135–145)
TRIGL SERPL-MCNC: 96 MG/DL
WBC # BLD AUTO: 4.1 10E3/UL (ref 4–11)

## 2025-06-02 PROCEDURE — 250N000011 HC RX IP 250 OP 636: Performed by: OBSTETRICS & GYNECOLOGY

## 2025-06-02 PROCEDURE — G0463 HOSPITAL OUTPT CLINIC VISIT: HCPCS

## 2025-06-02 PROCEDURE — 80069 RENAL FUNCTION PANEL: CPT

## 2025-06-02 PROCEDURE — 250N000013 HC RX MED GY IP 250 OP 250 PS 637: Performed by: SURGERY

## 2025-06-02 PROCEDURE — 250N000013 HC RX MED GY IP 250 OP 250 PS 637: Performed by: PHYSICIAN ASSISTANT

## 2025-06-02 PROCEDURE — 120N000002 HC R&B MED SURG/OB UMMC

## 2025-06-02 PROCEDURE — 83735 ASSAY OF MAGNESIUM: CPT

## 2025-06-02 PROCEDURE — 97530 THERAPEUTIC ACTIVITIES: CPT | Mod: GO

## 2025-06-02 PROCEDURE — 250N000013 HC RX MED GY IP 250 OP 250 PS 637

## 2025-06-02 PROCEDURE — 999N000127 HC STATISTIC PERIPHERAL IV START W US GUIDANCE

## 2025-06-02 PROCEDURE — 84155 ASSAY OF PROTEIN SERUM: CPT | Performed by: PHYSICIAN ASSISTANT

## 2025-06-02 PROCEDURE — 84075 ASSAY ALKALINE PHOSPHATASE: CPT | Performed by: PHYSICIAN ASSISTANT

## 2025-06-02 PROCEDURE — 250N000011 HC RX IP 250 OP 636: Performed by: FAMILY MEDICINE

## 2025-06-02 PROCEDURE — 85027 COMPLETE CBC AUTOMATED: CPT

## 2025-06-02 PROCEDURE — B4185 PARENTERAL SOL 10 GM LIPIDS: HCPCS | Performed by: SURGERY

## 2025-06-02 PROCEDURE — 84100 ASSAY OF PHOSPHORUS: CPT

## 2025-06-02 PROCEDURE — 250N000013 HC RX MED GY IP 250 OP 250 PS 637: Performed by: STUDENT IN AN ORGANIZED HEALTH CARE EDUCATION/TRAINING PROGRAM

## 2025-06-02 PROCEDURE — 250N000009 HC RX 250: Performed by: OBSTETRICS & GYNECOLOGY

## 2025-06-02 PROCEDURE — 84478 ASSAY OF TRIGLYCERIDES: CPT | Performed by: PHYSICIAN ASSISTANT

## 2025-06-02 PROCEDURE — 80048 BASIC METABOLIC PNL TOTAL CA: CPT

## 2025-06-02 PROCEDURE — 250N000009 HC RX 250: Performed by: SURGERY

## 2025-06-02 RX ADMIN — TRAZODONE HYDROCHLORIDE 300 MG: 150 TABLET ORAL at 22:08

## 2025-06-02 RX ADMIN — BUSPIRONE HYDROCHLORIDE 30 MG: 15 TABLET ORAL at 20:49

## 2025-06-02 RX ADMIN — METHADONE HYDROCHLORIDE 5 MG: 5 SOLUTION ORAL at 20:49

## 2025-06-02 RX ADMIN — PANTOPRAZOLE SODIUM 40 MG: 40 TABLET, DELAYED RELEASE ORAL at 20:49

## 2025-06-02 RX ADMIN — Medication 5 ML: at 10:04

## 2025-06-02 RX ADMIN — QUETIAPINE FUMARATE 200 MG: 200 TABLET ORAL at 20:50

## 2025-06-02 RX ADMIN — ENOXAPARIN SODIUM 60 MG: 60 INJECTION SUBCUTANEOUS at 12:05

## 2025-06-02 RX ADMIN — ACETAMINOPHEN 1000 MG: 500 TABLET ORAL at 10:04

## 2025-06-02 RX ADMIN — METHYLPHENIDATE HYDROCHLORIDE 15 MG: 5 TABLET ORAL at 09:07

## 2025-06-02 RX ADMIN — POLYETHYLENE GLYCOL 3350 17 G: 17 POWDER, FOR SOLUTION ORAL at 09:08

## 2025-06-02 RX ADMIN — ENOXAPARIN SODIUM 60 MG: 60 INJECTION SUBCUTANEOUS at 20:53

## 2025-06-02 RX ADMIN — ZOLPIDEM TARTRATE 5 MG: 5 TABLET, FILM COATED ORAL at 22:08

## 2025-06-02 RX ADMIN — PANTOPRAZOLE SODIUM 40 MG: 40 TABLET, DELAYED RELEASE ORAL at 09:07

## 2025-06-02 RX ADMIN — ACETAMINOPHEN 1000 MG: 500 TABLET ORAL at 20:49

## 2025-06-02 RX ADMIN — MAGNESIUM SULFATE HEPTAHYDRATE: 500 INJECTION, SOLUTION INTRAMUSCULAR; INTRAVENOUS at 20:44

## 2025-06-02 RX ADMIN — TOPIRAMATE 50 MG: 50 TABLET, FILM COATED ORAL at 20:49

## 2025-06-02 RX ADMIN — OLIVE OIL AND SOYBEAN OIL 250 ML: 16; 4 INJECTION, EMULSION INTRAVENOUS at 20:47

## 2025-06-02 RX ADMIN — METHYLPHENIDATE HYDROCHLORIDE 10 MG: 5 TABLET ORAL at 12:05

## 2025-06-02 RX ADMIN — PROPRANOLOL HYDROCHLORIDE 10 MG: 10 TABLET ORAL at 09:08

## 2025-06-02 RX ADMIN — BUSPIRONE HYDROCHLORIDE 30 MG: 15 TABLET ORAL at 09:07

## 2025-06-02 RX ADMIN — TOPIRAMATE 50 MG: 50 TABLET, FILM COATED ORAL at 09:08

## 2025-06-02 RX ADMIN — DESVENLAFAXINE 150 MG: 50 TABLET, FILM COATED, EXTENDED RELEASE ORAL at 20:53

## 2025-06-02 RX ADMIN — LORAZEPAM 1 MG: 0.5 TABLET ORAL at 09:07

## 2025-06-02 RX ADMIN — METHADONE HYDROCHLORIDE 5 MG: 5 SOLUTION ORAL at 09:08

## 2025-06-02 RX ADMIN — PROPRANOLOL HYDROCHLORIDE 10 MG: 10 TABLET ORAL at 20:48

## 2025-06-02 RX ADMIN — RAMELTEON 8 MG: 8 TABLET ORAL at 20:50

## 2025-06-02 RX ADMIN — ACETAMINOPHEN 1000 MG: 500 TABLET ORAL at 05:27

## 2025-06-02 RX ADMIN — Medication: at 16:00

## 2025-06-02 RX ADMIN — LORAZEPAM 1 MG: 0.5 TABLET ORAL at 20:50

## 2025-06-02 RX ADMIN — ACETAMINOPHEN 1000 MG: 500 TABLET ORAL at 16:04

## 2025-06-02 ASSESSMENT — ACTIVITIES OF DAILY LIVING (ADL)
ADLS_ACUITY_SCORE: 52

## 2025-06-02 NOTE — PROGRESS NOTES
[facility]    Progress Note - Colorectal Surgery Service       Date of Admission:  5/18/2025    Assessment & Plan: Surgery   Mrs. Granados is a 40 year old female with history of left ovarian cancer and peritoneal carcinomatosis s/p debulking and diverting loop ileostomy, rectovaginal fistula s/p open intersphincteric proctectomy, takedown of DLI, appendectomy, end colostomy on 5/28/25.     Post-operative course remarkable for difficulty with pain control. Tolerating diet with antegrade bowel function and is ambulatory.     - Palliative care consulted, appreciate recs  - Continue tylenol and hydroxyzine   - Regular diet and wean TPN  - Continue waite per gyn onc   - Continue pantoprazole  - Encourage ambulation QID, incentive spirometer use      Diet: Regular Diet Adult  parenteral nutrition - ADULT compounded formula CYCLE    DVT Prophylaxis: Enoxaparin (Lovenox) SQ  Waite Catheter: PRESENT, indication: Surgical procedure  Lines: PRESENT      Port a Cath 10/31/24 Single Lumen Right Chest wall-Site Assessment: WDL      Drains: PRESENT         - 1 Closed/Suction Drain(s)    - 1 Open Drain(s)   Cardiac Monitoring: None  Code Status: Full Code      Clinically Significant Risk Factors          # Hyperchloremia: Highest Cl = 110 mmol/L in last 2 days, will monitor as appropriate          # Hypoalbuminemia: Lowest albumin = 3.3 g/dL at 5/21/2025  5:47 AM, will monitor as appropriate   # Thrombocytopenia: Lowest platelets = 126 in last 2 days, will monitor for bleeding               # Severe Malnutrition: based on nutrition assessment and treatment provided per dietitian's recommendations.    # Financial/Environmental Concerns: unable to afford rent/mortgage;unemployed  # Asthma: noted on problem list      Social Drivers of Health   Depression: Risk of Self Harm (5/4/2025)    Received from [facility]    Depression (PHQ-9)     Last PHQ-9 Score: 27      Thoughts of self harm: Nearly every day   Housing Stability: High Risk (5/19/2025)    Housing Stability     Do you have housing? : No     Are you worried about losing your housing?: No   Stress: Stress Concern Present (5/13/2024)    [employer] [place] [employer] - Occupational Stress Questionnaire     Feeling of Stress : Very much   Social Connections: Moderately Isolated (5/13/2024)    Social Connection and Isolation Panel [NHANES]     Frequency of Communication with Friends and Family: Three times a week     Frequency of Social Gatherings with Friends and Family: Once a week     Attends [employer] Services: Never     Active Member of Clubs or Organizations: No     Attends Club or Organization Meetings: Never     Marital Status: Living with partner         Disposition Plan       Patient seen with colorectal fellow, Dr. Grider, who discussed with colorectal surgeon, Dr. Velasquez.     Mary Kay Banuelos MD  General Surgery Resident  [phone]   _______________________________________________    Interval History   She has been tolerating a regular diet without N/V. She has ambulated around the unit twice in the past 24 hours.     Physical Exam   Vital Signs: Temp: 98.7  F (37.1  C) Temp src: Oral BP: 95/59 Pulse: 101   Resp: 20 SpO2: 95 % O2 Device: None (Room air) Oxygen Delivery: 1 LPM  Weight: 147 lbs 0 oz  Intake/Output Summary (Last 24 hours) at 5/29/2025 0940  Last data filed at 5/29/2025 0907  Gross per 24 hour   Intake 6880.3 ml   Output 2323 ml   Net 4557.3 ml     General Appearance: In acute distress due to pain  Respiratory: nonlabored breathing on room air   Cardiovascular: normal rate and regular rhythm   GI: soft, tender to palpation throughout, non distended, pink and well perfused ostomy with gas as well as brown liquid stool, dressings clean/dry/intact, AQUILINO drain with serosanguinous output 75ml/24 hrs  Skin: no rashes      Data     I have personally reviewed the following data over the past  24 hrs:    4.1  \   8.7 (L)   / 142 (L)     141 108 (H) 23.6 (H) /  97   4.0 23 0.65 \       Imaging results reviewed over the past 24 hrs:   No results found for this or any previous visit (from the past 24 hours).

## 2025-06-02 NOTE — PLAN OF CARE
"Goal Outcome Evaluation:                             Plan of Care Reviewed With: patient     Overall Patient Progress: improving     Outcome Evaluation:[id number]     Level of Care: Acute     POD: #4 = 6/1  Pain:  Not controlled - On PCA Dilaudid with 0.6 mg dose, pain averaging 8-9/10  Neuro:WDL. A&O x 4.  CV:WDL  Resp:.WDL except, rhythm/pattern except, rhythm/pattern - on RA- uses NC at night,  GI:.WDL except, GI symptoms - abdominal discomfort. Colostomy in place with no stool yet.   :.WDL except, voiding ability/characteristics- Cat intact with adequate UOP. Yellow UOP observed.  Skin: .WDL except, integrity - midline incision, intact with island dressing, urethral meatus surgical site intact, with dressing and mesh panties, abd surgical site intact, AQUILINO drain intact.   Activity Assistance: assistance, 1 person  Diet: regular with fair appetite. TPN cycled.     A/Ox4. VSS on RA- RR and BP's have been good today. Denies nausea. Pain is managed better today but she reports to still be in pain. Aquaphor applied to rash. Ate cream of wheat x2 and a banana. AQUILINO drain still has some pain and feels like it is \"burning at times.\" AQUILINO site does have leaking and dressing changed as needed. Continue with POC.         "

## 2025-06-02 NOTE — PROGRESS NOTES
"Brief GYN Oncology Note    To bedside for PM check in     Marizol is doing okay. Reports the day was fine. She went on 2 walks and sat up in the chair for some time. She reports pain is currently adequately controlled; notes that it is hard to describe exactly where her pain is. Had some leaking around the AQUILINO drain site earlier this afternoon; dressing has been replaced and is now dry. She noticed a new rash on her thighs extending up to her inguinal creases this afternoon; doesn't quite itch or burn but has sensation close to these two descriptors. She has not noticed more output from her end colostomy. She tolerated a few protein shakes today, denies nausea or vomiting. No other new concerns.     /75 (BP Location: Left arm)   Pulse 111   Temp 97.7  F (36.5  C) (Oral)   Resp 13   Ht 1.651 m (5' 5\")   Wt 66.7 kg (147 lb)   LMP  (LMP Unknown)   SpO2 97%   BMI 24.46 kg/m    Gen: resting comfortably in bed   Abd: soft, appropriately tender, AQUILINO drain RLQ with small amount of serosanguinous output, end colostomy pink, bowel sweat noted in bag w/o stool or gas, midline abdominal dressing c/d/I without surrounding erythema or drainage   Ext: bright red <1 mm petechiae diffusely from inguinal crease to mid thigh; no vesicles or plaques, no surrounding erythema or drainage     Pt is admitted HD#16 after initially presenting with peristomal skin infection now POD#4 XL, Sarah, Takedown of Ileostomy, Formation of End Colostomy, appendectomy, Repair of Rectovaginal fistula, Protectomy, Cystoscopy. Appreciate assistance from palliative and colorectal teams with management of postoperative course and pain control. Pain improved today with increased dose dilaudid (0.6 q10m) along with ongoing use of methadone and other adjuncts. New proximal lower extremity skin rash noted today; barrier cream applied, minimal symptoms from this right now but will continue to monitor closely. Repeat CBC, BMP, Mg, Phos in AM. Continue with " plan of care.     Maribel Garcia MD  OB/GYN PGY-3  6/1/2025 8:10 PM

## 2025-06-02 NOTE — PLAN OF CARE
"Goal Outcome Evaluation:  BP 95/59 (BP Location: Right arm)   Pulse 101   Temp 98.7  F (37.1  C) (Oral)   Resp 20   Ht 1.651 m (5' 5\")   Wt 66.7 kg (147 lb)   LMP  (LMP Unknown)   SpO2 95%   BMI 24.46 kg/m           POD-5    Plan of Care Reviewed with: patient    Overall Patient Care: improving     DND overnight.   Activity: Assist x2, not oob this shift, d/t pain  Neuros:  A&OX4   Respiratory:on  R/A, no SOB or resp distress noted.   GI/: voiding via waite, patent and draining well. No bm noted, hasn,t pass gas yet. Abdominal discomfort, colostomy in place with some stool.   Diet: regular diet   Skin: midline incision intact with island dressing, urethral meatus surgical site intact with dressing and mesh panties, abd surgical site intact, KANDY . AQUILINO drain intact.  Pain/nausea: cont on PCA dilaudid for pain mgmt.   Plan: pain mgmt, cont POC.                            "

## 2025-06-02 NOTE — PLAN OF CARE
Goal Outcome Evaluation:      Plan of Care Reviewed With: patient    Overall Patient Progress: improving    Outcome Evaluation: [id number]    Significant 24 hour events: Ambulated x3. Start calorie counts tomorrow 6/3  Level of Care: Acute     Summary Type: 5A Post Op Report   POD: #4 = 6/1  Pain:  Not controlled - On PCA Dilaudid with 0.6 mg dose, pain averaging 8-9/10  Neuro:WDL. A&O x 4. Stated that she's hard of hearing.  CV:WDL - intermittently tachycardic   Resp:.WDL except, rhythm/pattern except, rhythm/pattern - on RA- uses NC at night,  GI:.WDL except, GI symptoms - abdominal discomfort. Colostomy in place with stool  :.WDL except, voiding ability/characteristics- Cat intact with adequate UOP. Yellow UOP observed.  Skin: .WDL except, integrity - midline incision, intact with island dressing, urethral meatus surgical site intact, with dressing and mesh panties, abd surgical site intact, AQUILINO drain intact.   Activity Assistance: assistance, 1 person  Diet: regular with fair appetite.    A/Ox4. VSS on RA. On cont. pulse ox. Denies nausea and SOB. Ambulated in the hallways today and has been moving around easier in her bed/chair. All dressings changed today and ointment applied to her rash. Ointment applied to her vaginal area for her yeast infection. Still some redness and burning around the AQUILINO drain. Leaking around the AQUILINO drain was minimal today. Cluster cares at night. Continue with POC.

## 2025-06-02 NOTE — PROGRESS NOTES
Gynecology Oncology Progress Note      HD#14 peristomal skin infection, decreased PO intake, POD#5 s/p XL-interspincteric proctectomy, loop ileostomy takedown and creation of end colostomy, takedown and repair of rectovaginal fistula, appendectomy, cystoscopy with bilateral ureteral stent insertion and removal      Disease:  stage IIIb low grade peritoneal carcinoma      24 hour events:   - palliative care assisting with pain management, dilaudid increased from 0.4 to 0.6/10min  - new proximal lower extremity rash     Subjective: doing okay this morning. Continues to have abdominal pain but was able to get sleep despite this overnight. Denies chest pain, shortness of breath, lightheadedness. Noticing a bit more ostomy output this morning. Has not noticed leaking around AQUILINO drain site since dressing change yesterday evening. Rash on her upper thighs persists though she reports no itching or discomfort related to this this morning.     Objective:   Vitals:    06/01/25 1639 06/01/25 1749 06/01/25 2125 06/02/25 0527   BP: 120/75 97/70  95/59   BP Location: Left arm Left arm  Right arm   Pulse: 111 90  101   Resp: 13  14 20   Temp: 97.7  F (36.5  C) 98.3  F (36.8  C) 98.2  F (36.8  C) 98.7  F (37.1  C)   TempSrc: Oral Oral Oral Oral   SpO2: 97% 97% 100% 95%   Weight:       Height:         General: resting in bed, uncomfortable with position changes   CV: regular rate and rhythm, warm and well perfused   Resp: Breathing comfortably on room air  Abdomen: soft, diffusely tender to palpation, midline dressing removed with stapled incision intact, no surrounding erythema or drainage; AQUILINO drain RLQ with serosanguinous output in bulb, surrounding dressing dry; ileostomy takedown site packed with gauze and covered with 4x4 which remain c/d/I; colostomy with small amount of dark brown liquid output     I/O last 3 completed shifts:  In: 1629.13 [P.O.:540; I.V.:126]  Out: 3720 [Urine:3650; Drains:65; Stool:5]    Intake/Output (24 Hrs  // Since [place])  PO: 540 // NR  TPN: 648 // NR  UOP: 2450// NR  Drain: 65// NR  Stool: 5// NR    Assessment: Marizol Granados is a 40 year old female with stage IIIB low grade peritoneal carcinoma now s/p ileostomy takedown, creation of end colostomy with colorectal surgery, and repair of rectovaginal fistula admitted for post-op care awaiting return of bowel function and struggling with pain management, improved after adjustment of dilaudid PCA dose yesterday.     # Stage IIIB low grade peritoneal carcinoma  # Rectovaginal fistula, repaired   # Neurogenic constipation  # Postoperative state   - Diet: As per Colorectal surgery recommendations, currently regular diet with TPN until tolerating sufficient calories  - Activity: as per Colorectal recommendations: Perineal precautions: No direct pressure on perineum, ok for patient to lay on her side, limit sitting up on chair for prolonged periods of time, ambulate at least 4x daily  - Wound care: Per CRS - Daily xeroform changes on rectum, daily packing changes on ostomy take down  - AQUILINO drain: Per CRS - maintain through hospitalization, check AQUILINO creatinine level prior to removal when nearing discharge  Pain management: as per palliative care, see their daily note, also below  - Bladder: has urinary retention and performs intermittent straight catheterization. Maintain indwelling waite catheter until nearing discharge and pt able to manage ISC independently    # Anorectal pain  # Cancer related fatigue   # Postoperative pain   Followed by palliative outpatient with Dr. Norton, last seen 4/24; inpatient team consulted and helping with inpatient/acute postoperative pain management.  - Currently on Methadone, Dilaudid PCA (0.6/10min), Tylenol. Gabapentin and robaxin discontinued.  - PTA Ritalin for fatigue, pain management as described   - Prior to admission: Dilaudid, Butrans patch, gabapentin    # Urinary retention, neurogenic from prior surgery with urinary retention   Managed  with Intermittent straight catheterization prior to admission. Waite in place since surgery. Plan is to continue indwelling catheter until nearing discharge.  - continue waite catheter, monitor output (adequate last 24h)  - History of recurrent UTI last 2/25 with cipro, culture resulted with pansensitive staph aureus   - UA on admission with +LE, +nitrites collected post CTX; urine culture klebsiella on admission   - s/p 6d ceftriaxone, discontinued prior to OR given no ongoing evidence of infection      # Hx DVT/PE - chronic anticoagulation  - PTA Eliquis- currently held; Lovenox ppx held 5/31 due to drop in hgb, restarted with improvement in counts.  - Ultimately plan for indefinite anti-coagulation on home Eliquis given long history of clotting. See Hematology note from 6/14/2024.   - continue therpaeutic lovenox      # MDD/SHIVAM  # Insomnia   # RLS   - PTA scheduled Buspar, Pristiq, Seroquel, ramelteon, trazodone, ambien continued  - PTA gabapentin held   - Avoid changing or holding any of these medications as much as possible  - continue outpatient follow up with psychiatry after discharge for medication management     # Hx migraines  - PTA Topamax, propranolol    Maribel Garcia MD  [facility]  Gynecologic Oncology, PGY3  06/02/2025 5:39 AM    Team pager: [phone]    I have examined this patient personally with the team and agree with the above findings and plan.  Patient HD stable with increasing ostomy output and improved pain control (still on high doses of opioids).  Will continue supportive care.  Waite to stay for now (neurogenic bladder).  Drain management per CR surg.    Homer Arita

## 2025-06-02 NOTE — PROGRESS NOTES
Care Management Follow Up    Length of Stay (days): 13    Expected Discharge Date: 06/06/2025     Concerns to be Addressed:       Patient plan of care discussed at interdisciplinary rounds: Yes    Anticipated Discharge Disposition: Home, Home Care     Anticipated Discharge Services: Home Care  Anticipated Discharge DME: None    Patient/family educated on Medicare website which has current facility and service quality ratings: yes  Education Provided on the Discharge Plan: No  Patient/Family in Agreement with the Plan:      Referrals Placed by CM/SW: Internal Clinic Care Coordination, Homecare  Private pay costs discussed: Not applicable    Discussed  Partnership in Safe Discharge Planning  document with patient/family: No     Handoff Completed: No, handoff not indicated or clinically appropriate    Additional Information:  RNCC notified Pt remains on PCA, will begin to wean as able today. Ostomy has output, supra functioning well.    May discharge on TPN, continue to follow. Awaiting update from Team on final nutrition plan at [place].     Home care updated  is following.  [facility] [place]  [address]  [facility] [address]   P:[phone]   F: [phone]    [place] [facility] letter of recommendation completed by [place] on 5/28.    RNCC will continue to follow and support safe discharge planning as needed.       Next Steps:   [] Follow for disp planning and needs,   [] Confirm Nutrition plan, TPN needed?   [] Update HC     Jerrica Read RN

## 2025-06-02 NOTE — PROGRESS NOTES
[facility] Nurse Inpatient Assessment     Consulted for: ileostomy  5/21: please aleah for end colostomy scheduled surgery 5/28 5/29: New consult for colostomy    [facility] nurse follow-up plan: daily    Patient History (according to provider note(s):      Mrs. Granados is a 40 year old female with history of left ovarian cancer and peritoneal carcinomatosis s/p debulking and diverting loop ileostomy, rectovaginal fistula s/p open intersphincteric proctectomy, takedown of DLI, appendectomy, end colostomy on 5/28/25.     Assessment:      Areas visualized during today's visit: Focused: and Abdomen    Assessment of new end Colostomy:  Diagnosis Pertinent to Stoma: ovarian cancer      Surgery Date: 5/28  Surgeon:Gerson Carpenter       [facility]: [facility]  Pouching system in place on assessment today: Coloplast two piece and cut to fit  Pouch barrier status: intact and Minimal melting  Pouch last changed/wear time: 4 days  Reason for pouch change today: ostomy education and routine schedule  Effectiveness of current pouching/ supply plan: Effective  Change made with ostomy management today: No  Pouching system placed today: Coloplast two piece, cut to fit, and barrier ring   Supplies: at bedside    Last photo: 5/29  Stoma location: LUQ  Stoma size: 1-5/8 inches  Stoma appearance: pink-red, round, moist, good turgor, and protruberant  Mucocutaneous junction:  intact  Peristomal complication(s): none   Output: bowel sweat  Output volume emptied during visit: 100 ml  Abdominal assessment: Soft and Distended  Surgical site(s): dressing dry and intact  NG still in place? No  Pain: Cramping and Sharp  Is patient still on a PCA? Yes    Ostomy education assessment:  Participant of teaching session today: patient   Education completed today: Pouching system assessment  and Pouch change demonstration  Educational materials/methods: Verbal and Demonstration  Education still needed: Pouch change  return demonstration, When to seek medical attention, Low fiber diet , Odor/flatus management , Infection prevention/hygiene , Hernia prevention, Lifestyle adjustments , and Discharge instructions  Learning Comprehension:   Psychosocial assessment: Attentive. Pt had a loop ileostomy prior to this surgery and was independent with care.  Patient readiness for education today: observing and attentive  Following today's visit: patient  is able to demonstrate;         1. How to empty their pouch? Yes        2. How to change their pouch? Yes        3. How to read and record intake and output correctly? Yes  Preparation for discharge completed: Discussed how to order supplies after discharge  and Ordered samples from  after gaining consent from patient/caregiver  Preparation for discharge still needed: Placed prescription recommendations in discharge navigator for MD to sign, Ensured patient has extra supplies for discharge, Discussed how and when to make an outpatient WOC nurse appointment after discharge, Prepared for discharge home with home care, and Discuss signs/symptoms of when to seek medical attention  Pt support system on discharge: Pt lives alone  WOC recommend home care? No  Face to face time: 35 minutes         Treatment Plan:     LUQ Colostomy pouching plan:   Pouching system: ostomy supplies pouches: Coloplast Sensura Click ostomy supplies barrier: Coloplast Sensura Click flat  Accessories used: WOC ostomy accessories: Coloplast barrier ring   Frequency of pouch changes: Twice weekly  WOC follow up plan: Daily Monday-Friday (as able)  Bedside RN interventions: Change pouch PRN if leaking using the supplies above, Empty pouch when 1/3 to 1/2 full, ensure to clean pouch outlet after emptying to prevent odor, Notify WOC for ongoing pouch leakage, Document stoma appearance and output volume, color, and consistency every shift, and Encourage patient to empty pouch with assist     Orders:  Reviewed    RECOMMEND PRIMARY TEAM ORDER: None, at this time  Education provided: plan of care  Discussed plan of care with: Patient and Nurse Practitioner  Notify [facility] if wound(s) deteriorate.  Nursing to notify the Provider(s) and re-consult the [facility] Nurse if new skin concern.    DATA:     Current support surface: Standard  Standard gel mattress (Isoflex)  Containment of urine/stool: Incontinent pad in bed and Ileostomy pouch  BMI: Body mass index is 24.46 kg/m .   Active diet order: Orders Placed This Encounter      Regular Diet Adult     Output: I/O last 3 completed shifts:  In: 696 [P.O.:540; I.V.:156]  Out: 2745 [Urine:2650; Drains:75; Stool:20]     Labs:   Recent Labs   Lab 06/02/25  0627   HGB 8.7*   WBC 4.1     Pressure injury risk assessment:   Sensory Perception: 4-->no impairment  Moisture: 3-->occasionally moist  Activity: 2-->chairfast  Mobility: 3-->slightly limited  Nutrition: 3-->adequate  Friction and Shear: 3-->no apparent problem  Terry Score: 18      Pager no longer in use, please contact through [facility] group: [facility] Nurse [place]    Dept. Office Number: [phone]

## 2025-06-03 ENCOUNTER — APPOINTMENT (OUTPATIENT)
Dept: OCCUPATIONAL THERAPY | Facility: CLINIC | Age: 40
End: 2025-06-03
Payer: COMMERCIAL

## 2025-06-03 LAB
ANION GAP SERPL CALCULATED.3IONS-SCNC: 8 MMOL/L (ref 7–15)
BUN SERPL-MCNC: 18.7 MG/DL (ref 6–20)
CALCIUM SERPL-MCNC: 8.5 MG/DL (ref 8.8–10.4)
CHLORIDE SERPL-SCNC: 107 MMOL/L (ref 98–107)
CREAT FLD-MCNC: 0.7 MG/DL
CREAT SERPL-MCNC: 0.62 MG/DL (ref 0.51–0.95)
CREATININE BODY FLUID SOURCE: NORMAL
EGFRCR SERPLBLD CKD-EPI 2021: >90 ML/MIN/1.73M2
ERYTHROCYTE [DISTWIDTH] IN BLOOD BY AUTOMATED COUNT: 12.9 % (ref 10–15)
GLUCOSE SERPL-MCNC: 100 MG/DL (ref 70–99)
HCO3 SERPL-SCNC: 25 MMOL/L (ref 22–29)
HCT VFR BLD AUTO: 23.3 % (ref 35–47)
HGB BLD-MCNC: 7.7 G/DL (ref 11.7–15.7)
MAGNESIUM SERPL-MCNC: 1.6 MG/DL (ref 1.7–2.3)
MCH RBC QN AUTO: 31.6 PG (ref 26.5–33)
MCHC RBC AUTO-ENTMCNC: 33 G/DL (ref 31.5–36.5)
MCV RBC AUTO: 96 FL (ref 78–100)
PATH REPORT.COMMENTS IMP SPEC: ABNORMAL
PATH REPORT.COMMENTS IMP SPEC: YES
PATH REPORT.FINAL DX SPEC: ABNORMAL
PATH REPORT.GROSS SPEC: ABNORMAL
PATH REPORT.MICROSCOPIC SPEC OTHER STN: ABNORMAL
PATH REPORT.RELEVANT HX SPEC: ABNORMAL
PHOSPHATE SERPL-MCNC: 3.7 MG/DL (ref 2.5–4.5)
PHOTO IMAGE: ABNORMAL
PLATELET # BLD AUTO: 146 10E3/UL (ref 150–450)
POTASSIUM SERPL-SCNC: 3.8 MMOL/L (ref 3.4–5.3)
RBC # BLD AUTO: 2.44 10E6/UL (ref 3.8–5.2)
SODIUM SERPL-SCNC: 140 MMOL/L (ref 135–145)
WBC # BLD AUTO: 3.6 10E3/UL (ref 4–11)

## 2025-06-03 PROCEDURE — 250N000009 HC RX 250: Performed by: OBSTETRICS & GYNECOLOGY

## 2025-06-03 PROCEDURE — 84100 ASSAY OF PHOSPHORUS: CPT

## 2025-06-03 PROCEDURE — 250N000009 HC RX 250: Performed by: SURGERY

## 2025-06-03 PROCEDURE — 97535 SELF CARE MNGMENT TRAINING: CPT | Mod: GO

## 2025-06-03 PROCEDURE — 250N000013 HC RX MED GY IP 250 OP 250 PS 637: Performed by: SURGERY

## 2025-06-03 PROCEDURE — B4185 PARENTERAL SOL 10 GM LIPIDS: HCPCS | Performed by: SURGERY

## 2025-06-03 PROCEDURE — 83735 ASSAY OF MAGNESIUM: CPT

## 2025-06-03 PROCEDURE — 250N000013 HC RX MED GY IP 250 OP 250 PS 637

## 2025-06-03 PROCEDURE — 250N000013 HC RX MED GY IP 250 OP 250 PS 637: Performed by: STUDENT IN AN ORGANIZED HEALTH CARE EDUCATION/TRAINING PROGRAM

## 2025-06-03 PROCEDURE — 85027 COMPLETE CBC AUTOMATED: CPT | Performed by: OBSTETRICS & GYNECOLOGY

## 2025-06-03 PROCEDURE — 250N000013 HC RX MED GY IP 250 OP 250 PS 637: Performed by: PHYSICIAN ASSISTANT

## 2025-06-03 PROCEDURE — 250N000011 HC RX IP 250 OP 636: Performed by: OBSTETRICS & GYNECOLOGY

## 2025-06-03 PROCEDURE — 82570 ASSAY OF URINE CREATININE: CPT | Performed by: SURGERY

## 2025-06-03 PROCEDURE — 250N000011 HC RX IP 250 OP 636: Performed by: FAMILY MEDICINE

## 2025-06-03 PROCEDURE — 999N000199 HC STATISTIC WOC PT EDUCATION, 31-45 MIN

## 2025-06-03 PROCEDURE — 80048 BASIC METABOLIC PNL TOTAL CA: CPT | Performed by: OBSTETRICS & GYNECOLOGY

## 2025-06-03 PROCEDURE — 120N000002 HC R&B MED SURG/OB UMMC

## 2025-06-03 PROCEDURE — 97530 THERAPEUTIC ACTIVITIES: CPT | Mod: GO

## 2025-06-03 RX ADMIN — DESVENLAFAXINE 150 MG: 50 TABLET, FILM COATED, EXTENDED RELEASE ORAL at 20:27

## 2025-06-03 RX ADMIN — LORAZEPAM 1 MG: 0.5 TABLET ORAL at 07:57

## 2025-06-03 RX ADMIN — QUETIAPINE FUMARATE 200 MG: 200 TABLET ORAL at 20:59

## 2025-06-03 RX ADMIN — PANTOPRAZOLE SODIUM 40 MG: 40 TABLET, DELAYED RELEASE ORAL at 07:58

## 2025-06-03 RX ADMIN — ACETAMINOPHEN 1000 MG: 500 TABLET ORAL at 20:29

## 2025-06-03 RX ADMIN — PANTOPRAZOLE SODIUM 40 MG: 40 TABLET, DELAYED RELEASE ORAL at 20:26

## 2025-06-03 RX ADMIN — ENOXAPARIN SODIUM 60 MG: 60 INJECTION SUBCUTANEOUS at 20:27

## 2025-06-03 RX ADMIN — TRAZODONE HYDROCHLORIDE 300 MG: 150 TABLET ORAL at 20:59

## 2025-06-03 RX ADMIN — METHADONE HYDROCHLORIDE 5 MG: 5 SOLUTION ORAL at 07:58

## 2025-06-03 RX ADMIN — BUSPIRONE HYDROCHLORIDE 30 MG: 15 TABLET ORAL at 07:58

## 2025-06-03 RX ADMIN — OLIVE OIL AND SOYBEAN OIL 250 ML: 16; 4 INJECTION, EMULSION INTRAVENOUS at 21:02

## 2025-06-03 RX ADMIN — TOPIRAMATE 50 MG: 50 TABLET, FILM COATED ORAL at 07:58

## 2025-06-03 RX ADMIN — PROPRANOLOL HYDROCHLORIDE 10 MG: 10 TABLET ORAL at 07:58

## 2025-06-03 RX ADMIN — METHYLPHENIDATE HYDROCHLORIDE 15 MG: 5 TABLET ORAL at 07:58

## 2025-06-03 RX ADMIN — Medication: at 08:04

## 2025-06-03 RX ADMIN — MAGNESIUM SULFATE HEPTAHYDRATE: 500 INJECTION, SOLUTION INTRAMUSCULAR; INTRAVENOUS at 21:01

## 2025-06-03 RX ADMIN — RAMELTEON 8 MG: 8 TABLET ORAL at 20:27

## 2025-06-03 RX ADMIN — PROPRANOLOL HYDROCHLORIDE 10 MG: 10 TABLET ORAL at 20:26

## 2025-06-03 RX ADMIN — LORAZEPAM 1 MG: 0.5 TABLET ORAL at 20:59

## 2025-06-03 RX ADMIN — METHADONE HYDROCHLORIDE 5 MG: 5 SOLUTION ORAL at 20:26

## 2025-06-03 RX ADMIN — BUSPIRONE HYDROCHLORIDE 30 MG: 15 TABLET ORAL at 20:26

## 2025-06-03 RX ADMIN — ENOXAPARIN SODIUM 60 MG: 60 INJECTION SUBCUTANEOUS at 09:57

## 2025-06-03 RX ADMIN — ZOLPIDEM TARTRATE 5 MG: 5 TABLET, FILM COATED ORAL at 20:59

## 2025-06-03 RX ADMIN — POLYETHYLENE GLYCOL 3350 17 G: 17 POWDER, FOR SOLUTION ORAL at 07:58

## 2025-06-03 RX ADMIN — TOPIRAMATE 50 MG: 50 TABLET, FILM COATED ORAL at 20:27

## 2025-06-03 RX ADMIN — METHYLPHENIDATE HYDROCHLORIDE 10 MG: 5 TABLET ORAL at 12:14

## 2025-06-03 ASSESSMENT — ACTIVITIES OF DAILY LIVING (ADL)
ADLS_ACUITY_SCORE: 52

## 2025-06-03 NOTE — PROGRESS NOTES
PALLIATIVE CARE PROGRESS NOTE  [facility]     Patient Name: Marizol Granados  Date of Admission: 5/18/2025   Today the patient was seen for: symptom management     Recommendations & Counseling     Discussion: New for Today:  Goal today is to start moving toward oral pain regimen.  We note for context that today is POD #6, that we are not treating cancer pain per se, given status of her disease (most recent imaging 3/25 showing no evidence of active disease).  Prior to this admission, our team was seeing her for cancer associated pain, with pain issues related to rectovaginal fistula and increased pain related to loop ileostomy stoma.  The goal of surgery this admission was to reduce pain and functional limitations related to the complications identified above.  Discussed with patient that the best practice postoperative care is to use IV pain management for a limited amount of time postoperatively, then transition to oral pain regimen as clinically indicated.  I have additional concerns, given patient's complex comorbid psychiatric treatment, that continued IV opioid treatment puts her at risk for development of coping difficulties etc.  Discussed with patient how essential it is for her to be up ambulating to make recovery possible.  Discussed this also with gynecologic oncology team (who joined us for this visit), with plans to increase scheduled ambulation with nursing staff throughout the day.  Our team will continue to see her as an outpatient to continue to down titrate opioids as clinically indicated.    Recommendations for today:   Continue methadone 5 mg BID  Decrease PCA hydromorphone dosing from 0.6 milligrams IV every 10 minutes as needed to 0.6 mg IV every 30 minutes.  Discussed with patient rationale for doing so, and let her know that we anticipate transition tomorrow to oral Dilaudid regimen with potential IV rescue doses, which will also be titrated down  over next several days in anticipation of subsequent discharge.  Will see together with gynecologic oncology team today and in upcoming days to ensure that patient is receiving consistent messages regarding her care.  Gynecologic oncology team discussed aiming for Friday discharge. Marizol agrees with this goal, and was receptive to pain management changes noted above.    Elian Fisher MD  Palliative Medicine Consult Team  Text me via [employer]  TT: 44 minutes, with > 50% spent in C/C/E patient/family/care teams re: GOC, POC, Sx management. 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 Most recent imaging in March 2025 showed no evidence of disease. She is being followed by CRS for rectovaginal fistula and coordinating surgery for takedown of loop ileostomy and creation of end colostomy. Follows palliative care outpatient for pain. Our team was consulted to help with pain and anxiety.   Seen today for: GYN cancer, anxiety, postop pain   Interval History:   No significant changes since yesterday.  PCA flowsheet reviewed.  Last night she used 1.8 mg of Dilaudid IV; on the shift prior to that she used 13 mg, and on the shift prior to that 12 mg.    Medications managed by palliative  APAP 975 mg q6h earnest   Methadone 5mg BID (started 5/22 PM, stopped 5/28, restarted 5/29)  Dilaudid PCA decreased today to 0.6 mg every 30 minutes as needed    Other medications not managed by our team  BuSpar 30 mg twice daily  Does venlafaxine 150 mg p.o. x 1 daily  Lorazepam 1 mg twice daily  Methylphenidate 15 mg p.o. every morning  Seroquel 200 mg at bedtime  Ramelteon 8 mg at bedtime  Trazodone 300 mg at bedtime  Topiramate  Zofran 4 mg IV x1  Atarax 25 mg po x1  Narcan 0.2 mg IV x2    Patient/family narrative  Seen and examined at bedside.         Physical Exam:   Blood  "pressure 102/67, pulse 111, temperature 98.5  F (36.9  C), temperature source Oral, resp. rate 16, height 1.651 m (5' 5\"), weight 66.7 kg (147 lb), SpO2 (!) 90%.   General: Sitting up in chair, awake alert and interactive.  No excessive sedation  Lungs: non labored work of breathing  CV: Regular radial pulse 104  GI: r surgical wounds dressed; I did not evaluate  Neuro: alert and oriented x4, fluent speech, moving all extremities  Psych: anxious, normal affect.      Data Reviewed:     ROUTINE ICU LABS (Last four results)  CMP  Recent Labs   Lab 06/03/25  0357 06/02/25  0627 06/01/25  0441 05/31/25  0040 05/30/25  1113 05/30/25  0810 05/30/25  0442    141 142  --   --   --  139   POTASSIUM 3.8 4.0 3.7  --   --   --  3.5   CHLORIDE 107 108* 110*  --   --   --  107   CO2 25 23 25  --   --   --  27   ANIONGAP 8 10 7  --   --   --  5*   * 97 127*  --   --  130* 189*   BUN 18.7 23.6* 19.0  --   --   --  16.3   CR 0.62 0.65 0.63 0.68  --   --  0.76   GFRESTIMATED >90 >90 >90 >90  --   --  >90   LORY 8.5* 8.6* 8.2*  --   --   --  7.9*   MAG 1.6* 1.8 1.7 1.3*  --   --  1.6*   PHOS 3.7 4.7* 4.1 2.8   < >  --  1.1*   PROTTOTAL  --  5.1*  --   --   --   --   --    ALBUMIN  --  2.5*  --   --   --   --   --    BILITOTAL  --  0.2  --   --   --   --   --    ALKPHOS  --  56  --   --   --   --   --    AST  --  22  --   --   --   --   --    ALT  --  17  --   --   --   --   --     < > = values in this interval not displayed.     CBC  Recent Labs   Lab 06/03/25  0357 06/02/25  0627 06/01/25  0441 05/31/25  0040   WBC 3.6* 4.1 4.8 7.5   RBC 2.44* 2.78* 2.55* 2.48*   HGB 7.7* 8.7* 8.1* 8.0*   HCT 23.3* 26.6* 24.1* 24.3*   MCV 96 96 95 98  95   MCH 31.6 31.3 31.8 32.3   MCHC 33.0 32.7 33.6 32.9   RDW 12.9 12.9 13.2 13.5   * 142* 126* 119*  120*     INR  No lab results found in last 7 days.    Arterial Blood Gas  Recent Labs   Lab 05/28/25  1658 05/28/25  1521 05/28/25  1230   O2PER 60.0 60.0 60.0               "

## 2025-06-03 NOTE — PROGRESS NOTES
[facility] Nurse Inpatient Assessment     Consulted for: ileostomy  5/21: please aleah for end colostomy scheduled surgery 5/28 5/29: New consult for colostomy    [facility] nurse follow-up plan: weekly    Patient History (according to provider note(s):      Mrs. Granados is a 40 year old female with history of left ovarian cancer and peritoneal carcinomatosis s/p debulking and diverting loop ileostomy, rectovaginal fistula s/p open intersphincteric proctectomy, takedown of DLI, appendectomy, end colostomy on 5/28/25.     Assessment:      Areas visualized during today's visit: Focused: and Abdomen    Assessment of new end Colostomy:  Diagnosis Pertinent to Stoma: ovarian cancer      Surgery Date: 5/28  Surgeon:Gerson Carpenter       [facility]: [facility]  Pouching system in place on assessment today: Coloplast two piece and cut to fit  Pouch barrier status: intact  Pouch last changed/wear time: 1 days  Reason for pouch change today: pouch not changed today  Effectiveness of current pouching/ supply plan: Effective  Change made with ostomy management today: No  Pouching system placed today: Coloplast two piece, cut to fit, and barrier ring   Supplies: at bedside    Last photo: 5/29  Stoma location: LUQ  Stoma size: 1-5/8 inches  Stoma appearance: pink-red, round, moist, good turgor, and protruberant  Mucocutaneous junction:  intact  Peristomal complication(s): none   Output: bowel sweat  Output volume emptied during visit: 0 ml  Abdominal assessment: Soft and Distended  Surgical site(s): dressing dry and intact  NG still in place? No  Pain: Cramping and Sharp  Is patient still on a PCA? Yes    Ostomy education assessment:  Participant of teaching session today: patient   Education completed today: Pouching system assessment , Importance of hydration, When to seek medical attention, Odor/flatus management , Hernia prevention, Lifestyle adjustments , and Discharge  instructions  Educational materials/methods: Verbal, FOD [place] education hand out, and Addressed patient/caregiver questions/concerns  Education still needed: Pouch change return demonstration  Learning Comprehension:   Psychosocial assessment: Attentive. Pt had a loop ileostomy prior to this surgery and was independent with care.  Patient readiness for education today: observing and attentive  Following today's visit: patient  is able to demonstrate;         1. How to empty their pouch? Yes        2. How to change their pouch? Yes        3. How to read and record intake and output correctly? Yes  Preparation for discharge completed: Placed prescription recommendations in discharge navigator for MD to sign, Ensured patient has extra supplies for discharge, Discussed how to order supplies after discharge , Ordered samples from  after gaining consent from patient/caregiver, Discussed how and when to make an outpatient WOC nurse appointment after discharge, Prepared for discharge home with home care, and Discuss signs/symptoms of when to seek medical attention  Preparation for discharge still needed: None  Pt support system on discharge: Pt lives alone  WOC recommend home care? No  Face to face time: 35 minutes         Treatment Plan:     LUQ Colostomy pouching plan:   Pouching system: ostomy supplies pouches: Coloplast Sensura Click ostomy supplies barrier: Coloplast Sensura Click flat  Accessories used: WOC ostomy accessories: Coloplast barrier ring   Frequency of pouch changes: Twice weekly  WOC follow up plan: Daily Monday-Friday (as able)  Bedside RN interventions: Change pouch PRN if leaking using the supplies above, Empty pouch when 1/3 to 1/2 full, ensure to clean pouch outlet after emptying to prevent odor, Notify WOC for ongoing pouch leakage, Document stoma appearance and output volume, color, and consistency every shift, and Encourage patient to empty pouch with assist     Orders:  Reviewed    RECOMMEND PRIMARY TEAM ORDER: None, at this time  Education provided: plan of care  Discussed plan of care with: Patient and Nurse Practitioner  Notify [facility] if wound(s) deteriorate.  Nursing to notify the Provider(s) and re-consult the [facility] Nurse if new skin concern.    DATA:     Current support surface: Standard  Standard gel mattress (Isoflex)  Containment of urine/stool: Incontinent pad in bed and Ileostomy pouch  BMI: Body mass index is 24.46 kg/m .   Active diet order: Orders Placed This Encounter      Regular Diet Adult     Output: I/O last 3 completed shifts:  In: 2732.8 [P.O.:1560; I.V.:135.8]  Out: 2355 [Urine:2225; Drains:30; Stool:100]     Labs:   Recent Labs   Lab 06/03/25 0357 06/02/25 0627   ALBUMIN  --  2.5*   HGB 7.7* 8.7*   WBC 3.6* 4.1     Pressure injury risk assessment:   Sensory Perception: 4-->no impairment  Moisture: 4-->rarely moist  Activity: 3-->walks occasionally  Mobility: 3-->slightly limited  Nutrition: 3-->adequate  Friction and Shear: 3-->no apparent problem  Terry Score: 20      Pager no longer in use, please contact through [employer] group: [facility] Nurse [place]    Dept. Office Number: [phone]          [facility]     Name: Marizol Granados  Date: 6/3/25    To order your ostomy supplies    The ostomy Supplier needs this supply list  to process your order. You will need to fax/deliver this list, along with your Insurance information. Your home care nurse can assist with this process.    List of Ostomy Distributors      [facility]  Ph. [phone] Fax # [phone]  [facility]   Ph. [phone] [phone]  [facility]   Ph. [phone] [phone]  Or Call your insurance provider for their preferred supplier    Your Medical Supplier will need your surgeon's name, phone and fax number    Clinic:                     Phone                            Fax  Colorectal Surgery:     [phone]   [phone]    Verbal Order for ostomy supplies for 1 Month per:              Jalen Hart RN, CWOCN                                                                  Authorizing MD: Dr. Gerson Carpenter    Quantity of pouches:     20/mo.    Request the following supplies:      Jose                        Coloplast  Sensura Vaughn Click Barrier flat  #[id number]    Sensura Florin Click Pouch #[id number]      OR      1 piece convex light Vaughn cut up to 1    with filter #[id number]         Accessories  Coloplast barrier ring #[id number]                                            Change your pouch 2 times a week, more often if leaking.   If you are cutting a hole in the wafer of your pouch, recheck stoma size and adjust pouch opening as needed every week    . Call the Ostomy Nurse at [facility]       [address] [place] [address], [place] : [phone]   Schedule a follow-up visit in 2 to 4 weeks after your surgery, sooner if having problems Bring a complete set of pouch-changing supplies to this visit    [facility] outpatient [facility] department  [address] [place] [address]   number- [phone]     Problems you should Report  - The stoma turns blue or darker in color.  - Cuts or sores around the stoma.  - Red, raw or painful skin around the stoma.  - Any bulging of the skin around the stoma.  - A pouch that leaks every day.  - Problems making the right size hole in the pouch wafer.   Please call with any questions or concerns.      [facility] Smokers Quitline ([phone])

## 2025-06-03 NOTE — PROGRESS NOTES
Brief GYN Oncology Note    To bedside for PM check in    Marizol kunz with her children on my arrival to the room. Sitting up in bed, smiling, appears well. Asked if there is anything she needs and she reported no; left her to continue her phone call. She remains on dilaudid PCA for pain control; will d/w palliative team tomorrow plan for weaning significant IV pain medication. Ostomy with increased output today. Plan to initiate calorie counts tomorrow to work on weaning TPN as well. No changes to plan of care tonight. Will f/up AM labs.     Maribel Garcia MD  OB/GYN PGY-3  6/2/2025 8:57 PM

## 2025-06-03 NOTE — PROGRESS NOTES
"PALLIATIVE CARE SOCIAL WORK Progress Note   Location: [facility]      Support Visit    PCSW follow up visit this afternoon with Marizol, mom Atif also at bedside.    Marizol presented in significantly improved spirits from time of last visit, which was pre-surgery and while still in observation unit.     Marizol continued to verbalize significant pain. PCSW observed her to ambulate independently from recliner to bed and reposition self independently in bed, some grimacing noted with movement.    Marizol able to acknowledge that being \"on this side of surgery\" has helped her mood and anxiety and she looks forward to hopefully discharging home later this week. Continues to have concerns related to disability application and benefits; reviewed with her the [employer] information provided to her while she was still in observation and again encouraged her to contact them and ask for assistance.     Marizol has enjoyed a few visits from her children over the last week+ and looks forward to being home with them soon. Marizol did notably become overwhelmed as her mom shared her opinion that Marizol and the kids should \"move back home to [place]\" noting it would be easier for her to be close and help take care of them all. PCSW observed Marizol to manage conversations with her mom effectively and assertively though understandably felt tired and overwhelmed as conversation progressed.    PCSW offered gentle interruption to conversation and redirection to Marizol being ready to rest following shower prior to PCSW visit. Marizol in agreement, waiting on bedside RN to come in to admin pain medication.    Plan: PCSW will check in again 6/4 as discussed with patient; if mom present may ask to visit with Marizol 1:1  Continue to assess emotional support needs and coping for duration of hospitalization, baseline of anxiety    Clinical Social Work Interventions:   Assessment of palliative specific issues    Facilitation of processing of " thoughts/feelings  Family communication facilitated  Re-framing   Resource referral confirmed patient still has resources provided 5/21: Cancer Legal Care, [place]     FLORIAN Knight, [facility] Hospital[facility]  [facility], Palliative Care  Securely message with the [employer] Web Console (learn more here) or  Text page via [employer] Paging/Directory

## 2025-06-03 NOTE — PROGRESS NOTES
Gynecology Oncology Progress Note      HD#15 peristomal skin infection, decreased PO intake, POD#6 s/p XL-interspincteric proctectomy, loop ileostomy takedown and creation of end colostomy, takedown and repair of rectovaginal fistula, appendectomy, cystoscopy with bilateral ureteral stent insertion and removal      Disease:  stage IIIb low grade peritoneal carcinoma      24 hour events:   - increasing ostomy output     Subjective: kaycee is in pain this morning. This was the case through much of the night. Pain is diffuse throughout her abdomen. Not much relief with PCA. Otherwise she has no new symptoms. Reports she was out of bed most of the day yesterday, walking, sitting in chair an this went well. Has had improved appetite and is eating solids as well as drinking protein shakes without nausea or vomiting. Has noticed increased output from the ostomy. Has not had itching or burning of her thighs relate to the rash, has not noticed any changes in this.      Objective:   Vitals:    06/03/25 0200 06/03/25 0349 06/03/25 0351 06/03/25 0400   BP:   (!) 88/49 (!) 88/54   BP Location:       Pulse:       Resp:  14     Temp:  98.9  F (37.2  C)     TempSrc:  Oral     SpO2: 94% 94%     Weight:       Height:          General: resting in bed, uncomfortable with position changes   CV: regular rate and rhythm, warm and well perfused   Resp: Breathing comfortably on room air  Abdomen: soft, diffusely tender to palpation, stapled incision intact, no surrounding erythema or drainage; AQUILINO drain RLQ with serosanguinous output in bulb, surrounding dressing dry; ileostomy takedown site packed with gauze and covered with 4x4 which remain c/d/I; colostomy with small amount of dark brown liquid output     I/O last 3 completed shifts:  In: 1889.8 [P.O.:1560; I.V.:156.8]  Out: 2275 [Urine:2125; Drains:35; Stool:115]    Intake/Output (24 Hrs // Since [place])  PO: 1560 cc// NR  TPN: 173 cc// NR  UOP: 2125 cc// 1100 cc  Drain: 35 cc// 15 cc  Stool:  115 cc// NR    Assessment: Marizol Granados is a 40 year old female with stage IIIB low grade peritoneal carcinoma now s/p ileostomy takedown, creation of end colostomy with colorectal surgery, and repair of rectovaginal fistula admitted for post-op care. Now with return of bowel function; struggling with pain management, improved after adjustment of dilaudid PCA dose.     # Stage IIIB low grade peritoneal carcinoma  # Rectovaginal fistula, repaired   # Neurogenic constipation  # Postoperative state   - Diet: As per Colorectal surgery recommendations, currently regular diet with TPN until tolerating sufficient calories. Start calorie counts today.  - Activity: as per Colorectal recommendations: Perineal precautions: No direct pressure on perineum, ok for patient to lay on her side, limit sitting up on chair for prolonged periods of time, ambulate at least 4x daily  - Wound care: Per CRS - Daily xeroform changes on rectum, daily packing changes on ostomy take down  - AQUILINO drain: Per CRS - maintain through hospitalization, check AQUILINO creatinine level prior to removal when nearing discharge  Pain management: as per palliative care, see their daily note, also below  - Bladder: has urinary retention and performs intermittent straight catheterization. Maintain indwelling waite catheter until nearing discharge and pt able to manage ISC independently    # Anorectal pain  # Cancer related fatigue   # Postoperative pain   Followed by palliative outpatient with Dr. Norton, last seen 4/24; inpatient team consulted and helping with inpatient/acute postoperative pain management.  - Currently on Methadone, Dilaudid PCA (0.6/10min), Tylenol. Gabapentin and robaxin discontinued.  - will d/w palliative care today plan for weaning PCA as pt continues to meet more goals for discharge   - PTA Ritalin for fatigue, pain management as described   - Prior to admission: Dilaudid, Butrans patch, gabapentin    # Urinary retention, neurogenic from  prior surgery with urinary retention   Managed with Intermittent straight catheterization prior to admission. Waite in place since surgery. Plan is to continue indwelling catheter until nearing discharge.  - continue waite catheter, monitor output (adequate last 24h)  - History of recurrent UTI last 2/25 with cipro, culture resulted with pansensitive staph aureus   - UA on admission with +LE, +nitrites collected post CTX; urine culture klebsiella on admission   - s/p 6d ceftriaxone, discontinued prior to OR given no ongoing evidence of infection      # Hx DVT/PE - chronic anticoagulation  - PTA Eliquis- currently held; Lovenox ppx held 5/31 due to drop in hgb, restarted with improvement in counts.  - Ultimately plan for indefinite anti-coagulation on home Eliquis given long history of clotting. See Hematology note from 6/14/2024.   - continue therpaeutic lovenox      # MDD/SHIVAM  # Insomnia   # RLS   - PTA scheduled Buspar, Pristiq, Seroquel, ramelteon, trazodone, ambien continued  - PTA gabapentin held   - Avoid changing or holding any of these medications as much as possible  - continue outpatient follow up with psychiatry after discharge for medication management     # Hx migraines  - PTA Topamax, propranolol    Maribel Garcia MD  [facility]  Gynecologic Oncology, PGY3  06/03/2025 5:37 AM    Team pager: [phone]      I have examined this patient personally with the team and agree with the above findings and plan.  Patient feeling better, increased output from ostomy.  WIll need palliative care for pain mgmt and PT/OT for ambulation.    Homer Arita

## 2025-06-03 NOTE — PROGRESS NOTES
Brief Progress Note    Patient evaluated at approximately 11AM with palliative care team. Marizol is doing well. Able to eat more and more food every day. Pain is well controlled with dilaudid 0.6 PCA. She is eager to go home due to son's birthday party on Friday. Discussed plan with palliative care to wean PCA and transition to oral pain medication. Plan to space out administration of PCA today. Plan to transition to oral pain medication tomorrow. Marizol is very motivated and in agreement with this plan. Encouraged ambulation- recommend 4 walks per day. Discussed timing of these walks and made a plan with nursing team: aim for before noon, before 1500, before 1800 and before bedtime. Mother joins at bedside and is also in agreement with plan. She is satisfied with her cares here- appreciate interdisciplinary collaboration.      Isamar Woodruff MD PGY-1  [facility] Gynecology Oncology   Gyn Onc Pager: [phone]  06/03/25 1:47 PM

## 2025-06-03 NOTE — PROGRESS NOTES
[facility]    Progress Note - Colorectal Surgery Service       Date of Admission:  5/18/2025    Assessment & Plan: Surgery   Mrs. Granados is a 40 year old female with history of left ovarian cancer and peritoneal carcinomatosis s/p prior debulking and diverting loop ileostomy, rectovaginal fistula.  Now s/p open intersphincteric proctectomy, takedown of DLI, appendectomy, end colostomy on 5/28/25.  Now having bowel function, but struggling with post-op pain control.     - greatly appreciate Gyn Onc management  - No new recommendations from CRS perspective at this time  - Regular diet and wean TPN as able  - Will discuss AQUILINO drain plan and will need AQUILINO creatinine level checked prior to AQUILINO Drain removal.    - apprec WOCN for colostomy education  - Encourage ambulation QID, incentive spirometer use   - Sitting:  MODIFIED SITTING PROTOCOL:  OK to sit on a soft surface for 15-20 minutes at a time 4-5 times per day.  Otherwise no sitting, bending, squatting, Nor apply either direct pressure or pulling/shearing forces to perineal incision.   OK to perch at edge of bed when transitioning from laying to standing.   OK to walk as much as possible. And encourage walking at least 4-5 times per day.   NO DONUT pillows. Request heidi-mat cushion from PT/OT.   Ppx:  on lovenox 60mg BID       Diet: Regular Diet Adult  parenteral nutrition - ADULT compounded formula CYCLE  Calorie Counts  parenteral nutrition - ADULT compounded formula CYCLE    DVT Prophylaxis: Enoxaparin (Lovenox) SQ  Cat Catheter: PRESENT, indication: Surgical procedure  Lines: PRESENT      Port a Cath 10/31/24 Single Lumen Right Chest wall-Site Assessment: WDL      Drains: PRESENT         - 1 Closed/Suction Drain(s)    - 1 Open Drain(s)   Cardiac Monitoring: None  Code Status: Full Code      Clinically Significant Risk Factors          # Hyperchloremia: Highest Cl = 108 mmol/L in last 2 days, will monitor as  appropriate        # Hypomagnesemia: Lowest Mg = 1.6 mg/dL in last 2 days, will replace as needed   # Hypoalbuminemia: Lowest albumin = 2.5 g/dL at 6/2/2025  6:27 AM, will monitor as appropriate                 # Severe Malnutrition: based on nutrition assessment and treatment provided per dietitian's recommendations.    # Financial/Environmental Concerns: unable to afford rent/mortgage;unemployed  # Asthma: noted on problem list      Social Drivers of Health   Depression: Risk of Self Harm (5/4/2025)    Received from [facility]    Depression (PHQ-9)     Last PHQ-9 Score: 27     Thoughts of self harm: Nearly every day   Housing Stability: High Risk (5/19/2025)    Housing Stability     Do you have housing? : No     Are you worried about losing your housing?: No   Stress: Stress Concern Present (5/13/2024)    [employer] - Occupational Stress Questionnaire     Feeling of Stress : Very much   Social Connections: Moderately Isolated (5/13/2024)    Social Connection and Isolation Panel [NHANES]     Frequency of Communication with Friends and Family: Three times a week     Frequency of Social Gatherings with Friends and Family: Once a week     Attends Restorationist Services: Never     Active Member of Clubs or Organizations: No     Attends Club or Organization Meetings: Never     Marital Status: Living with partner             Patient seen with colorectal fellow, Dr. Grider, who discussed with colorectal surgeon, Dr. Velasquez.     Abby Mcguire PA-C  Colon and Rectal Surgery   _______________________________________________    Interval History   She has been tolerating some diet without N/V. Endorses ostomy output.  Needs help to change positions and get into a more comfortable position.      Physical Exam   Vital Signs: Temp: 98.5  F (36.9  C) Temp src: Oral BP: 102/67 Pulse: 111   Resp: 16 SpO2: 94 % O2 Device: None (Room air)    Weight: 147 lbs 0 oz  Intake/Output  Summary (Last 24 hours) at 5/29/2025 0940  Last data filed at 5/29/2025 0907  Gross per 24 hour   Intake 6880.3 ml   Output 2323 ml   Net 4557.3 ml     General Appearance: Pt was sitting at edge of bed tearful due to pain and has been waiting for 45 minutes for help from the nurses.    Respiratory: nonlabored breathing on room air   Cardiovascular: normal rate and regular rhythm   GI:  Mildly distended, pink and well perfused ostomy with gas as well as brown liquid stool, dressings clean/dry/intact, AQUILINO drain with serosanguinous output.  Will need to return to assess perineal incision due to pain.   Skin: no rashes      Data     I have personally reviewed the following data over the past 24 hrs:    3.6 (L)  \   7.7 (L)   / 146 (L)     140 107 18.7 /  100 (H)   3.8 25 0.62 \       Imaging results reviewed over the past 24 hrs:   No results found for this or any previous visit (from the past 24 hours).

## 2025-06-03 NOTE — PROGRESS NOTES
"CLINICAL NUTRITION SERVICES - REASSESSMENT NOTE     RECOMMENDATIONS FOR MDs/PROVIDERS TO ORDER:  Recommend patient be able to consistently consume at least 1100 kcal and 50 g protein daily (~60% estimated kcal and protein needs) before stopping nutrition support    Registered Dietitian Interventions:  Continue TPN/lipids    Future/Additional Recommendations:  Monitor PO intakes, wt trends, TPN tolerance; inpatient RD will continue to follow per protocol       CURRENT NUTRITION ORDERS  Diet: Regular  - Calorie Counts 6/3-6/5    Nutrition Support: TPN, 960 mL/day x 12 hrs with 220 g dextrose, 90 g AA, and 250 mL 20% lipids 6 days per week for total provision of 1536 Kcals (25 Kcals/kg), 1.5 g/kg protein, GIR 5.45 mg/kg/minute @ peak infusion, and 28% fat kcals on average daily.     CURRENT INTAKE/TOLERANCE  TPN: TPN was discontinued 5/28 evening after OR, resumed 5/29 evening.  No other documentation of any TPN/lipid interruptions per review of progress notes and MAR.    PO Intake: Per provider note today, pt \"Able to eat more and more food every day\". Has been on regular diet since 5/31, was on FLD 5/30, clear liquids diet 5/28 late-5/30, NPO most of day 5/28, and regular diet 5/18-5/28.     NEW FINDINGS  GI symptoms: Reviewed    - Per chart review, POD#6 s/p XL-interspincteric proctectomy, loop ileostomy takedown and creation of end colostomy, takedown and repair of rectovaginal fistula, appendectomy, cystoscopy with bilateral ureteral stent insertion and removal     Nutrition-relevant labs: Reviewed  - K+ and Phos WNL  - Mg++ 1.6 (L), RN managed replacement protoocl is in place  - BGs stable  - (6/2): Alk Phos, ALT, AST, Tbili, and TG WNL    Nutrition-relevant medications: Reviewed    Weight: mostly stable since admit, question accuracy of most recent wt on 6/1    Date/Time Weight Weight Method   06/01/25 1400 66.7 kg (147 lb) --   05/29/25 0906 -- Standing scale   05/28/25 0723 62.1 kg (136 lb 14.4 oz) Standing " scale   05/26/25 0848 62.1 kg (136 lb 12.8 oz) Standing scale   05/19/25 0115 61.3 kg (135 lb 2.3 oz) --   05/18/25 1715 60.7 kg (133 lb 12.8 oz) --       MALNUTRITION  % Intake: Decreased intake does not meet criteria  % Weight Loss: > 20% in 1 year (severe)   Subcutaneous Fat Loss: Orbital: Mild, Buccal: Moderate, and Triceps: Moderate per RD note on 5/20  Muscle Loss: Temples (temporalis muscle): Moderate, Clavicles (pectoralis and deltoids): Moderate, and Calf (gastrocnemius): Mild per RD note on 5/20  Fluid Accumulation/Edema: None noted per chart review  Malnutrition Diagnosis: Severe malnutrition in the context of chronic illness  Malnutrition Present on Admission: Yes    EVALUATION OF THE PROGRESS TOWARD GOALS   Previous Goals  Total avg nutritional intake to meet a minimum of 25 kcal/kg and 1.2 g PRO/kg daily (per dosing wt 61 kg).   Evaluation: Progressing    Previous Nutrition Diagnosis  Inadequate oral intake related to not tolerating adequate PO intake as evidenced by chart review and requiring TPN/lipids for nutrition support   Evaluation: Improving    NUTRITION DIAGNOSIS  Inadequate oral intake related to slow diet advancement post-op as evidenced by NPO or CLD x 3 days earlier in week and pt working on increasing PO intake per chart review    INTERVENTIONS  Chart review, pt with other cares during attempts to visit    GOALS  Patient to consume % of nutritionally adequate meal trays TID, or the equivalent with supplements/snacks.  Total avg nutritional intake to meet a minimum of 25 kcal/kg and 1.2 g PRO/kg daily (per dosing wt 61 kg).     MONITORING/EVALUATION  Progress toward goals will be monitored and evaluated per policy.     Do Siegel, RD, , LD  Weekday Units covered: 5A (non-Heme Onc pts) and 7B ([id number])  Available by 5A or 7B Clinical Dietitiemily Serrato  Weekend Coverage: Weekend Clinical Dietjoel Serrato    Inpatient Clinical Dietitians no longer available via paging

## 2025-06-03 NOTE — PLAN OF CARE
Goal Outcome Evaluation:      Plan of Care Reviewed With: patient    Overall Patient Progress: no changeOverall Patient Progress: no change    Outcome Evaluation: [id number]      Summary Type: 5A Post Op Report   POD: #6 = 6/3  Complications:   Pain:  Not controlled - On PCA Dilaudid with 0.6 mg dose, pain averaging 8-9/10  Neuro:WDL. A&O x 4. Stated that she's hard of hearing.  CV:WDL - intermittently tachycardic   Resp:WDL except, rhythm/pattern - on RA- uses NC at night,  GI:.WDL except - abdominal discomfort. Colostomy in place with stool.   :.WDL except Cat intact with adequate UOP. Yellow UOP observed.  Skin: .WDL except - midline incision, intact with island dressing, urethral meatus surgical site intact, with dressing and mesh panties, abd surgical site intact, AQUILINO drain intact.   Activity Assistance: assistance, stand-by  Safety/Falls Risk: Level of Risk: Moderate Risk  Consults:   Procedures/Imaging:   Precautions: Bleeding Precautions    Attempting to wean off of PCA dilaudid. Lockout and 1 hour limit adjusted. No acute events this shift, continue with care as planned.

## 2025-06-04 ENCOUNTER — APPOINTMENT (OUTPATIENT)
Dept: PHYSICAL THERAPY | Facility: CLINIC | Age: 40
End: 2025-06-04
Payer: COMMERCIAL

## 2025-06-04 ENCOUNTER — APPOINTMENT (OUTPATIENT)
Dept: OCCUPATIONAL THERAPY | Facility: CLINIC | Age: 40
End: 2025-06-04
Payer: COMMERCIAL

## 2025-06-04 LAB
ANION GAP SERPL CALCULATED.3IONS-SCNC: 9 MMOL/L (ref 7–15)
BUN SERPL-MCNC: 22.1 MG/DL (ref 6–20)
CALCIUM SERPL-MCNC: 8.2 MG/DL (ref 8.8–10.4)
CHLORIDE SERPL-SCNC: 107 MMOL/L (ref 98–107)
CREAT FLD-MCNC: 0.6 MG/DL
CREAT SERPL-MCNC: 0.57 MG/DL (ref 0.51–0.95)
CREATININE BODY FLUID SOURCE: NORMAL
EGFRCR SERPLBLD CKD-EPI 2021: >90 ML/MIN/1.73M2
ERYTHROCYTE [DISTWIDTH] IN BLOOD BY AUTOMATED COUNT: 12.7 % (ref 10–15)
GLUCOSE SERPL-MCNC: 143 MG/DL (ref 70–99)
HCO3 SERPL-SCNC: 24 MMOL/L (ref 22–29)
HCT VFR BLD AUTO: 23.8 % (ref 35–47)
HGB BLD-MCNC: 7.8 G/DL (ref 11.7–15.7)
MAGNESIUM SERPL-MCNC: 1.9 MG/DL (ref 1.7–2.3)
MCH RBC QN AUTO: 31.1 PG (ref 26.5–33)
MCHC RBC AUTO-ENTMCNC: 32.8 G/DL (ref 31.5–36.5)
MCV RBC AUTO: 95 FL (ref 78–100)
PHOSPHATE SERPL-MCNC: 4.1 MG/DL (ref 2.5–4.5)
PLATELET # BLD AUTO: 156 10E3/UL (ref 150–450)
POTASSIUM SERPL-SCNC: 4.1 MMOL/L (ref 3.4–5.3)
RBC # BLD AUTO: 2.51 10E6/UL (ref 3.8–5.2)
SODIUM SERPL-SCNC: 140 MMOL/L (ref 135–145)
WBC # BLD AUTO: 2.9 10E3/UL (ref 4–11)

## 2025-06-04 PROCEDURE — 80048 BASIC METABOLIC PNL TOTAL CA: CPT | Performed by: OBSTETRICS & GYNECOLOGY

## 2025-06-04 PROCEDURE — 250N000013 HC RX MED GY IP 250 OP 250 PS 637: Performed by: STUDENT IN AN ORGANIZED HEALTH CARE EDUCATION/TRAINING PROGRAM

## 2025-06-04 PROCEDURE — 82570 ASSAY OF URINE CREATININE: CPT

## 2025-06-04 PROCEDURE — 250N000013 HC RX MED GY IP 250 OP 250 PS 637

## 2025-06-04 PROCEDURE — 120N000002 HC R&B MED SURG/OB UMMC

## 2025-06-04 PROCEDURE — 250N000011 HC RX IP 250 OP 636: Performed by: FAMILY MEDICINE

## 2025-06-04 PROCEDURE — 250N000013 HC RX MED GY IP 250 OP 250 PS 637: Performed by: FAMILY MEDICINE

## 2025-06-04 PROCEDURE — 250N000013 HC RX MED GY IP 250 OP 250 PS 637: Performed by: SURGERY

## 2025-06-04 PROCEDURE — 99232 SBSQ HOSP IP/OBS MODERATE 35: CPT | Performed by: FAMILY MEDICINE

## 2025-06-04 PROCEDURE — 250N000011 HC RX IP 250 OP 636: Performed by: OBSTETRICS & GYNECOLOGY

## 2025-06-04 PROCEDURE — 97530 THERAPEUTIC ACTIVITIES: CPT | Mod: GP | Performed by: REHABILITATION PRACTITIONER

## 2025-06-04 PROCEDURE — 84100 ASSAY OF PHOSPHORUS: CPT | Performed by: NURSE PRACTITIONER

## 2025-06-04 PROCEDURE — 85027 COMPLETE CBC AUTOMATED: CPT | Performed by: OBSTETRICS & GYNECOLOGY

## 2025-06-04 PROCEDURE — 250N000013 HC RX MED GY IP 250 OP 250 PS 637: Performed by: NURSE PRACTITIONER

## 2025-06-04 PROCEDURE — 250N000009 HC RX 250: Performed by: OBSTETRICS & GYNECOLOGY

## 2025-06-04 PROCEDURE — 97535 SELF CARE MNGMENT TRAINING: CPT | Mod: GO

## 2025-06-04 PROCEDURE — 83735 ASSAY OF MAGNESIUM: CPT | Performed by: NURSE PRACTITIONER

## 2025-06-04 PROCEDURE — 250N000013 HC RX MED GY IP 250 OP 250 PS 637: Performed by: PHYSICIAN ASSISTANT

## 2025-06-04 RX ORDER — POLYETHYLENE GLYCOL 3350 17 G/17G
17 POWDER, FOR SOLUTION ORAL 3 TIMES DAILY
Status: DISCONTINUED | OUTPATIENT
Start: 2025-06-04 | End: 2025-06-06 | Stop reason: HOSPADM

## 2025-06-04 RX ORDER — HYDROMORPHONE HYDROCHLORIDE 2 MG/1
6 TABLET ORAL EVERY 4 HOURS
Refills: 0 | Status: DISCONTINUED | OUTPATIENT
Start: 2025-06-04 | End: 2025-06-05

## 2025-06-04 RX ADMIN — PROPRANOLOL HYDROCHLORIDE 10 MG: 10 TABLET ORAL at 09:17

## 2025-06-04 RX ADMIN — HYDROMORPHONE HYDROCHLORIDE 6 MG: 2 TABLET ORAL at 23:16

## 2025-06-04 RX ADMIN — TOPIRAMATE 50 MG: 50 TABLET, FILM COATED ORAL at 09:16

## 2025-06-04 RX ADMIN — POLYETHYLENE GLYCOL 3350 17 G: 17 POWDER, FOR SOLUTION ORAL at 09:16

## 2025-06-04 RX ADMIN — METHADONE HYDROCHLORIDE 5 MG: 5 SOLUTION ORAL at 19:59

## 2025-06-04 RX ADMIN — LORAZEPAM 1 MG: 0.5 TABLET ORAL at 20:41

## 2025-06-04 RX ADMIN — QUETIAPINE FUMARATE 200 MG: 200 TABLET ORAL at 20:45

## 2025-06-04 RX ADMIN — METHYLPHENIDATE HYDROCHLORIDE 15 MG: 5 TABLET ORAL at 09:27

## 2025-06-04 RX ADMIN — LORAZEPAM 1 MG: 0.5 TABLET ORAL at 09:17

## 2025-06-04 RX ADMIN — ZOLPIDEM TARTRATE 5 MG: 5 TABLET, FILM COATED ORAL at 20:45

## 2025-06-04 RX ADMIN — MAGNESIUM SULFATE HEPTAHYDRATE: 500 INJECTION, SOLUTION INTRAMUSCULAR; INTRAVENOUS at 20:17

## 2025-06-04 RX ADMIN — ACETAMINOPHEN 1000 MG: 500 TABLET ORAL at 09:17

## 2025-06-04 RX ADMIN — Medication: at 07:32

## 2025-06-04 RX ADMIN — HYDROMORPHONE HYDROCHLORIDE 6 MG: 2 TABLET ORAL at 19:11

## 2025-06-04 RX ADMIN — RAMELTEON 8 MG: 8 TABLET ORAL at 20:00

## 2025-06-04 RX ADMIN — ENOXAPARIN SODIUM 60 MG: 60 INJECTION SUBCUTANEOUS at 09:18

## 2025-06-04 RX ADMIN — METHADONE HYDROCHLORIDE 5 MG: 5 SOLUTION ORAL at 09:16

## 2025-06-04 RX ADMIN — TOPIRAMATE 50 MG: 50 TABLET, FILM COATED ORAL at 20:00

## 2025-06-04 RX ADMIN — TRAZODONE HYDROCHLORIDE 300 MG: 150 TABLET ORAL at 20:42

## 2025-06-04 RX ADMIN — PANTOPRAZOLE SODIUM 40 MG: 40 TABLET, DELAYED RELEASE ORAL at 09:16

## 2025-06-04 RX ADMIN — PANTOPRAZOLE SODIUM 40 MG: 40 TABLET, DELAYED RELEASE ORAL at 20:00

## 2025-06-04 RX ADMIN — PROPRANOLOL HYDROCHLORIDE 10 MG: 10 TABLET ORAL at 20:00

## 2025-06-04 RX ADMIN — BUSPIRONE HYDROCHLORIDE 30 MG: 15 TABLET ORAL at 20:00

## 2025-06-04 RX ADMIN — BUSPIRONE HYDROCHLORIDE 30 MG: 15 TABLET ORAL at 09:17

## 2025-06-04 RX ADMIN — HYDROMORPHONE HYDROCHLORIDE 6 MG: 2 TABLET ORAL at 15:33

## 2025-06-04 RX ADMIN — METHYLPHENIDATE HYDROCHLORIDE 10 MG: 5 TABLET ORAL at 11:47

## 2025-06-04 RX ADMIN — ACETAMINOPHEN 1000 MG: 500 TABLET ORAL at 20:00

## 2025-06-04 RX ADMIN — HYDROMORPHONE HYDROCHLORIDE 0.6 MG: 1 INJECTION, SOLUTION INTRAMUSCULAR; INTRAVENOUS; SUBCUTANEOUS at 20:36

## 2025-06-04 RX ADMIN — POLYETHYLENE GLYCOL 3350 17 G: 17 POWDER, FOR SOLUTION ORAL at 19:59

## 2025-06-04 RX ADMIN — ACETAMINOPHEN 1000 MG: 500 TABLET ORAL at 15:34

## 2025-06-04 RX ADMIN — DESVENLAFAXINE 150 MG: 50 TABLET, FILM COATED, EXTENDED RELEASE ORAL at 20:01

## 2025-06-04 RX ADMIN — POLYETHYLENE GLYCOL 3350 17 G: 17 POWDER, FOR SOLUTION ORAL at 15:34

## 2025-06-04 RX ADMIN — ENOXAPARIN SODIUM 60 MG: 60 INJECTION SUBCUTANEOUS at 19:59

## 2025-06-04 RX ADMIN — HYDROMORPHONE HYDROCHLORIDE 6 MG: 2 TABLET ORAL at 11:47

## 2025-06-04 ASSESSMENT — ACTIVITIES OF DAILY LIVING (ADL)
ADLS_ACUITY_SCORE: 52
ADLS_ACUITY_SCORE: 49
ADLS_ACUITY_SCORE: 52
ADLS_ACUITY_SCORE: 49
ADLS_ACUITY_SCORE: 52
ADLS_ACUITY_SCORE: 49
ADLS_ACUITY_SCORE: 52

## 2025-06-04 NOTE — PLAN OF CARE
Goal Outcome Evaluation:      Plan of Care Reviewed With: patient    Overall Patient Progress: no change       Status: POD#7 s/p XL-interspincteric proctectomy, loop ileostomy takedown and creation of end colostomy, takedown and repair of rectovaginal fistula, appendectomy, cystoscopy with bilateral ureteral stent insertion and remova   Lines/Drains: LUQ colostomy; R AQUILINO drain; waite - AUOP; R port; L PIV  Pain: 8-10/10, notable guarding of abd when settling in bed. PCA in use.   Neuro: A&Ox4  CV: WDL  Resp: WD:  GI: x - colostomy, no gas or BM this shift.   : x - waite  Skin: scattered bruising, midline abd incision c staples  Activity: SBA.   Updates: Sample collected from AQUILINO drain for Creatinine testing this shift: 0.7.

## 2025-06-04 NOTE — PROGRESS NOTES
"PALLIATIVE CARE SOCIAL WORK Progress Note   Location: [facility]      Support Visit    Marizol resting on her side, mom asleep on couch. Marizol awake and welcoming of visit this afternoon.    Marizol noted she had her AQUILINO drains removed today, pleased that plans continue to move toward discharge home. Continued pain though Marizol acknowledged transition from PCA to oral pain meds and noted that as another hopeful step in prep for discharge.    Marizol is anticipating some challenges with managing her children's expectations once she is home, as well as her own. Time spent discussing ways to begin setting that stage for her children and herself now, prior to her getting home. Reminding herself and her kids that she is continuing to recover from surgery, that recovery requires activity as well as rest, that she will have follow up appointments.    Marizol noted that it is her son's birthday on Friday and she expressed fear of \"letting him down\" as she be up to a large celebration. She went on to share that neighbor friend's mom offered to throw a birthday party for him which her son was excited about; Marizol acknowledged initially feeling shame around not being able to do this herself for her son but in time was able to re-frame that she needs to lean on her \"village\" and instead of feeling shame was working on feeling grateful.    Overall Marizol continues to demonstrate much improved mood and lower anxiety. Shared that she feels like \"this is a new beginning\" and she is able to feel hopeful and identified things she is looking forward to, including hopeful being able to travel for her daughter's dance nationals the end of this month to [place].    Interventions used today: active and reflective listening, validation, aligning, re-framing, emotional support     Plan: PCSW will plan for a final visit prior to discharge end of this week.     Clinical Social Work Interventions:   Assessment of palliative specific issues  "   Facilitation of processing of thoughts/feelings  Re-framing    FLORIAN Knight, [facility]  [facility], Palliative Care  Securely message with the [employer] Web Console (learn more here) or  Text page via [employer] Paging/Directory

## 2025-06-04 NOTE — PROGRESS NOTES
CLINICAL NUTRITION SERVICES - BRIEF NOTE     RECOMMENDATIONS FOR MDs/PROVIDERS TO ORDER:  Recommend patient be able to consistently consume at least 1100 kcal and 50 g protein daily (~60% estimated kcal and protein needs) before stopping nutrition support     Registered Dietitian Interventions:  Sent change order request to pharmacist: stop IV lipids.  Decrease dex to 110 g/day and decrease AA to 40 g/day.  Continue 960 mL volume and 12 hr cycle schedule    Updated goal TPN: 960 mL/day x 12 hrs with 110 g dextrose, 40 g AA, and NO lipids for total provision of 534 Kcals (9 Kcals/kg), 0.7 g/kg protein        *See RD note on 6/3 for last nutrition reassessment note    CURRENT NUTRITION ORDERS  Diet: Regular  - Calorie Counts 6/3-6/5    Nutrition Support: TPN, 960 mL/day x 12 hrs with 220 g dextrose, 90 g AA, and 250 mL 20% lipids 6 days per week for total provision of 1536 Kcals (25 Kcals/kg), 1.5 g/kg protein, GIR 5.45 mg/kg/minute @ peak infusion, and 28% fat kcals on average daily     CURRENT INTAKE/TOLERANCE  Calorie Counts  6/3: 907 kcal and 105 g protein       INTERVENTIONS  Collaboration by nutrition professional with other providers - primary team reports plan to wean off TPN possibly tomorrow pending ongoing calorie counts. See RD recs above  Parenteral Nutrition/IV fluids - see above      MONITORING/EVALUATION  Progress toward goals will be monitored and evaluated per policy.    Do Sigeel, RD, , LD  Weekday Units covered: 5A (non-Heme Onc pts) and 7B ([id number])  Available by 5A or 7B Clinical Behzad Serrato  Weekend Coverage: Weekend Clinical Behzad Serrato    Inpatient Clinical Dietitians no longer available via paging

## 2025-06-04 NOTE — PROGRESS NOTES
Care Management Follow Up    Length of Stay (days): 15    Expected Discharge Date: 06/06/2025     Concerns to be Addressed:       Patient plan of care discussed at interdisciplinary rounds: Yes    Anticipated Discharge Disposition: Home, Home Care        Anticipated Discharge Services: Home Care  Anticipated Discharge DME: None    Patient/family educated on Medicare website which has current facility and service quality ratings: yes  Education Provided on the Discharge Plan: yes, ongoing  Patient/Family in Agreement with the Plan:  yes, ongoing    Referrals Placed by CM/SW: Internal Clinic Care Coordination, Homecare  Private pay costs discussed: Not applicable    Discussed  Partnership in Safe Discharge Planning  document with patient/family: No     Handoff Completed: Yes, [facility] PCP: Internal handoff referral completed prior to this encounter.    Additional Information:  RNCC participated at interdisciplinary rounds and performed chart review. It was noted the patient's goal is to discharge by Friday, 06/06/2025. Barriers to discharge include:  Wean TPN (PO intake goal is 1100 kcal daily, 50g protein daily)  6/3 intake: 907 kcal and 105 g protein  6/4 intake: TBD  6/5 intake: TBD  Cat removal and void  Final colostomy education      RNCC noted the patient has only been accepted by [facility] for SN/SW, but is also being recommended home PT/OT. They are able to accommodate the addition of these services. MABLE was also reviewed during this call.      Next Steps:   []Confirm TPN is discontinued prior to discharge  [x]Confirm addition of PT/OT to home care services  []Fax orders to home care at discharge (will need to be update by adding PT/OT)  [x]Ensure ostomy supplies are in discharge orders + information for pt to set self up with DME supplier  [x]Send hand-off at discharge (internal)    RESOURCES  [facility] - SN, SW, PT, OT  P: [phone]  F: [phone]         Janee Carty RN  Care Coordinator  Reachable on [facility] or Teams  [phone] (updated daily)

## 2025-06-04 NOTE — PROGRESS NOTES
Calorie Count  Intake recorded for: 6/3  Total Kcals: 907 Total Protein: 105g  Kcals from Hospital Food: 427  Protein: 15g  Kcals from Outside Food (average):480 Protein: 90g  # Meals Ordered from Kitchen: 2 meals + supplements from home  # Meals Recorded: 2 meals (First - 100% Greek yogurt, banana, 3 ele siddiqui)       (Second - 100% cream of wheat w/ brown sugar, banana, 3 strawberry kiwi siddiqui)   # Supplements Recorded: 100% 3 Premier Protein shakes from home

## 2025-06-04 NOTE — PROGRESS NOTES
"Brief Progress Note    To bedside for afternoon check in. Marizol is \"doing\" this afternoon but pleased with her progress. Continues to be concerned about the small level of bleeding at her perineum and would like to have this examined again. Up and walking, just got back from a walk recently. Working on her PO intake which is improving and this is also going well. Feels like pain control is improving off the PCA and she is motivated for discharge. Currently waiting for her kids to come back home so she can spook them on the Ring camera. No other concerns.     Patient Vitals for the past 24 hrs:   BP Temp Temp src Pulse Resp SpO2 Height   06/04/25 1525 100/63 98.5  F (36.9  C) Oral 93 16 97 % --   06/04/25 1300 -- -- -- -- -- -- 1.651 m (5' 5\")   06/04/25 0850 96/87 98.8  F (37.1  C) Oral 98 18 95 % --   06/04/25 0546 95/60 99.1  F (37.3  C) Oral 96 15 96 % --   06/04/25 0300 -- -- -- -- -- 94 % --   06/04/25 0100 -- -- -- -- -- 97 % --   06/03/25 2300 -- -- -- -- -- 96 % --   06/03/25 2104 106/60 98.2  F (36.8  C) Oral -- 16 -- --     Gen: laying in bed, NAD   CV: well perfused   Pulm: breathing comfortably on RA   : packing in place. No active bleeding apparent at or around the packing. Scant amount of blood on her pad.      exam reassuring without obvious evidence of active bleeding. Continuing to meet postoperative milestones. Doing well off the dPCA, continue current pain regimen. No changes to POC.     Bharti Rich DO  [facility]  Gynecology Oncology Resident, PGY-2  06/04/2025 7:11 PM    To reach the GYNECOLOGY ONCOLOGY team for this patient, please page [phone] or search Gynecologic Oncology Resident Group on Vocera.     "

## 2025-06-04 NOTE — PROGRESS NOTES
[facility]    Progress Note - Colorectal Surgery Service       Date of Admission:  2025    Assessment & Plan: Surgery   Mrs. Granados is a 40 year old female with history of left ovarian cancer and peritoneal carcinomatosis s/p prior debulking and diverting loop ileostomy, rectovaginal fistula.  Now s/p open intersphincteric proctectomy, takedown of DLI, appendectomy, end colostomy on 25. Post-operative course is significant for difficulty in pain control, having bowel function.      - greatly appreciate Gyn Onc management  - ordered AQUILINO creatinine, will consider AQUILINO drain removal on 25 given consistent low output ~30-35ml/24hrs  - Regular diet and let TPN   - apprec WOCN for colostomy education  - Encourage ambulation QID, incentive spirometer use   - Sitting:  MODIFIED SITTING PROTOCOL:  OK to sit on a soft surface for 15-20 minutes at a time 4-5 times per day.  Otherwise no sitting, bending, squatting, Nor apply either direct pressure or pulling/shearing forces to perineal incision.   OK to perch at edge of bed when transitioning from laying to standing.   OK to walk as much as possible. And encourage walking at least 4-5 times per day.   NO DONUT pillows. Request heidi-mat cushion from PT/OT.   Ppx:  on lovenox 60mg BID       Diet: Regular Diet Adult  Calorie Counts  parenteral nutrition - ADULT compounded formula CYCLE    DVT Prophylaxis: Enoxaparin (Lovenox) SQ  Cat Catheter: PRESENT, indication: Surgical procedure  Lines: PRESENT      Port a Cath 10/31/24 Single Lumen Right Chest wall-Site Assessment: WDL      Drains: PRESENT         - 1 Closed/Suction Drain(s)    - 1 Open Drain(s)   Cardiac Monitoring: None  Code Status: Full Code      Clinically Significant Risk Factors             # Hypomagnesemia: Lowest Mg = 1.6 mg/dL in last 2 days, will replace as needed   # Hypoalbuminemia: Lowest albumin = 2.5 g/dL at 2025  6:27 AM, will monitor as  appropriate                 # Severe Malnutrition: based on nutrition assessment and treatment provided per dietitian's recommendations.    # Financial/Environmental Concerns: unable to afford rent/mortgage;unemployed  # Asthma: noted on problem list      Social Drivers of Health   Depression: Risk of Self Harm (5/4/2025)    Received from [facility]    Depression (PHQ-9)     Last PHQ-9 Score: 27     Thoughts of self harm: Nearly every day   Housing Stability: High Risk (5/19/2025)    Housing Stability     Do you have housing? : No     Are you worried about losing your housing?: No   Stress: Stress Concern Present (5/13/2024)    [employer] - Occupational Stress Questionnaire     Feeling of Stress : Very much   Social Connections: Moderately Isolated (5/13/2024)    Social Connection and Isolation Panel [NHANES]     Frequency of Communication with Friends and Family: Three times a week     Frequency of Social Gatherings with Friends and Family: Once a week     Attends Orthodoxy Services: Never     Active Member of Clubs or Organizations: No     Attends Club or Organization Meetings: Never     Marital Status: Living with partner             Patient seen with colorectal fellow, Dr. Grider, who discussed with colorectal surgeon, Dr. Velasquez.     Zuleyma Hercules MD  General Surgery PGY-1    **For on call schedules and contact information, please refer to [facility]**      _______________________________________________    Interval History   She has been tolerating a regular diet without nausea/vomiting. She is having ostomy output of gas and stool. No fevers/chills.     Physical Exam   Vital Signs: Temp: 99.1  F (37.3  C) Temp src: Oral BP: 95/60 Pulse: 96   Resp: 15 SpO2: 96 % O2 Device: None (Room air)    Weight: 147 lbs 0 oz  Intake/Output Summary (Last 24 hours) at 5/29/2025 0940  Last data filed at 5/29/2025 0907  Gross per 24 hour   Intake 6880.3 ml   Output 2323 ml    Net 4557.3 ml     General Appearance: In no acute distress   Respiratory: nonlabored breathing on room air   Cardiovascular: normal rate   GI:  Mildly distended, pink and well perfused ostomy with gas as well as brown liquid stool, dressings clean/dry/intact, AQUILINO drain with serosanguinous output, 35ml/24hrs  Skin: no rashes      Data     I have personally reviewed the following data over the past 24 hrs:    2.9 (L)  \   7.8 (L)   / 156     140 107 22.1 (H) /  143 (H)   4.1 24 0.57 \       Imaging results reviewed over the past 24 hrs:   No results found for this or any previous visit (from the past 24 hours).

## 2025-06-04 NOTE — PROGRESS NOTES
Brief Colorectal Surgery Note    AQUILINO drain removed after discussion with Dr. Carpenter and Dr. Velasquez, patient tolerated the procedure without difficulty. Please replace 4x4 gauze at AQUILINO removal site as needed.       Zuleyma Hercules MD  General Surgery PGY-1    **For on call schedules and contact information, please refer to [facility]**

## 2025-06-04 NOTE — PROGRESS NOTES
"Brief GYN Progress Note    To bedside around 1900 for PM check in    Marizol is frustrated this evening. Had a tough day. Initially was in high spirits and felt good, however as the day progressed had a hard time getting comfortable in bed, no improvement with movement to the chair. Also noticed red-tinged urine or discharge on her chux a few times during the day which was new. Nobody was able to tell her what this was and she states she was told she would be seen \"a very long time ago\" for assessment of this, frustrated that she is just being evaluated now. She was able to tolerate food today, both solids and liquids. Having increased pain since frequency of dilaudid PCA was decreased to q30m and frustrated that she is receiving less frequent pain medication with no new additions to keep her more comfortable. Worried that the AQUILINO drain bulb isn't being emptied enough.     /68 (BP Location: Right arm)   Pulse 102   Temp 98.4  F (36.9  C) (Oral)   Resp 16   Ht 1.651 m (5' 5\")   Wt 66.7 kg (147 lb)   LMP  (LMP Unknown)   SpO2 100%   BMI 24.46 kg/m     Gen: sitting up in chair, no acute distress  Abd: soft, appropriately tender, mild erythema at edges of vertical midline skin incision (reapproximated w staples) with no active drainage or bleeding, erythema surrounding AQUILINO drain site extending 1 mm from drain edge, no calor or edema. Light yellow exudate ate base of ileostomy takedown. Pink, well perfused ostomy with brown liquid stool in bag.   : waite in place draining light yellow urine, no urethral irritation or bleeding at meatus, external exam to the introitus without obvious discharge or bleeding, xeroform dressing in place over anal wound, not removed, no surrounding blood or obvious discharge, scant serosanguinous appearing discharge on chux.     Pt admitted HD#18 peristomal skin infection, POD#6 XL, Sarah, Takedown of Ileostomy, Formation of End Colostomy, appendectomy, Repair of Rectovaginal " fistula, Protectomy, Cystoscopy, Stent placement and removal. Having increased pain through the day today and new discharge from the perineal area. Discussed rationale for weaning dilaudid PCA and pt voices frustration but understanding, states her goal is to get home later this week. Exam of perineum as above; no active bleeding and no obvious source of discharge, scant blood on chux; discussed different surgical incisions and that some small amounts of bleeding/weeping from these areas is normal in the process of healing, but that we should monitor this closely. Will continue to work on weaning IV pain medication, TPN with goal of discharge later this week. No changes to plan of care overnight.     Maribel Garcia MD  OB/GYN PGY-3  6/3/2025 7:59 PM

## 2025-06-04 NOTE — PROGRESS NOTES
"  PALLIATIVE CARE PROGRESS NOTE  [facility]     Patient Name: Marizol Granados  Date of Admission: 5/18/2025   Today the patient was seen for: symptom management     Recommendations & Counseling     Recommendations for today:   Continue methadone 5 mg BID  Start hydromorphone 6 mg po q 4 hours, initially as scheduled dose that may be held for excessive sedation or patient may refuse if she does not need it.  Scheduling in this manner initially should help prevent any logistical delays in her getting this medication in an as needed fashion.  Anticipate that tomorrow we will change oral hydromorphone to as needed basis  DC PCA hydromorphone.  This had been tapered down yesterday 0.6 mg every 10 minutes as needed to 0.6 mg every 30 minutes as needed.  Will make available hydromorphone 0.6 mg IV as a as needed rescue dose, up to a max of 3 doses per 24 hours.  Discussed that we will likely discontinue IV rescue doses tomorrow, in preparation for anticipated discharge on Friday.  We note limited stool output postoperatively.  This is likely multifactorial but her opioids are certainly constipating.  Primary teams are managing this and have increased her MiraLAX to 3 times daily initially.  Senna is not being started at this time due to her postop status; will defer to primary team postop surgical management.  As noted in multiple previous evaluations and encounters, I believe a significant amount of her \"total pain experience\" and pain reports reflect pain experience not responsive to opioid medication    Discussion:   Goal today is continuing toward oral pain regimen.  We note for context that today is POD #7, that we are not treating cancer pain per se, given status of her disease (most recent imaging 3/25 showing no evidence of active disease).  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  I reviewed her outpatient palliative care clinic notes.  For context, prior to current series of events she was on a Butrans patch and 4-6 mg oral Dilaudid up to every 4 hours as needed.  The goal of surgery this admission was to reduce pain and functional limitations related to the complications identified above.  Discussed with patient that the best practice postoperative care is to use IV pain management for a limited amount of time postoperatively, then transition to oral pain regimen as clinically indicated.  I have additional concerns, given patient's complex comorbid psychiatric treatment, that continued IV opioid treatment puts her at risk for development of coping difficulties etc.  Discussed with patient how essential it is for her to be up ambulating to make recovery possible.  Discussed this also with gynecologic oncology team (who joined us for this visit), with plans to increase scheduled ambulation with nursing staff throughout the day.  Our team will continue to see her as an outpatient to continue to down titrate opioids as clinically indicated.        Elian Fisher MD  Palliative Medicine Consult Team  Text me via [employer]  TT: 39 minutes, with > 50% spent in C/C/E patient/family/care teams re: GOC, POC, Sx management. 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 Most recent imaging in March 2025 showed no evidence of disease. She is being followed by CRS for rectovaginal fistula and coordinating surgery for takedown of loop ileostomy and creation of end colostomy. Follows palliative care outpatient for pain. Our team was consulted to help with pain and anxiety.   Seen today for: GYN cancer, anxiety, postop pain   Interval History:   No significant changes since yesterday.  PCA  "flowsheet reviewed.  There are only 2 shift total summaries  charted yesterday indicating ~25 mg total IV hydromorphone yesterday.  Her pain at this time is primarily abdominal (after extensive surgery).    Medications managed by palliative  APAP 975 mg q6h earnest   Methadone 5mg BID (started 5/22 PM, stopped 5/28, restarted 5/29)  Dilaudid PCA discontinued today.  Dilaudid 6 mg every 4 hours scheduled starting today  Dilaudid IV rescue doses 0.6 mg up to 3 times over the next 24 hours.    Other medications not managed by our team  BuSpar 30 mg twice daily  Does venlafaxine 150 mg p.o. x 1 daily  Lorazepam 1 mg twice daily  Methylphenidate 15 mg p.o. every morning  Seroquel 200 mg at bedtime  Ramelteon 8 mg at bedtime  Trazodone 300 mg at bedtime  Topiramate  Zofran 4 mg IV x1  Atarax 25 mg po x1  Narcan 0.2 mg IV x2    Patient/family narrative  Seen and examined at bedside.         Physical Exam:   Blood pressure 96/87, pulse 98, temperature 98.8  F (37.1  C), temperature source Oral, resp. rate 18, height 1.651 m (5' 5\"), weight 66.7 kg (147 lb), SpO2 95%.   General: Laying in bed  No excessive sedation  Lungs: non labored work of breathing  CV: Regular radial pulse 92  GI:  surgical wounds dressed; I did not evaluate  Neuro: alert and oriented x4, fluent speech, moving all extremities  Psych: anxious, normal affect.      Data Reviewed:     ROUTINE ICU LABS (Last four results)  CMP  Recent Labs   Lab 06/04/25  0548 06/03/25  0357 06/02/25  0627 06/01/25  0441    140 141 142   POTASSIUM 4.1 3.8 4.0 3.7   CHLORIDE 107 107 108* 110*   CO2 24 25 23 25   ANIONGAP 9 8 10 7   * 100* 97 127*   BUN 22.1* 18.7 23.6* 19.0   CR 0.57 0.62 0.65 0.63   GFRESTIMATED >90 >90 >90 >90   LORY 8.2* 8.5* 8.6* 8.2*   MAG 1.9 1.6* 1.8 1.7   PHOS 4.1 3.7 4.7* 4.1   PROTTOTAL  --   --  5.1*  --    ALBUMIN  --   --  2.5*  --    BILITOTAL  --   --  0.2  --    ALKPHOS  --   --  56  --    AST  --   --  22  --    ALT  --   --  17  --  "     CBC  Recent Labs   Lab 06/04/25  0548 06/03/25  0357 06/02/25  0627 06/01/25  0441   WBC 2.9* 3.6* 4.1 4.8   RBC 2.51* 2.44* 2.78* 2.55*   HGB 7.8* 7.7* 8.7* 8.1*   HCT 23.8* 23.3* 26.6* 24.1*   MCV 95 96 96 95   MCH 31.1 31.6 31.3 31.8   MCHC 32.8 33.0 32.7 33.6   RDW 12.7 12.9 12.9 13.2    146* 142* 126*     INR  No lab results found in last 7 days.    Arterial Blood Gas  Recent Labs   Lab 05/28/25  1658 05/28/25  1521 05/28/25  1230   O2PER 60.0 60.0 60.0

## 2025-06-04 NOTE — PROGRESS NOTES
Gynecology Oncology Progress Note      HD#16 peristomal skin infection, decreased PO intake, POD#7 s/p XL-interspincteric proctectomy, loop ileostomy takedown and creation of end colostomy, takedown and repair of rectovaginal fistula, appendectomy, cystoscopy with bilateral ureteral stent insertion and removal      Disease:  stage IIIb low grade peritoneal carcinoma      24 hour events:   - increase dilaudid spacing to 0.6 every 30 min  - 2 episodes of discharge/bleeding at the perineum; no evidence of active bleeding/drainage from vagina, anus on exam    Subjective: Marizol is sleeping soundly this morning. Wakes up enough to say that her pain is adequately controlled. Denies changes to thigh rash or new symptoms related to this overnight. When asked about vaginal discharge/bleeding she shakes her head no (that she has not noticed any overnight). No questions at this time, would like to go back to bed.     Objective:   Vitals:    06/03/25 2300 06/04/25 0100 06/04/25 0300 06/04/25 0546   BP:    95/60   BP Location:    Right arm   Pulse:    96   Resp:    15   Temp:    99.1  F (37.3  C)   TempSrc:    Oral   SpO2: 96% 97% 94% 96%   Weight:       Height:          General: resting in bed, uncomfortable with position changes   CV: regular rate and rhythm, warm and well perfused   Resp: Breathing comfortably on room air  Abdomen: soft, diffusely tender to palpation, stapled incision intact, no surrounding erythema or drainage; AQUILINO drain RLQ with serosanguinous output in bulb, ileostomy takedown site covered with 4x4 which remain c/d/I; colostomy with scant dark brown liquid output, no gas     I/O last 3 completed shifts:  In: 2237.1 [P.O.:1040; I.V.:159.1]  Out: 2420 [Urine:2375; Drains:45]    Intake/Output (24 Hrs // Since MN)  PO: 1040 cc// NR  TPN: 1038 cc// NR  UOP: 2375 cc// NR  Drain: 45 cc// NR  Stool: NR // NR    Assessment: Marizol Granados is a 40 year old female with stage IIIB low grade peritoneal carcinoma now s/p  ileostomy takedown, creation of end colostomy with colorectal surgery, and repair of rectovaginal fistula admitted for post-op care. Now with return of bowel function; working on weaning dilaudid PCA.     # Stage IIIB low grade peritoneal carcinoma  # Rectovaginal fistula, repaired   # Neurogenic constipation  # Postoperative state   - Diet: As per Colorectal surgery recommendations, currently regular diet with TPN until tolerating sufficient calories. Continue calorie counts. Appreciate assistance from nutrition team re: weaning TPN.   - Activity: as per Colorectal recommendations: Perineal precautions: No direct pressure on perineum, ok for patient to lay on her side, limit sitting up on chair for prolonged periods of time, ambulate at least 4x daily.  - Wound care: Per CRS - Daily xeroform changes on rectum, daily packing changes on ostomy take down. Monitor for bleeding/discharge; reported by pt yesterday though none visible on exam yesterday evening  - AQUILINO drain: Per CRS - f/up drain creatinine (ordered 6/3)   Pain management: as per palliative care, see their daily note, also below  - Bladder: has urinary retention and performs intermittent straight catheterization. Maintain indwelling waite catheter until nearing discharge and pt able to manage ISC independently    # Anorectal pain  # Cancer related fatigue   # Postoperative pain   Followed by palliative outpatient with Dr. Norton, last seen 4/24; inpatient team consulted and helping with inpatient/acute postoperative pain management.  - Currently on Methadone, Dilaudid PCA (0.6/30min), Tylenol. Gabapentin and robaxin discontinued.  - appreciate palliative care team assistance with pain management   - PTA Ritalin for fatigue, pain management as described   - Prior to admission: Dilaudid, Butrans patch, gabapentin    # Urinary retention, neurogenic from prior surgery with urinary retention   Managed with Intermittent straight catheterization prior to admission.  Waite in place since surgery. Plan is to continue indwelling catheter until nearing discharge.  - continue waite catheter, monitor output (adequate last 24h)  - History of recurrent UTI last 2/25 with cipro, culture resulted with pansensitive staph aureus   - UA on admission with +LE, +nitrites collected post CTX; urine culture klebsiella on admission   - s/p 6d ceftriaxone, discontinued prior to OR given no ongoing evidence of infection      # Hx DVT/PE - chronic anticoagulation  - PTA Eliquis- currently held; Lovenox ppx held 5/31 due to drop in hgb, restarted with improvement in counts.  - Ultimately plan for indefinite anti-coagulation on home Eliquis given long history of clotting. See Hematology note from 6/14/2024.   - continue therpaeutic lovenox      # MDD/SHIVAM  # Insomnia   # RLS   - PTA scheduled Buspar, Pristiq, Seroquel, ramelteon, trazodone, ambien continued  - PTA gabapentin held   - Avoid changing or holding any of these medications as much as possible  - continue outpatient follow up with psychiatry after discharge for medication management     # Hx migraines  - PTA Topamax, propranolol    Maribel Garcia MD  [facility]  Gynecologic Oncology, PGY3  06/04/2025 6:04 AM    Team pager: [phone]    I have examined this patient personally with the team and agree with the above findings and plan.  Patient making slow progress.  Denies oversedation, but limited ambulation.  We have discussed the importance of mobilization to effect discharge (she has a family birthday party she would like to attend).    Homer Artia

## 2025-06-05 ENCOUNTER — APPOINTMENT (OUTPATIENT)
Dept: PHYSICAL THERAPY | Facility: CLINIC | Age: 40
End: 2025-06-05
Payer: COMMERCIAL

## 2025-06-05 VITALS
HEART RATE: 78 BPM | DIASTOLIC BLOOD PRESSURE: 59 MMHG | WEIGHT: 147 LBS | HEIGHT: 65 IN | BODY MASS INDEX: 24.49 KG/M2 | RESPIRATION RATE: 16 BRPM | SYSTOLIC BLOOD PRESSURE: 94 MMHG | OXYGEN SATURATION: 97 % | TEMPERATURE: 98.1 F

## 2025-06-05 LAB
ANION GAP SERPL CALCULATED.3IONS-SCNC: 9 MMOL/L (ref 7–15)
BASOPHILS # BLD AUTO: 0 10E3/UL (ref 0–0.2)
BASOPHILS NFR BLD AUTO: 0 %
BUN SERPL-MCNC: 23.3 MG/DL (ref 6–20)
CALCIUM SERPL-MCNC: 8.8 MG/DL (ref 8.8–10.4)
CHLORIDE SERPL-SCNC: 106 MMOL/L (ref 98–107)
CREAT SERPL-MCNC: 0.66 MG/DL (ref 0.51–0.95)
EGFRCR SERPLBLD CKD-EPI 2021: >90 ML/MIN/1.73M2
EOSINOPHIL # BLD AUTO: 0.2 10E3/UL (ref 0–0.7)
EOSINOPHIL NFR BLD AUTO: 5 %
ERYTHROCYTE [DISTWIDTH] IN BLOOD BY AUTOMATED COUNT: 12.8 % (ref 10–15)
GLUCOSE SERPL-MCNC: 106 MG/DL (ref 70–99)
HCO3 SERPL-SCNC: 25 MMOL/L (ref 22–29)
HCT VFR BLD AUTO: 21.5 % (ref 35–47)
HGB BLD-MCNC: 7.1 G/DL (ref 11.7–15.7)
IMM GRANULOCYTES # BLD: 0 10E3/UL
IMM GRANULOCYTES NFR BLD: 0 %
LYMPHOCYTES # BLD AUTO: 1.7 10E3/UL (ref 0.8–5.3)
LYMPHOCYTES NFR BLD AUTO: 51 %
MAGNESIUM SERPL-MCNC: 2.2 MG/DL (ref 1.7–2.3)
MCH RBC QN AUTO: 30.9 PG (ref 26.5–33)
MCHC RBC AUTO-ENTMCNC: 33 G/DL (ref 31.5–36.5)
MCV RBC AUTO: 94 FL (ref 78–100)
MONOCYTES # BLD AUTO: 0.4 10E3/UL (ref 0–1.3)
MONOCYTES NFR BLD AUTO: 13 %
NEUTROPHILS # BLD AUTO: 1 10E3/UL (ref 1.6–8.3)
NEUTROPHILS NFR BLD AUTO: 31 %
NRBC # BLD AUTO: 0 10E3/UL
NRBC BLD AUTO-RTO: 0 /100
PHOSPHATE SERPL-MCNC: 4.8 MG/DL (ref 2.5–4.5)
PLATELET # BLD AUTO: 157 10E3/UL (ref 150–450)
POTASSIUM SERPL-SCNC: 4 MMOL/L (ref 3.4–5.3)
RBC # BLD AUTO: 2.3 10E6/UL (ref 3.8–5.2)
SODIUM SERPL-SCNC: 140 MMOL/L (ref 135–145)
WBC # BLD AUTO: 3.2 10E3/UL (ref 4–11)

## 2025-06-05 PROCEDURE — 250N000013 HC RX MED GY IP 250 OP 250 PS 637

## 2025-06-05 PROCEDURE — 85004 AUTOMATED DIFF WBC COUNT: CPT | Performed by: STUDENT IN AN ORGANIZED HEALTH CARE EDUCATION/TRAINING PROGRAM

## 2025-06-05 PROCEDURE — 250N000011 HC RX IP 250 OP 636: Performed by: OBSTETRICS & GYNECOLOGY

## 2025-06-05 PROCEDURE — 250N000013 HC RX MED GY IP 250 OP 250 PS 637: Performed by: PHYSICIAN ASSISTANT

## 2025-06-05 PROCEDURE — 250N000013 HC RX MED GY IP 250 OP 250 PS 637: Performed by: NURSE PRACTITIONER

## 2025-06-05 PROCEDURE — 82310 ASSAY OF CALCIUM: CPT | Performed by: STUDENT IN AN ORGANIZED HEALTH CARE EDUCATION/TRAINING PROGRAM

## 2025-06-05 PROCEDURE — 97116 GAIT TRAINING THERAPY: CPT | Mod: GP | Performed by: REHABILITATION PRACTITIONER

## 2025-06-05 PROCEDURE — 250N000013 HC RX MED GY IP 250 OP 250 PS 637: Performed by: SURGERY

## 2025-06-05 PROCEDURE — 83735 ASSAY OF MAGNESIUM: CPT

## 2025-06-05 PROCEDURE — 250N000013 HC RX MED GY IP 250 OP 250 PS 637: Performed by: STUDENT IN AN ORGANIZED HEALTH CARE EDUCATION/TRAINING PROGRAM

## 2025-06-05 PROCEDURE — 120N000002 HC R&B MED SURG/OB UMMC

## 2025-06-05 PROCEDURE — 84132 ASSAY OF SERUM POTASSIUM: CPT | Performed by: STUDENT IN AN ORGANIZED HEALTH CARE EDUCATION/TRAINING PROGRAM

## 2025-06-05 PROCEDURE — 99232 SBSQ HOSP IP/OBS MODERATE 35: CPT | Performed by: FAMILY MEDICINE

## 2025-06-05 PROCEDURE — G0463 HOSPITAL OUTPT CLINIC VISIT: HCPCS

## 2025-06-05 PROCEDURE — 97530 THERAPEUTIC ACTIVITIES: CPT | Mod: GP | Performed by: REHABILITATION PRACTITIONER

## 2025-06-05 PROCEDURE — 250N000013 HC RX MED GY IP 250 OP 250 PS 637: Performed by: FAMILY MEDICINE

## 2025-06-05 PROCEDURE — 84100 ASSAY OF PHOSPHORUS: CPT

## 2025-06-05 PROCEDURE — 85048 AUTOMATED LEUKOCYTE COUNT: CPT | Performed by: STUDENT IN AN ORGANIZED HEALTH CARE EDUCATION/TRAINING PROGRAM

## 2025-06-05 RX ORDER — HYDROMORPHONE HYDROCHLORIDE 2 MG/1
6 TABLET ORAL EVERY 4 HOURS PRN
Refills: 0 | Status: DISCONTINUED | OUTPATIENT
Start: 2025-06-05 | End: 2025-06-06

## 2025-06-05 RX ADMIN — TOPIRAMATE 50 MG: 50 TABLET, FILM COATED ORAL at 20:23

## 2025-06-05 RX ADMIN — TOPIRAMATE 50 MG: 50 TABLET, FILM COATED ORAL at 08:23

## 2025-06-05 RX ADMIN — METHYLPHENIDATE HYDROCHLORIDE 10 MG: 5 TABLET ORAL at 12:34

## 2025-06-05 RX ADMIN — METHADONE HYDROCHLORIDE 5 MG: 5 SOLUTION ORAL at 08:23

## 2025-06-05 RX ADMIN — ENOXAPARIN SODIUM 60 MG: 60 INJECTION SUBCUTANEOUS at 20:23

## 2025-06-05 RX ADMIN — ACETAMINOPHEN 1000 MG: 500 TABLET ORAL at 05:32

## 2025-06-05 RX ADMIN — TRAZODONE HYDROCHLORIDE 300 MG: 150 TABLET ORAL at 20:31

## 2025-06-05 RX ADMIN — BUSPIRONE HYDROCHLORIDE 30 MG: 15 TABLET ORAL at 20:23

## 2025-06-05 RX ADMIN — PANTOPRAZOLE SODIUM 40 MG: 40 TABLET, DELAYED RELEASE ORAL at 20:23

## 2025-06-05 RX ADMIN — Medication 5 ML: at 13:16

## 2025-06-05 RX ADMIN — Medication 5 ML: at 20:23

## 2025-06-05 RX ADMIN — ACETAMINOPHEN 1000 MG: 500 TABLET ORAL at 17:16

## 2025-06-05 RX ADMIN — HYDROMORPHONE HYDROCHLORIDE 6 MG: 2 TABLET ORAL at 15:46

## 2025-06-05 RX ADMIN — ZOLPIDEM TARTRATE 5 MG: 5 TABLET, FILM COATED ORAL at 20:31

## 2025-06-05 RX ADMIN — DESVENLAFAXINE 150 MG: 50 TABLET, FILM COATED, EXTENDED RELEASE ORAL at 20:22

## 2025-06-05 RX ADMIN — BUSPIRONE HYDROCHLORIDE 30 MG: 15 TABLET ORAL at 08:23

## 2025-06-05 RX ADMIN — HYDROMORPHONE HYDROCHLORIDE 6 MG: 2 TABLET ORAL at 05:31

## 2025-06-05 RX ADMIN — PROPRANOLOL HYDROCHLORIDE 10 MG: 10 TABLET ORAL at 20:22

## 2025-06-05 RX ADMIN — POLYETHYLENE GLYCOL 3350 17 G: 17 POWDER, FOR SOLUTION ORAL at 20:23

## 2025-06-05 RX ADMIN — POLYETHYLENE GLYCOL 3350 17 G: 17 POWDER, FOR SOLUTION ORAL at 08:26

## 2025-06-05 RX ADMIN — PANTOPRAZOLE SODIUM 40 MG: 40 TABLET, DELAYED RELEASE ORAL at 08:23

## 2025-06-05 RX ADMIN — HYDROMORPHONE HYDROCHLORIDE 6 MG: 2 TABLET ORAL at 09:15

## 2025-06-05 RX ADMIN — PROPRANOLOL HYDROCHLORIDE 10 MG: 10 TABLET ORAL at 08:23

## 2025-06-05 RX ADMIN — METHYLPHENIDATE HYDROCHLORIDE 15 MG: 5 TABLET ORAL at 08:23

## 2025-06-05 RX ADMIN — ACETAMINOPHEN 1000 MG: 500 TABLET ORAL at 12:34

## 2025-06-05 RX ADMIN — HYDROMORPHONE HYDROCHLORIDE 6 MG: 2 TABLET ORAL at 20:22

## 2025-06-05 RX ADMIN — RAMELTEON 8 MG: 8 TABLET ORAL at 20:23

## 2025-06-05 RX ADMIN — METHADONE HYDROCHLORIDE 5 MG: 5 SOLUTION ORAL at 20:22

## 2025-06-05 RX ADMIN — POLYETHYLENE GLYCOL 3350 17 G: 17 POWDER, FOR SOLUTION ORAL at 15:46

## 2025-06-05 RX ADMIN — LORAZEPAM 1 MG: 0.5 TABLET ORAL at 08:23

## 2025-06-05 RX ADMIN — LORAZEPAM 1 MG: 0.5 TABLET ORAL at 20:22

## 2025-06-05 RX ADMIN — ENOXAPARIN SODIUM 60 MG: 60 INJECTION SUBCUTANEOUS at 08:26

## 2025-06-05 RX ADMIN — QUETIAPINE FUMARATE 200 MG: 200 TABLET ORAL at 20:23

## 2025-06-05 ASSESSMENT — ACTIVITIES OF DAILY LIVING (ADL)
ADLS_ACUITY_SCORE: 49
ADLS_ACUITY_SCORE: 53
ADLS_ACUITY_SCORE: 49
ADLS_ACUITY_SCORE: 53
ADLS_ACUITY_SCORE: 49
ADLS_ACUITY_SCORE: 53
ADLS_ACUITY_SCORE: 49
ADLS_ACUITY_SCORE: 53
ADLS_ACUITY_SCORE: 49
ADLS_ACUITY_SCORE: 53
ADLS_ACUITY_SCORE: 49

## 2025-06-05 NOTE — PROGRESS NOTES
PALLIATIVE CARE PROGRESS NOTE  [facility]     Patient Name: Marizol Granados  Date of Admission: 5/18/2025   Today the patient was seen for: symptom management     Recommendations & Counseling     Recommendations for today:   Continue methadone 5 mg BID  Will change oral Dilaudid from 6 mg every 4 hours scheduled to 6 mg every 4 hours as needed.  She did well without needing as needed IV Dilaudid rescue doses yesterday (used 1 dose).  Will leave 2 doses available today if needed.  Encouraged her to avoid these unless necessary.  Still no significant return of bowel function; this is being managed by primary team.    Overall, her transition to oral pain regimen going well.  Pain was better yesterday than it has been for several days.  Discussed how oral regimen gives a bit more smooth pattern of pain relief than frequent IV regimen.  Nursing staff notes her motivation to stick with transition to orals.    Discussion:   Goal today is continuing toward oral pain regimen.  We note for context that today is POD #8, that we are not treating cancer pain per se, given status of her disease (most recent imaging 3/25 showing no evidence of active disease).  Prior to this admission, our team was seeing her for cancer associated pain, with pain issues related to rectovaginal fistula and increased pain related to loop ileostomy stoma.  I reviewed her outpatient palliative care clinic notes.  For context, prior to current series of events she was on a Butrans patch and 4-6 mg oral Dilaudid up to every 4 hours as needed.  The goal of surgery this admission was to reduce pain and functional limitations related to the complications identified above.  Discussed with patient that the best practice postoperative care is to use IV pain management for a limited amount of time postoperatively, then transition to oral pain regimen as clinically indicated.  I have additional concerns, given  patient's complex comorbid psychiatric treatment, that continued IV opioid treatment puts her at risk for development of coping difficulties etc.  Discussed with patient how essential it is for her to be up ambulating to make recovery possible.  Discussed this also with gynecologic oncology team, with plans to increase scheduled ambulation with nursing staff throughout the day.  Our team will continue to see her as an outpatient to continue to down titrate opioids as clinically indicated.    Elian Fisher MD  Palliative Medicine Consult Team  Text me via [employer]  TT: 36 minutes, with > 50% spent in C/C/E patient/family/care teams re: GOC, POC, Sx management. 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 Most recent imaging in March 2025 showed no evidence of disease. She is being followed by CRS for rectovaginal fistula and coordinating surgery for takedown of loop ileostomy and creation of end colostomy. Follows palliative care outpatient for pain. Our team was consulted to help with pain and anxiety.   Seen today for: GYN cancer, anxiety, postop pain   Interval History:   Patient seen with mother at bedside today.  Pain significantly better today; see comments above.  Continue transition as outlined above.  She is 1 as needed IV Dilaudid rescue dose yesterday, 5 doses of oral Dilaudid 6 mg yesterday    Medications managed by palliative  APAP 975 mg q6h earnest   Methadone 5mg BID (started 5/22 PM, stopped 5/28, restarted 5/29)  Dilaudid PCA now discontinued  Dilaudid 6 mg p.o. every 4 hours changed from scheduled to as needed starting today  Dilaudid IV rescue doses 0.6 mg used once yesterday; will cut back to availability of twice daily today.    Other medications not managed by our team  BuSpar 30 mg twice daily  Does venlafaxine 150 mg p.o. x 1  "daily  Lorazepam 1 mg twice daily  Methylphenidate 15 mg p.o. every morning  Seroquel 200 mg at bedtime  Ramelteon 8 mg at bedtime  Trazodone 300 mg at bedtime  Topiramate  Zofran 4 mg IV x1  Atarax 25 mg po x1  Narcan 0.2 mg IV x2    Patient/family narrative  Seen and examined at bedside.         Physical Exam:   Blood pressure (P) 100/63, pulse (P) 90, temperature (P) 98.2  F (36.8  C), temperature source (P) Oral, resp. rate (P) 14, height 1.651 m (5' 5\"), weight 66.7 kg (147 lb), SpO2 97%.   General: Sitting up in chair, mother at bedside  Lungs: non labored work of breathing  CV: Regular radial pulse 88  GI:  surgical wounds dressed; I did not evaluate  Neuro: alert and oriented x4, fluent speech, moving all extremities  Psych: anxious, normal affect.      Data Reviewed:     ROUTINE ICU LABS (Last four results)  CMP  Recent Labs   Lab 06/05/25 0603 06/04/25 0548 06/03/25 0357 06/02/25 0627    140 140 141   POTASSIUM 4.0 4.1 3.8 4.0   CHLORIDE 106 107 107 108*   CO2 25 24 25 23   ANIONGAP 9 9 8 10   * 143* 100* 97   BUN 23.3* 22.1* 18.7 23.6*   CR 0.66 0.57 0.62 0.65   GFRESTIMATED >90 >90 >90 >90   LORY 8.8 8.2* 8.5* 8.6*   MAG 2.2 1.9 1.6* 1.8   PHOS 4.8* 4.1 3.7 4.7*   PROTTOTAL  --   --   --  5.1*   ALBUMIN  --   --   --  2.5*   BILITOTAL  --   --   --  0.2   ALKPHOS  --   --   --  56   AST  --   --   --  22   ALT  --   --   --  17     CBC  Recent Labs   Lab 06/05/25 0603 06/04/25 0548 06/03/25 0357 06/02/25 0627   WBC 3.2* 2.9* 3.6* 4.1   RBC 2.30* 2.51* 2.44* 2.78*   HGB 7.1* 7.8* 7.7* 8.7*   HCT 21.5* 23.8* 23.3* 26.6*   MCV 94 95 96 96   MCH 30.9 31.1 31.6 31.3   MCHC 33.0 32.8 33.0 32.7   RDW 12.8 12.7 12.9 12.9    156 146* 142*     INR  No lab results found in last 7 days.    Arterial Blood Gas  No lab results found in last 7 days.              "

## 2025-06-05 NOTE — PROGRESS NOTES
[facility] Nurse Inpatient Assessment     Consulted for: ileostomy  5/21: please aleah for end colostomy scheduled surgery 5/28 5/29: New consult for colostomy    [facility] nurse follow-up plan: weekly    Patient History (according to provider note(s):      Mrs. Granados is a 40 year old female with history of left ovarian cancer and peritoneal carcinomatosis s/p debulking and diverting loop ileostomy, rectovaginal fistula s/p open intersphincteric proctectomy, takedown of DLI, appendectomy, end colostomy on 5/28/25.     Assessment:      Areas visualized during today's visit: Focused: and Abdomen    Assessment of new end Colostomy:  Diagnosis Pertinent to Stoma: ovarian cancer      Surgery Date: 5/28  Surgeon:Gerson Carpenter       [facility]: [facility]  Pouching system in place on assessment today: Coloplast two piece and cut to fit  Pouch barrier status: intact  Pouch last changed/wear time: 2 days  Reason for pouch change today: ostomy education and routine schedule  Effectiveness of current pouching/ supply plan: Effective  Change made with ostomy management today: No  Pouching system placed today: Coloplast two piece, cut to fit, and barrier ring   Supplies: at bedside    Last photo: 5/29  Stoma location: LUQ  Stoma size: 1-5/8 inches  Stoma appearance: pink-red, round, moist, good turgor, and protruberant  Mucocutaneous junction:  intact  Peristomal complication(s): none   Output: bowel sweat  Output volume emptied during visit: 0 ml  Abdominal assessment: Soft and Distended  Surgical site(s): dressing dry and intact  NG still in place? No  Pain: Cramping and Sharp  Is patient still on a PCA? Yes    Ostomy education assessment:  Participant of teaching session today: patient   Education completed today: Pouching system assessment , Pouch change return demonstration, Pouch emptying return demonstration, When to seek medical attention, Lifestyle adjustments , and Discharge  instructions  Educational materials/methods: Verbal, F[place] education hand out, and Addressed patient/caregiver questions/concerns  Education still needed: Pouch change return demonstration  Learning Comprehension:   Psychosocial assessment: Attentive. Pt had a loop ileostomy prior to this surgery and was independent with care.  Patient readiness for education today: observing and attentive  Following today's visit: patient  is able to demonstrate;         1. How to empty their pouch? Yes        2. How to change their pouch? Yes        3. How to read and record intake and output correctly? Yes  Preparation for discharge completed: Placed prescription recommendations in discharge navigator for MD to sign, Ensured patient has extra supplies for discharge, Discussed how to order supplies after discharge , Ordered samples from  after gaining consent from patient/caregiver, Discussed how and when to make an outpatient WOC nurse appointment after discharge, Prepared for discharge home with home care, and Discuss signs/symptoms of when to seek medical attention  Preparation for discharge still needed: None  Pt support system on discharge: Pt lives alone  WOC recommend home care? No  Face to face time: 40 minutes         Treatment Plan:     LUQ Colostomy pouching plan:   Pouching system: ostomy supplies pouches: Coloplast Sensura Click ostomy supplies barrier: Coloplast Sensura Click flat  Accessories used: WOC ostomy accessories: Coloplast barrier ring   Frequency of pouch changes: Twice weekly  WOC follow up plan: Daily Monday-Friday (as able)  Bedside RN interventions: Change pouch PRN if leaking using the supplies above, Empty pouch when 1/3 to 1/2 full, ensure to clean pouch outlet after emptying to prevent odor, Notify WOC for ongoing pouch leakage, Document stoma appearance and output volume, color, and consistency every shift, and Encourage patient to empty pouch with assist     Orders:  Reviewed    RECOMMEND PRIMARY TEAM ORDER: None, at this time  Education provided: plan of care  Discussed plan of care with: Patient and Nurse Practitioner  Notify [facility] if wound(s) deteriorate.  Nursing to notify the Provider(s) and re-consult the [facility] Nurse if new skin concern.    DATA:     Current support surface: Standard  Standard gel mattress (Isoflex)  Containment of urine/stool: Incontinent pad in bed and Ileostomy pouch  BMI: Body mass index is 24.46 kg/m .   Active diet order: Orders Placed This Encounter      Regular Diet Adult     Output: I/O last 3 completed shifts:  In: 1227 [P.O.:340]  Out: 1400 [Urine:1400]     Labs:   Recent Labs   Lab 06/05/25  0603 06/03/25  0357 06/02/25  0627   ALBUMIN  --   --  2.5*   HGB 7.1*   < > 8.7*   WBC 3.2*   < > 4.1    < > = values in this interval not displayed.     Pressure injury risk assessment:   Sensory Perception: 4-->no impairment  Moisture: 3-->occasionally moist  Activity: 3-->walks occasionally  Mobility: 3-->slightly limited  Nutrition: 3-->adequate  Friction and Shear: 3-->no apparent problem  Terry Score: 19      Pager no longer in use, please contact through [employer] group: [facility] Nurse Section    Dept. Office Number: [phone]          [facility]     Name: Marizol Granados  Date: 6/3/25    To order your ostomy supplies    The ostomy Supplier needs this supply list  to process your order. You will need to fax/deliver this list, along with your Insurance information. Your home care nurse can assist with this process.    List of Ostomy Distributors      [facility]  Ph. [phone] Fax # [phone]  [facility]   Ph. [phone] [phone]  [facility]   Ph. [phone] [phone]  Or Call your insurance provider for their preferred supplier    Your Medical Supplier will need your surgeon's name, phone and fax number    Clinic:                     Phone                             Fax  Colorectal Surgery:    [phone]    Verbal Order for ostomy supplies for 1 Month per:              Jalen Hart RN, CWOCN                                                                  Authorizing MD: Dr. Gerson Carpenter    Quantity of pouches:     20/mo.    Request the following supplies:      Jose                        Coloplast  Sensura Florin Click Barrier flat  #[id number]    Sensura Florin Click Pouch #[id number]      OR      1 piece convex light [place] cut up to 1    with filter #[id number]         Accessories  Coloplast barrier ring #[id number]                                            Change your pouch 2 times a week, more often if leaking.   If you are cutting a hole in the wafer of your pouch, recheck stoma size and adjust pouch opening as needed every week    . Call the Ostomy Nurse at [facility]       [address] [place] [address], [place] : [phone]   Schedule a follow-up visit in 2 to 4 weeks after your surgery, sooner if having problems Bring a complete set of pouch-changing supplies to this visit    [facility] outpatient [facility] department  [facility] [place] [address]   number- [phone]     Problems you should Report  - The stoma turns blue or darker in color.  - Cuts or sores around the stoma.  - Red, raw or painful skin around the stoma.  - Any bulging of the skin around the stoma.  - A pouch that leaks every day.  - Problems making the right size hole in the pouch wafer.   Please call with any questions or concerns.

## 2025-06-05 NOTE — PROGRESS NOTES
[facility]    Progress Note - Colorectal Surgery Service       Date of Admission:  2025    Assessment & Plan: Surgery   Mrs. Granados is a 40 year old female with history of left ovarian cancer and peritoneal carcinomatosis s/p prior debulking and diverting loop ileostomy, rectovaginal fistula.  Now s/p open intersphincteric proctectomy, takedown of DLI, appendectomy, end colostomy on 25. Post-operative course is significant for difficulty in pain control, having bowel function.      - greatly appreciate Gyn Onc management  - AQUILINO drain removed 25  - Regular diet, let TPN    - apprec WOCN for colostomy education  - multimodal pain regimen per gyn onc an palliative   - Encourage ambulation QID, incentive spirometer use   - Sitting:  MODIFIED SITTING PROTOCOL:  OK to sit on a soft surface for 15-20 minutes at a time 4-5 times per day.  Otherwise no sitting, bending, squatting, Nor apply either direct pressure or pulling/shearing forces to perineal incision.   OK to perch at edge of bed when transitioning from laying to standing.   OK to walk as much as possible. And encourage walking at least 4-5 times per day.   NO DONUT pillows. Request heidi-mat cushion from PT/OT.   Ppx:  on lovenox 60mg BID       Diet: Regular Diet Adult  Calorie Counts  parenteral nutrition - ADULT compounded formula CYCLE    DVT Prophylaxis: Enoxaparin (Lovenox) SQ  Cat Catheter: PRESENT, indication: Surgical procedure  Lines: PRESENT      Port a Cath 10/31/24 Single Lumen Right Chest wall-Site Assessment: WDL      Drains: None     Cardiac Monitoring: None  Code Status: Full Code      Clinically Significant Risk Factors               # Hypoalbuminemia: Lowest albumin = 2.5 g/dL at 2025  6:27 AM, will monitor as appropriate                 # Severe Malnutrition: based on nutrition assessment and treatment provided per dietitian's recommendations.    # Financial/Environmental  Concerns: unable to afford rent/mortgage;unemployed  # Asthma: noted on problem list      Social Drivers of Health   Depression: Risk of Self Harm (5/4/2025)    Received from [facility]    Depression (PHQ-9)     Last PHQ-9 Score: 27     Thoughts of self harm: Nearly every day   Housing Stability: High Risk (5/19/2025)    Housing Stability     Do you have housing? : No     Are you worried about losing your housing?: No   Stress: Stress Concern Present (5/13/2024)    [employer] [place] [employer] - Occupational Stress Questionnaire     Feeling of Stress : Very much   Social Connections: Moderately Isolated (5/13/2024)    Social Connection and Isolation Panel [NHANES]     Frequency of Communication with Friends and Family: Three times a week     Frequency of Social Gatherings with Friends and Family: Once a week     Attends [employer] Services: Never     Active Member of Clubs or Organizations: No     Attends Club or Organization Meetings: Never     Marital Status: Living with partner             Patient seen with colorectal fellow, Dr. Grider, who discussed with colorectal surgeon, Dr. Velasquez.     Zuleyma Hercules MD  General Surgery PGY-1    **For on call schedules and contact information, please refer to [facility]**      _______________________________________________    Interval History   She has been tolerating a regular diet without nausea/vomiting. Pain is manageable. No fevers/chills.     Physical Exam   Vital Signs: Temp: 98.4  F (36.9  C) Temp src: Oral BP: 94/55 Pulse: 69   Resp: 16 SpO2: 96 % O2 Device: None (Room air)    Weight: 147 lbs 0 oz  Intake/Output Summary (Last 24 hours) at 5/29/2025 0940  Last data filed at 5/29/2025 0907  Gross per 24 hour   Intake 6880.3 ml   Output 2323 ml   Net 4557.3 ml     General Appearance: In no acute distress   Respiratory: nonlabored breathing on room air   Cardiovascular: normal rate   GI:  Mildly distended, pink and well perfused ostomy  with gas and minimal brown liquid stool, dressings clean/dry/intact, AQUILINO removal site dressing clean/dry/intact  Skin: no rashes      Data     I have personally reviewed the following data over the past 24 hrs:    3.2 (L)  \   7.1 (L)   / 157     140 106 23.3 (H) /  106 (H)   4.0 25 0.66 \       Imaging results reviewed over the past 24 hrs:   No results found for this or any previous visit (from the past 24 hours).

## 2025-06-05 NOTE — PROGRESS NOTES
Brief Progress Note    Endorses patient for after treatment.  She is lying in bed, she reports quite a bit of pain.  Last dose of oral pain medication was at 9 AM this morning, and she feels as though she has fallen behind on pain control. She has taking multiple walks around the hallways in the morning and in the afternoon. She is optimistic about going up she would really like to go home tomorrow. TPN off today, she is tolerating general diet well. Per patient request Cat was removed today, she plans to continue ISC. After Cat was removed she reported some increase in brown/serosanguineous discharge on pad. Upon inspection pad had scant red/serosanguineous discharge. Reassured patient that this was normal postoperative vaginal discharge from her rectovaginal fistula repair and at this were symptoms that we could continue to monitor. Patient and mother verbalized understanding. Plan to observe overnight, discharge pending pain control and tolerating general diet.      Isamar Woodruff MD PGY-1  [facility] Gynecology Oncology   Gyn Onc Pager: [phone]  06/05/25 5:38 PM

## 2025-06-05 NOTE — PROGRESS NOTES
Brief GYN Oncology Note    Marizol is doing okay this evening. Pain is managed after having just received a dose of IV dilaudid, hopeful this will help with sleep. Has not noticed much output from the ostomy today. AQUILINO drain removed without incident. Disappointed that TPN still running; per review of nutrition note, lipids discontinued but pt continues on dex and AA with decreasing doses given her PO intake. Denies other questions or concerns, recently received PM meds and hoping to get to bed. Pain medication adjustments made per palliative today; PCA discontinued but prn IV dilaudid available if needed. No other changes to plan of care tonight.     Maribel Garcia MD  OB/GYN PGY-3  6/4/2025 10:02 PM

## 2025-06-05 NOTE — PLAN OF CARE
Goal Outcome Evaluation:      Plan of Care Reviewed With: patient    Overall Patient Progress: improvingOverall Patient Progress: improving    Outcome Evaluation: [id number]    Summary Type: 5A Post Op Report   POD: #7 = 6/4  Complications:   Pain:  Not controlled - On PCA Dilaudid with 0.6 mg dose, pain averaging 8-9/10  Neuro:WDL. A&O x 4. Stated that she's hard of hearing.  CV:WDL - intermittently tachycardic   Resp:WDL except, rhythm/pattern - on RA- uses NC at night,  GI:.WDL except - abdominal discomfort. Colostomy in place with stool.   :.WDL except Cat intact with adequate UOP. Yellow UOP observed.  Skin: .WDL except - midline incision, intact with island dressing, urethral meatus surgical site intact, with dressing and mesh panties, abd surgical site intact, AQUILINO drain intact.   Activity Assistance: independent  Safety/Falls Risk: Level of Risk: Moderate Risk  Consults:   Procedures/Imaging:   Precautions: Bleeding Precautions    Cat removed. Self caths with kits from home. Scheduled dilaudid switched to PRN, given twice this shift. No acute events this shift, continue with care as planned.

## 2025-06-05 NOTE — PROGRESS NOTES
"Gynecology Oncology Progress Note      HD#17 peristomal skin infection, decreased PO intake, POD#8 s/p XL-interspincteric proctectomy, loop ileostomy takedown and creation of end colostomy, takedown and repair of rectovaginal fistula, appendectomy, cystoscopy with bilateral ureteral stent insertion and removal      Disease:  stage IIIb low grade peritoneal carcinoma      24 hour events:   - PCA discontinued, transition to PO dilaudid with PRN IV doses available per palliative  - AQUILINO drain Cr wnl, AQUILINO drain removed per CRS   - kcal:1000, 100g protein; lipids discontinued, continuing to wean TPN per nutrition     Subjective: Marizol is doing okay this morning. Got sleep overnight. Feels pain is a bit better today. Tolerated PO yesterday without nausea/vomiting. Not lightheaded or dizzy. Hopeful to get TPN stopped ASAP.     Objective:   Vitals:    06/04/25 1300 06/04/25 1525 06/04/25 2011 06/04/25 2338   BP:  100/63 101/63 94/55   BP Location:  Left arm Right arm Left arm   Pulse:  93 84 69   Resp:  16 14 16   Temp:  98.5  F (36.9  C) 98  F (36.7  C) 98.4  F (36.9  C)   TempSrc:  Oral Oral Oral   SpO2:  97% 98% 96%   Weight:       Height: 1.651 m (5' 5\")         General: resting in bed, appears comfortable   CV: regular rate and rhythm, warm and well perfused   Resp: Breathing comfortably on room air  Abdomen: soft, diffusely tender to palpation, stapled incision intact, no surrounding erythema or drainage; previosu AQUILINO drain site with overlying 4x4 which is clean and dry, ileostomy takedown site with exudate at base, no active drainage; colostomy with no output or gas     I/O last 3 completed shifts:  In: 1193.4 [P.O.:240; I.V.:6]  Out: 1705 [Urine:1700; Drains:5]    Intake/Output (24 Hrs // Since [place])  PO: 290 cc// NR  TPN: 947 cc// NR  UOP: 2050 cc// NR  Drain: 5 ml>> removed   Stool: 0 cc // NR    Assessment: Marizol Granados is a 40 year old female with stage IIIB low grade peritoneal carcinoma now s/p ileostomy takedown, " creation of end colostomy with colorectal surgery, and repair of rectovaginal fistula admitted for post-op care. Now with return of bowel function; working on weaning dilaudid PCA.     # Stage IIIB low grade peritoneal carcinoma  # Rectovaginal fistula, repaired   # Neurogenic constipation  # Postoperative state   - Diet: As per Colorectal surgery recommendations, currently regular diet with TPN until tolerating sufficient calories. Continue calorie counts. Appreciate assistance from nutrition team re: weaning TPN.   - Activity: as per Colorectal recommendations: Perineal precautions: No direct pressure on perineum, ok for patient to lay on her side, limit sitting up on chair for prolonged periods of time, ambulate at least 4x daily; strongly encouraging ambulation given slow return to baseline functional status in postoperative period  - Wound care: Per CRS - Daily xeroform changes on rectum, daily packing changes on ostomy take down. Monitor for bleeding/discharge; reported by pt yesterday though none visible on exam yesterday evening  - AQUILINO drain: removed after normal drain Cr 6/4   Pain management: as per palliative care, see their daily note, also below  - Bladder: has urinary retention and performs intermittent straight catheterization. Maintain indwelling waite catheter until nearing discharge and pt able to manage ISC independently - consider removal today?     # Anorectal pain  # Cancer related fatigue   # Postoperative pain   Followed by palliative outpatient with Dr. Norton, last seen 4/24; inpatient team consulted and helping with inpatient/acute postoperative pain management. Now s/p dilaudid PCA, transitioning to goal completely PO regimen.  - Currently on Methadone, PO dilaudid 6 mg q4h, PRN IV dilaudid 0.6 mg up to 3x per 24h, Tylenol. Gabapentin and robaxin discontinued.  - appreciate palliative care team assistance with pain management   - PTA Ritalin for fatigue, pain management as described   -  Prior to admission: Dilaudid, Butrans patch, gabapentin    # Urinary retention, neurogenic from prior surgery with urinary retention   Managed with Intermittent straight catheterization prior to admission. Waite in place since surgery. Plan is to continue indwelling catheter until nearing discharge.  - continue waite catheter, monitor output (adequate last 24h)  - History of recurrent UTI last 2/25 with cipro, culture resulted with pansensitive staph aureus   - UA on admission with +LE, +nitrites collected post CTX; urine culture klebsiella on admission   - s/p 6d ceftriaxone, discontinued prior to OR given no ongoing evidence of infection      # Hx DVT/PE - chronic anticoagulation  - PTA Eliquis- currently held; Lovenox ppx held 5/31 due to drop in hgb, restarted with improvement in counts.  - Ultimately plan for indefinite anti-coagulation on home Eliquis given long history of clotting. See Hematology note from 6/14/2024.   - continue therpaeutic lovenox      # MDD/SHIVAM  # Insomnia   # RLS   - PTA scheduled Buspar, Pristiq, Seroquel, ramelteon, trazodone, ambien continued  - PTA gabapentin held   - Avoid changing or holding any of these medications as much as possible  - continue outpatient follow up with psychiatry after discharge for medication management     # Hx migraines  - PTA Topamax, propranolol    Maribel Garcia MD  [facility]  Gynecologic Oncology, PGY3  06/05/2025 6:31 AM    Team pager: [phone]    I have examined this patient personally with the team and agree with the above findings and plan.  PAin improving, cartwright alert with transition to PO narcotics (still hgih doses, based on prior exposure).  Increase PO intake with eye to ultimately discontinuing TPN.  OTherwise as above.    Homer Arita

## 2025-06-05 NOTE — PLAN OF CARE
Goal Outcome Evaluation:      Plan of Care Reviewed With: patient    Overall Patient Progress: no changeOverall Patient Progress: no change    Outcome Evaluation: [id number]      Summary Type: 5A Post Op Report   POD: #7 = 6/4  Complications:   Pain:  Not controlled - On PCA Dilaudid with 0.6 mg dose, pain averaging 8-9/10  Neuro:WDL. A&O x 4. Stated that she's hard of hearing.  CV:WDL - intermittently tachycardic   Resp:WDL except, rhythm/pattern - on RA- uses NC at night,  GI:.WDL except, GI symptoms, appearance/characteristics - abdominal discomfort. Colostomy in place with stool.   :.WDL except, voiding ability/characteristics Cat intact with adequate UOP. Yellow UOP observed.  Skin: .WDL except, color, integrity - midline incision, intact with island dressing, urethral meatus surgical site intact, with dressing and mesh panties, abd surgical site intact, AQUILINO drain intact.   Activity Assistance: assistance, stand-by  Safety/Falls Risk: Level of Risk: Moderate Risk    PCA discontinued, pain controlled well with scheduled oral dilaudid. AQUILINO drain removed. No acute events this shift, continue with care as planned.

## 2025-06-05 NOTE — PLAN OF CARE
Advance diet slowly    Stop using marijuana    Return Precautions    Although you are being discharged from the ED today, I encourage you to return for worsening symptoms.  Things can, and do, change such that treatment at home with medication may not be adequate.      Specifically, return for any of the following:    Chest pain, shortness of breath, pain or nausea and vomiting not controlled by medications provided.    Please make a follow up with your Primary Care Provider for a blood pressure recheck.      Goal Outcome Evaluation:      Plan of Care Reviewed With: patient    Overall Patient Progress: improving     POD 8 s/p XL-interspincteric proctectomy, loop ileostomy takedown and creation of end colostomy, takedown and repair of rectovaginal fistula, appendectomy, cystoscopy with bilateral ureteral stent insertion and removal      Alert, oriented, not OOB overnight.  Appeared to rest well.    AVSS, on RA.  Pain managed with scheduled PO dilaudid and tylenol.  IV dilaudid 1x last evening but pt is motivated to try to manage with oral meds.   Regular diet, cycled TPN into port, on christelle counts.  Cat with good UOP.    Colostomy with no gas/stool.

## 2025-06-05 NOTE — PROGRESS NOTES
Calorie Count  Intake recorded for: 6/4  Total Kcals: 641 Total Protein: 29g  Kcals from Hospital Food: 641  Protein: 29g  Kcals from Outside Food (average):0 Protein: 0g  # Meals Ordered from Kitchen: 2 meals   # Meals Recorded: 2 meals (First - 100% 3 ele siddiqui, Greek yogurt, blueberry muffin, 50% scrambled eggs)      (Second -  100% peanut butter & jelly sandwich)  # Supplements Recorded: 0    Addendum @ 0849: spoke with pt who reports she also drank 3 Premier protein drinks yesterday in addition to above food (additional 480 kcal and 90 g protein)    Intake recorded for: 6/4  Total Kcals: 1121 Total Protein: 79g    Do Siegel RD, , LD

## 2025-06-06 ENCOUNTER — APPOINTMENT (OUTPATIENT)
Dept: PHYSICAL THERAPY | Facility: CLINIC | Age: 40
End: 2025-06-06
Payer: COMMERCIAL

## 2025-06-06 ENCOUNTER — DOCUMENTATION ONLY (OUTPATIENT)
Dept: ONCOLOGY | Facility: CLINIC | Age: 40
End: 2025-06-06
Payer: COMMERCIAL

## 2025-06-06 VITALS
HEART RATE: 87 BPM | SYSTOLIC BLOOD PRESSURE: 109 MMHG | DIASTOLIC BLOOD PRESSURE: 71 MMHG | HEIGHT: 65 IN | WEIGHT: 147 LBS | TEMPERATURE: 98.2 F | RESPIRATION RATE: 18 BRPM | OXYGEN SATURATION: 93 % | BODY MASS INDEX: 24.49 KG/M2

## 2025-06-06 LAB
ANION GAP SERPL CALCULATED.3IONS-SCNC: 11 MMOL/L (ref 7–15)
ANION GAP SERPL CALCULATED.3IONS-SCNC: 11 MMOL/L (ref 7–15)
BUN SERPL-MCNC: 21.7 MG/DL (ref 6–20)
BUN SERPL-MCNC: 23.1 MG/DL (ref 6–20)
CALCIUM SERPL-MCNC: 9.2 MG/DL (ref 8.8–10.4)
CALCIUM SERPL-MCNC: 9.4 MG/DL (ref 8.8–10.4)
CHLORIDE SERPL-SCNC: 106 MMOL/L (ref 98–107)
CHLORIDE SERPL-SCNC: 107 MMOL/L (ref 98–107)
CREAT SERPL-MCNC: 1.06 MG/DL (ref 0.51–0.95)
CREAT SERPL-MCNC: 1.1 MG/DL (ref 0.51–0.95)
EGFRCR SERPLBLD CKD-EPI 2021: 66 ML/MIN/1.73M2
EGFRCR SERPLBLD CKD-EPI 2021: 69 ML/MIN/1.73M2
ERYTHROCYTE [DISTWIDTH] IN BLOOD BY AUTOMATED COUNT: 12.9 % (ref 10–15)
GLUCOSE SERPL-MCNC: 111 MG/DL (ref 70–99)
GLUCOSE SERPL-MCNC: 93 MG/DL (ref 70–99)
HCO3 SERPL-SCNC: 23 MMOL/L (ref 22–29)
HCO3 SERPL-SCNC: 23 MMOL/L (ref 22–29)
HCT VFR BLD AUTO: 23 % (ref 35–47)
HGB BLD-MCNC: 7.6 G/DL (ref 11.7–15.7)
MAGNESIUM SERPL-MCNC: 1.9 MG/DL (ref 1.7–2.3)
MCH RBC QN AUTO: 30.8 PG (ref 26.5–33)
MCHC RBC AUTO-ENTMCNC: 33 G/DL (ref 31.5–36.5)
MCV RBC AUTO: 93 FL (ref 78–100)
PHOSPHATE SERPL-MCNC: 6 MG/DL (ref 2.5–4.5)
PHOSPHATE SERPL-MCNC: 6.4 MG/DL (ref 2.5–4.5)
PLATELET # BLD AUTO: 178 10E3/UL (ref 150–450)
POTASSIUM SERPL-SCNC: 3.8 MMOL/L (ref 3.4–5.3)
POTASSIUM SERPL-SCNC: 4 MMOL/L (ref 3.4–5.3)
RBC # BLD AUTO: 2.47 10E6/UL (ref 3.8–5.2)
SODIUM SERPL-SCNC: 140 MMOL/L (ref 135–145)
SODIUM SERPL-SCNC: 141 MMOL/L (ref 135–145)
WBC # BLD AUTO: 4.3 10E3/UL (ref 4–11)

## 2025-06-06 PROCEDURE — 250N000013 HC RX MED GY IP 250 OP 250 PS 637

## 2025-06-06 PROCEDURE — 84100 ASSAY OF PHOSPHORUS: CPT | Performed by: NURSE PRACTITIONER

## 2025-06-06 PROCEDURE — 250N000013 HC RX MED GY IP 250 OP 250 PS 637: Performed by: FAMILY MEDICINE

## 2025-06-06 PROCEDURE — 258N000003 HC RX IP 258 OP 636: Performed by: NURSE PRACTITIONER

## 2025-06-06 PROCEDURE — 99232 SBSQ HOSP IP/OBS MODERATE 35: CPT | Performed by: FAMILY MEDICINE

## 2025-06-06 PROCEDURE — 250N000011 HC RX IP 250 OP 636: Performed by: OBSTETRICS & GYNECOLOGY

## 2025-06-06 PROCEDURE — 250N000013 HC RX MED GY IP 250 OP 250 PS 637: Performed by: SURGERY

## 2025-06-06 PROCEDURE — 250N000013 HC RX MED GY IP 250 OP 250 PS 637: Performed by: PHYSICIAN ASSISTANT

## 2025-06-06 PROCEDURE — 84100 ASSAY OF PHOSPHORUS: CPT

## 2025-06-06 PROCEDURE — 250N000013 HC RX MED GY IP 250 OP 250 PS 637: Performed by: NURSE PRACTITIONER

## 2025-06-06 PROCEDURE — 97110 THERAPEUTIC EXERCISES: CPT | Mod: GP

## 2025-06-06 PROCEDURE — 82310 ASSAY OF CALCIUM: CPT | Performed by: OBSTETRICS & GYNECOLOGY

## 2025-06-06 PROCEDURE — 250N000013 HC RX MED GY IP 250 OP 250 PS 637: Performed by: STUDENT IN AN ORGANIZED HEALTH CARE EDUCATION/TRAINING PROGRAM

## 2025-06-06 PROCEDURE — 83735 ASSAY OF MAGNESIUM: CPT

## 2025-06-06 PROCEDURE — 85014 HEMATOCRIT: CPT | Performed by: OBSTETRICS & GYNECOLOGY

## 2025-06-06 PROCEDURE — 80048 BASIC METABOLIC PNL TOTAL CA: CPT | Performed by: NURSE PRACTITIONER

## 2025-06-06 RX ORDER — HEPARIN SODIUM (PORCINE) LOCK FLUSH IV SOLN 100 UNIT/ML 100 UNIT/ML
SOLUTION INTRAVENOUS
Status: DISCONTINUED
Start: 2025-06-06 | End: 2025-06-06 | Stop reason: HOSPADM

## 2025-06-06 RX ORDER — METHADONE HYDROCHLORIDE 5 MG/5ML
5 SOLUTION ORAL 2 TIMES DAILY
Qty: 500 ML | Refills: 0 | Status: SHIPPED | OUTPATIENT
Start: 2025-06-06 | End: 2025-06-10 | Stop reason: ALTCHOICE

## 2025-06-06 RX ORDER — HYDROMORPHONE HYDROCHLORIDE 4 MG/1
4-6 TABLET ORAL EVERY 4 HOURS PRN
Qty: 90 TABLET | Refills: 0 | Status: SHIPPED | OUTPATIENT
Start: 2025-06-06

## 2025-06-06 RX ORDER — HYDROMORPHONE HYDROCHLORIDE 2 MG/1
4-6 TABLET ORAL EVERY 4 HOURS PRN
Refills: 0 | Status: DISCONTINUED | OUTPATIENT
Start: 2025-06-06 | End: 2025-06-06 | Stop reason: HOSPADM

## 2025-06-06 RX ADMIN — BUSPIRONE HYDROCHLORIDE 30 MG: 15 TABLET ORAL at 08:58

## 2025-06-06 RX ADMIN — POLYETHYLENE GLYCOL 3350 17 G: 17 POWDER, FOR SOLUTION ORAL at 08:58

## 2025-06-06 RX ADMIN — METHADONE HYDROCHLORIDE 5 MG: 5 SOLUTION ORAL at 08:57

## 2025-06-06 RX ADMIN — HYDROMORPHONE HYDROCHLORIDE 6 MG: 2 TABLET ORAL at 00:38

## 2025-06-06 RX ADMIN — HYDROMORPHONE HYDROCHLORIDE 6 MG: 2 TABLET ORAL at 08:57

## 2025-06-06 RX ADMIN — METHYLPHENIDATE HYDROCHLORIDE 15 MG: 5 TABLET ORAL at 08:58

## 2025-06-06 RX ADMIN — POLYETHYLENE GLYCOL 3350 17 G: 17 POWDER, FOR SOLUTION ORAL at 13:49

## 2025-06-06 RX ADMIN — ACETAMINOPHEN 1000 MG: 500 TABLET ORAL at 00:32

## 2025-06-06 RX ADMIN — HYDROMORPHONE HYDROCHLORIDE 6 MG: 2 TABLET ORAL at 13:52

## 2025-06-06 RX ADMIN — SODIUM CHLORIDE 500 ML: 0.9 INJECTION, SOLUTION INTRAVENOUS at 09:16

## 2025-06-06 RX ADMIN — TOPIRAMATE 50 MG: 50 TABLET, FILM COATED ORAL at 08:58

## 2025-06-06 RX ADMIN — ENOXAPARIN SODIUM 60 MG: 60 INJECTION SUBCUTANEOUS at 08:57

## 2025-06-06 RX ADMIN — Medication 5 ML: at 04:51

## 2025-06-06 RX ADMIN — PROCHLORPERAZINE MALEATE 10 MG: 5 TABLET ORAL at 11:11

## 2025-06-06 RX ADMIN — HYDROMORPHONE HYDROCHLORIDE 6 MG: 2 TABLET ORAL at 04:43

## 2025-06-06 RX ADMIN — LORAZEPAM 1 MG: 0.5 TABLET ORAL at 08:59

## 2025-06-06 RX ADMIN — ACETAMINOPHEN 1000 MG: 500 TABLET ORAL at 13:48

## 2025-06-06 RX ADMIN — METHYLPHENIDATE HYDROCHLORIDE 10 MG: 5 TABLET ORAL at 13:48

## 2025-06-06 RX ADMIN — MAGNESIUM HYDROXIDE 30 ML: 400 SUSPENSION ORAL at 08:58

## 2025-06-06 RX ADMIN — PROPRANOLOL HYDROCHLORIDE 10 MG: 10 TABLET ORAL at 08:59

## 2025-06-06 RX ADMIN — PANTOPRAZOLE SODIUM 40 MG: 40 TABLET, DELAYED RELEASE ORAL at 08:59

## 2025-06-06 ASSESSMENT — ACTIVITIES OF DAILY LIVING (ADL)
ADLS_ACUITY_SCORE: 51
ADLS_ACUITY_SCORE: 46
ADLS_ACUITY_SCORE: 51
ADLS_ACUITY_SCORE: 51
ADLS_ACUITY_SCORE: 46
ADLS_ACUITY_SCORE: 51
ADLS_ACUITY_SCORE: 46
ADLS_ACUITY_SCORE: 51
ADLS_ACUITY_SCORE: 46
ADLS_ACUITY_SCORE: 51
ADLS_ACUITY_SCORE: 46
ADLS_ACUITY_SCORE: 51
ADLS_ACUITY_SCORE: 46
ADLS_ACUITY_SCORE: 46
ADLS_ACUITY_SCORE: 51

## 2025-06-06 NOTE — PROGRESS NOTES
[facility]    Progress Note - Colorectal Surgery Service       Date of Admission:  2025    Assessment & Plan: Surgery   Mrs. Granados is a 40 year old female with history of left ovarian cancer and peritoneal carcinomatosis s/p prior debulking and diverting loop ileostomy, rectovaginal fistula.  Now s/p open intersphincteric proctectomy, takedown of DLI, appendectomy, end colostomy on 25. Post-operative course is significant for difficulty in pain control, having bowel function.      - greatly appreciate Gyn Onc management  - recommend adding milk of magnesia to bowel regimen given chronic constipation, passing gas via ostomy, however, decrease in stool output   - keep perineal incision dry  - AQUILINO drain removed 25  - Regular diet, let TPN    - apprec WOCN for colostomy education  - multimodal pain regimen per gyn onc an palliative   - Encourage ambulation QID, incentive spirometer use   - Sitting:  MODIFIED SITTING PROTOCOL:  OK to sit on a soft surface for 15-20 minutes at a time 4-5 times per day.  Otherwise no sitting, bending, squatting, Nor apply either direct pressure or pulling/shearing forces to perineal incision.   OK to perch at edge of bed when transitioning from laying to standing.   OK to walk as much as possible. And encourage walking at least 4-5 times per day.   NO DONUT pillows. Request heidi-mat cushion from PT/OT.   Ppx:  on lovenox 60mg BID       Diet: Regular Diet Adult    DVT Prophylaxis: Enoxaparin (Lovenox) SQ  Cat Catheter: Not present  Lines: PRESENT      Port a Cath 10/31/24 Single Lumen Right Chest wall-Site Assessment: WDL      Drains: None     Cardiac Monitoring: None  Code Status: Full Code      Clinically Significant Risk Factors               # Hypoalbuminemia: Lowest albumin = 2.5 g/dL at 2025  6:27 AM, will monitor as appropriate                 # Severe Malnutrition: based on nutrition assessment and treatment  provided per dietitian's recommendations.    # Financial/Environmental Concerns: unable to afford rent/mortgage;unemployed  # Asthma: noted on problem list      Social Drivers of Health   Depression: Risk of Self Harm (5/4/2025)    Received from [facility]    Depression (PHQ-9)     Last PHQ-9 Score: 27     Thoughts of self harm: Nearly every day   Housing Stability: High Risk (5/19/2025)    Housing Stability     Do you have housing? : No     Are you worried about losing your housing?: No   Stress: Stress Concern Present (5/13/2024)    [facility] of Occupational Health - Occupational Stress Questionnaire     Feeling of Stress : Very much   Social Connections: Moderately Isolated (5/13/2024)    Social Connection and Isolation Panel [NHANES]     Frequency of Communication with Friends and Family: Three times a week     Frequency of Social Gatherings with Friends and Family: Once a week     Attends Taoist Services: Never     Active Member of Clubs or Organizations: No     Attends Club or Organization Meetings: Never     Marital Status: Living with partner           Patient seen with colorectal fellow, Dr. Grider, who discussed with colorectal surgeon, Dr. Velasquez.     Zuleyma Hercules MD  General Surgery PGY-1    **For on call schedules and contact information, please refer to [facility]**      _______________________________________________    Interval History   She has been tolerating a regular diet without nausea/vomiting. Pain is manageable. No fevers/chills. She has been ambulating. Passing gas via ostomy.      Physical Exam   Vital Signs: Temp: 98  F (36.7  C) Temp src: Oral BP: 112/69 Pulse: 68   Resp: 18 SpO2: 95 % O2 Device: None (Room air)    Weight: 147 lbs 0 oz  Intake/Output Summary (Last 24 hours) at 5/29/2025 0940  Last data filed at 5/29/2025 0907  Gross per 24 hour   Intake 6880.3 ml   Output 2323 ml   Net 4557.3 ml     General Appearance: In no acute distress   Respiratory:  nonlabored breathing on room air   Cardiovascular: normal rate   GI:  soft, appropriately tender to palpation, Mildly distended, pink and well perfused ostomy with gas in bag, dressings clean/dry/intact, AQUILINO removal site dressing clean/dry/intact, dressing changed due to moisture at perineal incision site c/d/I no erythema or purulent drainage, chaperone present during exam  Skin: no rashes      Data     I have personally reviewed the following data over the past 24 hrs:    4.3  \   7.6 (L)   / 178     140 106 21.7 (H) /  93   3.8 23 1.06 (H) \       Imaging results reviewed over the past 24 hrs:   No results found for this or any previous visit (from the past 24 hours).

## 2025-06-06 NOTE — PROGRESS NOTES
Care Management Follow Up    Length of Stay (days): 17  Expected Discharge Date: maybe weekend  Concerns to be Addressed: discharge planning     Patient plan of care discussed at interdisciplinary rounds: Yes    Anticipated Discharge Disposition: Home  Anticipated Discharge Services: Home Care    [facility] - RN, SW, PT, OT  P: [phone]  F: [phone]   Order placed, accepted.     Anticipated Discharge DME: New Colostomy Supplies.   [facility]   . [phone]  Fx: [phone]  Will need extra supplies provided by CLARIBEL/bedside RN.   RNCC to fax order to supply company prior to discharge    Patient/family educated on Medicare website which has current facility and service quality ratings: yes  Education Provided on the Discharge Plan: Yes  Patient/Family in Agreement with the Plan: yes  Referrals Placed by CM/SW: Homecare, DME  Private pay costs discussed: Not applicable  Discussed 'Partnership in Safe Discharge Planning' document with patient/family: No   Handoff Completed: No, handoff not indicated or clinically appropriate      Additional Information:  Per LESTER Weir patient was weaned off TPN 6/5. She isn't having stools and kidney function is elevated - they will reassess later today, but likely plan for discharge this weekend. Pt has history of ileostomy, but now has new colostomy. Pt is set up with [facility] for  RN/PT/OT/SW.     Updated I liaison that TPN was discontinued. They will continue to following to ensure patient is still off TPN.     Updated [facility] via [facility] that discharge likely this weekend    Spoke with patient about discharge planning. She is requesting additional items added to her ostomy supply order since this will replace her current one. Sent vocera to CLARIBEL Banuelos and LESTER Guardado to assist with updating notes and order:  Adapt powder #[id number] and [id number]   No sting film barrier # 8485   Adapt odor eliminator and lubricant 236ml bottle #00276    Adapt universal adhesive remover #7760   Adapt M-9 Spray room deodorizer #7732   Barrier Strips for Skin [id number] and [id number]   Belts (x2) [id number]   Lollipop Skin Prep     Order and note updated, RNCC faxed orders to [facility] per patient request.        Next Steps:   [] Update [facility]  [] Bedside/WOCN to provide extra ostomy supplies  [x] Colostomy order faxed to Francesca 6/6 at 1:28 PM and provided pt copy  [] Internal hand off vs external hand off - Georgie Hernández, RN, [place]  [facility]  Inpatient Care Management - FLOAT

## 2025-06-06 NOTE — PLAN OF CARE
"Goal Outcome Evaluation:    Plan of Care Reviewed With: patient    Overall Patient Progress: improvingOverall Patient Progress: improving    Outcome Evaluation: VSS. Seen by palliative, titrating pain regimen. AROBF.    [id number]    /71 (BP Location: Right arm)   Pulse 87   Temp 98.2  F (36.8  C) (Oral)   Resp 18   Ht 1.651 m (5' 5\")   Wt 66.7 kg (147 lb)   LMP  (LMP Unknown)   SpO2 93%   BMI 24.46 kg/m      Reason for admission: Open intersphincteric proctectomy, takedown of DLI, appendectomy, end colostomy on 5/28/25. Post-operative course is significant for difficulty in pain control, having bowel function   Activity: UAL  Pain: Reporting 7/10 abdominal pain. Given PRN Dilaudid with effect.  Neuro: AxOx4. Neuros intact.   Cardiac: WDL  Respiratory: NLB on RA. O2 sats WDL.   GI/: Straight caths PRN independently. Ostomy with flatus, no stool.   Diet: Regular. Poor appetite.  Lines: PAC intact. SL. Site WDL.   Wounds: Abd incision well approximated with staples KANDY without drainage. Takedown site with dried drainage on Primapore. Pt refusing daily wound cares, cites will do every other day.   Labs/imaging: Reviewed. See chart.       Continue to monitor and follow POC        "

## 2025-06-06 NOTE — PLAN OF CARE
[facility]      Name: Marizol Granados  Date: 6/3/25     To order your ostomy supplies     The ostomy Supplier needs this supply list  to process your order. You will need to fax/deliver this list, along with your Insurance information. Your home care nurse can assist with this process.     List of Ostomy Distributors                                          [facility]  Ph. [phone] Fax # [phone]  [facility]   Ph. [phone] [phone]  [facility] located within [facility]   Ph. [phone]  Or Call your insurance provider for their preferred supplier     Your Medical Supplier will need your surgeon's name, phone and fax number     Clinic:                                                         Phone                                    Fax  Colorectal Surgery:                               [phone]     Verbal Order for ostomy supplies for 1 Month per:                                                                                                  Jalen Hart RN, CWOCN                                                                             Authorizing MD: Dr. Gerson Carpenter     Quantity of pouches:     20/mo.     Request the following supplies:      [place]                                            Coloplast        Sensura Florin Click Barrier flat  #63844                            Sensura Florin Click Pouch #74403                               OR                               1 piece convex light [place] cut up to 1    with filter #51627              Accessories     Coloplast barrier ring #[id number]                           Adapt powder #7907    No sting film barrier # 6026                          Adapt odor eliminator and lubricant 236ml bottle # 77397     Adapt universal adhesive remover #9917                          Adapt M-9 Spray room deodorizer #1165                           Brava elastic barrier strips curved  # [id number] and #86730    Coloplast ostomy belt #1278                                                               Change your pouch 2 times a week, more often if leaking.   If you are cutting a hole in the wafer of your pouch, recheck stoma size and adjust pouch opening as needed every week     . Call the Ostomy Nurse at [facility]      [address] [place] [address], [place] : [phone]   Schedule a follow-up visit in 2 to 4 weeks after your surgery, sooner if having problems Bring a complete set of pouch-changing supplies to this visit    [facility] outpatient [facility] department ([place])  Ph number- [phone]      Problems you should Report  - The stoma turns blue or darker in color.  - Cuts or sores around the stoma.  - Red, raw or painful skin around the stoma.  - Any bulging of the skin around the stoma.  - A pouch that leaks every day.  - Problems making the right size hole in the pouch wafer.   Please call with any questions or concerns.

## 2025-06-06 NOTE — PROGRESS NOTES
Prior Authorization Initiated    Medication: HYDROMORPHONE HCL 4 MG PO TABS  Insurance Company: [employer] [place] - Phone [phone] Fax [phone]  Filling Pharmacy: [facility] [place], [place] - [address] [place] [address]  Start Date: 6/6/2025  Count includes the [facility] Key / Reference #: [id number]   Comments:       Hansa Dowd  [facility] [place], M-CHRISTOFER  Ph: [phone]  Fax: [phone]  Available on Teams and Vocera

## 2025-06-06 NOTE — PROGRESS NOTES
PALLIATIVE CARE PROGRESS NOTE  [facility]     Patient Name: Marizol Granados  Date of Admission: 5/18/2025   Today the patient was seen for: symptom management     Recommendations & Counseling     What's New Today:  Marizol continues to work diligently on increasing her activity and ambulation.  She really hopes to get home today because it is her son's birthday.  Main thing holding her back at this point is postoperative bowel function.  She got a dose of MOM this morning, and is taking MiraLAX 3 times daily scheduled.  Will decrease oral Dilaudid from 6 mg every 4 hours as needed to 4-6 mg every 4 hours as needed.  Encouraged her to try the lower dose and see if that meets her needs.  Asked the bedside nurse to encourage her to try 4 mg dose.  Continue methadone 5 mg BID.  She is not requiring as needed IV Dilaudid rescue doses; will discontinue this order  She has a follow-up appointment early next week with her outpatient palliative care doc; we will continue with pain management/opioid weaning as indicated.    Anticipated discharge med recommendations:  Continue methadone 5 mg twice daily  Hydromorphone 4-6 mg up to every 4 hours as needed.  If she does not discharge today, but her pain needs are adequately addressed by using only 4 mg oral Dilaudid doses today, it would be okay to simply discharge her with hydromorphone 4 mg (rather than 4-6 mg) every 4 hours.    Discussion:   We note for context that today is POD #9, that we are not treating cancer pain per se, given status of her disease (most recent imaging 3/25 showing no evidence of active disease).  Prior to this admission, our team was seeing her for cancer associated pain, with pain issues related to rectovaginal fistula and increased pain related to loop ileostomy stoma.  I reviewed her outpatient palliative care clinic notes.  For context, prior to current series of events she was on a Butrans patch and 4-6  mg oral Dilaudid up to every 4 hours as needed.  The goal of surgery this admission was to reduce pain and functional limitations related to the complications identified above.  Discussed with patient that the best practice postoperative care is to use IV pain management for a limited amount of time postoperatively, then transition to oral pain regimen as clinically indicated.  I have additional concerns, given patient's complex comorbid psychiatric treatment, that continued IV opioid treatment puts her at risk for development of coping difficulties etc.  Discussed with patient how essential it is for her to be up ambulating to make recovery possible.  Discussed this also with gynecologic oncology team, with plans to increase scheduled ambulation with nursing staff throughout the day.  Our team will continue to see her as an outpatient to continue to down titrate opioids as clinically indicated.    Elian Fisher MD  Palliative Medicine Consult Team  Text me via [employer]  TT: 35 minutes, with > 50% spent in C/C/E patient/family/care teams re: GOC, POC, Sx management. 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 Most recent imaging in March 2025 showed no evidence of disease. She is being followed by CRS for rectovaginal fistula and coordinating surgery for takedown of loop ileostomy and creation of end colostomy. Follows palliative care outpatient for pain. Our team was consulted to help with pain and anxiety.   Seen today for: GYN cancer, anxiety, postop pain   Interval History:   Her pain reports continue to improve daily since we transition to an oral-oral regimen.    Medications managed by palliative  APAP 975 mg q6h earnest   Methadone 5mg BID   Dilaudid PCA now discontinued  Dilaudid 6 mg p.o. every 4 hours as needed, changed today to 4-6 mg every  "4 hours as needed.  Dilaudid IV rescue doses 0.6 mg have not been used in the last 24 hours; will discontinue    Other medications not managed by our team  BuSpar 30 mg twice daily  Does venlafaxine 150 mg p.o. x 1 daily  Lorazepam 1 mg twice daily  Methylphenidate 15 mg p.o. every morning  Seroquel 200 mg at bedtime  Ramelteon 8 mg at bedtime  Trazodone 300 mg at bedtime  Topiramate  Zofran 4 mg IV x1  Atarax 25 mg po x1  Narcan 0.2 mg IV x2    Patient/family narrative  Seen and examined at bedside.         Physical Exam:   Blood pressure 109/71, pulse 87, temperature 98.2  F (36.8  C), temperature source Oral, resp. rate 18, height 1.651 m (5' 5\"), weight 66.7 kg (147 lb), SpO2 93%.   General: Alert and interactive, recumbent in bed  Lungs: non labored work of breathing  CV: Regular radial pulse 84  GI:  surgical wounds dressed; I did not evaluate  Neuro: alert and oriented x4, fluent speech, moving all extremities        Data Reviewed:     ROUTINE ICU LABS (Last four results)  CMP  Recent Labs   Lab 06/06/25  0443 06/05/25  0603 06/04/25  0548 06/03/25  0357 06/02/25  0627    140 140 140 141   POTASSIUM 3.8 4.0 4.1 3.8 4.0   CHLORIDE 106 106 107 107 108*   CO2 23 25 24 25 23   ANIONGAP 11 9 9 8 10   GLC 93 106* 143* 100* 97   BUN 21.7* 23.3* 22.1* 18.7 23.6*   CR 1.06* 0.66 0.57 0.62 0.65   GFRESTIMATED 69 >90 >90 >90 >90   LORY 9.4 8.8 8.2* 8.5* 8.6*   MAG 1.9 2.2 1.9 1.6* 1.8   PHOS 6.0* 4.8* 4.1 3.7 4.7*   PROTTOTAL  --   --   --   --  5.1*   ALBUMIN  --   --   --   --  2.5*   BILITOTAL  --   --   --   --  0.2   ALKPHOS  --   --   --   --  56   AST  --   --   --   --  22   ALT  --   --   --   --  17     CBC  Recent Labs   Lab 06/06/25  0443 06/05/25  0603 06/04/25  0548 06/03/25  0357   WBC 4.3 3.2* 2.9* 3.6*   RBC 2.47* 2.30* 2.51* 2.44*   HGB 7.6* 7.1* 7.8* 7.7*   HCT 23.0* 21.5* 23.8* 23.3*   MCV 93 94 95 96   MCH 30.8 30.9 31.1 31.6   MCHC 33.0 33.0 32.8 33.0   RDW 12.9 12.8 12.7 12.9    157 " 156 146*     INR  No lab results found in last 7 days.    Arterial Blood Gas  No lab results found in last 7 days.

## 2025-06-06 NOTE — PROGRESS NOTES
Gynecology Oncology Progress Note      HD#18 peristomal skin infection, decreased PO intake, POD#9 s/p XL-interspincteric proctectomy, loop ileostomy takedown and creation of end colostomy, takedown and repair of rectovaginal fistula, appendectomy, cystoscopy with bilateral ureteral stent insertion and removal      Disease:  stage IIIb low grade peritoneal carcinoma      24 hour events:   - increased PO intake, TPN discontinued   - waite removed, straight catheterization back to pts baseline   - gas in ostomy, no stool***    Subjective: ***    Objective:   Vitals:    06/05/25 1020 06/05/25 1221 06/05/25 1629 06/05/25 2017   BP: 100/63 110/69 97/61 94/59   BP Location: Left arm Left arm Left arm Left arm   Pulse: 90 95 79 78   Resp: 14 16 16 16   Temp: 98.2  F (36.8  C) 98  F (36.7  C) 97.8  F (36.6  C) 98.1  F (36.7  C)   TempSrc: Oral Oral Oral Oral   SpO2: 97% 96% 97% 97%   Weight:       Height:        ***  General: resting in bed, appears comfortable   CV: regular rate and rhythm, warm and well perfused   Resp: Breathing comfortably on room air  Abdomen: soft, diffusely tender to palpation, stapled incision intact, no surrounding erythema or drainage; previous AQUILINO drain site with overlying 4x4 which is clean and dry, ileostomy takedown site with exudate at base, no active drainage; colostomy with no output or gas     I/O last 3 completed shifts:  In: 1227 [P.O.:340]  Out: 1400 [Urine:1400]    Intake/Output (24 Hrs // Since [place])  PO: *** cc// NR  TPN: *** cc// NR  UOP: *** cc// NR  Stool: *** cc // NR    Assessment: Marizol Granados is a 40 year old female with stage IIIB low grade peritoneal carcinoma now s/p ileostomy takedown, creation of end colostomy with colorectal surgery, and repair of rectovaginal fistula admitted for post-op care. Now with return of bowel function; working on weaning dilaudid PCA.     # Stage IIIB low grade peritoneal carcinoma  # Rectovaginal fistula, repaired   # Neurogenic constipation  #  Postoperative state   - Diet: As per Colorectal surgery recommendations, currently regular diet, TPN discontinued yesterday given appropriate calorie coutns  - Activity: as per Colorectal recommendations: Perineal precautions: No direct pressure on perineum, ok for patient to lay on her side, limit sitting up on chair for prolonged periods of time, ambulate at least 4x daily; strongly encouraging ambulation given slow return to baseline functional status in postoperative period  - Wound care: Per CRS - Daily xeroform changes on rectum, daily packing changes on ostomy take down. Monitor for bleeding/discharge  - AQUILINO drain: removed after normal drain Cr 6/4   - Pain management: as per palliative care, see their daily note, also below  - Bladder: has urinary retention and performs intermittent straight catheterization. Waite removed POD#9 and pt now performing straight catheterization per her baseline     # Anorectal pain  # Cancer related fatigue   # Postoperative pain   Followed by palliative outpatient with Dr. Norton, last seen 4/24; inpatient team consulted and helping with inpatient/acute postoperative pain management. Now s/p dilaudid PCA, transitioning to goal completely PO regimen.  - Currently on Methadone, PO dilaudid 6 mg q4h prn, PRN IV dilaudid 0.6 mg up to 2x per 24h, Tylenol. Gabapentin and robaxin discontinued.  - appreciate palliative care team assistance with pain management   - PTA Ritalin for fatigue, pain management as described   - Prior to admission: Dilaudid, Butrans patch, gabapentin    # Urinary retention, neurogenic from prior surgery with urinary retention   Managed with Intermittent straight catheterization prior to admission. Waite in place since surgery. Indwelling waite catheter removed 6/5, pt performing ISC.   - continue waite catheter, monitor output (adequate last 24h)  - History of recurrent UTI last 2/25 with cipro, culture resulted with pansensitive staph aureus   - UA on  admission with +LE, +nitrites collected post CTX; urine culture klebsiella on admission   - s/p 6d ceftriaxone, discontinued prior to OR given no ongoing evidence of infection      # Hx DVT/PE - chronic anticoagulation  - PTA Eliquis- currently held; Lovenox ppx held 5/31 due to drop in hgb, restarted with improvement in counts.  - Ultimately plan for indefinite anti-coagulation on home Eliquis given long history of clotting. See Hematology note from 6/14/2024.   - continue therpaeutic lovenox      # MDD/SHIVAM  # Insomnia   # RLS   - PTA scheduled Buspar, Pristiq, Seroquel, ramelteon, trazodone, ambien continued  - PTA gabapentin held   - Avoid changing or holding any of these medications as much as possible  - continue outpatient follow up with psychiatry after discharge for medication management     # Hx migraines  - PTA Topamax, propranolol    Maribel Garcia MD  [facility]  Gynecologic Oncology, PGY3  06/05/2025 8:47 PM    Team pager: [phone]

## 2025-06-06 NOTE — PROGRESS NOTES
Brief GYN oncology note    To bedside for PM check in.     Marizol is doing okay, a bit down this evening. Frustrated as she very much would like to go home tomorrow for her son's 11th birthday; worried that lack of ostomy output will keep her here. Has been up ambulating today, tolerating PO without n/v, straight catheterized x2 since waite out, pain overall adequately controlled. On exam abdomen is soft, minimally distended, gas noted in ostomy, no stool. Continue POC overnight.     Maribel Garcia MD  OB/GYN PGY-3  6/5/2025 8:46 PM

## 2025-06-06 NOTE — PLAN OF CARE
Goal Outcome Evaluation:      Plan of Care Reviewed With: patient    Overall Patient Progress: improving    Outcome Evaluation: Assumed [id number]  Vitals: Soft BP, within parameters, on RA  Neuros: A&Ox4, able to make needs known  Cardiac: Soft BP, MD aware map goal of >55  IV: Port HL, PIV SL  Labs/Electrolytes: Review AM labs  Resp: WNL, denies SOB  Diet: Regular  GI: Colostomy, no output, minimal gas  : Straight caths done independently  Skin: No new concerns noted  Pain: Managed with PRN dilaudid and scheduled meds  Activity: SBA  Plan: Possible discharge 6/6, continue to follow POC, notify provider of any changes

## 2025-06-06 NOTE — PROGRESS NOTES
Calorie Count  Intake recorded for: 6/5  Total Kcals: 301 Total Protein: 11g  Kcals from Hospital Food: 301  Protein: 11g  Kcals from Outside Food (average):0 Protein: 0g  # Meals Ordered from Kitchen: 2 meals   # Meals Recorded: 1 meal - 100% peanut butter and jelly sandwich, strawberry kiwi water  # Supplements Recorded: 0

## 2025-06-06 NOTE — PLAN OF CARE
Physical Therapy Discharge Summary    Reason for therapy discharge:    All goals and outcomes met, no further needs identified.    Progress towards therapy goal(s). See goals on Care Plan in [facility] electronic health record for goal details.  Goals met    Therapy recommendation(s):    Continued therapy is recommended.  Rationale/Recommendations:  recommend continued PT services to progress pt's general strength and activity tolerance at discharge. Recommend continued daily walking program at discharge .

## 2025-06-07 NOTE — PLAN OF CARE
Occupational Therapy Discharge Summary    Reason for therapy discharge:    Discharged to home with home therapy.    Progress towards therapy goal(s). See goals on Care Plan in [facility] electronic health record for goal details.  Goals partially met.  Barriers to achieving goals:   discharge from facility.    Therapy recommendation(s):    Continued therapy is recommended.  Rationale/Recommendations:  to cont to progress funcitonal strength, endurance, safety and IND w/ ADL and mobility.

## 2025-06-09 DIAGNOSIS — Z09 HOSPITAL DISCHARGE FOLLOW-UP: ICD-10-CM

## 2025-06-09 NOTE — PROGRESS NOTES
Prior Authorization Approval    Medication: HYDROMORPHONE HCL 4 MG PO TABS  PA Initiated: 6/6/2025  PA Type: Quantity Limit    Insurance: [facility] - Phone [phone] Fax [phone]  [facility] Key / Reference #: [id number] / [id number]   Authorization Effective Dates: 3/9/2025 - 12/7/2025    Expected CoPay: $ 0.00  CoPay Card Eligible: No    Filling Pharmacy: [facility] - [place], [place] - [address] [place] [address]  Comments:  Qty Approved: 270 tabs per 30 days      Hansa Dowd  [facility] Liaison, M-CHRISTOFER  Ph: [phone]  Fax: [phone]  Available on Teams and Vocera

## 2025-06-10 ENCOUNTER — PATIENT OUTREACH (OUTPATIENT)
Dept: SURGERY | Facility: CLINIC | Age: 40
End: 2025-06-10

## 2025-06-10 ENCOUNTER — VIRTUAL VISIT (OUTPATIENT)
Dept: RADIATION ONCOLOGY | Facility: HOSPITAL | Age: 40
End: 2025-06-10
Attending: FAMILY MEDICINE
Payer: COMMERCIAL

## 2025-06-10 ENCOUNTER — PATIENT OUTREACH (OUTPATIENT)
Dept: CARE COORDINATION | Facility: CLINIC | Age: 40
End: 2025-06-10

## 2025-06-10 ENCOUNTER — PATIENT OUTREACH (OUTPATIENT)
Dept: ONCOLOGY | Facility: CLINIC | Age: 40
End: 2025-06-10

## 2025-06-10 VITALS — WEIGHT: 135 LBS | HEIGHT: 65 IN | BODY MASS INDEX: 22.49 KG/M2

## 2025-06-10 DIAGNOSIS — C56.9 PRIMARY LOW GRADE SEROUS ADENOCARCINOMA OF OVARY (H): ICD-10-CM

## 2025-06-10 DIAGNOSIS — F33.2 SEVERE EPISODE OF RECURRENT MAJOR DEPRESSIVE DISORDER, WITHOUT PSYCHOTIC FEATURES (H): ICD-10-CM

## 2025-06-10 DIAGNOSIS — Z51.5 PALLIATIVE CARE PATIENT: Primary | ICD-10-CM

## 2025-06-10 DIAGNOSIS — R53.0 NEOPLASTIC MALIGNANT RELATED FATIGUE: ICD-10-CM

## 2025-06-10 DIAGNOSIS — G89.18 ACUTE POST-OPERATIVE PAIN: ICD-10-CM

## 2025-06-10 DIAGNOSIS — C48.1 PRIMARY MALIGNANT NEOPLASM OF PELVIC PERITONEUM (H): ICD-10-CM

## 2025-06-10 RX ORDER — METHADONE HYDROCHLORIDE 5 MG/1
5 TABLET ORAL EVERY 12 HOURS
Qty: 60 TABLET | Refills: 0 | Status: SHIPPED | OUTPATIENT
Start: 2025-06-10

## 2025-06-10 ASSESSMENT — PAIN SCALES - GENERAL: PAINLEVEL_OUTOF10: MODERATE PAIN (5)

## 2025-06-10 NOTE — PROGRESS NOTES
Clinic Care Coordination Contact  Care Coordination Clinician Chart Review    Situation: Patient chart reviewed by care coordinator.    Background: Clinic Care Coordination Referral received from inpatient care team for transition handoff communication following hospital admission.    Assessment: Upon chart review, patient is not a candidate for Primary Care Clinic Care Coordination enrollment due to reason stated below:  Primary Care Clinic Care Coordination will defer follow-up outreach to Specialty Clinic Care Coordination team who are already closely following patient. Post hospital outreach attempt made by Oncology.     Plan/Recommendations: Clinic Care Coordination Referral/order cancelled. RN/SW CC will perform no further monitoring/outreaches at this time and will remain available as needed. If new needs arise, a new Care Coordination Referral may be placed.    Lee Bush [facility]  Clinic Care Coordinator  [facility]  [phone]  [email or web address]
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
## 2025-06-10 NOTE — NURSING NOTE
Current patient location: [address]  [facility] [address]    Is the patient currently in the state of [place]? YES    Visit mode: VIDEO    If the visit is dropped, the patient can be reconnected by:VIDEO VISIT: Text to cell phone:   Telephone Information:   Mobile [phone]       Will anyone else be joining the visit? NO  (If patient encounters technical issues they should call [phone])    Are changes needed to the allergy or medication list? No    Are refills needed on medications prescribed by this physician? NO    Rooming Documentation:  Questionnaire(s) not done per department protocol    Reason for visit: SOPHIE CISNEROS

## 2025-06-10 NOTE — PROGRESS NOTES
Pt s/p - exploratory laparotomy, intersphincteric proctectomy, takedown of loop ileostomy, creation of end colostomy, suprapubic catheter insertion/bilateral ureteral stent insertion with Dr. Carpenter on 5/28. Discharged on 6/6.     She is doing very well. Pain controlled, eating ok with no nausea/vomiting, having moderate stool output via colostomy. No pouching issues. Incision cdi, no signs of infection. Staples in place.    Appointment scheduled 6/13 with Coni for post op visit/staple removal. Will call if any further concerns in the meantime.

## 2025-06-10 NOTE — PATIENT INSTRUCTIONS
It was good to see you today, Marizol.    Here are the things we talked about:  Stop the methadone liquid.  I sent in a new prescription for methadone pills and use 1 by mouth twice day.  Keep using the dilaudid 1/2 to 1 whole 4 mg tablet every 4 hours as needed.    OK to use tylenol 1000 mg  by mouth three t imes a day routinely for 2 weeks and then as needed.  1000 mg = 2 Extra strength tablets or 3 regular strength tablets.    Someone from the team will reach out to schedule a follow up appointment in 2 months and call sooner, if needed.     How to get a hold of us:  For non-urgent matters, MyChart works best.    For more urgent matters, or if you prefer not to use MyChart, call our clinic nurse coordinator Christina JACOBO at [phone]    We have an on-call number for evenings and weekends. Please call this only if you are having uncontrolled symptoms or serious side effects from your medicines: [phone].     For refills, please give us a week (5 working days) notice. We don't always have providers available everyday to do refills. If you call the day you run out of your medicine, we may not be able to refill it in time, so call 5 days in advance!    Arik Norton MD MS FAAFP CAQHPM  [facility]  Office [phone]  Fax [phone]

## 2025-06-11 ENCOUNTER — TELEPHONE (OUTPATIENT)
Dept: PHARMACY | Facility: OTHER | Age: 40
End: 2025-06-11
Payer: COMMERCIAL

## 2025-06-11 ENCOUNTER — TELEPHONE (OUTPATIENT)
Dept: FAMILY MEDICINE | Facility: CLINIC | Age: 40
End: 2025-06-11
Payer: COMMERCIAL

## 2025-06-11 NOTE — TELEPHONE ENCOUNTER
Forms/Letter Request    Type of form/letter: OTHER:    [facility]  Summary of missed visits    Do we have the form/letter: Yes:     Who is the form from? Home care    Where did/will the form come from? form was faxed in    When is form/letter needed by:     How would you like the form/letter returned: Fax : [phone]  Augustina Gore Patient Representative

## 2025-06-11 NOTE — TELEPHONE ENCOUNTER
MTM referral from: Transitions of Care (recent hospital discharge, TCU discharge, or ED visit)    MTM referral outreach attempt #2 on June 11, 2025 at 9:40 AM      Outcome: Spoke with patient declined    Use [facility], [facility], eulalia discharged 6/6 for the carrier/Plan on the flowsheet          Lizy Coleman [facility]  -[facility]  [phone]

## 2025-06-11 NOTE — TELEPHONE ENCOUNTER
Form placed in Provider basket for review.  [facility]  Summary of missed visits    No signature necessary - form is FYI to Provider.    Helen Gomes

## 2025-06-12 ENCOUNTER — DOCUMENTATION ONLY (OUTPATIENT)
Dept: SURGERY | Facility: CLINIC | Age: 40
End: 2025-06-12
Payer: COMMERCIAL

## 2025-06-12 NOTE — PROGRESS NOTES
2nd Post-op appointment with Dr. Gerson Carpenter scheduled on 7/10/2025 at 12:30 PM.    Message sent to patient via Specified[employer].    Chiara sinha on 6/12/2025 at 4:45 PM

## 2025-06-13 ENCOUNTER — DOCUMENTATION ONLY (OUTPATIENT)
Dept: SURGERY | Facility: CLINIC | Age: 40
End: 2025-06-13

## 2025-06-16 ENCOUNTER — TELEPHONE (OUTPATIENT)
Dept: FAMILY MEDICINE | Facility: CLINIC | Age: 40
End: 2025-06-16
Payer: COMMERCIAL

## 2025-06-16 NOTE — TELEPHONE ENCOUNTER
Home Care is calling regarding an established patient with [facility].  Requesting orders from: Georgie Hernández  FYI: RN able to provide verbal orders.  Sending as FYI only.  Is this a request for a temporary pause in the home care episode?  No    Orders Requested    Skilled Nursing  Request for initial certification (first set of orders)   Frequency: 2 times a week for 3 weeks and then 1 time a week for 3 weeks   RN gave verbal order: Yes    Kia informed the writer that the patient refused PT and OT services. Kia states that they referred the patient to [facility] for social work.      Phone number Home Care can be reached at: [phone]  Okay to leave a detailed message?: Yes      Vickie Starks RN

## 2025-06-18 DIAGNOSIS — C48.1 PRIMARY MALIGNANT NEOPLASM OF PELVIC PERITONEUM (H): ICD-10-CM

## 2025-06-18 DIAGNOSIS — Z51.5 PALLIATIVE CARE PATIENT: ICD-10-CM

## 2025-06-18 DIAGNOSIS — G89.18 ACUTE POST-OPERATIVE PAIN: ICD-10-CM

## 2025-06-18 DIAGNOSIS — C56.9 PRIMARY LOW GRADE SEROUS ADENOCARCINOMA OF OVARY (H): ICD-10-CM

## 2025-06-18 RX ORDER — METHADONE HYDROCHLORIDE 5 MG/1
5 TABLET ORAL EVERY 12 HOURS
Qty: 60 TABLET | Refills: 0 | Status: CANCELLED | OUTPATIENT
Start: 2025-06-18

## 2025-06-18 NOTE — TELEPHONE ENCOUNTER
Resending methadone prescription to [facility] due to it accidentally being deleted in their system.    CHAVA EwingN, RN  Palliative Care Nurse Clinician    [phone] (Direct)  [phone] (Main)  [phone] (Appointment Scheduling)

## 2025-06-18 NOTE — TELEPHONE ENCOUNTER
Cancelling methadone order, already sent.    CHAVA EwingN, RN  Palliative Care Nurse Clinician    [phone] (Direct)  [phone] (Main)  [phone] (Appointment Scheduling)

## 2025-06-19 ENCOUNTER — TUMOR CONFERENCE (OUTPATIENT)
Dept: ONCOLOGY | Facility: CLINIC | Age: 40
End: 2025-06-19
Payer: COMMERCIAL

## 2025-06-19 NOTE — TUMOR CONFERENCE
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 Group consensus is that offering observation until macroscopic disease is noted, or offering hormone therapy again are both reasonable options.          This abstract and interpretation of the conversation at tumor board has been completed by Sara Alva. These are the general recommendations/discussion provided at tumor board conference. The definitive recommendation/discussion will be made by the primary health care team and patient after full discussion as appropriate.

## 2025-06-20 ENCOUNTER — MEDICAL CORRESPONDENCE (OUTPATIENT)
Dept: HEALTH INFORMATION MANAGEMENT | Facility: CLINIC | Age: 40
End: 2025-06-20

## 2025-06-23 ENCOUNTER — TELEPHONE (OUTPATIENT)
Dept: FAMILY MEDICINE | Facility: CLINIC | Age: 40
End: 2025-06-23
Payer: COMMERCIAL

## 2025-06-23 NOTE — TELEPHONE ENCOUNTER
Forms/Letter Request    Type of form/letter: OTHER: Home Health Care Certification and POC        Do we have the form/letter: Yes:  basket    Who is the form from? [facility]     Where did/will the form come from? form was faxed in    When is form/letter needed by: ASAP    How would you like the form/letter returned: Fax : [phone]    Natasha Jimenez Patient Rep.

## 2025-06-23 NOTE — TELEPHONE ENCOUNTER
Form placed in Provider basket for review and signature.    Form:   [facility]   Home Health Care Certification and POC   Fax : [phone]     Helen Gomes

## 2025-06-24 ENCOUNTER — TELEPHONE (OUTPATIENT)
Dept: SURGERY | Facility: CLINIC | Age: 40
End: 2025-06-24
Payer: COMMERCIAL

## 2025-06-24 NOTE — TELEPHONE ENCOUNTER
Form signed by Provider and faxed to:    Form:   [facility]   Home Health Care Certification and POC   Fax : [phone]      Helen Gomes

## 2025-06-24 NOTE — TELEPHONE ENCOUNTER
Patient confirmed scheduled appointment:  Date: 7/17/25  Time: 200 pm   Visit type: Post Op   Provider:    Location: [facility]  Testing/imaging: n/a  Additional notes: Pt rescheduled from 7/10/2025
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
## 2025-07-07 ENCOUNTER — PATIENT OUTREACH (OUTPATIENT)
Dept: ONCOLOGY | Facility: CLINIC | Age: 40
End: 2025-07-07
Payer: COMMERCIAL
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
## 2025-07-11 DIAGNOSIS — Z98.84 S/P GASTRIC BYPASS: ICD-10-CM

## 2025-07-11 DIAGNOSIS — R10.13 EPIGASTRIC DISCOMFORT: ICD-10-CM

## 2025-07-13 RX ORDER — OMEPRAZOLE 40 MG/1
40 CAPSULE, DELAYED RELEASE ORAL DAILY
Qty: 90 CAPSULE | Refills: 3 | Status: SHIPPED | OUTPATIENT
Start: 2025-07-13

## 2025-07-14 ENCOUNTER — TELEPHONE (OUTPATIENT)
Dept: HEMATOLOGY | Facility: CLINIC | Age: 40
End: 2025-07-14
Payer: COMMERCIAL

## 2025-07-14 ENCOUNTER — MYC MEDICAL ADVICE (OUTPATIENT)
Dept: FAMILY MEDICINE | Facility: CLINIC | Age: 40
End: 2025-07-14
Payer: COMMERCIAL

## 2025-07-14 ENCOUNTER — TELEPHONE (OUTPATIENT)
Dept: FAMILY MEDICINE | Facility: CLINIC | Age: 40
End: 2025-07-14
Payer: COMMERCIAL

## 2025-07-14 NOTE — TELEPHONE ENCOUNTER
Form placed in Provider basket for review.  No signature required.  For Provider review.    Helen Gomes

## 2025-07-14 NOTE — TELEPHONE ENCOUNTER
Forms/Letter Request    Type of form/letter: OTHER:    [facility]  Missed Visits form    Do we have the form/letter: Yes:     Who is the form from? Home care    Where did/will the form come from? form was faxed in    When is form/letter needed by:     How would you like the form/letter returned: Fax : [phone]    Augustina Gore Patient Representative

## 2025-07-15 NOTE — TELEPHONE ENCOUNTER
Form signed by Provider and faxed to:  Form:  [facility]  Missed Visit Report  Fax # [phone]    Helen Gomes

## 2025-07-17 ENCOUNTER — OFFICE VISIT (OUTPATIENT)
Dept: SURGERY | Facility: CLINIC | Age: 40
End: 2025-07-17
Payer: COMMERCIAL

## 2025-07-17 VITALS
HEART RATE: 89 BPM | OXYGEN SATURATION: 99 % | DIASTOLIC BLOOD PRESSURE: 65 MMHG | BODY MASS INDEX: 22.47 KG/M2 | SYSTOLIC BLOOD PRESSURE: 97 MMHG | HEIGHT: 65 IN

## 2025-07-17 DIAGNOSIS — K91.89 LEAK OF ANASTOMOSIS BETWEEN GASTROINTESTINAL STRUCTURES: Primary | ICD-10-CM

## 2025-07-17 ASSESSMENT — PAIN SCALES - GENERAL: PAINLEVEL_OUTOF10: MODERATE PAIN (4)
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
## 2025-07-17 NOTE — NURSING NOTE
"Chief Complaint   Patient presents with    Post-op Visit       Vitals:    07/17/25 1348   BP: 97/65   BP Location: Right arm   Patient Position: Sitting   Cuff Size: Adult Regular   Pulse: 89   SpO2: 99%   Height: 5' 5\"       Body mass index is 22.47 kg/m .    Carin Mobley CMA    "

## 2025-07-28 DIAGNOSIS — C48.1 PRIMARY MALIGNANT NEOPLASM OF PELVIC PERITONEUM (H): ICD-10-CM

## 2025-07-28 DIAGNOSIS — C56.9 PRIMARY LOW GRADE SEROUS ADENOCARCINOMA OF OVARY (H): ICD-10-CM

## 2025-07-28 DIAGNOSIS — F33.2 SEVERE EPISODE OF RECURRENT MAJOR DEPRESSIVE DISORDER, WITHOUT PSYCHOTIC FEATURES (H): ICD-10-CM

## 2025-07-28 DIAGNOSIS — G89.18 ACUTE POST-OPERATIVE PAIN: ICD-10-CM

## 2025-07-28 DIAGNOSIS — Z51.5 PALLIATIVE CARE PATIENT: ICD-10-CM

## 2025-07-28 DIAGNOSIS — R53.0 NEOPLASTIC MALIGNANT RELATED FATIGUE: ICD-10-CM

## 2025-07-28 RX ORDER — METHYLPHENIDATE HYDROCHLORIDE 5 MG/1
TABLET ORAL
Qty: 150 TABLET | Refills: 0 | Status: SHIPPED | OUTPATIENT
Start: 2025-07-28

## 2025-07-28 RX ORDER — METHADONE HYDROCHLORIDE 5 MG/1
5 TABLET ORAL EVERY 12 HOURS
Qty: 60 TABLET | Refills: 0 | Status: SHIPPED | OUTPATIENT
Start: 2025-07-28
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
## 2025-07-28 NOTE — TELEPHONE ENCOUNTER
Received Simalayat message from patient requesting refill of methadone and methylphenidate.     Last refill of methadone: 6/18/25  Last refill of methylphenidate: 5/18/25  Last office visit: 6/10/25  Scheduled for follow up per check out request, asked pt to call and schedule     Will route request to MD/DO for review.     Reviewed [place]  Report.

## 2025-07-30 ENCOUNTER — VIRTUAL VISIT (OUTPATIENT)
Dept: ONCOLOGY | Facility: CLINIC | Age: 40
End: 2025-07-30
Attending: OBSTETRICS & GYNECOLOGY
Payer: COMMERCIAL

## 2025-07-30 ENCOUNTER — TELEPHONE (OUTPATIENT)
Dept: WOUND CARE | Facility: CLINIC | Age: 40
End: 2025-07-30
Payer: COMMERCIAL

## 2025-07-30 VITALS — BODY MASS INDEX: 22.49 KG/M2 | WEIGHT: 135 LBS | HEIGHT: 65 IN

## 2025-07-30 DIAGNOSIS — N30.01 ACUTE CYSTITIS WITH HEMATURIA: ICD-10-CM

## 2025-07-30 DIAGNOSIS — C48.2 PRIMARY PERITONEAL ADENOCARCINOMA (H): Primary | ICD-10-CM

## 2025-07-30 PROCEDURE — 98007 SYNCH AUDIO-VIDEO EST HI 40: CPT | Performed by: OBSTETRICS & GYNECOLOGY

## 2025-07-30 ASSESSMENT — PAIN SCALES - GENERAL: PAINLEVEL_OUTOF10: NO PAIN (0)

## 2025-07-30 NOTE — TELEPHONE ENCOUNTER
Left Voicemail (1st Attempt) and Sent Mychart (1st Attempt) for the patient to call back and schedule the following:    Appointment type: Ret Ostomy Nurse   Provider: Janette Wells RN   Return date: Next Available   Specialty phone number: [phone]  Additional appointment(s) needed: n/a  Additonal Notes: follow up per pt
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
## 2025-07-30 NOTE — PATIENT INSTRUCTIONS
Continue seeing WOC PRN     We will cancel scheduled follow up in August     In September   CT scan and and labs a few days prior to in-person visit with Dr Sosa.

## 2025-07-30 NOTE — NURSING NOTE
Current patient location: [facility] [address]  On license of [facility] [id number]    Is the patient currently in the state of [place]? YES    Visit mode: VIDEO    If the visit is dropped, the patient can be reconnected by:VIDEO VISIT: Send to e-mail at: [email or web address]    Will anyone else be joining the visit? NO  (If patient encounters technical issues they should call [phone])    Are changes needed to the allergy or medication list? Pt stated no changes to allergies and Pt stated no med changes    Are refills needed on medications prescribed by this physician? NO    Rooming Documentation:  Questionnaire(s) completed    Reason for visit: SOPHIE CISNEROS

## 2025-07-31 DIAGNOSIS — G89.3 CANCER ASSOCIATED PAIN: Primary | ICD-10-CM

## 2025-07-31 DIAGNOSIS — C56.9 PRIMARY LOW GRADE SEROUS ADENOCARCINOMA OF OVARY (H): ICD-10-CM

## 2025-09-02 ENCOUNTER — ANCILLARY PROCEDURE (OUTPATIENT)
Dept: CT IMAGING | Facility: CLINIC | Age: 40
End: 2025-09-02
Attending: OBSTETRICS & GYNECOLOGY
Payer: COMMERCIAL

## 2025-09-02 ENCOUNTER — LAB (OUTPATIENT)
Dept: LAB | Facility: CLINIC | Age: 40
End: 2025-09-02
Attending: OBSTETRICS & GYNECOLOGY
Payer: COMMERCIAL

## 2025-09-02 DIAGNOSIS — C48.2 PRIMARY PERITONEAL ADENOCARCINOMA (H): ICD-10-CM

## 2025-09-02 DIAGNOSIS — N30.01 ACUTE CYSTITIS WITH HEMATURIA: ICD-10-CM

## 2025-09-02 LAB
ALBUMIN SERPL BCG-MCNC: 3.9 G/DL (ref 3.5–5.2)
ALP SERPL-CCNC: 70 U/L (ref 40–150)
ALT SERPL W P-5'-P-CCNC: 19 U/L (ref 0–50)
ANION GAP SERPL CALCULATED.3IONS-SCNC: 14 MMOL/L (ref 7–15)
AST SERPL W P-5'-P-CCNC: 24 U/L (ref 0–45)
BILIRUB SERPL-MCNC: <0.2 MG/DL
BUN SERPL-MCNC: 19.7 MG/DL (ref 6–20)
CALCIUM SERPL-MCNC: 9.4 MG/DL (ref 8.8–10.4)
CANCER AG125 SERPL-ACNC: 9 U/ML
CHLORIDE SERPL-SCNC: 106 MMOL/L (ref 98–107)
CREAT BLD-MCNC: 1.2 MG/DL (ref 0.5–1)
CREAT SERPL-MCNC: 0.99 MG/DL (ref 0.51–0.95)
EGFRCR SERPLBLD CKD-EPI 2021: 59 ML/MIN/1.73M2
EGFRCR SERPLBLD CKD-EPI 2021: 74 ML/MIN/1.73M2
ERYTHROCYTE [DISTWIDTH] IN BLOOD BY AUTOMATED COUNT: 14.3 % (ref 10–15)
GLUCOSE SERPL-MCNC: 161 MG/DL (ref 70–99)
HCO3 SERPL-SCNC: 22 MMOL/L (ref 22–29)
HCT VFR BLD AUTO: 27.9 % (ref 35–47)
HGB BLD-MCNC: 9 G/DL (ref 11.7–15.7)
MCH RBC QN AUTO: 30.5 PG (ref 26.5–33)
MCHC RBC AUTO-ENTMCNC: 32.3 G/DL (ref 31.5–36.5)
MCV RBC AUTO: 94.6 FL (ref 78–100)
PLATELET # BLD AUTO: 163 10E3/UL (ref 150–450)
POTASSIUM SERPL-SCNC: 4 MMOL/L (ref 3.4–5.3)
PROT SERPL-MCNC: 6.6 G/DL (ref 6.4–8.3)
RBC # BLD AUTO: 2.95 10E6/UL (ref 3.8–5.2)
SODIUM SERPL-SCNC: 142 MMOL/L (ref 135–145)
WBC # BLD AUTO: 3.63 10E3/UL (ref 4–11)

## 2025-09-02 PROCEDURE — 71260 CT THORAX DX C+: CPT | Performed by: STUDENT IN AN ORGANIZED HEALTH CARE EDUCATION/TRAINING PROGRAM

## 2025-09-02 PROCEDURE — 36591 DRAW BLOOD OFF VENOUS DEVICE: CPT

## 2025-09-02 PROCEDURE — 250N000011 HC RX IP 250 OP 636: Performed by: OBSTETRICS & GYNECOLOGY

## 2025-09-02 PROCEDURE — 85014 HEMATOCRIT: CPT

## 2025-09-02 PROCEDURE — 82247 BILIRUBIN TOTAL: CPT

## 2025-09-02 PROCEDURE — 86304 IMMUNOASSAY TUMOR CA 125: CPT

## 2025-09-02 PROCEDURE — 74177 CT ABD & PELVIS W/CONTRAST: CPT | Performed by: STUDENT IN AN ORGANIZED HEALTH CARE EDUCATION/TRAINING PROGRAM

## 2025-09-02 RX ORDER — HEPARIN SODIUM (PORCINE) LOCK FLUSH IV SOLN 100 UNIT/ML 100 UNIT/ML
5 SOLUTION INTRAVENOUS ONCE
Status: COMPLETED | OUTPATIENT
Start: 2025-09-02 | End: 2025-09-02

## 2025-09-02 RX ORDER — IOPAMIDOL 755 MG/ML
76 INJECTION, SOLUTION INTRAVASCULAR ONCE
Status: COMPLETED | OUTPATIENT
Start: 2025-09-02 | End: 2025-09-02

## 2025-09-02 RX ORDER — HEPARIN SODIUM (PORCINE) LOCK FLUSH IV SOLN 100 UNIT/ML 100 UNIT/ML
500 SOLUTION INTRAVENOUS ONCE
Status: COMPLETED | OUTPATIENT
Start: 2025-09-02 | End: 2025-09-02

## 2025-09-02 RX ADMIN — IOPAMIDOL 76 ML: 755 INJECTION, SOLUTION INTRAVASCULAR at 16:13

## 2025-09-02 RX ADMIN — HEPARIN SODIUM (PORCINE) LOCK FLUSH IV SOLN 100 UNIT/ML 500 UNITS: 100 SOLUTION at 16:23

## 2025-09-02 RX ADMIN — Medication 5 ML: at 15:44

## (undated) DEVICE — Device

## (undated) DEVICE — SU SILK 4-0 TIE 12X30" A303H

## (undated) DEVICE — SU PDS II 2-0 SH 27" Z317H

## (undated) DEVICE — STPL LINEAR CUT 75MM TLC75

## (undated) DEVICE — CLOSURE SYS SKIN PREMIERPRO EXOFINFUSION 4X60CM 3473

## (undated) DEVICE — TUBING IRRIG CYSTO/BLADDER SET 81" LF 2C4040

## (undated) DEVICE — SU PDS II 0 TP-1 60" Z991G

## (undated) DEVICE — LINEN TOWEL PACK X30 5481

## (undated) DEVICE — NDL COUNTER 20CT 31142493

## (undated) DEVICE — BINDER ABDOMINAL 3PANEL LG 45IN 08140345

## (undated) DEVICE — SYR 10ML FINGER CONTROL W/O NDL 309695

## (undated) DEVICE — DRAIN RESERVOIR 100ML JP 0070740

## (undated) DEVICE — CLIP HORIZON LG ORANGE 004200

## (undated) DEVICE — BLADE CLIPPER SGL USE 9680

## (undated) DEVICE — DRSG ABD TNDRSRB WET PRUF 8IN X 10IN STRL  9194A

## (undated) DEVICE — DRSG WOUND VAC SPONGE MED BLACK M8275052/5

## (undated) DEVICE — SUCTION MANIFOLD NEPTUNE 2 SYS 1 PORT 702-025-000

## (undated) DEVICE — WIRE GUIDE 3.2X400MM  357.399

## (undated) DEVICE — ENDO TUBING CO2 SMARTCAP STERILE DISP 100145CO2EXT

## (undated) DEVICE — CATH FOLEY COUNCIL 16FR 5ML LATEX 0196SI16

## (undated) DEVICE — DRSG GAUZE 4X4" TRAY 6939

## (undated) DEVICE — HOLDER DRAINAGE BULB 0814-8220

## (undated) DEVICE — SURGICEL POWDER ABSORBABLE HEMOSTAT 3GM 3013SP

## (undated) DEVICE — TUBING SUCTION SIGMOIDOSCOPIC 18FR 6FT

## (undated) DEVICE — DRAPE SHEET REV FOLD 3/4 9349

## (undated) DEVICE — GUIDEWIRE SENSOR DUAL FLEX STR 0.035"X150CM M0066703080

## (undated) DEVICE — SUTURE VICRYL+ 0 27IN CT-1 UND VCP260H

## (undated) DEVICE — DRESSING XEROFORM PETROLATUM 5X9 33605

## (undated) DEVICE — GLOVE BIOGEL PI ULTRATOUCH SZ 6.5 41165

## (undated) DEVICE — TAPE CLOTH ADHESIVE 3" LATEX 3554C

## (undated) DEVICE — STPL ECHELON CONTOUR CUTTER CVD 40MM GREEN GCS40G

## (undated) DEVICE — DECANTER BAG 2002S

## (undated) DEVICE — STPL SKIN 35W ROTATING HEAD PRW35

## (undated) DEVICE — BLADE KNIFE SURG 10 371110

## (undated) DEVICE — SU SILK 2-0 TIE 12X30" A305H

## (undated) DEVICE — TUBING INFUSION INFILTRATION LIPOSUCTION 156" 24-6008

## (undated) DEVICE — SU VICRYL 4-0 P-3 18" UND  J494H

## (undated) DEVICE — SWAB PROCTO 16" 2/PK 32-046

## (undated) DEVICE — GOWN IMPERVIOUS BREATHABLE SMART XLG 89045

## (undated) DEVICE — STPL SKIN 35W 6.9MM  PXW35

## (undated) DEVICE — SU VICRYL 2-0 TIE 12X18" J905T

## (undated) DEVICE — SUCTION TIP YANKAUER W/O VENT K86

## (undated) DEVICE — DRSG TEGADERM 4X4 3/4" 1626W

## (undated) DEVICE — GLOVE BIOGEL PI ULTRATOUCH G SZ 8.0 42180

## (undated) DEVICE — NDL BLUNT 18GA 1" W/O FILTER 305181

## (undated) DEVICE — SYR 50ML LL W/O NDL 309653

## (undated) DEVICE — LINEN TOWEL PACK X6 WHITE 5487

## (undated) DEVICE — SU VICRYL 3-0 SH 8X18" UND J864D

## (undated) DEVICE — KIT ENDO FIRST STEP DISINFECTANT 200ML W/POUCH EP-4

## (undated) DEVICE — SUCTION MANIFOLD NEPTUNE 2 SYS 4 PORT 0702-020-000

## (undated) DEVICE — CATH URETERAL OPEN END 05FR 70CM 020015

## (undated) DEVICE — SPONGE LAP 18X18" X8435

## (undated) DEVICE — ESU ELEC BLADE HEX-LOCKING 2.5" E1450X

## (undated) DEVICE — GLOVE BIOGEL PI MICRO INDICATOR UNDERGLOVE SZ 7.0 48970

## (undated) DEVICE — KIT NEW IMAGE COLOSTOMY/ILEOSTOMY 2 3/4" LF 19354

## (undated) DEVICE — SU VICRYL+ 3-0 27IN SH UND VCP416H

## (undated) DEVICE — KIT INTRODUCER FLUENT MICRO 5FRX10CM ECHO TIP KIT-038-04

## (undated) DEVICE — BLANKET BAIR HUGGER UNDERBODY 36X84 AU63500

## (undated) DEVICE — DRSG KERLIX 4 1/2"X4YDS ROLL 6715

## (undated) DEVICE — SOL NACL 0.9% IRRIG 1000ML BOTTLE 2F7124

## (undated) DEVICE — JELLY LUBRICATING SURGILUBE 2OZ TUBE

## (undated) DEVICE — SOL NACL 0.9% IRRIG 3000ML BAG 2B7127

## (undated) DEVICE — LINEN GOWN XLG 5407

## (undated) DEVICE — CATH FOLEY 3WAY 16FR 30ML SIL 73016SI

## (undated) DEVICE — DRAIN JACKSON PRATT ROUND SIL 19FR W/TROCAR LF JP-2232

## (undated) DEVICE — DRAPE STERI U 1015

## (undated) DEVICE — DRSG XEROFORM 5X9" CUR253590W

## (undated) DEVICE — KIT ENDO TURNOVER/PROCEDURE W/CLEAN A SCOPE LINERS 103888

## (undated) DEVICE — SYR BULB IRRIG DOVER 60 ML LATEX FREE 67000

## (undated) DEVICE — ENDO SYSTEM WATER BOTTLE & TUBING W/CO2 FILTER 00711549

## (undated) DEVICE — BASIN EMESIS STERILE  SSK9005A

## (undated) DEVICE — NDL ANGIOCATH 14GA 1.25" 4048

## (undated) DEVICE — OSTOMY STOMADHESIVE 2OZ 79300

## (undated) DEVICE — LEGGINGS CLEAR TELESCOPIC XLONG 55X30.75IN 6IN 8430

## (undated) DEVICE — PACK CYSTO UMMC CUSTOM

## (undated) DEVICE — GLOVE BIOGEL INDICATOR 7.5 LF 41675

## (undated) DEVICE — SU VICRYL 0 CT-2 27" J334H

## (undated) DEVICE — DRAPE POUCH INSTRUMENT 1018

## (undated) DEVICE — PAD CHUX UNDERPAD 23X36" 676105

## (undated) DEVICE — WIPE PREMOIST CLEANSING WASHCLOTHS 7988

## (undated) DEVICE — SU PDS II 4-0 SH 27" Z315H

## (undated) DEVICE — ESU LIGASURE IMPACT OPEN SEALER/DVDR CVD LG JAW LF4418

## (undated) DEVICE — SU SILK 3-0 TIE 12X30" A304H

## (undated) DEVICE — SU PDS II 0 CTX 36" Z370T

## (undated) DEVICE — SU VICRYL 0 CT-1 27" UND J260H

## (undated) DEVICE — ESU ELEC BLADE E-SEP INSULATED NEPTUNE 165MM 0703-165-002

## (undated) DEVICE — ESU PENCIL SMOKE EVAC W/ROCKER SWITCH 0703-047-000

## (undated) DEVICE — SU PDS II 3-0 SH 27" Z316H

## (undated) DEVICE — CATH TRAY FOLEY SURESTEP 16FR DRAIN BAG STATOCK A899916

## (undated) DEVICE — DRAPE LEGGINGS CLEAR 8430

## (undated) DEVICE — GOWN XLG DISP 9545

## (undated) DEVICE — LINEN TOWEL PACK X5 5464

## (undated) DEVICE — DRSG XEROFORM 1X8"

## (undated) DEVICE — CATH TRAY FOLEY SURESTEP 16FR W/DRAIN BAG LF A300416A

## (undated) DEVICE — SU SILK 3-0 SH CR 8X18" C013D

## (undated) DEVICE — DRAIN PENROSE 1/4"X18" LATEX  30416-025

## (undated) DEVICE — TUBING SUCTION 10'X3/16" N510

## (undated) DEVICE — SU PDS II 0 CT-1 27" Z340H

## (undated) DEVICE — SYR 10ML LL W/O NDL 302995

## (undated) DEVICE — CUSTOM PACK GEN MAJOR SBA5BGMHEA

## (undated) DEVICE — SU VICRYL 2-0 CT-2 27" J333H

## (undated) DEVICE — PACK AB HYST II

## (undated) DEVICE — SPONGE RAY-TEC 4X8" 7318

## (undated) DEVICE — SU MONOCRYL 3-0 PS-1 27" Y936H

## (undated) DEVICE — KNIFE HANDLE W/15 BLADE 371615

## (undated) DEVICE — COVER CAMERA IN-LIGHT DISP LT-C02

## (undated) DEVICE — SYR 30ML LL W/O NDL 302832

## (undated) DEVICE — SUTURE VICRYL+ 2-0 27IN CT-1 UND VCP259H

## (undated) DEVICE — SU SILK 2-0 SH 30" K833H

## (undated) DEVICE — GLOVE BIOGEL PI ULTRATOUCH G SZ 6.5 42165

## (undated) DEVICE — PAD ABD 10X8IN DERMACEA ABS NWVN 4 SL EDG SFT LF STRL 7198D

## (undated) DEVICE — TUBING SUCTION MEDI-VAC 1/4"X20' N620A

## (undated) DEVICE — GLOVE UNDER INDICATOR PI SZ 7.0 LF 41670

## (undated) DEVICE — SOL WATER IRRIG 1000ML BOTTLE 2F7114

## (undated) DEVICE — TUBING SMOKE EVACUATOR 7/8"X10' W/WAND 0700-026-000

## (undated) DEVICE — DRSG DRAIN 4X4" 7086

## (undated) DEVICE — SU STRATAFIX MONOCRYL 4-0 SPIRAL PS-2 SXMP1B118

## (undated) DEVICE — SYR BULB IRRIG 50ML LATEX FREE 0035280

## (undated) DEVICE — ESU ELEC BLADE 6" COATED E1450-6

## (undated) DEVICE — SU SILK 0 TIE 6X30" A306H

## (undated) DEVICE — SU PDS II 1 TP-1 54" Z879G

## (undated) DEVICE — PACK CENTRAL LINE INSERTION SAN32CLFCG

## (undated) DEVICE — LABEL MEDICATION SYSTEM 3303-P

## (undated) DEVICE — SU SILK 1 TIE 6X30" A307H

## (undated) DEVICE — SU SILK 2-0 FS-1 18" 685G

## (undated) DEVICE — COVER LIGHT HANDLE MIS

## (undated) DEVICE — SU VICRYL+ 0 54 UNDYED VCP608H

## (undated) DEVICE — GLOVE BIOGEL PI MICRO SZ 6.5 48565

## (undated) DEVICE — STPL LINEAR 90 X 3.5MM TA9035S

## (undated) DEVICE — PREP CHLORAPREP 26ML TINTED HI-LITE ORANGE 930815

## (undated) DEVICE — MINOR SINGLE BASIN KIT CSR WRAPX2 7QT SSK3002

## (undated) DEVICE — SU ETHILON 2-0 FS 18" 664H

## (undated) DEVICE — PACK MINOR SINGLE BASIN SSK3001

## (undated) DEVICE — BINDER ABD 12IN 4 PNL ELC CNTCT CLSR PRCR 62-74IN 79-89220

## (undated) DEVICE — KIT CONNECTOR FOR OLYMPUS ENDOSCOPES DEFENDO 100310

## (undated) DEVICE — DRAPE UNDER BUTTOCK W/POUCH 8487

## (undated) DEVICE — ENDO SEAL BX PORT BPS-A

## (undated) DEVICE — KIT ENDO TURNOVER/PROCEDURE CARRY-ON 101822

## (undated) DEVICE — DRAPE IOBAN INCISE 23X17" 6650EZ

## (undated) DEVICE — SYR PISTON IRRIGATION 60 ML DYND20325

## (undated) DEVICE — BLADE KNIFE SURG 15 371115

## (undated) DEVICE — SU VICRYL 2-0 TIE 54" J615H

## (undated) DEVICE — DRAPE POUCH IRR 1016

## (undated) DEVICE — PITCHER STERILE 1000ML  SSK9004A

## (undated) DEVICE — ENDO CAP AND TUBING STERILE FOR ENDOGATOR  100130

## (undated) DEVICE — GOWN LG DISP 9515

## (undated) DEVICE — SUCTION SLEEVE NEPTUNE 2 125MM 0703-005-125

## (undated) DEVICE — VESSEL LOOPS YELLOW MAXI 31145694

## (undated) DEVICE — GLOVE BIOGEL PI MICRO SZ 7.5 48575

## (undated) DEVICE — SU MONOCRYL 3-0 SH 27" UND Y416H

## (undated) DEVICE — SOL NACL 0.9% 10ML VIAL 0409-4888-02

## (undated) DEVICE — SOL WATER IRRIG 3000ML BAG 2B7117

## (undated) DEVICE — DRSG TELFA 3X8" 1238

## (undated) DEVICE — SURGICEL ABSORBABLE HEMOSTAT SNOW 4"X4" 2083

## (undated) DEVICE — SOL WATER IRRIG 500ML BOTTLE 2F7113

## (undated) DEVICE — ADH LIQUID MASTISOL TOPICAL VIAL 2-3ML 0523-48

## (undated) DEVICE — RETR ELASTIC STAYS LONE STAR SHARP 5MM 8/PACK 3311-8G

## (undated) DEVICE — CLEANER CAUTERY TIP V8101

## (undated) DEVICE — SOL NACL 0.9% INJ 1000ML BAG 2B1324X

## (undated) DEVICE — DRAPE GYN/UROLOGY FLUID POUCH TUR 29455

## (undated) DEVICE — MARKER SKIN DOUBLE TIP W/FLEXI-RULER W/LABELS

## (undated) DEVICE — ESU GROUND PAD ADULT W/CORD E7507

## (undated) DEVICE — STPL RELOAD LINEAR CUT 75MM TCR75

## (undated) DEVICE — SU MONOCRYL 4-0 P-3 18" UND Y494G

## (undated) DEVICE — DRSG TEGADERM 4X10" 1627

## (undated) DEVICE — SUTURE MONOCRYL+ 5-0 18IN P3 UND MCP493G

## (undated) DEVICE — SUTURE MONOCRYL+ 4-0 PS-2 27IN MCP426H

## (undated) DEVICE — RETR RING LONE STAR 14.1X14.1CM 3307G

## (undated) DEVICE — PACK RECTAL UMMC

## (undated) DEVICE — PAD CHUX UNDERPAD 30X36" P3036C

## (undated) DEVICE — PAD CHUX UNDERPAD 23X24" 7136

## (undated) DEVICE — DRSG PRIMAPORE 02X3" 7133

## (undated) DEVICE — SU PROLENE 1 CTX 30" 8455H

## (undated) DEVICE — VESSEL LOOPS BLUE SUPERMAXI 011022PBX

## (undated) DEVICE — TUBING IRR TUR Y TYPE 2C4041

## (undated) DEVICE — DRAIN BLAKE 19FR SIL 2231

## (undated) DEVICE — GLOVE BIOGEL PI INDICATOR 8.0 LF 41680

## (undated) DEVICE — GLOVE GAMMEX NEOPRENE ULTRA SZ 7.5 LF 8515

## (undated) DEVICE — CONNECTOR MALE TO MALE LL

## (undated) DEVICE — GLOVE SURG PI ULTRA TOUCH M SZ 6-1/2 LF

## (undated) DEVICE — GLOVE BIOGEL PI MICRO SZ 8.0 48580

## (undated) DEVICE — SU DERMABOND ADVANCED .7ML DNX12

## (undated) DEVICE — FCP BIOPSY RADIAL JAW 4 JUMBO 3.2MM CHANNEL M00513370

## (undated) DEVICE — ESU GROUND PAD ADULT REM W/15' CORD E7507DB

## (undated) DEVICE — SU VICRYL 2-0 UR-6 27" J602H

## (undated) DEVICE — SU MONOCRYL 4-0 PS-2 27" UND Y426H

## (undated) DEVICE — SU PROLENE 2-0 SHDA 36" 8523H

## (undated) DEVICE — WIPES FOLEY CARE SURESTEP PROVON DFC100

## (undated) DEVICE — PANTIES MESH LG/XLG 2PK 706M2

## (undated) DEVICE — CUP AND LID 2PK 2OZ STERILE  SSK9006A

## (undated) DEVICE — GLOVE GAMMEX NEOPRENE ULTRA SZ 7 LF 8514

## (undated) DEVICE — DRAPE MAYO STAND 23X54 8337

## (undated) DEVICE — CATH FOLEY 16FR 5ML LUBRICATH LATEX 0165L16

## (undated) DEVICE — PROBE COVER INTRAOPERATIVE 5"X96" PC1308

## (undated) RX ORDER — FENTANYL CITRATE 50 UG/ML
INJECTION, SOLUTION INTRAMUSCULAR; INTRAVENOUS
Status: DISPENSED
Start: 2024-06-21

## (undated) RX ORDER — PROPOFOL 10 MG/ML
INJECTION, EMULSION INTRAVENOUS
Status: DISPENSED
Start: 2024-10-29

## (undated) RX ORDER — ACETAMINOPHEN 325 MG/1
TABLET ORAL
Status: DISPENSED
Start: 2024-10-29

## (undated) RX ORDER — HYDROMORPHONE HYDROCHLORIDE 1 MG/ML
INJECTION, SOLUTION INTRAMUSCULAR; INTRAVENOUS; SUBCUTANEOUS
Status: DISPENSED
Start: 2024-09-01

## (undated) RX ORDER — ENOXAPARIN SODIUM 100 MG/ML
INJECTION SUBCUTANEOUS
Status: DISPENSED
Start: 2025-05-28

## (undated) RX ORDER — HYDROMORPHONE HCL IN WATER/PF 6 MG/30 ML
PATIENT CONTROLLED ANALGESIA SYRINGE INTRAVENOUS
Status: DISPENSED
Start: 2024-09-01

## (undated) RX ORDER — DEXAMETHASONE SODIUM PHOSPHATE 10 MG/ML
INJECTION, SOLUTION INTRAMUSCULAR; INTRAVENOUS
Status: DISPENSED
Start: 2023-01-23

## (undated) RX ORDER — ONDANSETRON 2 MG/ML
INJECTION INTRAMUSCULAR; INTRAVENOUS
Status: DISPENSED
Start: 2024-09-01

## (undated) RX ORDER — HEPARIN SODIUM (PORCINE) LOCK FLUSH IV SOLN 100 UNIT/ML 100 UNIT/ML
SOLUTION INTRAVENOUS
Status: DISPENSED
Start: 2025-05-09

## (undated) RX ORDER — FENTANYL CITRATE 50 UG/ML
INJECTION, SOLUTION INTRAMUSCULAR; INTRAVENOUS
Status: DISPENSED
Start: 2024-10-31

## (undated) RX ORDER — FENTANYL CITRATE-0.9 % NACL/PF 10 MCG/ML
PLASTIC BAG, INJECTION (ML) INTRAVENOUS
Status: DISPENSED
Start: 2024-09-01

## (undated) RX ORDER — GLYCOPYRROLATE 0.2 MG/ML
INJECTION, SOLUTION INTRAMUSCULAR; INTRAVENOUS
Status: DISPENSED
Start: 2024-09-01

## (undated) RX ORDER — SODIUM CHLORIDE, SODIUM LACTATE, POTASSIUM CHLORIDE, CALCIUM CHLORIDE 600; 310; 30; 20 MG/100ML; MG/100ML; MG/100ML; MG/100ML
INJECTION, SOLUTION INTRAVENOUS
Status: DISPENSED
Start: 2025-05-28

## (undated) RX ORDER — PROPOFOL 10 MG/ML
INJECTION, EMULSION INTRAVENOUS
Status: DISPENSED
Start: 2023-01-23

## (undated) RX ORDER — HEPARIN SODIUM (PORCINE) LOCK FLUSH IV SOLN 100 UNIT/ML 100 UNIT/ML
SOLUTION INTRAVENOUS
Status: DISPENSED
Start: 2025-04-22

## (undated) RX ORDER — FENTANYL CITRATE 50 UG/ML
INJECTION, SOLUTION INTRAMUSCULAR; INTRAVENOUS
Status: DISPENSED
Start: 2024-09-13

## (undated) RX ORDER — ONDANSETRON 2 MG/ML
INJECTION INTRAMUSCULAR; INTRAVENOUS
Status: DISPENSED
Start: 2025-04-22

## (undated) RX ORDER — ONDANSETRON 2 MG/ML
INJECTION INTRAMUSCULAR; INTRAVENOUS
Status: DISPENSED
Start: 2023-01-23

## (undated) RX ORDER — ACETAMINOPHEN 325 MG/1
TABLET ORAL
Status: DISPENSED
Start: 2025-05-28

## (undated) RX ORDER — FENTANYL CITRATE 50 UG/ML
INJECTION, SOLUTION INTRAMUSCULAR; INTRAVENOUS
Status: DISPENSED
Start: 2024-09-01

## (undated) RX ORDER — FENTANYL CITRATE 50 UG/ML
INJECTION, SOLUTION INTRAMUSCULAR; INTRAVENOUS
Status: DISPENSED
Start: 2025-05-28

## (undated) RX ORDER — FENTANYL CITRATE 50 UG/ML
INJECTION, SOLUTION INTRAMUSCULAR; INTRAVENOUS
Status: DISPENSED
Start: 2025-04-22

## (undated) RX ORDER — BUPIVACAINE HYDROCHLORIDE 2.5 MG/ML
INJECTION, SOLUTION EPIDURAL; INFILTRATION; INTRACAUDAL
Status: DISPENSED
Start: 2024-09-01

## (undated) RX ORDER — HYDROMORPHONE HYDROCHLORIDE 1 MG/ML
INJECTION, SOLUTION INTRAMUSCULAR; INTRAVENOUS; SUBCUTANEOUS
Status: DISPENSED
Start: 2024-10-29

## (undated) RX ORDER — ERTAPENEM 1 G/1
INJECTION, POWDER, LYOPHILIZED, FOR SOLUTION INTRAMUSCULAR; INTRAVENOUS
Status: DISPENSED
Start: 2025-05-28

## (undated) RX ORDER — HEPARIN SODIUM (PORCINE) LOCK FLUSH IV SOLN 100 UNIT/ML 100 UNIT/ML
SOLUTION INTRAVENOUS
Status: DISPENSED
Start: 2024-10-31

## (undated) RX ORDER — FENTANYL CITRATE 50 UG/ML
INJECTION, SOLUTION INTRAMUSCULAR; INTRAVENOUS
Status: DISPENSED
Start: 2023-01-23

## (undated) RX ORDER — HEPARIN SODIUM (PORCINE) LOCK FLUSH IV SOLN 100 UNIT/ML 100 UNIT/ML
SOLUTION INTRAVENOUS
Status: DISPENSED
Start: 2025-03-21

## (undated) RX ORDER — ONDANSETRON 2 MG/ML
INJECTION INTRAMUSCULAR; INTRAVENOUS
Status: DISPENSED
Start: 2024-10-29

## (undated) RX ORDER — DEXAMETHASONE SODIUM PHOSPHATE 4 MG/ML
INJECTION, SOLUTION INTRA-ARTICULAR; INTRALESIONAL; INTRAMUSCULAR; INTRAVENOUS; SOFT TISSUE
Status: DISPENSED
Start: 2024-09-01

## (undated) RX ORDER — HEPARIN SODIUM,PORCINE 10 UNIT/ML
VIAL (ML) INTRAVENOUS
Status: DISPENSED
Start: 2024-10-31

## (undated) RX ORDER — HYDROMORPHONE HYDROCHLORIDE 1 MG/ML
INJECTION, SOLUTION INTRAMUSCULAR; INTRAVENOUS; SUBCUTANEOUS
Status: DISPENSED
Start: 2025-05-28

## (undated) RX ORDER — GINSENG 100 MG
CAPSULE ORAL
Status: DISPENSED
Start: 2023-01-23

## (undated) RX ORDER — KETAMINE HYDROCHLORIDE 10 MG/ML
INJECTION INTRAMUSCULAR; INTRAVENOUS
Status: DISPENSED
Start: 2023-01-23

## (undated) RX ORDER — ACETAMINOPHEN 325 MG/1
TABLET ORAL
Status: DISPENSED
Start: 2025-04-22

## (undated) RX ORDER — HYDROMORPHONE HCL IN WATER/PF 6 MG/30 ML
PATIENT CONTROLLED ANALGESIA SYRINGE INTRAVENOUS
Status: DISPENSED
Start: 2025-04-22

## (undated) RX ORDER — LIDOCAINE HYDROCHLORIDE 10 MG/ML
INJECTION, SOLUTION EPIDURAL; INFILTRATION; INTRACAUDAL; PERINEURAL
Status: DISPENSED
Start: 2024-10-31

## (undated) RX ORDER — NYSTATIN 100000 [USP'U]/G
POWDER TOPICAL
Status: DISPENSED
Start: 2024-09-10

## (undated) RX ORDER — HYDROMORPHONE HYDROCHLORIDE 1 MG/ML
INJECTION, SOLUTION INTRAMUSCULAR; INTRAVENOUS; SUBCUTANEOUS
Status: DISPENSED
Start: 2025-04-22

## (undated) RX ORDER — HYDROMORPHONE HYDROCHLORIDE 2 MG/1
TABLET ORAL
Status: DISPENSED
Start: 2024-10-29

## (undated) RX ORDER — CEFAZOLIN SODIUM 2 G/50ML
SOLUTION INTRAVENOUS
Status: DISPENSED
Start: 2024-10-31

## (undated) RX ORDER — ACETAMINOPHEN 325 MG/1
TABLET ORAL
Status: DISPENSED
Start: 2024-09-01

## (undated) RX ORDER — SODIUM CHLORIDE, SODIUM LACTATE, POTASSIUM CHLORIDE, CALCIUM CHLORIDE 600; 310; 30; 20 MG/100ML; MG/100ML; MG/100ML; MG/100ML
INJECTION, SOLUTION INTRAVENOUS
Status: DISPENSED
Start: 2024-09-01

## (undated) RX ORDER — DEXAMETHASONE SODIUM PHOSPHATE 4 MG/ML
INJECTION, SOLUTION INTRA-ARTICULAR; INTRALESIONAL; INTRAMUSCULAR; INTRAVENOUS; SOFT TISSUE
Status: DISPENSED
Start: 2025-04-22

## (undated) RX ORDER — FENTANYL CITRATE-0.9 % NACL/PF 10 MCG/ML
PLASTIC BAG, INJECTION (ML) INTRAVENOUS
Status: DISPENSED
Start: 2025-04-22

## (undated) RX ORDER — PROPOFOL 10 MG/ML
INJECTION, EMULSION INTRAVENOUS
Status: DISPENSED
Start: 2025-04-22

## (undated) RX ORDER — HEPARIN SODIUM (PORCINE) LOCK FLUSH IV SOLN 100 UNIT/ML 100 UNIT/ML
SOLUTION INTRAVENOUS
Status: DISPENSED
Start: 2025-09-02